# Patient Record
Sex: MALE | Race: WHITE | NOT HISPANIC OR LATINO | Employment: UNEMPLOYED | URBAN - METROPOLITAN AREA
[De-identification: names, ages, dates, MRNs, and addresses within clinical notes are randomized per-mention and may not be internally consistent; named-entity substitution may affect disease eponyms.]

---

## 2018-02-20 ENCOUNTER — HOSPITAL ENCOUNTER (OUTPATIENT)
Facility: CLINIC | Age: 43
Setting detail: OBSERVATION
Discharge: HOME | End: 2018-02-21
Attending: EMERGENCY MEDICINE | Admitting: INTERNAL MEDICINE
Payer: COMMERCIAL

## 2018-02-20 DIAGNOSIS — F10.11 HISTORY OF ALCOHOL ABUSE: ICD-10-CM

## 2018-02-20 DIAGNOSIS — F10.982 ALCOHOL-INDUCED INSOMNIA (H): Primary | ICD-10-CM

## 2018-02-20 DIAGNOSIS — I48.91 ATRIAL FIBRILLATION WITH RVR (H): ICD-10-CM

## 2018-02-20 LAB
ALBUMIN SERPL-MCNC: 4 G/DL (ref 3.4–5)
ALP SERPL-CCNC: 54 U/L (ref 40–150)
ALT SERPL W P-5'-P-CCNC: 35 U/L (ref 0–70)
ANION GAP SERPL CALCULATED.3IONS-SCNC: 8 MMOL/L (ref 3–14)
AST SERPL W P-5'-P-CCNC: 24 U/L (ref 0–45)
BASOPHILS # BLD AUTO: 0 10E9/L (ref 0–0.2)
BASOPHILS NFR BLD AUTO: 0.2 %
BILIRUB SERPL-MCNC: 1.1 MG/DL (ref 0.2–1.3)
BUN SERPL-MCNC: 7 MG/DL (ref 7–30)
CALCIUM SERPL-MCNC: 9.1 MG/DL (ref 8.5–10.1)
CHLORIDE SERPL-SCNC: 106 MMOL/L (ref 94–109)
CO2 SERPL-SCNC: 23 MMOL/L (ref 20–32)
CREAT SERPL-MCNC: 0.77 MG/DL (ref 0.66–1.25)
D DIMER PPP FEU-MCNC: <0.3 UG/ML FEU (ref 0–0.5)
DIFFERENTIAL METHOD BLD: NORMAL
EOSINOPHIL # BLD AUTO: 0 10E9/L (ref 0–0.7)
EOSINOPHIL NFR BLD AUTO: 0.2 %
ERYTHROCYTE [DISTWIDTH] IN BLOOD BY AUTOMATED COUNT: 12.3 % (ref 10–15)
ETHANOL SERPL-MCNC: <0.01 G/DL
GFR SERPL CREATININE-BSD FRML MDRD: >90 ML/MIN/1.7M2
GLUCOSE SERPL-MCNC: 108 MG/DL (ref 70–99)
HCT VFR BLD AUTO: 47.5 % (ref 40–53)
HGB BLD-MCNC: 16.9 G/DL (ref 13.3–17.7)
IMM GRANULOCYTES # BLD: 0 10E9/L (ref 0–0.4)
IMM GRANULOCYTES NFR BLD: 0.4 %
LYMPHOCYTES # BLD AUTO: 2.1 10E9/L (ref 0.8–5.3)
LYMPHOCYTES NFR BLD AUTO: 24.8 %
MAGNESIUM SERPL-MCNC: 2.1 MG/DL (ref 1.6–2.3)
MCH RBC QN AUTO: 31.1 PG (ref 26.5–33)
MCHC RBC AUTO-ENTMCNC: 35.6 G/DL (ref 31.5–36.5)
MCV RBC AUTO: 88 FL (ref 78–100)
MONOCYTES # BLD AUTO: 0.8 10E9/L (ref 0–1.3)
MONOCYTES NFR BLD AUTO: 9.3 %
NEUTROPHILS # BLD AUTO: 5.6 10E9/L (ref 1.6–8.3)
NEUTROPHILS NFR BLD AUTO: 65.1 %
NRBC # BLD AUTO: 0 10*3/UL
NRBC BLD AUTO-RTO: 0 /100
NT-PROBNP SERPL-MCNC: 96 PG/ML (ref 0–450)
PLATELET # BLD AUTO: 225 10E9/L (ref 150–450)
POTASSIUM SERPL-SCNC: 3.5 MMOL/L (ref 3.4–5.3)
PROT SERPL-MCNC: 7.3 G/DL (ref 6.8–8.8)
RBC # BLD AUTO: 5.43 10E12/L (ref 4.4–5.9)
SODIUM SERPL-SCNC: 137 MMOL/L (ref 133–144)
TROPONIN I SERPL-MCNC: <0.015 UG/L (ref 0–0.04)
TSH SERPL DL<=0.005 MIU/L-ACNC: 3.56 MU/L (ref 0.4–4)
WBC # BLD AUTO: 8.5 10E9/L (ref 4–11)

## 2018-02-20 PROCEDURE — 80053 COMPREHEN METABOLIC PANEL: CPT | Performed by: EMERGENCY MEDICINE

## 2018-02-20 PROCEDURE — 83880 ASSAY OF NATRIURETIC PEPTIDE: CPT | Performed by: EMERGENCY MEDICINE

## 2018-02-20 PROCEDURE — 99285 EMERGENCY DEPT VISIT HI MDM: CPT | Mod: 25

## 2018-02-20 PROCEDURE — 96365 THER/PROPH/DIAG IV INF INIT: CPT

## 2018-02-20 PROCEDURE — 93005 ELECTROCARDIOGRAM TRACING: CPT

## 2018-02-20 PROCEDURE — 25000125 ZZHC RX 250: Performed by: EMERGENCY MEDICINE

## 2018-02-20 PROCEDURE — 96375 TX/PRO/DX INJ NEW DRUG ADDON: CPT

## 2018-02-20 PROCEDURE — 84484 ASSAY OF TROPONIN QUANT: CPT | Performed by: EMERGENCY MEDICINE

## 2018-02-20 PROCEDURE — 85025 COMPLETE CBC W/AUTO DIFF WBC: CPT | Performed by: EMERGENCY MEDICINE

## 2018-02-20 PROCEDURE — 99220 ZZC INITIAL OBSERVATION CARE,LEVL III: CPT | Performed by: INTERNAL MEDICINE

## 2018-02-20 PROCEDURE — 83735 ASSAY OF MAGNESIUM: CPT | Performed by: EMERGENCY MEDICINE

## 2018-02-20 PROCEDURE — 84443 ASSAY THYROID STIM HORMONE: CPT | Performed by: EMERGENCY MEDICINE

## 2018-02-20 PROCEDURE — 85379 FIBRIN DEGRADATION QUANT: CPT | Performed by: EMERGENCY MEDICINE

## 2018-02-20 PROCEDURE — 96374 THER/PROPH/DIAG INJ IV PUSH: CPT

## 2018-02-20 PROCEDURE — G0378 HOSPITAL OBSERVATION PER HR: HCPCS

## 2018-02-20 PROCEDURE — 25000128 H RX IP 250 OP 636: Performed by: EMERGENCY MEDICINE

## 2018-02-20 PROCEDURE — 80320 DRUG SCREEN QUANTALCOHOLS: CPT | Performed by: EMERGENCY MEDICINE

## 2018-02-20 PROCEDURE — 96372 THER/PROPH/DIAG INJ SC/IM: CPT

## 2018-02-20 RX ORDER — LORAZEPAM 2 MG/ML
1 INJECTION INTRAMUSCULAR ONCE
Status: COMPLETED | OUTPATIENT
Start: 2018-02-20 | End: 2018-02-20

## 2018-02-20 RX ORDER — DILTIAZEM HYDROCHLORIDE 5 MG/ML
20 INJECTION INTRAVENOUS ONCE
Status: COMPLETED | OUTPATIENT
Start: 2018-02-20 | End: 2018-02-20

## 2018-02-20 RX ORDER — IBUPROFEN 600 MG/1
600 TABLET, FILM COATED ORAL EVERY 6 HOURS PRN
Status: ON HOLD | COMMUNITY
End: 2018-02-21

## 2018-02-20 RX ORDER — DILTIAZEM HYDROCHLORIDE 5 MG/ML
25 INJECTION INTRAVENOUS
Status: DISCONTINUED | OUTPATIENT
Start: 2018-02-20 | End: 2018-02-21

## 2018-02-20 RX ADMIN — ENOXAPARIN SODIUM 90 MG: 100 INJECTION SUBCUTANEOUS at 22:12

## 2018-02-20 RX ADMIN — LORAZEPAM 1 MG: 2 INJECTION INTRAMUSCULAR; INTRAVENOUS at 20:44

## 2018-02-20 RX ADMIN — FOLIC ACID: 5 INJECTION, SOLUTION INTRAMUSCULAR; INTRAVENOUS; SUBCUTANEOUS at 20:43

## 2018-02-20 RX ADMIN — DILTIAZEM HYDROCHLORIDE 20 MG: 5 INJECTION INTRAVENOUS at 21:31

## 2018-02-20 ASSESSMENT — ENCOUNTER SYMPTOMS
MYALGIAS: 1
NERVOUS/ANXIOUS: 1

## 2018-02-20 NOTE — IP AVS SNAPSHOT
MRN:[id number]                      After Visit Summary   2/20/2018    Georges Moreno    MRN: [id number]           Thank you!     Thank you for choosing [facility] for your care. Our goal is always to provide you with excellent care. Hearing back from our patients is one way we can continue to improve our services. Please take a few minutes to complete the written survey that you may receive in the mail after you visit. If you would like to speak to someone directly about your visit please contact Patient Relations at [phone]. Thank you!          Patient Information     Date Of Birth          1975        Designated Caregiver       Most Recent Value    Caregiver    Will someone help with your care after discharge? yes    Name of designated caregiver Alberta Moreno    Phone number of caregiver [phone]    Caregiver address same as patient      About your hospital stay     You were admitted on:  February 20, 2018 You last received care in the:  [facility] [facility] [address] [facility]    You were discharged on:  February 21, 2018        Reason for your hospital stay       You were admitted for concerns of rapid heart rate and anxiety. You were found to have an irregular heart rhythm called Atrial fib. Your heart rate has improved with new medication. You will be started on Xarelto for blood thinner. You will need to follow up with Dr. Mccormack in 1 week as well as cardiology. Take meds as directed.                  Who to Call     For medical emergencies, please call [phone].  For non-urgent questions about your medical care, please call your primary care provider or clinic, [phone]          Attending Provider     Provider Specialty    Katie Hunt MD Emergency Medicine    [facility]       Primary Care Provider Office Phone # Fax #    Sarah Mccormack [phone]      After Care Instructions     Activity       Your activity upon discharge: activity as  tolerated            Diet       Follow this diet upon discharge: Orders Placed This Encounter      Combination Diet Regular Diet Adult.   Alcohol cessation and avoid excessive caffeine                  Follow-up Appointments     Follow-up and recommended labs and tests        Follow up with primary care provider, SARAH KAPOOR, within 7 days for hospital follow- up.   Follow up with Cardiology as instructed.                  Your next 10 appointments already scheduled     Feb 23, 2018 12:45 PM CST   New Visit with Theo Palmer MD   [facility]    [address] [place], [place] [id number]  [facility] [id number] [phone]              Further instructions from your care team       You have a follow up appointment with Dr. Sarah Kapoor on Saturday, 3/3/2018 at 10am at the [facility] in [place] ([address], [place], [place], [address]nd Floor). If you are unable to keep this appointment, please call them at [phone] option 2 to change.     Pending Results     Date and Time Order Name Status Description    2/21/2018 1218 Cardiac event monitor In process             Statement of Approval     Ordered          02/21/18 1411  I have reviewed and agree with all the recommendations and orders detailed in this document.  EFFECTIVE NOW     Approved and electronically signed by:  Dalila Basilio PA-C             Admission Information     Date & Time Provider Department Dept. Phone    2/20/2018 John Sampson MD [place] [address] [facility] [phone]      Your Vitals Were     Blood Pressure Pulse Temperature Respirations Height Weight    129/60 94 96.3  F (35.7  C) (Oral) 16 1.829 m (6') 88.7 kg (195 lb 8 oz)    Pulse Oximetry BMI (Body Mass Index)                96% 26.51 kg/m2          MyChart Information     [employer] lets you send messages to your doctor, view your test results, renew your prescriptions, schedule appointments and more. To sign up,  "go to [email or web address] . Click on \"Log in\" on the left side of the screen, which will take you to the Welcome page. Then click on \"Sign up Now\" on the right side of the page.     You will be asked to enter the access code listed below, as well as some personal information. Please follow the directions to create your username and password.     Your access code is: [email or web address]  Expires: 2018 12:37 PM     Your access code will  in 90 days. If you need help or a new code, please call your [facility] clinic or [phone].        Care EveryWhere ID     This is your Care EveryWhere ID. This could be used by other organizations to access your [facility] medical records  [id number]        Equal Access to Services     BRITTANY TURPIN : Esha López, fanta bishop, mei bhatti, michelet palmer. So [facility] [phone].    ATENCIÓN: Si habla español, tiene a chinchilla disposición servicios gratuitos de asistencia lingüística. Wyatt al [phone].    We comply with applicable federal civil rights laws and [place] laws. We do not discriminate on the basis of race, color, national origin, age, disability, sex, sexual orientation, or gender identity.               Review of your medicines      START taking        Dose / Directions    gabapentin 300 MG capsule   Commonly known as:  NEURONTIN   Used for:  History of alcohol abuse        Dose:  300 mg   Take 1 capsule (300 mg) by mouth 2 times daily   Quantity:  120 capsule   Refills:  1       metoprolol tartrate 50 MG tablet   Commonly known as:  LOPRESSOR        Dose:  50 mg   Take 1 tablet (50 mg) by mouth 2 times daily   Quantity:  60 tablet   Refills:  0       rivaroxaban ANTICOAGULANT 20 MG Tabs tablet   Commonly known as:  XARELTO        Dose:  20 mg   Take 1 tablet (20 mg) by mouth daily (with dinner)   Quantity:  30 tablet   Refills:  0         STOP taking     ASPIRIN EC PO           ibuprofen 600 MG tablet "   Commonly known as:  ADVIL/MOTRIN                Where to get your medicines      These medications were sent to [facility] [place], [place] - [address] [address]  [address] [address], [facility] [address]     Phone:  [phone]     gabapentin 300 MG capsule    metoprolol tartrate 50 MG tablet    rivaroxaban ANTICOAGULANT 20 MG Tabs tablet                Protect others around you: Learn how to safely use, store and throw away your medicines at www.disposemymeds.org.             Medication List: This is a list of all your medications and when to take them. Check marks below indicate your daily home schedule. Keep this list as a reference.      Medications           Morning Afternoon Evening Bedtime As Needed    gabapentin 300 MG capsule   Commonly known as:  NEURONTIN   Take 1 capsule (300 mg) by mouth 2 times daily   Next Dose Due:  Begin today 2/21/18- take one dose tonight and then begin taking twice daily tomorrow 2/22/18                                metoprolol tartrate 50 MG tablet   Commonly known as:  LOPRESSOR   Take 1 tablet (50 mg) by mouth 2 times daily   Last time this was given:  50 mg on 2/21/2018  8:36 AM   Next Dose Due:  Take again tonight, 2/21/18                                rivaroxaban ANTICOAGULANT 20 MG Tabs tablet   Commonly known as:  XARELTO   Take 1 tablet (20 mg) by mouth daily (with dinner)   Next Dose Due:  Begin tonight, 2/21/18                                          More Information        Understanding Atrial Fibrillation    An arrhythmia is any problem with the speed or pattern of the heartbeat. Atrial fibrillation (AFib) is a common type of arrhythmia. It causes fast, chaotic electrical signals in the atria. This leads to poor functioning of the heart. It also affects how much blood your heart can pump out to the body.  Afib may occur once in a while and go away on its own. Or it may continue for longer periods and need treatment.  AFib can lead to  serious problems, such as stroke. Your healthcare provider will need to monitor and manage it.  What happens during atrial fibrillation?   The heart has an electrical system that sends signals to control the heartbeat. As signals move through the heart, they tell the heart s upper chambers (atria) and lower chambers (ventricles) when to squeeze (contract) and relax. This lets blood move through the heart and out to the body and lungs.  With AFib, the atria receive abnormal signals. This causes them to contract in a fast and irregular way, and out of sync with the ventricles. When this happens, the atria also have a harder time moving blood into the ventricles. Blood may then pool in the atria, which increases the risk for blood clots and stroke. The ventricles also may contract too quickly and irregularly. As a result, they may not pump blood to the body and lungs as well as they should. This can weaken the heart muscle over time and cause heart failure.  What causes atrial fibrillation?  AFib is more common in older adults. It has many possible causes including:    Coronary artery disease    Heart valve disease    Heart attack    Heart surgery    High blood pressure    Thyroid disease    Diabetes    Lung disease    Sleep apnea    Heavy alcohol use  In some cases of AFib, doctors do not know the cause.  What are the symptoms of atrial fibrillation?  AFib may or may not cause symptoms. If symptoms do occur, they may include:    A fast, pounding, irregular heartbeat    Shortness of breath    Tiredness    Dizziness or fainting    Chest pain  How is atrial fibrillation treated?  Treatments for AFib can include any of the options below.    Medicines. You may be prescribed:    Heart rate medicines to help slow down the heartbeat    Heart rhythm medicines to help the heart beat more regularly    Anti-clotting medicines to help reduce the risk for blood clots and stroke    Electrical cardioversion. Your healthcare provider  uses special pads or paddles to send one or more brief electrical shocks to the heart. This can help reset the heartbeat to normal.    Ablation. Long, thin tubes called catheters are threaded through a blood vessel to the heart. There, the catheters send out hot or cold energy to the areas causing the abnormal signals. This energy destroys the problem tissue or cells. This improves the chances that your heart will stay in normal rhythm without using medicines. If your heart rate and rhythm can t be controlled, you may need ablation and a pacemaker. These will help control the heart rate and regularity of the heartbeat.    Surgery. During surgery, your healthcare provider may use different methods to create scar tissue in the areas of the heart causing the abnormal signals. The scar tissue disrupts the abnormal signals and may stop AFib from occurring.  What are the complications of atrial fibrillation?  These can include:    Blood clots    Stroke    Heart failure. This problem occurs when the heart muscle weakens so much that it can no longer pump blood well.  When should I call my healthcare provider?  Call your healthcare provider right away if you have any of these:    Symptoms that don t get better with treatment, or get worse    New symptoms   Date Last Reviewed: 5/1/2016 2000-2017 [employer]. [address] [place] [address]. All rights reserved. This information is not intended as a substitute for professional medical care. Always follow your healthcare professional's instructions.                Discharge Instructions for Atrial Fibrillation  You have been diagnosed with an abnormal heart rhythm called atrial fibrillation. With this condition, your heart s 2 upper chambers quiver rather than squeeze the blood out in a normal pattern. This leads to an irregular and sometimes rapid heartbeat. Some people will develop associated symptoms such as a flip-flopping heartbeat, chest pain,  lightheadedness, or shortness of breath. Other people may have no symptoms at all. Atrial fibrillation is serious because it affects the heart s ability to fill with blood as it should. Blood clots may form. This increases the risk for stroke. Untreated atrial fibrillation can also lead to heart failure. Atrial fibrillation can be controlled. With treatment, most people with atrial fibrillation lead normal lives.  Treatment options  Recommended treatment for atrial fibrillation depends on your age, symptoms, how long you have had atrial fibrillation, and other factors. You will have a complete evaluation to find out if you have any abnormalities that caused your heart to go into atrial fibrillation. This might be blocked heart arteries or a thyroid problem. Your doctor will assess your particular case and discuss choices with you.  Treatment choices may include:    Treating an underlying disorder that puts you at risk for atrial fibrillation. For example, correcting an abnormal thyroid or electrolyte problem, or treating a blocked heart artery.    Restoring a normal heart rhythm with an electrical shock (cardioversion) or with an antiarrhythmic medicine (chemical cardioversion)    Using medicine to control your heart rate in atrial fibrillation.    Preventing the risk for blood clot and stroke using blood-thinning medicines. Your doctor will tell you what he or she recommends. Choices may include aspirin, clopidogrel, warfarin, dabigatran, rivaroxaban, apixaban, and edoxaban.    Doing catheter ablation or a surgical maze procedure. These use different methods to destroy certain areas of heart tissue. This interrupts the electrical signals causing atrial fibrillation. One of these procedures may be a choice when medicines do not work, or as an alternative to long-term medicine.    Other treatment choices may be recommended for you by your doctor.  Managing risk factors for stroke and preventing heart failure are  important parts of any treatment plan for atrial fibrillation.  Home care    Take your medicines exactly as directed. Don t skip doses.    Work with your doctor to find the right medicines and doses for you.    Learn to take your own pulse. Keep a record of your results. Ask your doctor which pulse rates mean that you need medical attention. Slowing your pulse is often the goal of treatment. Ask your doctor if it s OK for you to use an automatic machine to check your pulse at home. Sometimes these machines don t count the pulse correctly when you have atrial fibrillation.    Limit your intake of coffee, tea, cola, and other beverages with caffeine. Talk with your doctor about whether you should eliminate caffeine.    Avoid over-the-counter medicines that have caffeine in them.    Let your doctor know what medicines you take, including prescription and over-the-counter medicines, as well as any supplements. They interfere with some medicines given for atrial fibrillation.    Ask your doctor about whether you can drink alcohol. Some people need to avoid alcohol to better treat atrial fibrillation. If you are taking blood-thinner medicines, alcohol may interfere with them by increasing their effect.    Never take stimulants such as amphetamines or cocaine. These drugs can speed up your heart rate and trigger atrial fibrillation.  Follow-up care  Follow up with your doctor, or as advised.     When should I call my healthcare provider  Call your healthcare provider right away if you have any of the following:    Weakness    Dizziness    Fainting    Fatigue    Shortness of breath    Chest pain with increased activity    A change in the usual regularity of your heartbeat, or an unusually fast heartbeat   Date Last Reviewed: 4/23/2016 2000-2017 [employer]. [address] [place] [address], [place] [address]. All rights reserved. This information is not intended as a substitute for professional medical care. Always  follow your healthcare professional's instructions.                Patient Education    Rivaroxaban Oral tablet    Rivaroxaban Oral tablet, Rivaroxaban Oral tablet  Rivaroxaban Oral tablet, Rivaroxaban Oral tablet  What is this medicine?  RIVAROXABAN (ri va ROD a ban) is an anticoagulant (blood thinner). It is used to treat blood clots in the lungs or in the veins. It is also used after knee or hip surgeries to prevent blood clots. It is also used to lower the chance of stroke in people with a medical condition called atrial fibrillation.  This medicine may be used for other purposes; ask your health care provider or pharmacist if you have questions.  What should I tell my health care provider before I take this medicine?  They need to know if you have any of these conditions:    bleeding disorders    bleeding in the brain    blood in your stools (black or tarry stools) or if you have blood in your vomit    history of stomach bleeding    kidney disease    liver disease    low blood counts, like low white cell, platelet, or red cell counts    recent or planned spinal or epidural procedure    take medicines that treat or prevent blood clots    an unusual or allergic reaction to rivaroxaban, other medicines, foods, dyes, or preservatives    pregnant or trying to get pregnant    breast-feeding  How should I use this medicine?  Take this medicine by mouth with a glass of water. Follow the directions on the prescription label. Take your medicine at regular intervals. Do not take it more often than directed. Do not stop taking except on your doctor's advice. Stopping this medicine may increase your risk of a blood clot. Be sure to refill your prescription before you run out of medicine.  If you are taking this medicine after hip or knee replacement surgery, take it with or without food. If you are taking this medicine for atrial fibrillation, take it with your evening meal. If you are taking this medicine to treat blood  clots, take it with food at the same time each day. If you are unable to swallow your tablet, you may crush the tablet and mix it in applesauce. Then, immediately eat the applesauce. You should eat more food right after you eat the applesauce containing the crushed tablet.  Talk to your pediatrician regarding the use of this medicine in children. Special care may be needed.  Overdosage: If you think you have taken too much of this medicine contact a poison control center or emergency room at once.  NOTE: This medicine is only for you. Do not share this medicine with others.  What if I miss a dose?  If you take your medicine once a day and miss a dose, take the missed dose as soon as you remember. If you take your medicine twice a day and miss a dose, take the missed dose immediately. In this instance, 2 tablets may be taken at the same time. The next day you should take 1 tablet twice a day as directed.  What may interact with this medicine?    aspirin and aspirin-like medicines    certain antibiotics like erythromycin, azithromycin, and clarithromycin    certain medicines for fungal infections like ketoconazole and itraconazole    certain medicines for irregular heart beat like amiodarone, quinidine, dronedarone    certain medicines for seizures like carbamazepine, phenytoin    certain medicines that treat or prevent blood clots like warfarin, enoxaparin, and dalteparin    conivaptan    diltiazem    felodipine    indinavir    lopinavir; ritonavir    NSAIDS, medicines for pain and inflammation, like ibuprofen or naproxen    ranolazine    rifampin    ritonavir    [place]'s wort    verapamil  This list may not describe all possible interactions. Give your health care provider a list of all the medicines, herbs, non-prescription drugs, or dietary supplements you use. Also tell them if you smoke, drink alcohol, or use illegal drugs. Some items may interact with your medicine.  What should I watch for while using this  medicine?  Visit your doctor or health care professional for regular checks on your progress. Your condition will be monitored carefully while you are receiving this medicine.  Notify your doctor or health care professional and seek emergency treatment if you develop breathing problems; changes in vision; chest pain; severe, sudden headache; pain, swelling, warmth in the leg; trouble speaking; sudden numbness or weakness of the face, arm, or leg. These can be signs that your condition has gotten worse.  If you are going to have surgery, tell your doctor or health care professional that you are taking this medicine.  Tell your health care professional that you use this medicine before you have a spinal or epidural procedure. Sometimes people who take this medicine have bleeding problems around the spine when they have a spinal or epidural procedure. This bleeding is very rare. If you have a spinal or epidural procedure while on this medicine, call your health care professional immediately if you have back pain, numbness or tingling (especially in your legs and feet), muscle weakness, paralysis, or loss of bladder or bowel control.  Avoid sports and activities that might cause injury while you are using this medicine. Severe falls or injuries can cause unseen bleeding. Be careful when using sharp tools or knives. Consider using an electric razor. Take special care brushing or flossing your teeth. Report any injuries, bruising, or red spots on the skin to your doctor or health care professional.  What side effects may I notice from receiving this medicine?  Side effects that you should report to your doctor or health care professional as soon as possible:    allergic reactions like skin rash, itching or hives, swelling of the face, lips, or tongue    back pain    redness, blistering, peeling or loosening of the skin, including inside the mouth    signs and symptoms of bleeding such as bloody or black, tarry stools; red  or dark-brown urine; spitting up blood or brown material that looks like coffee grounds; red spots on the skin; unusual bruising or bleeding from the eye, gums, or nose  Side effects that usually do not require medical attention (Report these to your doctor or health care professional if they continue or are bothersome.):    dizziness    muscle pain  This list may not describe all possible side effects. Call your doctor for medical advice about side effects. You may report side effects to FDA at 9-999-YXY-1048.  Where should I keep my medicine?  Keep out of the reach of children.  Store at room temperature between 15 and 30 degrees C (59 and 86 degrees F). Throw away any unused medicine after the expiration date.  NOTE: This sheet is a summary. It may not cover all possible information. If you have questions about this medicine, talk to your doctor, pharmacist, or health care provider.  NOTE:This sheet is a summary. It may not cover all possible information. If you have questions about this medicine, talk to your doctor, pharmacist, or health care provider. Copyright  2016 [employer]                Metoprolol tablets  Brand Name: Lopressor  What is this medicine?  METOPROLOL (me TOE proe lole) is a beta-blocker. Beta-blockers reduce the workload on the heart and help it to beat more regularly. This medicine is used to treat high blood pressure and to prevent chest pain. It is also used to after a heart attack and to prevent an additional heart attack from occurring.  How should I use this medicine?  Take this medicine by mouth with a drink of water. Follow the directions on the prescription label. Take this medicine immediately after meals. Take your doses at regular intervals. Do not take more medicine than directed. Do not stop taking this medicine suddenly. This could lead to serious heart-related effects.  Talk to your pediatrician regarding the use of this medicine in children. Special care may be  needed.  What side effects may I notice from receiving this medicine?  Side effects that you should report to your doctor or health care professional as soon as possible:    allergic reactions like skin rash, itching or hives    cold or numb hands or feet    depression    difficulty breathing    faint    fever with sore throat    irregular heartbeat, chest pain    rapid weight gain    swollen legs or ankles  Side effects that usually do not require medical attention (report to your doctor or health care professional if they continue or are bothersome):    anxiety or nervousness    change in sex drive or performance    dry skin    headache    nightmares or trouble sleeping    short term memory loss    stomach upset or diarrhea    unusually tired  What may interact with this medicine?  This medicine may interact with the following medications:    certain medicines for blood pressure, heart disease, irregular heart beat    certain medicines for depression like monoamine oxidase (MAO) inhibitors, fluoxetine, or paroxetine    clonidine    dobutamine    epinephrine    isoproterenol    reserpine  What if I miss a dose?  If you miss a dose, take it as soon as you can. If it is almost time for your next dose, take only that dose. Do not take double or extra doses.  Where should I keep my medicine?  Keep out of the reach of children.  Store at room temperature between 15 and 30 degrees C (59 and 86 degrees F). Throw away any unused medicine after the expiration date.  What should I tell my health care provider before I take this medicine?  They need to know if you have any of these conditions:    diabetes    heart or vessel disease like slow heart rate, worsening heart failure, heart block, sick sinus syndrome or Raynaud's disease    kidney disease    liver disease    lung or breathing disease, like asthma or emphysema    pheochromocytoma    thyroid disease    an unusual or allergic reaction to metoprolol, other  beta-blockers, medicines, foods, dyes, or preservatives    pregnant or trying to get pregnant    breast-feeding  What should I watch for while using this medicine?  Visit your doctor or health care professional for regular check ups. Contact your doctor right away if your symptoms worsen. Check your blood pressure and pulse rate regularly. Ask your health care professional what your blood pressure and pulse rate should be, and when you should contact them.  You may get drowsy or dizzy. Do not drive, use machinery, or do anything that needs mental alertness until you know how this medicine affects you. Do not sit or stand up quickly, especially if you are an older patient. This reduces the risk of dizzy or fainting spells. Contact your doctor if these symptoms continue. Alcohol may interfere with the effect of this medicine. Avoid alcoholic drinks.  NOTE:This sheet is a summary. It may not cover all possible information. If you have questions about this medicine, talk to your doctor, pharmacist, or health care provider. Copyright  2017 [employer]

## 2018-02-20 NOTE — Clinical Note
Admitting Physician: DAVEY MENJIVAR [[id number]]   [id number]   Bed Type: Cardiac Telemetry [44]   Special needs: Fall Risk [8]   Special needs: Cardiac Drip [12]   Bed request comments: BED REQUEST IN AT 2369

## 2018-02-20 NOTE — IP AVS SNAPSHOT
[place] [address] [facility]    [address]    [facility] [address]    Phone:  [phone]    Fax:  [phone]                                       After Visit Summary   2/20/2018    Georges Moreno    MRN: [id number]           After Visit Summary Signature Page     I have received my discharge instructions, and my questions have been answered. I have discussed any challenges I see with this plan with the nurse or doctor.    ..........................................................................................................................................  Patient/Patient Representative Signature      ..........................................................................................................................................  Patient Representative Print Name and Relationship to Patient    ..................................................               ................................................  Date                                            Time    ..........................................................................................................................................  Reviewed by Signature/Title    ...................................................              ..............................................  Date                                                            Time

## 2018-02-21 ENCOUNTER — APPOINTMENT (OUTPATIENT)
Dept: CARDIOLOGY | Facility: CLINIC | Age: 43
End: 2018-02-21
Attending: INTERNAL MEDICINE
Payer: COMMERCIAL

## 2018-02-21 VITALS
HEIGHT: 72 IN | HEART RATE: 94 BPM | OXYGEN SATURATION: 96 % | SYSTOLIC BLOOD PRESSURE: 129 MMHG | TEMPERATURE: 96.3 F | DIASTOLIC BLOOD PRESSURE: 60 MMHG | WEIGHT: 195.5 LBS | BODY MASS INDEX: 26.48 KG/M2 | RESPIRATION RATE: 16 BRPM

## 2018-02-21 PROBLEM — I48.91 ATRIAL FIBRILLATION WITH RVR (H): Status: RESOLVED | Noted: 2018-02-21 | Resolved: 2018-02-21

## 2018-02-21 PROBLEM — I48.91 ATRIAL FIBRILLATION WITH RVR (H): Status: ACTIVE | Noted: 2018-02-21

## 2018-02-21 LAB
ALBUMIN SERPL-MCNC: 3.8 G/DL (ref 3.4–5)
ALP SERPL-CCNC: 50 U/L (ref 40–150)
ALT SERPL W P-5'-P-CCNC: 35 U/L (ref 0–70)
ANION GAP SERPL CALCULATED.3IONS-SCNC: 7 MMOL/L (ref 3–14)
AST SERPL W P-5'-P-CCNC: 22 U/L (ref 0–45)
BILIRUB SERPL-MCNC: 1.5 MG/DL (ref 0.2–1.3)
BUN SERPL-MCNC: 8 MG/DL (ref 7–30)
CALCIUM SERPL-MCNC: 9 MG/DL (ref 8.5–10.1)
CHLORIDE SERPL-SCNC: 107 MMOL/L (ref 94–109)
CO2 SERPL-SCNC: 27 MMOL/L (ref 20–32)
CREAT SERPL-MCNC: 0.98 MG/DL (ref 0.66–1.25)
ERYTHROCYTE [DISTWIDTH] IN BLOOD BY AUTOMATED COUNT: 12.6 % (ref 10–15)
GFR SERPL CREATININE-BSD FRML MDRD: 83 ML/MIN/1.7M2
GLUCOSE SERPL-MCNC: 104 MG/DL (ref 70–99)
HCT VFR BLD AUTO: 52.9 % (ref 40–53)
HGB BLD-MCNC: 18.1 G/DL (ref 13.3–17.7)
INTERPRETATION ECG - MUSE: NORMAL
MAGNESIUM SERPL-MCNC: 2.4 MG/DL (ref 1.6–2.3)
MCH RBC QN AUTO: 31.1 PG (ref 26.5–33)
MCHC RBC AUTO-ENTMCNC: 34.2 G/DL (ref 31.5–36.5)
MCV RBC AUTO: 91 FL (ref 78–100)
PHOSPHATE SERPL-MCNC: 2.9 MG/DL (ref 2.5–4.5)
PLATELET # BLD AUTO: 210 10E9/L (ref 150–450)
POTASSIUM SERPL-SCNC: 4.2 MMOL/L (ref 3.4–5.3)
PROT SERPL-MCNC: 7.2 G/DL (ref 6.8–8.8)
RBC # BLD AUTO: 5.82 10E12/L (ref 4.4–5.9)
SODIUM SERPL-SCNC: 141 MMOL/L (ref 133–144)
WBC # BLD AUTO: 8 10E9/L (ref 4–11)

## 2018-02-21 PROCEDURE — 25000128 H RX IP 250 OP 636: Performed by: INTERNAL MEDICINE

## 2018-02-21 PROCEDURE — 83735 ASSAY OF MAGNESIUM: CPT | Performed by: INTERNAL MEDICINE

## 2018-02-21 PROCEDURE — G0378 HOSPITAL OBSERVATION PER HR: HCPCS

## 2018-02-21 PROCEDURE — 25000132 ZZH RX MED GY IP 250 OP 250 PS 637: Performed by: INTERNAL MEDICINE

## 2018-02-21 PROCEDURE — 84100 ASSAY OF PHOSPHORUS: CPT | Performed by: INTERNAL MEDICINE

## 2018-02-21 PROCEDURE — 93272 ECG/REVIEW INTERPRET ONLY: CPT | Performed by: INTERNAL MEDICINE

## 2018-02-21 PROCEDURE — 93306 TTE W/DOPPLER COMPLETE: CPT

## 2018-02-21 PROCEDURE — 36415 COLL VENOUS BLD VENIPUNCTURE: CPT | Performed by: INTERNAL MEDICINE

## 2018-02-21 PROCEDURE — 80053 COMPREHEN METABOLIC PANEL: CPT | Performed by: INTERNAL MEDICINE

## 2018-02-21 PROCEDURE — 85027 COMPLETE CBC AUTOMATED: CPT | Performed by: INTERNAL MEDICINE

## 2018-02-21 PROCEDURE — 93270 REMOTE 30 DAY ECG REV/REPORT: CPT | Performed by: PHYSICIAN ASSISTANT

## 2018-02-21 PROCEDURE — 99207 ZZC NON-BILLABLE SERV PER CHARTING: CPT | Performed by: PSYCHOLOGIST

## 2018-02-21 PROCEDURE — 99217 ZZC OBSERVATION CARE DISCHARGE: CPT | Performed by: INTERNAL MEDICINE

## 2018-02-21 PROCEDURE — 96372 THER/PROPH/DIAG INJ SC/IM: CPT

## 2018-02-21 PROCEDURE — 25000132 ZZH RX MED GY IP 250 OP 250 PS 637: Performed by: PHYSICIAN ASSISTANT

## 2018-02-21 PROCEDURE — 93306 TTE W/DOPPLER COMPLETE: CPT | Mod: 26 | Performed by: INTERNAL MEDICINE

## 2018-02-21 RX ORDER — MULTIPLE VITAMINS W/ MINERALS TAB 9MG-400MCG
1 TAB ORAL DAILY
Status: DISCONTINUED | OUTPATIENT
Start: 2018-02-21 | End: 2018-02-21 | Stop reason: HOSPADM

## 2018-02-21 RX ORDER — MAGNESIUM SULFATE HEPTAHYDRATE 40 MG/ML
4 INJECTION, SOLUTION INTRAVENOUS EVERY 4 HOURS PRN
Status: DISCONTINUED | OUTPATIENT
Start: 2018-02-21 | End: 2018-02-21 | Stop reason: HOSPADM

## 2018-02-21 RX ORDER — LORAZEPAM 1 MG/1
1-2 TABLET ORAL EVERY 30 MIN PRN
Status: DISCONTINUED | OUTPATIENT
Start: 2018-02-21 | End: 2018-02-21 | Stop reason: HOSPADM

## 2018-02-21 RX ORDER — QUETIAPINE FUMARATE 50 MG/1
50 TABLET, FILM COATED ORAL AT BEDTIME
Status: DISCONTINUED | OUTPATIENT
Start: 2018-02-21 | End: 2018-02-21 | Stop reason: HOSPADM

## 2018-02-21 RX ORDER — POTASSIUM CHLORIDE 7.45 MG/ML
10 INJECTION INTRAVENOUS
Status: DISCONTINUED | OUTPATIENT
Start: 2018-02-21 | End: 2018-02-21 | Stop reason: HOSPADM

## 2018-02-21 RX ORDER — FOLIC ACID 1 MG/1
1 TABLET ORAL DAILY
Status: DISCONTINUED | OUTPATIENT
Start: 2018-02-21 | End: 2018-02-21 | Stop reason: HOSPADM

## 2018-02-21 RX ORDER — METOPROLOL TARTRATE 50 MG
50 TABLET ORAL 2 TIMES DAILY
Qty: 60 TABLET | Refills: 0 | Status: SHIPPED | OUTPATIENT
Start: 2018-02-21 | End: 2018-03-30

## 2018-02-21 RX ORDER — POTASSIUM CHLORIDE 1.5 G/1.58G
20-40 POWDER, FOR SOLUTION ORAL
Status: DISCONTINUED | OUTPATIENT
Start: 2018-02-21 | End: 2018-02-21 | Stop reason: HOSPADM

## 2018-02-21 RX ORDER — METOPROLOL TARTRATE 50 MG
50 TABLET ORAL 2 TIMES DAILY
Status: DISCONTINUED | OUTPATIENT
Start: 2018-02-21 | End: 2018-02-21 | Stop reason: HOSPADM

## 2018-02-21 RX ORDER — GABAPENTIN 300 MG/1
300 CAPSULE ORAL 2 TIMES DAILY
Qty: 120 CAPSULE | Refills: 1 | Status: SHIPPED | OUTPATIENT
Start: 2018-02-21 | End: 2018-05-07

## 2018-02-21 RX ORDER — LIDOCAINE 40 MG/G
CREAM TOPICAL
Status: DISCONTINUED | OUTPATIENT
Start: 2018-02-21 | End: 2018-02-21

## 2018-02-21 RX ORDER — NALOXONE HYDROCHLORIDE 0.4 MG/ML
.1-.4 INJECTION, SOLUTION INTRAMUSCULAR; INTRAVENOUS; SUBCUTANEOUS
Status: DISCONTINUED | OUTPATIENT
Start: 2018-02-21 | End: 2018-02-21 | Stop reason: HOSPADM

## 2018-02-21 RX ORDER — LANOLIN ALCOHOL/MO/W.PET/CERES
100 CREAM (GRAM) TOPICAL DAILY
Status: DISCONTINUED | OUTPATIENT
Start: 2018-02-21 | End: 2018-02-21 | Stop reason: HOSPADM

## 2018-02-21 RX ORDER — POTASSIUM CL/LIDO/0.9 % NACL 10MEQ/0.1L
10 INTRAVENOUS SOLUTION, PIGGYBACK (ML) INTRAVENOUS
Status: DISCONTINUED | OUTPATIENT
Start: 2018-02-21 | End: 2018-02-21 | Stop reason: HOSPADM

## 2018-02-21 RX ORDER — POTASSIUM CHLORIDE 1500 MG/1
20-40 TABLET, EXTENDED RELEASE ORAL
Status: DISCONTINUED | OUTPATIENT
Start: 2018-02-21 | End: 2018-02-21 | Stop reason: HOSPADM

## 2018-02-21 RX ORDER — POTASSIUM CHLORIDE 29.8 MG/ML
20 INJECTION INTRAVENOUS
Status: DISCONTINUED | OUTPATIENT
Start: 2018-02-21 | End: 2018-02-21 | Stop reason: HOSPADM

## 2018-02-21 RX ORDER — LORAZEPAM 2 MG/ML
1-2 INJECTION INTRAMUSCULAR EVERY 30 MIN PRN
Status: DISCONTINUED | OUTPATIENT
Start: 2018-02-21 | End: 2018-02-21 | Stop reason: HOSPADM

## 2018-02-21 RX ORDER — NICOTINE 21 MG/24HR
1 PATCH, TRANSDERMAL 24 HOURS TRANSDERMAL DAILY
Status: DISCONTINUED | OUTPATIENT
Start: 2018-02-21 | End: 2018-02-21

## 2018-02-21 RX ADMIN — NICOTINE POLACRILEX 2 MG: 2 GUM, CHEWING ORAL at 11:51

## 2018-02-21 RX ADMIN — Medication 100 MG: at 08:36

## 2018-02-21 RX ADMIN — FOLIC ACID 1 MG: 1 TABLET ORAL at 08:36

## 2018-02-21 RX ADMIN — MULTIPLE VITAMINS W/ MINERALS TAB 1 TABLET: TAB at 08:36

## 2018-02-21 RX ADMIN — QUETIAPINE FUMARATE 50 MG: 50 TABLET, FILM COATED ORAL at 02:10

## 2018-02-21 RX ADMIN — NICOTINE 1 PATCH: 21 PATCH, EXTENDED RELEASE TRANSDERMAL at 02:11

## 2018-02-21 RX ADMIN — METOPROLOL TARTRATE 50 MG: 50 TABLET, FILM COATED ORAL at 08:36

## 2018-02-21 RX ADMIN — ENOXAPARIN SODIUM 90 MG: 100 INJECTION SUBCUTANEOUS at 10:34

## 2018-02-21 ASSESSMENT — ACTIVITIES OF DAILY LIVING (ADL)
AMBULATION: 0-->INDEPENDENT
TRANSFERRING: 0-->INDEPENDENT
EATING: 0 - INDEPENDENT
BATHING: 0-->INDEPENDENT
TOILETING: 0 - INDEPENDENT
RETIRED_EATING: 0-->INDEPENDENT
COMMUNICATION: 0 - UNDERSTANDS/COMMUNICATES WITHOUT DIFFICULTY
TOILETING: 0-->INDEPENDENT
DRESS: 0 - INDEPENDENT
DRESS: 0-->INDEPENDENT
SWALLOWING: 0 - SWALLOWS FOODS/LIQUIDS WITHOUT DIFFICULTY
BATHING: 0 - INDEPENDENT
SWALLOWING: 0-->SWALLOWS FOODS/LIQUIDS WITHOUT DIFFICULTY
COGNITION: 0 - NO COGNITION ISSUES REPORTED
RETIRED_COMMUNICATION: 0-->UNDERSTANDS/COMMUNICATES WITHOUT DIFFICULTY
TRANSFERRING: 0 - INDEPENDENT
AMBULATION: 0 - INDEPENDENT
FALL_HISTORY_WITHIN_LAST_SIX_MONTHS: NO

## 2018-02-21 NOTE — PHARMACY-ADMISSION MEDICATION HISTORY
Admission medication history interview status for this patient is complete. See [facility] admission navigator for allergy information, prior to admission medications and immunization status.     Medication history interview source(s):Patient  Medication history resources (including written lists, pill bottles, clinic record):None  Primary pharmacy:-    Changes made to PTA medication list:  Added: ibuprofen, aspirin  Deleted: -  Changed: -    Actions taken by pharmacist (provider contacted, etc):None     Additional medication history information:None    Medication reconciliation/reorder completed by provider prior to medication history? No    Do you take OTC medications (eg tylenol, ibuprofen, fish oil, eye/ear drops, etc)? yes(Y/N)    For patients on insulin therapy: no (Y/N)  Lantus/levemir/NPH/Mix 70/30 dose:   (Y/N) (see Med list for doses)   Sliding scale Novolog Y/N  If Yes, do you have a baseline novolog pre-meal dose:  units with meals  Patients eat three meals a day:   Y/N    How many episodes of hypoglycemia do you have per week: _______  How many missed doses do you have per week: ______  How many times do you check your blood glucose per day: _______   Any Barriers to therapy - Be specific :  cost of medications, comfortable with giving injections (if applicable), comfortable and confident with current diabetes regimen: Y/N ______________      Prior to Admission medications    Medication Sig Last Dose Taking? Auth Provider   ASPIRIN EC PO Take 325 mg by mouth daily as needed for fever  Yes Unknown, Entered By History   ibuprofen (ADVIL/MOTRIN) 600 MG tablet Take 600 mg by mouth every 6 hours as needed for moderate pain  Yes Unknown, Entered By History

## 2018-02-21 NOTE — PROGRESS NOTES
Pt to D/C to home.  Pt provided with d/c instructions, including new medications, when medications were last given, and when to take them again.  Pt also informed to f/u with Cardiology as scheduled on 2/23 and with PCP as scheduled on 3/3.  Encouraged cessation of alcohol consumption and limiting caffeine intake.  Pt verbalized understanding of all d/c and f/u instructions.  All questions were answered at this time.  Copy of paperwork sent with pt- including information on a-fib and new medications.  Pt provided with community resources for alcohol addition by PATRIZIA.  Cardiac monitor applied per cardiopulmonary department.  Medications (xarelto, lopressor, and gabapentin) x 3 sent with pt.  Friend to provide transport.  All personal belongings sent with pt.

## 2018-02-21 NOTE — PROGRESS NOTES
"PRIMARY DIAGNOSIS: \"GENERIC\" NURSING  OUTPATIENT/OBSERVATION GOALS TO BE MET BEFORE DISCHARGE:  1. ADLs back to baseline: Yes    2. Activity and level of assistance: Independent    3. Pain status: Denies     4. Return to near baseline physical activity: Yes     Discharge Planner Nurse   Safe discharge environment identified: Yes  Barriers to discharge: Yes       Entered by: Gosia Duran 02/21/2018 7:39 AM     Please review provider order for any additional goals.   Nurse to notify provider when observation goals have been met and patient is ready for discharge.    Pt up to unit @ 0100. Tachy, elevated BP, scheduled Metoprolol. On Ra, denies any pain. Up independently, voiding without difficulty, BM this shift, PIV saline locked, on tele monitoring A-fib with RVR (). CIWA scores 4,3. Nicotine patch in place. Consults: Chem dep Will continue to monitor.   "

## 2018-02-21 NOTE — ED NOTES
"Anxiety for the past week.  Worsening symptoms.  Uses ETOH daily with last drink about 1330 today.  Wants to quit drinking but causes so agitation and shakiness.  \"my body feels like it's going to explode\"  Unable to sleep.  Patient alert and oriented x3.  Airway, breathing and circulation intact.  "

## 2018-02-21 NOTE — PROGRESS NOTES
"PRIMARY DIAGNOSIS: \"GENERIC\" NURSING  OUTPATIENT/OBSERVATION GOALS TO BE MET BEFORE DISCHARGE:  1. ADLs back to baseline: Yes    Activity and level of assistance: Independent  2. Pain status: Denies     3. Return to near baseline physical activity: Yes     Discharge Planner Nurse   Safe discharge environment identified: Yes  Barriers to discharge: Yes       Entered by: Gosia Duran 02/21/2018 7:37 AM     Please review provider order for any additional goals.   Nurse to notify provider when observation goals have been met and patient is ready for discharge.  "

## 2018-02-21 NOTE — ED PROVIDER NOTES
"  History     Chief Complaint:  Panic attack     HPI:   The history is provided by the patient.      Georges Moreno is a 42 year old male with a history of anxiety and alcoholism who presents to the emergency department for evaluation of panic attacks. The patient reports he has been chronically using alcohol since he was a teenager. 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 He says that he has an MRI scheduled for tomorrow secondary to headaches that he has been having for the past 5 months and notes that he worries about the cause of these headaches daily. With his approaching MRI he has been thinking about it more frequently and thinks it is possible that thinking about this is giving him panic attacks, which would correlate with these worsening panic attacks in the last week which include racing heart rate and sweating.  Before this he was drinking about 8-10 beers a day and states that with these worsening panic attacks he has been trying to cut back on his alcohol usage. He presents today as his body \"feels like it is going to explode\". Here in the ED he notes that he has been in treatment 6 times and does not want to be admitted here today as he does not want to be late for his MRI appointment tomorrow. He does not believe to have ever gone through alcohol withdrawal symptoms but does not exactly know what those would be. He endorses that he has failed detox but is amenable to speaking to a therapist.     CARDIAC RISK FACTORS:  Sex:    Male   Tobacco:   Positive  Hypertension:   Negative  Hyperlipidemia:  Negative  Diabetes:   Negative  Family History:  Negative    Allergies:  No known drug allergies     Medications:    The patient is not currently taking any prescribed medications.     Past Medical History:    Anxiety   Alcoholism   Thoracic or lumbosacral neuritis or radiculitis, unspecified    Past Surgical History:    History reviewed. No pertinent surgical " history.     Family History:    Alcohol/drug - father and uncle     Social History:  Presents with no one    Tobacco use: 1.00 PPD   Alcohol use: Daily   History of alcoholism   PCP: MORGAN KAPOOR    Marital Status:  Single     Review of Systems   Musculoskeletal: Positive for myalgias.   Psychiatric/Behavioral: The patient is nervous/anxious.      Physical Exam     Patient Vitals for the past 24 hrs:   BP Temp Temp src Pulse Heart Rate Resp SpO2 Height Weight   02/20/18 2140 - - - - 93 16 97 % - -   02/20/18 2130 (!) 153/97 - - - 103 8 97 % - -   02/20/18 2125 - - - - 120 16 98 % - -   02/20/18 2115 (!) 137/111 - - - - 18 97 % - -   02/20/18 2100 (!) 122/95 - - - 112 10 97 % - -   02/20/18 2045 (!) 125/115 - - - 112 (!) 6 96 % - -   02/20/18 2030 (!) 121/102 - - - 120 (!) 5 97 % - -   02/20/18 2015 (!) 119/103 - - - 106 8 96 % - -   02/20/18 2000 110/76 - - - 107 9 97 % - -   02/20/18 1945 (!) 128/97 - - - 149 (!) 5 98 % - -   02/20/18 1930 (!) 136/94 - - - 98 (!) 6 97 % - -   02/20/18 1856 (!) 165/101 98.1  F (36.7  C) Oral 96 - 20 98 % 1.829 m (6') 90.7 kg (200 lb)        Physical Exam  General: Well-nourished, appears anxious  Eyes: PERRL, conjunctivae pink no scleral icterus or conjunctival injection  ENT:  Moist mucus membranes, posterior oropharynx clear without erythema or exudates  Respiratory:  Lungs clear to auscultation bilaterally, no crackles/rubs/wheezes.  Good air movement  CV: Irregularly irregular rhythm and tachycardic rate, no murmurs/rubs/gallops  GI:  Abdomen soft and non-distended.  Normoactive BS.  No tenderness, guarding or rebound  Skin: Warm, mildly diaphoretic over back. No rashes or petechiae  Musculoskeletal: No peripheral edema or calf tenderness  Neuro: Alert and oriented to person/place/time  Psychiatric: Anxious affect, mild tremulousness      Emergency Department Course   ECG (20:16:17):  Rate 133 bpm. MS interval *. QRS duration 86. QT/QTc 276/410. P-R-T axes * 90 -20. Atrial  fibrillation with rapid ventricular response. Rightward axis. Abnormal QRS-T angle, consider primary T wave abnormality. Abnormal ECG. Agree with computer interpretation.  Interpreted at 2018 by Katie Hunt MD.     Laboratory:  Alcohol ethyl: <0.01  CBC: WNL (WBC 8.5, HGB 16.9, )    CMP: Glucose 108 (high), ow WNL (Creatinine 0.77)   2010: Troponin I: <0.015    D-dimer: <0.3   TSH with free T4 reflex: 3.56  Magnesium: 2.1  Nt probnp inpatient (BNP): 96     Interventions:  2044: Ativan 1 mg IV   2043: 1L NS bolus with INFUVITE, thiamine 100 mg, folic acid 1 mg infusion IV  2131: Cardizem 20 mg IV  2212: Lovenox 90 mg subcutaneous    Caredizem pending on admission     Emergency Department Course:  Past medical records, nursing notes, and vitals reviewed.  1950: I performed an exam of the patient and obtained history, as documented above.     Above interventions provided.      Blood drawn. This was sent to the lab for further testing, results above.     I personally reviewed the laboratory results with the Patient and answered all related questions prior to admission.       Findings and plan explained to the Patient who consents to admission.     2140: Discussed the patient with Dr. Sampson, who will admit the patient to a medical bed for further monitoring, evaluation, and treatment.      Impression & Plan      Medical Decision Making:  Georges Moreno is a 42 year old male comes today with complaint of palpitations and anxiety in the setting of a long history of alcohol abuse and decreasing his drinking lately.   He was treated with ativan for possible withdrawal initially.  EKG then showed the patient to be in atrial fibrillation with RVR. There are no signs of cardiac ischemia and no signs or symptoms of pulmonary embolism, pneumonia or other acute process.  We rate controlled with Cardizem and started on Lovenox. Given that the duration since onset is unknown, I did not feel that it is safe to cardiovert in  the ED and so the patient will be admitted for ongoing anticoagulation, rate control and evaluation prior to decision for cardioversion versus chronic rate control and anticoagulation.  He is at high risk for withdrawal and will need to be closely monitors. I spoke with the hospitalist who graciously agreed to admit the patient to the telemetry floor.  The patient's heart rate is under better control and so I feel safe for the telemetry floor.      Diagnosis:    ICD-10-CM    1. Atrial fibrillation with RVR (H) I48.91 Magnesium     Magnesium     CANCELED: Magnesium   2. History of alcohol abuse Z87.898        Disposition:  Admitted to Dr. Sampson for further evaluation.     Scribe Disclosure:   I, Mateo Cornell, am serving as a scribe at 7:50 PM on 2/20/2018 to document services personally performed by Katie Hunt MD based on my observations and the provider's statements to me.       Mateo Cornell  2/20/2018   [facility] EMERGENCY DEPARTMENT       Katie Hunt MD  02/20/18 [id number]

## 2018-02-21 NOTE — DISCHARGE SUMMARY
[facility]  Outpatient/Observation Unit  Discharge Summary        Patient ID:  Georges Moreno  [id number]  42 year old  1975    Admit date: 2/20/2018    Discharge date and time: 2/21/2018     Admitting Provider: John Sampson MD    Discharge Provider: Dalila Basilio PA-C    Admission Diagnoses:  History of alcohol abuse [Z87.898]  New onset Atrial fibrillation with RVR (H) [I48.91]    Discharge Diagnoses: same     Admission Condition: good    Discharged Condition: good    Hospital Course:    Reason for your hospital stay       You were admitted for concerns of rapid heart rate and anxiety. You were   found to have an irregular heart rhythm called Atrial fib. Your heart rate   has improved with new medication. You will be started on Xarelto for blood   thinner. You will need to follow up with Dr. Mccormack in 1 week as well as   cardiology. Take meds as directed.       Mr. Moreno indicates that he has been trying to stop drinking since about New Year's.  He has been really generally unsuccessful and often finds that he needs to drink again to calm his nerves.  He also however notes that he is unable to quiet his mind when he tries to go to sleep.     Apparently he was hoping to stop drinking today after his last drink about  1:30 PM.  He eventually felt so poorly that he came to the emergency department for evaluation.     Mr. Moreno incidentally notes that he has had a right sided headache for months.  He has been evaluated with CT scans he reports and was hoping to get an MRI done tomorrow.  He reports that he has been evaluated by a neurologist who ordered the advanced imaging of his head and possibly his neck.  He has not had significant neurologic deficits.     Mr. Moreno was incidentally found to be in atrial fibrillation with rapid ventricular response in the emergency department.  He responded very well to an initial dose of diltiazem 20 mg IV once.  At the time that I saw him his heart  rate was well below 100 at rest.  He does not recall a history of atrial fibrillation.  He has no personal history of coronary artery disease.  He denies significant palpitations.     Pt was admitted under obs. He remained in rate controlled Afib during his hospitalization after initiation of metoprolol BID. ECHO was normal and had no WMA, or valvular insufficiencies.  He was started on Lovenox and transitioned to Xarelto. He was placed on an event monitor 30 days. He will see the cardiologist on  to discuss possible need for cardioversion.  In regards to the etoh abuse, he ha been in treatment 6 times and is not interested in going to inpatient rehab. I am not sure that he is really ready to quit. He states he drinks to help with stress and anxiety and wanted to a valium to help him with those concerns. I had Psychology see pt who further re-iterated the need to dec or stop etoh. After conferring with Dr. Mcgraw, he was started on gabapentin for anxiety and cravings. Pt was also counseled on dangers of OAC in the setting of etoh abuse.     Consults: psyche    Significant Diagnostic Studies:     Recent Labs  Lab 1844 18   WBC 8.0 8.5   HGB 18.1* 16.9   HCT 52.9 47.5   MCV 91 88    225       Recent Labs  Lab 1844 18    137   POTASSIUM 4.2 3.5   CHLORIDE 107 106   CO2 27 23   ANIONGAP 7 8   * 108*   BUN 8 7   CR 0.98 0.77   GFRESTIMATED 83 >90   GFRESTBLACK >90 >90   LEILA 9.0 9.1       Recent Labs  Lab 18  2010   TSH 3.56       Recent Labs  Lab 18  2010   TROPI <0.015         Results for orders placed or performed in visit on 05   X-RAY HIP UNI 2+ VW    Impression    REPORT OF OUTSIDE FILMS FROM [facility]    05  DEBORA PEARL : 71  RIGHT HIP: 2 VIEWS  HISTORY:  Hip pain.    FINDINGS:  Negative.    SARAH WESTON M.D./  D/T: 6-3-05           Ordering MD/Provider Initial  Interpretation:  Normal/Negative  Electronically filed by Alberta Pemberton     6/1/2005  3:53 PM  .         Treatments: Dilt, BB    Discharge Exam:    B/P: 129/60, T: 96.3, P: 94, R: 16  GENERAL:  Comfortable.  PSYCH: pleasant, oriented, No acute distress.  HEENT:  PERRLA. Normal conjunctiva, normal hearing, nasal mucosa and Oropharynx are normal.  NECK:  Supple, no neck vein distention, adenopathy or bruits, normal thyroid.  HEART:  irreg irreg  with no murmur, no pericardial rub, gallops or S3 or S4.  LUNGS:  Clear to auscultation, normal Respiratory effort. No wheezing, rales or ronchi.  ABDOMEN:  Soft, no hepatosplenomegaly, normal bowel sounds. Non-tender, non distended.   EXTREMITIES:  No pedal edema, +2 pulses bilateral and equal.  SKIN:  Dry to touch, No rash, wound or ulcerations.  NEUROLOGIC:  CN 2-12 intact, BL 5/5 symmetric upper and lower extremity strength, sensation is intact with no focal deficits.      Pending Studies:    Unresulted Labs Ordered in the Past 30 Days of this Admission     No orders found for last 61 day(s).           Disposition: home    Patient Instructions:        Review of your medicines      START taking       Dose / Directions    gabapentin 300 MG capsule   Commonly known as:  NEURONTIN   Used for:  History of alcohol abuse        Dose:  300 mg   Take 1 capsule (300 mg) by mouth 2 times daily   Quantity:  120 capsule   Refills:  1       metoprolol tartrate 50 MG tablet   Commonly known as:  LOPRESSOR        Dose:  50 mg   Take 1 tablet (50 mg) by mouth 2 times daily   Quantity:  60 tablet   Refills:  0       rivaroxaban ANTICOAGULANT 20 MG Tabs tablet   Commonly known as:  XARELTO        Dose:  20 mg   Take 1 tablet (20 mg) by mouth daily (with dinner)   Quantity:  30 tablet   Refills:  0         STOP taking          ASPIRIN EC PO           ibuprofen 600 MG tablet   Commonly known as:  ADVIL/MOTRIN                Where to get your medicines      These medications were sent to  [place], [place] - [address] [facility]  [address] [facility] [address]     Phone:  [phone]      gabapentin 300 MG capsule     metoprolol tartrate 50 MG tablet     rivaroxaban ANTICOAGULANT 20 MG Tabs tablet           Activity: activity as tolerated    Active Diet Order      Combination Diet Regular Diet Adult      Diet    Follow-up with Cardiology on Friday 2/23 and PCP in 1 week.    Signed:  Dalila Basilio

## 2018-02-21 NOTE — CONSULTS
Care Transition Initial Assessment -   Reason For Consult: substance use concerns  Met with: Patient    Active Problems:    * No active hospital problems. *       DATA  Lives With: parent(s)  Living Arrangements: house    Identified issues/concerns regarding health management: Pt met with Psychology. He would prefer to talk with his primary care MD for recommendation for MH and Cardiology support.   He is agreeable to have CC set up appt with primary care MD         ASSESSMENT  Cognitive Status:  awake, alert and oriented  Concerns to be addressed: Pt is open about his CD issues. He is not interested in CD at this time as he feels he has been through the program enough to know when he is ready to reach out for support. Pt is open to the idea of MH support for his anxiety.  SWS did offer to set something up for him. As mentioned above he will speak with his primary care Clinic.   PT would like to make sure he does not have any restrictions for working .     PLAN  Spoke with CC to set up new clinic appt for pt. Appt will be placed in DC instructions.

## 2018-02-21 NOTE — CONSULTS
This document was created with voice recognition software.  As result, there may be errors in grammar and syntax.  Please consider this when reading this document.      Psychological consult, covering for psychiatry, ordered by Dalila Basilio PA-C.  This was an initial consult, which took a total of 55 minutes, with over half the time spent coordinating care.    Summary of stay  Georges Moreno is a 42-year-old man with a history of alcohol abuse who came to attention today with complaints of intermittent extreme anxiety.  In the emergency department he was found to be in atrial fibrillation with rapid ventricular response and I was called to admit him. The patient's heart rate came down to  range after single 20 mg dose of diltiazem.  He is not shaky or jittery and his blood alcohol level at the time of admission is less than measurable threshold.    Patient is very liseth about in the past he has also used cocaine and marijuana.  He is also been through treatment multiple times but has never had a psychiatric diagnosis.  He drinks to fall asleep and he drinks to settle his nerves.  He does not have any exam findings or lab findings to suggest chronic liver disease or chronic alcohol toxicity.     Reason for consult  I was asked to see this patient to help with psychiatric/behavioral differential diagnosis, assess risk factors,  to facilitate a medication consultation with Dr. Mcgraw, and to provide input into discharge planning.     Records reviewed   H&P done by Dr. Sampson, including review of systems.    Progress notes/consults  Georges has been operative with nursing cares.     Previous treatment records  [facility] - no other records in the system.  A brief review of the Care Everywhere section did not locate any obvious psychiatric/behavioral treatment records.     Patient Report of Admission Events/Circumstances  Georges reports that he has been concerned about his health for about 5 months, as he has been  "experiencing persistent headaches.  He reports at the headaches started after a night of heavy drinking.  He currently is in the process of a neurological workup, and reports that he actually was scheduled to have his brain scanned today.  He had to wait 2 weeks for the scan, and this was hard for him and he reports that he has been more anxious during this period, and been drinking to cope and to help with sleep.    Mental and Chemical Health Treatment History    Garth has had, he estimates, 6 episodes of CD treatment.  Most, he reported, were a result of legal problems and were mandated treatments.  He had several psychiatric assessments as part of treatment, and reports that he was tried on several antidepressant medications, including Celexa, and did not find them to be helpful but also was monitored only during treatment and did not apparently follow through as part of his aftercare program.  He has never seen a psychiatrist or therapist outside of CD treatment.  We discussed the potential benefits of trying psychiatric medication, with a focus on helping him with his anxiety symptoms, with ongoing monitoring and also trying counseling.  He was receptive to this.     Georges acknowledged that he is drinking to excess, and spontaneously express concern about how his drinking has affected his mood.  He spontaneously said, \"I tend to overdo it.\"  I provided information about the current CDC guidelines for alcohol use, and recommended that he at least adhere to these guidelines (a maximum of 14 \"doses\" of alcohol per week,a maximum of 5 per day, and at least 2 days with no alcohol consumption) and he said, \"that is something to think about.\"  I also discussed the impact on his health of continued to drink like he is doing, and he also seems willing to think about this.  He spontaneously reported that he has found AA to be helpful in the past, has already considered resuming AA and establishing sobriety, and I strongly " "supported this.    Social Background  Garth is single, has no children, and currently lives with his mother.  He works in the construction industry, and currently is laid off.  The knowledge that he tends to drink more when laid off.  He reports having an active social life, primarily with other construction workers and he readily acknowledged that his group of buddies tends to drink excessively.    Behavioral Assessment  Garth was resting in his bed when I introduced myself, and readily agreed to be interviewed.  He was oriented in all spheres.  He spoke softly, and his speech was spontaneous, fluent, non-pressured and goal-directed.  His associations were intact.  He maintained normal attention and concentration throughout the interview.  There were no indications of hallucinations or any other indications of withdrawal from alcohol, and there were no obvious behavioral indications of any problems with his basic perceptual and cognitive processes.  His fund of knowledge appears to be average for his SES and stage of life.  He appears to be functioning in the Average range of cognitive abilities.    Scribed his current mood as \"tranquil,\" which he attributed to obtaining relief from anxiety symptoms due to medication prescribed yesterday.  He does not endorse current symptoms of depression, but does endorse classic symptoms of anxiety, including palpitations, ruminating, a sense of shaking, and inability to calm himself.  We discussed the potential benefits of medication that targets anxiety symptoms, he was willing to try what Dr. Mcgraw recommends, but also stated that he would prefer to PRN medication.  I informed him that this is not likely to be an option due to his current alcohol abuse, and he would need to establish sobriety and work with a community prescriber on this.    He reports that his sleep has been poor, which he attributes to anxiety about his health.  I also informed him that his drinking is " "certainly affecting the quality of his sleep, and he acknowledged this.    His insight and judgment are mixed; on the one hand, in spite of an extensive history of CD treatment, he has been abusing alcohol with daily and excessive intake.  On the other hand, he spontaneously said, \"I am a victim of my own bad choices (in regard to alcohol abuse)\" and he has already considered resuming AA and establishing sobriety.  He was receptive to the information that I provided and my support for sobriety and AA.    Risk Assessment  Garth has no history of suicidal behavior, and denied, with solid eye contact and convincing affect, current suicidal ideation.    Impressions  Strengths: Garth appears to be a temperamentally pleasant and engaging person who interacted with me openly and collaboratively.  Garth showed insight into the need to address his drinking problem and to get help with anxiety symptoms.  Liabilities: Garth is abusing alcohol, in spite of an extensive history of CD treatment    Consultation with other team members  I conferred remotely with Dr. Mcgraw, who recommended offering gabapentin, 300 mg, b.i.d., which can potentially help with both anxiety symptoms and potential withdrawal cravings. She supports the need for patient to get sober.     Diagnoses   Alcohol abuse, likely secondary to chronic dependency; anxiety disorder, unspecified; cardiac problems, per hospitalist team.    Recommendations (hold/sitter)  1. Offer medication, per Dr. Mcgraw's recommendation above.   2. I coached patient on accessing therapy by contacting the [facility] system.  3. When medically stable, discharge to community resources.   4. D/C recommendations should support sobriety,  Including attending AA.     Minh Webster Psy.D., L.P.  [phone]    "

## 2018-02-21 NOTE — ED NOTES
[facility]  ED Nurse Handoff Report    Georges Moreno is a 42 year old male w/hx of alcoholism presents today after attempting to wean self off.  EKG shows new afib w/rvr, dilt bolus and drip initiated.  Pt is cooperative, alert, and independent in ambulation.    ED Chief complaint: Panic Attack  . ED Diagnosis:   Final diagnoses:   Atrial fibrillation with RVR (H)   History of alcohol abuse     Allergies:   Allergies   Allergen Reactions     No Known Allergies        Code Status: Full Code  Activity level - Baseline/Home:  Independent. Activity Level - Current:   Independent. Lift room needed: No. Bariatric: No   Needed: No   Isolation: No. Infection: Not Applicable.     Vital Signs:   Vitals:    02/20/18 2115 02/20/18 2125 02/20/18 2130 02/20/18 2140   BP: (!) 137/111  (!) 153/97    Pulse:       Resp: 18 16 8 16   Temp:       TempSrc:       SpO2: 97% 98% 97% 97%   Weight:       Height:           Cardiac Rhythm:  ,      Pain level: 0-10 Pain Scale: 4  Patient confused: No. Patient Falls Risk: Yes.   Elimination Status: Has voided   Patient Report - Initial Complaint: anxiety. Focused Assessment: anxious appearing   Tests Performed: blood, EKG. Abnormal Results: EKG shows afib w/rvr.   Treatments provided: dilt & dilt drip, lovenox  Family Comments: at bedside  OBS brochure/video discussed/provided to patient:  Yes  ED Medications:   Medications   enoxaparin (LOVENOX) injection 90 mg (not administered)   diltiazem (CARDIZEM) injection 25 mg (not administered)   diltiazem (CARDIZEM) 125 mg in sodium chloride 0.9 % 125 mL infusion (not administered)   LORazepam (ATIVAN) injection 1 mg (1 mg Intravenous Given 2/20/18 2044)   sodium chloride 0.9 % 1,000 mL with INFUVITE 10 mL, thiamine 100 mg, folic acid 1 mg infusion ( Intravenous New Bag 2/20/18 2043)   diltiazem (CARDIZEM) injection 20 mg (20 mg Intravenous Given 2/20/18 2131)     Drips infusing: Yes, diltiazem  For the majority of the shift,  the patient's behavior Green. Interventions performed were n/a.     Severe Sepsis OR Septic Shock Diagnosis Present: No      ED Nurse Name/Phone Number: Jonathan Seaver,   9:53 PM  RECEIVING UNIT ED HANDOFF REVIEW    Above ED Nurse Handoff Report was reviewed: Yes  Reviewed by: Gosia Duran on February 21, 2018 at 12:27 AM

## 2018-02-21 NOTE — DISCHARGE INSTRUCTIONS
You have a follow up appointment with Dr. Sarah Mccormack on Saturday, 3/3/2018 at 10am at the [facility] in [place] ([address], [place], [place], [address]). If you are unable to keep this appointment, please call them at [phone] option 2 to change.

## 2018-02-21 NOTE — PHARMACY
NoAC coverage check.  Patient has [employer] Plus Medical Assistance.    Xarelto = $3/mo  Eliquis/Pradaxa = not covered    Jantoven = $1/mo    -DEBBIE Jaeger, Pharmacy Technician/Liaison, Discharge Pharmacy [phone]

## 2018-02-21 NOTE — PLAN OF CARE
"Problem: Patient Care Overview  Goal: Plan of Care/Patient Progress Review  Pt PRIMARY DIAGNOSIS: \"GENERIC\" NURSING  OUTPATIENT/OBSERVATION GOALS TO BE MET BEFORE DISCHARGE:  1. ADLs back to baseline: Yes    2. Activity and level of assistance: Ambulating independently.    3. Pain status: Pain free.    4. Return to near baseline physical activity: Yes     Discharge Planner Nurse   Safe discharge environment identified: Yes  Barriers to discharge: No       Entered by: Alanna Serrano 02/21/2018 2:56 PM     Please review provider order for any additional goals.   Nurse to notify provider when observation goals have been met and patient is ready for discharge.      "

## 2018-02-21 NOTE — PLAN OF CARE
"Problem: Patient Care Overview  Goal: Plan of Care/Patient Progress Review  Outcome: Adequate for Discharge Date Met: 02/21/18  PRIMARY DIAGNOSIS: \"GENERIC\" NURSING  OUTPATIENT/OBSERVATION GOALS TO BE MET BEFORE DISCHARGE:  1. ADLs back to baseline: Yes    2. Activity and level of assistance: Ambulating independently.    3. Pain status: Pain free.    4. Return to near baseline physical activity: Yes     Discharge Planner Nurse   Safe discharge environment identified: Yes  Barriers to discharge: Yes, awaiting  to provide resources and cardiac event monitor to be placed.        Entered by: Alanna Serrano 02/21/2018 12:51 PM     Please review provider order for any additional goals.   Nurse to notify provider when observation goals have been met and patient is ready for discharge.      "

## 2018-02-21 NOTE — H&P
[facility] History and Physical    Georges Moreno MRN# [id number]   Age: 42 year old YOB: 1975     Date of Admission:  2/20/2018    [facility] clinic: [facility]  Primary care provider: Sarah Mcocrmack          Assessment and Plan:   Assessment:   Georges Moreno is a 42-year-old man with a history of alcohol abuse who came to attention today with complaints of intermittent extreme anxiety.  In the emergency department he was found to be in atrial fibrillation with rapid ventricular response and I was called to admit him.    Notably the patient's heart rate came down to  range after single 20 mg dose of diltiazem.  He is not shaky or jittery and his blood alcohol level at the time of admission is less than measurable threshold.    Diagnoses:  1.  Atrial fibrillation with rapid ventricular response.  This well may be due to chronic alcohol abuse.  On physical examination the patient does not have any evidence of cardiomyopathy.  His rate actually seems as though it was quite easy to control.  2.  Alcohol abuse.  Patient is very liseth about this noting that in the past she has also used cocaine and marijuana.  He is also been through treatment multiple times but has never had a psychiatric diagnosis.  He drinks to fall asleep and he drinks to settle his nerves.  He notably does not have any exam findings or lab findings to suggest chronic liver disease or chronic alcohol toxicity.  3.  Probable untreated anxiety disorder.  4.  Chronic headache for which the patient has planned outpatient evaluation on Wednesday morning at 11 AM in [place].  He is aware that he may not be able to make it to this appointment.      Plan:   1.  Admit to observation on telemetry.  2.  Metoprolol 50 mg p.o. twice daily.  As needed diltiazem.  3.  Patient was started on Lovenox in the emergency department but I think it reasonable to see which of the oral anticoagulants is available to him through his  insurance.  4.  Seroquel 50 mg p.o. nightly.  5.  CIWA scale.   6.  I have asked social work to give him information about local psychiatric care.  Of the [facility] also has good psychiatric services.  I discussed this with the patient tonight.             Chief Complaint:   Severe anxiety and this alcoholic man.     History is obtained from the patient and EMR.     Mr. Moreno indicates that he has been trying to stop drinking since about New Year's.  He has been really generally unsuccessful and often finds that he needs to drink again to calm his nerves.  He also however notes that he is unable to quiet his mind when he tries to go to sleep.    Apparently he was hoping to stop drinking today after his last drink about  1:30 PM.  He eventually felt so poorly that he came to the emergency department for evaluation.    Mr. Moreno incidentally notes that he has had a right sided headache for months.  He has been evaluated with CT scans he reports and was hoping to get an MRI done tomorrow.  He reports that he has been evaluated by a neurologist who ordered the advanced imaging of his head and possibly his neck.  He has not had significant neurologic deficits.    Mr. Moreno was incidentally found to be in atrial fibrillation with rapid ventricular response in the emergency department.  He responded very well to an initial dose of diltiazem 20 mg IV once.  At the time that I saw him his heart rate was well below 100 at rest.  He does not recall a history of atrial fibrillation.  He has no personal history of coronary artery disease.  He denies significant palpitations.        Past Medical History:     Past Medical History:   Diagnosis Date     Anxiety      Backache, unspecified      Personal history of alcoholism (H)      Substance abuse              Past Surgical History:    History reviewed. No pertinent surgical history.          Social History:     Social History   Substance Use Topics     Smoking status:  Current Every Day Smoker     Packs/day: 1.00     Years: 15.00     Smokeless tobacco: Never Used     Alcohol use Yes      Comment: daily             Family History:     Family History   Problem Relation Age of Onset     Alcohol/Drug Father      Alcohol/Drug Paternal Uncle      Family History Negative Mother      Family history not contributory to this hospitalization.          Allergies:     Allergies   Allergen Reactions     No Known Allergies              Medications:   None.         Review of Systems:   A comprehensive review of systems was performed and found to be negative except as described in this note           Physical Exam:   Vitals were reviewed  Temp: 98.1  F (36.7  C) Temp src: Oral BP: (!) 141/109 Pulse: 96 Heart Rate: 92 Resp: 9 SpO2: 97 % O2 Device: None (Room air)    Constitutional: Awake, alert, cooperative, no apparent distress, and appears stated age.  Eyes: Lids and lashes normal, pupils equal, round and reactive to light, extra ocular muscles intact, sclera clear, conjunctiva normal.  ENT: Normocephalic, without obvious abnormality, atraumatic, oral pharynx with moist mucus membranes, tonsils without erythema or exudates, gums normal and good dentition.  Neck: Supple, symmetrical, trachea midline, no adenopathy, thyroid symmetric, not enlarged and no tenderness, skin normal.  Hematologic / Lymphatic: No cervical lymphadenopathy and no supraclavicular lymphadenopathy.  Back: Symmetric, no curvature, spinous processes are non-tender on palpation, paraspinous muscles are non-tender on palpation, no costal vertebral tenderness.  Lungs: No increased work of breathing, good air exchange, clear to auscultation bilaterally, no crackles or wheezing.  Cardiovascular: IRRIR, normal S1 and S2, no S3 or S4, and no murmur noted.  Abdomen: No scars, normal bowel sounds, soft, non-distended, non-tender, no masses palpated, no hepatosplenomegaly.  Musculoskeletal: No redness, warmth, or swelling of the joints.  Motor strength is 5 out of 5 all extremities bilaterally.  Tone is normal.  Neurologic: Awake, alert, oriented to name, place and time.  Cranial nerves II-XII are grossly intact.  Motor is 5 out of 5 bilaterally.  No appreciable tremor.  Neuropsychiatric: Normal affect, mood, orientation, memory and insight.  Skin: No rashes, erythema, pallor, petechia or purpura.          Data:     Results for orders placed or performed during the hospital encounter of 02/20/18 (from the past 24 hour(s))   CBC with platelets differential   Result Value Ref Range    WBC 8.5 4.0 - 11.0 10e9/L    RBC Count 5.43 4.4 - 5.9 10e12/L    Hemoglobin 16.9 13.3 - 17.7 g/dL    Hematocrit 47.5 40.0 - 53.0 %    MCV 88 78 - 100 fl    MCH 31.1 26.5 - 33.0 pg    MCHC 35.6 31.5 - 36.5 g/dL    RDW 12.3 10.0 - 15.0 %    Platelet Count 225 150 - 450 10e9/L    Diff Method Automated Method     % Neutrophils 65.1 %    % Lymphocytes 24.8 %    % Monocytes 9.3 %    % Eosinophils 0.2 %    % Basophils 0.2 %    % Immature Granulocytes 0.4 %    Nucleated RBCs 0 0 /100    Absolute Neutrophil 5.6 1.6 - 8.3 10e9/L    Absolute Lymphocytes 2.1 0.8 - 5.3 10e9/L    Absolute Monocytes 0.8 0.0 - 1.3 10e9/L    Absolute Eosinophils 0.0 0.0 - 0.7 10e9/L    Absolute Basophils 0.0 0.0 - 0.2 10e9/L    Abs Immature Granulocytes 0.0 0 - 0.4 10e9/L    Absolute Nucleated RBC 0.0    Comprehensive metabolic panel   Result Value Ref Range    Sodium 137 133 - 144 mmol/L    Potassium 3.5 3.4 - 5.3 mmol/L    Chloride 106 94 - 109 mmol/L    Carbon Dioxide 23 20 - 32 mmol/L    Anion Gap 8 3 - 14 mmol/L    Glucose 108 (H) 70 - 99 mg/dL    Urea Nitrogen 7 7 - 30 mg/dL    Creatinine 0.77 0.66 - 1.25 mg/dL    GFR Estimate >90 >60 mL/min/1.7m2    GFR Estimate If Black >90 >60 mL/min/1.7m2    Calcium 9.1 8.5 - 10.1 mg/dL    Bilirubin Total 1.1 0.2 - 1.3 mg/dL    Albumin 4.0 3.4 - 5.0 g/dL    Protein Total 7.3 6.8 - 8.8 g/dL    Alkaline Phosphatase 54 40 - 150 U/L    ALT 35 0 - 70 U/L    AST 24 0  - 45 U/L   Troponin I   Result Value Ref Range    Troponin I ES <0.015 0.000 - 0.045 ug/L   D dimer quantitative   Result Value Ref Range    D Dimer <0.3 0.0 - 0.50 ug/ml FEU   Alcohol ethyl   Result Value Ref Range    Ethanol g/dL <0.01 <0.01 g/dL   Nt probnp inpatient   Result Value Ref Range    N-Terminal Pro BNP Inpatient 96 0 - 450 pg/mL   TSH with free T4 reflex   Result Value Ref Range    TSH 3.56 0.40 - 4.00 mU/L   Magnesium   Result Value Ref Range    Magnesium 2.1 1.6 - 2.3 mg/dL   EKG 12-lead, tracing only   Result Value Ref Range    Interpretation ECG Click View Image link to view waveform and result         Attestation:  I have reviewed today's vital signs, notes, medications, labs and imaging.  Total time: 35 minutes     John Sampson MD

## 2018-02-28 ENCOUNTER — OFFICE VISIT (OUTPATIENT)
Dept: CARDIOLOGY | Facility: CLINIC | Age: 43
End: 2018-02-28
Payer: COMMERCIAL

## 2018-02-28 VITALS
BODY MASS INDEX: 28 KG/M2 | WEIGHT: 206.7 LBS | SYSTOLIC BLOOD PRESSURE: 124 MMHG | HEART RATE: 60 BPM | HEIGHT: 72 IN | DIASTOLIC BLOOD PRESSURE: 76 MMHG

## 2018-02-28 DIAGNOSIS — Z13.6 SCREENING FOR CARDIOVASCULAR CONDITION: Primary | ICD-10-CM

## 2018-02-28 PROCEDURE — 93000 ELECTROCARDIOGRAM COMPLETE: CPT | Performed by: INTERNAL MEDICINE

## 2018-02-28 PROCEDURE — 99244 OFF/OP CNSLTJ NEW/EST MOD 40: CPT | Performed by: INTERNAL MEDICINE

## 2018-02-28 NOTE — LETTER
2/28/2018    MORGAN KAPOOR MD  [facility] [address]   [place] [address]    RE: Georges Moreno       Dear Colleague,    I had the pleasure of seeing Georges Moreno in the [facility].    HPI and Plan:   See dictation    Orders Placed This Encounter   Procedures     EKG 12-lead complete w/read - Clinics (performed today)       Orders Placed This Encounter   Medications     amoxicillin-clavulanate (AUGMENTIN) 875-125 MG per tablet     Sig: Take 1 tablet by mouth 2 times daily Take for 10 days       There are no discontinued medications.      Encounter Diagnosis   Name Primary?     Screening for cardiovascular condition Yes       CURRENT MEDICATIONS:  Current Outpatient Prescriptions   Medication Sig Dispense Refill     amoxicillin-clavulanate (AUGMENTIN) 875-125 MG per tablet Take 1 tablet by mouth 2 times daily Take for 10 days       rivaroxaban ANTICOAGULANT (XARELTO) 20 MG TABS tablet Take 1 tablet (20 mg) by mouth daily (with dinner) 30 tablet 0     metoprolol tartrate (LOPRESSOR) 50 MG tablet Take 1 tablet (50 mg) by mouth 2 times daily 60 tablet 0     gabapentin (NEURONTIN) 300 MG capsule Take 1 capsule (300 mg) by mouth 2 times daily (Patient not taking: Reported on 2/28/2018) 120 capsule 1       ALLERGIES     Allergies   Allergen Reactions     No Known Allergies        PAST MEDICAL HISTORY:  Past Medical History:   Diagnosis Date     Anxiety      Depression, major, single episode     moderate     PUSHPA (generalized anxiety disorder)      HA (headache)      Personal history of alcoholism (H)      Substance abuse      Tobacco dependence        PAST SURGICAL HISTORY:  No past surgical history on file.    FAMILY HISTORY:  Family History   Problem Relation Age of Onset     Family History Negative Mother      Alcohol/Drug Father      DIABETES Father      Leukemia Father      Alcohol/Drug Paternal Uncle        SOCIAL HISTORY:  Social History     Social History     Marital  status: Single     Spouse name: N/A     Number of children: N/A     Years of education: N/A     Occupational History     construction      Social History Main Topics     Smoking status: Current Every Day Smoker     Years: 15.00     Smokeless tobacco: Never Used      Comment: a little under a pack a day     Alcohol use Yes      Comment: no alcohol since 2/20/18     Drug use: Yes     Special: Cocaine, Marijuana     Sexual activity: Not Asked     Other Topics Concern     None     Social History Narrative       Review of Systems:  Skin:  Negative       Eyes:  Negative      ENT:  Negative      Respiratory:  Positive for cough;wheezing     Cardiovascular:    Positive for;palpitations;fatigue chest tightness; fatigue occ  Gastroenterology: Negative      Genitourinary:  Negative      Musculoskeletal:  Positive for joint pain;nocturnal cramping    Neurologic:  Positive for headaches    Psychiatric:  Positive for sleep disturbances;anxiety    Heme/Lymph/Imm:  Negative      Endocrine:  Negative        Physical Exam:  Vitals: /76 (BP Location: Right arm, Patient Position: Chair, Cuff Size: Adult Large)  Pulse 60  Ht 1.829 m (6')  Wt 93.8 kg (206 lb 11.2 oz)  BMI 28.03 kg/m2    Constitutional:  cooperative, alert and oriented, well developed, well nourished, in no acute distress        Skin:  warm and dry to the touch, no apparent skin lesions or masses noted          Head:  normocephalic, no masses or lesions        Eyes:  pupils equal and round, conjunctivae and lids unremarkable, sclera white, no xanthalasma, EOMS intact, no nystagmus        Lymph:No Cervical lymphadenopathy present     ENT:  no pallor or cyanosis, dentition good        Neck:  carotid pulses are full and equal bilaterally, JVP normal, no carotid bruit        Respiratory:  normal breath sounds, clear to auscultation, normal A-P diameter, normal symmetry, normal respiratory excursion, no use of accessory muscles         Cardiac: regular rhythm, normal  S1/S2, no S3 or S4, apical impulse not displaced, no murmurs, gallops or rubs                                                         GI:  abdomen soft, non-tender, BS normoactive, no mass, no HSM, no bruits        Extremities and Muscular Skeletal:  no deformities, clubbing, cyanosis, erythema observed              Neurological:  no gross motor deficits        Psych:  Alert and Oriented x 3        CC  Sarah Mccormack MD  [facility]  [address] [place] [address]                Service Date: 02/28/2018      HISTORY OF PRESENT ILLNESS:  I saw Mr. Moreno for evaluation of atrial fibrillation.  He is a 42-year-old white male who presented to the emergency room with severe anxiety and was found to be in atrial fibrillation with rapid ventricular rate on 02/20.  He was admitted for overnight observation, and he was converted to sinus rhythm.  Since the discharge, he has been on Xarelto and metoprolol.  He has no apparent side effects from the above medications.  Symptomatically, he has not had any palpitation, shortness of breath or chest pain.  He has been on a cardiac event monitor which has not recorded any atrial fibrillation so far.      The patient has a long history of heavy alcohol use.  He did have alcohol drinking on 02/20, but his alcohol level was relatively low when he presented to the ER.  He stated that he has been sober since the hospital admission.      The patient continues to smoke cigarettes.      He has been having complaint of a headache.  He had a brain MRI yesterday, which I believe was incomplete due to severe anxiety.  The other problem is the diagnosis of depression.      PHYSICAL EXAMINATION:   VITAL SIGNS:  On examination blood pressure was 124/76, heart rate 60 beats per minute, body weight 206 pounds.   HEENT:  Eyes and ENT were unremarkable.   LUNGS:  Clear.   CARDIAC:  Rhythm was regular, and heart sounds were normal with no murmur.   ABDOMEN:  Showed moderate obesity.    EXTREMITIES:  There was no pedal edema.      EKG today showed normal sinus rhythm.  His echocardiography reported normal ejection fraction without valvular disease.      ASSESSMENT AND RECOMMENDATIONS:  Mr. Pearl is a 42-year-old white male who had 1 episode of atrial fibrillation with spontaneous conversion.  He was using alcohol heavily, and in fact, he stated that he was drunk a couple of days prior to the hospitalization.  He has been sober and so far has not had recurrent atrial fibrillation.  For the time being, I do not recommend other intervention.  I agree for him to stop Xarelto and metoprolol by the end of March if he has no recurrent atrial fibrillation.  He is advised to continue the use of the CardioNet event monitor until the end of 1 month monitoring.  If he has no recurrent atrial fibrillation, he does not need to have routine Cardiology followup visit.      The patient is strongly encouraged to quit smoking in the near future.      cc:   Sarah Mccormack MD    [facility] [address] [place]  [address]         GILBERT AKHTAR MD             D: 2018   T: 2018   MT: ROSITA      Name:     DEBORA PEARL   MRN:      [id number]        Account:      [id number]   :      1975           Service Date: 2018      Document: [id number]        Thank you for allowing me to participate in the care of your patient.      Sincerely,     Gilbert Akhtar MD     [facility]    cc:   Sarah Mccormack MD  [facility] [address] [place] [address]

## 2018-02-28 NOTE — MR AVS SNAPSHOT
"              After Visit Summary   2018    Georges Moreno    MRN: [id number]           Patient Information     Date Of Birth          1975        Visit Information        Provider Department      2018 2:15 PM Randall Akhtar MD [facility]        Today's Diagnoses     Screening for cardiovascular condition    -  1       Follow-ups after your visit        Who to contact     If you have questions or need follow up information about today's clinic visit or your schedule please contact [facility] directly at [phone].  Normal or non-critical lab and imaging results will be communicated to you by CONEXANCE MDhart, letter or phone within 4 business days after the clinic has received the results. If you do not hear from us within 7 days, please contact the clinic through Covagent or phone. If you have a critical or abnormal lab result, we will notify you by phone as soon as possible.  Submit refill requests through Motomotives or call your pharmacy and they will forward the refill request to us. Please allow 3 business days for your refill to be completed.          Additional Information About Your Visit        MyChart Information     Motomotives lets you send messages to your doctor, view your test results, renew your prescriptions, schedule appointments and more. To sign up, go to [email or web address] . Click on \"Log in\" on the left side of the screen, which will take you to the Welcome page. Then click on \"Sign up Now\" on the right side of the page.     You will be asked to enter the access code listed below, as well as some personal information. Please follow the directions to create your username and password.     Your access code is: [email or web address]  Expires: 2018 12:37 PM     Your access code will  in 90 days. If you need help or a new code, please call your [place] clinic or [phone].        Care EveryWhere ID     " This is your Care EveryWhere ID. This could be used by other organizations to access your [place] medical records  [id number]        Your Vitals Were     Pulse Height BMI (Body Mass Index)             60 1.829 m (6') 28.03 kg/m2          Blood Pressure from Last 3 Encounters:   02/28/18 124/76   02/21/18 129/60   11/08/12 155/103    Weight from Last 3 Encounters:   02/28/18 93.8 kg (206 lb 11.2 oz)   02/21/18 88.7 kg (195 lb 8 oz)   06/01/05 85.3 kg (188 lb)              We Performed the Following     EKG 12-lead complete w/read - Clinics (performed today)        Primary Care Provider Office Phone # Fax #    Sarah Mccormack [phone]       [facility] [address] [place] [address]        Equal Access to Services     [facility]: Hadii aad ku hadasho Soomaali, waaxda luqadaha, qaybta kaalmada adeegyada, waxay idiin hayaan rubina pagan . So [facility] [phone].    ATENCIÓN: Si habla español, tiene a chinchilla disposición servicios gratuitos de asistencia lingüística. Llame al [phone].    We comply with applicable federal civil rights laws and [place] laws. We do not discriminate on the basis of race, color, national origin, age, disability, sex, sexual orientation, or gender identity.            Thank you!     Thank you for choosing [facility]  for your care. Our goal is always to provide you with excellent care. Hearing back from our patients is one way we can continue to improve our services. Please take a few minutes to complete the written survey that you may receive in the mail after your visit with us. Thank you!             Your Updated Medication List - Protect others around you: Learn how to safely use, store and throw away your medicines at www.disposemymeds.org.          This list is accurate as of 2/28/18 11:59 PM.  Always use your most recent med list.                   Brand Name Dispense Instructions for use Diagnosis     amoxicillin-clavulanate 875-125 MG per tablet    AUGMENTIN     Take 1 tablet by mouth 2 times daily Take for 10 days        gabapentin 300 MG capsule    NEURONTIN    120 capsule    Take 1 capsule (300 mg) by mouth 2 times daily    History of alcohol abuse       metoprolol tartrate 50 MG tablet    LOPRESSOR    60 tablet    Take 1 tablet (50 mg) by mouth 2 times daily    Atrial fibrillation with RVR (H)       rivaroxaban ANTICOAGULANT 20 MG Tabs tablet    XARELTO    30 tablet    Take 1 tablet (20 mg) by mouth daily (with dinner)    Atrial fibrillation with RVR (H)

## 2018-02-28 NOTE — LETTER
2/28/2018      MORGAN KAPOOR MD  [facility] [address]   [place] [address]      RE: Georges Moreno       Dear Colleague,    I had the pleasure of seeing Georges Moreno in the [facility].    Service Date: 02/28/2018      HISTORY OF PRESENT ILLNESS:  I saw Mr. Moreno for evaluation of atrial fibrillation.  He is a 42-year-old white male who presented to the emergency room with severe anxiety and was found to be in atrial fibrillation with rapid ventricular rate on 02/20.  He was admitted for overnight observation, and he was converted to sinus rhythm.  Since the discharge, he has been on Xarelto and metoprolol.  He has no apparent side effects from the above medications.  Symptomatically, he has not had any palpitation, shortness of breath or chest pain.  He has been on a cardiac event monitor which has not recorded any atrial fibrillation so far.      The patient has a long history of heavy alcohol use.  He did have alcohol drinking on 02/20, but his alcohol level was relatively low when he presented to the ER.  He stated that he has been sober since the hospital admission.      The patient continues to smoke cigarettes.      He has been having complaint of a headache.  He had a brain MRI yesterday, which I believe was incomplete due to severe anxiety.  The other problem is the diagnosis of depression.      PHYSICAL EXAMINATION:   VITAL SIGNS:  On examination blood pressure was 124/76, heart rate 60 beats per minute, body weight 206 pounds.   HEENT:  Eyes and ENT were unremarkable.   LUNGS:  Clear.   CARDIAC:  Rhythm was regular, and heart sounds were normal with no murmur.   ABDOMEN:  Showed moderate obesity.   EXTREMITIES:  There was no pedal edema.      EKG today showed normal sinus rhythm.  His echocardiography reported normal ejection fraction without valvular disease.      ASSESSMENT AND RECOMMENDATIONS:  Mr. Moreno is a 42-year-old white male who had 1 episode of  atrial fibrillation with spontaneous conversion.  He was using alcohol heavily, and in fact, he stated that he was drunk a couple of days prior to the hospitalization.  He has been sober and so far has not had recurrent atrial fibrillation.  For the time being, I do not recommend other intervention.  I agree for him to stop Xarelto and metoprolol by the end of March if he has no recurrent atrial fibrillation.  He is advised to continue the use of the CardioNet event monitor until the end of 1 month monitoring.  If he has no recurrent atrial fibrillation, he does not need to have routine Cardiology followup visit.      The patient is strongly encouraged to quit smoking in the near future.      cc:   Sarah Mccormack MD    [facility] [address] [place] [address]         GILBERT AKHTAR MD             D: 2018   T: 2018   MT: ROSITA      Name:     DEBORA PEARL   MRN:      [id number]        Account:      [id number]   :      1975           Service Date: 2018      Document: [id number]           Outpatient Encounter Prescriptions as of 2018   Medication Sig Dispense Refill     amoxicillin-clavulanate (AUGMENTIN) 875-125 MG per tablet Take 1 tablet by mouth 2 times daily Take for 10 days       rivaroxaban ANTICOAGULANT (XARELTO) 20 MG TABS tablet Take 1 tablet (20 mg) by mouth daily (with dinner) 30 tablet 0     metoprolol tartrate (LOPRESSOR) 50 MG tablet Take 1 tablet (50 mg) by mouth 2 times daily 60 tablet 0     gabapentin (NEURONTIN) 300 MG capsule Take 1 capsule (300 mg) by mouth 2 times daily (Patient not taking: Reported on 2018) 120 capsule 1     No facility-administered encounter medications on file as of 2018.        Again, thank you for allowing me to participate in the care of your patient.      Sincerely,    Gilbert Akhtar MD     [facility]

## 2018-02-28 NOTE — PROGRESS NOTES
HPI and Plan:   See dictation    Orders Placed This Encounter   Procedures     EKG 12-lead complete w/read - Clinics (performed today)       Orders Placed This Encounter   Medications     amoxicillin-clavulanate (AUGMENTIN) 875-125 MG per tablet     Sig: Take 1 tablet by mouth 2 times daily Take for 10 days       There are no discontinued medications.      Encounter Diagnosis   Name Primary?     Screening for cardiovascular condition Yes       CURRENT MEDICATIONS:  Current Outpatient Prescriptions   Medication Sig Dispense Refill     amoxicillin-clavulanate (AUGMENTIN) 875-125 MG per tablet Take 1 tablet by mouth 2 times daily Take for 10 days       rivaroxaban ANTICOAGULANT (XARELTO) 20 MG TABS tablet Take 1 tablet (20 mg) by mouth daily (with dinner) 30 tablet 0     metoprolol tartrate (LOPRESSOR) 50 MG tablet Take 1 tablet (50 mg) by mouth 2 times daily 60 tablet 0     gabapentin (NEURONTIN) 300 MG capsule Take 1 capsule (300 mg) by mouth 2 times daily (Patient not taking: Reported on 2/28/2018) 120 capsule 1       ALLERGIES     Allergies   Allergen Reactions     No Known Allergies        PAST MEDICAL HISTORY:  Past Medical History:   Diagnosis Date     Anxiety      Depression, major, single episode     moderate     PUSHPA (generalized anxiety disorder)      HA (headache)      Personal history of alcoholism (H)      Substance abuse      Tobacco dependence        PAST SURGICAL HISTORY:  No past surgical history on file.    FAMILY HISTORY:  Family History   Problem Relation Age of Onset     Family History Negative Mother      Alcohol/Drug Father      DIABETES Father      Leukemia Father      Alcohol/Drug Paternal Uncle        SOCIAL HISTORY:  Social History     Social History     Marital status: Single     Spouse name: N/A     Number of children: N/A     Years of education: N/A     Occupational History     construction      Social History Main Topics     Smoking status: Current Every Day Smoker     Years: 15.00      Smokeless tobacco: Never Used      Comment: a little under a pack a day     Alcohol use Yes      Comment: no alcohol since 2/20/18     Drug use: Yes     Special: Cocaine, Marijuana     Sexual activity: Not Asked     Other Topics Concern     None     Social History Narrative       Review of Systems:  Skin:  Negative       Eyes:  Negative      ENT:  Negative      Respiratory:  Positive for cough;wheezing     Cardiovascular:    Positive for;palpitations;fatigue chest tightness; fatigue occ  Gastroenterology: Negative      Genitourinary:  Negative      Musculoskeletal:  Positive for joint pain;nocturnal cramping    Neurologic:  Positive for headaches    Psychiatric:  Positive for sleep disturbances;anxiety    Heme/Lymph/Imm:  Negative      Endocrine:  Negative        Physical Exam:  Vitals: /76 (BP Location: Right arm, Patient Position: Chair, Cuff Size: Adult Large)  Pulse 60  Ht 1.829 m (6')  Wt 93.8 kg (206 lb 11.2 oz)  BMI 28.03 kg/m2    Constitutional:  cooperative, alert and oriented, well developed, well nourished, in no acute distress        Skin:  warm and dry to the touch, no apparent skin lesions or masses noted          Head:  normocephalic, no masses or lesions        Eyes:  pupils equal and round, conjunctivae and lids unremarkable, sclera white, no xanthalasma, EOMS intact, no nystagmus        Lymph:No Cervical lymphadenopathy present     ENT:  no pallor or cyanosis, dentition good        Neck:  carotid pulses are full and equal bilaterally, JVP normal, no carotid bruit        Respiratory:  normal breath sounds, clear to auscultation, normal A-P diameter, normal symmetry, normal respiratory excursion, no use of accessory muscles         Cardiac: regular rhythm, normal S1/S2, no S3 or S4, apical impulse not displaced, no murmurs, gallops or rubs                                                         GI:  abdomen soft, non-tender, BS normoactive, no mass, no HSM, no bruits        Extremities and  Muscular Skeletal:  no deformities, clubbing, cyanosis, erythema observed              Neurological:  no gross motor deficits        Psych:  Alert and Oriented x 3        CC  Sarah Mccormack MD  [facility]  [address] [place] [address]

## 2018-03-01 NOTE — PROGRESS NOTES
Service Date: 02/28/2018      HISTORY OF PRESENT ILLNESS:  I saw Mr. Moreno for evaluation of atrial fibrillation.  He is a 42-year-old white male who presented to the emergency room with severe anxiety and was found to be in atrial fibrillation with rapid ventricular rate on 02/20.  He was admitted for overnight observation, and he was converted to sinus rhythm.  Since the discharge, he has been on Xarelto and metoprolol.  He has no apparent side effects from the above medications.  Symptomatically, he has not had any palpitation, shortness of breath or chest pain.  He has been on a cardiac event monitor which has not recorded any atrial fibrillation so far.      The patient has a long history of heavy alcohol use.  He did have alcohol drinking on 02/20, but his alcohol level was relatively low when he presented to the ER.  He stated that he has been sober since the hospital admission.      The patient continues to smoke cigarettes.      He has been having complaint of a headache.  He had a brain MRI yesterday, which I believe was incomplete due to severe anxiety.  The other problem is the diagnosis of depression.      PHYSICAL EXAMINATION:   VITAL SIGNS:  On examination blood pressure was 124/76, heart rate 60 beats per minute, body weight 206 pounds.   HEENT:  Eyes and ENT were unremarkable.   LUNGS:  Clear.   CARDIAC:  Rhythm was regular, and heart sounds were normal with no murmur.   ABDOMEN:  Showed moderate obesity.   EXTREMITIES:  There was no pedal edema.      EKG today showed normal sinus rhythm.  His echocardiography reported normal ejection fraction without valvular disease.      ASSESSMENT AND RECOMMENDATIONS:  Mr. Moreno is a 42-year-old white male who had 1 episode of atrial fibrillation with spontaneous conversion.  He was using alcohol heavily, and in fact, he stated that he was drunk a couple of days prior to the hospitalization.  He has been sober and so far has not had recurrent atrial  fibrillation.  For the time being, I do not recommend other intervention.  I agree for him to stop Xarelto and metoprolol by the end of March if he has no recurrent atrial fibrillation.  He is advised to continue the use of the CardioNet event monitor until the end of 1 month monitoring.  If he has no recurrent atrial fibrillation, he does not need to have routine Cardiology followup visit.      The patient is strongly encouraged to quit smoking in the near future.      cc:   Sarah Mccormack MD    [facility] [address] [place] [address]         GILBERT TOBIN MD             D: 2018   T: 2018   MT: ROSITA      Name:     DEBORA PEARL   MRN:      [id number]        Account:      [id number]   :      1975           Service Date: 2018      Document: [id number]

## 2018-03-14 ENCOUNTER — HOSPITAL ENCOUNTER (EMERGENCY)
Facility: CLINIC | Age: 43
Discharge: HOME | End: 2018-03-14
Attending: EMERGENCY MEDICINE | Admitting: EMERGENCY MEDICINE
Payer: COMMERCIAL

## 2018-03-14 ENCOUNTER — NURSE TRIAGE (OUTPATIENT)
Dept: NURSING | Facility: CLINIC | Age: 43
End: 2018-03-14

## 2018-03-14 VITALS
BODY MASS INDEX: 28.05 KG/M2 | HEART RATE: 97 BPM | SYSTOLIC BLOOD PRESSURE: 138 MMHG | RESPIRATION RATE: 18 BRPM | OXYGEN SATURATION: 95 % | WEIGHT: 206.79 LBS | DIASTOLIC BLOOD PRESSURE: 109 MMHG | TEMPERATURE: 98 F

## 2018-03-14 DIAGNOSIS — F41.9 ACUTE ANXIETY: ICD-10-CM

## 2018-03-14 LAB
ALBUMIN SERPL-MCNC: 4.3 G/DL (ref 3.4–5)
ALP SERPL-CCNC: 51 U/L (ref 40–150)
ALT SERPL W P-5'-P-CCNC: 29 U/L (ref 0–70)
ANION GAP SERPL CALCULATED.3IONS-SCNC: 9 MMOL/L (ref 3–14)
AST SERPL W P-5'-P-CCNC: 20 U/L (ref 0–45)
BASOPHILS # BLD AUTO: 0 10E9/L (ref 0–0.2)
BASOPHILS NFR BLD AUTO: 0.4 %
BILIRUB SERPL-MCNC: 0.7 MG/DL (ref 0.2–1.3)
BUN SERPL-MCNC: 11 MG/DL (ref 7–30)
CALCIUM SERPL-MCNC: 9 MG/DL (ref 8.5–10.1)
CHLORIDE SERPL-SCNC: 104 MMOL/L (ref 94–109)
CO2 SERPL-SCNC: 24 MMOL/L (ref 20–32)
CREAT SERPL-MCNC: 0.88 MG/DL (ref 0.66–1.25)
DIFFERENTIAL METHOD BLD: NORMAL
EOSINOPHIL # BLD AUTO: 0 10E9/L (ref 0–0.7)
EOSINOPHIL NFR BLD AUTO: 0.3 %
ERYTHROCYTE [DISTWIDTH] IN BLOOD BY AUTOMATED COUNT: 12.6 % (ref 10–15)
ETHANOL SERPL-MCNC: 0.11 G/DL
GFR SERPL CREATININE-BSD FRML MDRD: >90 ML/MIN/1.7M2
GLUCOSE SERPL-MCNC: 99 MG/DL (ref 70–99)
HCT VFR BLD AUTO: 48.8 % (ref 40–53)
HGB BLD-MCNC: 17.3 G/DL (ref 13.3–17.7)
IMM GRANULOCYTES # BLD: 0 10E9/L (ref 0–0.4)
IMM GRANULOCYTES NFR BLD: 0.3 %
INTERPRETATION ECG - MUSE: NORMAL
LYMPHOCYTES # BLD AUTO: 1.5 10E9/L (ref 0.8–5.3)
LYMPHOCYTES NFR BLD AUTO: 20.4 %
MCH RBC QN AUTO: 31.9 PG (ref 26.5–33)
MCHC RBC AUTO-ENTMCNC: 35.5 G/DL (ref 31.5–36.5)
MCV RBC AUTO: 90 FL (ref 78–100)
MONOCYTES # BLD AUTO: 0.4 10E9/L (ref 0–1.3)
MONOCYTES NFR BLD AUTO: 5.4 %
NEUTROPHILS # BLD AUTO: 5.3 10E9/L (ref 1.6–8.3)
NEUTROPHILS NFR BLD AUTO: 73.2 %
NRBC # BLD AUTO: 0 10*3/UL
NRBC BLD AUTO-RTO: 0 /100
PLATELET # BLD AUTO: 261 10E9/L (ref 150–450)
POTASSIUM SERPL-SCNC: 3.9 MMOL/L (ref 3.4–5.3)
PROT SERPL-MCNC: 7.6 G/DL (ref 6.8–8.8)
RBC # BLD AUTO: 5.42 10E12/L (ref 4.4–5.9)
SODIUM SERPL-SCNC: 137 MMOL/L (ref 133–144)
WBC # BLD AUTO: 7.3 10E9/L (ref 4–11)

## 2018-03-14 PROCEDURE — 80053 COMPREHEN METABOLIC PANEL: CPT | Performed by: EMERGENCY MEDICINE

## 2018-03-14 PROCEDURE — 93005 ELECTROCARDIOGRAM TRACING: CPT

## 2018-03-14 PROCEDURE — 80320 DRUG SCREEN QUANTALCOHOLS: CPT | Performed by: EMERGENCY MEDICINE

## 2018-03-14 PROCEDURE — 25000128 H RX IP 250 OP 636: Performed by: EMERGENCY MEDICINE

## 2018-03-14 PROCEDURE — 99285 EMERGENCY DEPT VISIT HI MDM: CPT | Mod: 25

## 2018-03-14 PROCEDURE — 96374 THER/PROPH/DIAG INJ IV PUSH: CPT

## 2018-03-14 PROCEDURE — 85025 COMPLETE CBC W/AUTO DIFF WBC: CPT | Performed by: EMERGENCY MEDICINE

## 2018-03-14 PROCEDURE — 90791 PSYCH DIAGNOSTIC EVALUATION: CPT

## 2018-03-14 RX ORDER — LORAZEPAM 1 MG/1
1 TABLET ORAL EVERY 8 HOURS PRN
Qty: 8 TABLET | Refills: 0 | Status: ON HOLD | OUTPATIENT
Start: 2018-03-14 | End: 2018-05-29

## 2018-03-14 RX ORDER — LORAZEPAM 2 MG/ML
1 INJECTION INTRAMUSCULAR ONCE
Status: COMPLETED | OUTPATIENT
Start: 2018-03-14 | End: 2018-03-14

## 2018-03-14 RX ORDER — DIAZEPAM 5 MG
5 TABLET ORAL ONCE
Status: DISCONTINUED | OUTPATIENT
Start: 2018-03-14 | End: 2018-03-14

## 2018-03-14 RX ADMIN — LORAZEPAM 1 MG: 2 INJECTION INTRAMUSCULAR at 08:50

## 2018-03-14 ASSESSMENT — ENCOUNTER SYMPTOMS
FATIGUE: 1
NERVOUS/ANXIOUS: 1
ARTHRALGIAS: 1
HEADACHES: 1

## 2018-03-14 NOTE — DISCHARGE INSTRUCTIONS
Understanding Anxiety Disorders  Almost everyone gets nervous now and then. It s normal to have knots in your stomach before a test, or for your heart to race on a first date. But an anxiety disorder is much more than a case of nerves. In fact, its symptoms may be overwhelming. But treatment can relieve many of these symptoms. Talking to your healthcare provider is the first step.    What are anxiety disorders?  An anxiety disorder causes intense feelings of panic and fear. These feelings may arise for no apparent reason. And they tend to recur again and again. They may prevent you from coping with life and cause you great distress. As a result, you may avoid anything that triggers your fear. In extreme cases, you may never leave the house. Anxiety disorders may cause other symptoms, such as:    Obsessive thoughts you can t control    Constant nightmares or painful thoughts of the past    Nausea, sweating, and muscle tension    Trouble sleeping or concentrating  What causes anxiety disorders?  Anxiety disorders tend to run in families. For some people, childhood abuse or neglect may play a role. For others, stressful life events or trauma may trigger anxiety disorders. Anxiety can trigger low self-esteem and poor coping skills.  Common anxiety disorders    Panic disorder. This causes an intense fear of being in danger.    Phobias. These are extreme fears of certain objects, places, or events.    Obsessive-compulsive disorder. This causes you to have unwanted thoughts and urges. You also may perform certain actions over and over.    Posttraumatic stress disorder. This occurs in people who have survived a terrible ordeal. It can cause nightmares and flashbacks about the event.    Generalized anxiety disorder. This causes constant worry that can greatly disrupt your life.   Getting better  You may believe that nothing can help you. Or, you might fear what others may think. But most anxiety symptoms can be eased.  Having an anxiety disorder is nothing to be ashamed of. Most people do best with treatment that combines medicine and therapy. These aren t cures. But they can help you live a healthier life.  Date Last Reviewed: 2/1/2017 2000-2017 [employer]. [address] [place] [address], [place] [address]. All rights reserved. This information is not intended as a substitute for professional medical care. Always follow your healthcare professional's instructions.

## 2018-03-14 NOTE — ED NOTES
"Pt arrives with weakness, dizziness and just \"not feeling right\" onset a few days ago. Pt reports he has had a right sided headache onset months ago and was supposed to get an MRI but had anxiety when in the machine so supposed to reschedule. He has not yet at this time. Pt has holter monitor on due to recent dx of a-fib.   "

## 2018-03-14 NOTE — ED PROVIDER NOTES
"  History     Chief Complaint:  Dizziness and generalized weakness    HPI   Georges Moreno is a 42 year old male with a history of alcohol abuse, anxiety, and atrial fibrillation, taking Xarelto, who presents with a headache, dizziness, and generalized weakness. The patient reports that he has been experiencing several months of a headache and shoulder pain since September of last year. He reports that this headache began after a night of drinking and blacking out, waking up the next day feeling as if he had been punched in the head. Three weeks ago the patient thought he was experiencing an acute episode of anxiety and upon presentation to the emergency department was found to be in atrial fibrillation. He was started on Xarelto for his a fib and was placed on a Holter monitor. The patient was sent for a head MRI for this persistent headache, however he became very anxious and was told to reschedule this MRI. Today the patient reports feeling very anxious again due to his continued to experience this headache that has concerned him. He describes having some blurry vision with movement of his head today. The patient states that he feels as if he may be experiencing \" a nervous breakdown. He has had decreased appetite and poor sleep recently. He denies any thoughts of harming himself or others. He denies experiencing any new acute symptoms. He has not experienced any chest pain or shortness of breath. 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 He does endorse having a problem with alcohol with his last drink occurring last evening. He has been drinking less recently due to his health concerns with 4-5 drinks a day in the past two weeks. The patient has not been seen by anyone recently for mental health concerns.    CT Head w/o contrast 9/11/2017:  FINDINGS:  There is no evidence of intracranial hemorrhage, mass effect, midline shift, acute infarct or " hydrocephalus.  There are no abnormal intraaxial or extraaxial fluid collections.  Brain parenchyma, ventricles, sulci and cisternal areas appear normal and there are no areas of abnormal brain density.  Visualized paranasal sinuses are well-aerated and clear.  Calvaria and skull base appear intact and bony structures are unremarkable.  Allergies:  No Known Drug Allergies     Medications:    Xarelto  Metoprolol  Gabapentin    Past Medical History:    Anxiety  Depression  PUSHPA  Headache  Alcoholism  Substance abuse  Thoracic or lumbosacral neuritis or radiculitis unspecified     Past Surgical History:    History reviewed. No pertinent past surgical history.     Family History:    Alcohol/drug  Diabetes  Leukemia    Social History:  Smoking Status: Yes, 1.50 PPD  Smokeless Tobacco: No  Alcohol Use: Yes   Marital Status:  Single [1]     Review of Systems   Constitutional: Positive for fatigue.   Eyes: Positive for visual disturbance.   Musculoskeletal: Positive for arthralgias.   Neurological: Positive for headaches.   Psychiatric/Behavioral: The patient is nervous/anxious.    All other systems reviewed and are negative.    Physical Exam   Vitals:  Patient Vitals for the past 24 hrs:   BP Temp Temp src Pulse Heart Rate Resp SpO2 Weight   03/14/18 1030 (!) 138/109 - - - 90 - 95 % -   03/14/18 1015 (!) 147/101 - - - 92 - 92 % -   03/14/18 1000 (!) 135/100 - - - 79 - 97 % -   03/14/18 0930 (!) 149/99 - - - 77 - 98 % -   03/14/18 0915 (!) 138/105 - - - 92 - 97 % -   03/14/18 0900 138/89 - - - 87 - 94 % -   03/14/18 0845 (!) 136/99 - - - 80 - 98 % -   03/14/18 0830 (!) 145/93 - - - 86 - 98 % -   03/14/18 0815 (!) 145/115 - - - - - 98 % -   03/14/18 0808 (!) 158/113 98  F (36.7  C) Oral 97 - 18 97 % 93.8 kg (206 lb 12.7 oz)        Physical Exam    Gen: alert  HEENT: PERRL, oropharynx clear  Neck: normal ROM  CV: RRR, no murmurs, 2+ distal pulses in all 4 extremities  Chest: no tenderness over the chest wall  Pulm: breath  sounds equal, lungs clear  Abd: Soft, nontender  Back: no evidence of injury  MSK: no lower extremity edema, no calf tenderness  Skin: no rash  Neuro: alert, appropriate conversation and interaction  Psych: very anxious appearing, pressured speech, denies SI, poor sleep, ruminating thoughts      Emergency Department Course     ECG:  ECG taken at 0814, ECG read at 0814  Normal sinus rhythm. Normal ECG  Rate 87 bpm. FL interval 156. QRS duration 82. QT/QTc 366/440. P-R-T axes 56, 65, 34.     Laboratory:  Laboratory findings were communicated with the patient who voiced understanding of the findings.  CBC: AWNL (WBC 7.3, HGB 17.3, )  CMP: AWNL (Creatinine 0.88)     Interventions:  0850 Ativan 1 mg IV    Emergency Department Course:  Nursing notes and vitals reviewed.  I performed an exam of the patient as documented above.   IV was inserted and blood was drawn for laboratory testing, results above.    0858 I spoke with DEC regarding the patient    I discussed the treatment plan with the patient. They expressed understanding of this plan and consented to discharge. They will be discharged home with instructions for care and follow up. In addition, the patient will return to the emergency department if their symptoms persist, worsen, if new symptoms arise or if there is any concern.  All questions were answered.     I personally reviewed the laboratory results with the patient and answered all related questions prior to discharge.    Impression & Plan      Medical Decision Making:  Georges Moreno is a 42 year old male who presents to the emergency department today for anxiety. Recent diagnosis of A fib. ECG obtained and showed sinus rhythm today. Laboratory studies unremarkable. Patient with quite a lot of anxiety over recent diagnosis as well as other health care concerns. He has had chronic headaches for many months that are unchanged. He is following with Neurology and needs to have outpatient MRI. No indication  for acute MRI of the brain or C spine today. The patient is not suicidal. He does describe very pervasive anxiety. This is likely worsened by his alcohol use, though he has cut back. At this time, there is no evidence of overt withdrawal. Patient was evaluated by [facility] of [facility] services. We agree that patient is not functioning well secondary to his anxiety. Patient was offered in patient psychiatric admission for stabilization, however he declines this. I did not feel the patient was a danger to himself or other. I felt he was competent to make this decision. DEC will contact the patient to provide additional resources. Discussed with him [facility] [place] and this resource as well. Given the proponent of panic, patient is prescribed a small number of Ativan> He is aware that this prescription will not be refilled in the emergency department and close primary care and outpatient mental diann follow up is required.  Discussed that abstinence from alcohol is required while using this medication.     Diagnosis:    ICD-10-CM    1. Acute anxiety F41.9 Alcohol ethyl        Disposition:   Discharged    CMS Diagnoses: None     Discharge Medications:  New Prescriptions    LORAZEPAM (ATIVAN) 1 MG TABLET    Take 1 tablet (1 mg) by mouth every 8 hours as needed for anxiety       Scribe Disclosure:  Yinka TROTTER, am serving as a scribe at 8:12 AM on 3/14/2018 to document services personally performed by Preeti Quinonez MD, based on my observations and the provider's statements to me.   [facility] EMERGENCY DEPARTMENT       Preeti Quinonez MD  03/14/18 2369

## 2018-03-14 NOTE — ED AVS SNAPSHOT
[facility] Emergency Department    [address]    [facility] [address]    Phone:  [phone]    Fax:  [phone]                                       Georges Moreno   MRN: [id number]    Department:  [facility] Emergency Department   Date of Visit:  3/14/2018           After Visit Summary Signature Page     I have received my discharge instructions, and my questions have been answered. I have discussed any challenges I see with this plan with the nurse or doctor.    ..........................................................................................................................................  Patient/Patient Representative Signature      ..........................................................................................................................................  Patient Representative Print Name and Relationship to Patient    ..................................................               ................................................  Date                                            Time    ..........................................................................................................................................  Reviewed by Signature/Title    ...................................................              ..............................................  Date                                                            Time

## 2018-03-14 NOTE — ED AVS SNAPSHOT
[facility] Emergency Department    [address] [place] [address]    Phone:  [phone]    Fax:  [phone]                                       Georges Moreno   MRN: [id number]    Department:  [facility] Emergency Department   Date of Visit:  3/14/2018           Patient Information     Date Of Birth          1975        Your diagnoses for this visit were:     Acute anxiety        You were seen by Preeti Quinonez MD.      Follow-up Information     Follow up with Sarah Mccormack MD In 3 days.    Specialty:  Internal Medicine    Contact information:    [facility]  [address] [place] [address]  [phone]          Follow up with [facility] resources.        Schedule an appointment as soon as possible for a visit with Jeanne Powell.    Specialty:  Internal Medicine    Why:  discuss alcohol use    Contact information:    [facility]  [address] [facility] [address]  [phone]          Follow up with [facility] Emergency Department.    Specialty:  EMERGENCY MEDICINE    Why:  If symptoms worsen    Contact information:    [address] [place] [address]  [phone]        Discharge Instructions         Understanding Anxiety Disorders  Almost everyone gets nervous now and then. It s normal to have knots in your stomach before a test, or for your heart to race on a first date. But an anxiety disorder is much more than a case of nerves. In fact, its symptoms may be overwhelming. But treatment can relieve many of these symptoms. Talking to your healthcare provider is the first step.    What are anxiety disorders?  An anxiety disorder causes intense feelings of panic and fear. These feelings may arise for no apparent reason. And they tend to recur again and again. They may prevent you from coping with life and cause you great distress. As a result, you may avoid anything that triggers your  fear. In extreme cases, you may never leave the house. Anxiety disorders may cause other symptoms, such as:    Obsessive thoughts you can t control    Constant nightmares or painful thoughts of the past    Nausea, sweating, and muscle tension    Trouble sleeping or concentrating  What causes anxiety disorders?  Anxiety disorders tend to run in families. For some people, childhood abuse or neglect may play a role. For others, stressful life events or trauma may trigger anxiety disorders. Anxiety can trigger low self-esteem and poor coping skills.  Common anxiety disorders    Panic disorder. This causes an intense fear of being in danger.    Phobias. These are extreme fears of certain objects, places, or events.    Obsessive-compulsive disorder. This causes you to have unwanted thoughts and urges. You also may perform certain actions over and over.    Posttraumatic stress disorder. This occurs in people who have survived a terrible ordeal. It can cause nightmares and flashbacks about the event.    Generalized anxiety disorder. This causes constant worry that can greatly disrupt your life.   Getting better  You may believe that nothing can help you. Or, you might fear what others may think. But most anxiety symptoms can be eased. Having an anxiety disorder is nothing to be ashamed of. Most people do best with treatment that combines medicine and therapy. These aren t cures. But they can help you live a healthier life.  Date Last Reviewed: 2/1/2017 2000-2017 [employer]. [address] [place] [address], [place] [address]. All rights reserved. This information is not intended as a substitute for professional medical care. Always follow your healthcare professional's instructions.          24 Hour Appointment Hotline       To make an appointment at any [facility] clinic, call [phone] ([phone]). If you don't have a family doctor or clinic, we will help you find one. [facility] are conveniently  located to serve the needs of you and your family.             Review of your medicines      START taking        Dose / Directions Last dose taken    LORazepam 1 MG tablet   Commonly known as:  ATIVAN   Dose:  1 mg   Quantity:  8 tablet        Take 1 tablet (1 mg) by mouth every 8 hours as needed for anxiety   Refills:  0          Our records show that you are taking the medicines listed below. If these are incorrect, please call your family doctor or clinic.        Dose / Directions Last dose taken    gabapentin 300 MG capsule   Commonly known as:  NEURONTIN   Dose:  300 mg   Quantity:  120 capsule        Take 1 capsule (300 mg) by mouth 2 times daily   Refills:  1        metoprolol tartrate 50 MG tablet   Commonly known as:  LOPRESSOR   Dose:  50 mg   Quantity:  60 tablet        Take 1 tablet (50 mg) by mouth 2 times daily   Refills:  0        rivaroxaban ANTICOAGULANT 20 MG Tabs tablet   Commonly known as:  XARELTO   Dose:  20 mg   Quantity:  30 tablet        Take 1 tablet (20 mg) by mouth daily (with dinner)   Refills:  0                Prescriptions were sent or printed at these locations (1 Prescription)                   Other Prescriptions                Printed at Department/Unit printer (1 of 1)         LORazepam (ATIVAN) 1 MG tablet                Procedures and tests performed during your visit     Alcohol ethyl    CBC + differential    Comprehensive metabolic panel    EKG 12 lead      Orders Needing Specimen Collection     None      Pending Results     Date and Time Order Name Status Description    3/14/2018 0837 Alcohol ethyl In process     3/14/2018 0828 EKG 12 lead Preliminary             Pending Culture Results     No orders found from 3/12/2018 to 3/15/2018.            Pending Results Instructions     If you had any lab results that were not finalized at the time of your Discharge, you can call the ED Lab Result RN at [phone]. You will be contacted by this team for any positive Lab results or  changes in treatment. The nurses are available 7 days a week from 10A to 6:30P.  You can leave a message 24 hours per day and they will return your call.        Test Results From Your Hospital Stay        3/14/2018  8:55 AM      Component Results     Component Value Ref Range & Units Status    WBC 7.3 4.0 - 11.0 10e9/L Final    RBC Count 5.42 4.4 - 5.9 10e12/L Final    Hemoglobin 17.3 13.3 - 17.7 g/dL Final    Hematocrit 48.8 40.0 - 53.0 % Final    MCV 90 78 - 100 fl Final    MCH 31.9 26.5 - 33.0 pg Final    MCHC 35.5 31.5 - 36.5 g/dL Final    RDW 12.6 10.0 - 15.0 % Final    Platelet Count 261 150 - 450 10e9/L Final    Diff Method Automated Method  Final    % Neutrophils 73.2 % Final    % Lymphocytes 20.4 % Final    % Monocytes 5.4 % Final    % Eosinophils 0.3 % Final    % Basophils 0.4 % Final    % Immature Granulocytes 0.3 % Final    Nucleated RBCs 0 0 /100 Final    Absolute Neutrophil 5.3 1.6 - 8.3 10e9/L Final    Absolute Lymphocytes 1.5 0.8 - 5.3 10e9/L Final    Absolute Monocytes 0.4 0.0 - 1.3 10e9/L Final    Absolute Eosinophils 0.0 0.0 - 0.7 10e9/L Final    Absolute Basophils 0.0 0.0 - 0.2 10e9/L Final    Abs Immature Granulocytes 0.0 0 - 0.4 10e9/L Final    Absolute Nucleated RBC 0.0  Final         3/14/2018  9:15 AM      Component Results     Component Value Ref Range & Units Status    Sodium 137 133 - 144 mmol/L Final    Potassium 3.9 3.4 - 5.3 mmol/L Final    Chloride 104 94 - 109 mmol/L Final    Carbon Dioxide 24 20 - 32 mmol/L Final    Anion Gap 9 3 - 14 mmol/L Final    Glucose 99 70 - 99 mg/dL Final    Urea Nitrogen 11 7 - 30 mg/dL Final    Creatinine 0.88 0.66 - 1.25 mg/dL Final    GFR Estimate >90 >60 mL/min/1.7m2 Final    Non  GFR Calc    GFR Estimate If Black >90 >60 mL/min/1.7m2 Final    African American GFR Calc    Calcium 9.0 8.5 - 10.1 mg/dL Final    Bilirubin Total 0.7 0.2 - 1.3 mg/dL Final    Albumin 4.3 3.4 - 5.0 g/dL Final    Protein Total 7.6 6.8 - 8.8 g/dL Final     Alkaline Phosphatase 51 40 - 150 U/L Final    ALT 29 0 - 70 U/L Final    AST 20 0 - 45 U/L Final         3/14/2018 10:27 AM                Clinical Quality Measure: Blood Pressure Screening     Your blood pressure was checked while you were in the emergency department today. The last reading we obtained was  BP: (!) 149/99 . Please read the guidelines below about what these numbers mean and what you should do about them.  If your systolic blood pressure (the top number) is less than 120 and your diastolic blood pressure (the bottom number) is less than 80, then your blood pressure is normal. There is nothing more that you need to do about it.  If your systolic blood pressure (the top number) is 120-139 or your diastolic blood pressure (the bottom number) is 80-89, your blood pressure may be higher than it should be. You should have your blood pressure rechecked within a year by a primary care provider.  If your systolic blood pressure (the top number) is 140 or greater or your diastolic blood pressure (the bottom number) is 90 or greater, you may have high blood pressure. High blood pressure is treatable, but if left untreated over time it can put you at risk for heart attack, stroke, or kidney failure. You should have your blood pressure rechecked by a primary care provider within the next 4 weeks.  If your provider in the emergency department today gave you specific instructions to follow-up with your doctor or provider even sooner than that, you should follow that instruction and not wait for up to 4 weeks for your follow-up visit.        Thank you for choosing [facility]       Thank you for choosing [facility] for your care. Our goal is always to provide you with excellent care. Hearing back from our patients is one way we can continue to improve our services. Please take a few minutes to complete the written survey that you may receive in the mail after you visit with us. Thank you!        MyChart Information      "[employer] lets you send messages to your doctor, view your test results, renew your prescriptions, schedule appointments and more. To sign up, go to [email or web address] . Click on \"Log in\" on the left side of the screen, which will take you to the Welcome page. Then click on \"Sign up Now\" on the right side of the page.     You will be asked to enter the access code listed below, as well as some personal information. Please follow the directions to create your username and password.     Your access code is: [email or web address]  Expires: 2018  1:37 PM     Your access code will  in 90 days. If you need help or a new code, please call your [facility] clinic or [phone].        Care EveryWhere ID     This is your Care EveryWhere ID. This could be used by other organizations to access your [facility] medical records  [id number]        Equal Access to Services     BRITTANY TURPIN AH: Hadii christine López, wawale bishop, qabill kaalmaleticia bhatti, michelet pagan . So [facility] [phone].    ATENCIÓN: Si habla español, tiene a chinchilla disposición servicios gratuitos de asistencia lingüística. Llame al [phone].    We comply with applicable federal civil rights laws and [place] laws. We do not discriminate on the basis of race, color, national origin, age, disability, sex, sexual orientation, or gender identity.            After Visit Summary       This is your record. Keep this with you and show to your community pharmacist(s) and doctor(s) at your next visit.                  "

## 2018-03-14 NOTE — TELEPHONE ENCOUNTER
Reason for Disposition    Patient sounds very sick or weak to the triager    Additional Information    Negative: Unable to walk, or can only walk with assistance (e.g., requires support)    Negative: Stiff neck (can't touch chin to chest)    Negative: Severe pain in one eye    Negative: [1] Other family members (or roommates) with headaches AND [2] possibility of carbon monoxide exposure    Protocols used: HEADACHE-ADULT-AH

## 2018-03-14 NOTE — TELEPHONE ENCOUNTER
"Georges was into ED three weeks ago and told he has A-Fib and seen cardiologist and was told Georges is back to normal.  Georges tried to have a MRI but anxiety took over.    Today Georges has a headache and cannot eat or sleep.   Georges feels light headed and is in a \"haze\".  Georges has a muscle spasm on right shoulder and headache is on right side.  Georges has a shoulder injury 4 or five years ago.  Currently Georges is having high anxiety and not feeling well.  FNA advised to go to ED and phone ambulance if he has no one   To drive him.  Georges agreed.    "

## 2018-03-15 ENCOUNTER — TELEPHONE (OUTPATIENT)
Dept: CARDIOLOGY | Facility: CLINIC | Age: 43
End: 2018-03-15

## 2018-03-15 NOTE — TELEPHONE ENCOUNTER
Call from [facility] pt had onset of A Fib at 1747 rate . Pt had gone into ER that morning d/t Dizziness and generalized weakness. Pt continues to drink 4-5 drinks per day. Pt currently on Xarelto and Metoprolol since hospital stay. Spoke to pt who was not aware of the A Fib, but thought maybe he noticed that his heart was racing a bit. Pt states that he has forgot to take his meds on occasion and did say he has forgotten to take his Xarelto.  Reminded pt that he should not miss his medications and the importance of each medication. Pt states understanding. Mode

## 2018-03-30 ENCOUNTER — TELEPHONE (OUTPATIENT)
Dept: ADMINISTRATIVE | Facility: CLINIC | Age: 43
End: 2018-03-30

## 2018-03-30 DIAGNOSIS — I48.91 ATRIAL FIBRILLATION WITH RVR (H): ICD-10-CM

## 2018-03-30 RX ORDER — METOPROLOL TARTRATE 50 MG
50 TABLET ORAL 2 TIMES DAILY
Qty: 60 TABLET | Refills: 0 | Status: SHIPPED | OUTPATIENT
Start: 2018-03-30 | End: 2018-05-09

## 2018-03-30 NOTE — TELEPHONE ENCOUNTER
Contacted patient with results of cardiac event monitor. He has been out of his metoprolol and Xarelto scripts for 1 week. He has not been symptomatic this week. Given his persisting paroxysmal a fib, will send a new script for Xarelto 20 mg daily w/ dinner and metoprolol tartrate 50 mg BID to [facility] in [place]. He saw his PCP Dr. Sarah Mccormack w/ [facility] yesterday and was started on escitalopram and given a new script of lorazepam for anxiety. I have called his primary care office and left a detailed message with Dr. Mccormack's clinic nursing staff, plan will be for her to call me back this afternoon sometime after 1 PM to discuss when follow up with her will be appropriate given these findings (patient currently has follow up appointment with her scheduled for 4/16/2018).    Forwarded results to Dr. Mccormack's (internal med, [facility]) office as well as Dr. Akhtar's office (cardiology, [facility]) for review.    ADDENDUM: Spoke with Dr. Mccormack this afternoon who confirmed that he can see her on 4/16/2018, does not need to shorten follow up time with these findings as long as he starts on the beta blocker and blood thinner again. Called the patient back to inform him of this and he plans to  the medications sent to his pharmacy.    Winnie Torre PA-C

## 2018-04-11 ENCOUNTER — THERAPY VISIT (OUTPATIENT)
Dept: PHYSICAL THERAPY | Facility: CLINIC | Age: 43
End: 2018-04-11
Payer: COMMERCIAL

## 2018-04-11 DIAGNOSIS — M54.2 CERVICALGIA: Primary | ICD-10-CM

## 2018-04-11 DIAGNOSIS — M25.511 RIGHT SHOULDER PAIN: ICD-10-CM

## 2018-04-11 PROCEDURE — 97162 PT EVAL MOD COMPLEX 30 MIN: CPT | Mod: GP | Performed by: PHYSICAL THERAPIST

## 2018-04-11 PROCEDURE — 97110 THERAPEUTIC EXERCISES: CPT | Mod: GP | Performed by: PHYSICAL THERAPIST

## 2018-04-11 PROCEDURE — G8985 CARRY GOAL STATUS: HCPCS | Mod: GP | Performed by: PHYSICAL THERAPIST

## 2018-04-11 PROCEDURE — G8984 CARRY CURRENT STATUS: HCPCS | Mod: GP | Performed by: PHYSICAL THERAPIST

## 2018-04-11 PROCEDURE — 97112 NEUROMUSCULAR REEDUCATION: CPT | Mod: GP | Performed by: PHYSICAL THERAPIST

## 2018-04-11 NOTE — PROGRESS NOTES
[place] [facility] Initial Evaluation  Subjective:  HPI                    Objective:  System    Physical Exam    General     [facility]: Physical Therapy Initial Evaluation   Apr 11, 2018  Precautions/Restrictions/MD instructions:   Per Orders: Right shoulder, neck pain, decreased internal rotation right shoulder    Therapist Impression: Georges is a 42 year-old male who presents to physical therapy with a primary complaint of posterior shoulder pain in the region of the scapula. Clinical findings include shoulder and upper extremity weakness, scapular dyskinesis, impaired cervical ROM, and poor posture. Georges would benefit from physical therapy to address posture, restore normal ROM, and to train the muscles of the upper girdle for improved dynamic stability.     Subjective:   Chief Complaint:    Pain: In the back of the right shoulder, neck.    Numbness/Tingling: down the right upper extremity to the forearm, but none presently   Weakness: in the right hand/upper extremity, specifically cites hand strength   Stiffness: neck and upper back  New/Recurrent/Chronic: Recurrent  DOI/onset: about 6 weeks ago   Referral Date: 4/6/2018 - Dr. 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  Previous Treatment (Effect): None  Imaging: None recently  AM/PM: constant  Quality of Pain: steady, dull  Pain: 5/10 at present, 1/10 at best, 10/10 at worst  Better: heating pad, propping up his arm,   Worse: activity - general, lifting,   Progression of Symptoms since onset: current trend - unchanging  Other current functional challenges: lifting, reaching  Current Functional Status: reaching - feels a pulling  with reaching overhead, painful with reaching behind the back ; lifting - tries not to lift much of anything right now, unable to lift his 6-7 month-old nephew (~20 Lbs)  Current HEP/exercise regimen: None  Hand/Leg Dominance: right-handed  Transportation: Doesn't drive - no specific set arrangements for transportation to therapy  Live with Others: Lives with mother, he typically does some of the more physical housework  Red Flags:   - Patient denies the following: Pain with Cough / Sneeze / Laughing ; Night Pain ; Vision Change   - Patient reports the following: Weakness ; Numbness/Tingling ; Chest Pain ;     Patient's Goal(s): Get back to work. Figure out what keeps causing this to come back        Objective:    Posture: Poor - Forward head posture, very rounded shoulders    Shoulder Screen: reproduction of symptoms with arm elevation      Neurological:    Motor Deficit:  Myotomes R L   C5 (shoulder abduction) 5 5   C6 (elbow flexion) 4 5   C7 (elbow extension) 5 5   C8 (thumb extension) 5 5   T1 (finger add/abd) 5 5     Reflexes: 1+ for biceps and triceps bilaterally. Unable to elicit brachioradialis bilaterally     Strength (lb) R L   Attempt #1 65 109   Attempt #2 63 101   Attempt #3 65 95   Average 64.3 101.7         AROM: (Major, Moderate, Minimal or Nil loss)  Movement Loss Lawrence Mod Min Nil Pain   Flexion   x  cc   Extension  x   cc   Left Rotation   x  cc   Right Rotation   x  cc   Left Side Bending   x  cc   Right Side bending   x  cc   Retraction  x   cc   Protrusion            Shoulder: (* indicates patient's pain)   MMT R MMT L   Flex/  Elevation 5 with winging of the scapula    Abd 5    IR 5    ER 4    SA 4- with winging 5         Cervical PIVMs: (R- right, L - left; U - upglide, D - downglide)  Level Hypo Pain   C1/2     C2/3     C3/4     C4/5     C5/6     C6/7     C7/T1     PIVM were WNL bilaterally    SPECIAL TESTS   R L   Distraction (-)    Alar Ligament Test (-) (-)   Transverse Ligament Test  (-)    Adson's Maneuver (-)    1st Rib Test (-)      Other:   - Patient has significant difficulty with the motor movement of scapular depression.      Assessment/Plan:    Patient is a 42 year old male with cervical, thoracic and right side shoulder complaints.    Patient has the following significant findings with corresponding treatment plan.                Diagnosis 1:  Right shoulder, neck pain  Pain -  hot/cold therapy, manual therapy, splint/taping/bracing/orthotics, self management, education and home program  Decreased ROM/flexibility - manual therapy, therapeutic exercise, therapeutic activity and home program  Decreased strength - therapeutic exercise, therapeutic activities and home program  Impaired muscle performance - neuro re-education and home program  Decreased function - therapeutic activities and home program  Impaired posture - neuro re-education, therapeutic activities and home program    Therapy Evaluation Codes:   1) History comprised of:   Personal factors that impact the plan of care:      Overall behavior pattern, Past/current experiences and Profession.    Comorbidity factors that impact the plan of care are:      Chemical Dependency and Depression.     Medications impacting care: None.  2) Examination of Body Systems comprised of:   Body structures and functions that impact the plan of care:      Cervical spine, Shoulder and Thoracic Spine.   Activity limitations that impact the plan of care are:      Lifting, Sleeping and Reaching.  3) Clinical presentation characteristics are:   Evolving/Changing.  4) Decision-Making    Moderate complexity using standardized patient assessment instrument and/or measureable assessment of functional outcome.  Cumulative Therapy Evaluation is: Moderate complexity.    Previous and current functional limitations:  (See Goal Flow Sheet for this information)    Short term and Long term goals: (See Goal Flow Sheet for this information)     Communication  ability:  Patient appears to be able to clearly communicate and understand verbal and written communication and follow directions correctly.  Treatment Explanation - The following has been discussed with the patient:   RX ordered/plan of care  Anticipated outcomes  Possible risks and side effects  This patient would benefit from PT intervention to resume normal activities.   Rehab potential is good.    Frequency:  1 X week, once daily  Duration:  for 6 weeks tapering to 2 X a month over 8 weeks  Discharge Plan:  Achieve all LTG.  Independent in home treatment program.  Reach maximal therapeutic benefit.    Please refer to the daily flowsheet for treatment today, total treatment time and time spent performing 1:1 timed codes.

## 2018-04-11 NOTE — LETTER
[facility]  [address]  [address]  [place] [facility] [address]  [phone]    2018    Re: Georges Moreno   :   1975  MRN:  [id number]   REFERRING PHYSICIAN:   Augusto NUNEZ PT    Date of Initial Evaluation:  2017  Visits:  Rxs Used: 1  Reason for Referral:     Cervicalgia  Right shoulder pain  [place] [facility]: Physical Therapy Initial Evaluation   2018  Precautions/Restrictions/MD instructions:   Per Orders: Right shoulder, neck pain, decreased internal rotation right shoulder  Therapist Impression: Georges is a 42 year-old male who presents to physical therapy with a primary complaint of posterior shoulder pain in the region of the scapula. Clinical findings include shoulder and upper extremity weakness, scapular dyskinesis, impaired cervical ROM, and poor posture. Georges would benefit from physical therapy to address posture, restore normal ROM, and to train the muscles of the upper girdle for improved dynamic stability.   Subjective:   Chief Complaint:    Pain: In the back of the right shoulder, neck.    Numbness/Tingling: down the right upper extremity to the forearm, but none presently   Weakness: in the right hand/upper extremity, specifically cites hand strength   Stiffness: neck and upper back  New/Recurrent/Chronic: Recurrent  DOI/onset: about 6 weeks ago   Referral Date: 2018 - Dr. 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  Previous Treatment (Effect): None  Imaging: None recently  AM/PM: constant  Quality of Pain: steady, dull  Pain: 5/10 at present, 1/10 at best, 10/10 at worst  Better:  heating pad, propping up his arm,   Worse: activity - general, lifting,   Progression of Symptoms since onset: current trend - unchanging  Other current functional challenges: lifting, reaching  Current Functional Status: reaching - feels a pulling with reaching overhead, painful with reaching behind the back ; lifting - tries not to lift much of anything right now, unable to lift his 6-7 month-old nephew (~20 Lbs)  Current HEP/exercise regimen: None  Hand/Leg Dominance: right-handed  Transportation: Doesn't drive - no specific set arrangements for transportation to therapy  Live with Others: Lives with mother, he typically does some of the more physical housework  Red Flags:   - Patient denies the following: Pain with Cough / Sneeze / Laughing ; Night Pain ; Vision Change   - Patient reports the following: Weakness ; Numbness/Tingling ; Chest Pain ;     Patient's Goal(s): Get back to work. Figure out what keeps causing this to come back  Objective:  Posture: Poor - Forward head posture, very rounded shoulders  Shoulder Screen: reproduction of symptoms with arm elevation  Neurological:    Motor Deficit:  Myotomes R L   C5 (shoulder abduction) 5 5   C6 (elbow flexion) 4 5   C7 (elbow extension) 5 5   C8 (thumb extension) 5 5   T1 (finger add/abd) 5 5   Re: Georges Moreno   :   1975    Reflexes: 1+ for biceps and triceps bilaterally. Unable to elicit brachioradialis bilaterally     Strength (lb) R L   Attempt #1 65 109   Attempt #2 63 101   Attempt #3 65 95   Average 64.3 101.7   AROM: (Major, Moderate, Minimal or Nil loss)  Movement Loss Lawrence Mod Min Nil Pain   Flexion   x  cc   Extension  x   cc   Left Rotation   x  cc   Right Rotation   x  cc   Left Side Bending   x  cc   Right Side bending   x  cc   Retraction  x   cc   Protrusion        Shoulder: (* indicates patient's pain)   MMT R MMT L   Flex/  Elevation 5 with winging of the scapula    Abd 5    IR 5    ER 4    SA 4- with winging 5   Cervical PIVMs:  (R- right, L - left; U - upglide, D - downglide)  Level Hypo Pain   C1/2     C2/3     C3/4     C4/5     C5/6     C6/7     C7/T1     PIVM were WNL bilaterally    SPECIAL TESTS   R L   Distraction (-)    Alar Ligament Test (-) (-)   Transverse Ligament Test (-)    Adson's Maneuver (-)    1st Rib Test (-)      Other:   - Patient has significant difficulty with the motor movement of scapular depression.          Re: Georges Moreno   :   1975    Assessment/Plan:    Patient is a 42 year old male with cervical, thoracic and right side shoulder complaints.    Patient has the following significant findings with corresponding treatment plan.                Diagnosis 1:  Right shoulder, neck pain  Pain -  hot/cold therapy, manual therapy, splint/taping/bracing/orthotics, self management, education and home program  Decreased ROM/flexibility - manual therapy, therapeutic exercise, therapeutic activity and home program  Decreased strength - therapeutic exercise, therapeutic activities and home program  Impaired muscle performance - neuro re-education and home program  Decreased function - therapeutic activities and home program  Impaired posture - neuro re-education, therapeutic activities and home program  Therapy Evaluation Codes:   1) History comprised of:   Personal factors that impact the plan of care:      Overall behavior pattern, Past/current experiences and Profession.    Comorbidity factors that impact the plan of care are:      Chemical Dependency and Depression.     Medications impacting care: None.  2) Examination of Body Systems comprised of:   Body structures and functions that impact the plan of care:      Cervical spine, Shoulder and Thoracic Spine.   Activity limitations that impact the plan of care are:      Lifting, Sleeping and Reaching.  3) Clinical presentation characteristics are:   Evolving/Changing.  4) Decision-Making    Moderate complexity using standardized patient assessment instrument  and/or measureable assessment of functional outcome.  Cumulative Therapy Evaluation is: Moderate complexity.  Previous and current functional limitations:  (See Goal Flow Sheet for this information)    Short term and Long term goals: (See Goal Flow Sheet for this information)   Communication ability:  Patient appears to be able to clearly communicate and understand verbal and written communication and follow directions correctly.  Treatment Explanation - The following has been discussed with the patient:   RX ordered/plan of care  Anticipated outcomes  Possible risks and side effects  This patient would benefit from PT intervention to resume normal activities.   Rehab potential is good.    Frequency:  1 X week, once daily  Duration:  for 6 weeks tapering to 2 X a month over 8 weeks  Discharge Plan:  Achieve all LTG.  Independent in home treatment program.  Reach maximal therapeutic benefit.    Thank you for your referral.    INQUIRIES  Therapist: PAOLO Regalado [facility] PT  [address] [address] [address]  Phone: [phone] / Fax: [phone]

## 2018-04-25 ENCOUNTER — THERAPY VISIT (OUTPATIENT)
Dept: PHYSICAL THERAPY | Facility: CLINIC | Age: 43
End: 2018-04-25
Payer: COMMERCIAL

## 2018-04-25 DIAGNOSIS — M54.2 CERVICALGIA: ICD-10-CM

## 2018-04-25 DIAGNOSIS — M25.511 RIGHT SHOULDER PAIN: ICD-10-CM

## 2018-04-25 PROCEDURE — 97112 NEUROMUSCULAR REEDUCATION: CPT | Mod: GP | Performed by: PHYSICAL THERAPIST

## 2018-04-25 PROCEDURE — 97140 MANUAL THERAPY 1/> REGIONS: CPT | Mod: GP | Performed by: PHYSICAL THERAPIST

## 2018-04-25 PROCEDURE — 97110 THERAPEUTIC EXERCISES: CPT | Mod: GP | Performed by: PHYSICAL THERAPIST

## 2018-04-25 NOTE — LETTER
[facility]  [address]   [address]  [place] [facility] [place] [facility] [address]  [phone]    May 30, 2018    Re: Georges Moreno   :   1975  MRN:  [id number]   [facility]ING PHYSICIAN:   [facility]    Date of Initial Evaluation:  2018  Visits:  Rxs Used: 2  Reason for Referral:     Cervicalgia  Right shoulder pain    DISCHARGE REPORT    Updated as of May 30, 2018  Progress reporting period is from 2018 to 2018.       SUBJECTIVE  Subjective changes noted by patient:  Unknown. Patient has failed to return to clinic.    Current pain level is unknown. Patient has failed to return to clinic.  .     Initial Pain level: 10/10.   Changes in function:  Unknown. Patient has failed to return to clinic.  Adverse reaction to treatment or activity: Unknown. Patient has failed to return to clinic.  OBJECTIVE  Changes noted in objective findings:  Patient has failed to return to therapy so current objective findings are unknown.  ASSESSMENT/PLAN  Updated problem list and treatment plan: Diagnosis 1:  Right Shoulder, Neck Pain  STG/LTGs have been met or progress has been made towards goals:  Unknown. Patient has failed to return to clinic.  Assessment of Progress: The patient has not returned to therapy. Current status is unknown.  Self Management Plans:  Patient has been instructed in a home treatment program.  Karma continues to require the following intervention to meet STG and LTG's:  Unknown. Patient has failed to return to clinic.    Recommendations:  Unable to make an accurate recommendation at this time. Patient has failed to return to clinic.    The chart reflects 3 visits by the patient to the emergency room since his last visit to PT. Discharge recommended at this time until the patients other health issues are resolved.     Thank you for your referral.    INQUIRIES  Therapist: [facility]  [address]   [address] [place] [address]  [address]  Phone: [phone]  Fax: [phone]

## 2018-05-07 PROCEDURE — 96374 THER/PROPH/DIAG INJ IV PUSH: CPT

## 2018-05-07 PROCEDURE — 93005 ELECTROCARDIOGRAM TRACING: CPT

## 2018-05-07 PROCEDURE — 99285 EMERGENCY DEPT VISIT HI MDM: CPT | Mod: 25

## 2018-05-07 PROCEDURE — 93005 ELECTROCARDIOGRAM TRACING: CPT | Mod: 76

## 2018-05-08 ENCOUNTER — APPOINTMENT (OUTPATIENT)
Dept: GENERAL RADIOLOGY | Facility: CLINIC | Age: 43
End: 2018-05-08
Attending: EMERGENCY MEDICINE
Payer: COMMERCIAL

## 2018-05-08 ENCOUNTER — HOSPITAL ENCOUNTER (EMERGENCY)
Facility: CLINIC | Age: 43
Discharge: HOME | End: 2018-05-08
Attending: EMERGENCY MEDICINE | Admitting: EMERGENCY MEDICINE
Payer: COMMERCIAL

## 2018-05-08 VITALS
DIASTOLIC BLOOD PRESSURE: 83 MMHG | RESPIRATION RATE: 20 BRPM | OXYGEN SATURATION: 97 % | HEART RATE: 73 BPM | SYSTOLIC BLOOD PRESSURE: 115 MMHG | TEMPERATURE: 98.4 F

## 2018-05-08 DIAGNOSIS — K85.20 ALCOHOL-INDUCED ACUTE PANCREATITIS WITHOUT INFECTION OR NECROSIS: ICD-10-CM

## 2018-05-08 DIAGNOSIS — F10.10 ALCOHOL ABUSE: ICD-10-CM

## 2018-05-08 DIAGNOSIS — R07.9 ACUTE CHEST PAIN: ICD-10-CM

## 2018-05-08 LAB
ANION GAP SERPL CALCULATED.3IONS-SCNC: 5 MMOL/L (ref 3–14)
BASOPHILS # BLD AUTO: 0.1 10E9/L (ref 0–0.2)
BASOPHILS NFR BLD AUTO: 0.5 %
BUN SERPL-MCNC: 21 MG/DL (ref 7–30)
CALCIUM SERPL-MCNC: 8.7 MG/DL (ref 8.5–10.1)
CHLORIDE SERPL-SCNC: 106 MMOL/L (ref 94–109)
CO2 SERPL-SCNC: 27 MMOL/L (ref 20–32)
CREAT SERPL-MCNC: 0.99 MG/DL (ref 0.66–1.25)
DIFFERENTIAL METHOD BLD: ABNORMAL
EOSINOPHIL # BLD AUTO: 0.2 10E9/L (ref 0–0.7)
EOSINOPHIL NFR BLD AUTO: 1.2 %
ERYTHROCYTE [DISTWIDTH] IN BLOOD BY AUTOMATED COUNT: 11.9 % (ref 10–15)
GFR SERPL CREATININE-BSD FRML MDRD: 82 ML/MIN/1.7M2
GLUCOSE SERPL-MCNC: 99 MG/DL (ref 70–99)
HCT VFR BLD AUTO: 43.5 % (ref 40–53)
HGB BLD-MCNC: 15.4 G/DL (ref 13.3–17.7)
IMM GRANULOCYTES # BLD: 0.1 10E9/L (ref 0–0.4)
IMM GRANULOCYTES NFR BLD: 0.5 %
INTERPRETATION ECG - MUSE: NORMAL
INTERPRETATION ECG - MUSE: NORMAL
LIPASE SERPL-CCNC: 415 U/L (ref 73–393)
LYMPHOCYTES # BLD AUTO: 3.1 10E9/L (ref 0.8–5.3)
LYMPHOCYTES NFR BLD AUTO: 24.7 %
MCH RBC QN AUTO: 31.6 PG (ref 26.5–33)
MCHC RBC AUTO-ENTMCNC: 35.4 G/DL (ref 31.5–36.5)
MCV RBC AUTO: 89 FL (ref 78–100)
MONOCYTES # BLD AUTO: 1.2 10E9/L (ref 0–1.3)
MONOCYTES NFR BLD AUTO: 9.6 %
NEUTROPHILS # BLD AUTO: 8.1 10E9/L (ref 1.6–8.3)
NEUTROPHILS NFR BLD AUTO: 63.5 %
NRBC # BLD AUTO: 0 10*3/UL
NRBC BLD AUTO-RTO: 0 /100
PLATELET # BLD AUTO: 205 10E9/L (ref 150–450)
POTASSIUM SERPL-SCNC: 3.7 MMOL/L (ref 3.4–5.3)
RBC # BLD AUTO: 4.87 10E12/L (ref 4.4–5.9)
SODIUM SERPL-SCNC: 138 MMOL/L (ref 133–144)
TROPONIN I BLD-MCNC: 0.01 UG/L (ref 0–0.1)
TROPONIN I SERPL-MCNC: <0.015 UG/L (ref 0–0.04)
WBC # BLD AUTO: 12.7 10E9/L (ref 4–11)

## 2018-05-08 PROCEDURE — 25000128 H RX IP 250 OP 636: Performed by: EMERGENCY MEDICINE

## 2018-05-08 PROCEDURE — 85025 COMPLETE CBC W/AUTO DIFF WBC: CPT | Performed by: EMERGENCY MEDICINE

## 2018-05-08 PROCEDURE — 84484 ASSAY OF TROPONIN QUANT: CPT | Mod: 91

## 2018-05-08 PROCEDURE — 80048 BASIC METABOLIC PNL TOTAL CA: CPT | Performed by: EMERGENCY MEDICINE

## 2018-05-08 PROCEDURE — 25000132 ZZH RX MED GY IP 250 OP 250 PS 637: Performed by: EMERGENCY MEDICINE

## 2018-05-08 PROCEDURE — 84484 ASSAY OF TROPONIN QUANT: CPT | Performed by: EMERGENCY MEDICINE

## 2018-05-08 PROCEDURE — 71046 X-RAY EXAM CHEST 2 VIEWS: CPT

## 2018-05-08 PROCEDURE — 83690 ASSAY OF LIPASE: CPT | Performed by: EMERGENCY MEDICINE

## 2018-05-08 PROCEDURE — 96374 THER/PROPH/DIAG INJ IV PUSH: CPT

## 2018-05-08 RX ORDER — ASPIRIN 81 MG/1
324 TABLET, CHEWABLE ORAL ONCE
Status: COMPLETED | OUTPATIENT
Start: 2018-05-08 | End: 2018-05-08

## 2018-05-08 RX ORDER — KETOROLAC TROMETHAMINE 30 MG/ML
30 INJECTION, SOLUTION INTRAMUSCULAR; INTRAVENOUS ONCE
Status: COMPLETED | OUTPATIENT
Start: 2018-05-08 | End: 2018-05-08

## 2018-05-08 RX ADMIN — KETOROLAC TROMETHAMINE 30 MG: 30 INJECTION, SOLUTION INTRAMUSCULAR at 01:19

## 2018-05-08 RX ADMIN — ASPIRIN 81 MG 324 MG: 81 TABLET ORAL at 01:17

## 2018-05-08 ASSESSMENT — ENCOUNTER SYMPTOMS
NERVOUS/ANXIOUS: 1
CHILLS: 0
DIAPHORESIS: 0
FEVER: 0
VOMITING: 0
NAUSEA: 0
LIGHT-HEADEDNESS: 0
HEADACHES: 1

## 2018-05-08 NOTE — ED AVS SNAPSHOT
[facility] Emergency Department    [address]    [facility] [address]    Phone:  [phone]    Fax:  [phone]                                       Georges Moreno   MRN: [id number]    Department:  [facility] Emergency Department   Date of Visit:  5/7/2018           After Visit Summary Signature Page     I have received my discharge instructions, and my questions have been answered. I have discussed any challenges I see with this plan with the nurse or doctor.    ..........................................................................................................................................  Patient/Patient Representative Signature      ..........................................................................................................................................  Patient Representative Print Name and Relationship to Patient    ..................................................               ................................................  Date                                            Time    ..........................................................................................................................................  Reviewed by Signature/Title    ...................................................              ..............................................  Date                                                            Time

## 2018-05-08 NOTE — ED PROVIDER NOTES
History     Chief Complaint:  Chest pain     HPI   Georges Moreno is a 42-year-old male with a history of atrial fibrillation, currently anticoagulated on Xarelto, who presents the emergency department for evaluation of left-sided chest pain that started 2-3 hours prior to arrival.  The patient reports that the pain started after he got out of the shower and was not related to exertion.  He describes the pain as pressure and radiating into his left shoulder.  He has no pain radiation into his neck or jaw.  The patient denies any shortness of breath or pleuritic pain.  He notes that throughout the day he was not feeling well.  He adds that he has had a headache on the top of his head for the last couple of days with associated blurry vision.  The patient denies any recent fall or head injury.  He notes that he has a history of panic attacks and thinks that anxiety may be contributing to his symptoms tonight.  He denies any recent travel or illness.  No nausea, vomiting, diaphoresis, or other symptoms.  Patient reports that he had a recent brain MRI, see results below.      Cardiac Risk Factors      Sex:     Male    Tobacco:    POSITIVE    Hypertension:    Negative   Diabetes:    Negative   Hyperlipidemia:   Negative   Family History:   Negative      MR Brain w/ and w/o Contrast 4/19/2018   1. No evidence for acute infarction, abnormal intracranial enhancement, acute sinusitis or acute mastoiditis.  2. Mild probable chronic small vessel ischemic foci involving deep white matter of the parietal lobes bilaterally. No evidence for corpus callosal or posterior fossa involvement.      Allergies:  No known drug allergies.      Medications:    Lexapro   Ativan   Metoprolol   Xarelto     Past Medical History:    Alcoholism   Anxiety   Atrial fibrillation   Cervicalgia   Depression   Headache   Substance abuse     Past Surgical History:    History reviewed. No pertinent past surgical history.     Family History:     Alcohol/drug   Diabetes   Leukemia     Social History:  Marital Status: Single   Presents to the ED alone   Tobacco Use: Current daily smoker   Alcohol Use: Yes   PCP: Sarah Mccormack     Review of Systems   Constitutional: Negative for chills, diaphoresis and fever.   Cardiovascular: Positive for chest pain.   Gastrointestinal: Negative for nausea and vomiting.   Musculoskeletal:        Positive for chronic shoulder pain.   Neurological: Positive for headaches. Negative for light-headedness.   Psychiatric/Behavioral: The patient is nervous/anxious.    All other systems reviewed and are negative.    Physical Exam   First Vitals:  BP: 121/83  Pulse: 73  Temp: 98.4  F (36.9  C)  Resp: 20  SpO2: 98 %    Physical Exam  General: The patient is alert, in no respiratory distress. Tearful.     HENT: Mucous membranes moist.    Cardiovascular: Regular rate and rhythm. Good pulses in all four extremities. Normal capillary refill and skin turgor.     Respiratory: Lungs are clear. No nasal flaring. No retractions. No wheezing, no crackles.    Gastrointestinal: Abdomen soft. No abdominal tenderness. No guarding, no rebound. No palpable hernias.     Musculoskeletal: No gross deformity.     Skin: No rashes or petechiae.     Neurologic: The patient is alert and oriented x3. GCS 15. No testable cranial nerve deficit. Follows commands with clear and appropriate speech. Gives appropriate answers. Good strength in all extremities. No gross neurologic deficit. Gross sensation intact. Pupils are round and reactive. No meningismus.     Lymphatic: No cervical adenopathy. No lower extremity swelling.    Psychiatric: The patient is non-tearful.     Emergency Department Course   ECG:  @ 2348  Indication: Chest pain   Vent. Rate 63 bpm. MS interval 182 ms. QRS duration 86 ms. QT/QTc 406/415 ms. P-R-T axis 82 71 51.   Normal sinus rhythm. Normal ECG.   Read @ 0020 by Dr. Ruiz.     ECG #2  @ 0055  Vent. Rate 63 bpm. MS interval 174 ms. QRS duration  90 ms. QT/QTc 420/429 ms. P-R-T axis 56 66 39.   Normal sinus rhythm. Abnormal ECG.  No significant change when compared to previous ECG.  Read @ 0055 by Dr. Ruiz.     Imaging:  XR Chest 2 Views  No acute abnormality.     Radiographic findings were communicated with the patient who voiced understanding of the findings.    Laboratory:  Blood:  CBC:   WBC 12.7, HGB 15.4, , otherwise WNL  BMP:  WNL (Creatinine 0.99)   Lipase: 415      Troponin I: <0.015 (WNL)    (0302) Troponin POCT: 0.01     Interventions:  (0117) Aspirin, 324 mg, PO   (0119) Toradol, 30 mg, IV injection     Emergency Department Course:  Nursing notes and vitals reviewed.    (0025) I entered the room with my scribe, obtained the history, and performed an exam of the patient as documented above.    EKG was done, interpretation as above.     A peripheral IV was established. Blood was drawn from the patient. This was sent for laboratory testing, findings above.      The patient was sent for a chest x-ray while in the emergency department, findings above.      (0256) Patient updated and reevaluated. Will do repeat troponin and if this is negative will discharge the patient. I personally reviewed the laboratory results with the patient and answered all related questions prior to discharge.       Findings and plan explained to the patient. Patient discharged home with instructions regarding supportive care, medications, and reasons to return. The importance of close follow-up was reviewed.     Impression & Plan    Medical Decision Making:  Georges Moreno is a 42-year-old male.  The patient reports that he has been using alcohol to treat pain in his right shoulder.  He is on Xarelto for A. fib but he is not currently in A. fib and there is no current signs of an ST elevation MI.  He reports that he developed left-sided chest pain after he was drinking.  The pain is more reproducible with palpation and I felt it was more likely chest wall related but  I did suspect that pancreatitis is playing some part of this.  He does have an elevated lipase.  His chest x-ray is clear and serial enzymes are normal.  EKGs are reassuring.  The patient was told to stop alcohol, follow-up with primary care doctor and he was discharged home in good condition.  I do not feel that his symptoms are due to cardiac cause.  He is otherwise stable and safe for discharge.      Diagnosis:    ICD-10-CM   1. Acute chest pain R07.9   2. Alcohol-induced acute pancreatitis without infection or necrosis K85.20   3. Alcohol abuse F10.10     Disposition:  discharged to home      [facility] EMERGENCY DEPARTMENT      Scribe Disclosure:  I, Guillermo Winkler, am serving as a scribe on 5/8/2018 at 12:25 AM to personally document services performed by Dr. Ruiz based on my observations and the provider's statements to me.                Jeff Ruiz MD  05/08/18 0593

## 2018-05-08 NOTE — ED TRIAGE NOTES
Left chest pressure, left abd pain with left shoulder pain on and off for 2-3 hrs.    Pt A&O x 3, CMS x 3, ABCD's adequate in triage

## 2018-05-08 NOTE — DISCHARGE INSTRUCTIONS
Discharge Instructions  Chest Pain    You have been seen today for chest pain or discomfort.  At this time, your doctor has found no signs that your chest pain is due to a serious or life-threatening condition, (or you have declined more testing and/or admission to the hospital). However, sometimes there is a serious problem that does not show up right away. Your evaluation today may not be complete and you may need further testing and evaluation.     You need to follow-up with your regular doctor within 3 days.    Return to the Emergency Department if:    Your chest pain changes, gets worse, starts to happen more often, or comes with less activity.    You are short of breath.    You get very weak or tired.    You pass out or faint.    You have any new symptoms, like fever, cough, numb legs, or you cough up blood.    You have anything else that worries you.    Until you follow-up with your regular doctor please do the following:    Take one aspirin daily unless you have an allergy or are told not to by your doctor.    If a stress test appointment has been made, go to the appointment.    If you have questions, contact your regular doctor.    If your doctor today has told you to follow-up with your regular doctor, it is very important that you make an appointment with your clinic and go to the appointment.  If you do not follow-up with your primary doctor, it may result in missing an important development which could result in permanent injury or disability and/or lasting pain.  If there is any problem keeping your appointment, call your doctor or return to the Emergency Department.    If you were given a prescription for medicine here today, be sure to read all of the information (including the package insert) that comes with your prescription.  This will include important information about the medicine, its side effects, and any warnings that you need to know about.  The pharmacist who fills the prescription can  provide more information and answer questions you may have about the medicine.  If you have questions or concerns that the pharmacist cannot address, please call or return to the Emergency Department.     Opioid Medication Information    Pain medications are among the most commonly prescribed medicines, so we are including this information for all our patients. If you did not receive pain medication or get a prescription for pain medicine, you can ignore it.     You may have been given a prescription for an opioid (narcotic) pain medicine and/or have received a pain medicine while here in the Emergency Department. These medicines can make you drowsy or impaired. You must not drive, operate dangerous equipment, or engage in any other dangerous activities while taking these medications. If you drive while taking these medications, you could be arrested for DUI, or driving under the influence. Do not drink any alcohol while you are taking these medications.     Opioid pain medications can cause addiction. If you have a history of chemical dependency of any type, you are at a higher risk of becoming addicted to pain medications.  Only take these prescribed medications to treat your pain when all other options have been tried. Take it for as short a time and as few doses as possible. Store your pain pills in a secure place, as they are frequently stolen and provide a dangerous opportunity for children or visitors in your house to start abusing these powerful medications. We will not replace any lost or stolen medicine.  As soon as your pain is better, you should flush all your remaining medication.     Many prescription pain medications contain Tylenol  (acetaminophen), including Vicodin , Tylenol #3 , Norco , Lortab , and Percocet .  You should not take any extra pills of Tylenol  if you are using these prescription medications or you can get very sick.  Do not ever take more than 3000 mg of acetaminophen in any 24 hour  period.    All opioids tend to cause constipation. Drink plenty of water and eat foods that have a lot of fiber, such as fruits, vegetables, prune juice, apple juice and high fiber cereal.  Take a laxative if you don t move your bowels at least every other day. Miralax , Milk of Magnesia, Colace , or Senna  can be used to keep you regular.      Remember that you can always come back to the Emergency Department if you are not able to see your regular doctor in the amount of time listed above, if you get any new symptoms, or if there is anything that worries you.          Discharge Instructions for Acute Pancreatitis  You have been diagnosed with acute pancreatitis. Your pancreas is inflamed or swollen. The pancreas is an organ that makes digestive juices and hormones. Gallstones are a common cause of pancreatitis. These hard stones form in the gallbladder. The gallbladder shares a tube with the pancreas into the small intestine. If gallstones block this tube, fluid can t leave the pancreas. The fluid backs up and causes redness and swelling (inflammation). There are other causes of pancreatitis. Make sure you understand the cause of your pancreatitis. Then you can try to stop it from happening again.  Immediate home care    Find someone to drive you to appointments. Acute pancreatitis is a serious condition, and you should never drive if you are experiencing symptoms.    Stop drinking if your illness was caused by alcohol.  ? Ask your healthcare provider about alcohol abuse programs and support groups such as Alcoholics Anonymous.  ? Ask your provider about prescription medicines that can help you stop drinking.  ? Tell your provider about the alcohol withdrawal symptoms you have when you stop drinking. This is very important. You may need close medical supervision and special medicines when you stop drinking. This will depend on your alcohol withdrawal history.     Take your medicines exactly as directed. Don t skip  doses.    Eat a low-fat diet. Ask your provider for menus and other diet information.    Learn to take your own pulse. Keep a record of your results. Ask your provider which readings mean that you need medical attention.  Ongoing care    Tell your provider about any medicines you are taking. Some medicines can cause this condition.    Before starting any new medicine, ask your provider if it will harm your pancreas. This includes any new over-the-counter medicines, vitamins, or herbal supplements.      Tell your provider if you lose weight without dieting.    Be aware of symptoms that may mean your pancreatitis has come back. These symptoms include belly pain, nausea and vomiting, and fever.    Keep all follow-up appointments with your provider. Problems can often show up later.  Follow-up  Follow up with your healthcare provider, or as advised.  When to call your provider  Call your healthcare provider right away if you have any of the following:    Fever of 100.4 F (38.0 C) or higher, or as advised by your provider    Severe pain from your upper belly to your back    Nausea and vomiting    Feely dizzy or lightheaded    Yellowing of your skin or eyes (jaundice)    Bruises on your belly or back    Belly swelling and tenderness    Rapid pulse    Shallow, fast breathing   Date Last Reviewed: 8/1/2016 2000-2017 [employer]. [address] [place] [address], [place] [address]. All rights reserved. This information is not intended as a substitute for professional medical care. Always follow your healthcare professional's instructions.

## 2018-05-08 NOTE — ED AVS SNAPSHOT
[facility] Emergency Department    [address] [place] [address]    Phone:  [phone]    Fax:  [phone]                                       Georges Moreno   MRN: [id number]    Department:  [facility] Emergency Department   Date of Visit:  5/7/2018           Patient Information     Date Of Birth          1975        Your diagnoses for this visit were:     Acute chest pain     Alcohol-induced acute pancreatitis without infection or necrosis     Alcohol abuse        You were seen by Jeff Ruiz MD.      Follow-up Information     Follow up with Sarah Mccormack MD. Schedule an appointment as soon as possible for a visit in 3 days.    Specialty:  Internal Medicine    Contact information:    [facility]  [address] [place] [address]  [phone]          Discharge Instructions       Discharge Instructions  Chest Pain    You have been seen today for chest pain or discomfort.  At this time, your doctor has found no signs that your chest pain is due to a serious or life-threatening condition, (or you have declined more testing and/or admission to the hospital). However, sometimes there is a serious problem that does not show up right away. Your evaluation today may not be complete and you may need further testing and evaluation.     You need to follow-up with your regular doctor within 3 days.    Return to the Emergency Department if:    Your chest pain changes, gets worse, starts to happen more often, or comes with less activity.    You are short of breath.    You get very weak or tired.    You pass out or faint.    You have any new symptoms, like fever, cough, numb legs, or you cough up blood.    You have anything else that worries you.    Until you follow-up with your regular doctor please do the following:    Take one aspirin daily unless you have an allergy or are told not to by your doctor.    If a stress test appointment has been  made, go to the appointment.    If you have questions, contact your regular doctor.    If your doctor today has told you to follow-up with your regular doctor, it is very important that you make an appointment with your clinic and go to the appointment.  If you do not follow-up with your primary doctor, it may result in missing an important development which could result in permanent injury or disability and/or lasting pain.  If there is any problem keeping your appointment, call your doctor or return to the Emergency Department.    If you were given a prescription for medicine here today, be sure to read all of the information (including the package insert) that comes with your prescription.  This will include important information about the medicine, its side effects, and any warnings that you need to know about.  The pharmacist who fills the prescription can provide more information and answer questions you may have about the medicine.  If you have questions or concerns that the pharmacist cannot address, please call or return to the Emergency Department.     Opioid Medication Information    Pain medications are among the most commonly prescribed medicines, so we are including this information for all our patients. If you did not receive pain medication or get a prescription for pain medicine, you can ignore it.     You may have been given a prescription for an opioid (narcotic) pain medicine and/or have received a pain medicine while here in the Emergency Department. These medicines can make you drowsy or impaired. You must not drive, operate dangerous equipment, or engage in any other dangerous activities while taking these medications. If you drive while taking these medications, you could be arrested for DUI, or driving under the influence. Do not drink any alcohol while you are taking these medications.     Opioid pain medications can cause addiction. If you have a history of chemical dependency of any type,  you are at a higher risk of becoming addicted to pain medications.  Only take these prescribed medications to treat your pain when all other options have been tried. Take it for as short a time and as few doses as possible. Store your pain pills in a secure place, as they are frequently stolen and provide a dangerous opportunity for children or visitors in your house to start abusing these powerful medications. We will not replace any lost or stolen medicine.  As soon as your pain is better, you should flush all your remaining medication.     Many prescription pain medications contain Tylenol  (acetaminophen), including Vicodin , Tylenol #3 , Norco , Lortab , and Percocet .  You should not take any extra pills of Tylenol  if you are using these prescription medications or you can get very sick.  Do not ever take more than 3000 mg of acetaminophen in any 24 hour period.    All opioids tend to cause constipation. Drink plenty of water and eat foods that have a lot of fiber, such as fruits, vegetables, prune juice, apple juice and high fiber cereal.  Take a laxative if you don t move your bowels at least every other day. Miralax , Milk of Magnesia, Colace , or Senna  can be used to keep you regular.      Remember that you can always come back to the Emergency Department if you are not able to see your regular doctor in the amount of time listed above, if you get any new symptoms, or if there is anything that worries you.          Discharge Instructions for Acute Pancreatitis  You have been diagnosed with acute pancreatitis. Your pancreas is inflamed or swollen. The pancreas is an organ that makes digestive juices and hormones. Gallstones are a common cause of pancreatitis. These hard stones form in the gallbladder. The gallbladder shares a tube with the pancreas into the small intestine. If gallstones block this tube, fluid can t leave the pancreas. The fluid backs up and causes redness and swelling  (inflammation). There are other causes of pancreatitis. Make sure you understand the cause of your pancreatitis. Then you can try to stop it from happening again.  Immediate home care    Find someone to drive you to appointments. Acute pancreatitis is a serious condition, and you should never drive if you are experiencing symptoms.    Stop drinking if your illness was caused by alcohol.  ? Ask your healthcare provider about alcohol abuse programs and support groups such as Alcoholics Anonymous.  ? Ask your provider about prescription medicines that can help you stop drinking.  ? Tell your provider about the alcohol withdrawal symptoms you have when you stop drinking. This is very important. You may need close medical supervision and special medicines when you stop drinking. This will depend on your alcohol withdrawal history.     Take your medicines exactly as directed. Don t skip doses.    Eat a low-fat diet. Ask your provider for menus and other diet information.    Learn to take your own pulse. Keep a record of your results. Ask your provider which readings mean that you need medical attention.  Ongoing care    Tell your provider about any medicines you are taking. Some medicines can cause this condition.    Before starting any new medicine, ask your provider if it will harm your pancreas. This includes any new over-the-counter medicines, vitamins, or herbal supplements.      Tell your provider if you lose weight without dieting.    Be aware of symptoms that may mean your pancreatitis has come back. These symptoms include belly pain, nausea and vomiting, and fever.    Keep all follow-up appointments with your provider. Problems can often show up later.  Follow-up  Follow up with your healthcare provider, or as advised.  When to call your provider  Call your healthcare provider right away if you have any of the following:    Fever of 100.4 F (38.0 C) or higher, or as advised by your provider    Severe pain from  your upper belly to your back    Nausea and vomiting    Feely dizzy or lightheaded    Yellowing of your skin or eyes (jaundice)    Bruises on your belly or back    Belly swelling and tenderness    Rapid pulse    Shallow, fast breathing   Date Last Reviewed: 8/1/2016 2000-2017 [employer]. [address] [place] [address]. All rights reserved. This information is not intended as a substitute for professional medical care. Always follow your healthcare professional's instructions.          Your next 10 appointments already scheduled     May 09, 2018  1:20 PM CDT   [facility] with Doe SCOTT Krakow PT ([facility]  )    [address]  [address]  [facility] [address]   [phone]            May 16, 2018 12:40 PM CDT   [facility] with Doe NUNEZ PT ([facility]  )    [address] [place] [address]  [address]  [facility] [address]   [phone]              24 Hour Appointment Hotline       To make an appointment at any [facility] clinic, call [phone] ([phone]). If you don't have a family doctor or clinic, we will help you find one. [facility] clinics are conveniently located to serve the needs of you and your family.             Review of your medicines      Our records show that you are taking the medicines listed below. If these are incorrect, please call your family doctor or clinic.        Dose / Directions Last dose taken    LEXAPRO PO        Refills:  0        LORazepam 1 MG tablet   Commonly known as:  ATIVAN   Dose:  1 mg   Quantity:  8 tablet        Take 1 tablet (1 mg) by mouth every 8 hours as needed for anxiety   Refills:  0        metoprolol tartrate 50 MG tablet   Commonly known as:  LOPRESSOR   Dose:  50 mg   Quantity:  60 tablet        Take 1 tablet (50 mg) by mouth 2 times daily   Refills:  0        rivaroxaban ANTICOAGULANT 20 MG Tabs tablet   Commonly known as:  XARELTO   Dose:  20 mg   Quantity:  30 tablet        Take 1 tablet (20  mg) by mouth daily (with dinner)   Refills:  0                Procedures and tests performed during your visit     Basic metabolic panel    CBC with platelets differential    EKG 12 lead    EKG 12-lead, tracing only    Lipase    Troponin I    Troponin POCT    XR Chest 2 Views      Orders Needing Specimen Collection     None      Pending Results     Date and Time Order Name Status Description    5/8/2018 0039 XR Chest 2 Views Preliminary     5/7/2018 2350 EKG 12 lead Preliminary             Pending Culture Results     No orders found from 5/6/2018 to 5/9/2018.            Pending Results Instructions     If you had any lab results that were not finalized at the time of your Discharge, you can call the ED Lab Result RN at [phone]. You will be contacted by this team for any positive Lab results or changes in treatment. The nurses are available 7 days a week from 10A to 6:30P.  You can leave a message 24 hours per day and they will return your call.        Test Results From Your Hospital Stay        5/8/2018  1:31 AM      Component Results     Component Value Ref Range & Units Status    WBC 12.7 (H) 4.0 - 11.0 10e9/L Final    RBC Count 4.87 4.4 - 5.9 10e12/L Final    Hemoglobin 15.4 13.3 - 17.7 g/dL Final    Hematocrit 43.5 40.0 - 53.0 % Final    MCV 89 78 - 100 fl Final    MCH 31.6 26.5 - 33.0 pg Final    MCHC 35.4 31.5 - 36.5 g/dL Final    RDW 11.9 10.0 - 15.0 % Final    Platelet Count 205 150 - 450 10e9/L Final    Diff Method Automated Method  Final    % Neutrophils 63.5 % Final    % Lymphocytes 24.7 % Final    % Monocytes 9.6 % Final    % Eosinophils 1.2 % Final    % Basophils 0.5 % Final    % Immature Granulocytes 0.5 % Final    Nucleated RBCs 0 0 /100 Final    Absolute Neutrophil 8.1 1.6 - 8.3 10e9/L Final    Absolute Lymphocytes 3.1 0.8 - 5.3 10e9/L Final    Absolute Monocytes 1.2 0.0 - 1.3 10e9/L Final    Absolute Eosinophils 0.2 0.0 - 0.7 10e9/L Final    Absolute Basophils 0.1 0.0 - 0.2 10e9/L Final    Abs  Immature Granulocytes 0.1 0 - 0.4 10e9/L Final    Absolute Nucleated RBC 0.0  Final         5/8/2018  1:49 AM      Component Results     Component Value Ref Range & Units Status    Sodium 138 133 - 144 mmol/L Final    Potassium 3.7 3.4 - 5.3 mmol/L Final    Chloride 106 94 - 109 mmol/L Final    Carbon Dioxide 27 20 - 32 mmol/L Final    Anion Gap 5 3 - 14 mmol/L Final    Glucose 99 70 - 99 mg/dL Final    Urea Nitrogen 21 7 - 30 mg/dL Final    Creatinine 0.99 0.66 - 1.25 mg/dL Final    GFR Estimate 82 >60 mL/min/1.7m2 Final    Non  GFR Calc    GFR Estimate If Black >90 >60 mL/min/1.7m2 Final    African American GFR Calc    Calcium 8.7 8.5 - 10.1 mg/dL Final         5/8/2018  1:49 AM      Component Results     Component Value Ref Range & Units Status    Troponin I ES <0.015 0.000 - 0.045 ug/L Final    The 99th percentile for upper reference range is 0.045 ug/L.  Troponin values   in the range of 0.045 - 0.120 ug/L may be associated with risks of adverse   clinical events.           5/8/2018  1:49 AM      Component Results     Component Value Ref Range & Units Status    Lipase 415 (H) 73 - 393 U/L Final         5/8/2018  1:54 AM      Narrative     XR CHEST 2 VW  5/8/2018 1:48 AM     HISTORY: Chest pain.    COMPARISON: None.    FINDINGS: The heart size is normal. The lungs are clear. No  pneumothorax or pleural effusion.        Impression     IMPRESSION: No acute abnormality.         5/8/2018  3:16 AM      Component Results     Component Value Ref Range & Units Status    Troponin I 0.01 0.00 - 0.10 ug/L Final                Clinical Quality Measure: Blood Pressure Screening     Your blood pressure was checked while you were in the emergency department today. The last reading we obtained was  BP: 117/72 . Please read the guidelines below about what these numbers mean and what you should do about them.  If your systolic blood pressure (the top number) is less than 120 and your diastolic blood pressure (the  "bottom number) is less than 80, then your blood pressure is normal. There is nothing more that you need to do about it.  If your systolic blood pressure (the top number) is 120-139 or your diastolic blood pressure (the bottom number) is 80-89, your blood pressure may be higher than it should be. You should have your blood pressure rechecked within a year by a primary care provider.  If your systolic blood pressure (the top number) is 140 or greater or your diastolic blood pressure (the bottom number) is 90 or greater, you may have high blood pressure. High blood pressure is treatable, but if left untreated over time it can put you at risk for heart attack, stroke, or kidney failure. You should have your blood pressure rechecked by a primary care provider within the next 4 weeks.  If your provider in the emergency department today gave you specific instructions to follow-up with your doctor or provider even sooner than that, you should follow that instruction and not wait for up to 4 weeks for your follow-up visit.        Thank you for choosing [facility]       Thank you for choosing [facility] for your care. Our goal is always to provide you with excellent care. Hearing back from our patients is one way we can continue to improve our services. Please take a few minutes to complete the written survey that you may receive in the mail after you visit with us. Thank you!        [employer] Information     [facility] lets you send messages to your doctor, view your test results, renew your prescriptions, schedule appointments and more. To sign up, go to [email or web address] . Click on \"Log in\" on the left side of the screen, which will take you to the Welcome page. Then click on \"Sign up Now\" on the right side of the page.     You will be asked to enter the access code listed below, as well as some personal information. Please follow the directions to create your username and password.     Your access code is: " [email or web address]  Expires: 2018  1:37 PM     Your access code will  in 90 days. If you need help or a new code, please call your [facility] clinic or [phone].        Care EveryWhere ID     This is your Care EveryWhere ID. This could be used by other organizations to access your [facility] medical records  [id number]        Equal Access to Services     [facility] HospitalRUDOLPH : Hadii christine López, waaxda ludestinyadaha, qaybta kaalmada davy, michelet pagan . So [facility] [phone].    ATENCIÓN: Si habla español, tiene a chinchilla disposición servicios gratuitos de asistencia lingüística. Llame al [phone].    We comply with applicable federal civil rights laws and [place] laws. We do not discriminate on the basis of race, color, national origin, age, disability, sex, sexual orientation, or gender identity.            After Visit Summary       This is your record. Keep this with you and show to your community pharmacist(s) and doctor(s) at your next visit.

## 2018-05-09 ENCOUNTER — TELEPHONE (OUTPATIENT)
Dept: CARDIOLOGY | Facility: CLINIC | Age: 43
End: 2018-05-09

## 2018-05-09 DIAGNOSIS — I48.91 ATRIAL FIBRILLATION WITH RVR (H): ICD-10-CM

## 2018-05-09 RX ORDER — METOPROLOL TARTRATE 50 MG
50 TABLET ORAL 2 TIMES DAILY
Qty: 60 TABLET | Refills: 5 | Status: SHIPPED | OUTPATIENT
Start: 2018-05-09 | End: 2018-05-23

## 2018-05-09 NOTE — TELEPHONE ENCOUNTER
Pt called and was wondering what his next step is.  Pt wore and event monitor and was told to continue on the Metoprolol and Xarelto, d/t showing episodes a fib.  This writer spoke to pt while wearing the monitor and made pt aware of the episode of A Fib and at that time pt stated that he had been missing doses of both Metoprolol and Xarelto and was instructed to continue taking and not miss any doses.   Monitor was then completed and PCP contacted pt with the final results and at that time pt had run out of both Metoprolol and Xarelto and had been off for a week.  Pt was given a new prescription for one month and told to start taking again.  Pt has been trying to decrease his drinking, but states that he does not do well all the time and at one time decided he was going to go back to his old ways of drinking and not taking his medicaitons.  Pt is unaware of his A Fib and it is pt anxiety that keeps him going back to the ER, with last visit on 5/7.  Pt now calls as he is running low on meds and wondering what to do.  Will have pt come in and see Anais on 5/23 at 0850 in [place], Dr Akhtar also happens to be in [place] that day also.  Pt needs a long term plan to ease his anxiety. Mode

## 2018-05-16 ENCOUNTER — HOSPITAL ENCOUNTER (EMERGENCY)
Facility: CLINIC | Age: 43
Discharge: HOME | End: 2018-05-16
Attending: EMERGENCY MEDICINE | Admitting: EMERGENCY MEDICINE
Payer: COMMERCIAL

## 2018-05-16 VITALS
SYSTOLIC BLOOD PRESSURE: 155 MMHG | WEIGHT: 190.48 LBS | BODY MASS INDEX: 25.83 KG/M2 | OXYGEN SATURATION: 99 % | DIASTOLIC BLOOD PRESSURE: 97 MMHG | TEMPERATURE: 97.1 F | RESPIRATION RATE: 18 BRPM | HEART RATE: 90 BPM

## 2018-05-16 DIAGNOSIS — F41.1 ANXIETY, GENERALIZED: ICD-10-CM

## 2018-05-16 DIAGNOSIS — F10.90 ALCOHOL USE DISORDER: ICD-10-CM

## 2018-05-16 DIAGNOSIS — E86.0 DEHYDRATION: ICD-10-CM

## 2018-05-16 LAB
ALBUMIN SERPL-MCNC: 4.5 G/DL (ref 3.4–5)
ALBUMIN UR-MCNC: 30 MG/DL
ALP SERPL-CCNC: 60 U/L (ref 40–150)
ALT SERPL W P-5'-P-CCNC: 31 U/L (ref 0–70)
ANION GAP SERPL CALCULATED.3IONS-SCNC: 13 MMOL/L (ref 3–14)
APPEARANCE UR: CLEAR
AST SERPL W P-5'-P-CCNC: 30 U/L (ref 0–45)
BACTERIA #/AREA URNS HPF: ABNORMAL /HPF
BASOPHILS # BLD AUTO: 0.1 10E9/L (ref 0–0.2)
BASOPHILS NFR BLD AUTO: 0.5 %
BILIRUB SERPL-MCNC: 1.6 MG/DL (ref 0.2–1.3)
BILIRUB UR QL STRIP: NEGATIVE
BUN SERPL-MCNC: 17 MG/DL (ref 7–30)
CALCIUM SERPL-MCNC: 9.3 MG/DL (ref 8.5–10.1)
CHLORIDE SERPL-SCNC: 102 MMOL/L (ref 94–109)
CO2 SERPL-SCNC: 20 MMOL/L (ref 20–32)
COLOR UR AUTO: YELLOW
CREAT SERPL-MCNC: 0.91 MG/DL (ref 0.66–1.25)
DIFFERENTIAL METHOD BLD: ABNORMAL
EOSINOPHIL # BLD AUTO: 0 10E9/L (ref 0–0.7)
EOSINOPHIL NFR BLD AUTO: 0 %
ERYTHROCYTE [DISTWIDTH] IN BLOOD BY AUTOMATED COUNT: 11.9 % (ref 10–15)
GFR SERPL CREATININE-BSD FRML MDRD: >90 ML/MIN/1.7M2
GLUCOSE SERPL-MCNC: 118 MG/DL (ref 70–99)
GLUCOSE UR STRIP-MCNC: NEGATIVE MG/DL
HCT VFR BLD AUTO: 52.5 % (ref 40–53)
HGB BLD-MCNC: 18.6 G/DL (ref 13.3–17.7)
HGB UR QL STRIP: NEGATIVE
IMM GRANULOCYTES # BLD: 0.1 10E9/L (ref 0–0.4)
IMM GRANULOCYTES NFR BLD: 0.4 %
INTERPRETATION ECG - MUSE: NORMAL
KETONES UR STRIP-MCNC: 80 MG/DL
LEUKOCYTE ESTERASE UR QL STRIP: NEGATIVE
LIPASE SERPL-CCNC: 126 U/L (ref 73–393)
LYMPHOCYTES # BLD AUTO: 1.6 10E9/L (ref 0.8–5.3)
LYMPHOCYTES NFR BLD AUTO: 11.9 %
MAGNESIUM SERPL-MCNC: 1.9 MG/DL (ref 1.6–2.3)
MCH RBC QN AUTO: 31.2 PG (ref 26.5–33)
MCHC RBC AUTO-ENTMCNC: 35.4 G/DL (ref 31.5–36.5)
MCV RBC AUTO: 88 FL (ref 78–100)
MONOCYTES # BLD AUTO: 0.9 10E9/L (ref 0–1.3)
MONOCYTES NFR BLD AUTO: 6.5 %
MUCOUS THREADS #/AREA URNS LPF: PRESENT /LPF
NEUTROPHILS # BLD AUTO: 10.7 10E9/L (ref 1.6–8.3)
NEUTROPHILS NFR BLD AUTO: 80.7 %
NITRATE UR QL: NEGATIVE
NRBC # BLD AUTO: 0 10*3/UL
NRBC BLD AUTO-RTO: 0 /100
PH UR STRIP: 6 PH (ref 5–7)
PHOSPHATE SERPL-MCNC: 2.1 MG/DL (ref 2.5–4.5)
PLATELET # BLD AUTO: 320 10E9/L (ref 150–450)
POTASSIUM SERPL-SCNC: 3.8 MMOL/L (ref 3.4–5.3)
PROT SERPL-MCNC: 7.9 G/DL (ref 6.8–8.8)
RBC # BLD AUTO: 5.96 10E12/L (ref 4.4–5.9)
RBC #/AREA URNS AUTO: <1 /HPF (ref 0–2)
SODIUM SERPL-SCNC: 135 MMOL/L (ref 133–144)
SOURCE: ABNORMAL
SP GR UR STRIP: 1.02 (ref 1–1.03)
SQUAMOUS #/AREA URNS AUTO: <1 /HPF (ref 0–1)
UROBILINOGEN UR STRIP-MCNC: 2 MG/DL (ref 0–2)
WBC # BLD AUTO: 13.2 10E9/L (ref 4–11)
WBC #/AREA URNS AUTO: 1 /HPF (ref 0–5)

## 2018-05-16 PROCEDURE — 90791 PSYCH DIAGNOSTIC EVALUATION: CPT

## 2018-05-16 PROCEDURE — 93005 ELECTROCARDIOGRAM TRACING: CPT

## 2018-05-16 PROCEDURE — 81001 URINALYSIS AUTO W/SCOPE: CPT | Performed by: EMERGENCY MEDICINE

## 2018-05-16 PROCEDURE — 80053 COMPREHEN METABOLIC PANEL: CPT | Performed by: EMERGENCY MEDICINE

## 2018-05-16 PROCEDURE — 96375 TX/PRO/DX INJ NEW DRUG ADDON: CPT

## 2018-05-16 PROCEDURE — 99285 EMERGENCY DEPT VISIT HI MDM: CPT | Mod: 25

## 2018-05-16 PROCEDURE — 25000132 ZZH RX MED GY IP 250 OP 250 PS 637: Performed by: EMERGENCY MEDICINE

## 2018-05-16 PROCEDURE — 25000128 H RX IP 250 OP 636: Performed by: EMERGENCY MEDICINE

## 2018-05-16 PROCEDURE — 96361 HYDRATE IV INFUSION ADD-ON: CPT

## 2018-05-16 PROCEDURE — 96374 THER/PROPH/DIAG INJ IV PUSH: CPT

## 2018-05-16 PROCEDURE — 83735 ASSAY OF MAGNESIUM: CPT | Performed by: EMERGENCY MEDICINE

## 2018-05-16 PROCEDURE — 83690 ASSAY OF LIPASE: CPT | Performed by: EMERGENCY MEDICINE

## 2018-05-16 PROCEDURE — 84100 ASSAY OF PHOSPHORUS: CPT | Performed by: EMERGENCY MEDICINE

## 2018-05-16 PROCEDURE — 85025 COMPLETE CBC W/AUTO DIFF WBC: CPT | Performed by: EMERGENCY MEDICINE

## 2018-05-16 RX ORDER — DIAZEPAM 5 MG
5 TABLET ORAL ONCE
Status: COMPLETED | OUTPATIENT
Start: 2018-05-16 | End: 2018-05-16

## 2018-05-16 RX ORDER — CHLORDIAZEPOXIDE HYDROCHLORIDE 25 MG/1
25 CAPSULE, GELATIN COATED ORAL 3 TIMES DAILY PRN
Qty: 9 CAPSULE | Refills: 0 | Status: ON HOLD | OUTPATIENT
Start: 2018-05-16 | End: 2018-05-31

## 2018-05-16 RX ORDER — METOCLOPRAMIDE HYDROCHLORIDE 5 MG/ML
10 INJECTION INTRAMUSCULAR; INTRAVENOUS ONCE
Status: COMPLETED | OUTPATIENT
Start: 2018-05-16 | End: 2018-05-16

## 2018-05-16 RX ORDER — HYDROXYZINE HYDROCHLORIDE 50 MG/1
TABLET, FILM COATED ORAL
Qty: 30 TABLET | Refills: 0 | Status: SHIPPED | OUTPATIENT
Start: 2018-05-16 | End: 2018-05-29

## 2018-05-16 RX ORDER — HYDROXYZINE HYDROCHLORIDE 25 MG/1
50 TABLET, FILM COATED ORAL ONCE
Status: COMPLETED | OUTPATIENT
Start: 2018-05-16 | End: 2018-05-16

## 2018-05-16 RX ORDER — DIAZEPAM 10 MG/2ML
5 INJECTION, SOLUTION INTRAMUSCULAR; INTRAVENOUS ONCE
Status: COMPLETED | OUTPATIENT
Start: 2018-05-16 | End: 2018-05-16

## 2018-05-16 RX ORDER — METOPROLOL TARTRATE 50 MG
50 TABLET ORAL ONCE
Status: COMPLETED | OUTPATIENT
Start: 2018-05-16 | End: 2018-05-16

## 2018-05-16 RX ORDER — ACETAMINOPHEN 500 MG
1000 TABLET ORAL ONCE
Status: COMPLETED | OUTPATIENT
Start: 2018-05-16 | End: 2018-05-16

## 2018-05-16 RX ADMIN — SODIUM CHLORIDE, POTASSIUM CHLORIDE, SODIUM LACTATE AND CALCIUM CHLORIDE 2000 ML: 600; 310; 30; 20 INJECTION, SOLUTION INTRAVENOUS at 07:26

## 2018-05-16 RX ADMIN — HYDROXYZINE HYDROCHLORIDE 50 MG: 25 TABLET ORAL at 07:26

## 2018-05-16 RX ADMIN — ACETAMINOPHEN 1000 MG: 500 TABLET, FILM COATED ORAL at 11:15

## 2018-05-16 RX ADMIN — DIAZEPAM 5 MG: 5 INJECTION, SOLUTION INTRAMUSCULAR; INTRAVENOUS at 13:14

## 2018-05-16 RX ADMIN — METOPROLOL TARTRATE 50 MG: 50 TABLET, FILM COATED ORAL at 15:06

## 2018-05-16 RX ADMIN — METOCLOPRAMIDE 10 MG: 5 INJECTION, SOLUTION INTRAMUSCULAR; INTRAVENOUS at 11:15

## 2018-05-16 RX ADMIN — DIAZEPAM 5 MG: 5 TABLET ORAL at 09:12

## 2018-05-16 ASSESSMENT — LIFESTYLE VARIABLES
ORIENTATION AND CLOUDING OF SENSORIUM: ORIENTED AND CAN DO SERIAL ADDITIONS
PAROXYSMAL SWEATS: 2
ANXIETY: MILDLY ANXIOUS
ANXIETY: 3
ORIENTATION AND CLOUDING OF SENSORIUM: ORIENTED AND CAN DO SERIAL ADDITIONS
TOTAL SCORE: 8
PAROXYSMAL SWEATS: BARELY PERCEPTIBLE SWEATING, PALMS MOIST
VISUAL DISTURBANCES: MODERATE SENSITIVITY
AUDITORY DISTURBANCES: NOT PRESENT
TOTAL SCORE: 10
AUDITORY DISTURBANCES: NOT PRESENT
NAUSEA AND VOMITING: NO NAUSEA AND NO VOMITING
TREMOR: 2
VISUAL DISTURBANCES: VERY MILD SENSITIVITY
NAUSEA AND VOMITING: NO NAUSEA AND NO VOMITING
AGITATION: NORMAL ACTIVITY
ORIENTATION AND CLOUDING OF SENSORIUM: ORIENTED AND CAN DO SERIAL ADDITIONS
ANXIETY: 3
PAROXYSMAL SWEATS: 2
HEADACHE, FULLNESS IN HEAD: MILD
HEADACHE, FULLNESS IN HEAD: MILD
AGITATION: NORMAL ACTIVITY
NAUSEA AND VOMITING: NO NAUSEA AND NO VOMITING
HEADACHE, FULLNESS IN HEAD: MILD
AGITATION: NORMAL ACTIVITY
AUDITORY DISTURBANCES: NOT PRESENT
TREMOR: NO TREMOR
VISUAL DISTURBANCES: NOT PRESENT
TOTAL SCORE: 7
TREMOR: NOT VISIBLE, BUT CAN BE FELT FINGERTIP TO FINGERTIP

## 2018-05-16 ASSESSMENT — ENCOUNTER SYMPTOMS
HEADACHES: 1
HEMATURIA: 0
DYSURIA: 0
VOMITING: 0
DIARRHEA: 0
FREQUENCY: 0
FEVER: 0
DIAPHORESIS: 1
SLEEP DISTURBANCE: 1
DIFFICULTY URINATING: 0
NERVOUS/ANXIOUS: 1

## 2018-05-16 NOTE — ED AVS SNAPSHOT
[facility] Emergency Department    [address]    [facility] [address]    Phone:  [phone]    Fax:  [phone]                                       Georges Moreno   MRN: [id number]    Department:  [facility] Emergency Department   Date of Visit:  5/16/2018           After Visit Summary Signature Page     I have received my discharge instructions, and my questions have been answered. I have discussed any challenges I see with this plan with the nurse or doctor.    ..........................................................................................................................................  Patient/Patient Representative Signature      ..........................................................................................................................................  Patient Representative Print Name and Relationship to Patient    ..................................................               ................................................  Date                                            Time    ..........................................................................................................................................  Reviewed by Signature/Title    ...................................................              ..............................................  Date                                                            Time

## 2018-05-16 NOTE — ED NOTES
"Patient continues with headache despite Tylenol.  States he feels \"funny\".  Also complains of feeling anxious.  No tremors noted, patient is not diaphoretic or nauseated.  Dr. Coles notified, order for IV valium obtained.  "

## 2018-05-16 NOTE — DISCHARGE INSTRUCTIONS
"THE EMERGENCY DEPARTMENT IS UNABLE TO PROVIDE ANY REFILLS OR LONG TERM PRESCRIPTIONS FOR BENZODIAZEPINES SUCH AS ATIVAN. THESE MEDICATIONS SHOULD BE USED IN THE SETTING OF A COMPREHENSIVE TREATMENT PROGRAM FOR ANXIETY WHICH INCLUDES COGNITIVE BEHAVIORAL THERAPY, ALCOHOL ABUSE TREATMENT, AND PSYCHIATRY CARE. FOLLOW UP CLOSELY WITH YOUR PSYCHIATRIST, PRIMARY CARE PROVIDER AND SCHEDULE AN APPOINTMENT WITH [facility] OR OTHER OUTPATIENT MENTAL HEALTH ORGANIZATION FOR THERAPY      Mental Health Crisis Numbers  [facility]  If you have a mental health or substance abuse crisis on a weekend or after hours, please use any of the resources below.  General numbers  911 emergency services  211 First Call for Help  [facility]  [address] [place] [id number]  [phone]  Crisis Connection Hotline  729.237.1949 or 1-284.854.9425  National Suicide Prevention Lifeline  1-575-317-TALK (8058)  NSD5BTJO  Text crisis line for teens. Text \"LIFE\" to 886545  [facility] mobile crisis services  These services will come to you. Call the county where the child is physically located.    [place] (adult only): [phone]    [place] (child and adult): [phone]    [place] (child and adult): [phone]    [place] (child): [phone]    [place] (adult): [phone]    [place] (child): [phone]    [place] (Adult): [phone]    [place] (child and adult): [phone]    [place] (child and adult): [phone] or [phone]  Other crisis resources (not mobile)  [facility]  [phone]  [place] Crisis Hotline  797.857.6001 or 1-321.139.6492  [facility]formerly known as [facility])  Offers walk-in and telephone crisis intervention services for adults.  [address], " [place] [address]  [phone] or 725-801-5827 (suicide hotline)  Short-term residential crisis resources  [facility] (ages 10 to 17)  [address] [place] [address] [phone]  Suggested inpatient hospitalization   [facility]  [address] [place] [address]  [phone]  For informational purposes only. Not to replace the advice of your health care provider.  Copyright   2014 [facility]. All rights reserved. [employer] [id number] - REV 09/15.    Anxiety Disorders  Almost everyone gets nervous now and then. It s normal to have knots in your stomach before a test, or for your heart to race on a first date. But an anxiety disorder is much more than a case of nerves. In fact, its symptoms may be overwhelming. But treatment can relieve many of these symptoms. Talking to your healthcare provider is the first step.    What are anxiety disorders?  An anxiety disorder causes intense feelings of panic and fear. These feelings may arise for no apparent reason. And they tend to recur again and again. They may prevent you from coping with life and cause you great distress. As a result, you may avoid anything that triggers your fear. In extreme cases, you may never leave the house. Anxiety disorders may cause other symptoms, such as:    Obsessive thoughts you can t control    Constant nightmares or painful thoughts of the past    Nausea, sweating, and muscle tension    Trouble sleeping or concentrating  What causes anxiety disorders?  Anxiety disorders tend to run in families. For some people, childhood abuse or neglect may play a role. For others, stressful life events or trauma may trigger anxiety disorders. Anxiety can trigger low self-esteem and poor coping skills.  Common anxiety disorders    Panic disorder. This causes an intense fear of being in danger.    Phobias. These are extreme fears of certain objects, places, or  "events.    Obsessive-compulsive disorder. This causes you to have unwanted thoughts and urges. You also may perform certain actions over and over.    Posttraumatic stress disorder. This occurs in people who have survived a terrible ordeal. It can cause nightmares and flashbacks about the event.    Generalized anxiety disorder. This causes constant worry that can greatly disrupt your life.   Getting better  You may believe that nothing can help you. Or, you might fear what others may think. But most anxiety symptoms can be eased. Having an anxiety disorder is nothing to be ashamed of. Most people do best with treatment that combines medicine and therapy. These aren t cures. But they can help you live a healthier life.  Date Last Reviewed: 2/1/2017 2000-2017 [employer]. [address] [place] [address], [place] [address]. All rights reserved. This information is not intended as a substitute for professional medical care. Always follow your healthcare professional's instructions.       Alcohol Abuse  Alcoholic drinks are harmful when you have too many of them. There is no set number of drinks that defines too much. Drinking that disrupts your life or your health is called alcohol abuse. Alcohol abuse can hurt your relationships with others. You may lose friends, a spouse, or even your job. You may be abusing alcohol if any of the following are true for you:    Duties at home or with  suffer because of drinking.    Duties at work or in school suffer because of drinking.    You have missed work or school because of drinking.    You use alcohol while driving or operating machinery.    You have legal problems such as arrests due to drinking.    You keep drinking even though it causes serious problems in your life.  Health effects  Alcohol abuse causes health problems. Sometimes this can happen after only drinking a  little.\" There is no set number of drinks or amount of alcohol that defines too much. The " more you drink at one time, and the more often you drink determine both the short-term and long-term health effects. It affects all parts of your body and your health, including your:    Brain. Alcohol is a central nervous system depressant. It can damage parts of the brain that affect your balance, memory, thinking, and emotions. It can cause memory loss, blackouts, depression, agitation, sleep cycle changes, and seizures. These changes may or may not be reversible.    Heart and vascular system. Alcohol affects multiple areas. It can damage heart muscle causing cardiomyopathy, which is a weakening and stretching of the heart muscle. This can lead to trouble breathing, an irregular heartbeat, atrial fibrillation, leg swelling, and heart failure. Alcohol use makes the blood vessels stiffen causing high blood pressure. All of these problems increase your risk of having heart attacks or strokes.    Liver. Alcohol causes fat to build up in the liver, affecting its normal function. This increases the risk for hepatitis, leading to abdominal pain, appetite loss, jaundice, bleeding problems, liver fibrosis, and cirrhosis. This, in turn, can affect your ability to fight off infections, and can cause diabetes. The liver changes prevent it from removing toxins in your blood that can cause encephalopathy, which may show with confusion, altered level of consciousness, personality changes, memory loss, seizures, coma, and death.    Pancreas. Alcohol can cause inflammation of the pancreas, or pancreatitis. This can cause abdominal pain, fever, and diabetes.    Immune system. Alcohol weakens your immune system in a number of ways. It suppresses your immune system making it harder to fight infections and colds. It also increases the chance of getting pneumonia and tuberculosis.    Cancer. Alcohol is a risk factor for developing cancer of the mouth, esophagus, pharynx, larynx, liver, and breast.    Sexual function. Alcohol can lead  to sexual problems.  Home care  The following guidelines will help you deal with alcohol abuse:    Admit you have a problem with alcohol.    Ask for help from your healthcare provider and trusted family members or close friends.    Get help from people trained in dealing with alcohol abuse. This may be individual counseling or group therapy, or it may be a supervised alcohol treatment program.    Join a self-help group for alcohol abuse such as Alcoholics Anonymous (AA).    Stay away from people who abuse alcohol or tempt you to drink.  Follow-up care  Follow up with your healthcare provider, or as advised. Contact these groups to get help:    Alcoholics Anonymous (AA): Go to www.aa.org or check the phone book for meetings near you.    National Alcohol and Substance Abuse Information Center ([employer]): 734.925.1277, www.addictioncareChibwe.[place]    National [place] on Alcoholism and Drug Dependence (NCADD): 346-MSB-FTEN (621-339-0086), www.ncadd.org  Call 911  Call 911 if any of these occur:    Trouble breathing or slow irregular breathing    Chest pain    Sudden weakness on one side of your body or sudden trouble speaking    Heavy bleeding or vomiting blood    Very drowsy or trouble awakening    Fainting or loss of consciousness    Rapid heart rate    Seizure  When to seek medical care  Call your healthcare provider right away if any of these occur:     Confusion    Hallucinations (seeing, hearing, or feeling things that aren t there)    Pain in your upper abdomen that gets worse    Repeated vomiting or black or tarry stools    Severe shakiness  Date Last Reviewed: 6/1/2016 2000-2017 [employer]. [address] [place] [address], [place] [address]. All rights reserved. This information is not intended as a substitute for professional medical care. Always follow your healthcare professional's instructions.

## 2018-05-16 NOTE — ED AVS SNAPSHOT
[facility] Emergency Department    [address] [place] [address]    Phone:  [phone]    Fax:  [phone]                                       Georges Moreno   MRN: [id number]    Department:  [facility] Emergency Department   Date of Visit:  5/16/2018           Patient Information     Date Of Birth          1975        Your diagnoses for this visit were:     Alcohol use disorder (H)     Anxiety, generalized     Dehydration        You were seen by Ez Coles MD.      Follow-up Information     Schedule an appointment as soon as possible for a visit with [facility].    Why:  For comprehensive treatment of your anxiety and therapy and alcohol abuse treatment    Contact information:    Address: [address] [place], [place] [address], [place] [address]  Phone: [phone]  Fax: [phone]  Hours:  Monday through Friday  8:00 am   5:30 pm        Follow up with Sarah Mccomrack MD. Schedule an appointment as soon as possible for a visit in 3 days.    Specialty:  Internal Medicine    Why:  For close follow up.    Contact information:    [facility]  [address]   [place] [address]  [phone]          Discharge Instructions       THE EMERGENCY DEPARTMENT IS UNABLE TO PROVIDE ANY REFILLS OR LONG TERM PRESCRIPTIONS FOR BENZODIAZEPINES SUCH AS ATIVAN. THESE MEDICATIONS SHOULD BE USED IN THE SETTING OF A COMPREHENSIVE TREATMENT PROGRAM FOR ANXIETY WHICH INCLUDES COGNITIVE BEHAVIORAL THERAPY, ALCOHOL ABUSE TREATMENT, AND PSYCHIATRY CARE. FOLLOW UP CLOSELY WITH YOUR PSYCHIATRIST, PRIMARY CARE PROVIDER AND SCHEDULE AN APPOINTMENT WITH [facility] OR OTHER OUTPATIENT MENTAL HEALTH ORGANIZATION FOR THERAPY      Mental Health Crisis Numbers  [facility]  If you have a mental health or substance abuse crisis on a weekend or after hours, please use any of the resources below.  General numbers  911 emergency services  211 First Call for Help  [facility]  "[facility]  [address] [place] [address]  [phone]  Crisis Connection Hotline  931.184.9758 or 1-512.960.1848  National Suicide Prevention Lifeline  3-872-303-TALK (2983)  GDQ7QBWV  Text crisis line for teens. Text \"LIFE\" to 177393  [facility] mobile crisis services  These services will come to you. Call the county where the child is physically located.    [place] (adult only): [phone]    [place] (child and adult): [phone]    [place] (child and adult): [phone]    [place] (child): [phone]    [place] (adult): [phone]    [place] (child): [phone]    [place] (Adult): [phone]    [place] (child and adult): [phone]    [place] (child and adult): [phone] or 1-289.804.1993  Other crisis resources (not mobile)  [facility]  0-800-697-5120  [place] Crisis Hotline  526.418.8392 or 1-937.191.6329  [facility] Services (formerly known as the Crisis Intervention Center)  Offers walk-in and telephone crisis intervention services for adults.  [facility]  [address] [place] [address] [phone] or 981-519-9600 (suicide hotline)  Short-term residential crisis resources  [facility] (ages 10 to 17)  [address] [place], [place] [id number] [phone]  Suggested inpatient hospitalization   [address] [address] [phone]  For informational purposes only. Not to replace the advice of your health care provider.  Copyright   2014 [facility]. All rights reserved. [facility] 254737 - REV 09/15.    Anxiety Disorders  Almost everyone gets nervous now and then. It s normal to have knots in your stomach before a test, or for your heart to race on a first date. But an anxiety disorder is much more than a " case of nerves. In fact, its symptoms may be overwhelming. But treatment can relieve many of these symptoms. Talking to your healthcare provider is the first step.    What are anxiety disorders?  An anxiety disorder causes intense feelings of panic and fear. These feelings may arise for no apparent reason. And they tend to recur again and again. They may prevent you from coping with life and cause you great distress. As a result, you may avoid anything that triggers your fear. In extreme cases, you may never leave the house. Anxiety disorders may cause other symptoms, such as:    Obsessive thoughts you can t control    Constant nightmares or painful thoughts of the past    Nausea, sweating, and muscle tension    Trouble sleeping or concentrating  What causes anxiety disorders?  Anxiety disorders tend to run in families. For some people, childhood abuse or neglect may play a role. For others, stressful life events or trauma may trigger anxiety disorders. Anxiety can trigger low self-esteem and poor coping skills.  Common anxiety disorders    Panic disorder. This causes an intense fear of being in danger.    Phobias. These are extreme fears of certain objects, places, or events.    Obsessive-compulsive disorder. This causes you to have unwanted thoughts and urges. You also may perform certain actions over and over.    Posttraumatic stress disorder. This occurs in people who have survived a terrible ordeal. It can cause nightmares and flashbacks about the event.    Generalized anxiety disorder. This causes constant worry that can greatly disrupt your life.   Getting better  You may believe that nothing can help you. Or, you might fear what others may think. But most anxiety symptoms can be eased. Having an anxiety disorder is nothing to be ashamed of. Most people do best with treatment that combines medicine and therapy. These aren t cures. But they can help you live a healthier life.  Date Last Reviewed: 2/1/2017     "2852-0083 [employer]. [address] [facility], [place] [address]. All rights reserved. This information is not intended as a substitute for professional medical care. Always follow your healthcare professional's instructions.       Alcohol Abuse  Alcoholic drinks are harmful when you have too many of them. There is no set number of drinks that defines too much. Drinking that disrupts your life or your health is called alcohol abuse. Alcohol abuse can hurt your relationships with others. You may lose friends, a spouse, or even your job. You may be abusing alcohol if any of the following are true for you:    Duties at home or with  suffer because of drinking.    Duties at work or in school suffer because of drinking.    You have missed work or school because of drinking.    You use alcohol while driving or operating machinery.    You have legal problems such as arrests due to drinking.    You keep drinking even though it causes serious problems in your life.  Health effects  Alcohol abuse causes health problems. Sometimes this can happen after only drinking a  little.\" There is no set number of drinks or amount of alcohol that defines too much. The more you drink at one time, and the more often you drink determine both the short-term and long-term health effects. It affects all parts of your body and your health, including your:    Brain. Alcohol is a central nervous system depressant. It can damage parts of the brain that affect your balance, memory, thinking, and emotions. It can cause memory loss, blackouts, depression, agitation, sleep cycle changes, and seizures. These changes may or may not be reversible.    Heart and vascular system. Alcohol affects multiple areas. It can damage heart muscle causing cardiomyopathy, which is a weakening and stretching of the heart muscle. This can lead to trouble breathing, an irregular heartbeat, atrial fibrillation, leg swelling, and heart failure. " Alcohol use makes the blood vessels stiffen causing high blood pressure. All of these problems increase your risk of having heart attacks or strokes.    Liver. Alcohol causes fat to build up in the liver, affecting its normal function. This increases the risk for hepatitis, leading to abdominal pain, appetite loss, jaundice, bleeding problems, liver fibrosis, and cirrhosis. This, in turn, can affect your ability to fight off infections, and can cause diabetes. The liver changes prevent it from removing toxins in your blood that can cause encephalopathy, which may show with confusion, altered level of consciousness, personality changes, memory loss, seizures, coma, and death.    Pancreas. Alcohol can cause inflammation of the pancreas, or pancreatitis. This can cause abdominal pain, fever, and diabetes.    Immune system. Alcohol weakens your immune system in a number of ways. It suppresses your immune system making it harder to fight infections and colds. It also increases the chance of getting pneumonia and tuberculosis.    Cancer. Alcohol is a risk factor for developing cancer of the mouth, esophagus, pharynx, larynx, liver, and breast.    Sexual function. Alcohol can lead to sexual problems.  Home care  The following guidelines will help you deal with alcohol abuse:    Admit you have a problem with alcohol.    Ask for help from your healthcare provider and trusted family members or close friends.    Get help from people trained in dealing with alcohol abuse. This may be individual counseling or group therapy, or it may be a supervised alcohol treatment program.    Join a self-help group for alcohol abuse such as Alcoholics Anonymous (AA).    Stay away from people who abuse alcohol or tempt you to drink.  Follow-up care  Follow up with your healthcare provider, or as advised. Contact these groups to get help:    Alcoholics Anonymous (AA): Go to www.aa.org or check the phone book for meetings near you.    National  Alcohol and Substance Abuse Information Center ([facility]): 263.840.5214, www.addictioncareIGG.U.S. Fiduciary    National [place] on Alcoholism and Drug Dependence (NCADD): 038-THE-SMUT (124-225-0601), www.ncadd.org  Call 911  Call 911 if any of these occur:    Trouble breathing or slow irregular breathing    Chest pain    Sudden weakness on one side of your body or sudden trouble speaking    Heavy bleeding or vomiting blood    Very drowsy or trouble awakening    Fainting or loss of consciousness    Rapid heart rate    Seizure  When to seek medical care  Call your healthcare provider right away if any of these occur:     Confusion    Hallucinations (seeing, hearing, or feeling things that aren t there)    Pain in your upper abdomen that gets worse    Repeated vomiting or black or tarry stools    Severe shakiness  Date Last Reviewed: 6/1/2016 2000-2017 [employer]. [address] [place] [address]. All rights reserved. This information is not intended as a substitute for professional medical care. Always follow your healthcare professional's instructions.            Your next 10 appointments already scheduled     May 23, 2018  8:50 AM CDT   Return Visit with JOE Nieves CNP   [facility] ([facility])    [address] [address] [address] [phone]              24 Hour Appointment Hotline       To make an appointment at any [facility], call [phone] ([phone]). If you don't have a family doctor or clinic, we will help you find one. [facility] are conveniently located to serve the needs of you and your family.             Review of your medicines      START taking        Dose / Directions Last dose taken    chlordiazePOXIDE 25 MG capsule   Commonly known as:  LIBRIUM   Dose:  25 mg   Quantity:  9 capsule        Take 1 capsule (25 mg) by mouth 3 times daily as needed for anxiety or  withdrawal   Refills:  0        hydrOXYzine 50 MG tablet   Commonly known as:  ATARAX   Quantity:  30 tablet        Take 1-2 tablets every 8 hours as needed for anxiety   Refills:  0          Our records show that you are taking the medicines listed below. If these are incorrect, please call your family doctor or clinic.        Dose / Directions Last dose taken    LEXAPRO PO        Refills:  0        LORazepam 1 MG tablet   Commonly known as:  ATIVAN   Dose:  1 mg   Quantity:  8 tablet        Take 1 tablet (1 mg) by mouth every 8 hours as needed for anxiety   Refills:  0        metoprolol tartrate 50 MG tablet   Commonly known as:  LOPRESSOR   Dose:  50 mg   Quantity:  60 tablet        Take 1 tablet (50 mg) by mouth 2 times daily   Refills:  5        rivaroxaban ANTICOAGULANT 20 MG Tabs tablet   Commonly known as:  XARELTO   Dose:  20 mg   Quantity:  30 tablet        Take 1 tablet (20 mg) by mouth daily (with dinner)   Refills:  5                Prescriptions were sent or printed at these locations (2 Prescriptions)                   Other Prescriptions                Printed at Department/Unit printer (2 of 2)         chlordiazePOXIDE (LIBRIUM) 25 MG capsule               hydrOXYzine (ATARAX) 50 MG tablet                Procedures and tests performed during your visit     CBC with platelets differential    Comprehensive metabolic panel    EKG 12-lead, tracing only    Lipase    Magnesium    Peripheral IV catheter    Phosphorus    UA reflex to Microscopic and Culture      Orders Needing Specimen Collection     None      Pending Results     No orders found from 5/14/2018 to 5/17/2018.            Pending Culture Results     No orders found from 5/14/2018 to 5/17/2018.            Pending Results Instructions     If you had any lab results that were not finalized at the time of your Discharge, you can call the ED Lab Result RN at [phone]. You will be contacted by this team for any positive Lab results or changes in  treatment. The nurses are available 7 days a week from 10A to 6:30P.  You can leave a message 24 hours per day and they will return your call.        Test Results From Your Hospital Stay        5/16/2018  7:26 AM      Component Results     Component Value Ref Range & Units Status    WBC 13.2 (H) 4.0 - 11.0 10e9/L Final    RBC Count 5.96 (H) 4.4 - 5.9 10e12/L Final    Hemoglobin 18.6 (H) 13.3 - 17.7 g/dL Final    Hematocrit 52.5 40.0 - 53.0 % Final    MCV 88 78 - 100 fl Final    MCH 31.2 26.5 - 33.0 pg Final    MCHC 35.4 31.5 - 36.5 g/dL Final    RDW 11.9 10.0 - 15.0 % Final    Platelet Count 320 150 - 450 10e9/L Final    Diff Method Automated Method  Final    % Neutrophils 80.7 % Final    % Lymphocytes 11.9 % Final    % Monocytes 6.5 % Final    % Eosinophils 0.0 % Final    % Basophils 0.5 % Final    % Immature Granulocytes 0.4 % Final    Nucleated RBCs 0 0 /100 Final    Absolute Neutrophil 10.7 (H) 1.6 - 8.3 10e9/L Final    Absolute Lymphocytes 1.6 0.8 - 5.3 10e9/L Final    Absolute Monocytes 0.9 0.0 - 1.3 10e9/L Final    Absolute Eosinophils 0.0 0.0 - 0.7 10e9/L Final    Absolute Basophils 0.1 0.0 - 0.2 10e9/L Final    Abs Immature Granulocytes 0.1 0 - 0.4 10e9/L Final    Absolute Nucleated RBC 0.0  Final         5/16/2018  7:50 AM      Component Results     Component Value Ref Range & Units Status    Sodium 135 133 - 144 mmol/L Final    Potassium 3.8 3.4 - 5.3 mmol/L Final    Chloride 102 94 - 109 mmol/L Final    Carbon Dioxide 20 20 - 32 mmol/L Final    Anion Gap 13 3 - 14 mmol/L Final    Glucose 118 (H) 70 - 99 mg/dL Final    Urea Nitrogen 17 7 - 30 mg/dL Final    Creatinine 0.91 0.66 - 1.25 mg/dL Final    GFR Estimate >90 >60 mL/min/1.7m2 Final    Non  GFR Calc    GFR Estimate If Black >90 >60 mL/min/1.7m2 Final    African American GFR Calc    Calcium 9.3 8.5 - 10.1 mg/dL Final    Bilirubin Total 1.6 (H) 0.2 - 1.3 mg/dL Final    Albumin 4.5 3.4 - 5.0 g/dL Final    Protein Total 7.9 6.8 - 8.8  g/dL Final    Alkaline Phosphatase 60 40 - 150 U/L Final    ALT 31 0 - 70 U/L Final    AST 30 0 - 45 U/L Final         5/16/2018  7:50 AM      Component Results     Component Value Ref Range & Units Status    Lipase 126 73 - 393 U/L Final         5/16/2018  7:50 AM      Component Results     Component Value Ref Range & Units Status    Magnesium 1.9 1.6 - 2.3 mg/dL Final         5/16/2018  7:50 AM      Component Results     Component Value Ref Range & Units Status    Phosphorus 2.1 (L) 2.5 - 4.5 mg/dL Final         5/16/2018  9:38 AM      Component Results     Component Value Ref Range & Units Status    Color Urine Yellow  Final    Appearance Urine Clear  Final    Glucose Urine Negative NEG^Negative mg/dL Final    Bilirubin Urine Negative NEG^Negative Final    Ketones Urine 80 (A) NEG^Negative mg/dL Final    Specific Gravity Urine 1.023 1.003 - 1.035 Final    Blood Urine Negative NEG^Negative Final    pH Urine 6.0 5.0 - 7.0 pH Final    Protein Albumin Urine 30 (A) NEG^Negative mg/dL Final    Urobilinogen mg/dL 2.0 0.0 - 2.0 mg/dL Final    Nitrite Urine Negative NEG^Negative Final    Leukocyte Esterase Urine Negative NEG^Negative Final    Source Midstream Urine  Final    RBC Urine <1 0 - 2 /HPF Final    WBC Urine 1 0 - 5 /HPF Final    Bacteria Urine Few (A) NEG^Negative /HPF Final    Squamous Epithelial /HPF Urine <1 0 - 1 /HPF Final    Mucous Urine Present (A) NEG^Negative /LPF Final                Clinical Quality Measure: Blood Pressure Screening     Your blood pressure was checked while you were in the emergency department today. The last reading we obtained was  BP: (!) 155/97 . Please read the guidelines below about what these numbers mean and what you should do about them.  If your systolic blood pressure (the top number) is less than 120 and your diastolic blood pressure (the bottom number) is less than 80, then your blood pressure is normal. There is nothing more that you need to do about it.  If your  "systolic blood pressure (the top number) is 120-139 or your diastolic blood pressure (the bottom number) is 80-89, your blood pressure may be higher than it should be. You should have your blood pressure rechecked within a year by a primary care provider.  If your systolic blood pressure (the top number) is 140 or greater or your diastolic blood pressure (the bottom number) is 90 or greater, you may have high blood pressure. High blood pressure is treatable, but if left untreated over time it can put you at risk for heart attack, stroke, or kidney failure. You should have your blood pressure rechecked by a primary care provider within the next 4 weeks.  If your provider in the emergency department today gave you specific instructions to follow-up with your doctor or provider even sooner than that, you should follow that instruction and not wait for up to 4 weeks for your follow-up visit.        Thank you for choosing [facility]       Thank you for choosing [facility] for your care. Our goal is always to provide you with excellent care. Hearing back from our patients is one way we can continue to improve our services. Please take a few minutes to complete the written survey that you may receive in the mail after you visit with us. Thank you!        [facility] Information     [employer] lets you send messages to your doctor, view your test results, renew your prescriptions, schedule appointments and more. To sign up, go to [email or web address] . Click on \"Log in\" on the left side of the screen, which will take you to the Welcome page. Then click on \"Sign up Now\" on the right side of the page.     You will be asked to enter the access code listed below, as well as some personal information. Please follow the directions to create your username and password.     Your access code is: [email or web address]  Expires: 2018  1:37 PM     Your access code will  in 90 days. If you need help or a new code, please call your [facility] " [facility] or [phone].        Care EveryWhere ID     This is your Care EveryWhere ID. This could be used by other organizations to access your [facility] medical records  [id number]        Equal Access to Services     BRITTANY TURPIN : Esha López, wakendrada ludestinyadaha, qaybta kaalmada rubinaagleticia, michelet palmer. [facility] [phone].    ATENCIÓN: Si habla español, tiene a chinchilla disposición servicios gratuitos de asistencia lingüística. Llame al [phone].    We comply with applicable federal civil rights laws and [place] laws. We do not discriminate on the basis of race, color, national origin, age, disability, sex, sexual orientation, or gender identity.            After Visit Summary       This is your record. Keep this with you and show to your community pharmacist(s) and doctor(s) at your next visit.

## 2018-05-16 NOTE — ED PROVIDER NOTES
"  History     Chief Complaint:  \"Not feeling right\"    HPI   Georges Moreno is a 42 year old male with a history of anxiety and pancreatitis who presents to the emergency department today for evaluation of feeling \"off.\" The patient was seen here in the ED on 5/8/18 for chest and abdominal pain and was diagnosed with alcohol induced pancreatitis. The patient reports that he developed a lack of balance, a faint feeling, diaphoresis, dry mouth, and a headache yesterday, 5/15/18, after waking up from a nap that have persisted. In addition, the patient notes that he has been having difficulty sleeping, getting approximately 4-5 hours a night, and has been having increased anxiety. He states that he had been drinking a bit heavier on 5/13 and 5/14, but notes that yesterday, 5/15 was a lighter day (five drinks in total). He reports that he was recently started on Ativan but his PCP has not refilled this for him. He denies fever, vomiting, diarrhea, urinary symptoms, syncope, recent illness, or history of alcohol withdrawal.     Allergies:  No Known Allergies     Medications:    Lexapro  Lopressor  Xarelto  Ativan    Past Medical History:    Anxiety  Atrial fibrillation  Depression  Generalized anxiety  Headache  Personal history of alcoholism  Pancreatitis  Substance abuse  Tobacco dependence    Past Surgical History:    History reviewed. No pertinent surgical history.    Family History:    Father: alcohol/drug, diabetes, leukemia, alcohol/drug    Social History:  Smoking Status: Current Every Day Smoker   Years: 15  Smokeless Tobacco: Never Used  Marijuana use: positive  Alcohol Use: Positive  Marital Status:  Single      Review of Systems   Constitutional: Positive for diaphoresis. Negative for fever.        Feeling \"off\"   HENT:        Dry mouth   Gastrointestinal: Negative for diarrhea and vomiting.   Genitourinary: Negative for difficulty urinating, dysuria, frequency and hematuria.   Neurological: Positive for " "headaches. Negative for syncope.        Lack of balance  A \"faint\" feeling     Psychiatric/Behavioral: Positive for sleep disturbance. The patient is nervous/anxious.    All other systems reviewed and are negative.      Physical Exam     Patient Vitals for the past 24 hrs:   BP Temp Temp src Pulse Heart Rate Resp SpO2 Weight   05/16/18 1400 155/86 - - - 73 - 95 % -   05/16/18 1345 (!) 153/103 - - - 85 - 98 % -   05/16/18 1330 (!) 145/93 - - - 65 - 97 % -   05/16/18 1315 152/86 - - - 82 - 95 % -   05/16/18 1245 149/85 - - - 107 - 98 % -   05/16/18 1230 146/85 - - - 80 - 98 % -   05/16/18 1215 (!) 152/96 - - - 77 - 97 % -   05/16/18 1200 155/82 - - - - - - -   05/16/18 1145 (!) 145/118 - - - 89 - 97 % -   05/16/18 1130 (!) 140/93 - - - 80 - 98 % -   05/16/18 1017 - - - - 88 - 98 % -   05/16/18 1015 (!) 136/112 - - - - - 97 % -   05/16/18 1008 - - - - - - 96 % -   05/16/18 1000 142/87 - - - - - - -   05/16/18 0947 - - - - - - (!) 89 % -   05/16/18 0945 (!) 134/93 - - - 80 - 90 % -   05/16/18 0930 - - - - - - 94 % -   05/16/18 0915 149/86 - - - 78 - 98 % -   05/16/18 0845 150/88 - - - 80 - 97 % -   05/16/18 0830 147/86 - - - 93 - 97 % -   05/16/18 0800 141/88 - - - 79 - 93 % -   05/16/18 0745 152/85 - - - - - - -   05/16/18 0730 (!) 138/99 - - - 92 - 97 % -   05/16/18 0715 (!) 126/93 - - - 81 - 99 % -   05/16/18 0655 (!) 137/110 97.1  F (36.2  C) Oral 90 - 18 100 % 86.4 kg (190 lb 7.6 oz)     Physical Exam  General: Well appearing, nontoxic. Resting comfortably  Head:  Scalp, face, and head appear normal  Eyes:  Pupils are equal, round, and reactive to light. No nystagmus.    Conjunctivae non-injected and sclerae white  ENT:    The external nose is normal    Pinnae are normal    The oropharynx is normal, mucous membranes dry    Uvula is in the midline  Neck:  Normal range of motion    There is no rigidity noted    Trachea is in the midline  CV:  Regular rate and rhythm     Normal S1/S2, no S3/S4    No murmur or " rub  Resp:  Lungs are clear and equal bilaterally    There is no tachypnea    No increased work of breathing    No rales, wheezing, or rhonchi  GI:  Abdomen is soft, no rigidity or guarding    No distension, or mass    No tenderness or rebound tenderness   MS:  Normal muscular tone    Symmetric motor strength    No lower extremity edema  Skin:  No rash or acute skin lesions noted  Neuro: Awake and alert, oriented x3. Strength 5/5 and intact throughout. SILT.    Speech is normal and fluent    Moves all extremities spontaneously  Psych:  Anxious affect.  Appropriate interactions.    Emergency Department Course     ECG:  ECG taken at 0946, ECG read at 0950  Normal sinus rhythm  Normal ECG  Rate 73 bpm. SC interval 152 ms. QRS duration 82 ms. QT/QTc 406/447 ms. P-R-T axes 61 73 48.    Laboratory:  Laboratory findings were communicated with the patient who voiced understanding of the findings.    UA Reflex to Microscopic and Culture: Bacteria few (A), Mucous present (A), Protein Albumin 30 (A), Ketones 80 (A), o/w WNL  CBC: WBC 13.2 (H), HGB 18.6 (H),   CMP: Glucose 118 (H), Bilirubin 1.6 (H) o/w WNL (Creatinine 0.91)  Lipase: 126  Magnesium 1.9  Phosphorus: 2.1 (L)    Interventions:  0726 Lactated ringers bolus 2000 ml IV  0726 Ztarax 50 mg oral   0912 Valium 5  mg Oral  1115 Tylenol 1000 mg Oral  1115 Reglan 10 mg IV  1314 Valium 5 mg IV    Emergency Department Course:    0657 Nursing notes and vitals reviewed.    0704 IV was inserted and blood was drawn for laboratory testing, results above.    0709 I performed an exam of the patient as documented above.     0842 The patient provided a urine sample here in the emergency department. This was sent for laboratory testing, findings above.    1330 DEC assessment of the patient.    1500 I personally reviewed the imaging results with the patient and answered all related questions prior to discharge.    Impression & Plan      Medical Decision Making:  Georges Moreno is  "a 42 year old male with a long history of chronic alcohol abuse who presents to the emergency department today for evaluation of generalized feeling unwell as noted above. There is no evidence of trauma. He does not appear to be acutely intoxicated. No evidence of acute alcohol withdrawal at this time. No significant tachycardia or tremors. Patient denies seizures or significant delirium in the past. Patient reports he has been to detox, as well as inpatient and outpatient programs many times. He is unwilling to consider detox again today. I feel that he may benefit from further outpatient resources. The DEV evaluator initially saw the patient and recommended detox referral, however the patient declines this, stating that he has been there before and it is \"not a good environment\" and would only \"make him feel worse.\" The DEC  did state that she could schedule an outpatient intake assessment for Monday. I offered this as an option to the patient, who then stated that he actually had a substance abuse intake appointment to start treatment on Tuesday. I recommended that he keep this appointment. Patient was given IV fluid hydration. Laboratory studies were checked and were non-concerning. He was monitored in the ED. There is no evidence of developing significant alcohol withdrawal symptoms, although he does continue to be significantly anxious. PO and IV valium were provided. I discussed with the patient that we are unable to continue to provide Ativan prescriptions to him from the ED and that he needs to participate in detox or substance abuse treatment programs and obtain further outpatient care such as counseling. As he is at risk for alcohol withdrawal I did povide a short supply of librium to help alleviate this. There is no sign of acute alcohol withdrawal or DTs at the time of discharge. Patient was agreeable to plan of care and was discharged in stable condition.    Diagnosis:    ICD-10-CM    1. " Alcohol use disorder (H) F10.99    2. Anxiety, generalized F41.1    3. Dehydration E86.0      Disposition:   The patient is discharged to home.    Scribe Disclosure:  I, Hayley Huntley, am serving as a scribe at 6:58 AM on 5/16/2018 to document services personally performed by Ez Coles MD based on my observations and the provider's statements to me.    [facility] EMERGENCY DEPARTMENT       Ez Coles MD  05/16/18 [id number]

## 2018-05-23 ENCOUNTER — OFFICE VISIT (OUTPATIENT)
Dept: CARDIOLOGY | Facility: CLINIC | Age: 43
End: 2018-05-23
Payer: COMMERCIAL

## 2018-05-23 VITALS
HEIGHT: 72 IN | DIASTOLIC BLOOD PRESSURE: 66 MMHG | HEART RATE: 64 BPM | WEIGHT: 204.2 LBS | BODY MASS INDEX: 27.66 KG/M2 | SYSTOLIC BLOOD PRESSURE: 112 MMHG

## 2018-05-23 DIAGNOSIS — I48.0 PAROXYSMAL ATRIAL FIBRILLATION (H): Primary | ICD-10-CM

## 2018-05-23 PROCEDURE — 99214 OFFICE O/P EST MOD 30 MIN: CPT | Performed by: NURSE PRACTITIONER

## 2018-05-23 PROCEDURE — 93000 ELECTROCARDIOGRAM COMPLETE: CPT | Performed by: NURSE PRACTITIONER

## 2018-05-23 RX ORDER — METOPROLOL TARTRATE 50 MG
75 TABLET ORAL 2 TIMES DAILY
Qty: 275 TABLET | Refills: 3 | Status: SHIPPED | OUTPATIENT
Start: 2018-05-23 | End: 2019-01-07

## 2018-05-23 NOTE — LETTER
5/23/2018    MORGAN KAPOOR MD  [facility] [address] [place] [address]    RE: Georges Moreno       Dear Colleague,    I had the pleasure of seeing Georges Moreno in the [facility].    HPI:  Georges Moreno is a 42 year old male who is a patient of Dr. Akhtar.  His past medical history includes significant alcohol intake, anxiety, atrial fibrillation, tobacco abuse.  Frequent visits to the  in the ED   2/22/18, 3/14/18, 5/8/18, 5/16/18.  His only documented atrial fibrillation includes 2/22/2018 ED visit and a 30 day cardiac event monitor (2/20/2018) showed paroxsymal atrial fibrillation with rates 110-160s.  During the last 3 ED visits he is complaining of palpitations prior to arrival however the EKG in the ED are sinus rhythm.  Echo (2/21/18) showed EF 50-55% when the patient was in atrial fibrillation.    Today Georges Moreno presents stating he has not had any alcohol since 5/16 and is taking Librium and ativan to help with his anxiety as he has found that SSRIs are not helpful.  He continues to smoke and does not regularly exercise he is not interested in doing inpatient or outpatient alcohol treatment.   Denies chest pain or pressure, headaches, dizziness, syncope, angina, dyspnea at rest or with exertion, dry cough, palpitations, orthopnea, PND, abdominal pain, abdominal edema, pedal edema, or claudication.  Denies easy bruising or bleeding, hematuria, hematochezia, and epistaxis. Denies signs/symptoms of stroke such as visual disturbance, difficulty speaking, facial drooping, confusion, problems with gait, or any new numbness or weakness.      Presenting EKG:   Sinus bradycardia 59 bpm    ASSESSMENT AND PLAN    (I48.0) Paroxysmal atrial fibrillation (H)      Patient's history sounds like he is having an increase in symptomatic paroxysmal atrial fibrillation therefore I increased his metoprolol to 75 mg twice daily.  Which may help with his anxiety as well.  He continues  to take Xarelto 20 mg daily with no problems.  Will discuss with Dr. Akhtar options for antiarrhythmics including possible pulmonary vein isolation ablation.  Will also discuss anticoagulation in the setting of alcohol use.  Currently patient is sober however has had multiple episodes of relapse.  CHADS VASC 0.  Did refer to sleep study as patient snores and does have atrial fibrillation.  Encouraged tobacco cessation, alcohol abstinence, and follow-up treatment for anxiety.    Will follow up with Dr. Akhtar and get back to patient via phone within 2 weeks.  And also has follow-up for 4 weeks, however if we get a plan from Dr. Akhtar that is different than today we can cancel that appointment    Patient expresses understanding and agreement with the plan.     I appreciate the chance to help with Georges Moreno Please let me know if you have any questions or concerns.    JOE Hua, CNP    This note was completed in part using Dragon voice recognition software. Although reviewed after completion, some word and grammatical errors may occur.    Orders Placed This Encounter   Procedures     SLEEP EVALUATION & MANAGEMENT REFERRAL - [facility]  [phone] (Age 18 and up)     Follow-Up with Cardiac Advanced Practice Provider     EKG 12-lead complete w/read - Clinics (performed today)     Orders Placed This Encounter   Medications     metoprolol tartrate (LOPRESSOR) 50 MG tablet     Sig: Take 1.5 tablets (75 mg) by mouth 2 times daily     Dispense:  275 tablet     Refill:  3     Pt will call when he needs refill     Medications Discontinued During This Encounter   Medication Reason     metoprolol tartrate (LOPRESSOR) 50 MG tablet Reorder         Encounter Diagnosis   Name Primary?     Paroxysmal atrial fibrillation (H) Yes       CURRENT MEDICATIONS:  Current Outpatient Prescriptions   Medication Sig Dispense Refill     chlordiazePOXIDE (LIBRIUM) 25 MG capsule Take 1 capsule (25 mg) by mouth 3  times daily as needed for anxiety or withdrawal 9 capsule 0     Escitalopram Oxalate (LEXAPRO PO)        hydrOXYzine (ATARAX) 50 MG tablet Take 1-2 tablets every 8 hours as needed for anxiety 30 tablet 0     LORazepam (ATIVAN) 1 MG tablet Take 1 tablet (1 mg) by mouth every 8 hours as needed for anxiety 8 tablet 0     metoprolol tartrate (LOPRESSOR) 50 MG tablet Take 1.5 tablets (75 mg) by mouth 2 times daily 275 tablet 3     rivaroxaban ANTICOAGULANT (XARELTO) 20 MG TABS tablet Take 1 tablet (20 mg) by mouth daily (with dinner) 30 tablet 5     [DISCONTINUED] metoprolol tartrate (LOPRESSOR) 50 MG tablet Take 1 tablet (50 mg) by mouth 2 times daily 60 tablet 5       ALLERGIES     Allergies   Allergen Reactions     No Known Allergies        PAST MEDICAL HISTORY:  Past Medical History:   Diagnosis Date     Anxiety      Depression, major, single episode     moderate     PUSHPA (generalized anxiety disorder)      HA (headache)      Personal history of alcoholism (H)      Substance abuse      Tobacco dependence        PAST SURGICAL HISTORY:  No past surgical history on file.    FAMILY HISTORY:  Family History   Problem Relation Age of Onset     Family History Negative Mother      Alcohol/Drug Father      DIABETES Father      Leukemia Father      Alcohol/Drug Paternal Uncle        SOCIAL HISTORY:  Social History     Social History     Marital status: Single     Spouse name: N/A     Number of children: N/A     Years of education: N/A     Occupational History     construction      Social History Main Topics     Smoking status: Current Every Day Smoker     Years: 15.00     Smokeless tobacco: Never Used      Comment: a little over a pack a day     Alcohol use Yes     Drug use: Yes     Special: Cocaine, Marijuana     Sexual activity: Not Asked     Other Topics Concern     None     Social History Narrative       Review of Systems:  Skin:  Negative     Eyes:  Positive for visual blurring  ENT:  Positive for sinus  trouble  Respiratory:  Positive for cough;wheezing  Cardiovascular:    Positive for;palpitations;lightheadedness  Gastroenterology: Positive for    Genitourinary:  Negative    Musculoskeletal:  Positive for joint pain  Neurologic:  Positive for headaches  Psychiatric:  Positive for anxiety;sleep disturbances  Heme/Lymph/Imm:  Negative    Endocrine:  Negative      Physical Exam:  Vitals: /66 (BP Location: Right arm, Patient Position: Chair, Cuff Size: Adult Large)  Pulse 64  Ht 1.829 m (6')  Wt 92.6 kg (204 lb 3.2 oz)  BMI 27.69 kg/m2    Constitutional:  cooperative, alert and oriented, well developed, well nourished, in no acute distress        Skin:  warm and dry to the touch, no apparent skin lesions or masses noted        Head:  normocephalic, no masses or lesions        Eyes:  pupils equal and round, conjunctivae and lids unremarkable, sclera white, no xanthalasma, EOMS intact, no nystagmus        ENT:  no pallor or cyanosis        Neck:  carotid pulses are full and equal bilaterally, JVP normal, no carotid bruit        Chest:  normal breath sounds, clear to auscultation, normal A-P diameter, normal symmetry, normal respiratory excursion, no use of accessory muscles        Cardiac: regular rhythm, normal S1/S2, no S3 or S4, apical impulse not displaced, no murmurs, gallops or rubs                  Abdomen:  abdomen soft, non-tender, BS normoactive, no mass, no HSM, no bruits        Vascular:                                        Extremities and Back:  no deformities, clubbing, cyanosis, erythema observed        Neurological:  no gross motor deficits          Recent Lab Results:  LIPID RESULTS:  No results found for: CHOL, HDL, LDL, TRIG, CHOLHDLRATIO    LIVER ENZYME RESULTS:  Lab Results   Component Value Date    AST 30 05/16/2018    ALT 31 05/16/2018       CBC RESULTS:  Lab Results   Component Value Date    WBC 13.2 (H) 05/16/2018    RBC 5.96 (H) 05/16/2018    HGB 18.6 (H) 05/16/2018    HCT 52.5  05/16/2018    MCV 88 05/16/2018    MCH 31.2 05/16/2018    MCHC 35.4 05/16/2018    RDW 11.9 05/16/2018     05/16/2018       BMP RESULTS:  Lab Results   Component Value Date     05/16/2018    POTASSIUM 3.8 05/16/2018    CHLORIDE 102 05/16/2018    CO2 20 05/16/2018    ANIONGAP 13 05/16/2018     (H) 05/16/2018    BUN 17 05/16/2018    CR 0.91 05/16/2018    GFRESTIMATED >90 05/16/2018    GFRESTBLACK >90 05/16/2018    LEILA 9.3 05/16/2018        A1C RESULTS:  No results found for: A1C    INR RESULTS:  No results found for: INR        CC  No referring provider defined for this encounter.                  Thank you for allowing me to participate in the care of your patient.      Sincerely,     JOE [facility]    cc:   No referring provider defined for this encounter.

## 2018-05-23 NOTE — LETTER
5/23/2018    MORGAN KAPOOR MD  [facility] [address] [place] [address]    RE: Georges Moreno       Dear Colleague,    I had the pleasure of seeing Georges Moreno in the [facility].    HPI:  Georges Moreno is a 42 year old male who is a patient of Dr. Akhtar.  His past medical history includes significant alcohol intake, anxiety, atrial fibrillation, tobacco abuse.  Frequent visits to the  in the ED   2/22/18, 3/14/18, 5/8/18, 5/16/18.  His only documented atrial fibrillation includes 2/22/2018 ED visit and a 30 day cardiac event monitor (2/20/2018) showed paroxsymal atrial fibrillation with rates 110-160s.  During the last 3 ED visits he is complaining of palpitations prior to arrival however the EKG in the ED are sinus rhythm.  Echo (2/21/18) showed EF 50-55% when the patient was in atrial fibrillation.    Today Georges Moreno presents stating he has not had any alcohol since 5/16 and is taking Librium and ativan to help with his anxiety as he has found that SSRIs are not helpful.  He continues to smoke and does not regularly exercise he is not interested in doing inpatient or outpatient alcohol treatment.   Denies chest pain or pressure, headaches, dizziness, syncope, angina, dyspnea at rest or with exertion, dry cough, palpitations, orthopnea, PND, abdominal pain, abdominal edema, pedal edema, or claudication.  Denies easy bruising or bleeding, hematuria, hematochezia, and epistaxis. Denies signs/symptoms of stroke such as visual disturbance, difficulty speaking, facial drooping, confusion, problems with gait, or any new numbness or weakness.      Presenting EKG:   Sinus bradycardia 59 bpm    ASSESSMENT AND PLAN    (I48.0) Paroxysmal atrial fibrillation (H)      Patient's history sounds like he is having an increase in symptomatic paroxysmal atrial fibrillation therefore I increased his metoprolol to 75 mg twice daily.  Which may help with his anxiety as well.  He continues  to take Xarelto 20 mg daily with no problems.  Will discuss with Dr. Akhtar options for antiarrhythmics including possible pulmonary vein isolation ablation.  Will also discuss anticoagulation in the setting of alcohol use.  Currently patient is sober however has had multiple episodes of relapse.  CHADS VASC 0.  Did refer to sleep study as patient snores and does have atrial fibrillation.  Encouraged tobacco cessation, alcohol abstinence, and follow-up treatment for anxiety.    Will follow up with Dr. Akhtar and get back to patient via phone within 2 weeks.  And also has follow-up for 4 weeks, however if we get a plan from Dr. Akhtar that is different than today we can cancel that appointment    Patient expresses understanding and agreement with the plan.     I appreciate the chance to help with Georges Moreno Please let me know if you have any questions or concerns.    JOE Hua, CNP    This note was completed in part using Dragon voice recognition software. Although reviewed after completion, some word and grammatical errors may occur.    Orders Placed This Encounter   Procedures     SLEEP EVALUATION & MANAGEMENT REFERRAL - [facility]  [phone] (Age 18 and up)     Follow-Up with Cardiac Advanced Practice Provider     EKG 12-lead complete w/read - Clinics (performed today)     Orders Placed This Encounter   Medications     metoprolol tartrate (LOPRESSOR) 50 MG tablet     Sig: Take 1.5 tablets (75 mg) by mouth 2 times daily     Dispense:  275 tablet     Refill:  3     Pt will call when he needs refill     Medications Discontinued During This Encounter   Medication Reason     metoprolol tartrate (LOPRESSOR) 50 MG tablet Reorder         Encounter Diagnosis   Name Primary?     Paroxysmal atrial fibrillation (H) Yes       CURRENT MEDICATIONS:  Current Outpatient Prescriptions   Medication Sig Dispense Refill     chlordiazePOXIDE (LIBRIUM) 25 MG capsule Take 1 capsule (25 mg) by mouth 3  times daily as needed for anxiety or withdrawal 9 capsule 0     Escitalopram Oxalate (LEXAPRO PO)        hydrOXYzine (ATARAX) 50 MG tablet Take 1-2 tablets every 8 hours as needed for anxiety 30 tablet 0     LORazepam (ATIVAN) 1 MG tablet Take 1 tablet (1 mg) by mouth every 8 hours as needed for anxiety 8 tablet 0     metoprolol tartrate (LOPRESSOR) 50 MG tablet Take 1.5 tablets (75 mg) by mouth 2 times daily 275 tablet 3     rivaroxaban ANTICOAGULANT (XARELTO) 20 MG TABS tablet Take 1 tablet (20 mg) by mouth daily (with dinner) 30 tablet 5     [DISCONTINUED] metoprolol tartrate (LOPRESSOR) 50 MG tablet Take 1 tablet (50 mg) by mouth 2 times daily 60 tablet 5       ALLERGIES     Allergies   Allergen Reactions     No Known Allergies        PAST MEDICAL HISTORY:  Past Medical History:   Diagnosis Date     Anxiety      Depression, major, single episode     moderate     PUSHPA (generalized anxiety disorder)      HA (headache)      Personal history of alcoholism (H)      Substance abuse      Tobacco dependence        PAST SURGICAL HISTORY:  No past surgical history on file.    FAMILY HISTORY:  Family History   Problem Relation Age of Onset     Family History Negative Mother      Alcohol/Drug Father      DIABETES Father      Leukemia Father      Alcohol/Drug Paternal Uncle        SOCIAL HISTORY:  Social History     Social History     Marital status: Single     Spouse name: N/A     Number of children: N/A     Years of education: N/A     Occupational History     construction      Social History Main Topics     Smoking status: Current Every Day Smoker     Years: 15.00     Smokeless tobacco: Never Used      Comment: a little over a pack a day     Alcohol use Yes     Drug use: Yes     Special: Cocaine, Marijuana     Sexual activity: Not Asked     Other Topics Concern     None     Social History Narrative       Review of Systems:  Skin:  Negative     Eyes:  Positive for visual blurring  ENT:  Positive for sinus  trouble  Respiratory:  Positive for cough;wheezing  Cardiovascular:    Positive for;palpitations;lightheadedness  Gastroenterology: Positive for    Genitourinary:  Negative    Musculoskeletal:  Positive for joint pain  Neurologic:  Positive for headaches  Psychiatric:  Positive for anxiety;sleep disturbances  Heme/Lymph/Imm:  Negative    Endocrine:  Negative      Physical Exam:  Vitals: /66 (BP Location: Right arm, Patient Position: Chair, Cuff Size: Adult Large)  Pulse 64  Ht 1.829 m (6')  Wt 92.6 kg (204 lb 3.2 oz)  BMI 27.69 kg/m2    Constitutional:  cooperative, alert and oriented, well developed, well nourished, in no acute distress        Skin:  warm and dry to the touch, no apparent skin lesions or masses noted        Head:  normocephalic, no masses or lesions        Eyes:  pupils equal and round, conjunctivae and lids unremarkable, sclera white, no xanthalasma, EOMS intact, no nystagmus        ENT:  no pallor or cyanosis        Neck:  carotid pulses are full and equal bilaterally, JVP normal, no carotid bruit        Chest:  normal breath sounds, clear to auscultation, normal A-P diameter, normal symmetry, normal respiratory excursion, no use of accessory muscles        Cardiac: regular rhythm, normal S1/S2, no S3 or S4, apical impulse not displaced, no murmurs, gallops or rubs                  Abdomen:  abdomen soft, non-tender, BS normoactive, no mass, no HSM, no bruits        Vascular:                                        Extremities and Back:  no deformities, clubbing, cyanosis, erythema observed        Neurological:  no gross motor deficits          Recent Lab Results:  LIPID RESULTS:  No results found for: CHOL, HDL, LDL, TRIG, CHOLHDLRATIO    LIVER ENZYME RESULTS:  Lab Results   Component Value Date    AST 30 05/16/2018    ALT 31 05/16/2018       CBC RESULTS:  Lab Results   Component Value Date    WBC 13.2 (H) 05/16/2018    RBC 5.96 (H) 05/16/2018    HGB 18.6 (H) 05/16/2018    HCT 52.5  05/16/2018    MCV 88 05/16/2018    MCH 31.2 05/16/2018    MCHC 35.4 05/16/2018    RDW 11.9 05/16/2018     05/16/2018       BMP RESULTS:  Lab Results   Component Value Date     05/16/2018    POTASSIUM 3.8 05/16/2018    CHLORIDE 102 05/16/2018    CO2 20 05/16/2018    ANIONGAP 13 05/16/2018     (H) 05/16/2018    BUN 17 05/16/2018    CR 0.91 05/16/2018    GFRESTIMATED >90 05/16/2018    GFRESTBLACK >90 05/16/2018    LEILA 9.3 05/16/2018        A1C RESULTS:  No results found for: A1C    INR RESULTS:  No results found for: INR      Thank you for allowing me to participate in the care of your patient.    Sincerely,     JOE Lee CNP     [facility]

## 2018-05-23 NOTE — PATIENT INSTRUCTIONS
Start taking metoprolol 75 mg twice daily  Please follow up with Sleep medicine  I will call you in a couple weeks about a plan once I talk with Dr. Akhtar.    Follow up in 1 month.  If we get a plan from Dr. Akhtar that is different than today, we can cancel the appointment.     If you need a medication refill please contact your pharmacy. Please allow 3 business days for your refill to be completed.     As always, thank you for trusting us with your health care needs!    If you have any questions regarding your visit please contact your care team:   Cardiology  Telephone Number    Afib RNs  Adilene Fraga, and [place] [phone]     Call for Electrophysiology procedure scheduling concerns  Rena Leno [phone]   Device Clinic (Pacemakers, ICDs, Loop)   RN's :    Lucy Pollard, RYANN Davey, Arin Heath During business hours:   [phone]

## 2018-05-23 NOTE — MR AVS SNAPSHOT
After Visit Summary   5/23/2018    Georges Moreno    MRN: [id number]           Patient Information     Date Of Birth          1975        Visit Information        Provider Department      5/23/2018 8:50 AM Anais Treviño APRN [facility]        Today's Diagnoses     Screening for cardiovascular condition    -  1    Atrial fibrillation with RVR (H)          Care Instructions    Start taking metoprolol 75 mg twice daily  Please follow up with Sleep medicine  I will call you in a couple weeks about a plan once I talk with Dr. Akhtar.    Follow up in 1 month.  If we get a plan from Dr. Akhtar that is different than today, we can cancel the appointment.     If you need a medication refill please contact your pharmacy. Please allow 3 business days for your refill to be completed.     As always, thank you for trusting us with your health care needs!    If you have any questions regarding your visit please contact your care team:   Cardiology  Telephone Number    Afib RNs  Adilene Fraga, and [place] [phone]     Call for Electrophysiology procedure scheduling concerns  Rena Burr [phone]   Device Clinic (Pacemakers, ICDs, Loop)   RN's :    Lucy Pollard, RYANN Davey, Arin Heath During business hours:   [phone]                 Follow-ups after your visit        Additional Services     SLEEP EVALUATION & MANAGEMENT REFERRAL - [facility]  [phone] (Age 18 and up)       Please be aware that coverage of these services is subject to the terms and limitations of your health insurance plan.  Call member services at your health plan with any benefit or coverage questions.      Please bring the following to your appointment:    >>   List of current medications   >>   This referral request   >>   Any documents/labs given to you for this referral                Follow-Up with Cardiac Advanced Practice Provider                  Your next 10 appointments already scheduled     Jun 27, 2018  8:10 AM CDT   Return Visit with JOE Nieves CNP   [facility]    [address]  [facility] [address] [phone]              Future tests that were ordered for you today     Open Future Orders        Priority Expected Expires Ordered    Follow-Up with Cardiac Advanced Practice Provider Routine 6/22/2018 5/23/2019 5/23/2018    SLEEP EVALUATION & MANAGEMENT REFERRAL - ADULT -[facility] [place]  [phone] (Age 18 and up) Routine 5/23/2018 5/22/2020 5/23/2018            Who to contact     If you have questions or need follow up information about today's clinic visit or your schedule please contact [facility] directly at [phone].  Normal or non-critical lab and imaging results will be communicated to you by MyChart, letter or phone within 4 business days after the clinic has received the results. If you do not hear from us within 7 days, please contact the clinic through MyChart or phone. If you have a critical or abnormal lab result, we will notify you by phone as soon as possible.  Submit refill requests through [email or web address] or call your pharmacy and they will forward the refill request to us. Please allow 3 business days for your refill to be completed.          Additional Information About Your Visit        Care EveryWhere ID     This is your Care EveryWhere ID. This could be used by other organizations to access your [place] medical records  [id number]        Your Vitals Were     Pulse Height BMI (Body Mass Index)             64 1.829 m (6') 27.69 kg/m2          Blood Pressure from Last 3 Encounters:   05/23/18 112/66   05/16/18 (!) 155/97   05/08/18 115/83    Weight from Last 3 Encounters:   05/23/18 92.6 kg (204 lb 3.2 oz)   05/16/18 86.4 kg (190 lb 7.6 oz)   03/14/18 93.8 kg  (206 lb 12.7 oz)              We Performed the Following     EKG 12-lead complete w/read - Clinics (performed today)          Today's Medication Changes          These changes are accurate as of 5/23/18  9:41 AM.  If you have any questions, ask your nurse or doctor.               These medicines have changed or have updated prescriptions.        Dose/Directions    metoprolol tartrate 50 MG tablet   Commonly known as:  LOPRESSOR   This may have changed:  how much to take   Used for:  Atrial fibrillation with RVR (H)   Changed by:  Anais Treviño APRN CNP        Dose:  75 mg   Take 1.5 tablets (75 mg) by mouth 2 times daily   Quantity:  275 tablet   Refills:  3            Where to get your medicines      These medications were sent to [id number] [place] - [address] AT [facility]  [address] [address]     Phone:  [phone]     metoprolol tartrate 50 MG tablet                Primary Care Provider Office Phone # Fax #    Sarah Mccormack [phone]       [facility] [address] [place] [address]        Equal Access to Services     [facility] AH: Hadii aad ku hadasho Soomaali, waaxda luqadaha, qaybta kaalmada adeegyada, waxay idiin hayaan adesamantha apgan . So [facility] [phone].    ATENCIÓN: Si habla español, tiene a chinchilla disposición servicios gratuitos de asistencia lingüística. Llame al [phone].    We comply with applicable federal civil rights laws and [place] laws. We do not discriminate on the basis of race, color, national origin, age, disability, sex, sexual orientation, or gender identity.            Thank you!     Thank you for choosing [facility]  for your care. Our goal is always to provide you with excellent care. Hearing back from our patients is one way we can continue to improve our services. Please take a few minutes to complete the written survey that you may  receive in the mail after your visit with us. Thank you!             Your Updated Medication List - Protect others around you: Learn how to safely use, store and throw away your medicines at www.disposemymeds.org.          This list is accurate as of 5/23/18  9:41 AM.  Always use your most recent med list.                   Brand Name Dispense Instructions for use Diagnosis    chlordiazePOXIDE 25 MG capsule    LIBRIUM    9 capsule    Take 1 capsule (25 mg) by mouth 3 times daily as needed for anxiety or withdrawal        hydrOXYzine 50 MG tablet    ATARAX    30 tablet    Take 1-2 tablets every 8 hours as needed for anxiety        LEXAPRO PO           LORazepam 1 MG tablet    ATIVAN    8 tablet    Take 1 tablet (1 mg) by mouth every 8 hours as needed for anxiety        metoprolol tartrate 50 MG tablet    LOPRESSOR    275 tablet    Take 1.5 tablets (75 mg) by mouth 2 times daily    Atrial fibrillation with RVR (H)       rivaroxaban ANTICOAGULANT 20 MG Tabs tablet    XARELTO    30 tablet    Take 1 tablet (20 mg) by mouth daily (with dinner)    Atrial fibrillation with RVR (H)

## 2018-05-23 NOTE — PROGRESS NOTES
HPI:  Georges Moreno is a 42 year old male who is a patient of Dr. Akhtar.  His past medical history includes significant alcohol intake, anxiety, atrial fibrillation, tobacco abuse.  Frequent visits to the  in the ED   2/22/18, 3/14/18, 5/8/18, 5/16/18.  His only documented atrial fibrillation includes 2/22/2018 ED visit and a 30 day cardiac event monitor (2/20/2018) showed paroxsymal atrial fibrillation with rates 110-160s.  During the last 3 ED visits he is complaining of palpitations prior to arrival however the EKG in the ED are sinus rhythm.  Echo (2/21/18) showed EF 50-55% when the patient was in atrial fibrillation.    Today Georges Moreno presents stating he has not had any alcohol since 5/16 and is taking Librium and ativan to help with his anxiety as he has found that SSRIs are not helpful.  He continues to smoke and does not regularly exercise he is not interested in doing inpatient or outpatient alcohol treatment.   Denies chest pain or pressure, headaches, dizziness, syncope, angina, dyspnea at rest or with exertion, dry cough, palpitations, orthopnea, PND, abdominal pain, abdominal edema, pedal edema, or claudication.  Denies easy bruising or bleeding, hematuria, hematochezia, and epistaxis. Denies signs/symptoms of stroke such as visual disturbance, difficulty speaking, facial drooping, confusion, problems with gait, or any new numbness or weakness.      Presenting EKG:   Sinus bradycardia 59 bpm    ASSESSMENT AND PLAN    (I48.0) Paroxysmal atrial fibrillation (H)      Patient's history sounds like he is having an increase in symptomatic paroxysmal atrial fibrillation therefore I increased his metoprolol to 75 mg twice daily.  Which may help with his anxiety as well.  He continues to take Xarelto 20 mg daily with no problems.  Will discuss with Dr. Akhtar options for antiarrhythmics including possible pulmonary vein isolation ablation.  Will also discuss anticoagulation in the setting of alcohol use.   Currently patient is sober however has had multiple episodes of relapse.  CHADS VASC 0.  Did refer to sleep study as patient snores and does have atrial fibrillation.  Encouraged tobacco cessation, alcohol abstinence, and follow-up treatment for anxiety.    Will follow up with Dr. Akhtar and get back to patient via phone within 2 weeks.  And also has follow-up for 4 weeks, however if we get a plan from Dr. Akhtar that is different than today we can cancel that appointment    Patient expresses understanding and agreement with the plan.     I appreciate the chance to help with Geroges Moreno Please let me know if you have any questions or concerns.    Anais Treviño, APRN, CNP    This note was completed in part using Dragon voice recognition software. Although reviewed after completion, some word and grammatical errors may occur.    Orders Placed This Encounter   Procedures     SLEEP EVALUATION & MANAGEMENT REFERRAL - [facility]  [phone] (Age 18 and up)     Follow-Up with Cardiac Advanced Practice Provider     EKG 12-lead complete w/read - Clinics (performed today)     Orders Placed This Encounter   Medications     metoprolol tartrate (LOPRESSOR) 50 MG tablet     Sig: Take 1.5 tablets (75 mg) by mouth 2 times daily     Dispense:  275 tablet     Refill:  3     Pt will call when he needs refill     Medications Discontinued During This Encounter   Medication Reason     metoprolol tartrate (LOPRESSOR) 50 MG tablet Reorder         Encounter Diagnosis   Name Primary?     Paroxysmal atrial fibrillation (H) Yes       CURRENT MEDICATIONS:  Current Outpatient Prescriptions   Medication Sig Dispense Refill     chlordiazePOXIDE (LIBRIUM) 25 MG capsule Take 1 capsule (25 mg) by mouth 3 times daily as needed for anxiety or withdrawal 9 capsule 0     Escitalopram Oxalate (LEXAPRO PO)        hydrOXYzine (ATARAX) 50 MG tablet Take 1-2 tablets every 8 hours as needed for anxiety 30 tablet 0     LORazepam  (ATIVAN) 1 MG tablet Take 1 tablet (1 mg) by mouth every 8 hours as needed for anxiety 8 tablet 0     metoprolol tartrate (LOPRESSOR) 50 MG tablet Take 1.5 tablets (75 mg) by mouth 2 times daily 275 tablet 3     rivaroxaban ANTICOAGULANT (XARELTO) 20 MG TABS tablet Take 1 tablet (20 mg) by mouth daily (with dinner) 30 tablet 5     [DISCONTINUED] metoprolol tartrate (LOPRESSOR) 50 MG tablet Take 1 tablet (50 mg) by mouth 2 times daily 60 tablet 5       ALLERGIES     Allergies   Allergen Reactions     No Known Allergies        PAST MEDICAL HISTORY:  Past Medical History:   Diagnosis Date     Anxiety      Depression, major, single episode     moderate     PUSHPA (generalized anxiety disorder)      HA (headache)      Personal history of alcoholism (H)      Substance abuse      Tobacco dependence        PAST SURGICAL HISTORY:  No past surgical history on file.    FAMILY HISTORY:  Family History   Problem Relation Age of Onset     Family History Negative Mother      Alcohol/Drug Father      DIABETES Father      Leukemia Father      Alcohol/Drug Paternal Uncle        SOCIAL HISTORY:  Social History     Social History     Marital status: Single     Spouse name: N/A     Number of children: N/A     Years of education: N/A     Occupational History     construction      Social History Main Topics     Smoking status: Current Every Day Smoker     Years: 15.00     Smokeless tobacco: Never Used      Comment: a little over a pack a day     Alcohol use Yes     Drug use: Yes     Special: Cocaine, Marijuana     Sexual activity: Not Asked     Other Topics Concern     None     Social History Narrative       Review of Systems:  Skin:  Negative     Eyes:  Positive for visual blurring  ENT:  Positive for sinus trouble  Respiratory:  Positive for cough;wheezing  Cardiovascular:    Positive for;palpitations;lightheadedness  Gastroenterology: Positive for    Genitourinary:  Negative    Musculoskeletal:  Positive for joint pain  Neurologic:   Positive for headaches  Psychiatric:  Positive for anxiety;sleep disturbances  Heme/Lymph/Imm:  Negative    Endocrine:  Negative      Physical Exam:  Vitals: /66 (BP Location: Right arm, Patient Position: Chair, Cuff Size: Adult Large)  Pulse 64  Ht 1.829 m (6')  Wt 92.6 kg (204 lb 3.2 oz)  BMI 27.69 kg/m2    Constitutional:  cooperative, alert and oriented, well developed, well nourished, in no acute distress        Skin:  warm and dry to the touch, no apparent skin lesions or masses noted        Head:  normocephalic, no masses or lesions        Eyes:  pupils equal and round, conjunctivae and lids unremarkable, sclera white, no xanthalasma, EOMS intact, no nystagmus        ENT:  no pallor or cyanosis        Neck:  carotid pulses are full and equal bilaterally, JVP normal, no carotid bruit        Chest:  normal breath sounds, clear to auscultation, normal A-P diameter, normal symmetry, normal respiratory excursion, no use of accessory muscles        Cardiac: regular rhythm, normal S1/S2, no S3 or S4, apical impulse not displaced, no murmurs, gallops or rubs                  Abdomen:  abdomen soft, non-tender, BS normoactive, no mass, no HSM, no bruits        Vascular:                                        Extremities and Back:  no deformities, clubbing, cyanosis, erythema observed        Neurological:  no gross motor deficits          Recent Lab Results:  LIPID RESULTS:  No results found for: CHOL, HDL, LDL, TRIG, CHOLHDLRATIO    LIVER ENZYME RESULTS:  Lab Results   Component Value Date    AST 30 05/16/2018    ALT 31 05/16/2018       CBC RESULTS:  Lab Results   Component Value Date    WBC 13.2 (H) 05/16/2018    RBC 5.96 (H) 05/16/2018    HGB 18.6 (H) 05/16/2018    HCT 52.5 05/16/2018    MCV 88 05/16/2018    MCH 31.2 05/16/2018    MCHC 35.4 05/16/2018    RDW 11.9 05/16/2018     05/16/2018       BMP RESULTS:  Lab Results   Component Value Date     05/16/2018    POTASSIUM 3.8 05/16/2018     CHLORIDE 102 05/16/2018    CO2 20 05/16/2018    ANIONGAP 13 05/16/2018     (H) 05/16/2018    BUN 17 05/16/2018    CR 0.91 05/16/2018    GFRESTIMATED >90 05/16/2018    GFRESTBLACK >90 05/16/2018    LEILA 9.3 05/16/2018        A1C RESULTS:  No results found for: A1C    INR RESULTS:  No results found for: INR        CC  No referring provider defined for this encounter.

## 2018-05-29 ENCOUNTER — TELEPHONE (OUTPATIENT)
Dept: BEHAVIORAL HEALTH | Facility: CLINIC | Age: 43
End: 2018-05-29

## 2018-05-29 ENCOUNTER — HOSPITAL ENCOUNTER (INPATIENT)
Facility: CLINIC | Age: 43
LOS: 2 days | Discharge: HOME | End: 2018-05-31
Attending: PSYCHIATRY & NEUROLOGY | Admitting: PSYCHIATRY & NEUROLOGY
Payer: COMMERCIAL

## 2018-05-29 DIAGNOSIS — F10.20 ALCOHOL DEPENDENCE, CONTINUOUS (H): ICD-10-CM

## 2018-05-29 DIAGNOSIS — F10.220 ALCOHOL DEPENDENCE WITH UNCOMPLICATED INTOXICATION (H): ICD-10-CM

## 2018-05-29 LAB
AMPHETAMINES UR QL SCN: NEGATIVE
BARBITURATES UR QL: NEGATIVE
BENZODIAZ UR QL: POSITIVE
CANNABINOIDS UR QL SCN: POSITIVE
COCAINE UR QL: NEGATIVE
ETHANOL UR QL SCN: POSITIVE
OPIATES UR QL SCN: NEGATIVE

## 2018-05-29 PROCEDURE — 12800012 ZZH R&B CD MH INTERMEDIATE ADULT

## 2018-05-29 PROCEDURE — 90791 PSYCH DIAGNOSTIC EVALUATION: CPT

## 2018-05-29 PROCEDURE — 80320 DRUG SCREEN QUANTALCOHOLS: CPT | Performed by: PSYCHIATRY & NEUROLOGY

## 2018-05-29 PROCEDURE — 99285 EMERGENCY DEPT VISIT HI MDM: CPT | Mod: 25

## 2018-05-29 PROCEDURE — 25000132 ZZH RX MED GY IP 250 OP 250 PS 637: Performed by: PSYCHIATRY & NEUROLOGY

## 2018-05-29 PROCEDURE — HZ2ZZZZ DETOXIFICATION SERVICES FOR SUBSTANCE ABUSE TREATMENT: ICD-10-PCS | Performed by: PSYCHIATRY & NEUROLOGY

## 2018-05-29 PROCEDURE — 80307 DRUG TEST PRSMV CHEM ANLYZR: CPT | Performed by: PSYCHIATRY & NEUROLOGY

## 2018-05-29 PROCEDURE — 99284 EMERGENCY DEPT VISIT MOD MDM: CPT | Mod: Z6 | Performed by: PSYCHIATRY & NEUROLOGY

## 2018-05-29 RX ORDER — NICOTINE 21 MG/24HR
1 PATCH, TRANSDERMAL 24 HOURS TRANSDERMAL DAILY
Status: DISCONTINUED | OUTPATIENT
Start: 2018-05-29 | End: 2018-05-31 | Stop reason: HOSPADM

## 2018-05-29 RX ORDER — DIAZEPAM 5 MG
5-20 TABLET ORAL EVERY 30 MIN PRN
Status: DISCONTINUED | OUTPATIENT
Start: 2018-05-29 | End: 2018-05-31

## 2018-05-29 RX ORDER — LORAZEPAM 1 MG/1
1-2 TABLET ORAL DAILY PRN
Status: ON HOLD | COMMUNITY
End: 2018-05-31

## 2018-05-29 RX ORDER — METOPROLOL TARTRATE 25 MG/1
75 TABLET, FILM COATED ORAL 2 TIMES DAILY
Status: DISCONTINUED | OUTPATIENT
Start: 2018-05-29 | End: 2018-05-31 | Stop reason: HOSPADM

## 2018-05-29 RX ORDER — GABAPENTIN 100 MG/1
100-300 CAPSULE ORAL 3 TIMES DAILY PRN
Status: ON HOLD | COMMUNITY
End: 2018-05-31

## 2018-05-29 RX ORDER — ACETAMINOPHEN 325 MG/1
325-650 TABLET ORAL EVERY 4 HOURS PRN
Status: DISCONTINUED | OUTPATIENT
Start: 2018-05-29 | End: 2018-05-31 | Stop reason: HOSPADM

## 2018-05-29 RX ORDER — LANOLIN ALCOHOL/MO/W.PET/CERES
100 CREAM (GRAM) TOPICAL DAILY
Status: COMPLETED | OUTPATIENT
Start: 2018-05-29 | End: 2018-05-31

## 2018-05-29 RX ORDER — ATENOLOL 50 MG/1
50 TABLET ORAL DAILY PRN
Status: DISCONTINUED | OUTPATIENT
Start: 2018-05-29 | End: 2018-05-31

## 2018-05-29 RX ORDER — TRAZODONE HYDROCHLORIDE 50 MG/1
50 TABLET, FILM COATED ORAL
Status: DISCONTINUED | OUTPATIENT
Start: 2018-05-29 | End: 2018-05-31 | Stop reason: HOSPADM

## 2018-05-29 RX ORDER — MULTIPLE VITAMINS W/ MINERALS TAB 9MG-400MCG
1 TAB ORAL DAILY
Status: DISCONTINUED | OUTPATIENT
Start: 2018-05-29 | End: 2018-05-31 | Stop reason: HOSPADM

## 2018-05-29 RX ORDER — BUSPIRONE HYDROCHLORIDE 5 MG/1
5 TABLET ORAL 3 TIMES DAILY
Status: ON HOLD | COMMUNITY
End: 2018-05-31

## 2018-05-29 RX ORDER — FOLIC ACID 1 MG/1
1 TABLET ORAL DAILY
Status: DISCONTINUED | OUTPATIENT
Start: 2018-05-29 | End: 2018-05-31 | Stop reason: HOSPADM

## 2018-05-29 RX ORDER — HYDROXYZINE HYDROCHLORIDE 25 MG/1
25 TABLET, FILM COATED ORAL EVERY 4 HOURS PRN
Status: DISCONTINUED | OUTPATIENT
Start: 2018-05-29 | End: 2018-05-31 | Stop reason: HOSPADM

## 2018-05-29 RX ORDER — ALUMINA, MAGNESIA, AND SIMETHICONE 2400; 2400; 240 MG/30ML; MG/30ML; MG/30ML
30 SUSPENSION ORAL EVERY 4 HOURS PRN
Status: DISCONTINUED | OUTPATIENT
Start: 2018-05-29 | End: 2018-05-31 | Stop reason: HOSPADM

## 2018-05-29 RX ADMIN — RIVAROXABAN 20 MG: 10 TABLET, FILM COATED ORAL at 18:27

## 2018-05-29 RX ADMIN — METOPROLOL TARTRATE 75 MG: 25 TABLET, FILM COATED ORAL at 20:17

## 2018-05-29 RX ADMIN — Medication 100 MG: at 15:46

## 2018-05-29 RX ADMIN — DIAZEPAM 10 MG: 5 TABLET ORAL at 15:55

## 2018-05-29 RX ADMIN — NICOTINE 1 PATCH: 21 PATCH, EXTENDED RELEASE TRANSDERMAL at 15:46

## 2018-05-29 RX ADMIN — FOLIC ACID 1 MG: 1 TABLET ORAL at 15:46

## 2018-05-29 RX ADMIN — DIAZEPAM 10 MG: 5 TABLET ORAL at 20:17

## 2018-05-29 RX ADMIN — MULTIPLE VITAMINS W/ MINERALS TAB 1 TABLET: TAB at 15:46

## 2018-05-29 RX ADMIN — TRAZODONE HYDROCHLORIDE 50 MG: 50 TABLET ORAL at 21:26

## 2018-05-29 ASSESSMENT — ACTIVITIES OF DAILY LIVING (ADL)
GROOMING: INDEPENDENT
LAUNDRY: WITH SUPERVISION
ORAL_HYGIENE: INDEPENDENT
GROOMING: INDEPENDENT
DRESS: INDEPENDENT

## 2018-05-29 ASSESSMENT — ENCOUNTER SYMPTOMS
RESPIRATORY NEGATIVE: 1
TREMORS: 0
HALLUCINATIONS: 0
SLEEP DISTURBANCE: 1
ENDOCRINE NEGATIVE: 1
MUSCULOSKELETAL NEGATIVE: 1
EYES NEGATIVE: 1
HEMATOLOGIC/LYMPHATIC NEGATIVE: 1
CARDIOVASCULAR NEGATIVE: 1
GASTROINTESTINAL NEGATIVE: 1
HYPERACTIVE: 0
APPETITE CHANGE: 1
ACTIVITY CHANGE: 1
NERVOUS/ANXIOUS: 1
NEUROLOGICAL NEGATIVE: 1
SEIZURES: 0

## 2018-05-29 NOTE — PROGRESS NOTES
"Georges is a 42 year old male admitted to station 3A due to etoh withdrawal and wanting to be sober. He is currently drinking \"about 4 days a week, anywhere from 10 to 16 drinks at a time\" Patients last drink was this morning \"from about 0700 to 1000 I had 5 beers.\" Patient is calm and cooperative during admission interview. He does talk about he has a recent history of Afib. He is being treated for that and it has contributed to a decrease in his drinking. He says he previously would drink 10-16 drinks 6 days a week and this was back in January. Patient also has anxiety and is prescribed ativan. However is a poor historian of how much he is using this medication. Patient will be started on MSSA protocol with valium for etoh and phenobarbital for ativan use. He reports smoking a pack and a half a day. Patient states he has \"dabbled\" in other substances years ago, but right now only uses etoh and marijuana. Patient did score an 8 on MSSA in admission interview. Dr. Anaya was paged and put in orders for patient. [facility]. No other concerns. Will continue to monitor and assess.  "

## 2018-05-29 NOTE — IP AVS SNAPSHOT
MRN:[id number]                      After Visit Summary   5/29/2018    Georges Moreno    MRN: [id number]           Thank you!     Thank you for choosing [facility] for your care. Our goal is always to provide you with excellent care.        Patient Information     Date Of Birth          1975        Designated Caregiver       Most Recent Value    Caregiver    Will someone help with your care after discharge? yes    Name of designated caregiver Mom    Phone number of caregiver same as pt    Caregiver address same as pt      About your hospital stay     You were admitted on:  May 29, 2018 You last received care in the: [facility]    You were discharged on:  May 31, 2018       Who to Call     For medical emergencies, please call 911.  For non-urgent questions about your medical care, please call your primary care provider or clinic, [phone]          Attending Provider     Provider Specialty    Henry Parrish MD Psychiatry    Jaclyn, Chai Brown MD Psychiatry       Primary Care Provider Office Phone # Fax #    Sarah Kapoor [phone]      Follow-up Appointments     Follow Up ([facility])       Follow up with primary care provider, SARAH KAPOOR, within 7 days for hospital follow- up.  The following labs/tests are recommended: CMP.      Appointments on [place] and/or [facility] (with [facility] or [facility] provider or service). Call [phone] if you haven't heard regarding these appointments within 7 days of discharge.                  Your next 10 appointments already scheduled     Jun 27, 2018  8:10 AM CDT   Return Visit with JOE Nieves CNP   [facility])    [address] [address] [address] [phone]              Further instructions from your care team       Behavioral Discharge Planning and Instructions  THANK YOU FOR CHOOSING THE [facility]  "[facility]  3A  [phone]    Summary: You were admitted to Station 3A on 5/29/18 for detoxification from alcohol.  A medical exam was performed that included lab work. You met with a  and opted to return home and follow up with Florentino Barone for outpatient treatment services and individual therapy.  You inquired about attending groups for [employer] alumni, now known as \"[employer]\" - you will be contacted at [phone] with more information. Please take care and make your recovery a daily priority, Georges!     Recommendation:  As stated above.    Main Diagnoses:  Per Dr. Anaya;  1.  Alcohol use disorder, severe dependence.     2.  Generalized anxiety disorder.   3.  Cannabis use disorder.     4.  Rule out benzodiazepine use disorder.   5.  Nicotine use disorder.    Major Treatments, Procedures and Findings:  You were treated for alcohol detoxification using valium administered based on the [facility] protocol. You were treated for benzodiazepine detoxification using phenobarbital. You completed a chemical dependency assessment. You had labs drawn and those results were reviewed with you. Please take a copy of your lab work with you to your next primary care physician appointment.    Symptoms to Report:  If you experience more anxiety, confusion, sleeplessness, deep sadness or thoughts of suicide, notify your treatment team or notify your primary care physician. IF ANY OF THE SYMPTOMS YOU ARE EXPERIENCING ARE A MEDICAL EMERGENCY CALL 911 IMMEDIATELY.     Lifestyle Adjustment: Adjust your lifestyle to get enough sleep, relaxation, exercise and good nutrition. Continue to develop healthy coping skills to decrease stress and promote a sober living environment. Do not use mood altering substances including alcohol, illegal drugs or addictive medications other than what is currently prescribed.     Treatment Program Provider:  Once [facility] has approved your admission, you will be contacted at " home to schedule an intake appointment.  [place]  [address] [place] [address]  Ph:  [phone]    Disposition:  Home, [place].    Follow-up Appointment:   Please schedule a follow up appointment with a primary care provider within 7 days of discharge.    Resources:   AA/NA and Sponsors are excellent resources for support and you can find one at any support group meeting.   SMART Recovery - self management for addiction recovery:  www.smartrecovery.org  http://www.aastpaul.org/?topic=8  http://a[place].org/meetings/  http://www.nam[place].org/index.php/meeting-list-pdf  Pathways ~ A Health Crisis Resource & Support Center:  385.307.9470.  http://www.Online Prasadreduction.org  [facility] 343-053-0966  Support Group:  AA/NA and Sponsor/support  Crisis Intervention: 264.850.7536 or 036-332-4906 (TTY: 956.993.5974).  Call anytime for help.  National [place] on Mental Illness (www.[place].melissa.org): 171.629.5387 or 232-349-8599.  Alcoholics Anonymous (www.alcoholics-anonymous.org): Check your phone book for your local chapter.  Suicide Awareness Voices of Education (SAVE) (www.save.org): 212-432-CTAV (8583)  National Suicide Prevention Line (www.mentalhealthmn.org): 517-713-MLJS (5242)  Mental Health Consumer/Survivor Network of [place] (www.mhcsn.net): 672.675.1481 or 788-902-8524  Mental Health Association of [place] (www.mentalhealth.org): 684.678.2862 or 815-000-5549   Substance Abuse and Mental Health Services (www.samhsa.gov)    [place] Recovery Connection ([facility])  [facility] connects people seeking recovery to resources that help foster and sustain long-term recovery.  Whether you are seeking resources for treatment, transportation, housing, job training, education, health care or other pathways to recovery, [facility] is a great place to start.  864.137.7127.  www.[facility].org    General Medication Instructions:   See your medication sheet(s) for instructions.   Take all medicines as directed.   Make no changes unless your doctor suggests them.   Go to all your doctor visits.  Be sure to have all your required lab tests. This way, your medicines can be refilled on time.  Do not use any forms of alcohol.    Please Note:  If you have any questions at anytime after you are discharged please call the [facility], [place] detox unit 3AW unit at [phone].  [facility], Behavioral Intake [phone]  Please take this discharge folder with you to all your follow up appointments, it contains your lab results, diagnosis, medication list and discharge recommendations.      THANK YOU FOR CHOOSING THE [facility]         Pending Results     No orders found from 5/27/2018 to 5/30/2018.            Statement of Approval     Ordered          05/31/18 0215  I have reviewed and agree with all the recommendations and orders detailed in this document.  EFFECTIVE NOW     Approved and electronically signed by:  Chai Anaya MD             Admission Information     Date & Time Provider Department Dept. Phone    5/29/2018 Chai Anaya MD [facility] [phone]      Your Vitals Were     Blood Pressure Pulse Temperature Respirations Height Weight    138/84 57 97.8  F (36.6  C) (Oral) 16 1.829 m (6') 90.7 kg (200 lb)    Pulse Oximetry BMI (Body Mass Index)                97% 27.12 kg/m2          Care EveryWhere ID     This is your Care EveryWhere ID. This could be used by other organizations to access your [place] medical records  [id number]        Equal Access to Services     BRITTANY TURPIN AH: Hadii christine López, wakendrada dario, qaybta kaalmaleticia bhatti, michelet palmer. So [facility] [phone].    ATENCIÓN: Si habla español, tiene a chinchilla disposición servicios gratuitos de asistencia lingüística. Llame al [phone].    We comply with applicable federal civil rights laws and  [place] laws. We do not discriminate on the basis of race, color, national origin, age, disability, sex, sexual orientation, or gender identity.               Review of your medicines      CONTINUE these medicines which may have CHANGED, or have new prescriptions. If we are uncertain of the size of tablets/capsules you have at home, strength may be listed as something that might have changed.        Dose / Directions    gabapentin 300 MG capsule   Commonly known as:  NEURONTIN   This may have changed:    - medication strength  - how much to take  - when to take this  - reasons to take this        Dose:  300 mg   Take 1 capsule (300 mg) by mouth 3 times daily   Refills:  0         CONTINUE these medicines which have NOT CHANGED        Dose / Directions    metoprolol tartrate 50 MG tablet   Commonly known as:  LOPRESSOR        Dose:  75 mg   Take 1.5 tablets (75 mg) by mouth 2 times daily   Quantity:  275 tablet   Refills:  3       rivaroxaban ANTICOAGULANT 20 MG Tabs tablet   Commonly known as:  XARELTO   Used for:  Atrial fibrillation with RVR (H)        Dose:  20 mg   Take 1 tablet (20 mg) by mouth daily (with dinner)   Quantity:  30 tablet   Refills:  5         STOP taking     busPIRone 5 MG tablet   Commonly known as:  BUSPAR           chlordiazePOXIDE 25 MG capsule   Commonly known as:  LIBRIUM           LORazepam 1 MG tablet   Commonly known as:  ATIVAN                    Protect others around you: Learn how to safely use, store and throw away your medicines at www.disposemymeds.org.             Medication List: This is a list of all your medications and when to take them. Check marks below indicate your daily home schedule. Keep this list as a reference.      Medications           Morning Afternoon Evening Bedtime As Needed    gabapentin 300 MG capsule   Commonly known as:  NEURONTIN   Take 1 capsule (300 mg) by mouth 3 times daily   Last time this was given:  300 mg on 5/31/2018  8:29 AM                                 metoprolol tartrate 50 MG tablet   Commonly known as:  LOPRESSOR   Take 1.5 tablets (75 mg) by mouth 2 times daily   Last time this was given:  75 mg on 5/31/2018  8:31 AM                                rivaroxaban ANTICOAGULANT 20 MG Tabs tablet   Commonly known as:  XARELTO   Take 1 tablet (20 mg) by mouth daily (with dinner)   Last time this was given:  20 mg on 5/30/2018  5:24 PM

## 2018-05-29 NOTE — ED PROVIDER NOTES
History     Chief Complaint   Patient presents with     Addiction Problem     blew a 0.090     The history is provided by the patient and medical records.     Georges Moreno is a 42 year old male who is here dropped off by his mother. Patient had called ahead and had a bed held for detox. Patient has a long history of alcohol dependence. He was here 10 years ago for detox and [facility] treatment. He felt it was beneficial. Patient struggled with sobriety. Past couple months, he has been having medical complications due to his drinking. He was seen in the ED on multiple occasions. He drank heavily and developed atrial fibrillation. He also had pancreatitis. He is told to stop drinking. He struggles with this. Sleep has been disrupted. He gets too anxious and needs alcohol to feel calmer and to get sleep. He doctor had prescribed lorazepam which he takes but does not stop him from drinking. He went on a binge over this 3 day weekend, and felt he needs to get his drinking under control. He does not recall how much he drank but past 24 hours he had consumed more than 12 beers. He last drank heavily last night but had to drink some beers this morning to stave off withdrawal. He last drank at 10 AM. He breathalized 0.09 on arrival. He also smoked THC today. He denies other drugs. He denies acute medical concerns. He denies feeling suicidal nor have hallucinations. Patient has no history of withdrawal seizures. He last took Ativan 2 days ago.    Please see DEC Crisis Assessment on 5/29/18 in [facility] for further details.    PERSONAL MEDICAL HISTORY  Past Medical History:   Diagnosis Date     Anxiety      Atrial fibrillation (H)      Depression, major, single episode     moderate     PUSHPA (generalized anxiety disorder)      HA (headache)      Pancreatitis      Personal history of alcoholism (H)      Substance abuse      Tobacco dependence      PAST SURGICAL HISTORY  Past Surgical History:   Procedure Laterality Date      ORTHOPEDIC SURGERY       SOFT TISSUE SURGERY       FAMILY HISTORY  Family History   Problem Relation Age of Onset     Family History Negative Mother      Alcohol/Drug Father      DIABETES Father      Leukemia Father      Alcohol/Drug Paternal Uncle      SOCIAL HISTORY  Social History   Substance Use Topics     Smoking status: Current Every Day Smoker     Years: 15.00     Smokeless tobacco: Never Used      Comment: a little over a pack a day     Alcohol use Yes      Comment: daily     MEDICATIONS  No current facility-administered medications for this encounter.      Current Outpatient Prescriptions   Medication     chlordiazePOXIDE (LIBRIUM) 25 MG capsule     Escitalopram Oxalate (LEXAPRO PO)     hydrOXYzine (ATARAX) 50 MG tablet     LORazepam (ATIVAN) 1 MG tablet     metoprolol tartrate (LOPRESSOR) 50 MG tablet     rivaroxaban ANTICOAGULANT (XARELTO) 20 MG TABS tablet     ALLERGIES  Allergies   Allergen Reactions     No Known Allergies          I have reviewed the Medications, Allergies, Past Medical and Surgical History, and Social History in the Epic system.    Review of Systems   Constitutional: Positive for activity change and appetite change.   HENT: Negative.    Eyes: Negative.    Respiratory: Negative.    Cardiovascular: Negative.    Gastrointestinal: Negative.    Endocrine: Negative.    Genitourinary: Negative.    Musculoskeletal: Negative.    Skin: Negative.    Neurological: Negative.  Negative for tremors and seizures.   Hematological: Negative.    Psychiatric/Behavioral: Positive for sleep disturbance. Negative for hallucinations and suicidal ideas. The patient is nervous/anxious. The patient is not hyperactive.    All other systems reviewed and are negative.      Physical Exam   BP: (!) 157/96  Pulse: 62  Temp: 97.6  F (36.4  C)  Resp: 16  Weight: 90.7 kg (200 lb)  SpO2: 99 %      Physical Exam   Constitutional: He appears well-developed and well-nourished.   HENT:   Head: Normocephalic.   Eyes: Pupils  are equal, round, and reactive to light.   Neck: Normal range of motion.   Cardiovascular: Normal rate.    Pulmonary/Chest: Effort normal.   Abdominal: Soft.   Musculoskeletal: Normal range of motion.   Neurological: He is alert.   Skin: Skin is warm.   Psychiatric: His speech is normal and behavior is normal. Judgment and thought content normal. His mood appears anxious. He is not agitated, not aggressive, not hyperactive, not actively hallucinating and not combative. Thought content is not paranoid and not delusional. Cognition and memory are normal. He expresses no homicidal and no suicidal ideation.   Nursing note and vitals reviewed.      ED Course     ED Course     Procedures      Labs Ordered and Resulted from Time of ED Arrival Up to the Time of Departure from the ED   DRUG ABUSE SCREEN 6 CHEM DEP URINE ([facility])            Assessments & Plan (with Medical Decision Making)   Patient with ongoing alcohol dependence who feels his drinking is out of control and it starting to affect his health. He wants to stop and get into treatment. He is medically and psychiatrically cleared for detox. He has a bed held.    I have reviewed the nursing notes.    I have reviewed the findings, diagnosis, plan and need for follow up with the patient.    New Prescriptions    No medications on file       Final diagnoses:   Alcohol dependence, continuous (H)       5/29/2018   [facility], [place], EMERGENCY DEPARTMENT     Henry Parrish MD  05/29/18 3577

## 2018-05-29 NOTE — IP AVS SNAPSHOT
[facility]: [phone]                                              INTERAGENCY TRANSFER FORM - LAB / IMAGING / EKG / EMG RESULTS   2018                    Hospital Admission Date: 2018  DEBORA PEARL   : 1975  Sex: Male        Attending Provider: Chai Anaya MD     Allergies:  No Known Allergies    Infection:  None   Service:  BEC    Ht:  1.829 m (6')   Wt:  90.7 kg (200 lb)   Admission Wt:  90.7 kg (200 lb)    BMI:  27.12 kg/m 2   BSA:  2.15 m 2            Patient PCP Information     Provider PCP Type    MORGAN KAPOOR MD General         Lab Results - 3 Days      Folate [[id number]]  Resulted: 18 1300, Result status: Final result    Ordering provider: Chai Anaya MD  18 Resulting lab: [facility]    Specimen Information    Type Source Collected On   Blood  18 0749          Components       Value Reference Range Flag Lab   Folate 18.5 >5.4 ng/mL  51            Vitamin B12 [[id number]]  Resulted: 18 1228, Result status: Final result    Ordering provider: Chai Anaya MD  18 Resulting lab: [facility]    Specimen Information    Type Source Collected On   Blood  18 0749          Components       Value Reference Range Flag Lab   Vitamin B12 455 193 - 986 pg/mL  51            GGT [[id number]]  Resulted: 18 0829, Result status: Final result    Ordering provider: Chai Anaya MD  18 Resulting lab: [facility]    Specimen Information    Type Source Collected On   Blood  18 0749          Components       Value Reference Range Flag Lab   GGT 66 0 - 75 U/L  13            Comprehensive metabolic panel [[id number]] (Abnormal)  Resulted: 18 0829, Result status: Final result    Ordering provider: Chai Anaya MD  18 Resulting lab: [facility]  BANK    Specimen Information    Type Source Collected On   Blood  05/30/18 0749          Components       Value Reference Range Flag Lab   Sodium 143 133 - 144 mmol/L  13   Potassium 4.4 3.4 - 5.3 mmol/L  13   Chloride 106 94 - 109 mmol/L  13   Carbon Dioxide 28 20 - 32 mmol/L  13   Anion Gap 9 3 - 14 mmol/L  13   Glucose 114 70 - 99 mg/dL H 13   Urea Nitrogen 12 7 - 30 mg/dL  13   Creatinine 0.98 0.66 - 1.25 mg/dL  13   GFR Estimate 83 >60 mL/min/1.7m2  13   Comment:  Non  GFR Calc   GFR Estimate If Black >90 >60 mL/min/1.7m2  13   Comment:  African American GFR Calc   Calcium 9.0 8.5 - 10.1 mg/dL  13   Bilirubin Total 1.7 0.2 - 1.3 mg/dL H 13   Albumin 4.1 3.4 - 5.0 g/dL  13   Protein Total 7.3 6.8 - 8.8 g/dL  13   Alkaline Phosphatase 51 40 - 150 U/L  13   ALT 48 0 - 70 U/L  13   AST 46 0 - 45 U/L H 13            Lipid panel [[id number]] (Abnormal)  Resulted: 05/30/18 0829, Result status: Final result    Ordering provider: Chai Anaya MD  05/30/18 0030 Resulting lab: [facility]    Specimen Information    Type Source Collected On   Blood  05/30/18 0749          Components       Value Reference Range Flag Lab   Cholesterol 99 <200 mg/dL  13   Triglycerides 294 <150 mg/dL H 13   Comment:         Borderline high:  150-199 mg/dl  High:             200-499 mg/dl  Very high:       >499 mg/dl     HDL Cholesterol 28 >39 mg/dL L 13   LDL Cholesterol Calculated 12 <100 mg/dL  13   Comment:  Desirable:       <100 mg/dl   Non HDL Cholesterol 71 <130 mg/dL  13            CBC with platelets differential [[id number]]  Resulted: 05/30/18 0804, Result status: Final result    Ordering provider: Chai Anaya MD  05/30/18 0030 Resulting lab: [facility]    Specimen Information    Type Source Collected On   Blood  05/30/18 0749          Components       Value Reference Range Flag Lab   WBC 6.6 4.0 - 11.0 10e9/L  13   RBC Count 5.45 4.4 - 5.9  10e12/L  13   Hemoglobin 16.9 13.3 - 17.7 g/dL  13   Hematocrit 49.3 40.0 - 53.0 %  13   MCV 91 78 - 100 fl  13   MCH 31.0 26.5 - 33.0 pg  13   MCHC 34.3 31.5 - 36.5 g/dL  13   RDW 12.1 10.0 - 15.0 %  13   Platelet Count 221 150 - 450 10e9/L  13   Diff Method Automated Method   13   % Neutrophils 56.1 %  13   % Lymphocytes 32.6 %  13   % Monocytes 9.0 %  13   % Eosinophils 1.8 %  13   % Basophils 0.3 %  13   % Immature Granulocytes 0.2 %  13   Nucleated RBCs 0 0 /100  13   Absolute Neutrophil 3.7 1.6 - 8.3 10e9/L  13   Absolute Lymphocytes 2.2 0.8 - 5.3 10e9/L  13   Absolute Monocytes 0.6 0.0 - 1.3 10e9/L  13   Absolute Eosinophils 0.1 0.0 - 0.7 10e9/L  13   Absolute Basophils 0.0 0.0 - 0.2 10e9/L  13   Abs Immature Granulocytes 0.0 0 - 0.4 10e9/L  13   Absolute Nucleated RBC 0.0   13            Drug abuse screen 6 urine (tox) [[id number]] (Abnormal)  Resulted: 05/29/18 1312, Result status: Final result    Ordering provider: Hnery Parrish MD  05/29/18 1118 Resulting lab: [facility]    Specimen Information    Type Source Collected On   Urine Random Urine 05/29/18 1243          Components       Value Reference Range Flag Lab   Amphetamine Qual Urine Negative NEG^Negative  13   Comment:  Cutoff for a negative amphetamine is 500 ng/mL or less.   Barbiturates Qual Urine Negative NEG^Negative  13   Comment:  Cutoff for a negative barbiturate is 200 ng/mL or less.   Benzodiazepine Qual Urine Positive NEG^Negative A 13   Comment:         Cutoff for a positive benzodiazepine is greater than 200 ng/mL. This is an   unconfirmed screening result to be used for medical purposes only.     Cannabinoids Qual Urine Positive NEG^Negative A 13   Comment:         Cutoff for a positive cannabinoid is greater than 50 ng/mL. This is an   unconfirmed screening result to be used for medical purposes only.     Cocaine Qual Urine Negative NEG^Negative  13   Comment:  Cutoff for a negative cocaine is 300  ng/mL or less.   Ethanol Qual Urine Positive NEG^Negative A 13   Comment:         Cutoff for a positive urine ethanol is greater than 0.05 g/dL.  This is an   unconfirmed screening result to be used for medical purposes only.     Opiates Qualitative Urine Negative NEG^Negative  13   Comment:  Cutoff for a negative opiate is 300 ng/mL or less.            Testing Performed By     Lab - Abbreviation Name Director Address Valid Date Range    13 - Unknown [facility] Unknown [address] [facility] [id number] 01/15/15 0916 - Present    51 - Unknown [facility] Unknown [address] [facility] [address] 12/31/14 1010 - Present            Unresulted Labs     None      Encounter-Level Documents:     There are no encounter-level documents.      Order-Level Documents:     There are no order-level documents.

## 2018-05-29 NOTE — PROGRESS NOTES
05/29/18 1433   Patient Belongings   Did you bring any home meds/supplements to the hospital?  Yes   Disposition of meds  Sent to security/pharmacy per site process   Patient Belongings clothing   Disposition of Belongings Kept with patient;Sent to security per site process;Locker;Other (see comment)   Belongings Search Yes     Duffle, hat,shoes, hygiene kit, in storage    Wallet in locked drawer    2 nail clipper, shaver w/extra blades, elec shaver, trrimmer, floss, , cigs, lighter, MP3 player in sharps    Meds, $46.00 cash, [place]isa, [place] ID, [place] in security    No cell phone    A               Admission:  I am responsible for any personal items that are not sent to the safe or pharmacy.  [facility] is not responsible for loss, theft or damage of any property in my possession.    Signature:  _________________________________ Date: _______  Time: _____                                              Staff Signature:  ____________________________ Date: ________  Time: _____      2nd Staff person, if patient is unable/unwilling to sign:    Signature: ________________________________ Date: ________  Time: _____     Discharge:  [facility] has returned all of my personal belongings:    Signature: _________________________________ Date: ________  Time: _____                                          Staff Signature:  ____________________________ Date: ________  Time: _____

## 2018-05-29 NOTE — IP AVS SNAPSHOT
[facility]    [address] [place] [id number]    Phone:  [phone]                                       After Visit Summary   5/29/2018    Georges Moreno    MRN: [id number]           After Visit Summary Signature Page     I have received my discharge instructions, and my questions have been answered. I have discussed any challenges I see with this plan with the nurse or doctor.    ..........................................................................................................................................  Patient/Patient Representative Signature      ..........................................................................................................................................  Patient Representative Print Name and Relationship to Patient    ..................................................               ................................................  Date                                            Time    ..........................................................................................................................................  Reviewed by Signature/Title    ...................................................              ..............................................  Date                                                            Time

## 2018-05-29 NOTE — CONSULTS
Internal Medicine Initial Visit    Georges Moreno MRN# [id number]   Age: 42 year old YOB: 1975   Date of Admission: 5/29/2018     Reason for consult: Paroxysmal A Fib, HA, Mild Hyperbilirubinemia       Requesting physician Dr. Chai nAaya      SUBJECTIVE   HPI:   Georges Moreno is a 42 year old male with history of paroxysmal atrial fibrillation, alcohol-induced pancreatitis, headaches, polysubstance abuse, depression, and anxiety admitted to station 3A for acute alcohol withdrawal. Medicine was consulted to co-manage patient's paroxysmal atrial fibrillation.    Patient presented to the ED 5/29 voluntarily seeking detoxification from alcohol. Reports drinking anywhere from 10-16 beers in a day, 4-5 days per week. No history of withdrawal seizures or hallucinations. Is prescribed ativan for anxiety. Also smokes marijuana daily.    Currently, patient is overall feeling well. Notes majority of his withdrawal symptoms have resolved since arriving. At home he was struggling with fevers, chills, and poor appetite in the setting of alcohol cessation. Reports often having large amounts of emesis the morning after drinking. Endorses recent history of paroxysmal atrial fibrillation, appears to have had first episode 2/20/2018. Started on metoprolol and rivaroxaban at that time. Has had a few breakthrough episodes since although is usually not able to tell when he is in a fib. Also has been struggling with frequent headaches, underwent brain MRI 4/19/18 which was unremarkable. Currently endorses a faint right-sided headache which has been slowly improving. Notes his declining health has made him take his drinking more seriously and is very interested in maintaining sobriety. Currently denies fevers, chills, dizziness, chest pain, SOB, abdominal pain, nausea, vomiting, dysuria, constipation, or peripheral edema.   Past Medical History:     Past Medical History:   Diagnosis Date     Atrial fibrillation (H)       Depression, major, single episode     moderate     PUSHPA (generalized anxiety disorder)      HA (headache)      Pancreatitis      Personal history of alcoholism (H)      Substance abuse      Tobacco dependence       Reviewed & updated in [employer].     Past Surgical History:      Past Surgical History:   Procedure Laterality Date     SOFT TISSUE SURGERY      Excision of pilonidal cyst     SOFT TISSUE SURGERY      removal of rectal cyst      Reviewed & updated in Epic.     Medications prior to admission:     Prior to Admission Medications   Prescriptions Last Dose Informant Patient Reported? Taking?   LORazepam (ATIVAN) 1 MG tablet 2 days ago  Yes Yes   Sig: Take 1-2 mg by mouth daily as needed for anxiety   busPIRone (BUSPAR) 5 MG tablet not started yet  Yes Yes   Sig: Take 5 mg by mouth 3 times daily   chlordiazePOXIDE (LIBRIUM) 25 MG capsule Past Week at Unknown time Self No Yes   Sig: Take 1 capsule (25 mg) by mouth 3 times daily as needed for anxiety or withdrawal   gabapentin (NEURONTIN) 100 MG capsule not started yet  Yes Yes   Sig: Take 100-300 mg by mouth 3 times daily as needed (anxiety or sleep)   metoprolol tartrate (LOPRESSOR) 50 MG tablet 5/28/2018 at PM Self No Yes   Sig: Take 1.5 tablets (75 mg) by mouth 2 times daily   rivaroxaban ANTICOAGULANT (XARELTO) 20 MG TABS tablet 5/28/2018 at PM Self No Yes   Sig: Take 1 tablet (20 mg) by mouth daily (with dinner)      Facility-Administered Medications: None         Allergies:     Allergies   Allergen Reactions     No Known Allergies          Social History:   Tobacco Use: Smokes 1+ packs per day  Alcohol Use: Reports drinking up to 10-14 drinks per day, 6 days per week  Illicit Drug Use: Smokes marijuana daily, denies IVDU     Family History:     Family History   Problem Relation Age of Onset     Family History Negative Mother      Alcohol/Drug Father      DIABETES Father      Leukemia Father      Alcohol/Drug Paternal Uncle       Reviewed & updated in Epic.      "Review of Systems:   Ten point review of systems negative except as stated above in History of Present Illness.    OBJECTIVE   Physical Exam:   Blood pressure 141/84, pulse 52, temperature 97.8  F (36.6  C), resp. rate 16, height 1.829 m (6'), weight 90.7 kg (200 lb), SpO2 97 %.  General: Well-appearing  male sitting upright in chair, NAD.  HEENT: NC/AT. Anicteric sclera. Mucous membranes moist.  Neck: Supple  Cardiovascular: RRR. S1/S2. No murmurs appreciated.  Lungs: Normal respiratory effort on RA. Lungs CTAB without wheezes, rales, or rhonchi.  Abdomen: Soft, non-tender, and non-distended with normoactive bowel sounds.  Extremities: Warm & well perfused. No peripheral edema.  Skin: No rashes, jaundice, or lesions on exposed areas of skin.  Neurologic: A&O X 3. Answers questions appropriately. Moves all extremities symmetrically.     Laboratory Data:   CMP    Recent Labs  Lab 05/30/18  0749      POTASSIUM 4.4   CHLORIDE 106   CO2 28   ANIONGAP 9   *   BUN 12   CR 0.98   GFRESTIMATED 83   GFRESTBLACK >90   LEILA 9.0   PROTTOTAL 7.3   ALBUMIN 4.1   BILITOTAL 1.7*   ALKPHOS 51   AST 46*   ALT 48       CBC    Recent Labs  Lab 05/30/18  0749   WBC 6.6   RBC 5.45   HGB 16.9   HCT 49.3   MCV 91   MCH 31.0   MCHC 34.3   RDW 12.1         Assessment and Plan/Recommendations:   Georges Moreno is a 42 year old male with history of paroxysmal atrial fibrillation, alcohol-induced pancreatitis, headaches, polysubstance abuse, depression, and anxiety admitted to station 3A for acute alcohol withdrawal.    Depression, Anxiety, Acute Polysubstance Abuse and Withdrawal. Reports drinking 10-16 beers per day. U tox + for benzodiazepines, cannabinoids, and ethanol in ED. On MSSA protocol.  - Continue MVI, thiamine, folate supplements  - Management per Psychiatry    Paroxysmal Atrial Fibrillation. Presented to ED 2/20/18 feeling \"unwell\", found to be in new a fib with RVR. Placed on a 30-day event monitor " which revealed additional episode of a fib in setting of ongoing alcohol use. Follows with cardiology as OP, last seen 5/23/18 at which time his metoprolol was increased. Also maintained on rivaroxaban for anticoagulation (CHADSVASC of 0). EKG in ED with sinus bradycardia with sinus arrhythmia, HR 50. Currently RRR, no chest pain or palpitations.  - Continue metoprolol 75 mg BID  - Continue rivaroxaban 20 mg QHS  - Follow up with cardiology as planned in ~3 weeks    Mild Hyperbilirubinemia. Total bilirubin mildly elevated at 1.7 this AM, consistent with previous level of 1.6 on 5/16. AST, ALT, and alk phos unremarkable. No abdominal pain, N/V on exam. Given bilirubin is overall unchanged from 2 weeks ago, no need to trend further while IP. Recommend following up with PCP within 1 week of discharge for repeat CMP.  - Follow up with PCP in 1 week for repeat CMP (follow up order placed)    Headaches. Recurrent headaches over the past several months. MRI 4/19/18 with no evidence for acute intracranial pathology. Reports mild HA currently, improving. No neurologic deficits.  - PRN tylenol    Medicine will sign off at this time. Please page the Internal Medicine job code pager for any intercurrent medical issues which arise. Thank you for the opportunity to be a part of this patient's care.    Trice Lauren PA-C  Hospitalist Service  [phone]

## 2018-05-29 NOTE — PHARMACY-ADMISSION MEDICATION HISTORY
Admission medication history interview status for the 5/29/2018 admission is complete. See Epic admission navigator for allergy information, pharmacy and prior to admission medications.     Medication history interview sources: [facility] (Anna; [phone])    Changes made to PTA medication list (reason)  Added: buspirone, gabapentin  Deleted: escitalopram 20 mg PO daily (patient reported he stopped taking)  Hydroxyzine  mg PO Q8 hours PRN (patient reported he has at home but stopped taking)  Changed: lorazepam (directions and ose per [facility])    Additional medication history information (including reliability of information, actions taken by pharmacist): The patient was a moderately reliable historian. He reported he has not started the buspirone or gabapentin yet as he felt the lorazepam worked better for him. The patient would like to discuss with a physician prior to starting the buspirone or gabapentin. He reported he used the chlordiazepoxide for withdrawal and was not clear on how many capsules he had left. The patient reported he stopped the Lexapro and hydroxyzine. This writer spoke with the outpatient [facility] pharmacist for the patient's prescription filling history.    Lorazepam 1 mg last filled 05/21/18 for quantity 15 for 7 day supply  Gabapentin 100 mg last filled 05/21/18 for quantity 120 for 13 day supply  Chlordiazepoxide 25 mg last filled 05/17/18 for quantity 9 for 3 day supply      Prior to Admission medications    Medication Sig Last Dose Taking? Auth Provider   busPIRone (BUSPAR) 5 MG tablet Take 5 mg by mouth 3 times daily not started yet Yes Unknown, Entered By History   chlordiazePOXIDE (LIBRIUM) 25 MG capsule Take 1 capsule (25 mg) by mouth 3 times daily as needed for anxiety or withdrawal Past Week at Unknown time Yes Ez Coles MD   gabapentin (NEURONTIN) 100 MG capsule Take 100-300 mg by mouth 3 times daily as needed (anxiety or sleep) not started yet Yes  Unknown, Entered By History   LORazepam (ATIVAN) 1 MG tablet Take 1-2 mg by mouth daily as needed for anxiety 2 days ago Yes Unknown, Entered By History   metoprolol tartrate (LOPRESSOR) 50 MG tablet Take 1.5 tablets (75 mg) by mouth 2 times daily 5/28/2018 at PM Yes Anais Treviño APRN CNP   rivaroxaban ANTICOAGULANT (XARELTO) 20 MG TABS tablet Take 1 tablet (20 mg) by mouth daily (with dinner) 5/28/2018 at PM Yes Randall Akhtar MD         Medication history completed by:  Mari Stockton, PharmD, BCPP  Behavioral ER Pharmacist  [phone]

## 2018-05-29 NOTE — ED NOTES
.  ED to Behavioral Floor Handoff    SITUATION  Georges Moreno is a 42 year old male who speaks English and lives in a home with family members The patient arrived in the ED by private car from home with a complaint of Addiction Problem (blew a 0.090)  .The patient's current symptoms started/worsened over the last several years and during this time the symptoms have increased.   In the ED, pt was diagnosed with   Final diagnoses:   Alcohol dependence, continuous (H)        Initial vitals were: BP: (!) 157/96  Pulse: 62  Temp: 97.6  F (36.4  C)  Resp: 16  Weight: 90.7 kg (200 lb)  SpO2: 99 %   --------  Is the patient diabetic? No   If yes, last blood glucose? --     If yes, was this treated in the ED? --  --------  Is the patient inebriated (ETOH) Yes or Impaired on other substances? No  MSSA done? Yes  Last MSSA score:2    Were withdrawal symptoms treated? No  Does the patient have a seizure history? No.   Is the patient patient experiencing suicidal ideation? denies current or recent suicidal ideation     Homicidal ideation? denies current or recent homicidal ideation or behaviors.    Self-injurious behavior/urges? denies current or recent self injurious behavior or ideation.  ------  Was pt aggressive in the ED No  Was a code called No  Is the pt now cooperative? Yes  -------  Meds given in ED: Medications - No data to display   Family present during ED course? No  Family currently present? No    BACKGROUND  Does the patient have a cognitive impairment or developmental disability? No  Allergies:   Allergies   Allergen Reactions     No Known Allergies    .   Social demographics are   Social History     Social History     Marital status: Single     Spouse name: N/A     Number of children: N/A     Years of education: N/A     Occupational History     construction      Social History Main Topics     Smoking status: Current Every Day Smoker     Years: 15.00     Smokeless tobacco: Never Used      Comment: a little over a  pack a day     Alcohol use Yes      Comment: daily     Drug use: Yes     Special: Marijuana     Sexual activity: Not Asked     Other Topics Concern     None     Social History Narrative        ASSESSMENT  Labs results   Labs Ordered and Resulted from Time of ED Arrival Up to the Time of Departure from the ED   DRUG ABUSE SCREEN 6 CHEM DEP URINE ([facility])      Imaging Studies: No results found for this or any previous visit (from the past 24 hour(s)).   Most recent vital signs BP (!) 157/96  Pulse 62  Temp 97.6  F (36.4  C) (Oral)  Resp 16  Wt 90.7 kg (200 lb)  SpO2 99%  BMI 27.12 kg/m2   Abnormal labs/tests/findings requiring intervention:---   Pain control: pt had none  Nausea control: fair, c/o queasy stomach, given ginger ale    RECOMMENDATION  Are any infection precautions needed (MRSA, VRE, etc.)? No If yes, what infection? --  ---  Does the patient have mobility issues? independently.   ---  Is patient on 72 hour hold or commitment? No If on 72 hour hold, have hold and rights been given to patient? N/A  Are admitting orders written if after 10 p.m. ?N/A  Tasks needing to be completed:---     Pamela Joiner   [facility]-- [phone]   [phone]

## 2018-05-29 NOTE — TELEPHONE ENCOUNTER
S: Pt is a 43 yo male looking to detox from [facility]    B: Pt was a daily user of ETOH since his early 20's. Pt experienced some periods of sobriety but recently began drinking again. Pt has been in [facility] and felt it was helpful. Pt wants to go back to [facility] once detox complete. Pt has been drinking up to 12 drinks over the past week. No history of DT's or seizures.  MRI completed one month ago, everything was ok. EKG done in ED, everything ok.     A: Hx of Afib     R: Jaclyn/3a

## 2018-05-30 PROBLEM — M54.2 CERVICALGIA: Status: RESOLVED | Noted: 2018-04-11 | Resolved: 2018-05-30

## 2018-05-30 PROBLEM — M25.511 RIGHT SHOULDER PAIN: Status: RESOLVED | Noted: 2018-04-11 | Resolved: 2018-05-30

## 2018-05-30 LAB
ALBUMIN SERPL-MCNC: 4.1 G/DL (ref 3.4–5)
ALP SERPL-CCNC: 51 U/L (ref 40–150)
ALT SERPL W P-5'-P-CCNC: 48 U/L (ref 0–70)
ANION GAP SERPL CALCULATED.3IONS-SCNC: 9 MMOL/L (ref 3–14)
AST SERPL W P-5'-P-CCNC: 46 U/L (ref 0–45)
BASOPHILS # BLD AUTO: 0 10E9/L (ref 0–0.2)
BASOPHILS NFR BLD AUTO: 0.3 %
BILIRUB SERPL-MCNC: 1.7 MG/DL (ref 0.2–1.3)
BUN SERPL-MCNC: 12 MG/DL (ref 7–30)
CALCIUM SERPL-MCNC: 9 MG/DL (ref 8.5–10.1)
CHLORIDE SERPL-SCNC: 106 MMOL/L (ref 94–109)
CHOLEST SERPL-MCNC: 99 MG/DL
CO2 SERPL-SCNC: 28 MMOL/L (ref 20–32)
CREAT SERPL-MCNC: 0.98 MG/DL (ref 0.66–1.25)
DIFFERENTIAL METHOD BLD: NORMAL
EOSINOPHIL # BLD AUTO: 0.1 10E9/L (ref 0–0.7)
EOSINOPHIL NFR BLD AUTO: 1.8 %
ERYTHROCYTE [DISTWIDTH] IN BLOOD BY AUTOMATED COUNT: 12.1 % (ref 10–15)
FOLATE SERPL-MCNC: 18.5 NG/ML
GFR SERPL CREATININE-BSD FRML MDRD: 83 ML/MIN/1.7M2
GGT SERPL-CCNC: 66 U/L (ref 0–75)
GLUCOSE SERPL-MCNC: 114 MG/DL (ref 70–99)
HCT VFR BLD AUTO: 49.3 % (ref 40–53)
HDLC SERPL-MCNC: 28 MG/DL
HGB BLD-MCNC: 16.9 G/DL (ref 13.3–17.7)
IMM GRANULOCYTES # BLD: 0 10E9/L (ref 0–0.4)
IMM GRANULOCYTES NFR BLD: 0.2 %
INTERPRETATION ECG - MUSE: NORMAL
LDLC SERPL CALC-MCNC: 12 MG/DL
LYMPHOCYTES # BLD AUTO: 2.2 10E9/L (ref 0.8–5.3)
LYMPHOCYTES NFR BLD AUTO: 32.6 %
MCH RBC QN AUTO: 31 PG (ref 26.5–33)
MCHC RBC AUTO-ENTMCNC: 34.3 G/DL (ref 31.5–36.5)
MCV RBC AUTO: 91 FL (ref 78–100)
MONOCYTES # BLD AUTO: 0.6 10E9/L (ref 0–1.3)
MONOCYTES NFR BLD AUTO: 9 %
NEUTROPHILS # BLD AUTO: 3.7 10E9/L (ref 1.6–8.3)
NEUTROPHILS NFR BLD AUTO: 56.1 %
NONHDLC SERPL-MCNC: 71 MG/DL
NRBC # BLD AUTO: 0 10*3/UL
NRBC BLD AUTO-RTO: 0 /100
PLATELET # BLD AUTO: 221 10E9/L (ref 150–450)
POTASSIUM SERPL-SCNC: 4.4 MMOL/L (ref 3.4–5.3)
PROT SERPL-MCNC: 7.3 G/DL (ref 6.8–8.8)
RBC # BLD AUTO: 5.45 10E12/L (ref 4.4–5.9)
SODIUM SERPL-SCNC: 143 MMOL/L (ref 133–144)
TRIGL SERPL-MCNC: 294 MG/DL
VIT B12 SERPL-MCNC: 455 PG/ML (ref 193–986)
WBC # BLD AUTO: 6.6 10E9/L (ref 4–11)

## 2018-05-30 PROCEDURE — H0001 ALCOHOL AND/OR DRUG ASSESS: HCPCS

## 2018-05-30 PROCEDURE — 36415 COLL VENOUS BLD VENIPUNCTURE: CPT | Performed by: PSYCHIATRY & NEUROLOGY

## 2018-05-30 PROCEDURE — 99222 1ST HOSP IP/OBS MODERATE 55: CPT | Performed by: PHYSICIAN ASSISTANT

## 2018-05-30 PROCEDURE — 99222 1ST HOSP IP/OBS MODERATE 55: CPT | Mod: AI | Performed by: PSYCHIATRY & NEUROLOGY

## 2018-05-30 PROCEDURE — 25000132 ZZH RX MED GY IP 250 OP 250 PS 637: Performed by: PSYCHIATRY & NEUROLOGY

## 2018-05-30 PROCEDURE — 82977 ASSAY OF GGT: CPT | Performed by: PSYCHIATRY & NEUROLOGY

## 2018-05-30 PROCEDURE — 82607 VITAMIN B-12: CPT | Performed by: PSYCHIATRY & NEUROLOGY

## 2018-05-30 PROCEDURE — 82746 ASSAY OF FOLIC ACID SERUM: CPT | Performed by: PSYCHIATRY & NEUROLOGY

## 2018-05-30 PROCEDURE — 12800012 ZZH R&B CD MH INTERMEDIATE ADULT

## 2018-05-30 PROCEDURE — 80053 COMPREHEN METABOLIC PANEL: CPT | Performed by: PSYCHIATRY & NEUROLOGY

## 2018-05-30 PROCEDURE — 85025 COMPLETE CBC W/AUTO DIFF WBC: CPT | Performed by: PSYCHIATRY & NEUROLOGY

## 2018-05-30 PROCEDURE — 80061 LIPID PANEL: CPT | Performed by: PSYCHIATRY & NEUROLOGY

## 2018-05-30 PROCEDURE — 99207 ZZC CONSULT E&M CHANGED TO INITIAL LEVEL: CPT | Performed by: PHYSICIAN ASSISTANT

## 2018-05-30 RX ORDER — PHENOBARBITAL 16.2 MG/1
16.2 TABLET ORAL 2 TIMES DAILY
Status: DISCONTINUED | OUTPATIENT
Start: 2018-05-31 | End: 2018-05-30

## 2018-05-30 RX ORDER — PHENOBARBITAL 16.2 MG/1
16.2 TABLET ORAL 3 TIMES DAILY
Status: COMPLETED | OUTPATIENT
Start: 2018-05-30 | End: 2018-05-30

## 2018-05-30 RX ORDER — PHENOBARBITAL 32.4 MG/1
32.4 TABLET ORAL 2 TIMES DAILY
Status: DISCONTINUED | OUTPATIENT
Start: 2018-05-30 | End: 2018-05-30

## 2018-05-30 RX ORDER — PHENOBARBITAL 16.2 MG/1
16.2 TABLET ORAL 2 TIMES DAILY
Status: DISCONTINUED | OUTPATIENT
Start: 2018-05-31 | End: 2018-05-31

## 2018-05-30 RX ORDER — GABAPENTIN 300 MG/1
300 CAPSULE ORAL 3 TIMES DAILY
Status: DISCONTINUED | OUTPATIENT
Start: 2018-05-30 | End: 2018-05-31 | Stop reason: HOSPADM

## 2018-05-30 RX ORDER — PROPRANOLOL HYDROCHLORIDE 10 MG/1
10-20 TABLET ORAL 3 TIMES DAILY PRN
Status: DISCONTINUED | OUTPATIENT
Start: 2018-05-30 | End: 2018-05-31 | Stop reason: HOSPADM

## 2018-05-30 RX ADMIN — GABAPENTIN 300 MG: 300 CAPSULE ORAL at 14:57

## 2018-05-30 RX ADMIN — NICOTINE POLACRILEX 4 MG: 2 GUM, CHEWING ORAL at 08:26

## 2018-05-30 RX ADMIN — METOPROLOL TARTRATE 75 MG: 25 TABLET, FILM COATED ORAL at 08:26

## 2018-05-30 RX ADMIN — PHENOBARBITAL 16.2 MG: 16.2 TABLET ORAL at 10:19

## 2018-05-30 RX ADMIN — TRAZODONE HYDROCHLORIDE 50 MG: 50 TABLET ORAL at 22:56

## 2018-05-30 RX ADMIN — NICOTINE POLACRILEX 4 MG: 2 GUM, CHEWING ORAL at 14:57

## 2018-05-30 RX ADMIN — METOPROLOL TARTRATE 75 MG: 25 TABLET, FILM COATED ORAL at 20:36

## 2018-05-30 RX ADMIN — MULTIPLE VITAMINS W/ MINERALS TAB 1 TABLET: TAB at 08:26

## 2018-05-30 RX ADMIN — Medication 100 MG: at 08:26

## 2018-05-30 RX ADMIN — PHENOBARBITAL 16.2 MG: 16.2 TABLET ORAL at 20:36

## 2018-05-30 RX ADMIN — NICOTINE POLACRILEX 4 MG: 2 GUM, CHEWING ORAL at 10:50

## 2018-05-30 RX ADMIN — FOLIC ACID 1 MG: 1 TABLET ORAL at 08:26

## 2018-05-30 RX ADMIN — PHENOBARBITAL 16.2 MG: 16.2 TABLET ORAL at 14:59

## 2018-05-30 RX ADMIN — GABAPENTIN 300 MG: 300 CAPSULE ORAL at 20:36

## 2018-05-30 RX ADMIN — HYDROXYZINE HYDROCHLORIDE 25 MG: 25 TABLET ORAL at 00:03

## 2018-05-30 RX ADMIN — NICOTINE POLACRILEX 4 MG: 2 GUM, CHEWING ORAL at 18:23

## 2018-05-30 RX ADMIN — GABAPENTIN 300 MG: 300 CAPSULE ORAL at 08:26

## 2018-05-30 RX ADMIN — RIVAROXABAN 20 MG: 10 TABLET, FILM COATED ORAL at 17:24

## 2018-05-30 RX ADMIN — TRAZODONE HYDROCHLORIDE 50 MG: 50 TABLET ORAL at 00:03

## 2018-05-30 ASSESSMENT — ACTIVITIES OF DAILY LIVING (ADL)
GROOMING: INDEPENDENT
DRESS: STREET CLOTHES
ORAL_HYGIENE: INDEPENDENT
LAUNDRY: WITH SUPERVISION
LAUNDRY: WITH SUPERVISION
GROOMING: INDEPENDENT
ORAL_HYGIENE: INDEPENDENT
DRESS: INDEPENDENT

## 2018-05-30 NOTE — TELEPHONE ENCOUNTER
LP SCREEN TELEPHONE NOTE   Georges Moreno paperwork was reviewed by DOM Saldana and the patient was deemed ELIGIBLE for the LP program.     Medical: Deferred, because this patient is currently on 3A IP detoxification unit.     Insurance: [employer] [place]/PMAP no authorization is needed for IOP CD treatment services.     This will be a: 3A DIRECT TRANSFER, The LP RN will complete the [facility] & [facility].  The primary counselors will complete the LP UPDATE.     IV: This patient is not an IV drug user.     Business office: The patient has [employer] [place]/PMAP and would NOT need to consult with anyone in the business office about the out of pocket costs of the LP program.     List: This patient CAN be placed on the PRIORITY LP Waiting List at this time.       Group: Men's Group or Mixed Group     Additional Info as needed: NA     The best current contact telephone number for the patient is: 3A @ [phone], or @ [phone] if discharged home       Thanks,  DOM Saldana   5/30/2018

## 2018-05-30 NOTE — PLAN OF CARE
Behavioral Team Discussion: (5/30/2018)    Continued Stay Criteria/Rationale: Patient admitted for Chemical Use Issues.  Plan: The following services will be provided to the patient; psychiatric assessment, medication management, therapeutic milieu, individual and group support, and skills groups.   Participants: 3A Provider: Dr. Chai Anaya MD; 3A RN's: Kaya Beckwith, RN, Radames Lopez, RN, Oleksandr Edouard, RN and Bruna Hare, RN; 3A CM's: Nory Wheat, Lacey Sanchez  and Joey Rosenberg.  Summary/Recommendation: Providers will assess today for treatment recommendations, discharge planning, and aftercare plans. CM will meet with pt for discharge planning.   Medical/Physical: Deferred (see medical notes).  Precautions:   Behavioral Orders   Procedures     Code 1 - Restrict to Unit     Routine Programming     As clinically indicated     Status 15     Every 15 minutes.     Withdrawal precautions     Rationale for change in precautions or plan: N/A  Progress: No Change.

## 2018-05-30 NOTE — PROGRESS NOTES
CASE MANAGEMENT NOTE    Rule 25 assessment completed and faxed to [facility] requesting intensive outpatient treatment and individual psychotherapy weekly. Patient lives with his mother in a safe, sober, supportive environment. He initially requesting placement in [facility] but then received an offer to work 3 days per week so is now requesting placement at [facility] for outpatient treatment. Patient attended outpatient treatment in the past and maintained sobriety during the program and for 3 months following. Majority of his treatment history was court-ordered or involved ultimatums. He states this time he is here voluntarily, asking for help.    Jolene Ga) DANA Wheat, [facility]  3A Psychotherapist  [phone]

## 2018-05-30 NOTE — PROGRESS NOTES
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 environment.    REASONS SEVERITY WAS ASSIGNED - (What support does the person have for making changes? What structure/stability does the person have in his or her daily life that will increase the likelihood that changes can be sustained? What problems exist in the person s environment that will jeopardize getting/staying clean and sober?)     Lives with mother in a safe, sober, supprotive environment and helps around the house.  Has a psychiatrist and is requesting individual psychotherapy also.  Has opportunity to work 3 days week.  History of legal involvement, not currently.  Lacks strong sober support system involvement.         Client Choice/Exceptions   Would you like services specific to language, age, gender, culture, Confucianist preference, race, ethnicity, sexual orientation or disability?  No    What particular treatment choices and options would you like to have? Outpatient treatment, individual psychotherapy.    Do you have a preference for a particular treatment program? [facility].    Criteria for Diagnosis     Criteria for Diagnosis  DSM-5 Criteria for Substance Use Disorder  Instructions:  Determine whether the client currently meets the criteria for Substance Use Disorder using the diagnostic criteria in the DSM-V pp.481-583. Current means during the most recent 12 months outside a facility that controls access to substances    Category of Substance Severity (ICD-10 Code / DSM 5 Code)     Alcohol Use Disorder Severe  (10.20) (303.90)   Cannabis Use Disorder Severe   (F12.20) (304.30)   Hallucinogen Use Disorder NA   Inhalant Use Disorder NA   Opioid Use Disorder NA   Sedative, Hypnotic, or Anxiolytic Use Disorder Rule out benzodiazepine use disorder   Stimulant Related Disorder NA   Tobacco Use Disorder Severe   (F17.200) (305.1)    Other (or unknown) Substance Use Disorder NA       Collateral Contact Summary   Number of contacts made: See below.    Contact with referring person:  NA.    If court related records were reviewed, summarize here: NA      Rule 25 Assessment Summary and Plan   's Recommendation    1) Complete an MI/CD intensive outpatient treatment program followed by strong aftercare plan.  2) Abstain from all mood-altering chemicals unless prescribed by a licensed provider and take all medications as prescribed.   3) Regular attendance and active participation in support group meetings with a same gender sponsor and/or  through [place] "[employer], [place]" ([phone]).   4)  Keep appointments with your care team providers.  5)  Remain law abiding.  6)  Patient may benefit from individual psychotherapy/chemical dependency counseling weekly.    Collateral Contacts     Name:       Relationship:       Phone Number:     Releases:         Henry Parrish MD Physician Signed Emergency Medicine ED Provider Notes   Date of Service: 5/29/2018 10:49 AM Creation Time: 5/29/2018  1:04 PM            History           Chief Complaint   Patient presents with     Addiction Problem       blew a 0.090      The history is provided by the patient and medical records.      Georges Moreno  is a 42 year old male who is here dropped off by his mother. Patient had called ahead and had a bed held for detox. Patient has a long history of alcohol dependence. He was here 10 years ago for detox and [facility] treatment. He felt it was beneficial. Patient struggled with sobriety. Past couple months, he has been having medical complications due to his drinking. He was seen in the ED on multiple occasions. He drank heavily and developed atrial fibrillation. He also had pancreatitis. He is told to stop drinking. He struggles with this. Sleep has been disrupted. He gets too anxious and needs alcohol to feel calmer and to get sleep. He doctor had prescribed lorazepam which he takes but does not stop him from drinking. He went on a binge over this 3 day weekend, and felt he needs to get his drinking under control. He does not recall how much he drank but past 24 hours he had consumed more than 12 beers. He last drank heavily last night but had to drink some beers this morning to stave off withdrawal. He last drank at 10 AM. He breathalized 0.09 on arrival. He also smoked THC today. He denies other drugs. He denies acute medical concerns. He denies feeling suicidal nor have hallucinations. Patient has no history of withdrawal seizures. He last took Ativan 2 days ago.            Collateral Contacts     Name:       Relationship:       Phone Number:     Releases:         Trice Briones PA-C Physician Assistant Addendum Hospitalist Consults   Date of Service: 5/30/2018  2:07 PM Creation Time: 5/29/2018  4:02 PM     Consult Orders:     Internal Medicine Adult IP Consult for [facility] Detox on 3A: Patient to be seen: Routine within 24 hrs; Call back #: [phone]; Detox; Consultant may enter orders: Yes [[id number]] ordered by Chai Anaya MD at 05/29/18 1500                  Internal Medicine Initial Visit     Georges Moreno MRN# [id number]   Age: 42 year old YOB: 1975   Date of Admission:                    5/29/2018      Reason for consult: Paroxysmal A Fib, HA, Mild Hyperbilirubinemia         Requesting physician Dr. Chai Anaya      SUBJECTIVE   HPI:   Georges Moreno is a 42 year old male with history of paroxysmal atrial fibrillation, alcohol-induced pancreatitis, headaches, polysubstance abuse, depression, and anxiety admitted to station 3A for acute alcohol withdrawal. Medicine was consulted to co-manage patient's paroxysmal atrial fibrillation.     Patient presented to the ED 5/29 voluntarily seeking detoxification from alcohol. Reports drinking anywhere from 10-16 beers in a day, 4-5 days per week. No history of withdrawal seizures or hallucinations. Is prescribed ativan for anxiety. Also smokes marijuana daily.     Currently, patient is overall feeling well. Notes majority of his withdrawal symptoms have resolved since arriving. At home he was struggling with fevers, chills, and poor appetite in the setting of alcohol cessation. Reports often having large amounts of emesis the morning after drinking. Endorses recent history of paroxysmal atrial fibrillation, appears to have had first episode 2/20/2018. Started on metoprolol and rivaroxaban at that time. Has had a few breakthrough episodes since although is usually not able to tell when he is in a fib. Also has been struggling with frequent headaches, underwent brain MRI 4/19/18 which was unremarkable. Currently endorses a faint right-sided headache which has been slowly improving. Notes his declining health has made him take his drinking more seriously and is very interested in maintaining sobriety. Currently denies fevers, chills, dizziness, chest pain, SOB, abdominal pain, nausea, vomiting, dysuria, constipation, or peripheral edema.   Past Medical History:       Past Medical History         Past Medical History:   Diagnosis Date     Atrial fibrillation (H)       Depression, major, single episode       moderate     PUSHPA (generalized anxiety  disorder)       HA (headache)       Pancreatitis       Personal history of alcoholism (H)       Substance abuse       Tobacco dependence           Reviewed & updated in [facility].      Past Surgical History:        Past Surgical History          Past Surgical History:   Procedure Laterality Date     SOFT TISSUE SURGERY         Excision of pilonidal cyst     SOFT TISSUE SURGERY         removal of rectal cyst         Reviewed & updated in Epic.      Medications prior to admission:      Prior to Admission Medications   Prescriptions Last Dose Informant Patient Reported? Taking?   LORazepam (ATIVAN) 1 MG tablet 2 days ago   Yes Yes   Sig: Take 1-2 mg by mouth daily as needed for anxiety   busPIRone (BUSPAR) 5 MG tablet not started yet   Yes Yes   Sig: Take 5 mg by mouth 3 times daily   chlordiazePOXIDE (LIBRIUM) 25 MG capsule Past Week at Unknown time Self No Yes   Sig: Take 1 capsule (25 mg) by mouth 3 times daily as needed for anxiety or withdrawal   gabapentin (NEURONTIN) 100 MG capsule not started yet   Yes Yes   Sig: Take 100-300 mg by mouth 3 times daily as needed (anxiety or sleep)   metoprolol tartrate (LOPRESSOR) 50 MG tablet 5/28/2018 at PM Self No Yes   Sig: Take 1.5 tablets (75 mg) by mouth 2 times daily   rivaroxaban ANTICOAGULANT (XARELTO) 20 MG TABS tablet 5/28/2018 at PM Self No Yes   Sig: Take 1 tablet (20 mg) by mouth daily (with dinner)       Facility-Administered Medications: None          Allergies:           Allergies   Allergen Reactions     No Known Allergies            Social History:   Tobacco Use: Smokes 1+ packs per day  Alcohol Use: Reports drinking up to 10-14 drinks per day, 6 days per week  Illicit Drug Use: Smokes marijuana daily, denies IVDU      Family History:       Family History          Family History   Problem Relation Age of Onset     Family History Negative Mother       Alcohol/Drug Father       DIABETES Father       Leukemia Father       Alcohol/Drug Paternal Uncle          "  Reviewed & updated in Epic.      Review of Systems:   Ten point review of systems negative except as stated above in History of Present Illness.     OBJECTIVE   Physical Exam:   Blood pressure 141/84, pulse 52, temperature 97.8  F (36.6  C), resp. rate 16, height 1.829 m (6'), weight 90.7 kg (200 lb), SpO2 97 %.  General: Well-appearing  male sitting upright in chair, NAD.  HEENT: NC/AT. Anicteric sclera. Mucous membranes moist.  Neck: Supple  Cardiovascular: RRR. S1/S2. No murmurs appreciated.  Lungs: Normal respiratory effort on RA. Lungs CTAB without wheezes, rales, or rhonchi.  Abdomen: Soft, non-tender, and non-distended with normoactive bowel sounds.  Extremities: Warm & well perfused. No peripheral edema.  Skin: No rashes, jaundice, or lesions on exposed areas of skin.  Neurologic: A&O X 3. Answers questions appropriately. Moves all extremities symmetrically.      Laboratory Data:   CMP     Recent Labs  Lab 05/30/18  0749      POTASSIUM 4.4   CHLORIDE 106   CO2 28   ANIONGAP 9   *   BUN 12   CR 0.98   GFRESTIMATED 83   GFRESTBLACK >90   LEILA 9.0   PROTTOTAL 7.3   ALBUMIN 4.1   BILITOTAL 1.7*   ALKPHOS 51   AST 46*   ALT 48         CBC     Recent Labs  Lab 05/30/18  0749   WBC 6.6   RBC 5.45   HGB 16.9   HCT 49.3   MCV 91   MCH 31.0   MCHC 34.3   RDW 12.1          Assessment and Plan/Recommendations:   Georges Moreno is a 42 year old male with history of paroxysmal atrial fibrillation, alcohol-induced pancreatitis, headaches, polysubstance abuse, depression, and anxiety admitted to station 3A for acute alcohol withdrawal.     Depression, Anxiety, Acute Polysubstance Abuse and Withdrawal. Reports drinking 10-16 beers per day. U tox + for benzodiazepines, cannabinoids, and ethanol in ED. On MSSA protocol.  - Continue MVI, thiamine, folate supplements  - Management per Psychiatry     Paroxysmal Atrial Fibrillation. Presented to ED 2/20/18 feeling \"unwell\", found to be in new a fib " with RVR. Placed on a 30-day event monitor which revealed additional episode of a fib in setting of ongoing alcohol use. Follows with cardiology as OP, last seen 5/23/18 at which time his metoprolol was increased. Also maintained on rivaroxaban for anticoagulation (CHADSVASC of 0). EKG in ED with sinus bradycardia with sinus arrhythmia, HR 50. Currently RRR, no chest pain or palpitations.  - Continue metoprolol 75 mg BID  - Continue rivaroxaban 20 mg QHS  - Follow up with cardiology as planned in ~3 weeks     Mild Hyperbilirubinemia. Total bilirubin mildly elevated at 1.7 this AM, consistent with previous level of 1.6 on 5/16. AST, ALT, and alk phos unremarkable. No abdominal pain, N/V on exam. Given bilirubin is overall unchanged from 2 weeks ago, no need to trend further while IP. Recommend following up with PCP within 1 week of discharge for repeat CMP.  - Follow up with PCP in 1 week for repeat CMP (follow up order placed)     Headaches. Recurrent headaches over the past several months. MRI 4/19/18 with no evidence for acute intracranial pathology. Reports mild HA currently, improving. No neurologic deficits.  - PRN tylenol     Medicine will sign off at this time. Please page the Internal Medicine job code pager for any intercurrent medical issues which arise. Thank you for the opportunity to be a part of this patient's care.     Trice Lauren PA-C  Hospitalist Service  [phone]                 Collateral Contacts     Name:       Relationship:       Phone Number:       Releases:         Chai Anaya MD Physician Unsigned Transcription Psychiatry H&P   Date of Service: 5/30/2018  8:53 AM Creation Time: 5/30/2018  9:08 AM           Admitted:     05/29/2018       CHIEF COMPLAINT:  Alcohol use and anxiety.       HISTORY OF PRESENT ILLNESS:  The patient is a 42-year-old white male with a history of alcohol use disorder and generalized anxiety disorder who was admitted after drinking he said a  10-16 drinks 4 times a week.  This is down from 6 times a week due to a recent diagnosis of AFib.  He says his anxiety is a little better here, but was taking Ativan for anxiety.  Has tried other things including Lexapro, hydroxyzine and gabapentin to he says very little effect.  Has been on Antabuse in the past, said that this did not go well.  Says he has been essentially drinking since he was age 18.  Says he did not sleep well last night, had some bad dreams but feels it was because he left the nicotine patch on.  Appetite has been okay this morning.  Denies suicidal or homicidal ideation, denies auditory or visual hallucinations.       PAST PSYCHIATRIC HISTORY:  The patient does have a history of generalized anxiety disorder.  Follows with Dr. Baron for psychiatry.  He never attempted suicide, never been psychiatrically hospitalized.       CURRENT PSYCHIATRIC MEDICATIONS:  The patient was prescribed BuSpar and gabapentin, but has not been taking them.  Was also on Ativan 1-2 mg daily, as well as Vistaril  mg q. 8 hours  p.r.n.       In the past, the patient has been on Lexapro and Antabuse.       ALLERGIES:  NO KNOWN DRUG ALLERGIES.       PAST MEDICAL HISTORY:  Significant for atrial fibrillation.       REVIEW OF SYSTEMS:  A 10-point systems reviewed and all were negative except those mentioned in HPI.       SUBSTANCE USE HISTORY:  The patient's substance of choice is alcohol.  Also uses marijuana and has been using benzodiazepines.  Denies any history of withdrawal seizures or DTs.  He is a smoker, has been through multiple chemical dependency treatment programs in the past.       FAMILY PSYCHIATRIC HISTORY:  The patient's father and paternal uncle both have chemical dependency issues.       SOCIAL HISTORY:  The patient is currently unemployed, staying with his mother.  Does have a history of a DUI.  No current legal charges.       PHYSICAL EXAMINATION:  Please see the physical exam from the medical team.        CURRENT VITAL SIGNS:  Blood pressure 137/82, pulse 58, respirations 16, temperature 97.6.       MENTAL STATUS EXAMINATION:  RAMONA:  The patient is a 42-year-old white male, cooperative, pleasant, fair eye contact.  Speech:  Regular rate, rhythm, volume and tone.  Mood is anxious.  Affect is mood congruent.  Thought process is goal directed.  Thought content:  Denies suicidal or homicidal ideation, denies auditory or visual hallucinations.  No loosening of associations noted.  Sensorium is clear.  Cognition:  Oriented x 3.  Recent and remote memory fair.  Attention and concentration:  No deficits noted.  Language:  No deficits noted.  Fund of knowledge appropriate.  Muscle strength and tone, no deficits noted.  Gait, station, no deficits noted.  Insight is fair, judgment is fair to good.       DIAGNOSES:   Axis I:   1.  Alcohol use disorder, severe dependence.     2.  Generalized anxiety disorder.   3.  Cannabis use disorder.     4.  Rule out benzodiazepine use disorder.   5.  Nicotine use disorder.   Axis II:  Deferred.   Axis III:  See past medical history.       PLAN:   1.  The patient was voluntarily admitted to station 3A, encouraged to engage in groups and with staff on unit.   2.  The patient will be detoxified from alcohol using the MSSA protocol and Valium.   3.  The patient will be detoxified from the Ativan using the phenobarbital taper started at 32.4 mg t.i.d.   4.  We will restart the patient's gabapentin at 300 mg t.i.d.   5.  We will have Inderal 10-20 mg available t.i.d. p.r.n. anxiety or palpitations.   6.  CTC to meet with the patient to discuss options for chemical dependency treatment.           MD mariaelena [facility] Contacts      A problematic pattern of alcohol/drug use leading to clinically significant impairment or distress, as manifested by at least two of the following, occurring within a 12-month period:    Alcohol/drug is often taken in larger amounts or  over a longer period than was intended.  There is a persistent desire or unsuccessful efforts to cut down or control alcohol/drug use  A great deal of time is spent in activities necessary to obtain alcohol, use alcohol, or recover from its effects.  Craving, or a strong desire or urge to use alcohol/drug  Recurrent alcohol/drug use resulting in a failure to fulfill major role obligations at work, school or home.  Continued alcohol use despite having persistent or recurrent social or interpersonal problems caused or exacerbated by the effects of alcohol/drug.  Important social, occupational, or recreational activities are given up or reduced because of alcohol/drug use.  Recurrent alcohol/drug use in situations in which it is physically hazardous.  Alcohol/drug use is continued despite knowledge of having a persistent or recurrent physical or psychological problem that is likely to have been caused or exacerbated by alcohol.  Tolerance, as defined by either of the following: A need for markedly increased amounts of alcohol/drug to achieve intoxication or desired effect.  Withdrawal, as manifested by either of the following: The characteristic withdrawal syndrome for alcohol/drug (refer to Criteria A and B of the criteria set for alcohol/drug withdrawal). and Alcohol/drug (or a closely related substance, such as a benzodiazepine) is taken to relieve or avoid withdrawal symptoms.      Specify if: In early remission:  After full criteria for alcohol/drug use disorder were previously met, none of the criteria for alcohol/drug use disorder have been met for at least 3 months but for less than 12 months (with the exception that Criterion A4,  Craving or a strong desire or urge to use alcohol/drug  may be met).     In sustained remission:   After full criteria for alcohol use disorder were previously met, non of the criteria for alcohol/drug use disorder have been met at any time during a period of 12 months or longer  (with the exception that Criterion A4,  Craving or strong desire or urge to use alcohol/drug  may be met).   Specify if:   This additional specifier is used if the individual is in an environment where access to alcohol is restricted.    Mild: Presence of 2-3 symptoms    Moderate: Presence of 4-5 symptoms    Severe: Presence of 6 or more symptoms

## 2018-05-30 NOTE — H&P
Admitted:     05/29/2018      CHIEF COMPLAINT:  Alcohol use and anxiety.      HISTORY OF PRESENT ILLNESS:  The patient is a 42-year-old white male with a history of alcohol use disorder and generalized anxiety disorder who was admitted after drinking he said a 10-16 drinks 4 times a week.  This is down from 6 times a week due to a recent diagnosis of AFib.  He says his anxiety is a little better here, but was taking Ativan for anxiety.  Has tried other things including Lexapro, hydroxyzine and gabapentin to he says very little effect.  Has been on Antabuse in the past, said that this did not go well.  Says he has been essentially drinking since he was age 18.  Says he did not sleep well last night, had some bad dreams but feels it was because he left the nicotine patch on.  Appetite has been okay this morning.  Denies suicidal or homicidal ideation, denies auditory or visual hallucinations.      PAST PSYCHIATRIC HISTORY:  The patient does have a history of generalized anxiety disorder.  Follows with Dr. Baron for psychiatry.  He never attempted suicide, never been psychiatrically hospitalized.      CURRENT PSYCHIATRIC MEDICATIONS:  The patient was prescribed BuSpar and gabapentin, but has not been taking them.  Was also on Ativan 1-2 mg daily, as well as Vistaril  mg q. 8 hours  p.r.n.      In the past, the patient has been on Lexapro and Antabuse.      ALLERGIES:  NO KNOWN DRUG ALLERGIES.      PAST MEDICAL HISTORY:  Significant for atrial fibrillation.      REVIEW OF SYSTEMS:  A 10-point systems reviewed and all were negative except those mentioned in HPI.      SUBSTANCE USE HISTORY:  The patient's substance of choice is alcohol.  Also uses marijuana and has been using benzodiazepines.  Denies any history of withdrawal seizures or DTs.  He is a smoker, has been through multiple chemical dependency treatment programs in the past.      FAMILY PSYCHIATRIC HISTORY:  The patient's father and paternal uncle both  have chemical dependency issues.      SOCIAL HISTORY:  The patient is currently unemployed, staying with his mother.  Does have a history of a DUI.  No current legal charges.      PHYSICAL EXAMINATION:  Please see the physical exam from the medical team.      CURRENT VITAL SIGNS:  Blood pressure 137/82, pulse 58, respirations 16, temperature 97.6.      MENTAL STATUS EXAMINATION:  RAMONA:  The patient is a 42-year-old white male, cooperative, pleasant, fair eye contact.  Speech:  Regular rate, rhythm, volume and tone.  Mood is anxious.  Affect is mood congruent.  Thought process is goal directed.  Thought content:  Denies suicidal or homicidal ideation, denies auditory or visual hallucinations.  No loosening of associations noted.  Sensorium is clear.  Cognition:  Oriented x 3.  Recent and remote memory fair.  Attention and concentration:  No deficits noted.  Language:  No deficits noted.  Fund of knowledge appropriate.  Muscle strength and tone, no deficits noted.  Gait, station, no deficits noted.  Insight is fair, judgment is fair to good.      DIAGNOSES:   Axis I:   1.  Alcohol use disorder, severe dependence.     2.  Generalized anxiety disorder.   3.  Cannabis use disorder.     4.  Rule out benzodiazepine use disorder.   5.  Nicotine use disorder.   Axis II:  Deferred.   Axis III:  See past medical history.      PLAN:   1.  The patient was voluntarily admitted to station 3A, encouraged to engage in groups and with staff on unit.   2.  The patient will be detoxified from alcohol using the MSSA protocol and Valium.   3.  The patient will be detoxified from the Ativan using the phenobarbital taper started at 16.2 mg t.i.d. For one day then BID for one day then Qday for one day.   4.  We will restart the patient's gabapentin at 300 mg t.i.d.   5.  We will have Inderal 10-20 mg available t.i.d. p.r.n. anxiety or palpitations.   6.  CTC to meet with the patient to discuss options for chemical dependency treatment.          APOLINAR ALEX MD             D: 2018   T: 2018   MT: JESSICA      Name:     DEBORA PEARL   MRN:      [id number]        Account:      [id number]   :      1975        Admitted:     2018                   Document: [id number]

## 2018-05-30 NOTE — PROGRESS NOTES
CASE MANAGEMENT NOTE    Writer met with patient to initiate discharge planning, he was working on his paperwork. Patient is asking for placement in [facility], hoping that he would not have to stay in detox over the weekend. Writer  plans to complete CD assessment today, pending patient's completion of paperwork.     Jolene Ga) ADNA Wheat, [facility]  3A Psychotherapist  [phone]

## 2018-05-31 VITALS
SYSTOLIC BLOOD PRESSURE: 138 MMHG | DIASTOLIC BLOOD PRESSURE: 84 MMHG | HEART RATE: 57 BPM | TEMPERATURE: 97.8 F | HEIGHT: 72 IN | WEIGHT: 200 LBS | BODY MASS INDEX: 27.09 KG/M2 | RESPIRATION RATE: 16 BRPM | OXYGEN SATURATION: 97 %

## 2018-05-31 PROCEDURE — 99207 ZZC NO CHARGE VISIT/PATIENT NOT SEEN: CPT | Performed by: PSYCHIATRY & NEUROLOGY

## 2018-05-31 PROCEDURE — 25000132 ZZH RX MED GY IP 250 OP 250 PS 637: Performed by: PSYCHIATRY & NEUROLOGY

## 2018-05-31 RX ORDER — GABAPENTIN 300 MG/1
300 CAPSULE ORAL 3 TIMES DAILY
Status: ON HOLD | COMMUNITY
Start: 2018-05-31 | End: 2019-08-16

## 2018-05-31 RX ADMIN — Medication 100 MG: at 08:31

## 2018-05-31 RX ADMIN — FOLIC ACID 1 MG: 1 TABLET ORAL at 08:31

## 2018-05-31 RX ADMIN — METOPROLOL TARTRATE 75 MG: 25 TABLET, FILM COATED ORAL at 08:31

## 2018-05-31 RX ADMIN — NICOTINE POLACRILEX 4 MG: 2 GUM, CHEWING ORAL at 08:34

## 2018-05-31 RX ADMIN — GABAPENTIN 300 MG: 300 CAPSULE ORAL at 08:29

## 2018-05-31 RX ADMIN — PHENOBARBITAL 16.2 MG: 16.2 TABLET ORAL at 08:31

## 2018-05-31 RX ADMIN — MULTIPLE VITAMINS W/ MINERALS TAB 1 TABLET: TAB at 08:31

## 2018-05-31 ASSESSMENT — ACTIVITIES OF DAILY LIVING (ADL)
LAUNDRY: WITH SUPERVISION
GROOMING: INDEPENDENT
DRESS: STREET CLOTHES
ORAL_HYGIENE: INDEPENDENT

## 2018-05-31 NOTE — PROGRESS NOTES
Pt given copy of their discharge instructions and medication administration instructions. All discharge plans and labs were discussed with patient. Pt reports no questions at this time regarding discharge plans, labs or medications. Pt denies any suicidal ideation, plans or intent at this time. Patient discharge assisted via Pastor GURROLA directly to the lobby. Patient plan is to follow up with outpatient treatment at [facility]. Patient is discharged at this time.

## 2018-05-31 NOTE — PROGRESS NOTES
CASE MANAGEMENT NOTE    Call received by [facility] stating patient's case is under review and they will contact him at home to schedule intake appointment.     Jolene Ga) DANA Wheat, [facility]  3A Psychotherapist  [phone]

## 2018-05-31 NOTE — PLAN OF CARE
Problem: Substance Withdrawal  Goal: Substance Withdrawal  Signs and symptoms of listed problems will be absent or manageable.   Outcome: Completed Date Met: 05/30/18  Pt denies SI/SIB/HI.  MSSA scores of 1 and 1.  No symptoms of withdrawal.  OOD as of 2020 this evening.  No other issues.  Will continue to monitor.

## 2018-05-31 NOTE — DISCHARGE INSTRUCTIONS
"Behavioral Discharge Planning and Instructions  THANK YOU FOR CHOOSING THE [facility]  3A  [phone]    Summary: You were admitted to Station 3A on 5/29/18 for detoxification from alcohol.  A medical exam was performed that included lab work. You met with a  and opted to return home and follow up with [facility] for outpatient treatment services and individual therapy.  You inquired about attending groups for [employer] alumni, now known as \"[employer]\" - you will be contacted at [phone] with more information. Please take care and make your recovery a daily priority, Georges!     Recommendation:  As stated above.    Main Diagnoses:  Per Dr. Anaya;  1.  Alcohol use disorder, severe dependence.     2.  Generalized anxiety disorder.   3.  Cannabis use disorder.     4.  Rule out benzodiazepine use disorder.   5.  Nicotine use disorder.    Major Treatments, Procedures and Findings:  You were treated for alcohol detoxification using valium administered based on the [facility] protocol. You were treated for benzodiazepine detoxification using phenobarbital. You completed a chemical dependency assessment. You had labs drawn and those results were reviewed with you. Please take a copy of your lab work with you to your next primary care physician appointment.    Symptoms to Report:  If you experience more anxiety, confusion, sleeplessness, deep sadness or thoughts of suicide, notify your treatment team or notify your primary care physician. IF ANY OF THE SYMPTOMS YOU ARE EXPERIENCING ARE A MEDICAL EMERGENCY CALL 911 IMMEDIATELY.     Lifestyle Adjustment: Adjust your lifestyle to get enough sleep, relaxation, exercise and good nutrition. Continue to develop healthy coping skills to decrease stress and promote a sober living environment. Do not use mood altering substances including alcohol, illegal drugs or addictive medications other than what is currently prescribed.     Treatment " Program Provider:  Once [facility] has approved your admission, you will be contacted at home to schedule an intake appointment.  Florentino Barone  [address] [place] [address]  Ph:  [phone]    Disposition:  Home, [facility].    Follow-up Appointment:   Please schedule a follow up appointment with a primary care provider within 7 days of discharge.    Resources:   AA/NA and Sponsors are excellent resources for support and you can find one at any support group meeting.   SMART Recovery - self management for addiction recovery:  www.smartrecovery.org  http://www.aastpaul.org/?topic=8  http://a[place].org/meetings/  http://www.[place].org/index.php/meeting-list-pdf  Pathways ~ A Health Crisis Resource & Support Center:  956.264.1101.  http://www.Zeebo.org  [facility] 702-830-0527  Support Group:  AA/NA and Sponsor/support  Crisis Intervention: 672.381.1532 or 498-399-1846 (TTY: 541.152.3358).  Call anytime for help.  National [place] on Mental Illness (www.[place].melissa.org): 386.477.4887 or 056-194-0740.  Alcoholics Anonymous (www.alcoholics-anonymous.org): Check your phone book for your local chapter.  Suicide Awareness Voices of Education (SAVE) (www.save.org): 824-849-RZIX (1683)  National Suicide Prevention Line (www.mentalhealthmn.org): 120-433-YKNV (7514)  Mental Health Consumer/Survivor Network of [place] (www.mhcsn.net): 356.318.7205 or 026-327-2441  Mental Health Association of [place] (www.mentalhealth.org): 976.944.4272 or 433-006-5146   Substance Abuse and Mental Health Services (www.samhsa.gov)    [place] Recovery Connection ([facility])  [facility] connects people seeking recovery to resources that help foster and sustain long-term recovery.  Whether you are seeking resources for treatment, transportation, housing, job training, education, health care or other pathways to recovery, [facility] is a great place to start.  767.251.8406.  www.[facility].org    General Medication  Instructions:   See your medication sheet(s) for instructions.   Take all medicines as directed.  Make no changes unless your doctor suggests them.   Go to all your doctor visits.  Be sure to have all your required lab tests. This way, your medicines can be refilled on time.  Do not use any forms of alcohol.    Please Note:  If you have any questions at anytime after you are discharged please call the [facility] detox unit 3AW unit at [phone].  [facility], Behavioral Intake [phone]  Please take this discharge folder with you to all your follow up appointments, it contains your lab results, diagnosis, medication list and discharge recommendations.      THANK YOU FOR CHOOSING THE [facility]

## 2018-05-31 NOTE — DISCHARGE SUMMARY
Psychiatric Discharge Summary    Georges Moreno MRN# [id number]   Age: 42 year old YOB: 1975     Date of Admission:  5/29/2018  Date of Discharge:  5/31/2018  1:50 PM  Admitting Physician:  Chai Anaya MD  Discharge Physician:  Chai Anaya MD          Event Leading to Hospitalization:   HISTORY OF PRESENT ILLNESS:  The patient is a 42-year-old white male with a history of alcohol use disorder and generalized anxiety disorder who was admitted after drinking he said a 10-16 drinks 4 times a week.  This is down from 6 times a week due to a recent diagnosis of AFib.  He says his anxiety is a little better here, but was taking Ativan for anxiety.  Has tried other things including Lexapro, hydroxyzine and gabapentin to he says very little effect.  Has been on Antabuse in the past, said that this did not go well.  Says he has been essentially drinking since he was age 18.  Says he did not sleep well last night, had some bad dreams but feels it was because he left the nicotine patch on.  Appetite has been okay this morning.  Denies suicidal or homicidal ideation, denies auditory or visual hallucinations.        See Admission note by Chai Anaya MD for additional details.          DIagnoses:     AAxis I:   1.  Alcohol use disorder, severe dependence.     2.  Generalized anxiety disorder.   3.  Cannabis use disorder.     4.  Rule out benzodiazepine use disorder.   5.  Nicotine use disorder.   Axis II:  Deferred.   Axis III:  See past medical history.          Labs:          Lab Results   Component Value Date     05/30/2018    Lab Results   Component Value Date    CHLORIDE 106 05/30/2018    Lab Results   Component Value Date    BUN 12 05/30/2018      Lab Results   Component Value Date    POTASSIUM 4.4 05/30/2018    Lab Results   Component Value Date    CO2 28 05/30/2018    Lab Results   Component Value Date    CR 0.98 05/30/2018        Lab Results   Component Value Date    WBC  "6.6 05/30/2018    HGB 16.9 05/30/2018    HCT 49.3 05/30/2018    MCV 91 05/30/2018     05/30/2018     Lab Results   Component Value Date    AST 46 (H) 05/30/2018    ALT 48 05/30/2018    GGT 66 05/30/2018    ALKPHOS 51 05/30/2018    BILITOTAL 1.7 (H) 05/30/2018     Lab Results   Component Value Date    TSH 3.56 02/20/2018            Consults:   Consultation during this admission received from internal medicine:    Georges Moreno is a 42 year old male with history of paroxysmal atrial fibrillation, alcohol-induced pancreatitis, headaches, polysubstance abuse, depression, and anxiety admitted to station 3A for acute alcohol withdrawal.     Depression, Anxiety, Acute Polysubstance Abuse and Withdrawal. Reports drinking 10-16 beers per day. U tox + for benzodiazepines, cannabinoids, and ethanol in ED. On MSSA protocol.  - Continue MVI, thiamine, folate supplements  - Management per Psychiatry     Paroxysmal Atrial Fibrillation. Presented to ED 2/20/18 feeling \"unwell\", found to be in new a fib with RVR. Placed on a 30-day event monitor which revealed additional episode of a fib in setting of ongoing alcohol use. Follows with cardiology as OP, last seen 5/23/18 at which time his metoprolol was increased. Also maintained on rivaroxaban for anticoagulation (CHADSVASC of 0). EKG in ED with sinus bradycardia with sinus arrhythmia, HR 50. Currently RRR, no chest pain or palpitations.  - Continue metoprolol 75 mg BID  - Continue rivaroxaban 20 mg QHS  - Follow up with cardiology as planned in ~3 weeks     Mild Hyperbilirubinemia. Total bilirubin mildly elevated at 1.7 this AM, consistent with previous level of 1.6 on 5/16. AST, ALT, and alk phos unremarkable. No abdominal pain, N/V on exam. Given bilirubin is overall unchanged from 2 weeks ago, no need to trend further while IP. Recommend following up with PCP within 1 week of discharge for repeat CMP.  - Follow up with PCP in 1 week for repeat CMP (follow up order " placed)     Headaches. Recurrent headaches over the past several months. MRI 4/19/18 with no evidence for acute intracranial pathology. Reports mild HA currently, improving. No neurologic deficits.  - PRN tylenol         Hospital Course:   Georges Moreno was admitted to Station 3A with attending Chai Anaya MD as a voluntary patient. The patient was placed under status 15 (15 minute checks) to ensure patient safety.   MSSA protocol was initiated due to the patient's history of alcohol abuse and concern for withdrawal symptoms.  CBC, BMP and utox obtained.    All outpatient medications were continued. The patient was detoxified from Ativan with a brief phenobarbital taper. He requested to leave before the taper was completed but had not shown any withdrawal symptoms per staff.     Georges Moreno did participate in groups and was visible in the milieu.     The patient's symptoms of withdrawal improved.     # Discharge Pain Plan:   - Patient currently has NO PAIN and is not being prescribed pain medications on discharge.      Georges Moreno was released to home. At the time of discharge Georges Moreno was determined to not be a danger to himself or others. At the current time of discharge, the patient does not meet criteria for involuntary hospitalization. On the day of discharge, the patient reports that they do not have suicidal or homicidal ideation and would never hurt themselves or others. Steps taken to minimize risk include: assessing patient s behavior and thought process daily during hospital stay, discharging patient with adequate plan for follow up for mental and physical health and discussing safety plan of returning to the hospital should the patient ever have thoughts of harming themselves or others. Therefore, based on all available evidence including the factors cited above, the patient does not appear to be at imminent risk for self-harm, and is appropriate for outpatient level of care.             Discharge Medications:     Current Discharge Medication List      CONTINUE these medications which have CHANGED    Details   gabapentin (NEURONTIN) 300 MG capsule Take 1 capsule (300 mg) by mouth 3 times daily         CONTINUE these medications which have NOT CHANGED    Details   metoprolol tartrate (LOPRESSOR) 50 MG tablet Take 1.5 tablets (75 mg) by mouth 2 times daily  Qty: 275 tablet, Refills: 3    Comments: Pt will call when he needs refill      rivaroxaban ANTICOAGULANT (XARELTO) 20 MG TABS tablet Take 1 tablet (20 mg) by mouth daily (with dinner)  Qty: 30 tablet, Refills: 5    Associated Diagnoses: Atrial fibrillation with RVR (H)         STOP taking these medications       busPIRone (BUSPAR) 5 MG tablet Comments:   Reason for Stopping:         chlordiazePOXIDE (LIBRIUM) 25 MG capsule Comments:   Reason for Stopping:         LORazepam (ATIVAN) 1 MG tablet Comments:   Reason for Stopping:                    Psychiatric Examination:   The patient was not seen on the day of discharge.            Discharge Plan:   Continue gabapentin.     Major Treatments, Procedures and Findings:  You were treated for alcohol detoxification using valium administered based on the [facility] protocol. You were treated for benzodiazepine detoxification using phenobarbital. You completed a chemical dependency assessment. You had labs drawn and those results were reviewed with you. Please take a copy of your lab work with you to your next primary care physician appointment.     Symptoms to Report:  If you experience more anxiety, confusion, sleeplessness, deep sadness or thoughts of suicide, notify your treatment team or notify your primary care physician. IF ANY OF THE SYMPTOMS YOU ARE EXPERIENCING ARE A MEDICAL EMERGENCY CALL 911 IMMEDIATELY.      Lifestyle Adjustment: Adjust your lifestyle to get enough sleep, relaxation, exercise and good nutrition. Continue to develop healthy coping skills to decrease stress and promote a sober  living environment. Do not use mood altering substances including alcohol, illegal drugs or addictive medications other than what is currently prescribed.      Treatment Program Provider:  Once [facility] has approved your admission, you will be contacted at home to schedule an intake appointment.  Florentino Barone  [address] [place] [address]  Ph:  [phone]     Disposition:  Home, [facility].     Follow-up Appointment:   Please schedule a follow up appointment with a primary care provider within 7 days of discharge.     Resources:   AA/NA and Sponsors are excellent resources for support and you can find one at any support group meeting.   SMART Recovery - self management for addiction recovery:  www.smartrecovery.org  http://www.aastpaul.org/?topic=8  http://a[place].org/meetings/  http://www.nam[place].org/index.php/meeting-list-pdf  Pathways ~ A Health Crisis Resource & Support Center:  316.857.8231.  http://www.Phosphate Therapeuticsreduction.org  [facility] 812-909-9949  Support Group:  AA/NA and Sponsor/support  Crisis Intervention: 184.356.1466 or 017-778-8668 (TTY: 166.297.8894).  Call anytime for help.  National [place] on Mental Illness (www.[place].melissa.org): 275.956.9336 or 918-845-1341.  Alcoholics Anonymous (www.alcoholics-anonymous.org): Check your phone book for your local chapter.  Suicide Awareness Voices of Education (SAVE) (www.save.org): 156-148-ZGMK (8883)  National Suicide Prevention Line (www.mentalhealthmn.org): 613-376-WUOM (9653)  Mental Health Consumer/Survivor Network of [place] (www.mhcsn.net): 988.821.7166 or 925-319-6729  Mental Health Association of [place] (www.mentalhealth.org): 107.485.8129 or 213-877-5366   Substance Abuse and Mental Health Services (www.samhsa.gov)    Attestation:  I spent less than 30 minutes on discharge day activities. Chai Anaya MD

## 2018-12-20 ENCOUNTER — HOSPITAL ENCOUNTER (EMERGENCY)
Facility: CLINIC | Age: 43
Discharge: HOME | End: 2018-12-20
Attending: EMERGENCY MEDICINE | Admitting: EMERGENCY MEDICINE
Payer: COMMERCIAL

## 2018-12-20 VITALS
HEART RATE: 74 BPM | TEMPERATURE: 98 F | RESPIRATION RATE: 18 BRPM | SYSTOLIC BLOOD PRESSURE: 137 MMHG | DIASTOLIC BLOOD PRESSURE: 112 MMHG | OXYGEN SATURATION: 98 %

## 2018-12-20 DIAGNOSIS — F10.220 ALCOHOL DEPENDENCE WITH UNCOMPLICATED INTOXICATION (H): ICD-10-CM

## 2018-12-20 LAB
ANION GAP SERPL CALCULATED.3IONS-SCNC: 13 MMOL/L (ref 3–14)
BASOPHILS # BLD AUTO: 0.1 10E9/L (ref 0–0.2)
BASOPHILS NFR BLD AUTO: 0.4 %
BUN SERPL-MCNC: 15 MG/DL (ref 7–30)
CALCIUM SERPL-MCNC: 8.7 MG/DL (ref 8.5–10.1)
CHLORIDE SERPL-SCNC: 105 MMOL/L (ref 94–109)
CO2 SERPL-SCNC: 21 MMOL/L (ref 20–32)
CREAT SERPL-MCNC: 0.98 MG/DL (ref 0.66–1.25)
DIFFERENTIAL METHOD BLD: ABNORMAL
EOSINOPHIL # BLD AUTO: 0 10E9/L (ref 0–0.7)
EOSINOPHIL NFR BLD AUTO: 0.2 %
ERYTHROCYTE [DISTWIDTH] IN BLOOD BY AUTOMATED COUNT: 11.9 % (ref 10–15)
ETHANOL SERPL-MCNC: 0.08 G/DL
GFR SERPL CREATININE-BSD FRML MDRD: >90 ML/MIN/{1.73_M2}
GLUCOSE SERPL-MCNC: 116 MG/DL (ref 70–99)
HCT VFR BLD AUTO: 52.2 % (ref 40–53)
HGB BLD-MCNC: 18.2 G/DL (ref 13.3–17.7)
IMM GRANULOCYTES # BLD: 0 10E9/L (ref 0–0.4)
IMM GRANULOCYTES NFR BLD: 0.3 %
INTERPRETATION ECG - MUSE: NORMAL
LIPASE SERPL-CCNC: 145 U/L (ref 73–393)
LYMPHOCYTES # BLD AUTO: 2.5 10E9/L (ref 0.8–5.3)
LYMPHOCYTES NFR BLD AUTO: 21 %
MCH RBC QN AUTO: 31.8 PG (ref 26.5–33)
MCHC RBC AUTO-ENTMCNC: 34.9 G/DL (ref 31.5–36.5)
MCV RBC AUTO: 91 FL (ref 78–100)
MONOCYTES # BLD AUTO: 0.6 10E9/L (ref 0–1.3)
MONOCYTES NFR BLD AUTO: 5.3 %
NEUTROPHILS # BLD AUTO: 8.6 10E9/L (ref 1.6–8.3)
NEUTROPHILS NFR BLD AUTO: 72.8 %
NRBC # BLD AUTO: 0 10*3/UL
NRBC BLD AUTO-RTO: 0 /100
PLATELET # BLD AUTO: 273 10E9/L (ref 150–450)
POTASSIUM SERPL-SCNC: 3.8 MMOL/L (ref 3.4–5.3)
RBC # BLD AUTO: 5.73 10E12/L (ref 4.4–5.9)
SODIUM SERPL-SCNC: 139 MMOL/L (ref 133–144)
WBC # BLD AUTO: 11.9 10E9/L (ref 4–11)

## 2018-12-20 PROCEDURE — 85025 COMPLETE CBC W/AUTO DIFF WBC: CPT | Performed by: EMERGENCY MEDICINE

## 2018-12-20 PROCEDURE — 96361 HYDRATE IV INFUSION ADD-ON: CPT

## 2018-12-20 PROCEDURE — 93005 ELECTROCARDIOGRAM TRACING: CPT

## 2018-12-20 PROCEDURE — 90791 PSYCH DIAGNOSTIC EVALUATION: CPT

## 2018-12-20 PROCEDURE — 80320 DRUG SCREEN QUANTALCOHOLS: CPT | Performed by: EMERGENCY MEDICINE

## 2018-12-20 PROCEDURE — 80048 BASIC METABOLIC PNL TOTAL CA: CPT | Performed by: EMERGENCY MEDICINE

## 2018-12-20 PROCEDURE — 96374 THER/PROPH/DIAG INJ IV PUSH: CPT

## 2018-12-20 PROCEDURE — 83690 ASSAY OF LIPASE: CPT | Performed by: EMERGENCY MEDICINE

## 2018-12-20 PROCEDURE — 25000128 H RX IP 250 OP 636: Performed by: EMERGENCY MEDICINE

## 2018-12-20 PROCEDURE — 99285 EMERGENCY DEPT VISIT HI MDM: CPT | Mod: 25

## 2018-12-20 RX ORDER — CHLORDIAZEPOXIDE HYDROCHLORIDE 25 MG/1
25 CAPSULE, GELATIN COATED ORAL 4 TIMES DAILY PRN
Qty: 12 CAPSULE | Refills: 0 | Status: SHIPPED | OUTPATIENT
Start: 2018-12-20 | End: 2019-04-24

## 2018-12-20 RX ORDER — LORAZEPAM 2 MG/ML
1 INJECTION INTRAMUSCULAR ONCE
Status: COMPLETED | OUTPATIENT
Start: 2018-12-20 | End: 2018-12-20

## 2018-12-20 RX ORDER — SODIUM CHLORIDE 9 MG/ML
1000 INJECTION, SOLUTION INTRAVENOUS CONTINUOUS
Status: DISCONTINUED | OUTPATIENT
Start: 2018-12-20 | End: 2018-12-20 | Stop reason: HOSPADM

## 2018-12-20 RX ADMIN — LORAZEPAM 1 MG: 2 INJECTION, SOLUTION INTRAMUSCULAR; INTRAVENOUS at 10:20

## 2018-12-20 RX ADMIN — SODIUM CHLORIDE 1000 ML: 9 INJECTION, SOLUTION INTRAVENOUS at 08:59

## 2018-12-20 ASSESSMENT — ENCOUNTER SYMPTOMS
HALLUCINATIONS: 0
SLEEP DISTURBANCE: 1
NUMBNESS: 0
TREMORS: 1
NERVOUS/ANXIOUS: 1

## 2018-12-20 NOTE — ED PROVIDER NOTES
History     Chief Complaint:  Alcohol Problem      HPI   Georges Moreno is a 42 year old male who presents with EMS for evaluation following some difficulty sleeping and shakes in the setting of at attempt to stop drinking. The patient reports that he has a long standing problem with alcohol consumption but would like to start undergoing treatment at [facility]. He states that every time he tried to sleep, he felt like he was losing consciousness rather than falling asleep. He states that he keeps feeling shaky though he does report that he has ever had seizures before. He denies hearing any voices or shortness of breath. He states that he is currently anxious. He denies any intentional thoughts of harming himself or others. The patient states that he thinks he had a nervous / anxiety attack yesterday. He reports that he has been seeing a psychiatrist for this but states that he would like treatment for anxiety only but is currently been treated for depression as well. The patient states that he has been taking Ativan for 6 months but then 4 days ago he stopped taking these. He states that he was taking about 4 pills of Ativan a day before the abrupt stop. The patient has had one drink this morning.       Allergies:  No Known Drug Allergies      Medications:    Lopressor  Xarelto  Neurontin     Past Medical History:    Atrial fibrillation (H)   Depression, major, single episode   PUSHPA (generalized anxiety disorder)   HA (headache)   Pancreatitis   Personal history of alcoholism (H)   Substance abuse (H)   Tobacco dependence     Past Surgical History:    Excision of pilonidal cyst  Removal of rectal cyst    Family History:    Alcohol  Diabetes  Leukemia     Social History:  The patient  reports that he has been smoking.  He has smoked for the past 15.00 years. he has never used smokeless tobacco. He reports that he drinks alcohol. He reports that he uses drugs. Drug: Marijuana.   Marital Status:  Single [1]      Review of Systems   Neurological: Positive for tremors. Negative for numbness.   Psychiatric/Behavioral: Positive for sleep disturbance. Negative for hallucinations, self-injury and suicidal ideas. The patient is nervous/anxious.    All other systems reviewed and are negative.        Physical Exam     Vital signs  Patient Vitals for the past 24 hrs:   BP Temp Temp src Pulse Heart Rate Resp SpO2   12/20/18 1000 (!) 143/113 -- -- 89 -- -- 99 %   12/20/18 0945 (!) 120/102 -- -- 78 -- -- 100 %   12/20/18 0930 (!) 127/93 -- -- 68 -- -- 99 %   12/20/18 0915 134/80 -- -- 75 -- -- 100 %   12/20/18 0900 (!) 164/98 -- -- 76 -- -- 100 %   12/20/18 0846 (!) 160/123 98  F (36.7  C) Temporal -- 77 18 99 %          Physical Exam    Constitutional: Patient is well appearing. No distress.  Head: Atraumatic.  Mouth/Throat: Oropharynx is clear and moist. No oropharyngeal exudate.  Eyes: Conjunctivae and EOM are normal. No scleral icterus.  Neck: Normal range of motion. Neck supple.   Cardiovascular: Normal rate, regular rhythm, normal heart sounds and intact distal pulses.   Pulmonary/Chest: Breath sounds normal. No respiratory distress.  Abdominal: Soft. Bowel sounds are normal. No distension. No tenderness. No rebound or guarding.   Musculoskeletal: Normal range of motion. No edema or tenderness.   Neurological: Alert and orientated to person, place, and time. No observable focal neuro deficit  Skin: Warm and dry. No rash noted. Not diaphoretic.    Emergency Department Course   ECG (08:49:44):  Rate 56 bpm. MO interval 160. QRS duration 92. QT/QTc 416/401. P-R-T axes 50 73 50. Sinus bradycardia with marked sinus arrhythmia. Otherwise normal EKG. Interpreted at 0850 by Aaron Hull MD.     Laboratory:  CBC: WBC 11.9, HGB 18.2, Absolute Neutrophil 8.6, otherwise within normal limits   BMP: Glucose 116, otherwise within normal limits   Alcohol ethyl 0.08  Lipase 145    Interventions:  0859: NS 1L IV Bolus  1020: Ativan 1mg      Emergency Department Course:  Past medical records, nursing notes, and vitals reviewed.  0855: I performed an exam of the patient and obtained history, as documented above.       EKG was obtained, findings as reported above.    1015: Rechecked the patient, findings and plan explained to the patient. Patient discharged home, status improved, with instructions regarding supportive care, medications, and reasons to return as well as the importance of close follow-up was reviewed.    Impression & Plan    Medical Decision Making:  Georges Moreno is a 42 year old male who presents for evaluation of alcohol abuse.  He is not intoxicated here in ED by blood work.  Blood work otherwise looks ok; no signs of alcoholic ketoacidosis, significant liver impairment or acute alcoholic hepatitis. He has no history of DT's or alcohol withdrawal seizures. There are no signs of co-ingestion including acetaminophen, drugs, medications, volatile alcohols. He has no signs of trauma related to alcohol use and no further workup is needed including head CT. Sober ride obtained.  Alcohol counseling provided by myself and patient does  want treatment resources.  DEC/SW did evaluate patient.        Diagnosis:    ICD-10-CM    1. Alcohol dependence with uncomplicated intoxication (H)Active F10.220        Disposition:  discharged to home    Discharge Medications:     Medication List      Started    chlordiazePOXIDE 25 MG capsule  Commonly known as:  LIBRIUM  25 mg, Oral, 4 TIMES DAILY PRN          Pushpa TROTTER am serving as a scribe at 8:55 AM on 12/20/2018 to document services personally performed by Aaron Hull MD based on my observations and the provider's statements to me.    [facility] EMERGENCY DEPARTMENT       Aaron Hull MD  12/20/18 3957

## 2018-12-20 NOTE — ED AVS SNAPSHOT
[facility] Emergency Department  [address]  [facility] [address]  Phone:  [phone]  Fax:  [phone]                                    Georges Moreno   MRN: [id number]    Department:  [facility] Emergency Department   Date of Visit:  12/20/2018           After Visit Summary Signature Page    I have received my discharge instructions, and my questions have been answered. I have discussed any challenges I see with this plan with the nurse or doctor.    ..........................................................................................................................................  Patient/Patient Representative Signature      ..........................................................................................................................................  Patient Representative Print Name and Relationship to Patient    ..................................................               ................................................  Date                                   Time    ..........................................................................................................................................  Reviewed by Signature/Title    ...................................................              ..............................................  Date                                               Time          22EPIC Rev 08/18

## 2018-12-20 NOTE — ED TRIAGE NOTES
Pt arrives with ETOH intoxication. Pt states last drink 1 hour ago. Was going to attempt to stop last night but was shaking this morning so had a drink. Endorses anxiety, nausea. Wants treatment. Hx of A-fib, on xarelto. ABCs intact.

## 2019-01-07 ENCOUNTER — OFFICE VISIT (OUTPATIENT)
Dept: CARDIOLOGY | Facility: CLINIC | Age: 44
End: 2019-01-07
Payer: COMMERCIAL

## 2019-01-07 VITALS
HEART RATE: 78 BPM | DIASTOLIC BLOOD PRESSURE: 82 MMHG | HEIGHT: 72 IN | WEIGHT: 216 LBS | SYSTOLIC BLOOD PRESSURE: 141 MMHG | BODY MASS INDEX: 29.26 KG/M2

## 2019-01-07 DIAGNOSIS — I48.0 PAROXYSMAL ATRIAL FIBRILLATION (H): Primary | ICD-10-CM

## 2019-01-07 PROCEDURE — 99214 OFFICE O/P EST MOD 30 MIN: CPT | Mod: 25 | Performed by: INTERNAL MEDICINE

## 2019-01-07 PROCEDURE — 93000 ELECTROCARDIOGRAM COMPLETE: CPT | Performed by: INTERNAL MEDICINE

## 2019-01-07 RX ORDER — FLECAINIDE ACETATE 100 MG/1
100 TABLET ORAL 2 TIMES DAILY
Qty: 180 TABLET | Refills: 3 | Status: SHIPPED | OUTPATIENT
Start: 2019-01-07 | End: 2019-09-24

## 2019-01-07 ASSESSMENT — MIFFLIN-ST. JEOR: SCORE: 1902.77

## 2019-01-07 NOTE — LETTER
1/7/2019    MORGAN KAPOOR MD  [facility] [address]   [place] [address]    RE: Georges Moreno       Dear Colleague,    I had the pleasure of seeing Georges Moreno in the [facility].    HPI and Plan:   See dictation    Orders Placed This Encounter   Procedures     Follow-Up with Cardiac Advanced Practice Provider Post-Procedure     Follow-Up with Electrophysiologist     EKG 12-lead complete w/read - Clinics (performed today)     EKG 12-lead complete w/read (Future)- to be scheduled     Zio Patch Holter Adult Pediatric Greater than 48 hrs       Orders Placed This Encounter   Medications     flecainide (TAMBOCOR) 100 MG tablet     Sig: Take 1 tablet (100 mg) by mouth 2 times daily     Dispense:  180 tablet     Refill:  3       Medications Discontinued During This Encounter   Medication Reason     metoprolol tartrate (LOPRESSOR) 50 MG tablet      rivaroxaban ANTICOAGULANT (XARELTO) 20 MG TABS tablet          Encounter Diagnosis   Name Primary?     Paroxysmal atrial fibrillation (H) Yes       CURRENT MEDICATIONS:  Current Outpatient Medications   Medication Sig Dispense Refill     flecainide (TAMBOCOR) 100 MG tablet Take 1 tablet (100 mg) by mouth 2 times daily 180 tablet 3     gabapentin (NEURONTIN) 300 MG capsule Take 1 capsule (300 mg) by mouth 3 times daily         ALLERGIES     Allergies   Allergen Reactions     No Known Allergies        PAST MEDICAL HISTORY:  Past Medical History:   Diagnosis Date     Depression, major, single episode     moderate     PUSHPA (generalized anxiety disorder)      HA (headache)      Pancreatitis      Paroxysmal atrial fibrillation (H)      Personal history of alcoholism (H)      Substance abuse (H)      Tobacco dependence        PAST SURGICAL HISTORY:  Past Surgical History:   Procedure Laterality Date     SOFT TISSUE SURGERY      Excision of pilonidal cyst     SOFT TISSUE SURGERY      removal of rectal cyst       FAMILY HISTORY:  Family History    Problem Relation Age of Onset     Family History Negative Mother      Alcohol/Drug Father      Diabetes Father      Leukemia Father      Alcohol/Drug Paternal Uncle        SOCIAL HISTORY:  Social History     Socioeconomic History     Marital status: Single     Spouse name: None     Number of children: None     Years of education: None     Highest education level: None   Social Needs     Financial resource strain: None     Food insecurity - worry: None     Food insecurity - inability: None     Transportation needs - medical: None     Transportation needs - non-medical: None   Occupational History     Occupation: construction   Tobacco Use     Smoking status: Current Every Day Smoker     Years: 15.00     Smokeless tobacco: Never Used     Tobacco comment: a little over a pack a day   Substance and Sexual Activity     Alcohol use: Yes     Comment: daily     Drug use: Yes     Types: Marijuana     Sexual activity: None   Other Topics Concern      Service Not Asked     Blood Transfusions Not Asked     Caffeine Concern Not Asked     Occupational Exposure Not Asked     Hobby Hazards Not Asked     Sleep Concern Not Asked     Stress Concern Not Asked     Weight Concern Not Asked     Special Diet Not Asked     Back Care Not Asked     Exercise Not Asked     Bike Helmet Not Asked     Seat Belt Not Asked     Self-Exams Not Asked   Social History Narrative     None       Review of Systems:  Skin:  Negative       Eyes:  Negative      ENT:  Negative      Respiratory:  Negative       Cardiovascular:    Positive for;palpitations chest discomfort   Gastroenterology: not assessed      Genitourinary:  Negative      Musculoskeletal:  Positive for joint pain    Neurologic:  Positive for headaches    Psychiatric:  Positive for anxiety;sleep disturbances    Heme/Lymph/Imm:  Negative      Endocrine:  Negative        Physical Exam:  Vitals: /82   Pulse 78   Ht 1.829 m (6')   Wt 98 kg (216 lb)   BMI 29.29 kg/m        Constitutional:  cooperative, alert and oriented, well developed, well nourished, in no acute distress        Skin:  warm and dry to the touch, no apparent skin lesions or masses noted          Head:  normocephalic, no masses or lesions        Eyes:  pupils equal and round, conjunctivae and lids unremarkable, sclera white, no xanthalasma, EOMS intact, no nystagmus        Lymph:No Cervical lymphadenopathy present     ENT:  no pallor or cyanosis        Neck:  carotid pulses are full and equal bilaterally, JVP normal, no carotid bruit        Respiratory:  normal breath sounds, clear to auscultation, normal A-P diameter, normal symmetry, normal respiratory excursion, no use of accessory muscles         Cardiac: regular rhythm, normal S1/S2, no S3 or S4, apical impulse not displaced, no murmurs, gallops or rubs                                                         GI:  abdomen soft, non-tender, BS normoactive, no mass, no HSM, no bruits        Extremities and Muscular Skeletal:  no deformities, clubbing, cyanosis, erythema observed              Neurological:  no gross motor deficits        Psych:  Alert and Oriented x 3        CC  No referring provider defined for this encounter.                Thank you for allowing me to participate in the care of your patient.      Sincerely,     Randall Akhtar MD     [facility]    cc:   No referring provider defined for this encounter.

## 2019-01-07 NOTE — LETTER
2019      [facility] [address]  [place] [address]      RE: Georges Boothman       Dear Colleague,    I had the pleasure of seeing Georges Moreno in the [facility].    Service Date: 2019      MD [facility]    [address] [place] [address]      RE: Georges Moreno    MRN: [id number]   : 1975      Dear Dr. Mccormack:        I saw Mr. Moreno for followup of atrial fibrillation.  He is a 43-year-old white male who had documented atrial fibrillation in early .  When I saw him on 2018, he was in sinus rhythm.  The frist episode of atrial fibrillation happened a few days after he got drunk from alcohol.  I stopped his medications during that clinic visit.  I recommended observation and advised to him to avoid binge drinking.  Unfortunately, the patient had recurrent atrial fibrillation that was documented by an event monitor in 2018.  Since then, he has been put on Xarelto and metoprolol.  The patient has been to the ER multiple times in the last several months, but no arrhythmia was documented by the time he got to the emergency room.  He thought he had anxiety, but that was the same symptom when he first presented with atrial fibrillation.  The patient currently drinks alcohol 2-3 times per week.  Each time he drinks about 2-3 beers.  He smokes marijuana occasionally.  There was a remote history of cocaine use years ago.  He still smokes cigarettes.      PHYSICAL EXAMINATION:  Blood pressure 141/82, heart rate 78 beats per minute, body weight 216 pounds.  The cardiac rhythm was regular, and an EKG showed normal sinus rhythm.      ASSESSMENT AND RECOMMENDATIONS:  Mr. Moreno has recurrent atrial fibrillation.  It is now clear that his atrial fibrillation recurrence is not clearly related to alcohol abuse.  He has had recurrent symptoms of atrial fibrillation in the absence of alcohol use.  He has no  indication for long-term anticoagulation.  Metoprolol does not prevent recurrence of atrial fibrillation.  Both metoprolol and Xarelto have been stopped.  I prescribed flecainide 100 mg p.o. b.i.d. for prevention of recurrent atrial fibrillation.  He will wear a Ziopatch monitor for 2 weeks next week.  I plan to see him in February for further assessment.  If the medication does not work well, we may discuss catheter ablation of atrial fib.      The patient is planning for an inpatient 30 day treatment for alcohol withdrawal.  Based on his current drinking pattern, which he insisted after being asked repeatedly, I do not think he needs to go through alcohol withdrawal treatment for 30 days as an inpatient.  The patient is encouraged to limit the alcohol, drinking up to 2 drinks per day.  He is advised to stop smoking.      Sincerely,      MD RANDALL Hyatt MD             D: 2019   T: 2019   MT: sharan      Name:     DEBORA PEARL   MRN:      [id number]        Account:      [id number]   :      1975           Service Date: 2019      Document: [id number]           Outpatient Encounter Medications as of 2019   Medication Sig Dispense Refill     flecainide (TAMBOCOR) 100 MG tablet Take 1 tablet (100 mg) by mouth 2 times daily 180 tablet 3     gabapentin (NEURONTIN) 300 MG capsule Take 1 capsule (300 mg) by mouth 3 times daily       [DISCONTINUED] metoprolol tartrate (LOPRESSOR) 50 MG tablet Take 1.5 tablets (75 mg) by mouth 2 times daily 275 tablet 3     [DISCONTINUED] rivaroxaban ANTICOAGULANT (XARELTO) 20 MG TABS tablet Take 1 tablet (20 mg) by mouth daily (with dinner) 30 tablet 5     No facility-administered encounter medications on file as of 2019.        Again, thank you for allowing me to participate in the care of your patient.      Sincerely,    Randall Akhtar MD     [facility]

## 2019-01-07 NOTE — PROGRESS NOTES
Service Date: 2019      Sarah Mccormack MD   [facility]    [address] [place] [address]      RE: Georges Moreno    MRN: [id number]   : 1975      Dear Dr. Mccormack:        I saw Mr. Moreno for followup of atrial fibrillation.  He is a 43-year-old white male who had documented atrial fibrillation in early .  When I saw him on 2018, he was in sinus rhythm.  The frist episode of atrial fibrillation happened a few days after he got drunk from alcohol.  I stopped his medications during that clinic visit.  I recommended observation and advised to him to avoid binge drinking.  Unfortunately, the patient had recurrent atrial fibrillation that was documented by an event monitor in 2018.  Since then, he has been put on Xarelto and metoprolol.  The patient has been to the ER multiple times in the last several months, but no arrhythmia was documented by the time he got to the emergency room.  He thought he had anxiety, but that was the same symptom when he first presented with atrial fibrillation.  The patient currently drinks alcohol 2-3 times per week.  Each time he drinks about 2-3 beers.  He smokes marijuana occasionally.  There was a remote history of cocaine use years ago.  He still smokes cigarettes.      PHYSICAL EXAMINATION:  Blood pressure 141/82, heart rate 78 beats per minute, body weight 216 pounds.  The cardiac rhythm was regular, and an EKG showed normal sinus rhythm.      ASSESSMENT AND RECOMMENDATIONS:  Mr. Moreno has recurrent atrial fibrillation.  It is now clear that his atrial fibrillation recurrence is not clearly related to alcohol abuse.  He has had recurrent symptoms of atrial fibrillation in the absence of alcohol use.  He has no indication for long-term anticoagulation.  Metoprolol does not prevent recurrence of atrial fibrillation.  Both metoprolol and Xarelto have been stopped.  I prescribed flecainide 100 mg p.o. b.i.d. for prevention of recurrent atrial  fibrillation.  He will wear a Ziopatch monitor for 2 weeks next week.  I plan to see him in February for further assessment.  If the medication does not work well, we may discuss catheter ablation of atrial fib.      The patient is planning for an inpatient 30 day treatment for alcohol withdrawal.  Based on his current drinking pattern, which he insisted after being asked repeatedly, I do not think he needs to go through alcohol withdrawal treatment for 30 days as an inpatient.  The patient is encouraged to limit the alcohol, drinking up to 2 drinks per day.  He is advised to stop smoking.      Sincerely,      MD GILBERT Hyatt MD             D: 2019   T: 2019   MT: sharan      Name:     DEBORA PEARL   MRN:      [id number]        Account:      [id number]   :      1975           Service Date: 2019      Document: [id number]

## 2019-01-07 NOTE — PROGRESS NOTES
HPI and Plan:   See dictation    Orders Placed This Encounter   Procedures     Follow-Up with Cardiac Advanced Practice Provider Post-Procedure     Follow-Up with Electrophysiologist     EKG 12-lead complete w/read - Clinics (performed today)     EKG 12-lead complete w/read (Future)- to be scheduled     Zio Patch Holter Adult Pediatric Greater than 48 hrs       Orders Placed This Encounter   Medications     flecainide (TAMBOCOR) 100 MG tablet     Sig: Take 1 tablet (100 mg) by mouth 2 times daily     Dispense:  180 tablet     Refill:  3       Medications Discontinued During This Encounter   Medication Reason     metoprolol tartrate (LOPRESSOR) 50 MG tablet      rivaroxaban ANTICOAGULANT (XARELTO) 20 MG TABS tablet          Encounter Diagnosis   Name Primary?     Paroxysmal atrial fibrillation (H) Yes       CURRENT MEDICATIONS:  Current Outpatient Medications   Medication Sig Dispense Refill     flecainide (TAMBOCOR) 100 MG tablet Take 1 tablet (100 mg) by mouth 2 times daily 180 tablet 3     gabapentin (NEURONTIN) 300 MG capsule Take 1 capsule (300 mg) by mouth 3 times daily         ALLERGIES     Allergies   Allergen Reactions     No Known Allergies        PAST MEDICAL HISTORY:  Past Medical History:   Diagnosis Date     Depression, major, single episode     moderate     PUSHPA (generalized anxiety disorder)      HA (headache)      Pancreatitis      Paroxysmal atrial fibrillation (H)      Personal history of alcoholism (H)      Substance abuse (H)      Tobacco dependence        PAST SURGICAL HISTORY:  Past Surgical History:   Procedure Laterality Date     SOFT TISSUE SURGERY      Excision of pilonidal cyst     SOFT TISSUE SURGERY      removal of rectal cyst       FAMILY HISTORY:  Family History   Problem Relation Age of Onset     Family History Negative Mother      Alcohol/Drug Father      Diabetes Father      Leukemia Father      Alcohol/Drug Paternal Uncle        SOCIAL HISTORY:  Social History     Socioeconomic  History     Marital status: Single     Spouse name: None     Number of children: None     Years of education: None     Highest education level: None   Social Needs     Financial resource strain: None     Food insecurity - worry: None     Food insecurity - inability: None     Transportation needs - medical: None     Transportation needs - non-medical: None   Occupational History     Occupation: construction   Tobacco Use     Smoking status: Current Every Day Smoker     Years: 15.00     Smokeless tobacco: Never Used     Tobacco comment: a little over a pack a day   Substance and Sexual Activity     Alcohol use: Yes     Comment: daily     Drug use: Yes     Types: Marijuana     Sexual activity: None   Other Topics Concern      Service Not Asked     Blood Transfusions Not Asked     Caffeine Concern Not Asked     Occupational Exposure Not Asked     Hobby Hazards Not Asked     Sleep Concern Not Asked     Stress Concern Not Asked     Weight Concern Not Asked     Special Diet Not Asked     Back Care Not Asked     Exercise Not Asked     Bike Helmet Not Asked     Seat Belt Not Asked     Self-Exams Not Asked   Social History Narrative     None       Review of Systems:  Skin:  Negative       Eyes:  Negative      ENT:  Negative      Respiratory:  Negative       Cardiovascular:    Positive for;palpitations chest discomfort   Gastroenterology: not assessed      Genitourinary:  Negative      Musculoskeletal:  Positive for joint pain    Neurologic:  Positive for headaches    Psychiatric:  Positive for anxiety;sleep disturbances    Heme/Lymph/Imm:  Negative      Endocrine:  Negative        Physical Exam:  Vitals: /82   Pulse 78   Ht 1.829 m (6')   Wt 98 kg (216 lb)   BMI 29.29 kg/m      Constitutional:  cooperative, alert and oriented, well developed, well nourished, in no acute distress        Skin:  warm and dry to the touch, no apparent skin lesions or masses noted          Head:  normocephalic, no masses or  lesions        Eyes:  pupils equal and round, conjunctivae and lids unremarkable, sclera white, no xanthalasma, EOMS intact, no nystagmus        Lymph:No Cervical lymphadenopathy present     ENT:  no pallor or cyanosis        Neck:  carotid pulses are full and equal bilaterally, JVP normal, no carotid bruit        Respiratory:  normal breath sounds, clear to auscultation, normal A-P diameter, normal symmetry, normal respiratory excursion, no use of accessory muscles         Cardiac: regular rhythm, normal S1/S2, no S3 or S4, apical impulse not displaced, no murmurs, gallops or rubs                                                         GI:  abdomen soft, non-tender, BS normoactive, no mass, no HSM, no bruits        Extremities and Muscular Skeletal:  no deformities, clubbing, cyanosis, erythema observed              Neurological:  no gross motor deficits        Psych:  Alert and Oriented x 3        CC  No referring provider defined for this encounter.

## 2019-01-14 ENCOUNTER — HOSPITAL ENCOUNTER (OUTPATIENT)
Dept: CARDIOLOGY | Facility: CLINIC | Age: 44
Discharge: HOME | End: 2019-01-14
Attending: INTERNAL MEDICINE | Admitting: INTERNAL MEDICINE
Payer: COMMERCIAL

## 2019-01-14 DIAGNOSIS — I48.0 PAROXYSMAL ATRIAL FIBRILLATION (H): ICD-10-CM

## 2019-01-14 PROCEDURE — 0298T ZIO PATCH HOLTER ADULT PEDIATRIC GREATER THAN 48 HRS: CPT | Performed by: INTERNAL MEDICINE

## 2019-01-14 PROCEDURE — 0296T ZIO PATCH HOLTER ADULT PEDIATRIC GREATER THAN 48 HRS: CPT

## 2019-02-18 ENCOUNTER — TELEPHONE (OUTPATIENT)
Dept: CARDIOLOGY | Facility: CLINIC | Age: 44
End: 2019-02-18

## 2019-02-18 NOTE — TELEPHONE ENCOUNTER
Called and LM requesting callback from pt. I have the results of the Ziopatch. More importantly he cancelled his Feb F/U with Dr Akhtar.  He was started on Flecainide at last office visit and has not had ECG yet. Informed pt I had a spot open n [facility] if he was interested. MARE Schultz

## 2019-04-07 ENCOUNTER — HOSPITAL ENCOUNTER (EMERGENCY)
Facility: CLINIC | Age: 44
Discharge: HOME | End: 2019-04-07
Attending: EMERGENCY MEDICINE | Admitting: EMERGENCY MEDICINE
Payer: COMMERCIAL

## 2019-04-07 VITALS
TEMPERATURE: 98.2 F | HEART RATE: 82 BPM | OXYGEN SATURATION: 95 % | RESPIRATION RATE: 15 BRPM | SYSTOLIC BLOOD PRESSURE: 137 MMHG | DIASTOLIC BLOOD PRESSURE: 93 MMHG

## 2019-04-07 DIAGNOSIS — R00.2 PALPITATIONS: ICD-10-CM

## 2019-04-07 LAB
ANION GAP SERPL CALCULATED.3IONS-SCNC: 9 MMOL/L (ref 3–14)
BASOPHILS # BLD AUTO: 0 10E9/L (ref 0–0.2)
BASOPHILS NFR BLD AUTO: 0.4 %
BUN SERPL-MCNC: 7 MG/DL (ref 7–30)
CALCIUM SERPL-MCNC: 9 MG/DL (ref 8.5–10.1)
CHLORIDE SERPL-SCNC: 103 MMOL/L (ref 94–109)
CO2 SERPL-SCNC: 24 MMOL/L (ref 20–32)
CREAT SERPL-MCNC: 0.91 MG/DL (ref 0.66–1.25)
DIFFERENTIAL METHOD BLD: NORMAL
EOSINOPHIL # BLD AUTO: 0 10E9/L (ref 0–0.7)
EOSINOPHIL NFR BLD AUTO: 0.2 %
ERYTHROCYTE [DISTWIDTH] IN BLOOD BY AUTOMATED COUNT: 11.8 % (ref 10–15)
GFR SERPL CREATININE-BSD FRML MDRD: >90 ML/MIN/{1.73_M2}
GLUCOSE SERPL-MCNC: 98 MG/DL (ref 70–99)
HCT VFR BLD AUTO: 49 % (ref 40–53)
HGB BLD-MCNC: 17.2 G/DL (ref 13.3–17.7)
IMM GRANULOCYTES # BLD: 0 10E9/L (ref 0–0.4)
IMM GRANULOCYTES NFR BLD: 0.2 %
LYMPHOCYTES # BLD AUTO: 2.6 10E9/L (ref 0.8–5.3)
LYMPHOCYTES NFR BLD AUTO: 31.8 %
MAGNESIUM SERPL-MCNC: 2.1 MG/DL (ref 1.6–2.3)
MCH RBC QN AUTO: 31.4 PG (ref 26.5–33)
MCHC RBC AUTO-ENTMCNC: 35.1 G/DL (ref 31.5–36.5)
MCV RBC AUTO: 90 FL (ref 78–100)
MONOCYTES # BLD AUTO: 0.5 10E9/L (ref 0–1.3)
MONOCYTES NFR BLD AUTO: 6.1 %
NEUTROPHILS # BLD AUTO: 5.1 10E9/L (ref 1.6–8.3)
NEUTROPHILS NFR BLD AUTO: 61.3 %
NRBC # BLD AUTO: 0 10*3/UL
NRBC BLD AUTO-RTO: 0 /100
PLATELET # BLD AUTO: 219 10E9/L (ref 150–450)
POTASSIUM SERPL-SCNC: 4 MMOL/L (ref 3.4–5.3)
RBC # BLD AUTO: 5.47 10E12/L (ref 4.4–5.9)
SODIUM SERPL-SCNC: 136 MMOL/L (ref 133–144)
WBC # BLD AUTO: 8.2 10E9/L (ref 4–11)

## 2019-04-07 PROCEDURE — 25000132 ZZH RX MED GY IP 250 OP 250 PS 637: Performed by: EMERGENCY MEDICINE

## 2019-04-07 PROCEDURE — 90791 PSYCH DIAGNOSTIC EVALUATION: CPT

## 2019-04-07 PROCEDURE — 99284 EMERGENCY DEPT VISIT MOD MDM: CPT | Mod: 25

## 2019-04-07 PROCEDURE — 83735 ASSAY OF MAGNESIUM: CPT | Performed by: EMERGENCY MEDICINE

## 2019-04-07 PROCEDURE — 93005 ELECTROCARDIOGRAM TRACING: CPT

## 2019-04-07 PROCEDURE — 96360 HYDRATION IV INFUSION INIT: CPT

## 2019-04-07 PROCEDURE — 25000128 H RX IP 250 OP 636: Performed by: EMERGENCY MEDICINE

## 2019-04-07 PROCEDURE — 85025 COMPLETE CBC W/AUTO DIFF WBC: CPT | Performed by: EMERGENCY MEDICINE

## 2019-04-07 PROCEDURE — 80048 BASIC METABOLIC PNL TOTAL CA: CPT | Performed by: EMERGENCY MEDICINE

## 2019-04-07 RX ORDER — LORAZEPAM 1 MG/1
1 TABLET ORAL EVERY 6 HOURS PRN
Qty: 8 TABLET | Refills: 0 | Status: ON HOLD | OUTPATIENT
Start: 2019-04-07 | End: 2019-08-16

## 2019-04-07 RX ORDER — LIDOCAINE 40 MG/G
CREAM TOPICAL
Status: DISCONTINUED | OUTPATIENT
Start: 2019-04-07 | End: 2019-04-08 | Stop reason: HOSPADM

## 2019-04-07 RX ORDER — LORAZEPAM 1 MG/1
1 TABLET ORAL ONCE
Status: COMPLETED | OUTPATIENT
Start: 2019-04-07 | End: 2019-04-07

## 2019-04-07 RX ADMIN — LORAZEPAM 1 MG: 1 TABLET ORAL at 20:45

## 2019-04-07 RX ADMIN — SODIUM CHLORIDE 500 ML: 9 INJECTION, SOLUTION INTRAVENOUS at 20:45

## 2019-04-07 ASSESSMENT — ENCOUNTER SYMPTOMS
FEVER: 0
NERVOUS/ANXIOUS: 1
BLOOD IN STOOL: 0
DIARRHEA: 1

## 2019-04-07 NOTE — ED AVS SNAPSHOT
[facility]  [address]  [facility] [address]  Phone:  [phone]  Fax:  [phone]                                    Georges Moreno   MRN: [id number]    Department:  [facility]   Date of Visit:  4/7/2019           After Visit Summary Signature Page    I have received my discharge instructions, and my questions have been answered. I have discussed any challenges I see with this plan with the nurse or doctor.    ..........................................................................................................................................  Patient/Patient Representative Signature      ..........................................................................................................................................  Patient Representative Print Name and Relationship to Patient    ..................................................               ................................................  Date                                   Time    ..........................................................................................................................................  Reviewed by Signature/Title    ...................................................              ..............................................  Date                                               Time          22EPIC Rev 08/18

## 2019-04-08 LAB — INTERPRETATION ECG - MUSE: NORMAL

## 2019-04-08 NOTE — ED TRIAGE NOTES
Pt comes in with irregular heart beat, anxiety, increased stress and diarrhea with difficulty sleeping for last week or so. Pt has hx of anxiety and afib.

## 2019-04-08 NOTE — ED PROVIDER NOTES
History     Chief Complaint:  Anxiety    HPI   Georges Moreno is a 43 year old male with a history of ETOH abuse, atrial fibrillation, depression with anxiety,  who presents with concern for anxiety. The patient reports that he has presented to the emergency department before with concern for anxiety and panic attacks, and he is being followed up with psychiatry and they have been trying several medications. He states that the most effective of these medications was Ativan which he was on briefly. He states that a year and a half ago he came in for anxiety and was diagnosed with atrial fibrillation at that time. Intermittently since then he has panic attacks associated with fears of being in atrial fibrillation again. For the last 3 days the patient has been having an extended episode of this and is not sure how to proceed forward. He states he drinks daily and goes to AA and had six beers today. He also notes diarrhea, but no melena or fevers.     Allergies:  NKDA    Medications:    Tambocor  Gabapentin    Past Medical History:    Substance abuse  Afib  ETOH abuse  Anxiety  Depression    Past Surgical History:    Pilonidal cyst excision     Family History:    Alcohol  Drug    Social History:  Current smoker: 15 years  Positive for alcohol use. Daily.  Marijuana    Review of Systems   Constitutional: Negative for fever.   Gastrointestinal: Positive for diarrhea. Negative for blood in stool.   Psychiatric/Behavioral: The patient is nervous/anxious.    All other systems reviewed and are negative.    Physical Exam     Patient Vitals for the past 24 hrs:   BP Temp Temp src Pulse Heart Rate Resp SpO2   04/07/19 2145 132/85 -- -- 82 87 (!) 6 95 %   04/07/19 2130 (!) 158/103 -- -- 81 90 13 96 %   04/07/19 2115 137/88 -- -- 81 77 (!) 5 95 %   04/07/19 2100 130/83 -- -- 81 80 (!) 5 94 %   04/07/19 1947 (!) 148/116 98.2  F (36.8  C) Oral 87 87 15 96 %         Physical Exam  General: Adult male sitting upright  Eyes: PERRL,  Conjunctive within normal limits  ENT: Moist mucous membranes, oropharynx clear.   CV: Normal S1S2, no murmur, rub or gallop. Regular rate and rhythm  Resp: Clear to auscultation bilaterally, no wheezes, rales or rhonchi. Normal respiratory effort.  GI: Abdomen is soft, nontender and nondistended. No palpable masses. No rebound or guarding.  MSK: No edema. Nontender. Normal active range of motion.  Skin: Warm and dry. No rashes or lesions or ecchymoses on visible skin.  Neuro: Alert and oriented. Responds appropriately to all questions and commands. No focal findings appreciated. Normal muscle tone. Occasional spasm/jerking of legs.  Psych: Normal mood and affect. Pleasant.      Emergency Department Course   ECG:  Indication: anxiety  Time: 1946  Vent. Rate 86 bpm. UT interval 184. QRS duration 100. QT/QTc 388/464. P-R-T axis 68 78 Normal sinus rhythm with sinus arrhythmia. Read time: 1949      Laboratory:  CBC: WBC: 8.2, HGB: 17.2, PLT: 219  BMP: All WNL (Creatinine: 0.91)    Magnesium: 2.1    Interventions:    2045 Ativan 1 mg Oral   NS 1L IV    Emergency Department Course:  Nursing notes and vitals reviewed. ( 1949) I performed an exam of the patient as documented above.     IV inserted. Medicine administered as documented above. Blood drawn. This was sent to the lab for further testing, results above.    Consulted DEC who evaluated the patient.     (2223) I rechecked the patient and discussed the results of his workup thus far. He denies new concerns.     Findings and plan explained to the Patient and mother. Patient discharged home with instructions regarding supportive care, medications, and reasons to return. The importance of close follow-up was reviewed. The patient was prescribed Ativan.     I personally reviewed the laboratory results with the Patient and mother and answered all related questions prior to discharge.       Impression & Plan      Medical Decision Making:  Garth Moreno is a 43 year old male  with a history of anxiety and atrial fibrillation that is paroxysmal who presents to the ED with concerns for palpitations ultimately stating he is most worried about longstanding, difficult to manage anxiety requesting ativan. Here in the ED he is in normal sinus rhythm. No signs of ischemia or injury. He denies any chest pain currently and was minimally symptomatic . His vital signs were remarkable for initial high blood pressure which improved over time. His workup here is unremarkable. DEC evaluated the patient and set him up for therapy tomorrow. He is not suicidal or homicidal. He seems very anxious.  Instructed to present to ED if symptoms worsen. Should follow up with his PCP within three days as discussed. At this period there is no indication that there is a life threatening cause for his sensations which have not been new over the past year but have been more severe since being of benzodiazepines and now having a therapist.       Diagnosis:    ICD-10-CM    1. Palpitations R00.2        Disposition:  discharged to home    Discharge Medications:     Medication List      Started    LORazepam 1 MG tablet  Commonly known as:  ATIVAN  1 mg, Oral, EVERY 6 HOURS PRN        ASK your doctor about these medications    chlordiazePOXIDE 25 MG capsule  Commonly known as:  LIBRIUM  25 mg, Oral, 4 TIMES DAILY PRN  Ask about: Should I take this medication?          Scribe Disclosure:  I, Giovani De Dios, am serving as a scribe on 4/7/2019 at 7:49 PM to personally document services performed by Juli Lau MD based on my observations and the provider's statements to me.     Giovani De Dios  4/7/2019   [facility] EMERGENCY DEPARTMENT       Juli Lau MD  04/11/19 0138

## 2019-04-24 ENCOUNTER — OFFICE VISIT (OUTPATIENT)
Dept: CARDIOLOGY | Facility: CLINIC | Age: 44
End: 2019-04-24
Attending: INTERNAL MEDICINE
Payer: COMMERCIAL

## 2019-04-24 VITALS
BODY MASS INDEX: 30.07 KG/M2 | HEIGHT: 72 IN | HEART RATE: 70 BPM | SYSTOLIC BLOOD PRESSURE: 112 MMHG | DIASTOLIC BLOOD PRESSURE: 70 MMHG | WEIGHT: 222 LBS

## 2019-04-24 DIAGNOSIS — I48.0 PAROXYSMAL ATRIAL FIBRILLATION (H): ICD-10-CM

## 2019-04-24 PROCEDURE — 99213 OFFICE O/P EST LOW 20 MIN: CPT | Performed by: INTERNAL MEDICINE

## 2019-04-24 PROCEDURE — 93000 ELECTROCARDIOGRAM COMPLETE: CPT | Performed by: INTERNAL MEDICINE

## 2019-04-24 RX ORDER — QUETIAPINE FUMARATE 50 MG/1
50 TABLET, FILM COATED ORAL
Status: ON HOLD | COMMUNITY
End: 2019-08-19

## 2019-04-24 ASSESSMENT — MIFFLIN-ST. JEOR: SCORE: 1929.99

## 2019-04-24 NOTE — PROGRESS NOTES
Service Date: 04/24/2019      HISTORY OF PRESENT ILLNESS:  I saw Mr. Moreno for followup of atrial fibrillation.  He is a 43-year-old white male with a history of cigarette smoking and binge drinking of alcohol.  He presented with recurrent atrial fibrillation with rapid ventricular rate.  He was initially thought to have alcohol-induced atrial fibrillation but subsequent cardiac monitoring showed that he continued to have recurrent atrial fibrillation without alcohol drinking.  The patient was started on flecainide 100 mg p.o. b.i.d. on 01/07/2019.  He subsequently had a ZIO Patch monitor during which he had 1 episode of atrial flutter that lasted for 2 minutes.  During the atrial flutter, his ventricular rate was about 113 beats per minute.  He had no atrial fibrillation.  He seemed to tolerate flecainide well without apparent side effects.      The patient has severe anxiety and has had ER visit multiple times.  He tended to drink alcohol heavily in order to solve his anxiety problem.  He is scheduled to see his psychiatrist for his anxiety.      He still has difficulty quitting smoking.      PHYSICAL EXAMINATION:  Blood pressure was 112/70, heart rate 70 beats per minute, body weight 222 pounds.  Cardiac rhythm was regular and the heart sounds were normal.        The EKG showed normal sinus rhythm.      ASSESSMENT AND RECOMMENDATIONS:  Mr. Moreno is a 43-year-old white male with recurrent atrial fibrillation.  He had 1 episode of atrial flutter lasting up to 2 minutes without particular rapid ventricular rate during a 2-week ZIO Patch monitoring.  He seemed to tolerate flecainide well.  For the time being, I have recommended continuation of flecainide.  The patient is strongly recommended to avoid binge drinking and quit cigarette smoking.  I plan to see him for followup in a year.  Because his alcohol use and psychiatric problems, I would defer the consideration of catheter ablation.      cc:      Sarah Mccormack MD     [facility] [address] [place] [address]         GILBERT TOBIN MD             D: 2019   T: 2019   MT: LUIS ALBERTO      Name:     DEBORA PEARL   MRN:      [id number]        Account:      [id number]   :      1975           Service Date: 2019      Document: [id number]

## 2019-04-24 NOTE — PROGRESS NOTES
HPI and Plan:   See dictation    Orders Placed This Encounter   Procedures     Follow-Up with Electrophysiologist     EKG 12-lead complete w/read (Future)- to be scheduled       Orders Placed This Encounter   Medications     QUEtiapine (SEROQUEL) 50 MG tablet     Sig: Take 50 mg by mouth       Medications Discontinued During This Encounter   Medication Reason     chlordiazePOXIDE (LIBRIUM) 25 MG capsule          Encounter Diagnosis   Name Primary?     Paroxysmal atrial fibrillation (H)        CURRENT MEDICATIONS:  Current Outpatient Medications   Medication Sig Dispense Refill     flecainide (TAMBOCOR) 100 MG tablet Take 1 tablet (100 mg) by mouth 2 times daily 180 tablet 3     QUEtiapine (SEROQUEL) 50 MG tablet Take 50 mg by mouth       gabapentin (NEURONTIN) 300 MG capsule Take 1 capsule (300 mg) by mouth 3 times daily       LORazepam (ATIVAN) 1 MG tablet Take 1 tablet (1 mg) by mouth every 6 hours as needed for anxiety (Patient not taking: Reported on 4/24/2019) 8 tablet 0       ALLERGIES     Allergies   Allergen Reactions     No Known Allergies        PAST MEDICAL HISTORY:  Past Medical History:   Diagnosis Date     Depression, major, single episode     moderate     PUSHPA (generalized anxiety disorder)      HA (headache)      Pancreatitis      Paroxysmal atrial fibrillation (H)      Personal history of alcoholism (H)      Substance abuse (H)      Tobacco dependence        PAST SURGICAL HISTORY:  Past Surgical History:   Procedure Laterality Date     SOFT TISSUE SURGERY      Excision of pilonidal cyst     SOFT TISSUE SURGERY      removal of rectal cyst       FAMILY HISTORY:  Family History   Problem Relation Age of Onset     Family History Negative Mother      Alcohol/Drug Father      Diabetes Father      Leukemia Father      Alcohol/Drug Paternal Uncle        SOCIAL HISTORY:  Social History     Socioeconomic History     Marital status: Single     Spouse name: None     Number of children: None     Years of  education: None     Highest education level: None   Occupational History     Occupation: construction   Social Needs     Financial resource strain: None     Food insecurity:     Worry: None     Inability: None     Transportation needs:     Medical: None     Non-medical: None   Tobacco Use     Smoking status: Current Every Day Smoker     Years: 15.00     Smokeless tobacco: Never Used     Tobacco comment: a little over a pack a day   Substance and Sexual Activity     Alcohol use: Yes     Comment: daily     Drug use: Yes     Types: Marijuana     Sexual activity: None   Lifestyle     Physical activity:     Days per week: None     Minutes per session: None     Stress: None   Relationships     Social connections:     Talks on phone: None     Gets together: None     Attends Taoist service: None     Active member of club or organization: None     Attends meetings of clubs or organizations: None     Relationship status: None     Intimate partner violence:     Fear of current or ex partner: None     Emotionally abused: None     Physically abused: None     Forced sexual activity: None   Other Topics Concern      Service Not Asked     Blood Transfusions Not Asked     Caffeine Concern Not Asked     Occupational Exposure Not Asked     Hobby Hazards Not Asked     Sleep Concern Not Asked     Stress Concern Not Asked     Weight Concern Not Asked     Special Diet Not Asked     Back Care Not Asked     Exercise Not Asked     Bike Helmet Not Asked     Seat Belt Not Asked     Self-Exams Not Asked   Social History Narrative     None       Review of Systems:  Skin:  Negative for       Eyes:  Negative for      ENT:  Negative for      Respiratory:  Positive for       Cardiovascular:    Positive for;edema chest discomfort  afib occ  Gastroenterology: Positive for heartburn    Genitourinary:  not assessed      Musculoskeletal:  Positive for back pain    Neurologic:  Positive for numbness or tingling of feet    Psychiatric:  Positive  for anxiety;sleep disturbances    Heme/Lymph/Imm:  Positive for allergies    Endocrine:  Negative        Physical Exam:  Vitals: /70   Pulse 70   Ht 1.829 m (6')   Wt 100.7 kg (222 lb)   BMI 30.11 kg/m      Constitutional:  cooperative, alert and oriented, well developed, well nourished, in no acute distress        Skin:  warm and dry to the touch, no apparent skin lesions or masses noted          Head:  normocephalic, no masses or lesions        Eyes:  pupils equal and round, conjunctivae and lids unremarkable, sclera white, no xanthalasma, EOMS intact, no nystagmus        Lymph:No Cervical lymphadenopathy present     ENT:  no pallor or cyanosis        Neck:  carotid pulses are full and equal bilaterally, JVP normal, no carotid bruit        Respiratory:  normal breath sounds, clear to auscultation, normal A-P diameter, normal symmetry, normal respiratory excursion, no use of accessory muscles         Cardiac: regular rhythm, normal S1/S2, no S3 or S4, apical impulse not displaced, no murmurs, gallops or rubs                                                         GI:  abdomen soft, non-tender, BS normoactive, no mass, no HSM, no bruits        Extremities and Muscular Skeletal:  no deformities, clubbing, cyanosis, erythema observed              Neurological:  no gross motor deficits        Psych:  Alert and Oriented x 3        CC  Randall Akhtar MD  [address]  [address]  [place] [address]

## 2019-04-24 NOTE — LETTER
4/24/2019    MORGAN KAPOOR MD  [facility] [address]   [place] [address]    RE: Georges Moreno       Dear Colleague,    I had the pleasure of seeing Georges Moreno in the [facility].    HPI and Plan:   See dictation    Orders Placed This Encounter   Procedures     Follow-Up with Electrophysiologist     EKG 12-lead complete w/read (Future)- to be scheduled       Orders Placed This Encounter   Medications     QUEtiapine (SEROQUEL) 50 MG tablet     Sig: Take 50 mg by mouth       Medications Discontinued During This Encounter   Medication Reason     chlordiazePOXIDE (LIBRIUM) 25 MG capsule          Encounter Diagnosis   Name Primary?     Paroxysmal atrial fibrillation (H)        CURRENT MEDICATIONS:  Current Outpatient Medications   Medication Sig Dispense Refill     flecainide (TAMBOCOR) 100 MG tablet Take 1 tablet (100 mg) by mouth 2 times daily 180 tablet 3     QUEtiapine (SEROQUEL) 50 MG tablet Take 50 mg by mouth       gabapentin (NEURONTIN) 300 MG capsule Take 1 capsule (300 mg) by mouth 3 times daily       LORazepam (ATIVAN) 1 MG tablet Take 1 tablet (1 mg) by mouth every 6 hours as needed for anxiety (Patient not taking: Reported on 4/24/2019) 8 tablet 0       ALLERGIES     Allergies   Allergen Reactions     No Known Allergies        PAST MEDICAL HISTORY:  Past Medical History:   Diagnosis Date     Depression, major, single episode     moderate     PUSHPA (generalized anxiety disorder)      HA (headache)      Pancreatitis      Paroxysmal atrial fibrillation (H)      Personal history of alcoholism (H)      Substance abuse (H)      Tobacco dependence        PAST SURGICAL HISTORY:  Past Surgical History:   Procedure Laterality Date     SOFT TISSUE SURGERY      Excision of pilonidal cyst     SOFT TISSUE SURGERY      removal of rectal cyst       FAMILY HISTORY:  Family History   Problem Relation Age of Onset     Family History Negative Mother      Alcohol/Drug Father       Diabetes Father      Leukemia Father      Alcohol/Drug Paternal Uncle        SOCIAL HISTORY:  Social History     Socioeconomic History     Marital status: Single     Spouse name: None     Number of children: None     Years of education: None     Highest education level: None   Occupational History     Occupation: construction   Social Needs     Financial resource strain: None     Food insecurity:     Worry: None     Inability: None     Transportation needs:     Medical: None     Non-medical: None   Tobacco Use     Smoking status: Current Every Day Smoker     Years: 15.00     Smokeless tobacco: Never Used     Tobacco comment: a little over a pack a day   Substance and Sexual Activity     Alcohol use: Yes     Comment: daily     Drug use: Yes     Types: Marijuana     Sexual activity: None   Lifestyle     Physical activity:     Days per week: None     Minutes per session: None     Stress: None   Relationships     Social connections:     Talks on phone: None     Gets together: None     Attends Worship service: None     Active member of club or organization: None     Attends meetings of clubs or organizations: None     Relationship status: None     Intimate partner violence:     Fear of current or ex partner: None     Emotionally abused: None     Physically abused: None     Forced sexual activity: None   Other Topics Concern      Service Not Asked     Blood Transfusions Not Asked     Caffeine Concern Not Asked     Occupational Exposure Not Asked     Hobby Hazards Not Asked     Sleep Concern Not Asked     Stress Concern Not Asked     Weight Concern Not Asked     Special Diet Not Asked     Back Care Not Asked     Exercise Not Asked     Bike Helmet Not Asked     Seat Belt Not Asked     Self-Exams Not Asked   Social History Narrative     None       Review of Systems:  Skin:  Negative for       Eyes:  Negative for      ENT:  Negative for      Respiratory:  Positive for       Cardiovascular:    Positive for;edema  chest discomfort  afib occ  Gastroenterology: Positive for heartburn    Genitourinary:  not assessed      Musculoskeletal:  Positive for back pain    Neurologic:  Positive for numbness or tingling of feet    Psychiatric:  Positive for anxiety;sleep disturbances    Heme/Lymph/Imm:  Positive for allergies    Endocrine:  Negative        Physical Exam:  Vitals: /70   Pulse 70   Ht 1.829 m (6')   Wt 100.7 kg (222 lb)   BMI 30.11 kg/m       Constitutional:  cooperative, alert and oriented, well developed, well nourished, in no acute distress        Skin:  warm and dry to the touch, no apparent skin lesions or masses noted          Head:  normocephalic, no masses or lesions        Eyes:  pupils equal and round, conjunctivae and lids unremarkable, sclera white, no xanthalasma, EOMS intact, no nystagmus        Lymph:No Cervical lymphadenopathy present     ENT:  no pallor or cyanosis        Neck:  carotid pulses are full and equal bilaterally, JVP normal, no carotid bruit        Respiratory:  normal breath sounds, clear to auscultation, normal A-P diameter, normal symmetry, normal respiratory excursion, no use of accessory muscles         Cardiac: regular rhythm, normal S1/S2, no S3 or S4, apical impulse not displaced, no murmurs, gallops or rubs                                                         GI:  abdomen soft, non-tender, BS normoactive, no mass, no HSM, no bruits        Extremities and Muscular Skeletal:  no deformities, clubbing, cyanosis, erythema observed              Neurological:  no gross motor deficits        Psych:  Alert and Oriented x 3        CC  Randall Akhtar MD  [address]  [address]  [place] [address]                Thank you for allowing me to participate in the care of your patient.      Sincerely,     Randall Akhtar MD     [facility]    cc:   Randall Akhtar MD  [address]  [address]  [place] [address]

## 2019-04-24 NOTE — LETTER
4/24/2019      MORGAN KAPOOR MD  [facility] [address] [place] [address]      RE: Georges Moreno       Dear Colleague,    I had the pleasure of seeing Georges Moreno in the [facility].    Service Date: 04/24/2019      HISTORY OF PRESENT ILLNESS:  I saw Mr. Moreno for followup of atrial fibrillation.  He is a 43-year-old white male with a history of cigarette smoking and binge drinking of alcohol.  He presented with recurrent atrial fibrillation with rapid ventricular rate.  He was initially thought to have alcohol-induced atrial fibrillation but subsequent cardiac monitoring showed that he continued to have recurrent atrial fibrillation without alcohol drinking.  The patient was started on flecainide 100 mg p.o. b.i.d. on 01/07/2019.  He subsequently had a ZIO Patch monitor during which he had 1 episode of atrial flutter that lasted for 2 minutes.  During the atrial flutter, his ventricular rate was about 113 beats per minute.  He had no atrial fibrillation.  He seemed to tolerate flecainide well without apparent side effects.      The patient has severe anxiety and has had ER visit multiple times.  He tended to drink alcohol heavily in order to solve his anxiety problem.  He is scheduled to see his psychiatrist for his anxiety.      He still has difficulty quitting smoking.      PHYSICAL EXAMINATION:  Blood pressure was 112/70, heart rate 70 beats per minute, body weight 222 pounds.  Cardiac rhythm was regular and the heart sounds were normal.        The EKG showed normal sinus rhythm.      ASSESSMENT AND RECOMMENDATIONS:  Mr. Moreno is a 43-year-old white male with recurrent atrial fibrillation.  He had 1 episode of atrial flutter lasting up to 2 minutes without particular rapid ventricular rate during a 2-week ZIO Patch monitoring.  He seemed to tolerate flecainide well.  For the time being, I have recommended continuation of flecainide.  The patient is strongly  recommended to avoid binge drinking and quit cigarette smoking.  I plan to see him for followup in a year.  Because his alcohol use and psychiatric problems, I would defer the consideration of catheter ablation.      cc:      Sarah Mccormack MD    [facility] [address] [place] [address]         RANDALL AKHTAR MD             D: 2019   T: 2019   MT:       Name:     DEBORA PEARL   MRN:      [id number]        Account:      [id number]   :      1975           Service Date: 2019      Document: [id number]      Outpatient Encounter Medications as of 2019   Medication Sig Dispense Refill     flecainide (TAMBOCOR) 100 MG tablet Take 1 tablet (100 mg) by mouth 2 times daily 180 tablet 3     QUEtiapine (SEROQUEL) 50 MG tablet Take 50 mg by mouth       gabapentin (NEURONTIN) 300 MG capsule Take 1 capsule (300 mg) by mouth 3 times daily       LORazepam (ATIVAN) 1 MG tablet Take 1 tablet (1 mg) by mouth every 6 hours as needed for anxiety (Patient not taking: Reported on 2019) 8 tablet 0     [DISCONTINUED] chlordiazePOXIDE (LIBRIUM) 25 MG capsule Take 1 capsule (25 mg) by mouth 4 times daily as needed for withdrawal 12 capsule 0     No facility-administered encounter medications on file as of 2019.      Again, thank you for allowing me to participate in the care of your patient.      Sincerely,    Randall Akhtar MD     [facility]

## 2019-05-08 ENCOUNTER — HOSPITAL ENCOUNTER (EMERGENCY)
Facility: CLINIC | Age: 44
Discharge: HOME | End: 2019-05-08
Attending: EMERGENCY MEDICINE | Admitting: EMERGENCY MEDICINE
Payer: COMMERCIAL

## 2019-05-08 VITALS
TEMPERATURE: 98.9 F | SYSTOLIC BLOOD PRESSURE: 140 MMHG | BODY MASS INDEX: 29.84 KG/M2 | DIASTOLIC BLOOD PRESSURE: 93 MMHG | RESPIRATION RATE: 16 BRPM | HEART RATE: 77 BPM | WEIGHT: 220 LBS | OXYGEN SATURATION: 96 %

## 2019-05-08 DIAGNOSIS — F43.0 ACUTE REACTION TO STRESS: ICD-10-CM

## 2019-05-08 LAB
ALBUMIN SERPL-MCNC: 3.8 G/DL (ref 3.4–5)
ALP SERPL-CCNC: 53 U/L (ref 40–150)
ALT SERPL W P-5'-P-CCNC: 28 U/L (ref 0–70)
ANION GAP SERPL CALCULATED.3IONS-SCNC: 10 MMOL/L (ref 3–14)
AST SERPL W P-5'-P-CCNC: 23 U/L (ref 0–45)
BASOPHILS # BLD AUTO: 0 10E9/L (ref 0–0.2)
BASOPHILS NFR BLD AUTO: 0.4 %
BILIRUB SERPL-MCNC: 0.5 MG/DL (ref 0.2–1.3)
BUN SERPL-MCNC: 12 MG/DL (ref 7–30)
CALCIUM SERPL-MCNC: 9 MG/DL (ref 8.5–10.1)
CHLORIDE SERPL-SCNC: 103 MMOL/L (ref 94–109)
CO2 SERPL-SCNC: 25 MMOL/L (ref 20–32)
CREAT SERPL-MCNC: 0.83 MG/DL (ref 0.66–1.25)
DIFFERENTIAL METHOD BLD: NORMAL
EOSINOPHIL # BLD AUTO: 0 10E9/L (ref 0–0.7)
EOSINOPHIL NFR BLD AUTO: 0.5 %
ERYTHROCYTE [DISTWIDTH] IN BLOOD BY AUTOMATED COUNT: 12 % (ref 10–15)
GFR SERPL CREATININE-BSD FRML MDRD: >90 ML/MIN/{1.73_M2}
GLUCOSE SERPL-MCNC: 106 MG/DL (ref 70–99)
HCT VFR BLD AUTO: 46.8 % (ref 40–53)
HGB BLD-MCNC: 16.6 G/DL (ref 13.3–17.7)
IMM GRANULOCYTES # BLD: 0 10E9/L (ref 0–0.4)
IMM GRANULOCYTES NFR BLD: 0.4 %
INTERPRETATION ECG - MUSE: NORMAL
LIPASE SERPL-CCNC: 190 U/L (ref 73–393)
LYMPHOCYTES # BLD AUTO: 2.1 10E9/L (ref 0.8–5.3)
LYMPHOCYTES NFR BLD AUTO: 24.6 %
MCH RBC QN AUTO: 31.9 PG (ref 26.5–33)
MCHC RBC AUTO-ENTMCNC: 35.5 G/DL (ref 31.5–36.5)
MCV RBC AUTO: 90 FL (ref 78–100)
MONOCYTES # BLD AUTO: 0.8 10E9/L (ref 0–1.3)
MONOCYTES NFR BLD AUTO: 9.2 %
NEUTROPHILS # BLD AUTO: 5.5 10E9/L (ref 1.6–8.3)
NEUTROPHILS NFR BLD AUTO: 64.9 %
NRBC # BLD AUTO: 0 10*3/UL
NRBC BLD AUTO-RTO: 0 /100
PLATELET # BLD AUTO: 218 10E9/L (ref 150–450)
POTASSIUM SERPL-SCNC: 3.5 MMOL/L (ref 3.4–5.3)
PROT SERPL-MCNC: 7.1 G/DL (ref 6.8–8.8)
RBC # BLD AUTO: 5.2 10E12/L (ref 4.4–5.9)
SODIUM SERPL-SCNC: 138 MMOL/L (ref 133–144)
TROPONIN I SERPL-MCNC: <0.015 UG/L (ref 0–0.04)
WBC # BLD AUTO: 8.5 10E9/L (ref 4–11)

## 2019-05-08 PROCEDURE — 25000132 ZZH RX MED GY IP 250 OP 250 PS 637: Performed by: EMERGENCY MEDICINE

## 2019-05-08 PROCEDURE — 90791 PSYCH DIAGNOSTIC EVALUATION: CPT

## 2019-05-08 PROCEDURE — 99285 EMERGENCY DEPT VISIT HI MDM: CPT | Mod: 25

## 2019-05-08 PROCEDURE — 84484 ASSAY OF TROPONIN QUANT: CPT | Performed by: EMERGENCY MEDICINE

## 2019-05-08 PROCEDURE — 83690 ASSAY OF LIPASE: CPT | Performed by: EMERGENCY MEDICINE

## 2019-05-08 PROCEDURE — 85025 COMPLETE CBC W/AUTO DIFF WBC: CPT | Performed by: EMERGENCY MEDICINE

## 2019-05-08 PROCEDURE — 80053 COMPREHEN METABOLIC PANEL: CPT | Performed by: EMERGENCY MEDICINE

## 2019-05-08 PROCEDURE — 93005 ELECTROCARDIOGRAM TRACING: CPT

## 2019-05-08 RX ORDER — LORAZEPAM 1 MG/1
2 TABLET ORAL ONCE
Status: COMPLETED | OUTPATIENT
Start: 2019-05-08 | End: 2019-05-08

## 2019-05-08 RX ADMIN — LORAZEPAM 2 MG: 1 TABLET ORAL at 02:48

## 2019-05-08 ASSESSMENT — ENCOUNTER SYMPTOMS
PALPITATIONS: 1
TREMORS: 1
CHEST TIGHTNESS: 1
APPETITE CHANGE: 1
NERVOUS/ANXIOUS: 1

## 2019-05-08 NOTE — ED AVS SNAPSHOT
[facility]  [address]  [facility] [address]  Phone:  [phone]  Fax:  [phone]                                    Georges Moreno   MRN: [id number]    Department:  [facility] Emergency [facility]   Date of Visit:  5/8/2019           After Visit Summary Signature Page    I have received my discharge instructions, and my questions have been answered. I have discussed any challenges I see with this plan with the nurse or doctor.    ..........................................................................................................................................  Patient/Patient Representative Signature      ..........................................................................................................................................  Patient Representative Print Name and Relationship to Patient    ..................................................               ................................................  Date                                   Time    ..........................................................................................................................................  Reviewed by Signature/Title    ...................................................              ..............................................  Date                                               Time          22EPIC Rev 08/18

## 2019-05-08 NOTE — DISCHARGE INSTRUCTIONS
Please follow-up with the therapy and resources provided    Consider calling the crisis number with acute attack.    Return to the ER with any other new or troubling symptoms

## 2019-05-08 NOTE — ED TRIAGE NOTES
Pt in with C/O Anxiety and palpitations. Pt reports hx of afib, and reports his anxiety sx and afib sx feel similar. Pt reports he has recently been seeing a counselor and has had 3 sessions so far. Pt reports recent stressors d/t argument with friend. Pt A&Ox4 ABCD's intact.

## 2019-05-09 ENCOUNTER — NURSE TRIAGE (OUTPATIENT)
Dept: NURSING | Facility: CLINIC | Age: 44
End: 2019-05-09

## 2019-05-10 NOTE — TELEPHONE ENCOUNTER
"Georges reports having a lot of difficulty with feeling \"on edge\" and that he currently feels unstable.    He reports that he does not currently feel that he'd hurt himself.    He reports he has struggled with this since he was an adolescent.    He reports that because of this he has struggled with alcohol and drugs, with addiction problems and with legal problems.    He has been to the ER but treated & released.  He is seeking inpatient treatment.    The resources he's tried have appointment wait times of several weeks or months.    He is aware of the  Mental Health clinics but reports that they are currently closed.    Info given for:   [facility]  [facility]   [phone]    Notified of:  [facility] Mental Health Crisis Response  [facility]: [phone]    He will try calling the [facility].    He will consider going to ER and requesting to be admitted to psychiatric unit.     7:45 pm - called patient. He had spoken to [facility] who advised hospitals, [place] being one of them.  Notified Georges of the [employer] [place]  He did not want the phone number at this time.    Laura Post RN  [facility]          Reason for Disposition    Patient sounds very upset or troubled to the triager    Additional Information    Negative: Severe difficulty breathing (e.g., struggling for each breath, speaks in single words)    Negative: Bluish (or gray) lips or face now    Negative: Difficult to awaken or acting confused (e.g., disoriented, slurred speech)    Negative: Hysterical or combative behavior    Negative: Sounds like a life-threatening emergency to the triager    Negative: [1] Difficulty breathing AND [2] persists > 10 minutes AND [3] not relieved by reassurance provided by triager    Negative: [1] Lightheadedness or dizziness AND [2] persists > 10 minutes AND [3] not relieved by reassurance provided by triager    Negative: [1] Known or " suspected alcohol or drug abuse AND [2] feeling very shaky (i.e., visible tremors of hands)    Negative: Patient sounds very sick or weak to the triager    Protocols used: ANXIETY AND PANIC ATTACK-A-AH

## 2019-08-16 ENCOUNTER — HOSPITAL ENCOUNTER (INPATIENT)
Facility: CLINIC | Age: 44
LOS: 4 days | Discharge: PSYCHIATRIC OR SUBSTANCE USE FACILITY | End: 2019-08-20
Attending: PSYCHIATRY & NEUROLOGY | Admitting: PSYCHIATRY & NEUROLOGY
Payer: COMMERCIAL

## 2019-08-16 DIAGNOSIS — F10.930 ALCOHOL WITHDRAWAL SYNDROME WITHOUT COMPLICATION (H): Primary | ICD-10-CM

## 2019-08-16 DIAGNOSIS — F12.20 CANNABIS USE DISORDER, MODERATE, DEPENDENCE (H): ICD-10-CM

## 2019-08-16 DIAGNOSIS — F41.9 ANXIETY: ICD-10-CM

## 2019-08-16 DIAGNOSIS — F10.20 ALCOHOL USE DISORDER, SEVERE, DEPENDENCE (H): ICD-10-CM

## 2019-08-16 DIAGNOSIS — F41.1 GAD (GENERALIZED ANXIETY DISORDER): ICD-10-CM

## 2019-08-16 DIAGNOSIS — F17.213 CIGARETTE NICOTINE DEPENDENCE WITH WITHDRAWAL: ICD-10-CM

## 2019-08-16 DIAGNOSIS — I48.20 CHRONIC ATRIAL FIBRILLATION (H): ICD-10-CM

## 2019-08-16 DIAGNOSIS — F43.10 PTSD (POST-TRAUMATIC STRESS DISORDER): ICD-10-CM

## 2019-08-16 DIAGNOSIS — F39 MOOD DISORDER (H): ICD-10-CM

## 2019-08-16 PROBLEM — F10.10 ALCOHOL ABUSE: Status: ACTIVE | Noted: 2019-08-16

## 2019-08-16 LAB
ALBUMIN SERPL-MCNC: 3.8 G/DL (ref 3.4–5)
ALCOHOL BREATH TEST: 0.12 (ref 0–0.01)
ALP SERPL-CCNC: 63 U/L (ref 40–150)
ALT SERPL W P-5'-P-CCNC: 44 U/L (ref 0–70)
AMPHETAMINES UR QL SCN: NEGATIVE
ANION GAP SERPL CALCULATED.3IONS-SCNC: 10 MMOL/L (ref 3–14)
AST SERPL W P-5'-P-CCNC: 36 U/L (ref 0–45)
BARBITURATES UR QL: NEGATIVE
BASOPHILS # BLD AUTO: 0 10E9/L (ref 0–0.2)
BASOPHILS NFR BLD AUTO: 0.2 %
BENZODIAZ UR QL: NEGATIVE
BILIRUB SERPL-MCNC: 0.8 MG/DL (ref 0.2–1.3)
BUN SERPL-MCNC: 18 MG/DL (ref 7–30)
CALCIUM SERPL-MCNC: 8.2 MG/DL (ref 8.5–10.1)
CANNABINOIDS UR QL SCN: POSITIVE
CHLORIDE SERPL-SCNC: 101 MMOL/L (ref 94–109)
CO2 SERPL-SCNC: 26 MMOL/L (ref 20–32)
COCAINE UR QL: NEGATIVE
CREAT SERPL-MCNC: 0.96 MG/DL (ref 0.66–1.25)
DIFFERENTIAL METHOD BLD: NORMAL
EOSINOPHIL # BLD AUTO: 0 10E9/L (ref 0–0.7)
EOSINOPHIL NFR BLD AUTO: 0.3 %
ERYTHROCYTE [DISTWIDTH] IN BLOOD BY AUTOMATED COUNT: 12.2 % (ref 10–15)
ETHANOL UR QL SCN: POSITIVE
GFR SERPL CREATININE-BSD FRML MDRD: >90 ML/MIN/{1.73_M2}
GGT SERPL-CCNC: 105 U/L (ref 0–75)
GLUCOSE SERPL-MCNC: 110 MG/DL (ref 70–99)
HCT VFR BLD AUTO: 48.2 % (ref 40–53)
HGB BLD-MCNC: 17.2 G/DL (ref 13.3–17.7)
IMM GRANULOCYTES # BLD: 0 10E9/L (ref 0–0.4)
IMM GRANULOCYTES NFR BLD: 0.3 %
LIPASE SERPL-CCNC: 145 U/L (ref 73–393)
LYMPHOCYTES # BLD AUTO: 2.4 10E9/L (ref 0.8–5.3)
LYMPHOCYTES NFR BLD AUTO: 23.6 %
MCH RBC QN AUTO: 31.9 PG (ref 26.5–33)
MCHC RBC AUTO-ENTMCNC: 35.7 G/DL (ref 31.5–36.5)
MCV RBC AUTO: 89 FL (ref 78–100)
MONOCYTES # BLD AUTO: 0.8 10E9/L (ref 0–1.3)
MONOCYTES NFR BLD AUTO: 7.5 %
NEUTROPHILS # BLD AUTO: 6.9 10E9/L (ref 1.6–8.3)
NEUTROPHILS NFR BLD AUTO: 68.1 %
NRBC # BLD AUTO: 0 10*3/UL
NRBC BLD AUTO-RTO: 0 /100
OPIATES UR QL SCN: NEGATIVE
PLATELET # BLD AUTO: 235 10E9/L (ref 150–450)
POTASSIUM SERPL-SCNC: 3.8 MMOL/L (ref 3.4–5.3)
PROT SERPL-MCNC: 7.4 G/DL (ref 6.8–8.8)
RBC # BLD AUTO: 5.4 10E12/L (ref 4.4–5.9)
SODIUM SERPL-SCNC: 137 MMOL/L (ref 133–144)
TSH SERPL DL<=0.005 MIU/L-ACNC: 1.95 MU/L (ref 0.4–4)
WBC # BLD AUTO: 10.1 10E9/L (ref 4–11)

## 2019-08-16 PROCEDURE — 83690 ASSAY OF LIPASE: CPT | Performed by: PSYCHIATRY & NEUROLOGY

## 2019-08-16 PROCEDURE — 25000132 ZZH RX MED GY IP 250 OP 250 PS 637: Performed by: PSYCHIATRY & NEUROLOGY

## 2019-08-16 PROCEDURE — 82075 ASSAY OF BREATH ETHANOL: CPT | Performed by: PSYCHIATRY & NEUROLOGY

## 2019-08-16 PROCEDURE — 99284 EMERGENCY DEPT VISIT MOD MDM: CPT | Mod: Z6 | Performed by: PSYCHIATRY & NEUROLOGY

## 2019-08-16 PROCEDURE — 85025 COMPLETE CBC W/AUTO DIFF WBC: CPT | Performed by: PSYCHIATRY & NEUROLOGY

## 2019-08-16 PROCEDURE — 80307 DRUG TEST PRSMV CHEM ANLYZR: CPT | Performed by: FAMILY MEDICINE

## 2019-08-16 PROCEDURE — 80053 COMPREHEN METABOLIC PANEL: CPT | Performed by: PSYCHIATRY & NEUROLOGY

## 2019-08-16 PROCEDURE — 82306 VITAMIN D 25 HYDROXY: CPT | Performed by: PSYCHIATRY & NEUROLOGY

## 2019-08-16 PROCEDURE — 12800008 ZZH R&B CD ADULT

## 2019-08-16 PROCEDURE — 82977 ASSAY OF GGT: CPT | Performed by: PSYCHIATRY & NEUROLOGY

## 2019-08-16 PROCEDURE — 84443 ASSAY THYROID STIM HORMONE: CPT | Performed by: PSYCHIATRY & NEUROLOGY

## 2019-08-16 PROCEDURE — 80320 DRUG SCREEN QUANTALCOHOLS: CPT | Performed by: FAMILY MEDICINE

## 2019-08-16 PROCEDURE — 25000131 ZZH RX MED GY IP 250 OP 636 PS 637: Performed by: PSYCHIATRY & NEUROLOGY

## 2019-08-16 PROCEDURE — 90791 PSYCH DIAGNOSTIC EVALUATION: CPT

## 2019-08-16 PROCEDURE — 99285 EMERGENCY DEPT VISIT HI MDM: CPT | Mod: 25 | Performed by: PSYCHIATRY & NEUROLOGY

## 2019-08-16 PROCEDURE — HZ2ZZZZ DETOXIFICATION SERVICES FOR SUBSTANCE ABUSE TREATMENT: ICD-10-PCS | Performed by: PSYCHIATRY & NEUROLOGY

## 2019-08-16 PROCEDURE — 25000132 ZZH RX MED GY IP 250 OP 250 PS 637: Performed by: NURSE PRACTITIONER

## 2019-08-16 RX ORDER — LANOLIN ALCOHOL/MO/W.PET/CERES
100 CREAM (GRAM) TOPICAL DAILY
Status: DISCONTINUED | OUTPATIENT
Start: 2019-08-17 | End: 2019-08-20 | Stop reason: HOSPADM

## 2019-08-16 RX ORDER — HYDROXYZINE HYDROCHLORIDE 25 MG/1
25 TABLET, FILM COATED ORAL EVERY 4 HOURS PRN
Status: DISCONTINUED | OUTPATIENT
Start: 2019-08-16 | End: 2019-08-17

## 2019-08-16 RX ORDER — MULTIPLE VITAMINS W/ MINERALS TAB 9MG-400MCG
1 TAB ORAL DAILY
Status: DISCONTINUED | OUTPATIENT
Start: 2019-08-17 | End: 2019-08-20 | Stop reason: HOSPADM

## 2019-08-16 RX ORDER — FOLIC ACID 1 MG/1
1 TABLET ORAL DAILY
Status: DISCONTINUED | OUTPATIENT
Start: 2019-08-17 | End: 2019-08-20 | Stop reason: HOSPADM

## 2019-08-16 RX ORDER — FLECAINIDE ACETATE 100 MG/1
100 TABLET ORAL 2 TIMES DAILY
Status: DISCONTINUED | OUTPATIENT
Start: 2019-08-16 | End: 2019-08-20 | Stop reason: HOSPADM

## 2019-08-16 RX ORDER — ONDANSETRON 4 MG/1
4 TABLET, ORALLY DISINTEGRATING ORAL ONCE
Status: COMPLETED | OUTPATIENT
Start: 2019-08-16 | End: 2019-08-16

## 2019-08-16 RX ORDER — TRAZODONE HYDROCHLORIDE 50 MG/1
50 TABLET, FILM COATED ORAL
Status: DISCONTINUED | OUTPATIENT
Start: 2019-08-16 | End: 2019-08-20 | Stop reason: HOSPADM

## 2019-08-16 RX ORDER — ATENOLOL 50 MG/1
50 TABLET ORAL DAILY PRN
Status: DISCONTINUED | OUTPATIENT
Start: 2019-08-16 | End: 2019-08-20 | Stop reason: HOSPADM

## 2019-08-16 RX ORDER — ALUMINA, MAGNESIA, AND SIMETHICONE 2400; 2400; 240 MG/30ML; MG/30ML; MG/30ML
30 SUSPENSION ORAL EVERY 4 HOURS PRN
Status: DISCONTINUED | OUTPATIENT
Start: 2019-08-16 | End: 2019-08-20 | Stop reason: HOSPADM

## 2019-08-16 RX ORDER — QUETIAPINE FUMARATE 50 MG/1
50 TABLET, FILM COATED ORAL
Status: DISCONTINUED | OUTPATIENT
Start: 2019-08-16 | End: 2019-08-17

## 2019-08-16 RX ORDER — ACETAMINOPHEN 325 MG/1
650 TABLET ORAL EVERY 4 HOURS PRN
Status: DISCONTINUED | OUTPATIENT
Start: 2019-08-16 | End: 2019-08-20 | Stop reason: HOSPADM

## 2019-08-16 RX ORDER — ONDANSETRON 4 MG/1
4 TABLET, ORALLY DISINTEGRATING ORAL EVERY 6 HOURS PRN
Status: DISCONTINUED | OUTPATIENT
Start: 2019-08-16 | End: 2019-08-20 | Stop reason: HOSPADM

## 2019-08-16 RX ORDER — DIAZEPAM 5 MG
5-20 TABLET ORAL EVERY 30 MIN PRN
Status: DISCONTINUED | OUTPATIENT
Start: 2019-08-16 | End: 2019-08-18 | Stop reason: CLARIF

## 2019-08-16 RX ADMIN — QUETIAPINE FUMARATE 50 MG: 50 TABLET ORAL at 22:53

## 2019-08-16 RX ADMIN — NICOTINE POLACRILEX 4 MG: 4 GUM, CHEWING ORAL at 19:18

## 2019-08-16 RX ADMIN — ONDANSETRON 4 MG: 4 TABLET, ORALLY DISINTEGRATING ORAL at 19:17

## 2019-08-16 RX ADMIN — DIAZEPAM 10 MG: 5 TABLET ORAL at 22:53

## 2019-08-16 ASSESSMENT — ACTIVITIES OF DAILY LIVING (ADL)
DRESS: INDEPENDENT
HYGIENE/GROOMING: INDEPENDENT
ORAL_HYGIENE: INDEPENDENT
LAUNDRY: WITH SUPERVISION

## 2019-08-16 ASSESSMENT — ENCOUNTER SYMPTOMS
ABDOMINAL PAIN: 0
SHORTNESS OF BREATH: 0
NERVOUS/ANXIOUS: 1
FEVER: 0
HALLUCINATIONS: 0
DYSPHORIC MOOD: 1

## 2019-08-16 ASSESSMENT — MIFFLIN-ST. JEOR: SCORE: 1936.79

## 2019-08-16 NOTE — ED NOTES
Bed: HW01  Expected date: 8/16/19  Expected time: 5:15 PM  Means of arrival: Ambulance  Comments:  [facility] 44yo male, depression, calm and cooperative

## 2019-08-16 NOTE — ED NOTES
Safety search completed by security. Patient Belongings: shoes, wallet, cigarettes, lighter, .     Patient has cell phone in room.

## 2019-08-17 PROBLEM — F10.930 ALCOHOL WITHDRAWAL SYNDROME WITHOUT COMPLICATION (H): Status: ACTIVE | Noted: 2019-08-17

## 2019-08-17 PROBLEM — F12.20 CANNABIS USE DISORDER, MODERATE, DEPENDENCE (H): Status: ACTIVE | Noted: 2019-08-17

## 2019-08-17 PROCEDURE — 99222 1ST HOSP IP/OBS MODERATE 55: CPT | Performed by: PHYSICIAN ASSISTANT

## 2019-08-17 PROCEDURE — 25000132 ZZH RX MED GY IP 250 OP 250 PS 637: Performed by: NURSE PRACTITIONER

## 2019-08-17 PROCEDURE — 99207 ZZC CONSULT E&M CHANGED TO INITIAL LEVEL: CPT | Performed by: PHYSICIAN ASSISTANT

## 2019-08-17 PROCEDURE — 99223 1ST HOSP IP/OBS HIGH 75: CPT | Mod: AI | Performed by: PSYCHIATRY & NEUROLOGY

## 2019-08-17 PROCEDURE — 25000132 ZZH RX MED GY IP 250 OP 250 PS 637: Performed by: PSYCHIATRY & NEUROLOGY

## 2019-08-17 PROCEDURE — 12800008 ZZH R&B CD ADULT

## 2019-08-17 RX ORDER — HYDROXYZINE HYDROCHLORIDE 50 MG/1
50 TABLET, FILM COATED ORAL EVERY 4 HOURS PRN
Status: DISCONTINUED | OUTPATIENT
Start: 2019-08-17 | End: 2019-08-20 | Stop reason: HOSPADM

## 2019-08-17 RX ORDER — QUETIAPINE FUMARATE 50 MG/1
50 TABLET, FILM COATED ORAL 4 TIMES DAILY PRN
Status: DISCONTINUED | OUTPATIENT
Start: 2019-08-17 | End: 2019-08-20 | Stop reason: HOSPADM

## 2019-08-17 RX ORDER — IBUPROFEN 600 MG/1
600 TABLET, FILM COATED ORAL EVERY 6 HOURS PRN
Status: DISCONTINUED | OUTPATIENT
Start: 2019-08-17 | End: 2019-08-20 | Stop reason: HOSPADM

## 2019-08-17 RX ORDER — GABAPENTIN 300 MG/1
300 CAPSULE ORAL 3 TIMES DAILY
Status: DISCONTINUED | OUTPATIENT
Start: 2019-08-17 | End: 2019-08-20 | Stop reason: HOSPADM

## 2019-08-17 RX ADMIN — FLECAINIDE ACETATE 100 MG: 100 TABLET ORAL at 00:16

## 2019-08-17 RX ADMIN — QUETIAPINE FUMARATE 50 MG: 50 TABLET ORAL at 21:36

## 2019-08-17 RX ADMIN — FLECAINIDE ACETATE 100 MG: 100 TABLET ORAL at 20:08

## 2019-08-17 RX ADMIN — DIAZEPAM 10 MG: 5 TABLET ORAL at 16:37

## 2019-08-17 RX ADMIN — DIAZEPAM 10 MG: 5 TABLET ORAL at 00:17

## 2019-08-17 RX ADMIN — DIAZEPAM 10 MG: 5 TABLET ORAL at 12:35

## 2019-08-17 RX ADMIN — NICOTINE POLACRILEX 8 MG: 4 GUM, CHEWING ORAL at 16:37

## 2019-08-17 RX ADMIN — HYDROXYZINE HYDROCHLORIDE 50 MG: 50 TABLET, FILM COATED ORAL at 16:37

## 2019-08-17 RX ADMIN — DIAZEPAM 10 MG: 5 TABLET ORAL at 08:32

## 2019-08-17 RX ADMIN — GABAPENTIN 300 MG: 300 CAPSULE ORAL at 14:04

## 2019-08-17 RX ADMIN — NICOTINE POLACRILEX 8 MG: 4 GUM, CHEWING ORAL at 09:33

## 2019-08-17 RX ADMIN — FLECAINIDE ACETATE 100 MG: 100 TABLET ORAL at 08:32

## 2019-08-17 RX ADMIN — ACETAMINOPHEN 650 MG: 325 TABLET, FILM COATED ORAL at 10:47

## 2019-08-17 RX ADMIN — GABAPENTIN 300 MG: 300 CAPSULE ORAL at 20:08

## 2019-08-17 RX ADMIN — DIAZEPAM 10 MG: 5 TABLET ORAL at 04:20

## 2019-08-17 RX ADMIN — NICOTINE POLACRILEX 8 MG: 4 GUM, CHEWING ORAL at 20:08

## 2019-08-17 ASSESSMENT — ACTIVITIES OF DAILY LIVING (ADL)
ORAL_HYGIENE: INDEPENDENT
HYGIENE/GROOMING: INDEPENDENT
DRESS: INDEPENDENT
LAUNDRY: WITH SUPERVISION

## 2019-08-17 NOTE — ED NOTES
ED to Behavioral Floor Handoff    SITUATION  Georges Moreno is a 43 year old male who speaks English and lives in a home with family members The patient arrived in the ED by ambulance from home with a complaint of Depression (Per EMS pt coming from home, Hx depression-feeling hopeless. Cooperative en route.)  .The patient's current symptoms started/worsened a while ago and during this time the symptoms have remained the same.   In the ED, pt was diagnosed with   Final diagnoses:   Mood disorder (H)   Uncomplicated alcohol dependence (H)        Initial vitals were: BP: 116/88  Pulse: 80  Temp: 98.2  F (36.8  C)  Resp: 18  Weight: 99.8 kg (220 lb)(stated)  SpO2: 98 %   --------  Is the patient diabetic? No   If yes, last blood glucose? --     If yes, was this treated in the ED? --  --------  Is the patient inebriated (ETOH) No or Impaired on other substances? No  MSSA done? Yes  Last MSSA score: 3    Were withdrawal symptoms treated? N/A  Does the patient have a seizure history? No. If yes, date of most recent seizure--  --------  Is the patient patient experiencing suicidal ideation? denies current or recent suicidal ideation     Homicidal ideation? denies current or recent homicidal ideation or behaviors.    Self-injurious behavior/urges? denies current or recent self injurious behavior or ideation.  ------  Was pt aggressive in the ED No  Was a code called No  Is the pt now cooperative? Yes  -------  Meds given in ED:   Medications   ondansetron (ZOFRAN-ODT) ODT tab 4 mg (4 mg Oral Given 8/16/19 1917)   nicotine polacrilex (NICORETTE) gum 4 mg (4 mg Buccal Given 8/16/19 1918)      Family present during ED course? No  Family currently present? No    BACKGROUND  Does the patient have a cognitive impairment or developmental disability? No  Allergies:   Allergies   Allergen Reactions     No Known Allergies    .   Social demographics are   Social History     Socioeconomic History     Marital status: Single     Spouse  name: None     Number of children: None     Years of education: None     Highest education level: None   Occupational History     Occupation: construction   Social Needs     Financial resource strain: None     Food insecurity:     Worry: None     Inability: None     Transportation needs:     Medical: None     Non-medical: None   Tobacco Use     Smoking status: Current Every Day Smoker     Years: 15.00     Smokeless tobacco: Never Used     Tobacco comment: a little over a pack a day   Substance and Sexual Activity     Alcohol use: Yes     Comment: daily     Drug use: Yes     Types: Marijuana     Comment: today     Sexual activity: None   Lifestyle     Physical activity:     Days per week: None     Minutes per session: None     Stress: None   Relationships     Social connections:     Talks on phone: None     Gets together: None     Attends [employer] service: None     Active member of club or organization: None     Attends meetings of clubs or organizations: None     Relationship status: None     Intimate partner violence:     Fear of current or ex partner: None     Emotionally abused: None     Physically abused: None     Forced sexual activity: None   Other Topics Concern      Service Not Asked     Blood Transfusions Not Asked     Caffeine Concern Not Asked     Occupational Exposure Not Asked     Hobby Hazards Not Asked     Sleep Concern Not Asked     Stress Concern Not Asked     Weight Concern Not Asked     Special Diet Not Asked     Back Care Not Asked     Exercise Not Asked     Bike Helmet Not Asked     Seat Belt Not Asked     Self-Exams Not Asked   Social History Narrative     None        ASSESSMENT  Labs results   Labs Ordered and Resulted from Time of ED Arrival Up to the Time of Departure from the ED   DRUG ABUSE SCREEN 6 CHEM DEP URINE ([facility]) - Abnormal; Notable for the following components:       Result Value    Cannabinoids Qual Urine Positive (*)     Ethanol Qual Urine Positive (*)     All other  components within normal limits   COMPREHENSIVE METABOLIC PANEL - Abnormal; Notable for the following components:    Glucose 110 (*)     Calcium 8.2 (*)     All other components within normal limits   ALCOHOL BREATH TEST POCT - Abnormal; Notable for the following components:    Alcohol Breath Test 0.123 (*)     All other components within normal limits   CBC WITH PLATELETS DIFFERENTIAL   LIPASE      Imaging Studies: No results found for this or any previous visit (from the past 24 hour(s)).   Most recent vital signs /89   Pulse 78   Temp 98.3  F (36.8  C) (Oral)   Resp 16   Wt 99.8 kg (220 lb)   SpO2 98%   BMI 29.84 kg/m     Abnormal labs/tests/findings requiring intervention:---   Pain control: good  Nausea control: good    RECOMMENDATION  Are any infection precautions needed (MRSA, VRE, etc.)? No If yes, what infection? --  ---  Does the patient have mobility issues? independently. If yes, what device does the pt use? ---  ---  Is patient on 72 hour hold or commitment? No If on 72 hour hold, have hold and rights been given to patient? N/A  Are admitting orders written if after 10 p.m. ?N/A  Tasks needing to be completed:---     Karlene Bland   [phone] [facility]   [phone] [facility]   [phone] [facility]

## 2019-08-17 NOTE — CONSULTS
Consult Date:  08/17/2019      HISTORY OF PRESENT ILLNESS:  The patient is a 43-year-old male with history of alcohol use disorder admitted to station 3A for alcohol abuse and detoxification.  Drinking on average 10-15 beers daily with breathalyzer on admission 0.123.  An Internal Medicine consultation was ordered by Dr. Menon to assess medical problems including history of paroxysmal atrial fibrillation and chronic back pain.  At this time, Georges admits to mild nausea but denies vomiting.  Denies fever, chills, chest pain, shortness of breath.  Denies abdominal pain.  Denies bowel or bladder concerns.      PAST MEDICAL HISTORY:   1.  Alcohol use disorder and other psychiatric history per Dr. Menon.   2.  History of paroxysmal atrial fibrillation followed outpatient by Cardiology at the .  CHADS-VASc score 0.  Placed on flecainide for prophylaxis 01/2019.   3.  Chronic low back pain.   4.  Status post pilonidal cystectomy.   5.  Denies history of other chronic medical problems and surgeries including coronary artery disease, hypertension and diabetes.      ADMISSION MEDICATIONS:  Reviewed and listed in the medication reconciliation list.      ALLERGIES:  NO KNOWN DRUG ALLERGIES.      SOCIAL HISTORY:  Single.  No children.  Lives in [place].  Currently unemployed.  Smokes on average 30 cigarettes daily.      FAMILY HISTORY:  Reviewed and noncontributory.      REVIEW OF SYSTEMS:  Ten-point review of systems negative except as stated above in history of present illness.      PHYSICAL EXAMINATION:   GENERAL:  Nontoxic appearing man in no acute distress.   VITAL SIGNS:  Stable.  Temperature afebrile, pulse in the 90s, blood pressure 147/99.   HEENT:  Negative.   NECK:  Supple.  No cervical lymphadenopathy or thyromegaly.   LUNGS:  Clear.   CARDIOVASCULAR:  Regular rate and rhythm.   ABDOMEN:  Soft, nontender.   EXTREMITIES:  No edema.   SKIN:  No rash on exposed areas.   NEUROLOGIC:  He is awake, alert and oriented x 3.   Cranial nerves grossly intact.  Motor strength is symmetric.  He is not tremulous.  Gait deferred.      LABORATORY DATA:  Breathalyzer 0.123.  Urine drug screen positive for cannabinoids and alcohol.  Glucose 110.  .  Otherwise, CBC and rest of comprehensive metabolic panel essentially normal.      IMPRESSION:    1.  Alcohol abuse and withdrawal per Dr. Menon.   2.  Chronic paroxysmal atrial fibrillation, currently in regular rhythm on exam.  Chronically managed on flecainide for prophylaxis prior to admission.   3.  Chronic back pain, stable.   4.  Elevated blood pressure, stable, most likely secondary to alcohol withdrawal. The patient denies history of primary hypertension.      PLAN:  No medical intervention indicated at this time.  Blood pressures will be monitored closely.  Continue with prior to admission flecainide 100 mg p.o. b.i.d.  No further medical intervention indicated at this time.  The patient is medically stable and Medicine is signing off.  Please feel free to call with questions.      Thank you for this consultation.         BILLY DAVILA PA-C             D: 2019   T: 2019   MT: GH      Name:     DEBORA PEARL   MRN:      [id number]        Account:       [id number]   :      1975           Consult Date:  2019      Document: [id number]

## 2019-08-17 NOTE — H&P
Admitted:     08/16/2019      CHIEF COMPLAINT:  A 43-year-old man admitted for alcohol withdrawal.      HISTORY OF PRESENT ILLNESS:  The patient is a 43-year-old man who is here for alcohol withdrawal and been drinking 10+ beers daily.  He reports that he mostly uses alcohol to self-medicate his anxiety.  Without marijuana or alcohol on board, he reports that his anxiety is so bad that he is unable to leave the house.  He has been tried on numerous SSRI medications in the past.  They either caused sexual side effects, rash or worsening anxiety.  He was given them adequate trials.  Nothing seems to have helped.  He has been on gabapentin in the past, did not recall it being helpful but could not remember the dose.  He was recently prescribed Seroquel for sleep, which he found moderately effective for that.  The patient is very anxious even during our interview.  The more we talked, the more he is thinking about how bad his anxiety is going to get and he starts to spiral during our interview.  I did discuss with him that stopping marijuana and stopping alcohol is likely going to lead to worsening anxiety over several weeks to possibly several months and indicated that we can manage this by nonaddictive medications here.  In the past, he has been given Ativan for about 4 months, which really worked, but then this was stopped.  We discussed the problems associated with chronic benzodiazepine use, but did indicate that if he is getting close followup with a provider who is good at managing this, it might be reasonable for him to get short-term benzodiazepine treatment in the form of several months with a taper at the end, provided that he is abstaining from all drugs and attending regular therapy.  In the past, he has been seeing in our outpatient Addiction [facility] under the care of Dr. Altamirano who had prescribed him benzodiazepines to good effect.  He understands that he cannot be on them here or in [facility].  He is  just fearful that we are not going to be to find a medicine to manage his anxiety and that he is going to return to drinking soon as he leaves.      PAST PSYCHIATRIC HISTORY:  The patient reports that he has been in therapy before for his anxiety, been on multiple SSRI medications in the past.  He has been in treatment, has been in detox before.      PAST MEDICAL HISTORY:   1.  Paroxysmal atrial fibrillation.   2.  Chronic low back pain   3.  History of pilonidal cyst removal.      SUBSTANCE HISTORY:  The patient reports that he smokes a joint every day.  Previously was a much heavier cannabis user.  He smokes cigarettes heavily every day.  He is drinking 10-15 beers daily.  Denies other illicit drug use.      PHYSICAL REVIEW OF SYSTEMS:  The patient reports that he is anxious now.  Denies other problems on 10-point review of systems except as noted in HPI.      FAMILY HISTORY:  Noncontributory.      SOCIAL HISTORY:  The patient reports that he is unable to hold down a job due to his anxiety, no children.  He is currently unemployed.      ALLERGIES:  NO KNOWN DRUG ALLERGIES.      PRIOR TO ADMISSION MEDICATIONS:     1.  Tambocor 100 mg 2 times daily.   2.  Seroquel 50 mg at night as needed for sleep.      LABORATORY DATA:  Comprehensive metabolic panel notable for calcium of 8.2, otherwise normal.  GGT was elevated at 105, other hepatic panel labs within normal limits.  TSH is 1.95, glucose is 110.  CBC with differential within normal limits.  Alcohol breath was 0.123 in the emergency room.  Urine toxicology is positive for cannabinoids and alcohol, negative for other drugs of abuse.      VITAL SIGNS:  Temperature 98.6, pulse 88, respiratory rate 16, blood pressure 148/95, oxygen saturation 96% on room air.      PHYSICAL EXAMINATION:  I have reviewed the physical exam as documented by Internal Medicine consultant, Rodri Lua, dated 08/17/2019.  I have no additional findings at this time.      MENTAL STATUS  EXAMINATION:  The patient is awake, alert, oriented to person, place and date.  He is casually dressed.  He is cooperative throughout the interview with good eye contact.  Speech is normal in rate and volume.  Language is intact for word usage and sentence structure.  He has no psychomotor agitation or retardation.  Muscle strength, tone, gait and station are within normal limits.  Mood is anxious.  Affect is congruent to mood.  Thought process is linear, goal oriented.  Associations are intact.  Thought content is notable for significant anxiety, some perseveration on this anxiety.  No signs of psychosis.  No suicidal or homicidal thoughts.  Recent and remote memory are intact.  Fund of knowledge is adequate.  Attention and concentration are intact.  Insight is fair, judgment is fair.      DIAGNOSES:   1.  Alcohol withdrawal.   2.  Alcohol use disorder, severe.   3.  Cannabis use disorder, moderate.   4.  Cigarette nicotine dependence, currently in withdrawal.   5.  Generalized anxiety disorder.   6.  Paroxysmal atrial fibrillation.   7.  Chronic low back pain.      PLAN:   1.  Continue with MSSA protocol for alcohol withdrawal.  Most recent MSSA score is 10.  We will continue to monitor.  He will be considered out of detox when he is 24 hours after his last need for Valium.   2.  Nicotine replacement therapy available as needed for his withdrawal symptoms.   3.  Continue with treatment of his medical issues per Internal Medicine recommendations.   4.  For his anxiety, at this point in time we will start gabapentin 300 mg 3 times daily.  In addition, we will make Seroquel available as needed up to 4 times daily for anxiety or sleep to be used only if hydroxyzine is not effective.  The patient may need to be referred to more specialist physicians for managing anxiety outside the hospital, may need to be closely monitored and consider placing on short-term benzo treatment while he is involved in intensive outpatient  CD treatment.   5.  Case management working with patient on placement into chemical dependency treatment.  The patient has a strong desire to go to some place that allows smoking.  He is considering [facility] currently.  There is a noted place in [place] that he has been to in the past that he would like considered.   has put in a note saying she has left a message with them to see if they take his insurance.   6.  Legal:  The patient is currently voluntary.   7.  Disposition:  The patient likely would do best on [id number] transfer for chemical dependency use.  In his current state, I think it is highly unlikely he would maintain sobriety outside the hospital.  I have advised this to him; however, he is somewhat ambivalent and in his current state it does not appear that he could be held should he request to leave.  He would need evaluation for safety.         DIRK MYERS MD             D: 2019   T: 2019   MT: PK      Name:     DEBORA PEARL   MRN:      [id number]        Account:      [id number]   :      1975        Admitted:     2019                   Document: [id number]       cc: Sarah Mccormack MD

## 2019-08-17 NOTE — PLAN OF CARE
The patient scored a 10 and an 8 on his MSSAs and was given Valium 10 mg x 2.  The patient completed his assessment.  He is focused on going to a treatment program where he can smoke.  He is thinking that he would like to go to [facility].  He denies that he has thoughts of self harm.  He has spent some of the shift in his room due to feeling very tired.

## 2019-08-17 NOTE — PLAN OF CARE
KRYSTAL      Georges Moreno is a 43 year old year old male with a chief complaint of alcohol abuse      S = Situation:   Patient is a voluntary admission seeking alcohol detox      B  = Background:   Patient has been drinking 10-15 beers a day for over a year. Patient breathalyzer in the ER 0.123. Urine drug screen positive for ethanol and cannabinoids. Patient denies any history of alcohol withdrawal seizures or delirium tremens. Patient has been to detox in the past. Has never been hospitalized for mental health reasons. Has been to both residential and outpatient CD programs for his alcohol abuse. Patient reports a history of atrial fibrillation, controlled with medication, anxiety, depression, and insomnia. Denies any active medical concerns at the moment.  A  =  Assessment:   Patient affect flat, blunted, and tense. Mood is anxious. Patient denies SI, SIB, HI, or hallucinations. Patient's MSSA is a 9 during admission assessment.     R =   Request or Recommendation:   Patient is on MSSA monitoring with Valium for alcohol withdrawal. Is on withdrawal precautions. Patient's general medical and psychosocial well-being to be monitored. Interventions to e provided as ordered and/or as needed.

## 2019-08-17 NOTE — ED PROVIDER NOTES
History     Chief Complaint   Patient presents with     Depression     Per EMS pt coming from home, Hx depression-feeling hopeless. Cooperative en route.     The history is provided by the patient and medical records.     Georges Moreno is a 43 year old male who comes in due to his depression, anxiety and drinking.  He is drinking 5 to 15 beers a day. His last drink was sometime this evening.  His breathalyzer is 0.123.  He is having some nausea, struggling keeping food down and some anorexia. He has a history of pancreatitis.  He talks about being sad, anxious and not wanting to live like this anymore. He is not suicidal or homicidal. He wants help and feels he needs to be in the hospital otherwise he will just continue to drink. He believes the only medication that works is ativan but he cannot get it anymore.  He uses marijuana daily.  He does not think that this has anything to do with his mood.    Please see the 's assessment in EPIC from today (8/16/19) for further details.    I have reviewed the Medications, Allergies, Past Medical and Surgical History, and Social History in the Epic system.    Review of Systems   Constitutional: Negative for fever.   Respiratory: Negative for shortness of breath.    Cardiovascular: Negative for chest pain.   Gastrointestinal: Negative for abdominal pain.   Psychiatric/Behavioral: Positive for dysphoric mood. Negative for hallucinations, self-injury and suicidal ideas. The patient is nervous/anxious.    All other systems reviewed and are negative.      Physical Exam   BP: 116/88  Pulse: 80  Temp: 98.2  F (36.8  C)  Resp: 18  Weight: 99.8 kg (220 lb)(stated)      Physical Exam   Constitutional: He appears well-developed and well-nourished.   Cardiovascular: Normal rate, regular rhythm and normal heart sounds.   Pulmonary/Chest: Effort normal and breath sounds normal. No respiratory distress.   Psychiatric: His speech is normal and behavior is normal. Thought content  normal. His mood appears anxious. He is not actively hallucinating. Thought content is not paranoid and not delusional. Cognition and memory are normal. He expresses inappropriate judgment. He exhibits a depressed mood. He expresses no homicidal and no suicidal ideation. He expresses no suicidal plans and no homicidal plans.   Georges is a 44 y/o male who looks his age. He is disheveled with good eye contact.    Nursing note and vitals reviewed.      ED Course        Procedures               Labs Ordered and Resulted from Time of ED Arrival Up to the Time of Departure from the ED   DRUG ABUSE SCREEN 6 CHEM DEP URINE ([facility]) - Abnormal; Notable for the following components:       Result Value    Cannabinoids Qual Urine Positive (*)     Ethanol Qual Urine Positive (*)     All other components within normal limits   ALCOHOL BREATH TEST POCT - Abnormal; Notable for the following components:    Alcohol Breath Test 0.123 (*)     All other components within normal limits   CBC WITH PLATELETS DIFFERENTIAL   COMPREHENSIVE METABOLIC PANEL   LIPASE            Assessments & Plan (with Medical Decision Making)   Georges will be admitted to detox due to his depression, anxiety and alcohol dependence. He will go to station 3a under Dr. Menon.    I have reviewed the nursing notes.    I have reviewed the findings, diagnosis, plan and need for follow up with the patient.    New Prescriptions    No medications on file       Final diagnoses:   Mood disorder (H)   Uncomplicated alcohol dependence (H)       8/16/2019   [facility], [place], EMERGENCY DEPARTMENT     Minh Griffiths MD  08/16/19 [id number]

## 2019-08-17 NOTE — PROGRESS NOTES
"Case Management Note  8/17/2019    Met with pt to discuss discharge planning. Pt irritable, defensive throughout conversation. Pt reports he wants to attend [facility], \"but only if I can smoke.\" Writer explained to pt smoking policy, assured him there are still smoke breaks. Pt responded \"because if I can't smoke, in the first 5 minutes, I'll leave.\" Pt reported he is also interested in [facility] in [place]. Writer explained they typically don't take state funding, offered to double check. Pt responded \"I'm not looking for state funding, I have insurance.\" Writer explained that due to pt having a medicaid-funded insurance (Blue Plus), he can't go to [facility], [facility], or [facility] as these programs will not take state funding including medicaid insurances. Assured pt he has many options for treatment with Blue Plus insurance. Pt wants to go somewhere that allows smoking and will be in the [facility] as he doesn't drive. Explained to pt the intake paperwork process. Pt has been through this before. Pt wanted assurance that by saying he wants residential treatment, he wouldn't be forced to go anywhere. Pt reports \"that's what happened last time.\" Pt reports he had been getting set up to go to residential during his last [facility] stay, then changed his mind to go to outpatient, and the [facility] staff were upset about it. Pt reports he didn't follow through with outpatient, \"but still\", wants to have autonomy in his discharge planning. Writer encouraged pt to work with [facility] staff during his stay as a team rather than opposition. Assured pt he is still his own decision-maker.     Upon review of their website, it's unclear if [facility] takes medicaid insurances. Writer called, left  requesting more info.     Addendum: Pt completed his paperwork, however there was not enough time to complete his assessment. Pt informed of this, understands he'll be assigned a CM on Monday who can follow up.     Steffanie" JAMES Barahona, [facility]

## 2019-08-17 NOTE — PROGRESS NOTES
08/16/19 5207   Patient Belongings   Did you bring any home meds/supplements to the hospital?  No   Patient Belongings other (see comments)   Patient Belongings Remaining with Patient other (see comments)   Patient Belongings Put in Hospital Secure Location (Security or Locker, etc.) other (see comments)   Belongings Search Yes   Clothing Search Yes   Second Staff Erick and Minh   Comment see note    Storage Bin   Just the shoes lace, lighter, cigarette  Medical bin   Cell phone, wallet,   Security Envelope  $70, [employer] [place] [employer], [employer] bank visa card and [place] 's license  A             Admission:  I am responsible for any personal items that are not sent to the safe or pharmacy.  [facility] is not responsible for loss, theft or damage of any property in my possession.  Signature:  _________________________________ Date: _______  Time: _____                                              Staff Signature:  ____________________________ Date: ________  Time: _____      2nd Staff person, if patient is unable/unwilling to sign:    Signature: ________________________________ Date: ________  Time: _____   Discharge:  [facility] has returned all of my personal belongings:  Signature: _________________________________ Date: ________  Time: _____                                          Staff Signature:  ____________________________ Date: ________  Time: _____

## 2019-08-18 PROCEDURE — 12800008 ZZH R&B CD ADULT

## 2019-08-18 PROCEDURE — H2032 ACTIVITY THERAPY, PER 15 MIN: HCPCS

## 2019-08-18 PROCEDURE — 25000132 ZZH RX MED GY IP 250 OP 250 PS 637: Performed by: PSYCHIATRY & NEUROLOGY

## 2019-08-18 PROCEDURE — 25000132 ZZH RX MED GY IP 250 OP 250 PS 637: Performed by: NURSE PRACTITIONER

## 2019-08-18 RX ORDER — NICOTINE 21 MG/24HR
1 PATCH, TRANSDERMAL 24 HOURS TRANSDERMAL DAILY
Status: DISCONTINUED | OUTPATIENT
Start: 2019-08-18 | End: 2019-08-20 | Stop reason: HOSPADM

## 2019-08-18 RX ORDER — QUETIAPINE FUMARATE 100 MG/1
100 TABLET, FILM COATED ORAL
Status: DISCONTINUED | OUTPATIENT
Start: 2019-08-18 | End: 2019-08-20 | Stop reason: HOSPADM

## 2019-08-18 RX ADMIN — QUETIAPINE FUMARATE 100 MG: 100 TABLET ORAL at 22:16

## 2019-08-18 RX ADMIN — GABAPENTIN 300 MG: 300 CAPSULE ORAL at 20:07

## 2019-08-18 RX ADMIN — GABAPENTIN 300 MG: 300 CAPSULE ORAL at 13:36

## 2019-08-18 RX ADMIN — NICOTINE POLACRILEX 8 MG: 4 GUM, CHEWING ORAL at 08:50

## 2019-08-18 RX ADMIN — HYDROXYZINE HYDROCHLORIDE 50 MG: 50 TABLET, FILM COATED ORAL at 12:09

## 2019-08-18 RX ADMIN — QUETIAPINE FUMARATE 50 MG: 50 TABLET ORAL at 16:17

## 2019-08-18 RX ADMIN — MULTIPLE VITAMINS W/ MINERALS TAB 1 TABLET: TAB at 08:32

## 2019-08-18 RX ADMIN — NICOTINE POLACRILEX 8 MG: 4 GUM, CHEWING ORAL at 12:09

## 2019-08-18 RX ADMIN — IBUPROFEN 600 MG: 600 TABLET ORAL at 11:24

## 2019-08-18 RX ADMIN — NICOTINE POLACRILEX 8 MG: 4 GUM, CHEWING ORAL at 18:06

## 2019-08-18 RX ADMIN — FLECAINIDE ACETATE 100 MG: 100 TABLET ORAL at 08:32

## 2019-08-18 RX ADMIN — FLECAINIDE ACETATE 100 MG: 100 TABLET ORAL at 20:07

## 2019-08-18 RX ADMIN — FOLIC ACID 1 MG: 1 TABLET ORAL at 08:32

## 2019-08-18 RX ADMIN — GABAPENTIN 300 MG: 300 CAPSULE ORAL at 08:32

## 2019-08-18 RX ADMIN — THIAMINE HCL TAB 100 MG 100 MG: 100 TAB at 08:32

## 2019-08-18 ASSESSMENT — ACTIVITIES OF DAILY LIVING (ADL)
LAUNDRY: WITH SUPERVISION
DRESS: INDEPENDENT
ORAL_HYGIENE: INDEPENDENT
HYGIENE/GROOMING: INDEPENDENT

## 2019-08-18 NOTE — PLAN OF CARE
The patient has not required Valium so far this shift.  He continues to be tired and says that he feels somewhat dizzy from the Gabapentin.  The writer encouraged the patient to talk to the doctor about this.  He is unsure about treatment, but is leaning towards [facility] or [facility].  No thoughts of self harm.

## 2019-08-18 NOTE — PROGRESS NOTES
08/18/19 1800   Therapeutic Recreation   Type of Intervention structured groups   Activity leisure education   Response Participates, initiates socially appropriate   Hours 1     Pt participated in Therapeutic Recreation group with focus on stress reduction, leisure education, and acquisition of knowledge and skills. Pt was fully engaged and cooperative in group activity creating a collaborative leisure inventory. Pt participated through the entire duration of the group. Pt discussed many healthy interests enjoyed during free time during group discussion. Showed progress in session goals. Pt mood was calm and was appropriate with interactions.

## 2019-08-19 LAB — DEPRECATED CALCIDIOL+CALCIFEROL SERPL-MC: 12 UG/L (ref 20–75)

## 2019-08-19 PROCEDURE — 25000132 ZZH RX MED GY IP 250 OP 250 PS 637: Performed by: PSYCHIATRY & NEUROLOGY

## 2019-08-19 PROCEDURE — 12800008 ZZH R&B CD ADULT

## 2019-08-19 PROCEDURE — 25000132 ZZH RX MED GY IP 250 OP 250 PS 637: Performed by: NURSE PRACTITIONER

## 2019-08-19 RX ORDER — PRAZOSIN HYDROCHLORIDE 1 MG/1
1 CAPSULE ORAL AT BEDTIME
Status: DISCONTINUED | OUTPATIENT
Start: 2019-08-19 | End: 2019-08-20 | Stop reason: HOSPADM

## 2019-08-19 RX ORDER — PRAZOSIN HYDROCHLORIDE 1 MG/1
1 CAPSULE ORAL AT BEDTIME
Qty: 30 CAPSULE | Refills: 3 | Status: SHIPPED | OUTPATIENT
Start: 2019-08-19 | End: 2020-01-16

## 2019-08-19 RX ORDER — QUETIAPINE FUMARATE 50 MG/1
50 TABLET, FILM COATED ORAL 4 TIMES DAILY PRN
Qty: 120 TABLET | Refills: 1 | Status: SHIPPED | OUTPATIENT
Start: 2019-08-19 | End: 2020-01-16

## 2019-08-19 RX ORDER — DIVALPROEX SODIUM 125 MG/1
500 CAPSULE, COATED PELLETS ORAL AT BEDTIME
Qty: 30 CAPSULE | Refills: 3 | Status: SHIPPED | OUTPATIENT
Start: 2019-08-19 | End: 2020-01-16

## 2019-08-19 RX ORDER — GABAPENTIN 300 MG/1
300 CAPSULE ORAL 3 TIMES DAILY
Qty: 90 CAPSULE | Refills: 3 | Status: SHIPPED | OUTPATIENT
Start: 2019-08-19 | End: 2020-01-16

## 2019-08-19 RX ORDER — DIVALPROEX SODIUM 125 MG/1
500 CAPSULE, COATED PELLETS ORAL
Status: DISCONTINUED | OUTPATIENT
Start: 2019-08-19 | End: 2019-08-20 | Stop reason: HOSPADM

## 2019-08-19 RX ORDER — MULTIPLE VITAMINS W/ MINERALS TAB 9MG-400MCG
1 TAB ORAL DAILY
Qty: 90 EACH | Refills: 3 | Status: SHIPPED | OUTPATIENT
Start: 2019-08-20 | End: 2020-01-16

## 2019-08-19 RX ORDER — HYDROXYZINE HYDROCHLORIDE 50 MG/1
50 TABLET, FILM COATED ORAL EVERY 4 HOURS PRN
Qty: 120 TABLET | Refills: 3 | Status: SHIPPED | OUTPATIENT
Start: 2019-08-19 | End: 2020-01-16

## 2019-08-19 RX ORDER — NICOTINE 21 MG/24HR
1 PATCH, TRANSDERMAL 24 HOURS TRANSDERMAL DAILY
Qty: 28 PATCH | Refills: 3 | Status: SHIPPED | OUTPATIENT
Start: 2019-08-20 | End: 2020-01-16

## 2019-08-19 RX ORDER — FOLIC ACID 1 MG/1
1 TABLET ORAL DAILY
Qty: 90 TABLET | Refills: 3 | Status: SHIPPED | OUTPATIENT
Start: 2019-08-20 | End: 2020-01-16

## 2019-08-19 RX ADMIN — NICOTINE POLACRILEX 4 MG: 4 GUM, CHEWING ORAL at 07:00

## 2019-08-19 RX ADMIN — GABAPENTIN 300 MG: 300 CAPSULE ORAL at 22:25

## 2019-08-19 RX ADMIN — FOLIC ACID 1 MG: 1 TABLET ORAL at 08:25

## 2019-08-19 RX ADMIN — NICOTINE 1 PATCH: 21 PATCH, EXTENDED RELEASE TRANSDERMAL at 08:25

## 2019-08-19 RX ADMIN — NICOTINE POLACRILEX 4 MG: 4 GUM, CHEWING ORAL at 12:07

## 2019-08-19 RX ADMIN — NICOTINE POLACRILEX 4 MG: 4 GUM, CHEWING ORAL at 18:50

## 2019-08-19 RX ADMIN — FLECAINIDE ACETATE 100 MG: 100 TABLET ORAL at 08:25

## 2019-08-19 RX ADMIN — GABAPENTIN 300 MG: 300 CAPSULE ORAL at 08:25

## 2019-08-19 RX ADMIN — MULTIPLE VITAMINS W/ MINERALS TAB 1 TABLET: TAB at 08:25

## 2019-08-19 RX ADMIN — QUETIAPINE FUMARATE 50 MG: 50 TABLET ORAL at 12:07

## 2019-08-19 RX ADMIN — PRAZOSIN HYDROCHLORIDE 1 MG: 1 CAPSULE ORAL at 22:25

## 2019-08-19 RX ADMIN — GABAPENTIN 300 MG: 300 CAPSULE ORAL at 14:07

## 2019-08-19 RX ADMIN — QUETIAPINE FUMARATE 100 MG: 100 TABLET ORAL at 22:25

## 2019-08-19 RX ADMIN — FLECAINIDE ACETATE 100 MG: 100 TABLET ORAL at 22:25

## 2019-08-19 RX ADMIN — THIAMINE HCL TAB 100 MG 100 MG: 100 TAB at 08:25

## 2019-08-19 ASSESSMENT — ACTIVITIES OF DAILY LIVING (ADL)
HYGIENE/GROOMING: INDEPENDENT
DRESS: INDEPENDENT
ORAL_HYGIENE: INDEPENDENT
LAUNDRY: WITH SUPERVISION

## 2019-08-19 ASSESSMENT — ANXIETY QUESTIONNAIRES
1. FEELING NERVOUS, ANXIOUS, OR ON EDGE: NEARLY EVERY DAY
IF YOU CHECKED OFF ANY PROBLEMS ON THIS QUESTIONNAIRE, HOW DIFFICULT HAVE THESE PROBLEMS MADE IT FOR YOU TO DO YOUR WORK, TAKE CARE OF THINGS AT HOME, OR GET ALONG WITH OTHER PEOPLE: EXTREMELY DIFFICULT
4. TROUBLE RELAXING: MORE THAN HALF THE DAYS
3. WORRYING TOO MUCH ABOUT DIFFERENT THINGS: NEARLY EVERY DAY
6. BECOMING EASILY ANNOYED OR IRRITABLE: NEARLY EVERY DAY
GAD7 TOTAL SCORE: 16
5. BEING SO RESTLESS THAT IT IS HARD TO SIT STILL: MORE THAN HALF THE DAYS
2. NOT BEING ABLE TO STOP OR CONTROL WORRYING: NEARLY EVERY DAY
7. FEELING AFRAID AS IF SOMETHING AWFUL MIGHT HAPPEN: NOT AT ALL

## 2019-08-19 ASSESSMENT — PATIENT HEALTH QUESTIONNAIRE - PHQ9: SUM OF ALL RESPONSES TO PHQ QUESTIONS 1-9: 10

## 2019-08-19 NOTE — PLAN OF CARE
The patient is out of detox.  He is social with his peers and has participated in unit activities.  No complaints noted.  He is awaiting placement.
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
## 2019-08-19 NOTE — PROGRESS NOTES
Patient has not scored greater than an 8 on the MSSA withdrawal protocol for alcohol withdrawal and has not received any valium for over 24 hours. Patient is out of alcohol detox.

## 2019-08-19 NOTE — PROGRESS NOTES
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 basis. \"Anxiety is the biggest trigger.\"      3. Have you been treated for alcohol/other drug abuse/dependence? Yes.  3B. Number of times(lifetime) (over what period) 6.  3C. Number of times completed treatment (lifetime) 6.  3D. During the past three years have you participated in outpatient and/or residential?  No    4. Support group participation: Have you/do you attend support group meetings to reduce/stop your alcohol/drug use? How recently? What was your experience? Are you willing to restart? If the person has not participated, is he or she willing?     \"Was going to AA. But due to anxiety I cannot attend.\"     5. What would assist you in staying sober/straight?     Unsure.     Dimension V Ratings   Relapse/Continued Use/Continued problem potential - The placing authority must use the criteria in Dimension V to determine a client s relapse, continued use, and continued problem potential.   RISK DESCRIPTIONS - Severity ratin No awareness of the negative impact of mental health problems or substance abuse. No coping skills to arrest mental health or addiction illnesses, or prevent relapse.    REASONS SEVERITY WAS ASSIGNED - (What information did the person provide that indicates his or her understanding of relapse issues? What about the person s experience indicates how prone he or she is to relapse? What coping " "skills does the person have that decrease relapse potential?)      Pt endorses tolerance, withdrawal, cravings, progressive use, loss of control. Patient has continued to use despite significant consequences. Patient lacks insight into his relapse cycle, triggers and cues. Pt has comorbid mental health concerns that are poorly understood that are likely increasing risk of recidivism. Pt lacks coping skills to arrest/prevent use, manage triggers, manage emotions including anxiety and anger. Pt is at high risk of relapse at this time.        DIMENSION VI - Recovery Environment   1. Are you employed/attending school? Tell me about that.     Not employed or attending school.     2A. Describe a typical day; evening for you. Work, school, social, leisure, volunteer, spiritual practices. Include time spent obtaining, using, recovering from drugs or alcohol. (DSM)     \"Since I don't work, most of my days are getting drunk.\"     Please describe what leisure activities have been associated with your substance abuse:     Fishing with his friends, they usually get drunk/high. Alone however it's not as much of an issue.     2B. How often do you spend more time than you planned using or use more than you planned? (DSM)     \"A lot.\"     3. How important is using to your social connections? Do many of your family or friends use?     Not important for family connections.   Friends: \"All of them use alcohol and marijuana.\"     4A. Are you currently in a significant relationship?     No    4C. Sexual Orientation:     Heterosexual    5A. Who do you live with?      Pt lives with his mother.     5B. Tell me about their alcohol/drug use and mental health issues.     Pt's mom is sober.     5C. Are you concerned for your safety there? No    5D. Are you concerned about the safety of anyone else who lives with you? No    6A. Do you have children who live with you?     No    6B. Do you have children who do not live with you?     No    7A. Who " "supports you in making changes in your alcohol or drug use? What are they willing to do to support you? Who is upset or angry about you making changes in your alcohol or drug use? How big a problem is this for you?      \"My friends don't seem to care. My family has shown concern for years.\"     7B. This table is provided to record information about the person s relationships and available support It is not necessary to ask each item; only to get a comprehensive picture of their support system.  How often can you count on the following people when you need someone?   Partner / Spouse The patient does not have a current partner or spouse.   Parent(s)/Aunt(s)/Uncle(s)/Grandparents Usually supportive   Sibling(s)/Cousin(s) Usually supportive   Child(oma) The patient doesn't have any children.   Other relative(s) The patient doesn't have any other relatives.   Friend(s)/neighbor(s) Never supportive   Child(oma) s father(s)/mother(s) The patient doesn't have any children.   Support group member(s) Always supportive   Community of [place] members The patient denied having any current involvement with community bridgette members.   /counselor/therapist/healer The patient denied having any current involvement with a , counselor, therapist or healer.   Other (specify) No     8A. What is your current living situation?     \"I live with my mother. Currently unemployable.\"     8B. What is your long term plan for where you will be living?     \"Mother's house.\"     8C. Tell me about your living environment/neighborhood? Ask enough follow up questions to determine safety, criminal activity, availability of alcohol and drugs, supportive or antagonistic to the person making changes.      No concerns. Drugs are available everywhere. \"Yes I feel safe there.\"     9. Criminal justice history: Gather current/recent history and any significant history related to substance use--Arrests? Convictions? Circumstances? Alcohol " "or drug involvement? Sentences? Still on probation or parole? Expectations of the court? Current court order? Any sex offenses - lifetime? What level? (DSM)    DWIs, disorderly conduct, 5th degree assault, burglary, theft, possession.     10. What obstacles exist to participating in treatment? (Time off work, childcare, funding, transportation, pending assisted time, living situation)     \"Possibly transportation. I don't drive.\"     Dimension VI Ratings   Recovery environment - The placing authority must use the criteria in Dimension VI to determine a client s recovery environment.   RISK DESCRIPTIONS - Severity rating: 3 Client is not engaged in structured, meaningful activity and the client's peers, family, significant other, and living environment are unsupportive, or there is significant criminal justice system involvement.    REASONS SEVERITY WAS ASSIGNED - (What support does the person have for making changes? What structure/stability does the person have in his or her daily life that will increase the likelihood that changes can be sustained? What problems exist in the person s environment that will jeopardize getting/staying clean and sober?)     Pt is unemployed, not attending school. Pt has no daily structure or routine. Pt lacks sufficient sober social support. All of pt's friends use alcohol/drugs, they do not support pt's recovery. Pt lives with his mom who is sober, however pt continually uses there and pt is sure that if he returns to this environment after detox he will relapse immediately. Pt endorses significant anxiety and sadness about his current station in life. Pt was working in construction, exploring a career change due to ongoing chronic pain issues. Pt's environment is currently unsupportive toward recovery at this time.        Client Choice/Exceptions   Would you like services specific to language, age, gender, culture, Islam preference, race, ethnicity, sexual orientation or disability? "  No    What particular treatment choices and options would you like to have? Residential or IOP w/ Lodging or similar    Do you have a preference for a particular treatment program? [facility]    Criteria for Diagnosis     Criteria for Diagnosis  DSM-5 Criteria for Substance Use Disorder  Instructions: Determine whether the client currently meets the criteria for Substance Use Disorder using the diagnostic criteria in the DSM-V pp.[phone]. Current means during the most recent 12 months outside a facility that controls access to substances    Category of Substance Severity (ICD-10 Code / DSM 5 Code)     Alcohol Use Disorder Severe  (10.20) (303.90)   Cannabis Use Disorder Moderate  (F12.20) (304.30)   Hallucinogen Use Disorder The patient does not meet the criteria for a Hallucinogen use disorder.   Inhalant Use Disorder The patient does not meet the criteria for an Inhalant use disorder.   Opioid Use Disorder The patient does not meet the criteria for an Opioid use disorder.   Sedative, Hypnotic, or Anxiolytic Use Disorder The patient does not meet the criteria for a Sedative/Hypnotic use disorder.   Stimulant Related Disorder The patient does not currently meet the criteria for a Stimulant use disorder, but has a history of cocaine use disorder.   Tobacco Use Disorder Moderate   (F17.200) (305.1)   Other (or unknown) Substance Use Disorder The patient does not meet the criteria for a Other (or unknown) Substance use disorder.       Collateral Contact Summary   Number of contacts made: 2    Contact with referring person:  No    If court related records were reviewed, summarize here: No court records had been reviewed at the time of this documentation.    Information from collateral contacts supported/largely agreed with information from the client and associated risk ratings.      Rule 25 Assessment Summary and Plan   's Recommendation    1)  Complete a residential based, IOP w/Lodging or similar CD  "treatment program such as [facility].   2)  Abstain from all mood-altering chemicals unless prescribed by a licensed provider.   3)  Attend weekly sober support group meetings.     4)  Actively work with a male sponsor or .  5)  Follow all the recommendations of your treatment/medical providers.  6)  Patient may benefit from obtaining a full mental health evaluation.  8)  Patient could benefit from individual therapy due to mental health symptoms, childhood abuse, poor coping mechanisms to manage emotional dysregulation, anxiety.     Collateral Contacts     Name:    Dr. Cat MD Relationship:    3A Attending Provider   Phone Number:    [phone] Releases:    Yes     \"The patient is a 43-year-old man who is here for alcohol withdrawal and been drinking 10+ beers daily.  He reports that he mostly uses alcohol to self-medicate his anxiety.  Without marijuana or alcohol on board, he reports that his anxiety is so bad that he is unable to leave the house.  He has been tried on numerous SSRI medications in the past.  They either caused sexual side effects, rash or worsening anxiety.  He was given them adequate trials.  Nothing seems to have helped.  He has been on gabapentin in the past, did not recall it being helpful but could not remember the dose.  He was recently prescribed Seroquel for sleep, which he found moderately effective for that.  The patient is very anxious even during our interview.  The more we talked, the more he is thinking about how bad his anxiety is going to get and he starts to spiral during our interview.  I did discuss with him that stopping marijuana and stopping alcohol is likely going to lead to worsening anxiety over several weeks to possibly several months and indicated that we can manage this by nonaddictive medications here.  In the past, he has been given Ativan for about 4 months, which really worked, but then this was stopped.  We discussed the problems associated " "with chronic benzodiazepine use, but did indicate that if he is getting close followup with a provider who is good at managing this, it might be reasonable for him to get short-term benzodiazepine treatment in the form of several months with a taper at the end, provided that he is abstaining from all drugs and attending regular therapy.  In the past, he has been seeing in our outpatient [facility] under the care of Dr. Altamirano who had prescribed him benzodiazepines to good effect.  He understands that he cannot be on them here or in [facility].  He is just fearful that we are not going to be to find a medicine to manage his anxiety and that he is going to return to drinking soon as he leaves.\"    Collateral Contacts     Name:    Dr. Tunde MD   Relationship:    ED Provider   Phone Number:    [phone]   Releases:    Yes     \"Georges Moreno is a 43 year old male who comes in due to his depression, anxiety and drinking.  He is drinking 5 to 15 beers a day. His last drink was sometime this evening.  His breathalyzer is 0.123.  He is having some nausea, struggling keeping food down and some anorexia. He has a history of pancreatitis.  He talks about being sad, anxious and not wanting to live like this anymore. He is not suicidal or homicidal. He wants help and feels he needs to be in the hospital otherwise he will just continue to drink. He believes the only medication that works is ativan but he cannot get it anymore.  He uses marijuana daily.  He does not think that this has anything to do with his mood.\"    ollateral Contacts      A problematic pattern of alcohol/drug use leading to clinically significant impairment or distress, as manifested by at least two of the following, occurring within a 12-month period:    1.) Alcohol/drug is often taken in larger amounts or over a longer period than was intended.  2.) There is a persistent desire or unsuccessful efforts to cut down or control alcohol/drug use  3.) A " great deal of time is spent in activities necessary to obtain alcohol, use alcohol, or recover from its effects.  4.) Craving, or a strong desire or urge to use alcohol/drug  5.) Recurrent alcohol/drug use resulting in a failure to fulfill major role obligations at work, school or home.  6.) Continued alcohol use despite having persistent or recurrent social or interpersonal problems caused or exacerbated by the effects of alcohol/drug.  7.) Important social, occupational, or recreational activities are given up or reduced because of alcohol/drug use.  8.) Recurrent alcohol/drug use in situations in which it is physically hazardous.  9.) Alcohol/drug use is continued despite knowledge of having a persistent or recurrent physical or psychological problem that is likely to have been caused or exacerbated by alcohol.  10.) Tolerance, as defined by either of the following: A need for markedly increased amounts of alcohol/drug to achieve intoxication or desired effect. and A markedly diminished effect with continued use of the same amount of alcohol/drug.  11.) Withdrawal, as manifested by either of the following: The characteristic withdrawal syndrome for alcohol/drug (refer to Criteria A and B of the criteria set for alcohol/drug withdrawal).      Specify if: In early remission:  After full criteria for alcohol/drug use disorder were previously met, none of the criteria for alcohol/drug use disorder have been met for at least 3 months but for less than 12 months (with the exception that Criterion A4,  Craving or a strong desire or urge to use alcohol/drug  may be met).     In sustained remission:   After full criteria for alcohol use disorder were previously met, non of the criteria for alcohol/drug use disorder have been met at any time during a period of 12 months or longer (with the exception that Criterion A4,  Craving or strong desire or urge to use alcohol/drug  may be met).   Specify if:   This additional  specifier is used if the individual is in an environment where access to alcohol is restricted.    Mild: Presence of 2-3 symptoms  Moderate: Presence of 4-5 symptoms  Severe: Presence of 6 or more symptoms
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
## 2019-08-20 ENCOUNTER — BEH TREATMENT PLAN (OUTPATIENT)
Dept: BEHAVIORAL HEALTH | Facility: CLINIC | Age: 44
End: 2019-08-20

## 2019-08-20 ENCOUNTER — HOSPITAL ENCOUNTER (OUTPATIENT)
Dept: BEHAVIORAL HEALTH | Facility: CLINIC | Age: 44
End: 2019-08-20
Attending: FAMILY MEDICINE
Payer: COMMERCIAL

## 2019-08-20 VITALS
WEIGHT: 220 LBS | OXYGEN SATURATION: 98 % | HEIGHT: 73 IN | TEMPERATURE: 97.2 F | SYSTOLIC BLOOD PRESSURE: 125 MMHG | BODY MASS INDEX: 29.16 KG/M2 | HEART RATE: 75 BPM | RESPIRATION RATE: 16 BRPM | DIASTOLIC BLOOD PRESSURE: 85 MMHG

## 2019-08-20 PROCEDURE — H2035 A/D TX PROGRAM, PER HOUR: HCPCS

## 2019-08-20 PROCEDURE — 10020000 ZZH LODGING PLUS FACILITY CHARGE ADULT

## 2019-08-20 PROCEDURE — 25000132 ZZH RX MED GY IP 250 OP 250 PS 637: Performed by: NURSE PRACTITIONER

## 2019-08-20 PROCEDURE — 25000132 ZZH RX MED GY IP 250 OP 250 PS 637: Performed by: PSYCHIATRY & NEUROLOGY

## 2019-08-20 RX ORDER — LORATADINE 10 MG/1
10 TABLET ORAL DAILY PRN
COMMUNITY
End: 2020-02-12

## 2019-08-20 RX ORDER — MAGNESIUM HYDROXIDE/ALUMINUM HYDROXICE/SIMETHICONE 120; 1200; 1200 MG/30ML; MG/30ML; MG/30ML
30 SUSPENSION ORAL EVERY 6 HOURS PRN
COMMUNITY
End: 2020-01-16

## 2019-08-20 RX ORDER — IBUPROFEN 200 MG
200-400 TABLET ORAL EVERY 4 HOURS PRN
COMMUNITY
End: 2020-01-16

## 2019-08-20 RX ORDER — ACETAMINOPHEN 325 MG/1
325-650 TABLET ORAL EVERY 4 HOURS PRN
COMMUNITY
End: 2020-02-26

## 2019-08-20 RX ORDER — AMOXICILLIN 250 MG
2 CAPSULE ORAL DAILY PRN
COMMUNITY
End: 2020-01-16

## 2019-08-20 RX ORDER — LANOLIN ALCOHOL/MO/W.PET/CERES
3 CREAM (GRAM) TOPICAL
COMMUNITY
End: 2020-01-16

## 2019-08-20 RX ADMIN — GABAPENTIN 300 MG: 300 CAPSULE ORAL at 13:11

## 2019-08-20 RX ADMIN — FLECAINIDE ACETATE 100 MG: 100 TABLET ORAL at 08:39

## 2019-08-20 RX ADMIN — GABAPENTIN 300 MG: 300 CAPSULE ORAL at 08:39

## 2019-08-20 RX ADMIN — NICOTINE 1 PATCH: 21 PATCH, EXTENDED RELEASE TRANSDERMAL at 08:40

## 2019-08-20 ASSESSMENT — ANXIETY QUESTIONNAIRES
6. BECOMING EASILY ANNOYED OR IRRITABLE: NEARLY EVERY DAY
4. TROUBLE RELAXING: SEVERAL DAYS
7. FEELING AFRAID AS IF SOMETHING AWFUL MIGHT HAPPEN: SEVERAL DAYS
GAD7 TOTAL SCORE: 13
5. BEING SO RESTLESS THAT IT IS HARD TO SIT STILL: SEVERAL DAYS
1. FEELING NERVOUS, ANXIOUS, OR ON EDGE: NEARLY EVERY DAY
2. NOT BEING ABLE TO STOP OR CONTROL WORRYING: MORE THAN HALF THE DAYS
3. WORRYING TOO MUCH ABOUT DIFFERENT THINGS: MORE THAN HALF THE DAYS
GAD7 TOTAL SCORE: 16

## 2019-08-20 ASSESSMENT — ACTIVITIES OF DAILY LIVING (ADL)
LAUNDRY: WITH SUPERVISION
ORAL_HYGIENE: INDEPENDENT
DRESS: INDEPENDENT
HYGIENE/GROOMING: INDEPENDENT

## 2019-08-20 ASSESSMENT — PATIENT HEALTH QUESTIONNAIRE - PHQ9: SUM OF ALL RESPONSES TO PHQ QUESTIONS 1-9: 11

## 2019-08-20 NOTE — DISCHARGE INSTRUCTIONS
Behavioral Discharge Planning and Instructions  THANK YOU FOR CHOOSING THE [address]  [phone]    Summary: You were admitted to [facility] on 8/16/2019 for detoxification from Alcohol.  A medical exam was performed that included lab work. You have met with a  and opted to attend treatment at [facility]. Please take care and make your recovery a priority!    Main Diagnosis:  Per  Dr. Cat MD:psychiatrist  DIAGNOSES:   1.  Alcohol withdrawal.   2.  Alcohol use disorder, severe.   3.  Cannabis use disorder, moderate.   4.  Cigarette nicotine dependence, currently in withdrawal.   5.  Generalized anxiety disorder.   6.  Paroxysmal atrial fibrillation.   7.  Chronic low back pain.     Major Treatments, Procedures and Findings:  You were detoxed from alcohol. You have met with a  to develop a treatment plan for discharge.  You have had labs drawn and a copy of those labs will be sent home with you. Your alcohol withdrawal is complete and you are being discharged today.  Please bring your lab results with to your follow up doctor appointment.  Make your recovery a priority!                      Symptoms to Report:  If you experience more anxiety, confusion, sleeplessness, deep sadness or thoughts of suicide, notify your treatment team or notify your primary care physician. IF ANY OF THE SYMPTOMS YOU ARE EXPERIENCING ARE A MEDICAL EMERGENCY CALL 911 IMMEDIATELY.     Lifestyle Adjustment:   Health Action Plan:  1.Create a daily schedule  2. Eat Healthy  3. Plan Enjoyable Sober Activities  4. Use Problem Solving Skills and Deal with Issues as they Arise.   5. Be Physically Active  6. Take your medications as prescribed  7. Get enough restful sleep  8. Practice Relaxation  9. Spend time with Supportive People  10. No use of alcohol, illegal drugs or addictive medications other than what is currently prescribed.   11.AA, NA Sponsor are excellent resources for  support      Keeping hands clean is one of the most important steps we can take to avoid getting sick and spreading germs to others.  Please wash your hands frequently.       Disposition: [facility]  Admit date/time: 8/20/2019 @ 1:45 PM.     Primary Provider:  You report you will make your own follow up appointment for medication, psychiatric and medical follow up.       DISCHARGE RESOURCES:  -SMART Recovery - self management for addiction recovery:  www.smartrecovery.org    -Pathways ~ A Health Crisis Resource & Support Center: 650.490.9017.  -[facility] [phone]   -[facility] Behavioral Intake 000-502-0380 or 732-856-1684.  -Crisis Intervention: 804.173.3899 or 037-042-1884 (TTY: 538.526.4942).  Call anytime.  -Suicide Awareness Voices of Education (SAVE) (www.save.org): 980-789-WBGO (9695)  -National Suicide Prevention Line (www.mentalhealthmn.org): 954-907-[facility]MC (2520)  -National [place] on Mental Illness (www.mn.melissa.org): 126.516.4777 or 266-365-7719.  -Yuhl3qzre: text the word LIFE to 58172 for immediate support and crisis intervention  -Mental Health Consumer/Survivor Network of [place] (www.mhcsn.net): 559.183.4675 or 980-543-2064  -Mental Health Association of [place] (www.mentalhealth.org): 587.532.4967 or 296-354-1407     -Substance Abuse and Mental Health Services (www.samhsa.gov)  -Harm Reduction Coalition (www. Harmreduction.org)  -www.prescribetoprevent.org or http://prescribetoprevent.org/video  -Poison control 1-792.603.5979     Sober Support Group Information:  AA/NA & Sponsor/Support  -Alcoholics Anonymous (www.alcoholics-anonymous.org): for local information 24 hours/day  -AA Intergroup service office in [place] (http://www.aastpaul.org/) 284.491.6501  -AA Intergroup service office in [facility]: 153-645-9944. (http://www.a[place].org/)  -Narcotics Anonymous (www.nam[place].org) (131) 917-6387   **Sober Fun Activities:  www.sober-activities.Proven.EZ-Ticket/[facility]//[facility] Connection ([facility])  [facility] connects people seeking recovery to resources that help foster and sustain long-term recovery.  Whether you are seeking resources for treatment, transportation, housing, job training, education, health care or other pathways to recovery, [facility] is a great place to start.  [phone].  Www.minnesotarecovery.org    General Medication Instructions:   See your medication sheet(s) for instructions.   Take all medicines as directed.  Make no changes unless your doctor suggests them.   Go to all your doctor visits.  Be sure to have all your required lab tests. This way, your medicines can be refilled on time.  Do not use any drugs not prescribed by your provider.  AA/NA and Sponsors are excellent resources for support  Avoid alcohol.    Please Note:  If you have any questions at anytime after you are discharged please call the [facility] [place] detox unit 3AW unit at [phone].    [facility], Behavioral Intake [phone]    Please take this discharge folder with you to all your follow up appointments, it contains your lab results, diagnosis, medication list and discharge recommendations.      THANK YOU FOR CHOOSING THE [facility]

## 2019-08-20 NOTE — PLAN OF CARE
Pt alcohol withdrawal is complete. Pt mood irritable in am. Pt in room. 1:1 with patient. He talked of his struggling with his social anxiety. Reports mood as slightly anxious. Denies SI.   Pt is to discharge to [facility]. Pt provided handouts on coping skills for anxiety. Reviewed discharge instructions with patient and he verbalized understanding. Pt has not take depakote as ordered last night. Discussed with patient and he may take it when up in the LP program. See discharge instructions. Pt states his mood is also a little emotional. Teared up when talking about a niece he is close with. Pt works in [employer].

## 2019-08-20 NOTE — PROGRESS NOTES
Progress Note    This patient had a comprehensive/Rule 25 assessment on 8/19/2019 completed by Steffanie Barahona LPC, [facility].  This patient was seen for a face to face update of the comprehensive/Rule 25 assessment on 8/20/2019 by DOM Saldana:  Yes    Alcohol/Drug use since the last CD evaluation (include date of last use):     No additional substances use since the last CD evaluation     Please note any other clinical changes since the last CD evaluation (such as medication changes, additional legal charges, detoxification admissions, overdoses, etc.)     No significant changes since the last CD evaluation       ASAM Dimensions Original scores Current Scores   I.) Intoxication and Withdrawal: 0 0   II.) Biomedical:  1 1   III.) Emotional and Behavioral:  2 2   IV.) Readiness to Change:  1 1   V.) Relapse Potential: 4 4   VI.) Recovery Environmental: 3 3     Please list clinical justifications for the above ASAM score changes since the original comprehensive assessment:     None of the ASAM scores on the six dimensions had changed since the original comprehensive assessment was completed on 8/19/2019.       Current JIMMY: Current UA:     No JIMMY as he was a direct transfer from [facility] IP detoxification unit.      No UA as he was a direct transfer from [facility] IP detoxification unit.        PHQ-9, PUSHPA-7   PHQ-9 on 8/20/2019 PUSHPA-7 on 8/20/2019   The patient's PHQ-9 score was 11 out of 27, indicating moderate depression.   The patient's PUSHPA-7 score was 13 out of 21, indicating moderate anxiety.       Caswell-Suicide Severity Rating Scale Reassessment   Have you ever wished you were dead or that you could go to sleep and not wake up?  Past Month:  No     Have you actually had any thoughts of killing yourself?  Past Month:  No     Have you been thinking about how you might do this?     Past Month:  No   Lifetime:  No   Have you had these thoughts and had some intention of acting on them?     Past Month:  No   Lifetime:   No   Have you started to work out the details of how to kill yourself?   Past Month:  No   Lifetime:  No   Do you intend to carry out this plan?   No     When you have the thoughts how long do they last?  The patient denied ever having any suicidal thoughts in life.     Are there things - anyone or anything (i.e. family, Congregation, pain of death) that stopped you from wanting to die or acting on thoughts of suicide?  Does not apply       2008  [facility]  Used with permission by Lauren Badillo, PhD.       Guide to C-SSRS Risk Ratings   NO IDEATION:  with no active thoughts IDEATION: with a wish to die. IDEATION: with active thoughts. Risk Ratings   If Yes No No 0 - Very Low Risk   If NA Yes No 1 - Low Risk   If NA Yes Yes 2 - Low/moderate risk   IDEATION: associated thoughts of methods without intent or plan INTENT: Intent to follow through on suicide PLAN: Plan to follow through on suicide Risk Ratings cont...   If Yes No No 3 - Moderate Risk   If Yes Yes No 4 - High Risk   If Yes Yes Yes 5 - High Risk   The patient's ADDITIONAL RISK FACTORS and lack of PROTECTIVE FACTORS may increase their overall suicide risk ratings.     Additional Risk Factors:    Someone close to the patient (family member/friend) completed a suicide     Significant history of having untreated or poorly treated mental health symptoms     Significant history of physical illness or chronic medical problems     Significant history of untreated or poorly treated chronic pain issues     Tendency to be socially isolated and/or cut off from the support of others     A recent death of someone close to the patient and/or unresolved grief and loss issues     Significant history of trauma and/or abuse issues     History of impulsive or aggressive behaviors   Protective Factors:    Having people in his/her life that would prevent the patient from considering a suicide attempt (i.e. young children, spouse, parents,  "etc.)     Having easy access to supportive family members     Having restricted access to highly lethal means of suicide     Risk Status   0. - Very Low Risk:  Evaluation Counselors:  Document in Epic / SBAR to counselor \"Very Low Risk\".      Treatment Counselors:  Reassess upon admission as applicable, assess weekly in progress notes under Dimension 3 and summarize in Discharge / Treatment summary under Dimension 3.     Additional information to support suicide risk rating: There was no additional information to provide at this time.     "

## 2019-08-20 NOTE — PROGRESS NOTES
Patient given medication education on prazosin and depakote. Patient not comfortable taking depakote, does not believe he has bipolar disorder. Declined depakote dose this evening.
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
## 2019-08-20 NOTE — PROGRESS NOTES
Initial Services Plan        Service Initiation Date: 8/20/2019    Immediate health and/or safety concerns: No    Identify health and safety concern(s) below and include plan to address:    None Identified    Client issues to be addressed in the first treatment sessions:     Some minimal concern regarding adjusting to a roommate and/or different environment  The patient would like to work on developing sober coping skills and long-term sober maintenance skills.  The patient is open to considering entering some type of extended care program or sober housing for after completing the [facility] treatment program.    Treatment suggestions for client during the time between intake (admit date) and completion of the individual treatment plan:     Look for a sober support network, i.e. 12 step, [facility], [facility], etc  Tour the treatment center or outpatient clinic  Introduce yourself to your treatment group. Spend time getting to know your peers  Review your patient or client handbook  Begin working on your treatment goal list    Completed by: DOM Saldana  Date completed: 8/20/2019 at 1:37 PM

## 2019-08-20 NOTE — PROGRESS NOTES
[facility] Health Assessment      Vital signs:     There were no vitals taken for this visit.      Transfer from [facility]/transfer    Counselor: Sukhi  Drug of Choice: Alcohol, marjuiana  Last use: Friday, 8/16/19  Home clinic/MD: [facility], [place], [place] - Janet Oh  Psychiatrist/therapist: none.  Pt would like to see therapy while at [facility]    Medical history/current conditions:  Atrial Fibrillation     H&P Screen:  H&P within the last 90 days: Yes.  Date: 8/20/2019 Location: 3A/detox      Mental Health diagnosis: ADHD, depression, anxiety  Medication compliant?: yes  Recent sucidal thoughts? no     When? na  Current thought of self-harm? no    Plan? na    Pain assessment:   Pt. Experiencing pain at this time?  Yes.  Rating on 0-10 scale: (1-10 scale): 3.  Location: back  Chronic  Result of: wear and tear.       Nursing Assessment Summary:  No acute issues    On-going nursing intervention required?   No    Acute care visit recommended: no
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
## 2019-08-21 ENCOUNTER — HOSPITAL ENCOUNTER (OUTPATIENT)
Dept: BEHAVIORAL HEALTH | Facility: CLINIC | Age: 44
End: 2019-08-21
Attending: FAMILY MEDICINE
Payer: COMMERCIAL

## 2019-08-21 DIAGNOSIS — E56.9 VITAMIN DEFICIENCY: Primary | ICD-10-CM

## 2019-08-21 PROCEDURE — H2035 A/D TX PROGRAM, PER HOUR: HCPCS

## 2019-08-21 PROCEDURE — H2035 A/D TX PROGRAM, PER HOUR: HCPCS | Mod: HQ

## 2019-08-21 PROCEDURE — 10020000 ZZH LODGING PLUS FACILITY CHARGE ADULT

## 2019-08-21 RX ORDER — CHOLECALCIFEROL (VITAMIN D3) 50 MCG
1 TABLET ORAL 2 TIMES DAILY
Qty: 60 TABLET | Refills: 3 | Status: SHIPPED | OUTPATIENT
Start: 2019-08-21 | End: 2020-01-16

## 2019-08-21 ASSESSMENT — ANXIETY QUESTIONNAIRES: GAD7 TOTAL SCORE: 13

## 2019-08-21 NOTE — PROGRESS NOTES
First Group Note:    D) Patient attended his first group today (8/21/2019). Patient shared a brief history of circumstances around his admittance and was able to identify some ways peers can help him achieve some sober goals. Pt was welcomed by the group.  I) Counselor facilitated group.  Group rules and expectations were reviewed. Counselors and peers were introduced.  A) Patient appears to have adequate motivation for sobriety and seems appropriate for this level of care.  P) Patient to participate in all programming and be available for further assessment and to contribute to treatment planning.    Sunil Self, [facility]
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
## 2019-08-21 NOTE — PROGRESS NOTES
Name: Georges Moreno  Date: 8/21/2019  Medical Record: [id number]    Envelope Number: [id number]    List of Contents (List each item separately in new row):     Nicotine 21/24 PT24 (2 boxes)     Admission:  I am responsible for any personal items that are not sent to the safe or pharmacy.  [facility] is not responsible for loss, theft or damage of any property in my possession.      Patient Signature:  ___________________________________________       Date/Time:__________________________    Staff Signature: __________________________________       Date/Time:__________________________    2nd Staff person, if patient is unable/unwilling to sign:      __________________________________________________________       Date/Time: __________________________      Discharge:  [facility] has returned all of my personal belongings:    Patient Signature: ________________________________________     Date/Time: ____________________________________    Staff Signature: ______________________________________     Date/Time:_____________________________________

## 2019-08-21 NOTE — PROGRESS NOTES
Name: Georges Moreno  Date: 8/20/2019  Medical Record: [id number]    Envelope Number: [id number]    List of Contents (List each item separately in new row):   1 cell phone ( doloressung w/ black case)  1     Admission:  I am responsible for any personal items that are not sent to the safe or pharmacy.  [facility] is not responsible for loss, theft or damage of any property in my possession.      Patient Signature:  ___________________________________________       Date/Time:__________________________    Staff Signature: __________________________________       Date/Time:__________________________    2nd Staff person, if patient is unable/unwilling to sign:      __________________________________________________________       Date/Time: __________________________      Discharge:  [facility] has returned all of my personal belongings:    Patient Signature: ________________________________________     Date/Time: ____________________________________    Staff Signature: ______________________________________     Date/Time:_____________________________________

## 2019-08-22 ENCOUNTER — HOSPITAL ENCOUNTER (OUTPATIENT)
Dept: BEHAVIORAL HEALTH | Facility: CLINIC | Age: 44
End: 2019-08-22
Attending: FAMILY MEDICINE
Payer: COMMERCIAL

## 2019-08-22 PROCEDURE — H2035 A/D TX PROGRAM, PER HOUR: HCPCS

## 2019-08-22 PROCEDURE — 10020000 ZZH LODGING PLUS FACILITY CHARGE ADULT

## 2019-08-22 PROCEDURE — H2035 A/D TX PROGRAM, PER HOUR: HCPCS | Mod: HQ

## 2019-08-22 NOTE — PROGRESS NOTES
Name: Georges Moreno  Date: 8/22/2019  Medical Record: [id number]    Envelope Number: [id number]    List of Contents (List each item separately in new row):   Rolaids  Hydrocortisone oint.    Admission:  I am responsible for any personal items that are not sent to the safe or pharmacy.  [facility] is not responsible for loss, theft or damage of any property in my possession.      Patient Signature:  ___________________________________________       Date/Time:__________________________    Staff Signature: __________________________________       Date/Time:__________________________    2nd Staff person, if patient is unable/unwilling to sign:      __________________________________________________________       Date/Time: __________________________      Discharge:  [facility] has returned all of my personal belongings:    Patient Signature: ________________________________________     Date/Time: ____________________________________    Staff Signature: ______________________________________     Date/Time:_____________________________________

## 2019-08-23 ENCOUNTER — HOSPITAL ENCOUNTER (OUTPATIENT)
Dept: BEHAVIORAL HEALTH | Facility: CLINIC | Age: 44
End: 2019-08-23
Attending: FAMILY MEDICINE
Payer: COMMERCIAL

## 2019-08-23 PROCEDURE — H2035 A/D TX PROGRAM, PER HOUR: HCPCS | Mod: HQ

## 2019-08-23 PROCEDURE — H2035 A/D TX PROGRAM, PER HOUR: HCPCS

## 2019-08-23 PROCEDURE — 80307 DRUG TEST PRSMV CHEM ANLYZR: CPT | Performed by: FAMILY MEDICINE

## 2019-08-23 PROCEDURE — 10020000 ZZH LODGING PLUS FACILITY CHARGE ADULT

## 2019-08-23 NOTE — PROGRESS NOTES
Patient met with program aftercare coordinator to review and initiate transitional care opportunities.  He is currently living with his mother, is unemployed and lacks financial resources. Patient presents as a high risk for relapse with limited independent sober living skills.  Patient acknowledges a need to transition into a program offering continuing care but is resistant to a residential component. Patient was informed of his counselor's recommendation that he enter an extended residential program after completing LP. Patient history indicates he has been resistant to following aftercare recommendations. He has had very limited participation in community support groups and has refused prior efforts to address his mental health issues. Patient was provided information about the residential programs at [facility] and [facility]. He was also given an overview of the [facility] outpatient program in [place].  Patient stated that he will review the information and follow up within the next week.

## 2019-08-24 ENCOUNTER — HOSPITAL ENCOUNTER (OUTPATIENT)
Dept: BEHAVIORAL HEALTH | Facility: CLINIC | Age: 44
End: 2019-08-24
Attending: FAMILY MEDICINE
Payer: COMMERCIAL

## 2019-08-24 LAB
BENZODIAZ UR QL: POSITIVE
CANNABINOIDS UR QL SCN: POSITIVE

## 2019-08-24 PROCEDURE — H2035 A/D TX PROGRAM, PER HOUR: HCPCS | Mod: HQ

## 2019-08-24 PROCEDURE — 10020000 ZZH LODGING PLUS FACILITY CHARGE ADULT

## 2019-08-25 ENCOUNTER — HOSPITAL ENCOUNTER (OUTPATIENT)
Dept: BEHAVIORAL HEALTH | Facility: CLINIC | Age: 44
End: 2019-08-25
Attending: FAMILY MEDICINE
Payer: COMMERCIAL

## 2019-08-25 PROCEDURE — 10020000 ZZH LODGING PLUS FACILITY CHARGE ADULT

## 2019-08-25 PROCEDURE — H2035 A/D TX PROGRAM, PER HOUR: HCPCS | Mod: HQ

## 2019-08-25 NOTE — PROGRESS NOTES
"Patient:  Georges Moreno            Adult CD Progress Note and Treatment Plan Review     Attendance  Please refer to OP BEH CD Adult Attendance Record Documentation Flowsheet    Support group attended this week: Yes    Reporting sobriety:  Yes    Treatment Plan     Treatment Plan Review competed on: 8/25/19     Client preferred learning style:   Visual   Verbal  Hands On  Demonstration    Staff Members contributing: DOM James. DOM Ortega, DOM Barker                        Received Supervision: No    Client contributed to goals and plans.    Client received copy of plan/revised plan: Yes    Client agrees with plan/revised plan: Yes    Changes to Treatment Plan: No    New Goals added since last review: None since this is clients first progress note.    Goals worked on since last review: Motivation, Change, Relationships, Relapse Prevention, Voices of Addiction, Smoking Cessation, Rituals    Strategies effective: Yes    Strategies need these changes: None needed    1) Care Coordination Activities:  Patient will work with counselors and  to address any such issues.  2) Medical, Mental Health and other appointments the client attended:  3) Medication issues: None  4) Physical and mental health problems: None  5) Any changes in Vulnerable Adult Status?  No  6) Review and evaluation of the individual abuse prevention plan: Current IAPP for this program is adequate for client      ASAM Risk Rating:    Dimension 1-0 Patient reports no withdrawal symptomology art this time.        Dimension 2-1 Patient reports no biomedical issues or concerns at this time.        Dimension 3-2 Patient is working to gain better insight regarding how his addiction affects in mental and emotional well-being. Patient is working on his \"Understanding and Coping with Guilt and Shame\" assignment and will present in group when complete. Patient will also be working on his \"Letter Assignment\" and will present " "in group when complete.  Patient will also attend scheduled appointments with staff therapist. Patient reports no suicidal ideation at this time.        Dimension 4-1 Patient is working on improving his internal motivation for change. Patient attends group sessions and lectures on time and offers meaningful feedback to his peers regarding various topics and assignments. Patient is working on his \"Drug Use History\" assignment and will present in group when complete.      Dimension 5-4 Patient is working to gain awareness regarding his triggers, cues and earlry warning signs for relapse. Patient is also gaining knowledge regarding people, places, things and how to avoid putting himself in risky situations. Patient attended a \"Relapse Prevention Workshop\".        Dimension 6-3 Patient reports attending 5 12 Step meetings this week according to his treatment plan.  Patient needs to obtain a sponsor while in treatment at [facility]. Patient will be working with counselors and  to develop an aftercare treatment program conducive to long term sobriety and recovery.      Guide to Risk Ratings for Suicidality:   IDEATION: Active thoughts of suicide? INTENT: Intent to follow on suicide? PLAN: Plan to follow through on suicide? Level of Risk:   IF Yes Yes Yes Patient = High Emergent   IF Yes Yes No Patient = High Urgent/Non-Emergent   IF Yes No No Patient = Moderate Non-Urgent   IF No No   No Patient = Low Risk   The patient's ADDITIONAL RISK FACTORS and lack of PROTECTIVE FACTORS may increase their overall suicide risk ratings.     Patient's/client's current risk rating:  Low Rsik    Family Involvement:   HENRY signed    Data:   Offered  Feedback, good insight, client did actively participate.      Intervention:   Aftercare planning  Behavior Modification  Cognitive Behavior Therapy  Counselor Feedback  Education'  Relapse Prevention  Twelve Step Facilitation  Mental Health Education    Assessment:   Stages of " Change Model    Appears/Sounds:  Cooperative  Motivated  Engaged    Plan:  Continue group therapy  Continue with treatment plan objectives    DOM Barker

## 2019-08-26 ENCOUNTER — HOSPITAL ENCOUNTER (OUTPATIENT)
Dept: BEHAVIORAL HEALTH | Facility: CLINIC | Age: 44
End: 2019-08-26
Attending: FAMILY MEDICINE
Payer: COMMERCIAL

## 2019-08-26 PROCEDURE — 10020000 ZZH LODGING PLUS FACILITY CHARGE ADULT

## 2019-08-26 PROCEDURE — H2035 A/D TX PROGRAM, PER HOUR: HCPCS | Mod: HQ

## 2019-08-26 PROCEDURE — H2035 A/D TX PROGRAM, PER HOUR: HCPCS

## 2019-08-27 ENCOUNTER — HOSPITAL ENCOUNTER (OUTPATIENT)
Dept: BEHAVIORAL HEALTH | Facility: CLINIC | Age: 44
End: 2019-08-27
Attending: FAMILY MEDICINE
Payer: COMMERCIAL

## 2019-08-27 PROCEDURE — 10020000 ZZH LODGING PLUS FACILITY CHARGE ADULT

## 2019-08-27 PROCEDURE — H2035 A/D TX PROGRAM, PER HOUR: HCPCS

## 2019-08-27 PROCEDURE — H2035 A/D TX PROGRAM, PER HOUR: HCPCS | Mod: HQ

## 2019-08-28 ENCOUNTER — HOSPITAL ENCOUNTER (OUTPATIENT)
Dept: BEHAVIORAL HEALTH | Facility: CLINIC | Age: 44
End: 2019-08-28
Attending: FAMILY MEDICINE
Payer: COMMERCIAL

## 2019-08-28 NOTE — PROGRESS NOTES
D) Pt.stated he will be leaving treatment today and returning to live with his mother. It should be noted that the pt is a 43 yr old man that has had many past treatments.

## 2019-08-28 NOTE — PROGRESS NOTES
[address] [place] [address]          Georges Moreno, 1975, was admitted for evaluation/treatment of chemical dependency at [facility].  This person took part in these program(s):    ______ The Inpatient Program   ______ The Outpatient Program   __x____ [facility]   ______ [facility]       Date admitted: 8-20-19  Date discharged: 8-28-19    Type of discharge:   ______ Satisfactory - completed evaluation / treatment   ______ Discharged without completing   ______ Behavioral discharge   ______ Transferred to another chemical dependency program   ______ Transferred to another type of service   ___X___ Left against medical advice (AMA) / [facility]       Comments:       Counselor: DOM Guerra                       Date: 8/28/2019             Time: 7:42 AM
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
## 2019-09-11 ENCOUNTER — HOSPITAL ENCOUNTER (EMERGENCY)
Facility: CLINIC | Age: 44
Discharge: HOME | End: 2019-09-11
Attending: EMERGENCY MEDICINE | Admitting: EMERGENCY MEDICINE
Payer: COMMERCIAL

## 2019-09-11 VITALS
TEMPERATURE: 99.2 F | HEART RATE: 73 BPM | SYSTOLIC BLOOD PRESSURE: 119 MMHG | HEIGHT: 72 IN | OXYGEN SATURATION: 98 % | WEIGHT: 213.19 LBS | BODY MASS INDEX: 28.88 KG/M2 | RESPIRATION RATE: 18 BRPM | DIASTOLIC BLOOD PRESSURE: 88 MMHG

## 2019-09-11 DIAGNOSIS — K62.5 RECTAL BLEEDING: ICD-10-CM

## 2019-09-11 LAB
ABO + RH BLD: NORMAL
ABO + RH BLD: NORMAL
ALBUMIN SERPL-MCNC: 4.2 G/DL (ref 3.4–5)
ALP SERPL-CCNC: 47 U/L (ref 40–150)
ALT SERPL W P-5'-P-CCNC: 35 U/L (ref 0–70)
ANION GAP SERPL CALCULATED.3IONS-SCNC: 1 MMOL/L (ref 3–14)
AST SERPL W P-5'-P-CCNC: 21 U/L (ref 0–45)
BASOPHILS # BLD AUTO: 0 10E9/L (ref 0–0.2)
BASOPHILS NFR BLD AUTO: 0.4 %
BILIRUB SERPL-MCNC: 0.3 MG/DL (ref 0.2–1.3)
BLD GP AB SCN SERPL QL: NORMAL
BLOOD BANK CMNT PATIENT-IMP: NORMAL
BUN SERPL-MCNC: 21 MG/DL (ref 7–30)
CALCIUM SERPL-MCNC: 8.9 MG/DL (ref 8.5–10.1)
CHLORIDE SERPL-SCNC: 108 MMOL/L (ref 94–109)
CO2 SERPL-SCNC: 31 MMOL/L (ref 20–32)
CREAT SERPL-MCNC: 0.84 MG/DL (ref 0.66–1.25)
DIFFERENTIAL METHOD BLD: NORMAL
EOSINOPHIL # BLD AUTO: 0.1 10E9/L (ref 0–0.7)
EOSINOPHIL NFR BLD AUTO: 1.2 %
ERYTHROCYTE [DISTWIDTH] IN BLOOD BY AUTOMATED COUNT: 11.9 % (ref 10–15)
ETHANOL SERPL-MCNC: <0.01 G/DL
GFR SERPL CREATININE-BSD FRML MDRD: >90 ML/MIN/{1.73_M2}
GLUCOSE SERPL-MCNC: 98 MG/DL (ref 70–99)
HCT VFR BLD AUTO: 46 % (ref 40–53)
HEMOCCULT STL QL: NEGATIVE
HGB BLD-MCNC: 16 G/DL (ref 13.3–17.7)
IMM GRANULOCYTES # BLD: 0 10E9/L (ref 0–0.4)
IMM GRANULOCYTES NFR BLD: 0.4 %
INR PPP: 0.98 (ref 0.86–1.14)
LIPASE SERPL-CCNC: 284 U/L (ref 73–393)
LYMPHOCYTES # BLD AUTO: 2.5 10E9/L (ref 0.8–5.3)
LYMPHOCYTES NFR BLD AUTO: 27.6 %
MCH RBC QN AUTO: 31.8 PG (ref 26.5–33)
MCHC RBC AUTO-ENTMCNC: 34.8 G/DL (ref 31.5–36.5)
MCV RBC AUTO: 92 FL (ref 78–100)
MONOCYTES # BLD AUTO: 0.7 10E9/L (ref 0–1.3)
MONOCYTES NFR BLD AUTO: 8 %
NEUTROPHILS # BLD AUTO: 5.6 10E9/L (ref 1.6–8.3)
NEUTROPHILS NFR BLD AUTO: 62.4 %
NRBC # BLD AUTO: 0 10*3/UL
NRBC BLD AUTO-RTO: 0 /100
PLATELET # BLD AUTO: 249 10E9/L (ref 150–450)
POTASSIUM SERPL-SCNC: 3.9 MMOL/L (ref 3.4–5.3)
PROT SERPL-MCNC: 7.2 G/DL (ref 6.8–8.8)
RBC # BLD AUTO: 5.03 10E12/L (ref 4.4–5.9)
SODIUM SERPL-SCNC: 140 MMOL/L (ref 133–144)
SPECIMEN EXP DATE BLD: NORMAL
WBC # BLD AUTO: 9.1 10E9/L (ref 4–11)

## 2019-09-11 PROCEDURE — 99284 EMERGENCY DEPT VISIT MOD MDM: CPT | Mod: 25

## 2019-09-11 PROCEDURE — 83690 ASSAY OF LIPASE: CPT | Performed by: EMERGENCY MEDICINE

## 2019-09-11 PROCEDURE — 85025 COMPLETE CBC W/AUTO DIFF WBC: CPT | Performed by: EMERGENCY MEDICINE

## 2019-09-11 PROCEDURE — 86901 BLOOD TYPING SEROLOGIC RH(D): CPT | Performed by: EMERGENCY MEDICINE

## 2019-09-11 PROCEDURE — 96374 THER/PROPH/DIAG INJ IV PUSH: CPT

## 2019-09-11 PROCEDURE — 86900 BLOOD TYPING SEROLOGIC ABO: CPT | Performed by: EMERGENCY MEDICINE

## 2019-09-11 PROCEDURE — 85610 PROTHROMBIN TIME: CPT | Performed by: EMERGENCY MEDICINE

## 2019-09-11 PROCEDURE — 36415 COLL VENOUS BLD VENIPUNCTURE: CPT | Performed by: EMERGENCY MEDICINE

## 2019-09-11 PROCEDURE — C9113 INJ PANTOPRAZOLE SODIUM, VIA: HCPCS | Performed by: EMERGENCY MEDICINE

## 2019-09-11 PROCEDURE — 80053 COMPREHEN METABOLIC PANEL: CPT | Performed by: EMERGENCY MEDICINE

## 2019-09-11 PROCEDURE — 80320 DRUG SCREEN QUANTALCOHOLS: CPT | Performed by: EMERGENCY MEDICINE

## 2019-09-11 PROCEDURE — 25000128 H RX IP 250 OP 636: Performed by: EMERGENCY MEDICINE

## 2019-09-11 PROCEDURE — 82272 OCCULT BLD FECES 1-3 TESTS: CPT | Performed by: EMERGENCY MEDICINE

## 2019-09-11 PROCEDURE — 86850 RBC ANTIBODY SCREEN: CPT | Performed by: EMERGENCY MEDICINE

## 2019-09-11 RX ADMIN — PANTOPRAZOLE SODIUM 80 MG: 40 INJECTION, POWDER, FOR SOLUTION INTRAVENOUS at 17:57

## 2019-09-11 ASSESSMENT — ENCOUNTER SYMPTOMS
BLOOD IN STOOL: 0
ANAL BLEEDING: 1
ABDOMINAL PAIN: 1
NAUSEA: 0
CHILLS: 0
VOMITING: 0
FEVER: 0

## 2019-09-11 ASSESSMENT — MIFFLIN-ST. JEOR: SCORE: 1890

## 2019-09-11 NOTE — ED PROVIDER NOTES
"  History     Chief Complaint:    Rectal Bleeding      HPI   Georges Moreno is a 43 year old male with history of alcoholism who presents to the emergency department today with rectal bleeding. The patient states that he has been having generalized abdominal pain and rectal bleeding for the last 3 days that worsens when he smokes. He states that when he wipes it is bright red in nature, otherwise yesterday he had 2 episode of black diarrhea and today had one black solid stool. He presented to urgent care today but they did not want to preform a rectal exam because they did not want to cause more bleeding he reports. He has taken Pepto bismol during this time. He states he is 25 days sober and has not consumed marijuana or cocaine for 2 years. He denies fever, chills, nausea or vomiting. He denies being on blood thinners, history of varies or cirrhosis, but states he has done enough daily drinking and ibuprofen in his life to \"probably cause harm.\" He sates he had a colonoscopy performed in in April of 2019, as noted below.    Colonoscopy (4/30/2019):  Impression: - One 5 mm polyp in the ascending                        colon, removed with a cold snare.                        Resected and retrieved.                       - The entire examined colon is                        normal. Biopsied.                       - One 1 mm polyp in the rectum,                        removed with a cold biopsy forceps.                        Resected and retrieved.    Allergies:  Duloxetine  Sertraline    Medications:    Alum & Mag Hydroxide-Simethicone (Mylanta/Maalox)   Divalproex Sodium Delayed-Release (Depakote Sprinkle)   Flecainide (Tambocor)   Folic Acid (Folvite)   Gabapentin (Neurontin)  Guaifenesin (Robitussin)   Hydroxyzine (Atarax)   Ibuprofen (Advil/Motrin)  Loratadine (Claritin)  Multivitamin W/Minerals (Thera-Vit-M)  Nicotine (Nicoderm Cq)  Nicotine Polacrilex (Nicorette)   Phenol-Menthol (Cepastat)   Prazosin " (Minipress)  Quetiapine (Seroquel)   Senna-Docusate (Senokot-S/Pericolace)    Past Medical History:    Depression, major   PUSHPA (generalized anxiety disorder)   HA (headache)   Pancreatitis   Paroxysmal atrial fibrillation    Personal history of alcoholism   Perirectal abscess  Substance abuse   Tobacco dependence   Thoracic or lumbosacral neuritis or radiculitis    Past Surgical History:    Excision of pilonidal cyst  Removal of rectal cyst    Family History:    Father - alcohol/drug abuse, diabetes, leukemia    Social History:  The patient was accompanied to the ED alone.  Smoking Status: Current  Smokeless Tobacco: Never  Alcohol Use: Yes  Drug: marijuana    Marital Status:  Single     Review of Systems   Constitutional: Negative for chills and fever.   Gastrointestinal: Positive for abdominal pain and anal bleeding. Negative for blood in stool, nausea and vomiting.   All other systems reviewed and are negative.      Physical Exam     Patient Vitals for the past 24 hrs:   BP Temp Temp src Pulse Heart Rate Resp SpO2 Height Weight   09/11/19 1800 119/88 -- -- 73 -- -- 98 % -- --   09/11/19 1740 (!) 114/96 -- -- 72 -- -- 97 % -- --   09/11/19 1735 111/78 -- -- 71 -- -- 97 % -- --   09/11/19 1638 (!) 132/95 99.2  F (37.3  C) Temporal -- 81 18 99 % 1.829 m (6') 96.7 kg (213 lb 3 oz)       Physical Exam  Nursing note and vitals reviewed.  Constitutional: Well nourished. Resting comfortably.   Eyes: Conjunctiva normal.  Pupils are equal, round, and reactive to light.   ENT: Nose normal. Mucous membranes pink and moist.    Neck: Normal range of motion.  CVS: Normal rate, regular rhythm.  Normal heart sounds.  No murmur.  Pulmonary: Lungs clear to auscultation bilaterally. No wheezes/rales/rhonchi.  GI: Abdomen soft. Nontender, nondistended. No rigidity or guarding.  ARIANNE with black appearing stool; nonthrombosed, nonbleeding external hemorrhoid. Chaperone FE NELSON: No calf tenderness or swelling.  Neuro: Alert.  Follows simple commands.  Skin: Skin is warm and dry. No rash noted.   Psychiatric: Normal affect.       Emergency Department Course     Laboratory:  INR: 0.98  CBC: WBC: 9.1, HGB: 16.0, PLT: 249  CMP: Glucose: 98, Anion Gap: 1 (L), o/w WNL (Creatinine: 0.84)  Lipase: 284  ETOH: <0.01  Stool occult blood: neg    Interventions:  1757 Protonix 80 mg IV    Emergency Department Course:  Nursing notes and vitals reviewed. (1720) I performed an exam of the patient as documented above.     IV inserted. Blood drawn. This was sent to the lab for further testing, results above.     Medicine administered as documented above.    A rectal exam was performed.    1821 I rechecked the patient and discussed the results of his and her workup thus far.     Findings and plan explained to the Patient. Patient discharged home with instructions regarding supportive care, medications, and reasons to return. The importance of close follow-up was reviewed.     I personally reviewed the laboratory results with the Patient and answered all related questions prior to discharge.     Impression & Plan      Medical Decision Making:  Patient is a 43-year-old male presenting with concerns for rectal bleeding.  He is nontoxic, well-hydrated on arrival.  He has a non-peritoneal abdomen.  Patient expresses a history of significant alcohol abuse though currently sober.  He denies any history of varices or cirrhosis.  He has guaiac negative stools today and has a stable hemoglobin and tthere is no active bleeding on exam.  I doubt acute life-threatening GI bleed.  Patient was given empiric Protonix given my concern for possible upper GI etiology on visualizing black stool on arrival though I discussed with patient however Pepto-Bismol can cause dark stool and this may be contributing to his presentation.  The remainder of his labs are unremarkable.  The patient is not septic appearing. His repeat abdominal exam is benign and I doubt intraabdominal  catastrophe. I recommended close outpatient follow-up for patient to return to ED should he develop fever, worsening abdominal pain, vomiting      Diagnosis:    ICD-10-CM    1. Rectal bleeding K62.5 ABO/Rh type and screen     INR       Disposition:  discharged to home    Scribe Disclosure:  I, Gustavo Hopkins, am serving as a scribe at 5:40 PM on 9/11/2019 to document services personally performed by Barbara Perry DO based on my observations and the provider's statements to me.      9/11/2019   [facility] EMERGENCY DEPARTMENT       Barbara Perry DO  09/11/19 2101

## 2019-09-11 NOTE — ED TRIAGE NOTES
"C/O abdominal pain since Sunday night and Monday.  Black stool x2 since yesterday, diarrhea. Pt reports solid stool today, black, Denies blood thinner use. Hx Afib, pt states taking \"fleckinide\" Pt reports last drink 25 days ago.  Pt also reports pain worsens with having a cigarette. Denies N/V. ABC in tact. A/Ox4  "

## 2019-09-11 NOTE — ED AVS SNAPSHOT
[facility] Emergency Department  [address]  [facility] [address]  Phone:  [phone]  Fax:  [phone]                                    Georges Moreno   MRN: [id number]    Department:  [facility] Emergency Department   Date of Visit:  9/11/2019           After Visit Summary Signature Page    I have received my discharge instructions, and my questions have been answered. I have discussed any challenges I see with this plan with the nurse or doctor.    ..........................................................................................................................................  Patient/Patient Representative Signature      ..........................................................................................................................................  Patient Representative Print Name and Relationship to Patient    ..................................................               ................................................  Date                                   Time    ..........................................................................................................................................  Reviewed by Signature/Title    ...................................................              ..............................................  Date                                               Time          22EPIC Rev 08/18

## 2019-09-24 DIAGNOSIS — I48.0 PAROXYSMAL ATRIAL FIBRILLATION (H): ICD-10-CM

## 2019-09-24 RX ORDER — FLECAINIDE ACETATE 100 MG/1
100 TABLET ORAL 2 TIMES DAILY
Qty: 180 TABLET | Refills: 2 | Status: SHIPPED | OUTPATIENT
Start: 2019-09-24 | End: 2020-01-16

## 2020-01-14 ENCOUNTER — TRANSFERRED RECORDS (OUTPATIENT)
Dept: HEALTH INFORMATION MANAGEMENT | Facility: CLINIC | Age: 45
End: 2020-01-14

## 2020-01-15 ENCOUNTER — TELEPHONE (OUTPATIENT)
Dept: CARDIOLOGY | Facility: CLINIC | Age: 45
End: 2020-01-15

## 2020-01-15 NOTE — TELEPHONE ENCOUNTER
"Patient called reporting he was in UC yesterday thinking he was in AF and was not.  Reports BP reading extremely high and was concerned.  Reviewed UC report and patient has history of anxiety and substance abuse.  Explained to patient that his BP was most likely elevated d/t the fact that he was not feeling well.  Explained to patient that this is a very common response that our bodies exhibit when under stress and anxiety.  Patient disclosed he has taken himself off of all his medications for his heart and anxiety.  Continues to drink however indicates not as much.  Admits to using marijuana and still smokes cigarettes.  Was verbalizing dissatisfaction on how no one is able to get his anxiety in check.  He stated \"nothing helps that is why I turn to alcohol and marijuana\".  This writer tried to lead patient down positive path and recommended that he see his PCP to try and get back on track.  Asked patient if he check BP on regular basis and he indicated he does not.  Explained to patient to identify if one has HTN there has to be a monitoring system in place. Recommended that he start checking his BP readings at home and then reach out to PCP if numbers are not satisfactory.  Provided patient with guideline of 130/80 as being cut off to look for.  Did offer appt tomorrow with Anais Treviño, EMA in [place] to see if we could get him back on track with management of his PAF.  He agreed to coming to [place] location.  Patient continued to rant about no Traitement for his anxiety and toward end of conversation was becoming angry when I told him we would not be able to address this and recommended that patient get in to see PCP as soon as possible to work on this.  Current medication list is incorrect as patient indicated he is taking nothing at this time.  Stopped taking flecainide about one month ago.  Will have EMA discuss tomorrow at OV.  FE Gutierrez  "

## 2020-01-16 ENCOUNTER — OFFICE VISIT (OUTPATIENT)
Dept: CARDIOLOGY | Facility: CLINIC | Age: 45
End: 2020-01-16
Payer: COMMERCIAL

## 2020-01-16 VITALS
WEIGHT: 226 LBS | SYSTOLIC BLOOD PRESSURE: 120 MMHG | HEART RATE: 92 BPM | BODY MASS INDEX: 30.61 KG/M2 | DIASTOLIC BLOOD PRESSURE: 80 MMHG | HEIGHT: 72 IN

## 2020-01-16 DIAGNOSIS — I48.0 PAROXYSMAL ATRIAL FIBRILLATION (H): Primary | ICD-10-CM

## 2020-01-16 PROCEDURE — 99214 OFFICE O/P EST MOD 30 MIN: CPT | Performed by: NURSE PRACTITIONER

## 2020-01-16 RX ORDER — FLECAINIDE ACETATE 100 MG/1
100 TABLET ORAL 2 TIMES DAILY
Qty: 180 TABLET | Refills: 2 | COMMUNITY
Start: 2020-01-16 | End: 2020-02-26

## 2020-01-16 ASSESSMENT — MIFFLIN-ST. JEOR: SCORE: 1943.13

## 2020-01-16 NOTE — LETTER
1/16/2020    MORGAN KAPOOR MD  [facility] [address] [place] [address]    RE: Georges Moreno       Dear Colleague,    I had the pleasure of seeing Georges Moreno in the [facility].    HPI:  Georges Moreno is a 44 year old male who presents for follow up after being in the emergency room.  He is a patient of Dr. Akhtar and his past medical history includes     1. atrial fibrillation.    2. significant alcohol intake  3. Anxiety  4. tobacco abuse.      Diagnostics:   Echo (2/21/18) showed EF 50-55% when the patient was in atrial fibrillation.    He was initially thought to have alcohol-induced atrial fibrillation but subsequent cardiac monitoring showed that he continued to have recurrent atrial fibrillation without consuming alcohol.  The patient was started on flecainide 100 mg twice daily on 01/07/2019.  He subsequently had a ZIO Patch monitor during which he had 1 episode of atrial flutter that lasted 2 minutes    In May 2019, he saw Dr. Akhtar, was on flecainide.  Due to alcohol use an ablation was deferred.      On 1/15/20 pt was in urgent care thinking he was in atrial fibrillation with elevated blood pressure.  ECG revealed Normal rhythm Apparently patient is no longer taking his medication.      Today he presents stating he was in the detox in the past year and had done well, was started on antipsychotic medications however approximately 2 months ago he stopped his medication including his flecainide.  When he is anxious he has palpitations and cannot tell if they are related to atrial fibrillation and he binge drinks.  Patient denies chest pain or pressure, dizziness, syncope, angina, dyspnea at rest or with exertion, orthopnea, PND, or edema.  He has not been on OAC and denies signs/symptoms of stroke       ASSESSMENT AND PLAN    paroxymal atrial fibrillation    Thought to be related to alcohol however a zio patch revealed paroxymal atrial fibrillation when not consuming  alcohol.       For rhythm control he was taking flecainide 100 mg twice daily with no AV not blocker.  He stopped taking this approximately 2 months ago and is unsure if he has a recurrence of atrial fibrillation.  His ECG at Urgent care revealed sinus rhythm with ventricular rate of 87 bpm, QRS=90 ms.      For anticoagulation for CHADS VASC 0 he is not taking OAC.     Life style intervention alcohol and tobacco cessation were encouraged.  Also encouraged to see his mental health provider.      Tobacco Abuse    Not interested in cessation at this time    Binge Alcohol Abuse    Not interested in cessation at thi time.      Plan:    Restart flecainide 100 mg twice daily    ECG on Monday 1/20/2020.  He will need an exercise ECG to evaluate for pro arrhythmias    Follow up with Dr. Akhtar in 2/2020      Patient expresses understanding and agreement with the plan.     I appreciate the chance to help with Georges Moreno Please let me know if you have any questions or concerns.    Anais Treviño, APRN, CNP    This note was completed in part using Dragon voice recognition software. Although reviewed after completion, some word and grammatical errors may occur.    Orders Placed This Encounter   Procedures     Follow-Up with Cardiac Advanced Practice Provider     EKG 12-lead complete w/read - Clinics (performed today)     EKG 12-lead complete w/read - Clinics (to be scheduled)     Orders Placed This Encounter   Medications     aspirin (ASA) 325 MG EC tablet     Sig: Take 325 mg by mouth every 6 hours as needed for moderate pain     flecainide (TAMBOCOR) 100 MG tablet     Sig: Take 1 tablet (100 mg) by mouth 2 times daily     Dispense:  180 tablet     Refill:  2     Medications Discontinued During This Encounter   Medication Reason     prazosin (MINIPRESS) 1 MG capsule Stopped by Patient     hydrOXYzine (ATARAX) 50 MG tablet Stopped by Patient     flecainide (TAMBOCOR) 100 MG tablet Not filled/taken by Patient     gabapentin  (NEURONTIN) 300 MG capsule Medication Reconciliation Clean Up     QUEtiapine (SEROQUEL) 50 MG tablet Stopped by Patient     folic acid (FOLVITE) 1 MG tablet Stopped by Patient     multivitamin w/minerals (THERA-VIT-M) tablet Stopped by Patient     vitamin D3 (CHOLECALCIFEROL) 2000 units (50 mcg) tablet Stopped by Patient     divalproex sodium delayed-release (DEPAKOTE SPRINKLE) 125 MG DR capsule Stopped by Patient     nicotine (NICODERM CQ) 21 MG/24HR 24 hr patch Stopped by Patient     nicotine polacrilex (NICORETTE) 4 MG gum Stopped by Patient     ibuprofen (ADVIL/MOTRIN) 200 MG tablet Stopped by Patient     phenol-menthol (CEPASTAT) 14.5 MG lozenge Stopped by Patient     senna-docusate (SENOKOT-S/PERICOLACE) 8.6-50 MG tablet Stopped by Patient     guaiFENesin (ROBITUSSIN) 20 mg/mL SOLN solution Stopped by Patient     alum & mag hydroxide-simethicone (MYLANTA/MAALOX) 200-200-20 MG/5ML SUSP suspension Stopped by Patient     melatonin 3 MG tablet Stopped by Patient         Encounter Diagnosis   Name Primary?     Paroxysmal atrial fibrillation (H) Yes       CURRENT MEDICATIONS:  Current Outpatient Medications   Medication Sig Dispense Refill     aspirin (ASA) 325 MG EC tablet Take 325 mg by mouth every 6 hours as needed for moderate pain       flecainide (TAMBOCOR) 100 MG tablet Take 1 tablet (100 mg) by mouth 2 times daily 180 tablet 2     acetaminophen (TYLENOL) 325 MG tablet Take 325-650 mg by mouth every 4 hours as needed for mild pain       loratadine (CLARITIN) 10 MG tablet Take 10 mg by mouth daily as needed for allergies         ALLERGIES     Allergies   Allergen Reactions     Duloxetine      Rash, hives     Sertraline      Excessive fatigue       PAST MEDICAL HISTORY:  Past Medical History:   Diagnosis Date     Depression, major, single episode     moderate     PUSHPA (generalized anxiety disorder)      HA (headache)      Pancreatitis      Paroxysmal atrial fibrillation (H)      Personal history of alcoholism  (H)      Substance abuse (H)      Tobacco dependence        PAST SURGICAL HISTORY:  Past Surgical History:   Procedure Laterality Date     SOFT TISSUE SURGERY      Excision of pilonidal cyst     SOFT TISSUE SURGERY      removal of rectal cyst       FAMILY HISTORY:  Family History   Problem Relation Age of Onset     Family History Negative Mother      Alcohol/Drug Father      Diabetes Father      Leukemia Father      Alcohol/Drug Paternal Uncle        SOCIAL HISTORY:  Social History     Socioeconomic History     Marital status: Single     Spouse name: None     Number of children: None     Years of education: None     Highest education level: None   Occupational History     Occupation: construction   Social Needs     Financial resource strain: None     Food insecurity:     Worry: None     Inability: None     Transportation needs:     Medical: None     Non-medical: None   Tobacco Use     Smoking status: Current Every Day Smoker     Years: 15.00     Smokeless tobacco: Never Used     Tobacco comment: a little over a pack a day   Substance and Sexual Activity     Alcohol use: Yes     Drug use: Yes     Types: Marijuana     Comment: today     Sexual activity: None   Lifestyle     Physical activity:     Days per week: None     Minutes per session: None     Stress: None   Relationships     Social connections:     Talks on phone: None     Gets together: None     Attends [employer] service: None     Active member of club or organization: None     Attends meetings of clubs or organizations: None     Relationship status: None     Intimate partner violence:     Fear of current or ex partner: None     Emotionally abused: None     Physically abused: None     Forced sexual activity: None   Other Topics Concern      Service Not Asked     Blood Transfusions Not Asked     Caffeine Concern Not Asked     Occupational Exposure Not Asked     Hobby Hazards Not Asked     Sleep Concern Not Asked     Stress Concern Not Asked     Weight  Concern Not Asked     Special Diet Not Asked     Back Care Not Asked     Exercise Not Asked     Bike Helmet Not Asked     Seat Belt Not Asked     Self-Exams Not Asked     Parent/sibling w/ CABG, MI or angioplasty before 65F 55M? Not Asked   Social History Narrative     None       Review of Systems:  Skin:  Negative for     Eyes:  Negative    ENT:  Negative    Respiratory:  Positive for wheezing;cough  Cardiovascular:  Negative Positive for;edema;chest pain  Gastroenterology: Negative    Genitourinary:  Negative    Musculoskeletal:  Positive for back pain  Neurologic:  Positive for numbness or tingling of feet  Psychiatric:  Positive for anxiety;sleep disturbances  Heme/Lymph/Imm:  Positive for allergies  Endocrine:  Negative      Physical Exam:  Vitals: /80   Pulse 92   Ht 1.829 m (6')   Wt 102.5 kg (226 lb)   BMI 30.65 kg/m       Constitutional:  cooperative, alert and oriented, well developed, well nourished, in no acute distress obese      Skin:  warm and dry to the touch, no apparent skin lesions or masses noted        Head:  normocephalic, no masses or lesions        Eyes:  pupils equal and round, conjunctivae and lids unremarkable, sclera white, no xanthalasma, EOMS intact, no nystagmus        ENT:  no pallor or cyanosis        Neck:  carotid pulses are full and equal bilaterally, JVP normal, no carotid bruit        Chest:  normal breath sounds, clear to auscultation, normal A-P diameter, normal symmetry, normal respiratory excursion, no use of accessory muscles        Cardiac: regular rhythm, normal S1/S2, no S3 or S4, apical impulse not displaced, no murmurs, gallops or rubs                  Abdomen:  abdomen soft obese      Vascular:                                        Extremities and Back:  no deformities, clubbing, cyanosis, erythema observed        Neurological:  no gross motor deficits          Recent Lab Results:  LIPID RESULTS:  Lab Results   Component Value Date    CHOL 99 05/30/2018     HDL 28 (L) 05/30/2018    LDL 12 05/30/2018    TRIG 294 (H) 05/30/2018       LIVER ENZYME RESULTS:  Lab Results   Component Value Date    AST 21 09/11/2019    ALT 35 09/11/2019       CBC RESULTS:  Lab Results   Component Value Date    WBC 9.1 09/11/2019    RBC 5.03 09/11/2019    HGB 16.0 09/11/2019    HCT 46.0 09/11/2019    MCV 92 09/11/2019    MCH 31.8 09/11/2019    MCHC 34.8 09/11/2019    RDW 11.9 09/11/2019     09/11/2019       BMP RESULTS:  Lab Results   Component Value Date     09/11/2019    POTASSIUM 3.9 09/11/2019    CHLORIDE 108 09/11/2019    CO2 31 09/11/2019    ANIONGAP 1 (L) 09/11/2019    GLC 98 09/11/2019    BUN 21 09/11/2019    CR 0.84 09/11/2019    GFRESTIMATED >90 09/11/2019    GFRESTBLACK >90 09/11/2019    LEILA 8.9 09/11/2019        A1C RESULTS:  No results found for: A1C    INR RESULTS:  Lab Results   Component Value Date    INR 0.98 09/11/2019           CC  No referring provider defined for this encounter.                  Thank you for allowing me to participate in the care of your patient.      Sincerely,     [facility]    cc:   No referring provider defined for this encounter.

## 2020-01-16 NOTE — PROGRESS NOTES
HPI:  Georges Moreno is a 44 year old male who presents for follow up after being in the emergency room.  He is a patient of Dr. Akhtar and his past medical history includes     1. atrial fibrillation.    2. significant alcohol intake  3. Anxiety  4. tobacco abuse.      Diagnostics:   Echo (2/21/18) showed EF 50-55% when the patient was in atrial fibrillation.    He was initially thought to have alcohol-induced atrial fibrillation but subsequent cardiac monitoring showed that he continued to have recurrent atrial fibrillation without consuming alcohol.  The patient was started on flecainide 100 mg twice daily on 01/07/2019.  He subsequently had a ZIO Patch monitor during which he had 1 episode of atrial flutter that lasted 2 minutes    In May 2019, he saw Dr. Akhtar, was on flecainide.  Due to alcohol use an ablation was deferred.      On 1/15/20 pt was in urgent care thinking he was in atrial fibrillation with elevated blood pressure.  ECG revealed Normal rhythm Apparently patient is no longer taking his medication.      Today he presents stating he was in the detox in the past year and had done well, was started on antipsychotic medications however approximately 2 months ago he stopped his medication including his flecainide.  When he is anxious he has palpitations and cannot tell if they are related to atrial fibrillation and he binge drinks.  Patient denies chest pain or pressure, dizziness, syncope, angina, dyspnea at rest or with exertion, orthopnea, PND, or edema.  He has not been on OAC and denies signs/symptoms of stroke       ASSESSMENT AND PLAN    paroxymal atrial fibrillation    Thought to be related to alcohol however a zio patch revealed paroxymal atrial fibrillation when not consuming alcohol.       For rhythm control he was taking flecainide 100 mg twice daily with no AV not blocker.  He stopped taking this approximately 2 months ago and is unsure if he has a recurrence of atrial fibrillation.  His ECG at  Urgent care revealed sinus rhythm with ventricular rate of 87 bpm, QRS=90 ms.      For anticoagulation for CHADS VASC 0 he is not taking OAC.     Life style intervention alcohol and tobacco cessation were encouraged.  Also encouraged to see his mental health provider.      Tobacco Abuse    Not interested in cessation at this time    Binge Alcohol Abuse    Not interested in cessation at thi time.      Plan:    Restart flecainide 100 mg twice daily    ECG on Monday 1/20/2020.  He will need an exercise ECG to evaluate for pro arrhythmias    Follow up with Dr. Akhtar in 2/2020      Patient expresses understanding and agreement with the plan.     I appreciate the chance to help with Georges Moreno Please let me know if you have any questions or concerns.    Anais Treviño, APRN, CNP    This note was completed in part using Dragon voice recognition software. Although reviewed after completion, some word and grammatical errors may occur.    Orders Placed This Encounter   Procedures     Follow-Up with Cardiac Advanced Practice Provider     EKG 12-lead complete w/read - Clinics (performed today)     EKG 12-lead complete w/read - Clinics (to be scheduled)     Orders Placed This Encounter   Medications     aspirin (ASA) 325 MG EC tablet     Sig: Take 325 mg by mouth every 6 hours as needed for moderate pain     flecainide (TAMBOCOR) 100 MG tablet     Sig: Take 1 tablet (100 mg) by mouth 2 times daily     Dispense:  180 tablet     Refill:  2     Medications Discontinued During This Encounter   Medication Reason     prazosin (MINIPRESS) 1 MG capsule Stopped by Patient     hydrOXYzine (ATARAX) 50 MG tablet Stopped by Patient     flecainide (TAMBOCOR) 100 MG tablet Not filled/taken by Patient     gabapentin (NEURONTIN) 300 MG capsule Medication Reconciliation Clean Up     QUEtiapine (SEROQUEL) 50 MG tablet Stopped by Patient     folic acid (FOLVITE) 1 MG tablet Stopped by Patient     multivitamin w/minerals (THERA-VIT-M) tablet Stopped  by Patient     vitamin D3 (CHOLECALCIFEROL) 2000 units (50 mcg) tablet Stopped by Patient     divalproex sodium delayed-release (DEPAKOTE SPRINKLE) 125 MG DR capsule Stopped by Patient     nicotine (NICODERM CQ) 21 MG/24HR 24 hr patch Stopped by Patient     nicotine polacrilex (NICORETTE) 4 MG gum Stopped by Patient     ibuprofen (ADVIL/MOTRIN) 200 MG tablet Stopped by Patient     phenol-menthol (CEPASTAT) 14.5 MG lozenge Stopped by Patient     senna-docusate (SENOKOT-S/PERICOLACE) 8.6-50 MG tablet Stopped by Patient     guaiFENesin (ROBITUSSIN) 20 mg/mL SOLN solution Stopped by Patient     alum & mag hydroxide-simethicone (MYLANTA/MAALOX) 200-200-20 MG/5ML SUSP suspension Stopped by Patient     melatonin 3 MG tablet Stopped by Patient         Encounter Diagnosis   Name Primary?     Paroxysmal atrial fibrillation (H) Yes       CURRENT MEDICATIONS:  Current Outpatient Medications   Medication Sig Dispense Refill     aspirin (ASA) 325 MG EC tablet Take 325 mg by mouth every 6 hours as needed for moderate pain       flecainide (TAMBOCOR) 100 MG tablet Take 1 tablet (100 mg) by mouth 2 times daily 180 tablet 2     acetaminophen (TYLENOL) 325 MG tablet Take 325-650 mg by mouth every 4 hours as needed for mild pain       loratadine (CLARITIN) 10 MG tablet Take 10 mg by mouth daily as needed for allergies         ALLERGIES     Allergies   Allergen Reactions     Duloxetine      Rash, hives     Sertraline      Excessive fatigue       PAST MEDICAL HISTORY:  Past Medical History:   Diagnosis Date     Depression, major, single episode     moderate     PUSHPA (generalized anxiety disorder)      HA (headache)      Pancreatitis      Paroxysmal atrial fibrillation (H)      Personal history of alcoholism (H)      Substance abuse (H)      Tobacco dependence        PAST SURGICAL HISTORY:  Past Surgical History:   Procedure Laterality Date     SOFT TISSUE SURGERY      Excision of pilonidal cyst     SOFT TISSUE SURGERY      removal of  rectal cyst       FAMILY HISTORY:  Family History   Problem Relation Age of Onset     Family History Negative Mother      Alcohol/Drug Father      Diabetes Father      Leukemia Father      Alcohol/Drug Paternal Uncle        SOCIAL HISTORY:  Social History     Socioeconomic History     Marital status: Single     Spouse name: None     Number of children: None     Years of education: None     Highest education level: None   Occupational History     Occupation: construction   Social Needs     Financial resource strain: None     Food insecurity:     Worry: None     Inability: None     Transportation needs:     Medical: None     Non-medical: None   Tobacco Use     Smoking status: Current Every Day Smoker     Years: 15.00     Smokeless tobacco: Never Used     Tobacco comment: a little over a pack a day   Substance and Sexual Activity     Alcohol use: Yes     Drug use: Yes     Types: Marijuana     Comment: today     Sexual activity: None   Lifestyle     Physical activity:     Days per week: None     Minutes per session: None     Stress: None   Relationships     Social connections:     Talks on phone: None     Gets together: None     Attends [employer] service: None     Active member of club or organization: None     Attends meetings of clubs or organizations: None     Relationship status: None     Intimate partner violence:     Fear of current or ex partner: None     Emotionally abused: None     Physically abused: None     Forced sexual activity: None   Other Topics Concern      Service Not Asked     Blood Transfusions Not Asked     Caffeine Concern Not Asked     Occupational Exposure Not Asked     Hobby Hazards Not Asked     Sleep Concern Not Asked     Stress Concern Not Asked     Weight Concern Not Asked     Special Diet Not Asked     Back Care Not Asked     Exercise Not Asked     Bike Helmet Not Asked     Seat Belt Not Asked     Self-Exams Not Asked     Parent/sibling w/ CABG, MI or angioplasty before 65F 55M? Not  Asked   Social History Narrative     None       Review of Systems:  Skin:  Negative for     Eyes:  Negative    ENT:  Negative    Respiratory:  Positive for wheezing;cough  Cardiovascular:  Negative Positive for;edema;chest pain  Gastroenterology: Negative    Genitourinary:  Negative    Musculoskeletal:  Positive for back pain  Neurologic:  Positive for numbness or tingling of feet  Psychiatric:  Positive for anxiety;sleep disturbances  Heme/Lymph/Imm:  Positive for allergies  Endocrine:  Negative      Physical Exam:  Vitals: /80   Pulse 92   Ht 1.829 m (6')   Wt 102.5 kg (226 lb)   BMI 30.65 kg/m      Constitutional:  cooperative, alert and oriented, well developed, well nourished, in no acute distress obese      Skin:  warm and dry to the touch, no apparent skin lesions or masses noted        Head:  normocephalic, no masses or lesions        Eyes:  pupils equal and round, conjunctivae and lids unremarkable, sclera white, no xanthalasma, EOMS intact, no nystagmus        ENT:  no pallor or cyanosis        Neck:  carotid pulses are full and equal bilaterally, JVP normal, no carotid bruit        Chest:  normal breath sounds, clear to auscultation, normal A-P diameter, normal symmetry, normal respiratory excursion, no use of accessory muscles        Cardiac: regular rhythm, normal S1/S2, no S3 or S4, apical impulse not displaced, no murmurs, gallops or rubs                  Abdomen:  abdomen soft obese      Vascular:                                        Extremities and Back:  no deformities, clubbing, cyanosis, erythema observed        Neurological:  no gross motor deficits          Recent Lab Results:  LIPID RESULTS:  Lab Results   Component Value Date    CHOL 99 05/30/2018    HDL 28 (L) 05/30/2018    LDL 12 05/30/2018    TRIG 294 (H) 05/30/2018       LIVER ENZYME RESULTS:  Lab Results   Component Value Date    AST 21 09/11/2019    ALT 35 09/11/2019       CBC RESULTS:  Lab Results   Component Value Date     WBC 9.1 09/11/2019    RBC 5.03 09/11/2019    HGB 16.0 09/11/2019    HCT 46.0 09/11/2019    MCV 92 09/11/2019    MCH 31.8 09/11/2019    MCHC 34.8 09/11/2019    RDW 11.9 09/11/2019     09/11/2019       BMP RESULTS:  Lab Results   Component Value Date     09/11/2019    POTASSIUM 3.9 09/11/2019    CHLORIDE 108 09/11/2019    CO2 31 09/11/2019    ANIONGAP 1 (L) 09/11/2019    GLC 98 09/11/2019    BUN 21 09/11/2019    CR 0.84 09/11/2019    GFRESTIMATED >90 09/11/2019    GFRESTBLACK >90 09/11/2019    LEILA 8.9 09/11/2019        A1C RESULTS:  No results found for: A1C    INR RESULTS:  Lab Results   Component Value Date    INR 0.98 09/11/2019           CC  No referring provider defined for this encounter.

## 2020-01-16 NOTE — LETTER
1/16/2020    MORGAN KAPOOR MD  [facility] [address] [place] [address]    RE: Georges Moreno       Dear Colleague,    I had the pleasure of seeing Georges Moreno in the [facility].    HPI:  Georges Moreno is a 44 year old male who presents for follow up after being in the emergency room.  He is a patient of Dr. Akhtar and his past medical history includes     1. atrial fibrillation.    2. significant alcohol intake  3. Anxiety  4. tobacco abuse.      Diagnostics:   Echo (2/21/18) showed EF 50-55% when the patient was in atrial fibrillation.    He was initially thought to have alcohol-induced atrial fibrillation but subsequent cardiac monitoring showed that he continued to have recurrent atrial fibrillation without consuming alcohol.  The patient was started on flecainide 100 mg twice daily on 01/07/2019.  He subsequently had a ZIO Patch monitor during which he had 1 episode of atrial flutter that lasted 2 minutes    In May 2019, he saw Dr. Akhtar, was on flecainide.  Due to alcohol use an ablation was deferred.      On 1/15/20 pt was in urgent care thinking he was in atrial fibrillation with elevated blood pressure.  ECG revealed Normal rhythm Apparently patient is no longer taking his medication.      Today he presents stating he was in the detox in the past year and had done well, was started on antipsychotic medications however approximately 2 months ago he stopped his medication including his flecainide.  When he is anxious he has palpitations and cannot tell if they are related to atrial fibrillation and he binge drinks.  Patient denies chest pain or pressure, dizziness, syncope, angina, dyspnea at rest or with exertion, orthopnea, PND, or edema.  He has not been on OAC and denies signs/symptoms of stroke       ASSESSMENT AND PLAN    paroxymal atrial fibrillation    Thought to be related to alcohol however a zio patch revealed paroxymal atrial fibrillation when not consuming  alcohol.       For rhythm control he was taking flecainide 100 mg twice daily with no AV not blocker.  He stopped taking this approximately 2 months ago and is unsure if he has a recurrence of atrial fibrillation.  His ECG at Urgent care revealed sinus rhythm with ventricular rate of 87 bpm, QRS=90 ms.      For anticoagulation for CHADS VASC 0 he is not taking OAC.     Life style intervention alcohol and tobacco cessation were encouraged.  Also encouraged to see his mental health provider.      Tobacco Abuse    Not interested in cessation at this time    Binge Alcohol Abuse    Not interested in cessation at thi time.      Plan:    Restart flecainide 100 mg twice daily    ECG on Monday 1/20/2020.  He will need an exercise ECG to evaluate for pro arrhythmias    Follow up with Dr. Akhtar in 2/2020      Patient expresses understanding and agreement with the plan.     I appreciate the chance to help with Georges Moreno Please let me know if you have any questions or concerns.    Anais Treviño, APRN, CNP    This note was completed in part using Dragon voice recognition software. Although reviewed after completion, some word and grammatical errors may occur.    Orders Placed This Encounter   Procedures     Follow-Up with Cardiac Advanced Practice Provider     EKG 12-lead complete w/read - Clinics (performed today)     EKG 12-lead complete w/read - Clinics (to be scheduled)     Orders Placed This Encounter   Medications     aspirin (ASA) 325 MG EC tablet     Sig: Take 325 mg by mouth every 6 hours as needed for moderate pain     flecainide (TAMBOCOR) 100 MG tablet     Sig: Take 1 tablet (100 mg) by mouth 2 times daily     Dispense:  180 tablet     Refill:  2     Medications Discontinued During This Encounter   Medication Reason     prazosin (MINIPRESS) 1 MG capsule Stopped by Patient     hydrOXYzine (ATARAX) 50 MG tablet Stopped by Patient     flecainide (TAMBOCOR) 100 MG tablet Not filled/taken by Patient     gabapentin  (NEURONTIN) 300 MG capsule Medication Reconciliation Clean Up     QUEtiapine (SEROQUEL) 50 MG tablet Stopped by Patient     folic acid (FOLVITE) 1 MG tablet Stopped by Patient     multivitamin w/minerals (THERA-VIT-M) tablet Stopped by Patient     vitamin D3 (CHOLECALCIFEROL) 2000 units (50 mcg) tablet Stopped by Patient     divalproex sodium delayed-release (DEPAKOTE SPRINKLE) 125 MG DR capsule Stopped by Patient     nicotine (NICODERM CQ) 21 MG/24HR 24 hr patch Stopped by Patient     nicotine polacrilex (NICORETTE) 4 MG gum Stopped by Patient     ibuprofen (ADVIL/MOTRIN) 200 MG tablet Stopped by Patient     phenol-menthol (CEPASTAT) 14.5 MG lozenge Stopped by Patient     senna-docusate (SENOKOT-S/PERICOLACE) 8.6-50 MG tablet Stopped by Patient     guaiFENesin (ROBITUSSIN) 20 mg/mL SOLN solution Stopped by Patient     alum & mag hydroxide-simethicone (MYLANTA/MAALOX) 200-200-20 MG/5ML SUSP suspension Stopped by Patient     melatonin 3 MG tablet Stopped by Patient         Encounter Diagnosis   Name Primary?     Paroxysmal atrial fibrillation (H) Yes       CURRENT MEDICATIONS:  Current Outpatient Medications   Medication Sig Dispense Refill     aspirin (ASA) 325 MG EC tablet Take 325 mg by mouth every 6 hours as needed for moderate pain       flecainide (TAMBOCOR) 100 MG tablet Take 1 tablet (100 mg) by mouth 2 times daily 180 tablet 2     acetaminophen (TYLENOL) 325 MG tablet Take 325-650 mg by mouth every 4 hours as needed for mild pain       loratadine (CLARITIN) 10 MG tablet Take 10 mg by mouth daily as needed for allergies         ALLERGIES     Allergies   Allergen Reactions     Duloxetine      Rash, hives     Sertraline      Excessive fatigue       PAST MEDICAL HISTORY:  Past Medical History:   Diagnosis Date     Depression, major, single episode     moderate     PUSHPA (generalized anxiety disorder)      HA (headache)      Pancreatitis      Paroxysmal atrial fibrillation (H)      Personal history of alcoholism  (H)      Substance abuse (H)      Tobacco dependence        PAST SURGICAL HISTORY:  Past Surgical History:   Procedure Laterality Date     SOFT TISSUE SURGERY      Excision of pilonidal cyst     SOFT TISSUE SURGERY      removal of rectal cyst       FAMILY HISTORY:  Family History   Problem Relation Age of Onset     Family History Negative Mother      Alcohol/Drug Father      Diabetes Father      Leukemia Father      Alcohol/Drug Paternal Uncle        SOCIAL HISTORY:  Social History     Socioeconomic History     Marital status: Single     Spouse name: None     Number of children: None     Years of education: None     Highest education level: None   Occupational History     Occupation: construction   Social Needs     Financial resource strain: None     Food insecurity:     Worry: None     Inability: None     Transportation needs:     Medical: None     Non-medical: None   Tobacco Use     Smoking status: Current Every Day Smoker     Years: 15.00     Smokeless tobacco: Never Used     Tobacco comment: a little over a pack a day   Substance and Sexual Activity     Alcohol use: Yes     Drug use: Yes     Types: Marijuana     Comment: today     Sexual activity: None   Lifestyle     Physical activity:     Days per week: None     Minutes per session: None     Stress: None   Relationships     Social connections:     Talks on phone: None     Gets together: None     Attends Pentecostal service: None     Active member of club or organization: None     Attends meetings of clubs or organizations: None     Relationship status: None     Intimate partner violence:     Fear of current or ex partner: None     Emotionally abused: None     Physically abused: None     Forced sexual activity: None   Other Topics Concern      Service Not Asked     Blood Transfusions Not Asked     Caffeine Concern Not Asked     Occupational Exposure Not Asked     Hobby Hazards Not Asked     Sleep Concern Not Asked     Stress Concern Not Asked     Weight  Concern Not Asked     Special Diet Not Asked     Back Care Not Asked     Exercise Not Asked     Bike Helmet Not Asked     Seat Belt Not Asked     Self-Exams Not Asked     Parent/sibling w/ CABG, MI or angioplasty before 65F 55M? Not Asked   Social History Narrative     None       Review of Systems:  Skin:  Negative for     Eyes:  Negative    ENT:  Negative    Respiratory:  Positive for wheezing;cough  Cardiovascular:  Negative Positive for;edema;chest pain  Gastroenterology: Negative    Genitourinary:  Negative    Musculoskeletal:  Positive for back pain  Neurologic:  Positive for numbness or tingling of feet  Psychiatric:  Positive for anxiety;sleep disturbances  Heme/Lymph/Imm:  Positive for allergies  Endocrine:  Negative      Physical Exam:  Vitals: /80   Pulse 92   Ht 1.829 m (6')   Wt 102.5 kg (226 lb)   BMI 30.65 kg/m       Constitutional:  cooperative, alert and oriented, well developed, well nourished, in no acute distress obese      Skin:  warm and dry to the touch, no apparent skin lesions or masses noted        Head:  normocephalic, no masses or lesions        Eyes:  pupils equal and round, conjunctivae and lids unremarkable, sclera white, no xanthalasma, EOMS intact, no nystagmus        ENT:  no pallor or cyanosis        Neck:  carotid pulses are full and equal bilaterally, JVP normal, no carotid bruit        Chest:  normal breath sounds, clear to auscultation, normal A-P diameter, normal symmetry, normal respiratory excursion, no use of accessory muscles        Cardiac: regular rhythm, normal S1/S2, no S3 or S4, apical impulse not displaced, no murmurs, gallops or rubs                  Abdomen:  abdomen soft obese      Vascular:                                        Extremities and Back:  no deformities, clubbing, cyanosis, erythema observed        Neurological:  no gross motor deficits          Recent Lab Results:  LIPID RESULTS:  Lab Results   Component Value Date    CHOL 99 05/30/2018     HDL 28 (L) 05/30/2018    LDL 12 05/30/2018    TRIG 294 (H) 05/30/2018       LIVER ENZYME RESULTS:  Lab Results   Component Value Date    AST 21 09/11/2019    ALT 35 09/11/2019       CBC RESULTS:  Lab Results   Component Value Date    WBC 9.1 09/11/2019    RBC 5.03 09/11/2019    HGB 16.0 09/11/2019    HCT 46.0 09/11/2019    MCV 92 09/11/2019    MCH 31.8 09/11/2019    MCHC 34.8 09/11/2019    RDW 11.9 09/11/2019     09/11/2019       BMP RESULTS:  Lab Results   Component Value Date     09/11/2019    POTASSIUM 3.9 09/11/2019    CHLORIDE 108 09/11/2019    CO2 31 09/11/2019    ANIONGAP 1 (L) 09/11/2019    GLC 98 09/11/2019    BUN 21 09/11/2019    CR 0.84 09/11/2019    GFRESTIMATED >90 09/11/2019    GFRESTBLACK >90 09/11/2019    LEILA 8.9 09/11/2019        A1C RESULTS:  No results found for: A1C    INR RESULTS:  Lab Results   Component Value Date    INR 0.98 09/11/2019           CC  No referring provider defined for this encounter.              Thank you for allowing me to participate in the care of your patient.  Sincerely,     JOE Lee [facility]

## 2020-02-12 ENCOUNTER — OFFICE VISIT (OUTPATIENT)
Dept: CARDIOLOGY | Facility: CLINIC | Age: 45
End: 2020-02-12
Attending: NURSE PRACTITIONER
Payer: COMMERCIAL

## 2020-02-12 VITALS
WEIGHT: 221 LBS | DIASTOLIC BLOOD PRESSURE: 82 MMHG | BODY MASS INDEX: 29.93 KG/M2 | SYSTOLIC BLOOD PRESSURE: 138 MMHG | HEART RATE: 79 BPM | HEIGHT: 72 IN | OXYGEN SATURATION: 97 %

## 2020-02-12 DIAGNOSIS — I48.0 PAROXYSMAL ATRIAL FIBRILLATION (H): ICD-10-CM

## 2020-02-12 PROCEDURE — 99214 OFFICE O/P EST MOD 30 MIN: CPT | Performed by: INTERNAL MEDICINE

## 2020-02-12 RX ORDER — IBUPROFEN 200 MG
200 TABLET ORAL EVERY 4 HOURS PRN
Status: ON HOLD | COMMUNITY
End: 2020-02-28

## 2020-02-12 ASSESSMENT — MIFFLIN-ST. JEOR: SCORE: 1920.45
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
## 2020-02-12 NOTE — LETTER
2/12/2020    MORGAN KAPOOR MD  [facility] [address]   [place] [address]    RE: Georges Moreno       Dear Colleague,    I had the pleasure of seeing Georges Moreno in the [facility].    HPI and Plan:   See dictation    No orders of the defined types were placed in this encounter.      Orders Placed This Encounter   Medications     ibuprofen (ADVIL/MOTRIN) 200 MG tablet     Sig: Take 200 mg by mouth every 4 hours as needed for mild pain       Medications Discontinued During This Encounter   Medication Reason     aspirin (ASA) 325 MG EC tablet Stopped by Patient     loratadine (CLARITIN) 10 MG tablet Stopped by Patient         Encounter Diagnosis   Name Primary?     Paroxysmal atrial fibrillation (H)        CURRENT MEDICATIONS:  Current Outpatient Medications   Medication Sig Dispense Refill     acetaminophen (TYLENOL) 325 MG tablet Take 325-650 mg by mouth every 4 hours as needed for mild pain       ibuprofen (ADVIL/MOTRIN) 200 MG tablet Take 200 mg by mouth every 4 hours as needed for mild pain       flecainide (TAMBOCOR) 100 MG tablet Take 1 tablet (100 mg) by mouth 2 times daily 180 tablet 2       ALLERGIES     Allergies   Allergen Reactions     Duloxetine      Rash, hives     Sertraline      Excessive fatigue       PAST MEDICAL HISTORY:  Past Medical History:   Diagnosis Date     ADHD      Depression, major, single episode     moderate     PUSHPA (generalized anxiety disorder)      HA (headache)      Pancreatitis      Paroxysmal atrial fibrillation (H)      Personal history of alcoholism (H)      PTSD (post-traumatic stress disorder)      Substance abuse (H)      Tobacco dependence        PAST SURGICAL HISTORY:  Past Surgical History:   Procedure Laterality Date     SOFT TISSUE SURGERY      Excision of pilonidal cyst     SOFT TISSUE SURGERY      removal of rectal cyst       FAMILY HISTORY:  Family History   Problem Relation Age of Onset     Family History Negative Mother       Alcohol/Drug Father      Diabetes Father      Leukemia Father      Alcohol/Drug Paternal Uncle        SOCIAL HISTORY:  Social History     Socioeconomic History     Marital status: Single     Spouse name: None     Number of children: None     Years of education: None     Highest education level: None   Occupational History     Occupation: construction   Social Needs     Financial resource strain: None     Food insecurity:     Worry: None     Inability: None     Transportation needs:     Medical: None     Non-medical: None   Tobacco Use     Smoking status: Current Every Day Smoker     Years: 15.00     Smokeless tobacco: Never Used     Tobacco comment: a little over a pack a day   Substance and Sexual Activity     Alcohol use: Yes     Drug use: Yes     Types: Marijuana     Comment: today     Sexual activity: None   Lifestyle     Physical activity:     Days per week: None     Minutes per session: None     Stress: None   Relationships     Social connections:     Talks on phone: None     Gets together: None     Attends Taoism service: None     Active member of club or organization: None     Attends meetings of clubs or organizations: None     Relationship status: None     Intimate partner violence:     Fear of current or ex partner: None     Emotionally abused: None     Physically abused: None     Forced sexual activity: None   Other Topics Concern      Service Not Asked     Blood Transfusions Not Asked     Caffeine Concern Not Asked     Occupational Exposure Not Asked     Hobby Hazards Not Asked     Sleep Concern Not Asked     Stress Concern Not Asked     Weight Concern Not Asked     Special Diet Not Asked     Back Care Not Asked     Exercise Not Asked     Bike Helmet Not Asked     Seat Belt Not Asked     Self-Exams Not Asked     Parent/sibling w/ CABG, MI or angioplasty before 65F 55M? Not Asked   Social History Narrative     None       Review of Systems:  Skin:  Negative       Eyes:  Negative      ENT:   Negative      Respiratory:  Positive for cough;wheezing     Cardiovascular:  Negative      Gastroenterology: Negative      Genitourinary:  Negative      Musculoskeletal:  Positive for back pain    Neurologic:  Positive for      Psychiatric:  Positive for anxiety;excessive stress    Heme/Lymph/Imm:  Negative      Endocrine:  Negative        Physical Exam:  Vitals: /82 (BP Location: Right arm, Patient Position: Sitting, Cuff Size: Adult Large)   Pulse 79   Ht 1.829 m (6')   Wt 100.2 kg (221 lb)   SpO2 97%   BMI 29.97 kg/m       Constitutional:  cooperative, alert and oriented, well developed, well nourished, in no acute distress obese      Skin:  warm and dry to the touch, no apparent skin lesions or masses noted          Head:  normocephalic, no masses or lesions        Eyes:  pupils equal and round, conjunctivae and lids unremarkable, sclera white, no xanthalasma, EOMS intact, no nystagmus        Lymph:No Cervical lymphadenopathy present     ENT:  no pallor or cyanosis        Neck:  carotid pulses are full and equal bilaterally, JVP normal, no carotid bruit        Respiratory:  normal breath sounds, clear to auscultation, normal A-P diameter, normal symmetry, normal respiratory excursion, no use of accessory muscles         Cardiac: regular rhythm, normal S1/S2, no S3 or S4, apical impulse not displaced, no murmurs, gallops or rubs                                                         GI:  abdomen soft obese      Extremities and Muscular Skeletal:  no deformities, clubbing, cyanosis, erythema observed              Neurological:  no gross motor deficits        Psych:  Alert and Oriented x 3 Anxious      CC  JOE Nieves CNP  [address]  [place] [address]                Thank you for allowing me to participate in the care of your patient.      Sincerely,     Randall Akhtar MD     [facility]    cc:   JOE Nieves CNP  [address]  [place] [address]

## 2020-02-12 NOTE — PROGRESS NOTES
HPI and Plan:   See dictation    No orders of the defined types were placed in this encounter.      Orders Placed This Encounter   Medications     ibuprofen (ADVIL/MOTRIN) 200 MG tablet     Sig: Take 200 mg by mouth every 4 hours as needed for mild pain       Medications Discontinued During This Encounter   Medication Reason     aspirin (ASA) 325 MG EC tablet Stopped by Patient     loratadine (CLARITIN) 10 MG tablet Stopped by Patient         Encounter Diagnosis   Name Primary?     Paroxysmal atrial fibrillation (H)        CURRENT MEDICATIONS:  Current Outpatient Medications   Medication Sig Dispense Refill     acetaminophen (TYLENOL) 325 MG tablet Take 325-650 mg by mouth every 4 hours as needed for mild pain       ibuprofen (ADVIL/MOTRIN) 200 MG tablet Take 200 mg by mouth every 4 hours as needed for mild pain       flecainide (TAMBOCOR) 100 MG tablet Take 1 tablet (100 mg) by mouth 2 times daily 180 tablet 2       ALLERGIES     Allergies   Allergen Reactions     Duloxetine      Rash, hives     Sertraline      Excessive fatigue       PAST MEDICAL HISTORY:  Past Medical History:   Diagnosis Date     ADHD      Depression, major, single episode     moderate     PUSHPA (generalized anxiety disorder)      HA (headache)      Pancreatitis      Paroxysmal atrial fibrillation (H)      Personal history of alcoholism (H)      PTSD (post-traumatic stress disorder)      Substance abuse (H)      Tobacco dependence        PAST SURGICAL HISTORY:  Past Surgical History:   Procedure Laterality Date     SOFT TISSUE SURGERY      Excision of pilonidal cyst     SOFT TISSUE SURGERY      removal of rectal cyst       FAMILY HISTORY:  Family History   Problem Relation Age of Onset     Family History Negative Mother      Alcohol/Drug Father      Diabetes Father      Leukemia Father      Alcohol/Drug Paternal Uncle        SOCIAL HISTORY:  Social History     Socioeconomic History     Marital status: Single     Spouse name: None     Number of  children: None     Years of education: None     Highest education level: None   Occupational History     Occupation: construction   Social Needs     Financial resource strain: None     Food insecurity:     Worry: None     Inability: None     Transportation needs:     Medical: None     Non-medical: None   Tobacco Use     Smoking status: Current Every Day Smoker     Years: 15.00     Smokeless tobacco: Never Used     Tobacco comment: a little over a pack a day   Substance and Sexual Activity     Alcohol use: Yes     Drug use: Yes     Types: Marijuana     Comment: today     Sexual activity: None   Lifestyle     Physical activity:     Days per week: None     Minutes per session: None     Stress: None   Relationships     Social connections:     Talks on phone: None     Gets together: None     Attends Evangelical service: None     Active member of club or organization: None     Attends meetings of clubs or organizations: None     Relationship status: None     Intimate partner violence:     Fear of current or ex partner: None     Emotionally abused: None     Physically abused: None     Forced sexual activity: None   Other Topics Concern      Service Not Asked     Blood Transfusions Not Asked     Caffeine Concern Not Asked     Occupational Exposure Not Asked     Hobby Hazards Not Asked     Sleep Concern Not Asked     Stress Concern Not Asked     Weight Concern Not Asked     Special Diet Not Asked     Back Care Not Asked     Exercise Not Asked     Bike Helmet Not Asked     Seat Belt Not Asked     Self-Exams Not Asked     Parent/sibling w/ CABG, MI or angioplasty before 65F 55M? Not Asked   Social History Narrative     None       Review of Systems:  Skin:  Negative       Eyes:  Negative      ENT:  Negative      Respiratory:  Positive for cough;wheezing     Cardiovascular:  Negative      Gastroenterology: Negative      Genitourinary:  Negative      Musculoskeletal:  Positive for back pain    Neurologic:  Positive for       Psychiatric:  Positive for anxiety;excessive stress    Heme/Lymph/Imm:  Negative      Endocrine:  Negative        Physical Exam:  Vitals: /82 (BP Location: Right arm, Patient Position: Sitting, Cuff Size: Adult Large)   Pulse 79   Ht 1.829 m (6')   Wt 100.2 kg (221 lb)   SpO2 97%   BMI 29.97 kg/m      Constitutional:  cooperative, alert and oriented, well developed, well nourished, in no acute distress obese      Skin:  warm and dry to the touch, no apparent skin lesions or masses noted          Head:  normocephalic, no masses or lesions        Eyes:  pupils equal and round, conjunctivae and lids unremarkable, sclera white, no xanthalasma, EOMS intact, no nystagmus        Lymph:No Cervical lymphadenopathy present     ENT:  no pallor or cyanosis        Neck:  carotid pulses are full and equal bilaterally, JVP normal, no carotid bruit        Respiratory:  normal breath sounds, clear to auscultation, normal A-P diameter, normal symmetry, normal respiratory excursion, no use of accessory muscles         Cardiac: regular rhythm, normal S1/S2, no S3 or S4, apical impulse not displaced, no murmurs, gallops or rubs                                                         GI:  abdomen soft obese      Extremities and Muscular Skeletal:  no deformities, clubbing, cyanosis, erythema observed              Neurological:  no gross motor deficits        Psych:  Alert and Oriented x 3 Anxious      CC  Anais Treviño, APRN CNP  [address]  [place] [address]

## 2020-02-26 ENCOUNTER — TELEPHONE (OUTPATIENT)
Dept: BEHAVIORAL HEALTH | Facility: CLINIC | Age: 45
End: 2020-02-26

## 2020-02-26 ENCOUNTER — HOSPITAL ENCOUNTER (INPATIENT)
Facility: CLINIC | Age: 45
LOS: 2 days | Discharge: HOME | End: 2020-02-28
Attending: PSYCHIATRY & NEUROLOGY | Admitting: PSYCHIATRY & NEUROLOGY
Payer: COMMERCIAL

## 2020-02-26 DIAGNOSIS — F10.939 ALCOHOL WITHDRAWAL WITH COMPLICATION WITH INPATIENT TREATMENT, WITH UNSPECIFIED COMPLICATION (H): Primary | ICD-10-CM

## 2020-02-26 DIAGNOSIS — F33.1 MAJOR DEPRESSIVE DISORDER, RECURRENT EPISODE, MODERATE (H): ICD-10-CM

## 2020-02-26 DIAGNOSIS — I48.91 ATRIAL FIBRILLATION, UNSPECIFIED TYPE (H): ICD-10-CM

## 2020-02-26 DIAGNOSIS — F10.220 ALCOHOL DEPENDENCE WITH UNCOMPLICATED INTOXICATION (H): ICD-10-CM

## 2020-02-26 DIAGNOSIS — F10.280 ALCOHOL DEPENDENCE WITH ALCOHOL-INDUCED ANXIETY DISORDER (H): ICD-10-CM

## 2020-02-26 LAB
ALBUMIN SERPL-MCNC: 4.1 G/DL (ref 3.4–5)
ALCOHOL BREATH TEST: 0.05 (ref 0–0.01)
ALP SERPL-CCNC: 51 U/L (ref 40–150)
ALT SERPL W P-5'-P-CCNC: 66 U/L (ref 0–70)
AMPHETAMINES UR QL SCN: NEGATIVE
ANION GAP SERPL CALCULATED.3IONS-SCNC: 10 MMOL/L (ref 3–14)
AST SERPL W P-5'-P-CCNC: 54 U/L (ref 0–45)
BARBITURATES UR QL: NEGATIVE
BASOPHILS # BLD AUTO: 0 10E9/L (ref 0–0.2)
BASOPHILS NFR BLD AUTO: 0.2 %
BENZODIAZ UR QL: NEGATIVE
BILIRUB SERPL-MCNC: 0.8 MG/DL (ref 0.2–1.3)
BUN SERPL-MCNC: 16 MG/DL (ref 7–30)
CALCIUM SERPL-MCNC: 8.7 MG/DL (ref 8.5–10.1)
CANNABINOIDS UR QL SCN: POSITIVE
CHLORIDE SERPL-SCNC: 102 MMOL/L (ref 94–109)
CO2 SERPL-SCNC: 25 MMOL/L (ref 20–32)
COCAINE UR QL: NEGATIVE
CREAT SERPL-MCNC: 0.96 MG/DL (ref 0.66–1.25)
DIFFERENTIAL METHOD BLD: NORMAL
EOSINOPHIL # BLD AUTO: 0 10E9/L (ref 0–0.7)
EOSINOPHIL NFR BLD AUTO: 0.2 %
ERYTHROCYTE [DISTWIDTH] IN BLOOD BY AUTOMATED COUNT: 12.5 % (ref 10–15)
ETHANOL UR QL SCN: POSITIVE
GFR SERPL CREATININE-BSD FRML MDRD: >90 ML/MIN/{1.73_M2}
GLUCOSE SERPL-MCNC: 104 MG/DL (ref 70–99)
HCT VFR BLD AUTO: 47.9 % (ref 40–53)
HGB BLD-MCNC: 17.2 G/DL (ref 13.3–17.7)
IMM GRANULOCYTES # BLD: 0 10E9/L (ref 0–0.4)
IMM GRANULOCYTES NFR BLD: 0.2 %
LYMPHOCYTES # BLD AUTO: 2.2 10E9/L (ref 0.8–5.3)
LYMPHOCYTES NFR BLD AUTO: 23.9 %
MCH RBC QN AUTO: 32.6 PG (ref 26.5–33)
MCHC RBC AUTO-ENTMCNC: 35.9 G/DL (ref 31.5–36.5)
MCV RBC AUTO: 91 FL (ref 78–100)
MONOCYTES # BLD AUTO: 0.8 10E9/L (ref 0–1.3)
MONOCYTES NFR BLD AUTO: 8.4 %
NEUTROPHILS # BLD AUTO: 6 10E9/L (ref 1.6–8.3)
NEUTROPHILS NFR BLD AUTO: 67.1 %
NRBC # BLD AUTO: 0 10*3/UL
NRBC BLD AUTO-RTO: 0 /100
OPIATES UR QL SCN: NEGATIVE
PLATELET # BLD AUTO: 208 10E9/L (ref 150–450)
POTASSIUM SERPL-SCNC: 3.8 MMOL/L (ref 3.4–5.3)
PROT SERPL-MCNC: 7.4 G/DL (ref 6.8–8.8)
RBC # BLD AUTO: 5.27 10E12/L (ref 4.4–5.9)
SODIUM SERPL-SCNC: 137 MMOL/L (ref 133–144)
WBC # BLD AUTO: 9 10E9/L (ref 4–11)

## 2020-02-26 PROCEDURE — 80320 DRUG SCREEN QUANTALCOHOLS: CPT | Performed by: FAMILY MEDICINE

## 2020-02-26 PROCEDURE — HZ2ZZZZ DETOXIFICATION SERVICES FOR SUBSTANCE ABUSE TREATMENT: ICD-10-PCS | Performed by: PSYCHIATRY & NEUROLOGY

## 2020-02-26 PROCEDURE — 99285 EMERGENCY DEPT VISIT HI MDM: CPT | Mod: 25 | Performed by: PSYCHIATRY & NEUROLOGY

## 2020-02-26 PROCEDURE — 93005 ELECTROCARDIOGRAM TRACING: CPT | Performed by: PSYCHIATRY & NEUROLOGY

## 2020-02-26 PROCEDURE — 12800008 ZZH R&B CD ADULT

## 2020-02-26 PROCEDURE — 80307 DRUG TEST PRSMV CHEM ANLYZR: CPT | Performed by: FAMILY MEDICINE

## 2020-02-26 PROCEDURE — 25000132 ZZH RX MED GY IP 250 OP 250 PS 637: Performed by: PSYCHIATRY & NEUROLOGY

## 2020-02-26 PROCEDURE — 85025 COMPLETE CBC W/AUTO DIFF WBC: CPT | Performed by: PSYCHIATRY & NEUROLOGY

## 2020-02-26 PROCEDURE — 25000128 H RX IP 250 OP 636: Performed by: PSYCHIATRY & NEUROLOGY

## 2020-02-26 PROCEDURE — 80053 COMPREHEN METABOLIC PANEL: CPT | Performed by: PSYCHIATRY & NEUROLOGY

## 2020-02-26 PROCEDURE — 82075 ASSAY OF BREATH ETHANOL: CPT | Performed by: PSYCHIATRY & NEUROLOGY

## 2020-02-26 PROCEDURE — 36415 COLL VENOUS BLD VENIPUNCTURE: CPT | Performed by: PSYCHIATRY & NEUROLOGY

## 2020-02-26 PROCEDURE — 90791 PSYCH DIAGNOSTIC EVALUATION: CPT

## 2020-02-26 PROCEDURE — 99285 EMERGENCY DEPT VISIT HI MDM: CPT | Mod: Z6 | Performed by: PSYCHIATRY & NEUROLOGY

## 2020-02-26 RX ORDER — LANOLIN ALCOHOL/MO/W.PET/CERES
100 CREAM (GRAM) TOPICAL DAILY
Status: DISCONTINUED | OUTPATIENT
Start: 2020-02-27 | End: 2020-02-28 | Stop reason: HOSPADM

## 2020-02-26 RX ORDER — MULTIPLE VITAMINS W/ MINERALS TAB 9MG-400MCG
1 TAB ORAL DAILY
Status: DISCONTINUED | OUTPATIENT
Start: 2020-02-27 | End: 2020-02-28 | Stop reason: HOSPADM

## 2020-02-26 RX ORDER — HYDROXYZINE HYDROCHLORIDE 25 MG/1
25 TABLET, FILM COATED ORAL EVERY 4 HOURS PRN
Status: DISCONTINUED | OUTPATIENT
Start: 2020-02-26 | End: 2020-02-28 | Stop reason: HOSPADM

## 2020-02-26 RX ORDER — FOLIC ACID 1 MG/1
1 TABLET ORAL DAILY
Status: DISCONTINUED | OUTPATIENT
Start: 2020-02-27 | End: 2020-02-28 | Stop reason: HOSPADM

## 2020-02-26 RX ORDER — HYDROXYZINE HYDROCHLORIDE 25 MG/1
50 TABLET, FILM COATED ORAL ONCE
Status: COMPLETED | OUTPATIENT
Start: 2020-02-26 | End: 2020-02-26

## 2020-02-26 RX ORDER — IBUPROFEN 200 MG
200 TABLET ORAL EVERY 4 HOURS PRN
Status: DISCONTINUED | OUTPATIENT
Start: 2020-02-26 | End: 2020-02-28 | Stop reason: HOSPADM

## 2020-02-26 RX ORDER — QUETIAPINE FUMARATE 25 MG/1
25-50 TABLET, FILM COATED ORAL 2 TIMES DAILY PRN
Status: DISCONTINUED | OUTPATIENT
Start: 2020-02-26 | End: 2020-02-28 | Stop reason: HOSPADM

## 2020-02-26 RX ORDER — DIAZEPAM 5 MG
5-20 TABLET ORAL EVERY 30 MIN PRN
Status: DISCONTINUED | OUTPATIENT
Start: 2020-02-26 | End: 2020-02-28 | Stop reason: HOSPADM

## 2020-02-26 RX ORDER — ALUMINA, MAGNESIA, AND SIMETHICONE 2400; 2400; 240 MG/30ML; MG/30ML; MG/30ML
30 SUSPENSION ORAL EVERY 4 HOURS PRN
Status: DISCONTINUED | OUTPATIENT
Start: 2020-02-26 | End: 2020-02-28 | Stop reason: HOSPADM

## 2020-02-26 RX ORDER — ONDANSETRON 4 MG/1
4 TABLET, ORALLY DISINTEGRATING ORAL EVERY 6 HOURS PRN
Status: DISCONTINUED | OUTPATIENT
Start: 2020-02-26 | End: 2020-02-28 | Stop reason: HOSPADM

## 2020-02-26 RX ORDER — QUETIAPINE FUMARATE 50 MG/1
TABLET, FILM COATED ORAL
Status: ON HOLD | COMMUNITY
End: 2020-02-28

## 2020-02-26 RX ORDER — POLYETHYLENE GLYCOL 3350 17 G
2 POWDER IN PACKET (EA) ORAL
Status: DISCONTINUED | OUTPATIENT
Start: 2020-02-26 | End: 2020-02-26 | Stop reason: ALTCHOICE

## 2020-02-26 RX ORDER — QUETIAPINE FUMARATE 50 MG/1
50-150 TABLET, FILM COATED ORAL
Status: DISCONTINUED | OUTPATIENT
Start: 2020-02-26 | End: 2020-02-28 | Stop reason: HOSPADM

## 2020-02-26 RX ORDER — LOPERAMIDE HCL 2 MG
2 CAPSULE ORAL 4 TIMES DAILY PRN
Status: DISCONTINUED | OUTPATIENT
Start: 2020-02-26 | End: 2020-02-28 | Stop reason: HOSPADM

## 2020-02-26 RX ORDER — ONDANSETRON 4 MG/1
4 TABLET, ORALLY DISINTEGRATING ORAL ONCE
Status: COMPLETED | OUTPATIENT
Start: 2020-02-26 | End: 2020-02-26

## 2020-02-26 RX ORDER — ATENOLOL 50 MG/1
50 TABLET ORAL DAILY PRN
Status: DISCONTINUED | OUTPATIENT
Start: 2020-02-26 | End: 2020-02-28 | Stop reason: HOSPADM

## 2020-02-26 RX ADMIN — NICOTINE POLACRILEX 2 MG: 2 GUM, CHEWING BUCCAL at 18:15

## 2020-02-26 RX ADMIN — HYDROXYZINE HYDROCHLORIDE 50 MG: 25 TABLET, FILM COATED ORAL at 18:14

## 2020-02-26 RX ADMIN — ONDANSETRON 4 MG: 4 TABLET, ORALLY DISINTEGRATING ORAL at 17:07

## 2020-02-26 ASSESSMENT — ACTIVITIES OF DAILY LIVING (ADL)
HYGIENE/GROOMING: INDEPENDENT
ORAL_HYGIENE: INDEPENDENT
DRESS: STREET CLOTHES;INDEPENDENT
LAUNDRY: WITH SUPERVISION

## 2020-02-26 ASSESSMENT — ENCOUNTER SYMPTOMS
NEUROLOGICAL NEGATIVE: 1
DECREASED CONCENTRATION: 1
EYES NEGATIVE: 1
ACTIVITY CHANGE: 1
APPETITE CHANGE: 1
HALLUCINATIONS: 0
GASTROINTESTINAL NEGATIVE: 1
NERVOUS/ANXIOUS: 1
RESPIRATORY NEGATIVE: 1
FATIGUE: 1
SLEEP DISTURBANCE: 1
MUSCULOSKELETAL NEGATIVE: 1
PALPITATIONS: 1

## 2020-02-26 ASSESSMENT — MIFFLIN-ST. JEOR: SCORE: 1915.91

## 2020-02-26 NOTE — PHARMACY-ADMISSION MEDICATION HISTORY
Admission Medication History status for the 2020 admission is complete.  See EPIC admission navigator for Prior to Admission medications.  Patient's Name: Georges Moreno  Patient's : 1975   44 year old, male    Medication History Sources: Patient and dispense report  Primary Pharmacy: [id number] - [place], [place] -  [address] [facility]    Medication history source reliability: Good  Medication adherence:  Poor    Changes made to PTA medication list (reason)  Added: None  Deleted:    Tylenol - takes ibuprofen   Flecainide - discontinue by his MD  Changed: Changed from 50 mg bid to 25-50 mg bid prn anxiety and  mg prn insomnia    High Risk Medications (Warfarin, Immunosuppressants, Insulin, Antibiotics, or Other)    None    Additional medication history information (including actions taken by pharmacist):    Pt expresses concern with some abdominal pain that he feels when he drinks alcohol and takes Seroquel together.    Pt reports he smokes 2 packs a day and is a loud sleeper and is requesting accomodation such as a solo room and nicotine replacement.     Time spent in this activity: 20 minutes    Medication history completed by:  Nereida Johnson, PD2 Pharmacy Intern    Prior to Admission Medications   Prescriptions Last Dose Informant Patient Reported? Taking?   QUEtiapine (SEROQUEL) 50 MG tablet Past Week at Unknown time Self Yes Yes   Sig: Take 1/2 - 1 tablet by mouth twice daily as needed for anxiety, and  mg at bedtime as needed for insomnia.   ibuprofen (ADVIL/MOTRIN) 200 MG tablet More than a month at Unknown time Self Yes No   Sig: Take 200 mg by mouth every 4 hours as needed for mild pain      Facility-Administered Medications: None

## 2020-02-26 NOTE — ED PROVIDER NOTES
"  History     Chief Complaint   Patient presents with     Mental Health Problem     Pt states \"I just don't feel good\".  Pt states \"It feels like a panic attack\" has not slept or ate in 2 days.  Pt feels he has needed psychiatric help x2 years     HPI  Georges Moreno is a 44 year old male who is here dropped off by mother due to feeling depressed and hopeless. Patient has history of alcohol dependence and depression. He does not drive. He lives at home with mother. He feels unable to manage his life. He is drinking alcohol daily, averaging 8-9 beers daily. He also smokes THC. He had 2 beers today. Patient admits to feeling hopeless and does not like the life he has presently. He tried therapy but was kicked out after he got angry at the therapist. He has been in multiple CD treatments. He was last in [facility] last summer. He has only been able to get limited sobriety. Patient breathalized 0.047 on arrival. He has history of atrial fibrillation. This likely is associated with his drinking.    Please see DEC Crisis Assessment on 02/26/20 in Epic for further details.    PERSONAL MEDICAL HISTORY  Past Medical History:   Diagnosis Date     ADHD      Depression, major, single episode     moderate     PUSHPA (generalized anxiety disorder)      HA (headache)      Pancreatitis      Paroxysmal atrial fibrillation (H)      Personal history of alcoholism (H)      PTSD (post-traumatic stress disorder)      Substance abuse (H)      Tobacco dependence      PAST SURGICAL HISTORY  Past Surgical History:   Procedure Laterality Date     SOFT TISSUE SURGERY      Excision of pilonidal cyst     SOFT TISSUE SURGERY      removal of rectal cyst     FAMILY HISTORY  Family History   Problem Relation Age of Onset     Family History Negative Mother      Alcohol/Drug Father      Diabetes Father      Leukemia Father      Alcohol/Drug Paternal Uncle      SOCIAL HISTORY  Social History     Tobacco Use     Smoking status: Current Every Day Smoker    "  Years: 15.00     Smokeless tobacco: Never Used     Tobacco comment: a little over a pack a day   Substance Use Topics     Alcohol use: Yes     MEDICATIONS  No current facility-administered medications for this encounter.      Current Outpatient Medications   Medication     QUEtiapine (SEROQUEL) 50 MG tablet     acetaminophen (TYLENOL) 325 MG tablet     flecainide (TAMBOCOR) 100 MG tablet     ibuprofen (ADVIL/MOTRIN) 200 MG tablet     ALLERGIES  Allergies   Allergen Reactions     Duloxetine      Rash, hives     Sertraline      Excessive fatigue         I have reviewed the Medications, Allergies, Past Medical and Surgical History, and Social History in the Epic system.    Review of Systems   Constitutional: Positive for activity change, appetite change and fatigue.   HENT: Negative.    Eyes: Negative.    Respiratory: Negative.    Cardiovascular: Positive for palpitations.   Gastrointestinal: Negative.    Genitourinary: Negative.    Musculoskeletal: Negative.    Neurological: Negative.    Psychiatric/Behavioral: Positive for decreased concentration, sleep disturbance and suicidal ideas. Negative for hallucinations. The patient is nervous/anxious.    All other systems reviewed and are negative.      Physical Exam   BP: (!) 140/101  Heart Rate: 99  Temp: 98.2  F (36.8  C)  Resp: 20  SpO2: 97 %      Physical Exam  Vitals signs and nursing note reviewed.   HENT:      Head: Normocephalic.      Nose: Nose normal.      Mouth/Throat:      Mouth: Mucous membranes are moist.   Eyes:      Pupils: Pupils are equal, round, and reactive to light.   Neck:      Musculoskeletal: Normal range of motion.   Cardiovascular:      Rate and Rhythm: Normal rate.   Pulmonary:      Effort: Pulmonary effort is normal.   Abdominal:      General: Abdomen is flat.   Musculoskeletal: Normal range of motion.   Skin:     General: Skin is warm.   Neurological:      General: No focal deficit present.      Mental Status: He is alert.   Psychiatric:          Attention and Perception: Attention and perception normal. He does not perceive auditory or visual hallucinations.         Mood and Affect: Affect normal. Mood is anxious and depressed.         Speech: Speech normal.         Behavior: Behavior is withdrawn. Behavior is not agitated, aggressive or hyperactive. Behavior is cooperative.         Thought Content: Thought content normal. Thought content is not paranoid or delusional. Thought content does not include homicidal or suicidal ideation.         Cognition and Memory: Cognition normal.         Judgment: Judgment normal.         ED Course        Procedures   EKG reveals normal sinus rhythm. No acute changes. Patient was reassured.             Labs Ordered and Resulted from Time of ED Arrival Up to the Time of Departure from the ED   DRUG ABUSE SCREEN 6 CHEM DEP URINE ([facility]) - Abnormal; Notable for the following components:       Result Value    Cannabinoids Qual Urine Positive (*)     Ethanol Qual Urine Positive (*)     All other components within normal limits   ALCOHOL BREATH TEST POCT - Abnormal; Notable for the following components:    Alcohol Breath Test 0.047 (*)     All other components within normal limits   CBC WITH PLATELETS DIFFERENTIAL   COMPREHENSIVE METABOLIC PANEL            Assessments & Plan (with Medical Decision Making)   Patient with alcohol dependence and recurrent MDD who is open to going into detox here. He is feeling depressed and his admission will be an MI/CD designation. Patient is concerned about his history of atrial fibrillation. He requests an EKG. I find this reasonable as part of the medical work-up and need for clearance.    I have reviewed the nursing notes.    I have reviewed the findings, diagnosis, plan and need for follow up with the patient.    New Prescriptions    No medications on file       Final diagnoses:   Alcohol dependence with uncomplicated intoxication (H)   Major depressive disorder, recurrent episode, moderate  (H)       2/26/2020   [facility], [place], EMERGENCY DEPARTMENT     Henry Parrish MD  02/26/20 1551       Henry Parrish MD  02/26/20 [id number]

## 2020-02-27 LAB
CHOLEST SERPL-MCNC: 123 MG/DL
HDLC SERPL-MCNC: 33 MG/DL
INTERPRETATION ECG - MUSE: NORMAL
LDLC SERPL CALC-MCNC: ABNORMAL MG/DL
LDLC SERPL DIRECT ASSAY-MCNC: 42 MG/DL
NONHDLC SERPL-MCNC: 90 MG/DL
TRIGL SERPL-MCNC: 595 MG/DL
TSH SERPL DL<=0.005 MIU/L-ACNC: 3.88 MU/L (ref 0.4–4)
VIT B12 SERPL-MCNC: 357 PG/ML (ref 193–986)

## 2020-02-27 PROCEDURE — 84443 ASSAY THYROID STIM HORMONE: CPT | Performed by: PSYCHIATRY & NEUROLOGY

## 2020-02-27 PROCEDURE — H2032 ACTIVITY THERAPY, PER 15 MIN: HCPCS

## 2020-02-27 PROCEDURE — 83721 ASSAY OF BLOOD LIPOPROTEIN: CPT | Performed by: PSYCHIATRY & NEUROLOGY

## 2020-02-27 PROCEDURE — 99222 1ST HOSP IP/OBS MODERATE 55: CPT | Mod: AI | Performed by: PSYCHIATRY & NEUROLOGY

## 2020-02-27 PROCEDURE — 99207 ZZC CDG-MDM COMPONENT: MEETS LOW - DOWN CODED: CPT | Performed by: PSYCHIATRY & NEUROLOGY

## 2020-02-27 PROCEDURE — 82607 VITAMIN B-12: CPT | Performed by: PSYCHIATRY & NEUROLOGY

## 2020-02-27 PROCEDURE — 36415 COLL VENOUS BLD VENIPUNCTURE: CPT | Performed by: PSYCHIATRY & NEUROLOGY

## 2020-02-27 PROCEDURE — 25000132 ZZH RX MED GY IP 250 OP 250 PS 637: Performed by: PSYCHIATRY & NEUROLOGY

## 2020-02-27 PROCEDURE — 12800008 ZZH R&B CD ADULT

## 2020-02-27 PROCEDURE — 80061 LIPID PANEL: CPT | Performed by: PSYCHIATRY & NEUROLOGY

## 2020-02-27 RX ORDER — NICOTINE 21 MG/24HR
1 PATCH, TRANSDERMAL 24 HOURS TRANSDERMAL DAILY
Status: DISCONTINUED | OUTPATIENT
Start: 2020-02-27 | End: 2020-02-28 | Stop reason: HOSPADM

## 2020-02-27 RX ORDER — PAROXETINE 20 MG/1
20 TABLET, FILM COATED ORAL DAILY
Status: DISCONTINUED | OUTPATIENT
Start: 2020-02-27 | End: 2020-02-28 | Stop reason: HOSPADM

## 2020-02-27 RX ADMIN — IBUPROFEN 200 MG: 200 TABLET, FILM COATED ORAL at 16:44

## 2020-02-27 RX ADMIN — DIAZEPAM 5 MG: 5 TABLET ORAL at 13:27

## 2020-02-27 RX ADMIN — MULTIPLE VITAMINS W/ MINERALS TAB 1 TABLET: TAB at 08:24

## 2020-02-27 RX ADMIN — DIAZEPAM 10 MG: 5 TABLET ORAL at 20:28

## 2020-02-27 RX ADMIN — QUETIAPINE FUMARATE 100 MG: 50 TABLET ORAL at 00:10

## 2020-02-27 RX ADMIN — FOLIC ACID 1 MG: 1 TABLET ORAL at 08:24

## 2020-02-27 RX ADMIN — NICOTINE POLACRILEX 4 MG: 2 GUM, CHEWING BUCCAL at 11:58

## 2020-02-27 RX ADMIN — QUETIAPINE FUMARATE 50 MG: 25 TABLET ORAL at 08:23

## 2020-02-27 RX ADMIN — NICOTINE POLACRILEX 4 MG: 2 GUM, CHEWING BUCCAL at 21:13

## 2020-02-27 RX ADMIN — DIAZEPAM 5 MG: 5 TABLET ORAL at 08:24

## 2020-02-27 RX ADMIN — QUETIAPINE FUMARATE 150 MG: 50 TABLET ORAL at 21:13

## 2020-02-27 RX ADMIN — DIAZEPAM 10 MG: 5 TABLET ORAL at 16:44

## 2020-02-27 RX ADMIN — PAROXETINE HYDROCHLORIDE HEMIHYDRATE 20 MG: 20 TABLET, FILM COATED ORAL at 14:22

## 2020-02-27 RX ADMIN — NICOTINE 1 PATCH: 21 PATCH, EXTENDED RELEASE TRANSDERMAL at 10:29

## 2020-02-27 RX ADMIN — THIAMINE HCL TAB 100 MG 100 MG: 100 TAB at 08:24

## 2020-02-27 RX ADMIN — DIAZEPAM 10 MG: 5 TABLET ORAL at 00:10

## 2020-02-27 ASSESSMENT — ACTIVITIES OF DAILY LIVING (ADL)
ORAL_HYGIENE: INDEPENDENT
LAUNDRY: WITH SUPERVISION
DRESS: STREET CLOTHES
HYGIENE/GROOMING: INDEPENDENT

## 2020-02-27 NOTE — PLAN OF CARE
S:  Georges is a 43 yo M who comes to [facility] seeking detox from alcohol.  He states that he has been drinking 8-10 beers daily since October 2019.  He reports that his last drink was today around 11:00 am.  He reports that he is also a marijuana smoker about an 1/8 of an ounce/week.  He states that he smokes cigarettes ~ 2 ppd.  When he arrived on the floor he was very hypertensive at 163/114, HR=87  He moved back in to his Mother's house.  He states that he has been having anxiety and panic.  He is worried about his mental health.  He can't concentrate or focus.  He denies any current thoughts of self harm, suicide or thoughts of harming others.  He states that he feels safe here. He states that when he was in his early twenties, he was drunk and he cut his arm.  He didn't think of it as a suicide attempt but he needed stitches.   B:  Per Epic: history of SA, headache, PUSHPA, depression, pancreatitis, paroxysmal atrial fibrillation, ADHD and PTSD   A:  Pt in moderate alcohol withdrawal AEB MSSA score of 10  R:  Attempted to obtain admission orders, (paged on call provider twice, text message sent once, cell phone called once and left message on that number's voice mail.  Re-oriented to unit, schedule and POC.  Counseled on smoking cessation.  Re-introduced to [facility] program. Contacted Behavioral Intake department.  They had not had any reports of having difficulty getting a response from the on call provider. Spoke with night RNErnst who will be assuming his care, regarding inability to obtain admission orders from on-call provider.  She is aware of the situation.  At 23:15 writer placed another page to the on call provider.  Updated FE Dukes of writer's action and that it would be up to her to assume responsibility for the situation at 23:30 when writer is off duty.

## 2020-02-27 NOTE — PROGRESS NOTES
02/26/20 2151   Patient Belongings   Did you bring any home meds/supplements to the hospital?  No   Patient Belongings other (see comments)   Patient Belongings Put in Hospital Secure Location (Security or Locker, etc.) other (see comments)   Belongings Search Yes   Clothing Search Yes   Second Staff Loyd   Comment See Notes     Large bin:  Backpack  Belt  Tennis shoes  Sunglasses  Toiletries   Small bin:  Cell phone  Wallet  Security # [id number]  Visa [phone]  [place] ID card  Health insurance card  A               Admission:  I am responsible for any personal items that are not sent to the safe or pharmacy.  [facility] is not responsible for loss, theft or damage of any property in my possession.    Signature:  _________________________________ Date: _______  Time: _____                                              Staff Signature:  ____________________________ Date: ________  Time: _____      2nd Staff person, if patient is unable/unwilling to sign:    Signature: ________________________________ Date: ________  Time: _____     Discharge:  [facility] has returned all of my personal belongings:    Signature: _________________________________ Date: ________  Time: _____                                          Staff Signature:  ____________________________ Date: ________  Time: _____

## 2020-02-27 NOTE — PROGRESS NOTES
Behavioral Team Discussion: (2/27/2020)    Continued Stay Criteria/Rationale: Patient admitted for Chemical Use Issues.  Plan: The following services will be provided to the patient; psychiatric assessment, medication management, therapeutic milieu, individual and group support, and skills groups.   Participants: [facility] Provider: Dr. Jacquelyn Justin MD; [facility] RN's: Genoveva RN; [facility] CM's: Philly Triplett.  Summary/Recommendation: Providers will assess today for treatment recommendations, discharge planning, and aftercare plans. CM will meet with pt for discharge planning.   Medical/Physical: Per ED note:    ADHD       Depression, major, single episode       moderate     PUSHPA (generalized anxiety disorder)       HA (headache)       Pancreatitis       Paroxysmal atrial fibrillation (H)       Personal history of alcoholism (H)       PTSD (post-traumatic stress disorder)       Substance abuse (H)       Tobacco dependence      Precautions:   Behavioral Orders   Procedures     Code 1 - Restrict to Unit     Routine Programming     As clinically indicated     Status 15     Every 15 minutes.     Withdrawal precautions     Rationale for change in precautions or plan: N/A  Progress: Initial.

## 2020-02-27 NOTE — PLAN OF CARE
"Nursing Note    Diagnosis: Major depressive disorder, recurrent episode, moderate (H) [F33.1]  Alcohol dependence with uncomplicated intoxication (H) [F10.220]  Admit Date: 2/26/2020  Precautions: Orders Placed This Encounter      Withdrawal precautions     Recent Vitals: /53   Pulse 90   Temp 98  F (36.7  C)   Resp 16   Ht 1.829 m (6')   Wt 99.8 kg (220 lb)   SpO2 94%   BMI 29.84 kg/m  ,  ,  ,       Patient was irritable in the morning since he did not receive breakfast, patient did not have a dietary order and one of placed. He was then frustrated when his food came up since it wasn't what he wanted and he no longer had an appetite. With patient's irritability and detox symptoms of sweatyness he was provided 5mg of Valium at 0815. Patient ate about half of lunch. Stated is sleep is okay. Did not shower, hygiene is lacking. Is not social with others nor does he partake in groups. Patient only ate about half of breakfast. He rated anxiety 5/10, denied SI/HI and AVH. He stated he feels depressed and hopeless since he's had multiple CD treatments for years and nothing has really worked. \"I feel like it's the same thing over and over and it's just not working for me. I don't know what to do next and don't think there is really anything else to do.\". Patient was started on Paxil this shift and was said he's hoping this will help.    Last Valium was at 1330 with a MSSA score of 8. Will continue to monitor detox symptoms and intervene as nessesary.    Genoveva Rose, RN MSN    "

## 2020-02-27 NOTE — PROGRESS NOTES
Writer met with pt to discuss aftercare plans. Pt reports he is not sure yet if he will do treatment at this time. Reports he was most recently in treatment in August 2019 in [facility]. Pt reports he has not had a recent CD assessment since that time. Pt reports he is feeling ill and will need to think about plan before making a decision. Pt is aware of paperwork he will need to complete for assessment and referral should he decide he is interested. Pt states he will work on this and turn into RN if he decides to do treatment. Pt has [employer] for insurance.  CM to follow-up with plan on 2/28.

## 2020-02-27 NOTE — H&P
Admitted:     02/26/2020      The patient is a 44-year-old  male.  He is living in his mother's basement.      CHIEF COMPLAINT:  Alcohol.      The patient came to the emergency room wanting detox from alcohol.  He was in [facility] in August, but did not complete and continued to drink.  He is having numerous stressors, job, girlfriend, money.  He began to drink alcohol.  He is vague about when he began to drink alcohol, but it has been a problem for him.  He has tolerance, withdrawal, progressive use with loss of control, used despite having negative consequences.  Family is upset with his use.  He has big problem is that he has anxiety.  He describes that he gets irritable, muscle tension, concentration gets impacted, he cannot focus.  He does not feel safe.  He feels restless and anger and rage.  He does not have any active suicidal or homicidal ideation, plan or intent.  He smokes 2 packs a day.  He uses marijuana 4 times a day.  He does not pearson.  He has tried several medications for his anxiety, including Lexapro, Effexor, Celexa, but has not tried BuSpar or Remeron.      PAST PSYCHIATRIC HISTORY:  He was never psychiatrically hospitalized.  He made 1 suicide attempt at age 23, sliced his arm.  He was in 6 or 7 chemical dependency treatments.      FAMILY HISTORY:  Alcoholism in his father.      SOCIAL HISTORY:  Born in [place].  He presently lives in his mother's basement.  He is unemployed.      PAST MEDICAL HISTORY:  A 10-point review systems was reviewed and is negative.         VITAL SIGNS:  Temperature of 97.3, pulse of 93, heart rate of 99, respiratory rate of 16, blood pressure 136/87.      MENTAL STATUS EXAMINATION:  The patient is a  male who appears his stated age.  He has adequate grooming, adequate hygiene, maintains good eye contact, cooperative, no psychomotor abnormalities.  No gait problems.  Mood is euthymic.  Affect is congruent.  Speech is spontaneous, normal rate.  No  psychomotor abnormalities.  Linear thought process.  No loose associations.  Does not have any active suicidal or homicidal ideation.  Does not have auditory or visual hallucinations.  Insight and judgment are fair.  Alert, oriented x3.  Attention and concentration good.  Recent and remote memory, language, fund of knowledge, muscle strength and gait are normal.      DIAGNOSES:    alcohol use disorder, severe; nicotine use disorder, severe.     Generalized anxiety disorder;  RECOMMENDATIONS:  The patient will be detoxed off alcohol using MSSA protocol on Valium.  The patient is having a lot of symptoms of withdrawal.  He is having eating disturbance, tremor, sleeping disturbances, agitation, elevated pulse, his pulse is 90.  He scored 8 on the MSSA withdrawal protocol.  Since his admission, he has required up to 50 mg.  He has generalized anxiety disorder.  He appears to want benzos.  He talks about coordination with being on Suboxone; however, patient has alcoholism.  He has had this discussion with numerous providers.  The patient does not have any active suicidal plan or intent.  The patient will be given options to try Paxil.      I have reviewed this patient's labs.  I have talked with the nurse about medications, detox course, withdrawal scores and I have spoken with Case Management about treatment options.         PAULINO LACEY MD             D: 2020   T: 2020   MT: ANUP      Name:     DEBORA PEARL   MRN:      [id number]        Account:      [id number]   :      1975        Admitted:     2020                   Document: [id number]       cc: Sarah Mccormack MD

## 2020-02-28 VITALS
SYSTOLIC BLOOD PRESSURE: 130 MMHG | DIASTOLIC BLOOD PRESSURE: 91 MMHG | HEART RATE: 94 BPM | RESPIRATION RATE: 16 BRPM | BODY MASS INDEX: 29.8 KG/M2 | TEMPERATURE: 97.9 F | WEIGHT: 220 LBS | OXYGEN SATURATION: 98 % | HEIGHT: 72 IN

## 2020-02-28 PROCEDURE — 25000132 ZZH RX MED GY IP 250 OP 250 PS 637: Performed by: PSYCHIATRY & NEUROLOGY

## 2020-02-28 PROCEDURE — 99239 HOSP IP/OBS DSCHRG MGMT >30: CPT | Performed by: PSYCHIATRY & NEUROLOGY

## 2020-02-28 RX ORDER — QUETIAPINE FUMARATE 50 MG/1
50-150 TABLET, FILM COATED ORAL
Qty: 90 TABLET | Refills: 0 | Status: SHIPPED | OUTPATIENT
Start: 2020-02-28 | End: 2020-04-29

## 2020-02-28 RX ORDER — QUETIAPINE FUMARATE 25 MG/1
25-50 TABLET, FILM COATED ORAL 2 TIMES DAILY PRN
Qty: 60 TABLET | Refills: 0 | Status: SHIPPED | OUTPATIENT
Start: 2020-02-28 | End: 2020-04-29

## 2020-02-28 RX ORDER — PAROXETINE 20 MG/1
20 TABLET, FILM COATED ORAL DAILY
Qty: 30 TABLET | Refills: 0 | Status: ON HOLD | OUTPATIENT
Start: 2020-02-29 | End: 2023-08-27

## 2020-02-28 RX ADMIN — QUETIAPINE FUMARATE 50 MG: 25 TABLET ORAL at 04:10

## 2020-02-28 RX ADMIN — PAROXETINE HYDROCHLORIDE HEMIHYDRATE 20 MG: 20 TABLET, FILM COATED ORAL at 08:00

## 2020-02-28 RX ADMIN — NICOTINE 1 PATCH: 21 PATCH, EXTENDED RELEASE TRANSDERMAL at 08:02

## 2020-02-28 RX ADMIN — NICOTINE POLACRILEX 2 MG: 2 GUM, CHEWING BUCCAL at 12:39

## 2020-02-28 RX ADMIN — FOLIC ACID 1 MG: 1 TABLET ORAL at 08:00

## 2020-02-28 RX ADMIN — MULTIPLE VITAMINS W/ MINERALS TAB 1 TABLET: TAB at 08:00

## 2020-02-28 RX ADMIN — THIAMINE HCL TAB 100 MG 100 MG: 100 TAB at 08:00

## 2020-02-28 ASSESSMENT — ACTIVITIES OF DAILY LIVING (ADL)
HYGIENE/GROOMING: INDEPENDENT
ORAL_HYGIENE: INDEPENDENT
HYGIENE/GROOMING: INDEPENDENT
DRESS: INDEPENDENT

## 2020-02-28 NOTE — PROGRESS NOTES
Writer followed up with pt to confirm aftercare plans. Pt reports he is not interested in treatment, reports he has been through several treatment programs and feels as though his mental health is what he needs to focus on at this point. Pt appeared irritable and edgy during meeting, reports he had recently seen his psychiatrist at [facility] in [place] and walked out of this appointment then began drinking prior to detox admission. Pt reports his plan is to call [facility] to reschedule both therapy and psychiatry appointments and will update treatment regarding dates and times of appointments. Pt is aware if needs change he can seek out case management for assistance. AVS updated.

## 2020-02-28 NOTE — DISCHARGE INSTRUCTIONS
Behavioral Discharge Planning and Instructions  THANK YOU FOR CHOOSING [facility]  3A  [phone]    Summary: You were admitted to [facility] on 2/26/20 for detoxification from alcohol.  A medical exam was performed that included lab work. You have met with a  and opted to continue with psychiatry and therapy at [facility].  Please take care and make your recovery a daily priority, Georges! It was a pleasure working with you and the entire treatment team here wishes you the very best in your recovery!     Recommendation:  Continue with outpatient mental health services.    Main Diagnoses:  Per Dr. Jacquelyn Justin MD:  Alcohol Use Disorder-Severe    Major Treatments, Procedures and Findings:  You were treated for alcohol detoxification using [facility] protocol.  You declined a chemical dependency assessment. You had labs drawn and those results were reviewed with you. Please take a copy of your lab work with you to your next primary care physician appointment.    Symptoms to Report:  If you experience more anxiety, confusion, sleeplessness, deep sadness or thoughts of suicide, notify your treatment team or notify your primary care physician. IF ANY OF THE SYMPTOMS YOU ARE EXPERIENCING ARE A MEDICAL EMERGENCY CALL 911 IMMEDIATELY.     Lifestyle Adjustment: Adjust your lifestyle to get enough sleep, relaxation, exercise and good nutrition. Continue to develop healthy coping skills to decrease stress and promote a sober living environment. Do not use mood altering substances including alcohol, illegal drugs or addictive medications other than what is currently prescribed.     Disposition: Home  [facility]    [address] [place] [address] [phone]    Please follow up with primary MD  within 30 days for post hospitalization checkup.      Therapy Follow-Up:  [facility]  [address]  [place] [address] [phone]    Psychiatry  Follow-Up:  [facility]  [address]  [place] [address]  [phone]    Resources:   AA/NA and Sponsors are excellent resources for support and you can find one at any support group meeting.   Alcoholics Anonymous (www.alcoholics-anonymous.org): for local information 24 hours/day  AA Intergroup service office in [place] (http://www.aastpaul.org/) [phone]  AA Intergroup service office in [facility]: [phone]. (http://www.a[place].org/)  Narcotics Anonymous (www.nam[place].org) (707) 171-8501  https://aafairviewriverside.org/meetings  SMART Recovery - self management for addiction recovery:  www.[place] Increcovery.org  Pathways ~ A Health Crisis Resource & Support Center:  959.954.2723.  https://prescribetoprevent.org/patient-education/videos/  http://www.harmreduction.org  [facility] [phone]  Support Group:  AA/NA and Sponsor/support.  National [place] on Mental Illness (www.[place].melissa.org): 869.641.3157 or 819-262-8567.  Alcoholics Anonymous (www.alcoholics-anonymous.org): Check your phone book for your local chapter.  Suicide Awareness Voices of Education (SAVE) (www.save.org): 277-309-OXEC (7747)  National Suicide Prevention Line (www.mentalhealthmn.org): 804-506-QYZX (4100)  Mental Health Consumer/Survivor Network of [place] (www.mhcsn.net): 548.928.4051 or 688-013-6941  Mental Health Association of [place] (www.mentalhealth.org): 314.183.3577 or 814-451-3395   Substance Abuse and Mental Health Services (www.samhsa.gov)  [place] Opioid Prevention Coalition: www.opioidcoalition.org    [place] Recovery Connection (MRC)  [facility] connects people seeking recovery to resources that help foster and sustain long-term recovery.  Whether you are seeking resources for treatment, transportation, housing, job training, education, health care or other pathways to recovery, [facility] is a great place to start.  529.865.6521.  www.[facility].org    General Medication  Instructions:   See your medication sheet(s) for instructions.   Take all medications as prescribed.  Make no changes unless your doctor suggests them.   Do not use any forms of alcohol.    Please Note:  If you have any questions at anytime after you are discharged please call [facility] detox unit 3AW at [phone].  [facility], Behavioral Intake [phone]  Medical Records call [phone]  Outpatient Behavioral Intake call [phone]  LP+ Wait List/Bed Availability call [phone]    Please remember to take all of your behavioral discharge planning and lab paperwork to any follow up appointments, it contains your lab results, diagnosis, medication list and discharge recommendations.      THANK YOU FOR CHOOSING [facility]

## 2020-02-28 NOTE — PROGRESS NOTES
"LUCITASOO Moreno is a 44 year old year old male with a chief complaint of Mental Health Problem (Pt states \"I just don't feel good\".  Pt states \"It feels like a panic attack\" has not slept or ate in 2 days.  Pt feels he has needed psychiatric help x2 years)    S = Situation:   Mood  B  = Background:   Patient admitted for depression.  Pt reports that his mood is mostly better.  He reports feeling \"a bit low\" but calmer.  Pt attended art therapy, therapeutic meeting and is eating socially.  A  =  Assessment:   Vital Signs: BP (!) 167/83 (BP Location: Left arm)   Pulse 101   Temp 97.5  F (36.4  C) (Tympanic)   Resp 16   Ht 1.829 m (6')   Wt 99.8 kg (220 lb)   SpO2 94%   BMI 29.84 kg/m    Pt has a full range affect, talking and laughing with peers and staff.  He appears engaged and interested in conversation.  He makes good eye contact, his thoughts follow.  He was interested in his discharge plan--wondering when he might be discharged.  Explained the relationship between being \"out of detox\" and discharge and also noted that he started a new medication today.  Pt verbalizes understanding and is comfortable staying until detox is complete.  R =   Request or Recommendation:   Will continue to monitor symptoms of depression, alcohol withdrawal, Dr. Justin to assess, case management to follow up regarding treatment options.     "

## 2020-02-28 NOTE — PROGRESS NOTES
[facility], [place]   Psychiatric Progress Note    Interim history   This is a 44 year old male with  alcohol use disorder, severe; nicotine use disorder, severe.     Generalized anxiety disorder.Pt seen in rounds. Patient's mood is irritable TOLERATING PAXIL   Was in trt at [facility]  Energy Level:MODERATE  Sleep:a little   Appetite:fair motivation interest improving   Suicidal/homicidal ideation/plan intent.psychosis  No prior suicde attempts  No access to gun  Pt is in alcohol withdrawal still being monitered every 4 hrs for it,   Pt mssa score are monitered  Tolerating meds and has no side effects.              Medications:     Current Facility-Administered Medications   Medication     alum & mag hydroxide-simethicone (MAALOX  ES) suspension 30 mL     atenolol (TENORMIN) tablet 50 mg     diazepam (VALIUM) tablet 5-20 mg     folic acid (FOLVITE) tablet 1 mg     hydrOXYzine (ATARAX) tablet 25 mg     ibuprofen (ADVIL/MOTRIN) tablet 200 mg     loperamide (IMODIUM) capsule 2 mg     magnesium hydroxide (MILK OF MAGNESIA) suspension 30 mL     multivitamin w/minerals (THERA-VIT-M) tablet 1 tablet     nicotine (NICODERM CQ) 21 MG/24HR 24 hr patch 1 patch     nicotine (NICORETTE) gum 2-4 mg     nicotine Patch in Place     ondansetron (ZOFRAN-ODT) ODT tab 4 mg     PARoxetine (PAXIL) tablet 20 mg     QUEtiapine (SEROquel) tablet 25-50 mg     QUEtiapine (SEROquel) tablet  mg     vitamin B1 (THIAMINE) tablet 100 mg             Allergies:     Allergies   Allergen Reactions     Duloxetine      Rash, hives     Sertraline      Excessive fatigue            Psychiatric Examination:   Blood pressure (!) 141/94, pulse 103, temperature 97  F (36.1  C), temperature source Tympanic, resp. rate 16, height 1.829 m (6'), weight 99.8 kg (220 lb), SpO2 98 %.  Weight is 220 lbs 0 oz  Body mass index is 29.84 kg/m .    Appearance:  awake, alert and adequately groomed  Attitude:  cooperative  Eye  Contact:  good  Mood:  anxious  Affect:  appropriate and in normal range and mood congruent  Speech:  clear, coherent rate /rhythm are good  Psychomotor Behavior:  no evidence of tardive dyskinesia, dystonia, or tics and intact station, gait and muscle tone  Throught Process:  logical  Associations:  no loose associations  Thought Content:  no evidence of suicidal ideation or homicidal ideation, no evidence of psychotic thought, no auditory hallucinations present and no visual hallucinations present  Insight:  limited  Judgement:  limited  Oriented to:  time, person, and place  Attention Span and Concentration:  intact  Recent and Remote Memory:  intact  Language fund of knowledge are adequate         Labs:     Lab Results   Component Value Date    NTBNPI 96 02/20/2018     Lab Results   Component Value Date    WBC 9.0 02/26/2020    HGB 17.2 02/26/2020    HCT 47.9 02/26/2020    MCV 91 02/26/2020     02/26/2020     Lab Results   Component Value Date    TSH 3.88 02/27/2020         DX      PLAN    DIAGNOSES:    alcohol use disorder, severe; nicotine use disorder, severe.     Generalized anxiety disorder;    ALOCHOL WITHDRAWAL:  :  The patient will be detoxed off alcohol using MSSA protocol on Valium.  The patient is having a lot of symptoms of withdrawal.  He is having e, tremor, sleeping disturbances, agitation, elevated pulse, his pulse is 103.  He scored 7 on the MSSA withdrawal protocol.        He has generalized anxiety disorder.       CONTINUE  Paxil. 20 MG PO every day  PT DECLINED GABPENTIN/NALTREXONE  SEROQUEL PT              Supportive psychotheraoy provided, rehana talked about recovery enviroment, relapse prevention, triggers to use.  Discussed with patient many issues of addiction,triggers, relapse, and establishing a solid recovery program.  Asked pt to be med complinat     Pt not open to naltrexone/antabuse/campral    Intervention done by me during this visit:    Laboratory/Imaging: reviewed with  patient   Consults: internal medicine consult reviewed  Patient will be treated in therapeutic milieu with appropriate individual and group therapies as described.  PDMP CHECKED     Supportive psychotheraoy provided, rehana talked about recovery enviroment, relapse prevention, triggers to use.  Discussed with patient many issues of addiction,triggers, relapse, and establishing a solid recovery program.  Asked pt to be med complinat   Medical diagnoses to be addressed this admission:    Plan:      Legal Status: voluntary    Safety Assessment:   Checks:  15 min  Precautions: withdrawal precautions  Pt has not required locked seclusion or restraints in the past 24 hours to maintain safety, please refer to RN documentation for further details.  Discussed with patient many issues of addiction,triggers, relapse, and establishing a solid recovery program.  Able to give informed consent:  YES   Discussed Risks/Benefits/Side Effects/Alternatives: YES    After discussion of the indications, risks, benefits, alternatives and consequences of no treatment, the patient elects to  complete detox and do trt. On calldoctor can discharge him after detox AND CM is complete, meds ordered for discharge

## 2020-02-28 NOTE — DISCHARGE SUMMARY
Admit Date:     02/26/2020             More than 35 minutes spent on discharge summary, doing the discharge instructions, discharge medication, discharge mental status examination.      DISCHARGE DIAGNOSES:   AXIS I:  Alcohol use disorder, severe; generalized anxiety disorder.      Please review detailed admission note by Dr. Justin on 02/27/2020.      During the hospitalization, the patient was detoxed off alcohol using MSSA protocol on Valium.  He was started on Paxil.  During the hospitalization, the patient was seen by Case Management.  His plan is to go to [facility].  He is anticipated to come out of detox at 8:00.      DISCHARGE MENTAL STATUS EXAMINATION:  The patient is alert, oriented x3.  Recent and remote memory, language, fund of knowledge were adequate.  No loose associations.  The patient does not have any active suicidal plan or intent.        During the hospitalization, the patient had lab work done, which shows a normal comprehensive metabolic panel.  Cholesterol is 33, triglycerides 194, AST 54.        During the hospitalization, the patient's energy, motivation, sleep and interest improved.  He did not have any suicidal or homicidal ideation, plan or intent.      DISCHARGE DISPOSITION:  The patient is going home.           Labs:   No results found for this or any previous visit (from the past 48 hour(s)).  Because this patient meets criteria for an Alcohol Use Disorder, I performed the following brief intervention on the date of this note:              1) Expressed concern that the patient is drinking at unhealthy levels known to increase their risk of alcohol related problems              2) Gave feedback linking alcohol use and health, including personalized feedback explaining how alcohol use can interact with their medical and/or psychiatric problems, and with prescribed medications.              3) Advised patient to abstain.      DISCHARGE MENTAL STATUS EXAMINATION:  The patient is alert,  oriented x3.  Good fund of knowledge.  Good use of language.  Recent and remote memory, language, fund of knowledge are all adequate.  Euthymic mood congruent affect  Speech normal rate/rhythm linear tp no loose asso,The patient does not have any active suicidal or homicidal ideation.  Does not have any auditory or visual hallucination.  Fair insight/judgment At this time, the patient was stable to be discharged.        Pt was not determined to not be a danger to himself or others. At the current time of discharge, the patient does not meet criteria for involuntary hospitalization. On the day of discharge, the patient reports that they do not have suicidal or homicidal ideation and would never hurt themselves or others. Steps taken to minimize risk include: assessing patient s behavior and thought process daily during hospital stay, discharging patient with adequate plan for follow up for mental and physical health and discussing safety plan of returning to the hospital should the patient ever have thoughts of harming themselves or others. Therefore, based on all available evidence including the factors cited above, the patient does not appear to be at imminent risk for self-harm, and is appropriate for outpatient level of care.     Educated about side effects/risk vs benefits /alternative including non treatment.Pt consented to be on medication.     .Total time spent on discharge summary more than 35 min  More than  20 min  planning, coordination of care, medication reconciliation and performance of physical exam on day of discharge.Care was coordinated with unit RN and unit therapist    .   Georges Moreno   Home Medication Instructions CARRI:[id number]    Printed on:03/04/20 1231   Medication Information                      PARoxetine (PAXIL) 20 MG tablet  Take 1 tablet (20 mg) by mouth daily             QUEtiapine (SEROQUEL) 25 MG tablet  Take 1-2 tablets (25-50 mg) by mouth 2 times daily as needed (anxiety)              QUEtiapine (SEROQUEL) 50 MG tablet  Take 1-3 tablets ( mg) by mouth nightly as needed (anxiety)             Disposition: Home  [facility]    [address] [place], [place] [id number] [phone]    Please follow up with primary MD  within 30 days for post hospitalization checkup.        Therapy Follow-Up:  [facility]  [address]  [place] [address] [phone]     Psychiatry Follow-Up:  [facility]  [address]  [place], [place] [id number] [phone]     PAULINO LACEY MD             D: 2020   T: 2020   MT: ANUP      Name:     PEARL, DEBORA   MRN:      [id number]        Account:        [id number]   :      1975           Admit Date:     2020                                  Discharge Date:       Document: [id number]

## 2020-02-28 NOTE — PROGRESS NOTES
02/27/20 1800   General Information   Art Directive other (see comments)   AT directive is to create an image of self as a landscape. Goals of directive: to create a personal self narrative, to identify personal strengths and goals, emotional expression. Pt was a quiet participant, focused on task for the full duration of group. Pt finished drawing and briefly shared with group.   Pts mood was calm.

## 2020-04-29 ENCOUNTER — APPOINTMENT (OUTPATIENT)
Dept: GENERAL RADIOLOGY | Facility: CLINIC | Age: 45
End: 2020-04-29
Attending: PHYSICIAN ASSISTANT
Payer: COMMERCIAL

## 2020-04-29 ENCOUNTER — HOSPITAL ENCOUNTER (OUTPATIENT)
Facility: CLINIC | Age: 45
Discharge: HOME | End: 2020-04-30
Attending: EMERGENCY MEDICINE | Admitting: HOSPITALIST
Payer: COMMERCIAL

## 2020-04-29 DIAGNOSIS — F10.10 ALCOHOL ABUSE: ICD-10-CM

## 2020-04-29 DIAGNOSIS — I48.91 ATRIAL FIBRILLATION WITH RVR (H): ICD-10-CM

## 2020-04-29 DIAGNOSIS — R00.2 PALPITATIONS: Primary | ICD-10-CM

## 2020-04-29 LAB
ALBUMIN SERPL-MCNC: 4.6 G/DL (ref 3.4–5)
ALP SERPL-CCNC: 62 U/L (ref 40–150)
ALT SERPL W P-5'-P-CCNC: 43 U/L (ref 0–70)
ANION GAP SERPL CALCULATED.3IONS-SCNC: 12 MMOL/L (ref 3–14)
AST SERPL W P-5'-P-CCNC: 32 U/L (ref 0–45)
BILIRUB DIRECT SERPL-MCNC: 0.2 MG/DL (ref 0–0.2)
BILIRUB SERPL-MCNC: 0.9 MG/DL (ref 0.2–1.3)
BUN SERPL-MCNC: 11 MG/DL (ref 7–30)
CALCIUM SERPL-MCNC: 9.4 MG/DL (ref 8.5–10.1)
CHLORIDE SERPL-SCNC: 105 MMOL/L (ref 94–109)
CO2 SERPL-SCNC: 21 MMOL/L (ref 20–32)
CREAT SERPL-MCNC: 0.68 MG/DL (ref 0.66–1.25)
ERYTHROCYTE [DISTWIDTH] IN BLOOD BY AUTOMATED COUNT: 11.9 % (ref 10–15)
ETHANOL SERPL-MCNC: 0.04 G/DL
FOLATE SERPL-MCNC: 27.8 NG/ML
GFR SERPL CREATININE-BSD FRML MDRD: >90 ML/MIN/{1.73_M2}
GLUCOSE SERPL-MCNC: 105 MG/DL (ref 70–99)
HCT VFR BLD AUTO: 51.7 % (ref 40–53)
HGB BLD-MCNC: 17.4 G/DL (ref 13.3–17.7)
INR PPP: 0.97 (ref 0.86–1.14)
MAGNESIUM SERPL-MCNC: 1.9 MG/DL (ref 1.6–2.3)
MCH RBC QN AUTO: 31.4 PG (ref 26.5–33)
MCHC RBC AUTO-ENTMCNC: 33.7 G/DL (ref 31.5–36.5)
MCV RBC AUTO: 93 FL (ref 78–100)
PHOSPHATE SERPL-MCNC: 2 MG/DL (ref 2.5–4.5)
PLATELET # BLD AUTO: 290 10E9/L (ref 150–450)
POTASSIUM SERPL-SCNC: 3.7 MMOL/L (ref 3.4–5.3)
PROT SERPL-MCNC: 8.4 G/DL (ref 6.8–8.8)
RBC # BLD AUTO: 5.55 10E12/L (ref 4.4–5.9)
SODIUM SERPL-SCNC: 138 MMOL/L (ref 133–144)
VIT B12 SERPL-MCNC: 390 PG/ML (ref 193–986)
WBC # BLD AUTO: 10.6 10E9/L (ref 4–11)

## 2020-04-29 PROCEDURE — 25000128 H RX IP 250 OP 636: Performed by: PHYSICIAN ASSISTANT

## 2020-04-29 PROCEDURE — 85610 PROTHROMBIN TIME: CPT | Performed by: PHYSICIAN ASSISTANT

## 2020-04-29 PROCEDURE — 25800030 ZZH RX IP 258 OP 636: Performed by: PHYSICIAN ASSISTANT

## 2020-04-29 PROCEDURE — 96374 THER/PROPH/DIAG INJ IV PUSH: CPT

## 2020-04-29 PROCEDURE — 80076 HEPATIC FUNCTION PANEL: CPT | Performed by: PHYSICIAN ASSISTANT

## 2020-04-29 PROCEDURE — 99223 1ST HOSP IP/OBS HIGH 75: CPT | Mod: AI | Performed by: PHYSICIAN ASSISTANT

## 2020-04-29 PROCEDURE — 82607 VITAMIN B-12: CPT | Performed by: PHYSICIAN ASSISTANT

## 2020-04-29 PROCEDURE — 84100 ASSAY OF PHOSPHORUS: CPT | Performed by: PHYSICIAN ASSISTANT

## 2020-04-29 PROCEDURE — 96375 TX/PRO/DX INJ NEW DRUG ADDON: CPT

## 2020-04-29 PROCEDURE — 82746 ASSAY OF FOLIC ACID SERUM: CPT | Performed by: PHYSICIAN ASSISTANT

## 2020-04-29 PROCEDURE — 25000125 ZZHC RX 250: Performed by: PHYSICIAN ASSISTANT

## 2020-04-29 PROCEDURE — 25000132 ZZH RX MED GY IP 250 OP 250 PS 637: Performed by: PHYSICIAN ASSISTANT

## 2020-04-29 PROCEDURE — 96361 HYDRATE IV INFUSION ADD-ON: CPT

## 2020-04-29 PROCEDURE — 99285 EMERGENCY DEPT VISIT HI MDM: CPT | Mod: 25

## 2020-04-29 PROCEDURE — 71046 X-RAY EXAM CHEST 2 VIEWS: CPT

## 2020-04-29 PROCEDURE — 85027 COMPLETE CBC AUTOMATED: CPT | Performed by: PHYSICIAN ASSISTANT

## 2020-04-29 PROCEDURE — 96376 TX/PRO/DX INJ SAME DRUG ADON: CPT

## 2020-04-29 PROCEDURE — 83735 ASSAY OF MAGNESIUM: CPT | Performed by: PHYSICIAN ASSISTANT

## 2020-04-29 PROCEDURE — 12000000 ZZH R&B MED SURG/OB

## 2020-04-29 PROCEDURE — 80320 DRUG SCREEN QUANTALCOHOLS: CPT | Performed by: PHYSICIAN ASSISTANT

## 2020-04-29 PROCEDURE — 93005 ELECTROCARDIOGRAM TRACING: CPT

## 2020-04-29 PROCEDURE — 80048 BASIC METABOLIC PNL TOTAL CA: CPT | Performed by: PHYSICIAN ASSISTANT

## 2020-04-29 RX ORDER — LANOLIN ALCOHOL/MO/W.PET/CERES
100 CREAM (GRAM) TOPICAL DAILY
Status: DISCONTINUED | OUTPATIENT
Start: 2020-04-30 | End: 2020-04-30 | Stop reason: HOSPADM

## 2020-04-29 RX ORDER — METOPROLOL TARTRATE 25 MG/1
25 TABLET, FILM COATED ORAL 2 TIMES DAILY
Status: DISCONTINUED | OUTPATIENT
Start: 2020-04-30 | End: 2020-04-30 | Stop reason: HOSPADM

## 2020-04-29 RX ORDER — PAROXETINE 20 MG/1
20 TABLET, FILM COATED ORAL DAILY
Status: DISCONTINUED | OUTPATIENT
Start: 2020-04-30 | End: 2020-04-30 | Stop reason: HOSPADM

## 2020-04-29 RX ORDER — DILTIAZEM HYDROCHLORIDE 5 MG/ML
25 INJECTION INTRAVENOUS ONCE
Status: COMPLETED | OUTPATIENT
Start: 2020-04-29 | End: 2020-04-29

## 2020-04-29 RX ORDER — LORAZEPAM 2 MG/ML
1 INJECTION INTRAMUSCULAR ONCE
Status: COMPLETED | OUTPATIENT
Start: 2020-04-29 | End: 2020-04-29

## 2020-04-29 RX ORDER — QUETIAPINE FUMARATE 50 MG/1
50 TABLET, FILM COATED ORAL
Status: DISCONTINUED | OUTPATIENT
Start: 2020-04-29 | End: 2020-04-30 | Stop reason: HOSPADM

## 2020-04-29 RX ORDER — FOLIC ACID 1 MG/1
1 TABLET ORAL DAILY
Status: DISCONTINUED | OUTPATIENT
Start: 2020-04-30 | End: 2020-04-30 | Stop reason: HOSPADM

## 2020-04-29 RX ORDER — NICOTINE 21 MG/24HR
1 PATCH, TRANSDERMAL 24 HOURS TRANSDERMAL DAILY
Status: DISCONTINUED | OUTPATIENT
Start: 2020-04-29 | End: 2020-04-30 | Stop reason: HOSPADM

## 2020-04-29 RX ORDER — DIAZEPAM 5 MG
10 TABLET ORAL EVERY 30 MIN PRN
Status: DISCONTINUED | OUTPATIENT
Start: 2020-04-29 | End: 2020-04-30 | Stop reason: HOSPADM

## 2020-04-29 RX ORDER — QUETIAPINE FUMARATE 50 MG/1
50-150 TABLET, FILM COATED ORAL DAILY PRN
COMMUNITY
End: 2023-04-21

## 2020-04-29 RX ORDER — METOPROLOL TARTRATE 25 MG/1
25 TABLET, FILM COATED ORAL ONCE
Status: COMPLETED | OUTPATIENT
Start: 2020-04-29 | End: 2020-04-29

## 2020-04-29 RX ORDER — LIDOCAINE 40 MG/G
CREAM TOPICAL
Status: DISCONTINUED | OUTPATIENT
Start: 2020-04-29 | End: 2020-04-30 | Stop reason: HOSPADM

## 2020-04-29 RX ORDER — IBUPROFEN 600 MG/1
600 TABLET, FILM COATED ORAL EVERY 6 HOURS PRN
Status: DISCONTINUED | OUTPATIENT
Start: 2020-04-29 | End: 2020-04-30 | Stop reason: HOSPADM

## 2020-04-29 RX ORDER — NICOTINE 21 MG/24HR
1 PATCH, TRANSDERMAL 24 HOURS TRANSDERMAL EVERY 24 HOURS
COMMUNITY
End: 2023-04-21

## 2020-04-29 RX ORDER — NALOXONE HYDROCHLORIDE 0.4 MG/ML
.1-.4 INJECTION, SOLUTION INTRAMUSCULAR; INTRAVENOUS; SUBCUTANEOUS
Status: DISCONTINUED | OUTPATIENT
Start: 2020-04-29 | End: 2020-04-30 | Stop reason: HOSPADM

## 2020-04-29 RX ORDER — DIAZEPAM 10 MG/2ML
5-10 INJECTION, SOLUTION INTRAMUSCULAR; INTRAVENOUS EVERY 30 MIN PRN
Status: DISCONTINUED | OUTPATIENT
Start: 2020-04-29 | End: 2020-04-30 | Stop reason: HOSPADM

## 2020-04-29 RX ORDER — MULTIPLE VITAMINS W/ MINERALS TAB 9MG-400MCG
1 TAB ORAL DAILY
Status: DISCONTINUED | OUTPATIENT
Start: 2020-04-30 | End: 2020-04-30 | Stop reason: HOSPADM

## 2020-04-29 RX ADMIN — QUETIAPINE 50 MG: 50 TABLET ORAL at 23:10

## 2020-04-29 RX ADMIN — LORAZEPAM 1 MG: 2 INJECTION, SOLUTION INTRAMUSCULAR; INTRAVENOUS at 13:20

## 2020-04-29 RX ADMIN — DILTIAZEM HYDROCHLORIDE 25 MG: 5 INJECTION, SOLUTION INTRAVENOUS at 14:36

## 2020-04-29 RX ADMIN — NICOTINE 1 PATCH: 14 PATCH, EXTENDED RELEASE TRANSDERMAL at 17:11

## 2020-04-29 RX ADMIN — SODIUM CHLORIDE 1000 ML: 9 INJECTION, SOLUTION INTRAVENOUS at 12:28

## 2020-04-29 RX ADMIN — DILTIAZEM HYDROCHLORIDE 25 MG: 5 INJECTION, SOLUTION INTRAVENOUS at 13:15

## 2020-04-29 RX ADMIN — METOPROLOL TARTRATE 25 MG: 25 TABLET, FILM COATED ORAL at 17:12

## 2020-04-29 RX ADMIN — DIAZEPAM 10 MG: 5 TABLET ORAL at 20:09

## 2020-04-29 RX ADMIN — FOLIC ACID: 5 INJECTION, SOLUTION INTRAMUSCULAR; INTRAVENOUS; SUBCUTANEOUS at 17:37

## 2020-04-29 ASSESSMENT — ENCOUNTER SYMPTOMS
CHILLS: 0
FEVER: 0
MYALGIAS: 0
SHORTNESS OF BREATH: 0
BLOOD IN STOOL: 0
PALPITATIONS: 1
DIAPHORESIS: 1

## 2020-04-29 ASSESSMENT — ACTIVITIES OF DAILY LIVING (ADL): ADLS_ACUITY_SCORE: 10

## 2020-04-29 NOTE — ED TRIAGE NOTES
Patient presents with the feeling his heart rate beating faster and reports he drank for 3 days and is a recovering alcoholic.  Patient reports history of A-Fib. ABC's intact.

## 2020-04-29 NOTE — PHARMACY-ADMISSION MEDICATION HISTORY
Admission medication history interview status for this patient is complete. See [facility] admission navigator for allergy information, prior to admission medications and immunization status.     Medication history interview done via telephone during Covid-19 pandemic, indicate source(s): Patient  Medication history resources (including written lists, pill bottles, clinic record): Care Everywhere & Rx fill hx.  [facility]    Changes made to PTA medication list:  Added:  Nicotine patches  Deleted:  Seroquel (duplicate)  Changed:  None    Actions taken by pharmacist (provider contacted, etc):None     Additional medication history information:None    Medication reconciliation/reorder completed by provider prior to medication history?  No    For patients on insulin therapy: No     Prior to Admission medications    Medication Sig Last Dose Taking? Auth Provider   nicotine (NICODERM CQ) 21 MG/24HR 24 hr patch Place 1 patch onto the skin every 24 hours Past Month at Unknown time Yes Unknown, Entered By History   PARoxetine (PAXIL) 20 MG tablet Take 1 tablet (20 mg) by mouth daily 4/29/2020 at am Yes Jacquelyn Justin MD   QUEtiapine (SEROQUEL) 50 MG tablet Take  mg by mouth daily as needed 4/24/2020 Yes Unknown, Entered By History

## 2020-04-29 NOTE — H&P
[facility] Hospitalist Admission Note  Name: Georges Moreno    MRN: [id number]  YOB: 1975    Age: 44 year old  Date of admission: 4/29/2020  Primary care provider: Sarah Mccormack      Date of Admission:  4/29/2020    Assessment & Plan   Georges Moreno is a 44 year old male with PMhx of alcohol dependence, tobacco dependence, atrial fibrillation, PUSHPA, who presents with palpitations found to be in atrial fibrillation with RVR.     The patient was admitted to inpatient status for further work-up and monitoring.    Active Hospital problems    #Atrial Fibrillation with RVR   Symptomatic with palpitations, unclear onset. Max ventricular rate in the . After treatment with 25 mg IV diltiazem x 2, now in and out of sinus rhythm with a rate of 87-93 on evaluation. Likely triggered with 5-6 days of heavy etoh use, 2 yrs sobriety prior to. No infectious, angina, CHF symptoms.  He has history of atrial fibrillation, thought to be paroxysmal.   Investigated previously with ambulatory monitoring showing recurrent atrial fibrillation despite cessation of alcohol.  Was on flecainide at one point which was discontinued for unclear reasons. No AV blocking medication PTA. He was not anticoagulated PTA, [id number] 0.  - Cardiac telemetry   - transition to oral metoprolol, 25 mg BID  - Update TTE   - hold anticoagulation for now given low risk of CVA, etoh use, now in SR  - consider ambulatory cardiac monitor at discharge    #Alcohol Use  #History of Alcohol Dependence  Patient has been drinking large quantities of alcohol for the past 5 to 6 days, EtOH detectable on admission 0.04.  Prior to this, maintained sobriety for two years, breach triggered by stress of quarantine.   He has history of alcohol withdrawal symptoms with hospitalization. No hx of withdrawal seizures.  Denies coingestions, no SI, hallucinations.  - Continue CIWA protocol  - IV, oral multivitamin/thiamine/folic acid replacement  - consider chem  dependency sima. , LISA consult     #Tobacco dependence  Ongoing tobacco usage pack 1.5/day.  -Discussed importance of sensation  -nicotine replacement during hospitalization    Chronic Medical Problems:     #PUSHPA  #ADHD  #PTSD  - resume PTA paxil, prn seroquel at bedtime    DVT Prophylaxis: PCD  Code Status: Full Code  Expected discharge: 1-2 days, recommended to prior living arrangement once heart rates stable with ambulation, safe disposition plan. .    Lali Braxton PA-C    Primary Care Physician   *MORGAN KAPOOR    Chief Complaint   palpitations    History is obtained from the patient    Discussed with Abdullahi ROMERO in the ED, full chart review including lab work, imaging, and vital signs were reviewed. Patient received 1 L normal saline, 25 mg Cardizem x2, 1 mg Ativan in the ED.     History of Present Illness   Georges Moreno is a 44 year old male with PMhx of alcohol dependence, tobacco dependence, atrial fibrillation, PUSHPA, PTSD, who presents with palpitations.  On interview the patient states that since Saturday he has been drinking excessive amounts of alcohol.  Yesterday he noticed palpitations in his chest.  Due to their persistence he presented to the emergency department for evaluation today.  He has had palpitations like this in the past with previous episodes of atrial fibrillation.  It is unclear what the patient was doing yesterday, how long they had been going on prior to him noticing.  He has had no chest pain. He denies dyspnea, although does state it is difficult to breathe with the surgical mask on.  No weight gain, lower extremity edema, orthopnea.  Prior to 5 to 6 days ago he had been sober from alcohol use for 2 years.  He states that the stress and boredom of being in quarantine had caused him to re start drinking alcohol.   He denies co ingestions.  Denies SI SA.  No hallucinations.  No falls, LOC, presyncope. No infectious symptoms or history of ill contacts.     Patient presented  afebrile with a pulse ranging 108-152, 92 after bolus of IV cardizem, blood pressure 170/117, respiratory rate 27.  Laboratory studies remarkable for BMP and CBC within normal limits, glucose 105.  Ethanol level 0.04.  Chest x-ray negative for consolidation or effusion.   EKG showing atrial fibrillation with ventricular rate of 136.    Past Medical History    I have reviewed this patient's medical history and updated it with pertinent information if needed.   Past Medical History:   Diagnosis Date     ADHD      Depression, major, single episode     moderate     PUSHPA (generalized anxiety disorder)      HA (headache)      Pancreatitis      Paroxysmal atrial fibrillation (H)      Personal history of alcoholism (H)      PTSD (post-traumatic stress disorder)      Substance abuse (H)      Tobacco dependence        Past Surgical History   I have reviewed this patient's surgical history and updated it with pertinent information if needed.  Past Surgical History:   Procedure Laterality Date     SOFT TISSUE SURGERY      Excision of pilonidal cyst     SOFT TISSUE SURGERY      removal of rectal cyst       Prior to Admission Medications   Prior to Admission Medications   Prescriptions Last Dose Informant Patient Reported? Taking?   PARoxetine (PAXIL) 20 MG tablet 4/29/2020 at Unknown time  No Yes   Sig: Take 1 tablet (20 mg) by mouth daily   QUEtiapine (SEROQUEL) 25 MG tablet 4/29/2020 at Unknown time  No Yes   Sig: Take 1-2 tablets (25-50 mg) by mouth 2 times daily as needed (anxiety)   QUEtiapine (SEROQUEL) 50 MG tablet   No No   Sig: Take 1-3 tablets ( mg) by mouth nightly as needed (anxiety)      Facility-Administered Medications: None     Allergies   Allergies   Allergen Reactions     Duloxetine      Rash, hives     Sertraline      Excessive fatigue       Social History   I have reviewed this patient's social history and updated it with pertinent information if needed. Georges Moreno  reports that he has been smoking.  He has smoked for the past 15.00 years. He has never used smokeless tobacco. He reports current alcohol use. He reports current drug use. Drug: Marijuana.    Family History   I have reviewed this patient's family history and updated it with pertinent information if needed.   Family History   Problem Relation Age of Onset     Family History Negative Mother      Alcohol/Drug Father      Diabetes Father      Leukemia Father      Alcohol/Drug Paternal Uncle        Review of Systems   The 10 point Review of Systems is negative other than noted in the HPI or here.     Physical Exam   Temp: 97.7  F (36.5  C)   BP: (!) 145/90 Pulse: 92 Heart Rate: 94 Resp: 27 SpO2: 96 %      Vital Signs with Ranges  Temp:  [97.7  F (36.5  C)] 97.7  F (36.5  C)  Pulse:  [] 92  Heart Rate:  [] 94  Resp:  [8-27] 27  BP: (145-170)/() 145/90  SpO2:  [96 %-98 %] 96 %  0 lbs 0 oz    Constitutional:  Awake, alert,  no apparent distress.  Eyes: Conjunctiva and pupils examined and normal.  HEENT: Dry mucous membranes, normal dentition.  Respiratory: Clear to auscultation bilaterally, no crackles or wheezing.  Cardiovascular: Regular rate and rhythm, normal S1 and S2, and no murmur noted.  GI: Soft, non-distended, non-tender, bowel sounds present. No rebound tenderness or guarding.  Lymph/Hematologic: No anterior cervical or supraclavicular adenopathy.  Skin: No rashes, no cyanosis, no edema.  Musculoskeletal: No deformities noted.  No erythema or tenderness. Moving all extremities.  Neurologic: No focal deficits noted. Speech is clear. Coordination and strength grossly normal.   Psychiatric: Appropriate affect.    Data   Data reviewed today:      EKG: atrial fibrillation with ventricular rate of 136.  Imaging:   Recent Results (from the past 24 hour(s))   Chest XR,  PA & LAT    Narrative    XR CHEST 2 VW  4/29/2020 2:08 PM       INDICATION: atrial fibrillation  COMPARISON: 5/8/2018       Impression    IMPRESSION: Negative chest. No  change from previous.    SAMUEL COX MD       Recent Labs   Lab 04/29/20  1218   WBC 10.6   HGB 17.4   MCV 93      INR 0.97      POTASSIUM 3.7   CHLORIDE 105   CO2 21   BUN 11   CR 0.68   ANIONGAP 12   LEILA 9.4   *   ALBUMIN 4.6   PROTTOTAL 8.4   BILITOTAL 0.9   ALKPHOS 62   ALT 43   AST 32       Lali Braxton PA-C on 4/29/2020 at 2:19 PM

## 2020-04-29 NOTE — ED NOTES
[facility]  ED Nurse Handoff Report    Georges Moerno is a 44 year old male   ED Chief complaint: Tachycardia  . ED Diagnosis:   Final diagnoses:   Atrial fibrillation with RVR (H)   Alcohol abuse     Allergies:   Allergies   Allergen Reactions     Duloxetine      Rash, hives     Sertraline      Excessive fatigue       Code Status: Full Code  Activity level - Baseline/Home:  Independent. Activity Level - Current:   Stand by Assist. Lift room needed: No. Bariatric: No   Needed: No   Isolation: No. Infection: Not Applicable.     Vital Signs:   Vitals:    04/29/20 1400 04/29/20 1415 04/29/20 1430 04/29/20 1445   BP:  (!) 166/134  (!) 149/93   Pulse:  100 111 94   Resp: 26   9   Temp:       SpO2: 98% 97% 96% 98%       Cardiac Rhythm:  ,      Pain level:    Patient confused: No. Patient Falls Risk: Yes.   Elimination Status: Has voided hasn't needed to yet, but can at baseline  Patient Report - Initial Complaint: tachycardia, ETOH. Focused Assessment: Georges Moreno is a 44 year old male, with history of alcoholism, paroxysmal atrial fibrillation, substance abuse amongst others as noted below, not currently anticoagulated, who presents alone for evaluation of palpitations. The patient reports he had increased alcohol intake the past 4 days and yesterday had onset of palpitations, accompanied by hot and cold flashes. He has since had persistence of his palpitations, prompting his presentation to the ED. The patient denies any fever, chills, chest pain, shortness of breath, black or bloody stools, leg swelling, leg pain, or rash. He notes his last episode of atrial fibrillation was around 6 months ago.     13:40 Cardiac Cardiac - Cardiac WDL: -WDL except (c/o tachycardia without CP, ETOH, hx afib RVR from alcohol use)      13[facility] - ADL Assessment (WDL): WDL  Speech (WDL): WDL   Psychiatric Symptoms / Stressors - Mood/Behavior: anxious; cooperative  Thoughts/Cognition (WDL):  WDL   Suicide/Homicide Risk - Suicidality (WDL): WDL   Chemical Abuse/Addictions - Alcohol: Binge; Blackouts; Daily (for past 4 days, hx ETOH and afib RVR) Benzodiazepines: None  Opiates: None  Marijuana: Occasional  Other Use: None   CIWA-Ar - Nausea and Vomiting: mild nausea with no vomiting  Tactile Disturbances: none  Tremor: not visible, but can be felt fingertip to fingertip  Auditory Disturbances: not present  Paroxysmal Sweats: 2  Visual Disturbances: not present  Anxiety: moderately anxious, or guarded, so anxiety is inferred  Headache, Fullness in Head: none present  Agitation: normal activity  Orientation and Clouding of Sensorium: oriented and can do serial additions  CIWA-Ar Total: 8         Tests Performed: labs, ekg, imaging. Abnormal Results:   Labs Ordered and Resulted from Time of ED Arrival Up to the Time of Departure from the ED   BASIC METABOLIC PANEL - Abnormal; Notable for the following components:       Result Value    Glucose 105 (*)     All other components within normal limits   ALCOHOL ETHYL - Abnormal; Notable for the following components:    Ethanol g/dL 0.04 (*)     All other components within normal limits   CBC WITH PLATELETS   HEPATIC PANEL   MAGNESIUM   INR   VITAMIN B12   FOLATE     Chest XR,  PA & LAT   Final Result   IMPRESSION: Negative chest. No change from previous.      SAMUEL COX MD        .   Treatments provided: see MAR  Family Comments: n/a  OBS brochure/video discussed/provided to patient:  No  ED Medications:   Medications   diltiazem (CARDIZEM) 125 mg in sodium chloride 0.9 % 125 mL infusion (0 mg/hr Intravenous Hold 4/29/20 1436)   0.9% sodium chloride BOLUS (0 mLs Intravenous Stopped 4/29/20 1436)   diltiazem (CARDIZEM) injection 25 mg (25 mg Intravenous Given 4/29/20 1315)   LORazepam (ATIVAN) injection 1 mg (1 mg Intravenous Given 4/29/20 1320)   diltiazem (CARDIZEM) injection 25 mg (25 mg Intravenous Given 4/29/20 1436)     Drips infusing:  No  For the  majority of the shift, the patient's behavior Green. Interventions performed were n/a.    Sepsis treatment initiated: No       ED Nurse Name/Phone Number: Catherine Estrada RN,   2:58 PM

## 2020-04-29 NOTE — PLAN OF CARE
Up with standby assist to bathroom. Makes needs known, A&Ox3, no c/o pain. Appetite good. Tele: SR HR in 70s to 80s.

## 2020-04-29 NOTE — ED PROVIDER NOTES
History     Chief Complaint:  Palpitations    The history is provided by the patient.      Georges Moreno is a 44 year old male, with history of alcoholism, paroxysmal atrial fibrillation, substance abuse amongst others as noted below, not currently anticoagulated, who presents alone for evaluation of palpitations. The patient reports he had increased alcohol intake the past 4 days and yesterday had onset of palpitations, accompanied by hot and cold flashes. He has since had persistence of his palpitations, prompting his presentation to the ED. The patient denies any fever, chills, chest pain, shortness of breath, black or bloody stools, leg swelling, leg pain, or rash. He notes his last episode of atrial fibrillation was around 6 months ago.    Allergies:  Duloxetine  Sertraline     Medications:    Paxil  Seroquel    Past Medical History:    ADHD  Depression  Generalized anxiety disorder  Headache  Pancreatitis  Paroxysmal atrial fibrillation  Alcoholism  PTSD  Substance abuse  Tobacco dependence  Alcohol withdrawal syndrome  Cannabis use disorder    Past Surgical History:    Soft tissue surgery - excision of pilonidal cyst  Removal of rectal cyst    Family History:    Father - alcohol/drug, diabetes, leukemia    Social History:  The patient was unaccompanied to the ED.  Smoking Status: Yes - current every day smoker  Smokeless Tobacco: Never  Alcohol Use: Yes  Drug Use: Yes   Marital Status:  Single [1]     Review of Systems   Constitutional: Positive for diaphoresis. Negative for chills and fever.   Respiratory: Negative for shortness of breath.    Cardiovascular: Positive for palpitations. Negative for leg swelling.   Gastrointestinal: Negative for blood in stool.   Musculoskeletal: Negative for myalgias.   Skin: Negative for rash.   All other systems reviewed and are negative.    Physical Exam     Patient Vitals for the past 24 hrs:   BP Temp Pulse Heart Rate Resp SpO2   04/29/20 1330 (!) 145/90 -- 92 94 27 96  %   04/29/20 1315 (!) 153/111 -- 141 147 10 98 %   04/29/20 1309 -- -- -- 152 17 --   04/29/20 1303 -- -- -- 141 10 --   04/29/20 1300 -- -- -- 114 8 --   04/29/20 1203 (!) 170/117 -- -- -- 18 --   04/29/20 1159 -- 97.7  F (36.5  C) 108 -- -- 97 %     Physical Exam  Vitals signs and nursing note reviewed.   Constitutional:       General: He is not in acute distress.     Appearance: He is not diaphoretic.   HENT:      Head: Atraumatic.   Eyes:      General: No scleral icterus.     Pupils: Pupils are equal, round, and reactive to light.   Cardiovascular:      Rate and Rhythm: Tachycardia present. Rhythm irregular.      Pulses: Normal pulses.      Heart sounds: Normal heart sounds.   Pulmonary:      Effort: Pulmonary effort is normal. No respiratory distress.      Breath sounds: Normal breath sounds.   Abdominal:      Palpations: Abdomen is soft.      Tenderness: There is no abdominal tenderness.   Musculoskeletal:         General: No tenderness.      Right lower leg: No edema.      Left lower leg: No edema.   Skin:     General: Skin is warm.      Findings: No rash.   Neurological:      Mental Status: He is alert.       Emergency Department Course   ECG:  Time: 1214  Vent. Rate 136 bpm. RI interval *. QRS duration 88. QT/QTc 324/487. P-R-T axis * 83 31.  Atrial fibrillation with rapid ventricular response   Abnormal ECG  Read time: 1218, read by Dr. Lara.    Imaging:  Radiology findings were communicated with the patient who voiced understanding of the findings.    XR Chest, PA & LAT:  Negative chest. No change from previous.  As per radiology.    Laboratory:  Laboratory findings were communicated with the patient who voiced understanding of the findings.    CBC: WBC: 10.6, HGB: 17.4, PLT: 290  BMP: Glucose 105 (H), o/w WNL (Creatinine: 0.68)  Hepatic panel: all negative    Magnesium: 1.8    Alcohol level: 0.04 (H)    Folate pending.  Vitamin B12 pending.    Interventions:  1228 NS 1L IV Bolus  1315 Diltiazem 25 mg  IV  1320 Ativan 1 mg IV  1436 Diltiazem 25 mg IV    Emergency Department Course:  Past medical records, nursing notes, and vitals reviewed.    1205 I performed an exam of the patient as documented above.     EKG obtained in the ED, see results above.     IV was inserted and blood was drawn for laboratory testing, results above.    The patient was sent for a XR Chest while in the emergency department, results above.     1355 I spoke with Dr. Marie, cardiology, regarding the patient.    1400 I rechecked the patient and discussed the results of his workup thus far.     1421 I spoke with Dr. Cano, hospitalist, who agreed to admit the patient.    Findings and plan explained to the Patient who consents to admission. Discussed the patient with Dr. Cano, who will admit the patient to a cardiac telemetry bed for further monitoring, evaluation, and treatment.    I personally reviewed the laboratory results with the Patient and answered all related questions prior to admission.     Impression & Plan     Medical Decision Making:  Georges Moreno is a 44 year old male presents for evaluation of palpitations. He does not appear intoxicated at this time. He does not appear most c/w acute alcohol withdrawal. There are changes of atrial fibrillation with RVR noted at this time. After repeat discussions with the patient he voices being uncertain about the timing of symptom onset and is unable to reliably give the window of onset. Due to this and potential for CVA if cardioverted, rate control was attempted. He voices feeling anxious but improved while in the ED. Case discussed with cardiology who voices patient can be admitted due to his a fib with RVR. Plan for continued Diltiazem administration at this time. Anticoagulants held pending admission presently. He is an alcoholic and there is potential for fall or injury with risk for life threatening bleeding.     Diagnosis:    ICD-10-CM   1. Atrial fibrillation with RVR (H)   I48.91   2. Alcohol abuse  F10.10       Disposition:  Admitted to Dr. Cano.    Scribe Disclosure:  I, Carlos Salgado, am serving as a scribe at 12:19 PM on 4/29/2020 to document services personally performed by Abdullahi Rodgers* based on my observations and the provider's statements to me.      Abdullahi Rodgers, MOISES  04/29/20 1450

## 2020-04-30 ENCOUNTER — APPOINTMENT (OUTPATIENT)
Dept: CARDIOLOGY | Facility: CLINIC | Age: 45
End: 2020-04-30
Attending: PHYSICIAN ASSISTANT
Payer: COMMERCIAL

## 2020-04-30 VITALS
WEIGHT: 220.8 LBS | OXYGEN SATURATION: 97 % | HEART RATE: 77 BPM | RESPIRATION RATE: 18 BRPM | SYSTOLIC BLOOD PRESSURE: 156 MMHG | TEMPERATURE: 97.1 F | DIASTOLIC BLOOD PRESSURE: 91 MMHG | BODY MASS INDEX: 29.95 KG/M2

## 2020-04-30 PROBLEM — R00.2 PALPITATION: Status: ACTIVE | Noted: 2020-04-30

## 2020-04-30 LAB
ANION GAP SERPL CALCULATED.3IONS-SCNC: 1 MMOL/L (ref 3–14)
BUN SERPL-MCNC: 14 MG/DL (ref 7–30)
CALCIUM SERPL-MCNC: 8.9 MG/DL (ref 8.5–10.1)
CHLORIDE SERPL-SCNC: 106 MMOL/L (ref 94–109)
CO2 SERPL-SCNC: 31 MMOL/L (ref 20–32)
CREAT SERPL-MCNC: 0.89 MG/DL (ref 0.66–1.25)
ERYTHROCYTE [DISTWIDTH] IN BLOOD BY AUTOMATED COUNT: 11.8 % (ref 10–15)
GFR SERPL CREATININE-BSD FRML MDRD: >90 ML/MIN/{1.73_M2}
GLUCOSE SERPL-MCNC: 110 MG/DL (ref 70–99)
HCT VFR BLD AUTO: 45.9 % (ref 40–53)
HGB BLD-MCNC: 15.4 G/DL (ref 13.3–17.7)
INTERPRETATION ECG - MUSE: NORMAL
INTERPRETATION ECG - MUSE: NORMAL
MAGNESIUM SERPL-MCNC: 2 MG/DL (ref 1.6–2.3)
MCH RBC QN AUTO: 31.8 PG (ref 26.5–33)
MCHC RBC AUTO-ENTMCNC: 33.6 G/DL (ref 31.5–36.5)
MCV RBC AUTO: 95 FL (ref 78–100)
PLATELET # BLD AUTO: 210 10E9/L (ref 150–450)
POTASSIUM SERPL-SCNC: 3.4 MMOL/L (ref 3.4–5.3)
RBC # BLD AUTO: 4.85 10E12/L (ref 4.4–5.9)
SODIUM SERPL-SCNC: 138 MMOL/L (ref 133–144)
WBC # BLD AUTO: 5.9 10E9/L (ref 4–11)

## 2020-04-30 PROCEDURE — G0378 HOSPITAL OBSERVATION PER HR: HCPCS

## 2020-04-30 PROCEDURE — 80048 BASIC METABOLIC PNL TOTAL CA: CPT | Performed by: PHYSICIAN ASSISTANT

## 2020-04-30 PROCEDURE — 99239 HOSP IP/OBS DSCHRG MGMT >30: CPT | Performed by: HOSPITALIST

## 2020-04-30 PROCEDURE — 25000132 ZZH RX MED GY IP 250 OP 250 PS 637: Performed by: PHYSICIAN ASSISTANT

## 2020-04-30 PROCEDURE — 99207 ZZC CDG-CODE CATEGORY CHANGED: CPT | Performed by: HOSPITALIST

## 2020-04-30 PROCEDURE — 83735 ASSAY OF MAGNESIUM: CPT | Performed by: PHYSICIAN ASSISTANT

## 2020-04-30 PROCEDURE — 93306 TTE W/DOPPLER COMPLETE: CPT | Mod: 26 | Performed by: INTERNAL MEDICINE

## 2020-04-30 PROCEDURE — 36415 COLL VENOUS BLD VENIPUNCTURE: CPT | Performed by: PHYSICIAN ASSISTANT

## 2020-04-30 PROCEDURE — 85027 COMPLETE CBC AUTOMATED: CPT | Performed by: PHYSICIAN ASSISTANT

## 2020-04-30 PROCEDURE — 93306 TTE W/DOPPLER COMPLETE: CPT

## 2020-04-30 RX ORDER — METOPROLOL TARTRATE 25 MG/1
25 TABLET, FILM COATED ORAL 2 TIMES DAILY
Qty: 60 TABLET | Refills: 1 | Status: ON HOLD | OUTPATIENT
Start: 2020-04-30 | End: 2024-02-02

## 2020-04-30 RX ADMIN — NICOTINE 1 PATCH: 14 PATCH, EXTENDED RELEASE TRANSDERMAL at 08:42

## 2020-04-30 RX ADMIN — THIAMINE HCL TAB 100 MG 100 MG: 100 TAB at 08:38

## 2020-04-30 RX ADMIN — FOLIC ACID 1 MG: 1 TABLET ORAL at 08:38

## 2020-04-30 RX ADMIN — PAROXETINE HYDROCHLORIDE 20 MG: 20 TABLET, FILM COATED ORAL at 08:37

## 2020-04-30 RX ADMIN — MULTIPLE VITAMINS W/ MINERALS TAB 1 TABLET: TAB at 08:38

## 2020-04-30 RX ADMIN — METOPROLOL TARTRATE 25 MG: 25 TABLET, FILM COATED ORAL at 08:38

## 2020-04-30 ASSESSMENT — ACTIVITIES OF DAILY LIVING (ADL)
ADLS_ACUITY_SCORE: 10

## 2020-04-30 NOTE — PLAN OF CARE
Pt. A&Ox4, up with SBA, Tele: SR. Lung sounds diminished, room air sat's at 97-98%. Pt. Scored a 9 on CIWA on evening shift and given PRN Valium based off score. Total 10mg PO Valium given at 2009, with improvement in behaviors. Pt. To have Echo this AM. Per MD orders: Pt. To walk on AM shift to examine telemetry. Pt. Denies any needs at this time. Will continue with POC

## 2020-04-30 NOTE — DISCHARGE SUMMARY
Education printed and verbal. Pt states understanding of education. IV out. Tele off. discharge meds given. Pt dressed self and walked off floor with belongings and medication. Pt stated ride was collecting him from the ED entrance.  Carrol Cates RN on 4/30/2020 at 12:21 PM

## 2020-04-30 NOTE — PLAN OF CARE
Orientation: A/O x4  VS: VSS,   Tele: SR/SB  LS: Clear  GI: WNL  : WNL  Skin: Tattoos, WNL  Activity: Independent  Pain: denies  Lines:PIV, SL  Plan:D/C today with meds  Carrol Cates RN on 4/30/2020 at 12:18 PM

## 2020-04-30 NOTE — DISCHARGE SUMMARY
Hospitalist Discharge Summary  [facility]    Georges Moreno MRN# [id number]   YOB: 1975 Age: 44 year old     Date of Admission:  4/29/2020  Date of Discharge:  4/30/2020  Admitting Physician:  Garth Cano DO  Discharge Physician:  Garth Cano DO  Discharging Service:  Hospitalist     Primary Provider: Sarah Mccormack          Discharge Diagnosis:   Atrial fibrillation with rapid ventricular rate  Alcohol dependence and intoxication             Discharge Disposition:   Discharged to home           Allergies:   Allergies   Allergen Reactions     Duloxetine      Rash, hives     Sertraline      Excessive fatigue              Discharge Medications:   Current Discharge Medication List      START taking these medications    Details   metoprolol tartrate (LOPRESSOR) 25 MG tablet Take 1 tablet (25 mg) by mouth 2 times daily  Qty: 60 tablet, Refills: 1    Associated Diagnoses: Palpitations         CONTINUE these medications which have NOT CHANGED    Details   nicotine (NICODERM CQ) 21 MG/24HR 24 hr patch Place 1 patch onto the skin every 24 hours      PARoxetine (PAXIL) 20 MG tablet Take 1 tablet (20 mg) by mouth daily  Qty: 30 tablet, Refills: 0    Associated Diagnoses: Alcohol withdrawal with complication with inpatient treatment, with unspecified complication (H)      QUEtiapine (SEROQUEL) 50 MG tablet Take  mg by mouth daily as needed                    Condition on Discharge:   Discharge condition: Stable   Discharge vitals: Blood pressure (!) 156/91, pulse 77, temperature 97.1  F (36.2  C), temperature source Oral, resp. rate 18, weight 100.2 kg (220 lb 12.8 oz), SpO2 97 %.   Code status on discharge: Full Code      BASIC PHYSICAL EXAMINATION:  GENERAL: No apparent distress.  CARDIOVASCULAR: Regular rate and rhythm without murmurs.  PULMONARY: Clear to auscultation bilaterally.   GASTROINTESTINAL: Abdomen soft, non-tender.  EXTREMITIES: No edema, pulses intact.  NEUROLOGIC: No  focal deficits.            History of Illness:   See detailed admission note for full details.               Procedures excluding imaging which is summarized below:   Please see details in the electronic medical record.           Consultations:   CHEMICAL DEPENDENCY IP CONSULT  SOCIAL WORK IP CONSULT          Significant Results:   Results for orders placed or performed during the hospital encounter of 20   Chest XR,  PA & LAT    Narrative    XR CHEST 2 VW  2020 2:08 PM       INDICATION: atrial fibrillation  COMPARISON: 2018       Impression    IMPRESSION: Negative chest. No change from previous.    SAMUEL COX MD   Echocardiogram Complete    Narrative    [id number]  [id number]  [id number]  [id number]^OLENA^LINDA^CONRAD           [facility]  Echocardiography Laboratory  [address] [place] [address]        Name: DEBORA PEARL  MRN: [id number]  : 1975  Study Date: 2020 07:51 AM  Age: 44 yrs  Gender: Male  Patient Location: [facility]  Reason For Study: Arrhythmia  Ordering Physician: LINDA HYATT  Referring Physician: Sarah Mccormack  Performed By: Radhames Estrada RDCS     BSA: 2.2 m2  Height: 72 in  Weight: 220 lb  HR: 69  BP: 132/80 mmHg  _____________________________________________________________________________  __        Procedure  Complete Portable Echo Adult.  _____________________________________________________________________________  __        Interpretation Summary     Normal LV size and function  Normal RV size and function  Atria are normal in size  PA pressure is borderline elevated  Valves are normal in function  Compared to last echo done 2 years ago for afib, there have been no changes.  _____________________________________________________________________________  __        Left Ventricle  The left ventricle is normal in size. There is normal left ventricular wall  thickness. The visual ejection fraction is estimated at 55-60%. Diastolic  Doppler  findings (E/E' ratio and/or other parameters) suggest left ventricular  filling pressures are normal. No regional wall motion abnormalities noted.     Right Ventricle  The right ventricle is normal in structure, function and size.     Atria  Normal left atrial size. Right atrial size is normal. There is no atrial shunt  seen.     Mitral Valve  The mitral valve is normal in structure and function. There is physiologic  mitral regurgitation.        Tricuspid Valve  Normal tricuspid valve. There is trace tricuspid regurgitation. The right  ventricular systolic pressure is approximated at 30mmHg plus the right atrial  pressure. IVC diameter <2.1 cm collapsing >50% with sniff suggests a normal RA  pressure of 3 mmHg.     Aortic Valve  The aortic valve is normal in structure and function.     Pulmonic Valve  The pulmonic valve is not well visualized. The pulmonic valve is not well  seen, but is grossly normal.     Vessels  Normal size aorta.     Pericardium  There is no pericardial effusion.        Rhythm  Sinus rhythm was noted.  _____________________________________________________________________________  __  MMode/2D Measurements & Calculations  IVSd: 0.92 cm     LVIDd: 4.8 cm  LVIDs: 3.0 cm  LVPWd: 1.1 cm  FS: 38.3 %  LV mass(C)d: 174.7 grams  LV mass(C)dI: 78.8 grams/m2  Ao root diam: 3.5 cm  LA dimension: 3.8 cm  asc Aorta Diam: 3.5 cm  LA/Ao: 1.1  LVOT diam: 2.0 cm  LVOT area: 3.1 cm2  LA Volume (BP): 46.0 ml  LA Volume Index (BP): 20.7 ml/m2  RWT: 0.47           Doppler Measurements & Calculations  MV E max cuauhtemoc: 81.4 cm/sec  MV A max cuauhtemoc: 76.5 cm/sec  MV E/A: 1.1  MV max P.0 mmHg  MV mean P.0 mmHg  MV V2 VTI: 36.8 cm  MVA(VTI): 2.8 cm2  MV dec time: 0.20 sec  LV V1 max P.2 mmHg  LV V1 max: 152.0 cm/sec  LV V1 VTI: 32.6 cm  CO(LVOT): 6.2 l/min  CI(LVOT): 2.8 l/min/m2  SV(LVOT): 102.4 ml  SI(LVOT): 46.2 ml/m2  PA acc time: 0.09 sec  TR max cuauhtemoc: 274.0 cm/sec  TR max P.0 mmHg  E/E' av.9  Lateral  E/e': 7.5  [facility] E/e': 8.2              _____________________________________________________________________________  __        Report approved by: Betzy Song 04/30/2020 08:33 AM            Transthoracic Echocardiogram Results:  No results found for this or any previous visit (from the past 4320 hour(s)).             Pending Results:   Unresulted Labs Ordered in the Past 30 Days of this Admission     No orders found for last 31 day(s).                      Discharge Instructions and Follow-Up:   Discharge instructions and follow-up:   Discharge Procedure Orders   Follow-up and recommended labs and tests    Order Comments: Follow up with primary care provider, MORGAN KAPOOR, within 7 days to evaluate medication change and for hospital follow- up.  The following labs/tests are recommended: Consider cardiology follow-up and cardiac monitoring at home.     Activity   Order Comments: Your activity upon discharge: activity as tolerated     Order Specific Question Answer Comments   Is discharge order? Yes      Full Code     Order Specific Question Answer Comments   Code status determined by: Discussion with patient/legal decision maker      Diet   Order Comments: Follow this diet upon discharge: Orders Placed This Encounter      Regular Diet Adult     Order Specific Question Answer Comments   Is discharge order? Yes              Hospital Course:   44-year-old male with a history of alcohol dependence, tobacco dependence, PUSHAP, and atrial fibrillation presented to the ED yesterday with palpitations.  He was found to be in rapid atrial fibrillation.  He was provided IV diltiazem and eventually converted back to a normal sinus rhythm.  He was started on metoprolol 25 mg twice daily and has maintained normal sinus rhythm overnight with resolution of his chest palpitations.  Echocardiogram is grossly normal with no changes from the study from several years ago.  He was previously on flecainide and at one point on  anticoagulation but has discontinued these medications.  I think it is reasonable to discharge him with low-dose metoprolol and consider follow-up with a cardiologist in the outpatient setting.  His [id number] is 0 and given his alcohol dependency I think the risk of anticoagulation outweighs the benefit.  The patient agrees with this determination.  He had no significant evidence of ongoing alcohol withdrawal during the hospitalization.  He does appear motivated to quit and is established in the  community and has previously underwent treatment.  He appears suitable for discharge home today.    The patient was seen, examined, and counseled on this day. The hospitalization and plan of care was reviewed with the patient extensively. All questions were addressed and the patient agreed to follow-up as noted above.      Total time spent in face to face contact with the patient and coordinating discharge was:  35 Minutes    Garth Cano DO, MPH  Critical access hospital Hospitalist  [address] [place] [address]  Pager: [phone]  04/30/2020

## 2020-05-26 ENCOUNTER — HOSPITAL ENCOUNTER (EMERGENCY)
Facility: CLINIC | Age: 45
Discharge: HOME | End: 2020-05-26
Attending: EMERGENCY MEDICINE | Admitting: EMERGENCY MEDICINE
Payer: COMMERCIAL

## 2020-05-26 VITALS
TEMPERATURE: 98.4 F | SYSTOLIC BLOOD PRESSURE: 144 MMHG | RESPIRATION RATE: 18 BRPM | OXYGEN SATURATION: 100 % | HEART RATE: 85 BPM | DIASTOLIC BLOOD PRESSURE: 96 MMHG

## 2020-05-26 DIAGNOSIS — R00.2 PALPITATIONS: ICD-10-CM

## 2020-05-26 DIAGNOSIS — F10.10 ALCOHOL ABUSE: ICD-10-CM

## 2020-05-26 LAB
ANION GAP SERPL CALCULATED.3IONS-SCNC: 8 MMOL/L (ref 3–14)
BASOPHILS # BLD AUTO: 0 10E9/L (ref 0–0.2)
BASOPHILS NFR BLD AUTO: 0.4 %
BUN SERPL-MCNC: 10 MG/DL (ref 7–30)
CALCIUM SERPL-MCNC: 9 MG/DL (ref 8.5–10.1)
CHLORIDE SERPL-SCNC: 102 MMOL/L (ref 94–109)
CO2 SERPL-SCNC: 26 MMOL/L (ref 20–32)
CREAT SERPL-MCNC: 0.73 MG/DL (ref 0.66–1.25)
DIFFERENTIAL METHOD BLD: NORMAL
EOSINOPHIL # BLD AUTO: 0 10E9/L (ref 0–0.7)
EOSINOPHIL NFR BLD AUTO: 0.2 %
ERYTHROCYTE [DISTWIDTH] IN BLOOD BY AUTOMATED COUNT: 11.9 % (ref 10–15)
GFR SERPL CREATININE-BSD FRML MDRD: >90 ML/MIN/{1.73_M2}
GLUCOSE SERPL-MCNC: 93 MG/DL (ref 70–99)
HCT VFR BLD AUTO: 48.1 % (ref 40–53)
HGB BLD-MCNC: 16.5 G/DL (ref 13.3–17.7)
IMM GRANULOCYTES # BLD: 0 10E9/L (ref 0–0.4)
IMM GRANULOCYTES NFR BLD: 0.3 %
LYMPHOCYTES # BLD AUTO: 2.5 10E9/L (ref 0.8–5.3)
LYMPHOCYTES NFR BLD AUTO: 25.7 %
MAGNESIUM SERPL-MCNC: 1.9 MG/DL (ref 1.6–2.3)
MCH RBC QN AUTO: 31.3 PG (ref 26.5–33)
MCHC RBC AUTO-ENTMCNC: 34.3 G/DL (ref 31.5–36.5)
MCV RBC AUTO: 91 FL (ref 78–100)
MONOCYTES # BLD AUTO: 0.7 10E9/L (ref 0–1.3)
MONOCYTES NFR BLD AUTO: 7.4 %
NEUTROPHILS # BLD AUTO: 6.4 10E9/L (ref 1.6–8.3)
NEUTROPHILS NFR BLD AUTO: 66 %
NRBC # BLD AUTO: 0 10*3/UL
NRBC BLD AUTO-RTO: 0 /100
PLATELET # BLD AUTO: 220 10E9/L (ref 150–450)
POTASSIUM SERPL-SCNC: 3.7 MMOL/L (ref 3.4–5.3)
RBC # BLD AUTO: 5.28 10E12/L (ref 4.4–5.9)
SODIUM SERPL-SCNC: 136 MMOL/L (ref 133–144)
WBC # BLD AUTO: 9.6 10E9/L (ref 4–11)

## 2020-05-26 PROCEDURE — 80048 BASIC METABOLIC PNL TOTAL CA: CPT | Performed by: EMERGENCY MEDICINE

## 2020-05-26 PROCEDURE — 85025 COMPLETE CBC W/AUTO DIFF WBC: CPT | Performed by: EMERGENCY MEDICINE

## 2020-05-26 PROCEDURE — 83735 ASSAY OF MAGNESIUM: CPT | Performed by: EMERGENCY MEDICINE

## 2020-05-26 PROCEDURE — 99284 EMERGENCY DEPT VISIT MOD MDM: CPT

## 2020-05-26 PROCEDURE — 93005 ELECTROCARDIOGRAM TRACING: CPT

## 2020-05-26 RX ORDER — LIDOCAINE 40 MG/G
CREAM TOPICAL
Status: DISCONTINUED | OUTPATIENT
Start: 2020-05-26 | End: 2020-05-26 | Stop reason: HOSPADM

## 2020-05-26 ASSESSMENT — ENCOUNTER SYMPTOMS
NERVOUS/ANXIOUS: 1
COUGH: 0
FEVER: 0
SHORTNESS OF BREATH: 0
PALPITATIONS: 1

## 2020-05-26 NOTE — ED PROVIDER NOTES
History   Chief Complaint:  Palpitations     HPI   Georges Moreno is a 44 year old male with history of atrial fibrillation, pancreatitis, substance abuse, and alcohol abuse who presents for evaluation of palpitations that began yesterday afternoon. The patient states about 3 weeks ago he presented to the hospital for similar symptoms, where he was found to be atrial fibrillation. He was admitted to the hospital for further cardiac monitoring after his atrial fibrillation resolved on its own and discharged home with prescription for Lopressor. The patient states he was supposed to follow-up with his cardiologist, Dr. Ayala at [facility]. This past weekend he states he drank heavily, drinking 1 case of beer and about 24 shots of tequila. Yesterday afternoon he began having palpitations, which has not subsided since initial onset. His last drink was around 0300 and he had difficulty sleeping secondary to his palpitations. Out of concern for his symptoms, he presented for evaluation.    Here, the patient states he was going through AA before the current pandemic and was sober for a period of time. He states he had palpitations while here in the ED with the EKG. He also endorses feeling anxious.  He states he has a history of alcohol withdrawal, but he denies any alcohol withdrawal seizures. He denies any fever, new cough, shortness of breath, chest pain, or suicidal ideation.     Of note, the patient states 3 months ago he went through detox at [facility].     Cardiac/PE/DVT Risk Factors:  History of hypertension - No  History of hyperlipidemia - No  History of diabetes - No  History of smoking - Yes  Personal history of PE/DVT - No  Family history of PE/DVT - No  Family history of heart complications - No  Recent travel - No  Recent surgery - No  Other immobilizations - No  Cancer - No     Allergies:  Duloxetine  Sertraline    Medications:   Lopressor  Paxil  Seroquel     Past Medical History:     ADHD  Depression  Anxiety   Migraine  Pancreatitis   Paroxysmal atrial fibrillation  Alcohol abuse  PTSD  Substance abuse   Tobacco abuse  Depression  Chronic low back pain  Perirectal abscess   Cannabis abuse      Past Surgical History:    Soft tissue surgery x 2     Family History:    Father - Alcohol/drug abuse, Diabetes, Leukemia     Social History:  The patient was unaccompanied to the ED.  Smoking Status: Current Every Day Smoker  Smokeless Tobacco: Never Used  Alcohol Use: Yes  Drug Use: Yes - Marijuana   PCP: Sarah Mccormack     Review of Systems   Constitutional: Negative for fever.   Respiratory: Negative for cough and shortness of breath.    Cardiovascular: Positive for palpitations. Negative for chest pain.   Psychiatric/Behavioral: Negative for suicidal ideas. The patient is nervous/anxious.    All other systems reviewed and are negative.      Physical Exam     Patient Vitals for the past 24 hrs:   BP Temp Temp src Pulse Heart Rate Resp SpO2   05/26/20 1600 (!) 144/96 -- -- 85 80 -- 100 %   05/26/20 1545 (!) 146/117 -- -- 90 90 -- 100 %   05/26/20 1530 (!) 154/87 -- -- 79 81 -- 100 %   05/26/20 1515 (!) 148/95 -- -- 85 96 -- 99 %   05/26/20 1500 (!) 163/88 -- -- 85 85 -- 98 %   05/26/20 1445 (!) 150/98 -- -- 89 84 -- 99 %   05/26/20 1430 (!) 136/99 -- -- -- -- -- --   05/26/20 1331 (!) 173/109 98.4  F (36.9  C) Oral 120 -- 18 --     Physical Exam  GEN: alert. Relaxed appearing, but expresses feelings of anxiety. Obese.    HEAD: atraumatic    EYES: pupils reactive, extraocular muscles intact, conjunctivae normal    ENT: TMs normal as are EACs; nares patent; normal posterior pharynx and oral mucosa    NECK: no posterior midline tenderness, no meningeal signs, trachea midline     RESPIRATORY: no tachypnea, breath sounds clear to auscultation    CVS: normal S1/S2, no murmurs/rubs/gallops. Normal cardiac exam.    ABDOMEN: soft, nontender, no masses or organomegaly, no rebound, positive bowel  sounds    MUSCULOSKELETAL: no deformities    SKIN: warm and dry, no acute rashes or ulceration, no erythema     NEURO: GCS 15, cranial nerves intact.  Motor and sensory- good tone; normal gait and coordination; no tremulousness despite feeling anxious.    LYMPH: no lymphadenopathy    PSYCHE:  Patient's expressing being anxious. No thoughts of SI/HI.  No hallucinations.    Emergency Department Course     ECG:  ECG taken at 1341, ECG read at 1538 by Silver Pickering MD  Normal sinus rhythm  Normal ECG  Rate 92 bpm. VT interval 152. QRS duration 84. QT/QTc 370/457. P-R-T axes 67 80 34.      Laboratory:  Laboratory findings were communicated with the patient who voiced understanding of the findings.    CBC: AWNL (WBC 9.6, HGB 16.5, )   BMP: AWNL (Creatinine 0.73)   Magnesium: 1.9    Emergency Department Course:  Past medical records, nursing notes, and vitals reviewed.  IV was inserted and blood was drawn for laboratory testing, results above.     (1539)   I performed an exam of the patient as documented above. History obtained from patient.     (8437)   I rechecked the patient and discussed results and plan of care.     Findings and plan explained to the Patient. Patient discharged home with instructions regarding supportive care, medications, and reasons to return. The importance of close follow-up was reviewed. I personally reviewed the laboratory and imaging results with the Patient and answered all related questions prior to discharge.     Impression & Plan     Medical Decision Making:  Georges Moreno is a 44 year old male with a history of what sounds to be holiday heart. He does abuse alcohol and has since his 20s and he has been through treatment. He went through detox 3 months ago, at which time he had been diagnosed with atrial fibrillation. He spontaneously converted on his own and has been on Eliquis since that time and he does report he has been taking that as prescribed.     Although he feels that he  is having palpitations now, we did not see any PVCs or PACs on the monitor nor does he have evidence of atrial fibrillation on his EKG. Essentially his electrolytes, being creatinine, glucose, cell counts, and magnesium, are normal. I have discussed this with him. I have recommended that he wean off of alcohol permanently and he has not yet followed up with the cardiologist he was referred to 3 weeks ago. He has this cardiologist from previous, by his report, and I have instructed that he do that today or first thing tomorrow and also follow-up with Dr. Mccormack from [facility] to discuss alcohol treatment and to recheck his Paxil dosing/anxiety medication. He does have a history of anxiety and depression. He denies any suicidal ideation at this time. There is no evidence of hallucination.     Diagnosis:    ICD-10-CM    1. Palpitations  R00.2    2. Alcohol abuse  F10.10    3.      History of Anxiety.      Disposition:  Discharged to home.     Scribe Disclosure:  I, Abdullahithomas Campbell, am serving as a scribe at 2:37 PM on 5/26/2020 to document services personally performed by Silver Pickering MD based on my observations and the provider's statements to me.  May 26, 2020   [facility] EMERGENCY DEPARTMENT        Silver Pickering MD  05/27/20 0553

## 2020-05-26 NOTE — ED TRIAGE NOTES
Patient with sensation of heart racing since yesterday afternoon.  Denies dizziness, lightheadedness, shortness of breath and chest pain.

## 2020-05-26 NOTE — ED AVS SNAPSHOT
[facility] Emergency Department  [address]  [facility] [address]  Phone:  [phone]  Fax:  [phone]                                    Georges Moreno   MRN: [id number]    Department:  [facility] Emergency Department   Date of Visit:  5/26/2020           After Visit Summary Signature Page    I have received my discharge instructions, and my questions have been answered. I have discussed any challenges I see with this plan with the nurse or doctor.    ..........................................................................................................................................  Patient/Patient Representative Signature      ..........................................................................................................................................  Patient Representative Print Name and Relationship to Patient    ..................................................               ................................................  Date                                   Time    ..........................................................................................................................................  Reviewed by Signature/Title    ...................................................              ..............................................  Date                                               Time          22EPIC Rev 08/18

## 2020-05-26 NOTE — DISCHARGE INSTRUCTIONS
Continue your anticoagulant.  Follow up with your cardiologist this week.  Keep appointment with Dr Mccormack.  Wean off alcohol.

## 2020-05-27 LAB — INTERPRETATION ECG - MUSE: NORMAL

## 2020-06-27 ENCOUNTER — HOSPITAL ENCOUNTER (EMERGENCY)
Facility: CLINIC | Age: 45
Discharge: HOME | End: 2020-06-28
Attending: EMERGENCY MEDICINE | Admitting: EMERGENCY MEDICINE
Payer: COMMERCIAL

## 2020-06-27 VITALS
DIASTOLIC BLOOD PRESSURE: 79 MMHG | TEMPERATURE: 98.5 F | SYSTOLIC BLOOD PRESSURE: 126 MMHG | OXYGEN SATURATION: 99 % | RESPIRATION RATE: 20 BRPM

## 2020-06-27 DIAGNOSIS — S09.90XA CLOSED HEAD INJURY, INITIAL ENCOUNTER: ICD-10-CM

## 2020-06-27 DIAGNOSIS — S01.01XA LACERATION OF SCALP, INITIAL ENCOUNTER: ICD-10-CM

## 2020-06-27 PROCEDURE — 99283 EMERGENCY DEPT VISIT LOW MDM: CPT | Mod: 25

## 2020-06-27 PROCEDURE — 90471 IMMUNIZATION ADMIN: CPT

## 2020-06-27 PROCEDURE — 12002 RPR S/N/AX/GEN/TRNK2.6-7.5CM: CPT

## 2020-06-27 RX ORDER — BUPIVACAINE HYDROCHLORIDE 5 MG/ML
INJECTION, SOLUTION PERINEURAL
Status: DISCONTINUED
Start: 2020-06-27 | End: 2020-06-28 | Stop reason: HOSPADM

## 2020-06-27 ASSESSMENT — ENCOUNTER SYMPTOMS
VOMITING: 0
HEADACHES: 0
WOUND: 1
NECK PAIN: 0
DIZZINESS: 0
NAUSEA: 0

## 2020-06-27 NOTE — ED AVS SNAPSHOT
[facility] Emergency Department  [address]  [facility] [address]  Phone:  [phone]  Fax:  [phone]                                    Georges Moreno   MRN: [id number]    Department:  [facility] Emergency Department   Date of Visit:  6/27/2020           After Visit Summary Signature Page    I have received my discharge instructions, and my questions have been answered. I have discussed any challenges I see with this plan with the nurse or doctor.    ..........................................................................................................................................  Patient/Patient Representative Signature      ..........................................................................................................................................  Patient Representative Print Name and Relationship to Patient    ..................................................               ................................................  Date                                   Time    ..........................................................................................................................................  Reviewed by Signature/Title    ...................................................              ..............................................  Date                                               Time          22EPIC Rev 08/18

## 2020-06-28 PROCEDURE — 90715 TDAP VACCINE 7 YRS/> IM: CPT | Performed by: EMERGENCY MEDICINE

## 2020-06-28 PROCEDURE — 90471 IMMUNIZATION ADMIN: CPT

## 2020-06-28 PROCEDURE — 25000128 H RX IP 250 OP 636: Performed by: EMERGENCY MEDICINE

## 2020-06-28 RX ADMIN — CLOSTRIDIUM TETANI TOXOID ANTIGEN (FORMALDEHYDE INACTIVATED), CORYNEBACTERIUM DIPHTHERIAE TOXOID ANTIGEN (FORMALDEHYDE INACTIVATED), BORDETELLA PERTUSSIS TOXOID ANTIGEN (GLUTARALDEHYDE INACTIVATED), BORDETELLA PERTUSSIS FILAMENTOUS HEMAGGLUTININ ANTIGEN (FORMALDEHYDE INACTIVATED), BORDETELLA PERTUSSIS PERTACTIN ANTIGEN, AND BORDETELLA PERTUSSIS FIMBRIAE 2/3 ANTIGEN 0.5 ML: 5; 2; 2.5; 5; 3; 5 INJECTION, SUSPENSION INTRAMUSCULAR at 00:08
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
## 2020-10-19 ENCOUNTER — COMMUNICATION - HEALTHEAST (OUTPATIENT)
Dept: SCHEDULING | Facility: CLINIC | Age: 45
End: 2020-10-19

## 2022-06-23 ENCOUNTER — OFFICE VISIT (OUTPATIENT)
Dept: INTERNAL MEDICINE | Facility: CLINIC | Age: 47
End: 2022-06-23
Payer: COMMERCIAL

## 2022-06-23 VITALS
DIASTOLIC BLOOD PRESSURE: 76 MMHG | RESPIRATION RATE: 22 BRPM | SYSTOLIC BLOOD PRESSURE: 121 MMHG | OXYGEN SATURATION: 98 % | WEIGHT: 202.7 LBS | BODY MASS INDEX: 29.02 KG/M2 | HEIGHT: 70 IN | TEMPERATURE: 97.9 F | HEART RATE: 75 BPM

## 2022-06-23 DIAGNOSIS — Z01.818 PREOP GENERAL PHYSICAL EXAM: Primary | ICD-10-CM

## 2022-06-23 DIAGNOSIS — I48.0 PAROXYSMAL ATRIAL FIBRILLATION (H): ICD-10-CM

## 2022-06-23 DIAGNOSIS — M54.12 CERVICAL RADICULOPATHY: ICD-10-CM

## 2022-06-23 DIAGNOSIS — M54.2 NECK PAIN: ICD-10-CM

## 2022-06-23 DIAGNOSIS — F41.1 GAD (GENERALIZED ANXIETY DISORDER): ICD-10-CM

## 2022-06-23 DIAGNOSIS — F10.21 PERSONAL HISTORY OF ALCOHOLISM (H): ICD-10-CM

## 2022-06-23 LAB
ALBUMIN SERPL-MCNC: 4.5 G/DL (ref 3.4–5)
ALP SERPL-CCNC: 56 U/L (ref 40–150)
ALT SERPL W P-5'-P-CCNC: 37 U/L (ref 0–70)
ANION GAP SERPL CALCULATED.3IONS-SCNC: 1 MMOL/L (ref 3–14)
AST SERPL W P-5'-P-CCNC: 26 U/L (ref 0–45)
BILIRUB SERPL-MCNC: 0.8 MG/DL (ref 0.2–1.3)
BUN SERPL-MCNC: 18 MG/DL (ref 7–30)
CALCIUM SERPL-MCNC: 10.5 MG/DL (ref 8.5–10.1)
CHLORIDE BLD-SCNC: 105 MMOL/L (ref 94–109)
CO2 SERPL-SCNC: 30 MMOL/L (ref 20–32)
CREAT SERPL-MCNC: 0.85 MG/DL (ref 0.66–1.25)
GFR SERPL CREATININE-BSD FRML MDRD: >90 ML/MIN/1.73M2
GLUCOSE BLD-MCNC: 102 MG/DL (ref 70–99)
HGB BLD-MCNC: 17 G/DL (ref 13.3–17.7)
POTASSIUM BLD-SCNC: 5 MMOL/L (ref 3.4–5.3)
PROT SERPL-MCNC: 8.4 G/DL (ref 6.8–8.8)
SODIUM SERPL-SCNC: 136 MMOL/L (ref 133–144)

## 2022-06-23 PROCEDURE — 99204 OFFICE O/P NEW MOD 45 MIN: CPT | Performed by: FAMILY MEDICINE

## 2022-06-23 PROCEDURE — 36415 COLL VENOUS BLD VENIPUNCTURE: CPT | Performed by: FAMILY MEDICINE

## 2022-06-23 PROCEDURE — 80053 COMPREHEN METABOLIC PANEL: CPT | Performed by: FAMILY MEDICINE

## 2022-06-23 PROCEDURE — 85018 HEMOGLOBIN: CPT | Performed by: FAMILY MEDICINE

## 2022-06-23 ASSESSMENT — PATIENT HEALTH QUESTIONNAIRE - PHQ9
SUM OF ALL RESPONSES TO PHQ QUESTIONS 1-9: 2
10. IF YOU CHECKED OFF ANY PROBLEMS, HOW DIFFICULT HAVE THESE PROBLEMS MADE IT FOR YOU TO DO YOUR WORK, TAKE CARE OF THINGS AT HOME, OR GET ALONG WITH OTHER PEOPLE: NOT DIFFICULT AT ALL
SUM OF ALL RESPONSES TO PHQ QUESTIONS 1-9: 2

## 2022-06-23 NOTE — PROGRESS NOTES
[place] [address] [facility] [address]  Phone: [phone]  Primary Provider: Sarah Mccormack  Pre-op Performing Provider: SHAISTA OSUNA      :[id number]}  PREOPERATIVE EVALUATION:  Today's date: 6/23/2022    Georges Moreno is a 46 year old male who presents for a preoperative evaluation.    Surgical Information:  Surgery/Procedure: MRI   Surgery Location:   Surgeon: to be determined  Surgery Date:   Time of Surgery:   Where patient plans to recover: Other: home  Fax number for surgical facility:       Type of Anesthesia Anticipated:  Sedation    Assessment & Plan     The proposed surgical procedure is considered LOW risk.    Problem List Items Addressed This Visit     Paroxysmal atrial fibrillation (H)    PUSHPA (generalized anxiety disorder)    Personal history of alcoholism (H)      Other Visit Diagnoses     Preop general physical exam    -  Primary    Relevant Orders    Comprehensive metabolic panel (BMP + Alb, Alk Phos, ALT, AST, Total. Bili, TP) (Completed)    Hemoglobin (Completed)    Neck pain        Cervical radiculopathy                     Risks and Recommendations:  The patient has the following additional risks and recommendations for perioperative complications:   - No identified additional risk factors other than previously addressed      Medications -   Takes metoprolol and paroxetine.  May take these with small amount of water prior to procedure.      RECOMMENDATION:    APPROVAL GIVEN to proceed with proposed procedure, without further diagnostic evaluation.    [id number]}  This was a 25-minute visit with review of record, interview and exam of patient, review lab, review heart rhythm, review alcohol situation, prepare medical record.      Subjective     HPI related to upcoming procedure:     This patient is having neck pain which radiates into proximal upper extremities probably a bit worse on the left side.  Pain worsens with motion, cough.  He has some hand  tingling.    Patient has an anxiety disorder and has had to have some sedation prior to MRI.    He was seen in orthopedic urgent care at [facility].  MRI was proposed.  He was asked to have a clearance prior to this procedure with sedation.    He does not have a history of diabetes, CVA.  An episode of A. fib, now in NSR.  He is a 1 pack/day smoker.  Does use some marijuana.  Has a history of alcohol problems but has been sober for 8 months.      Preop Questions 6/23/2022   1. Have you ever had a heart attack or stroke? No   2. Have you ever had surgery on your heart or blood vessels, such as a stent placement, a coronary artery bypass, or surgery on an artery in your head, neck, heart, or legs? No   3. Do you have chest pain with activity? No   4. Do you have a history of  heart failure? No   5. Do you currently have a cold, bronchitis or symptoms of other infection? No   6. Do you have a cough, shortness of breath, or wheezing? No   7. Do you or anyone in your family have previous history of blood clots? No   8. Do you or does anyone in your family have a serious bleeding problem such as prolonged bleeding following surgeries or cuts? No   9. Have you ever had problems with anemia or been told to take iron pills? No   10. Have you had any abnormal blood loss such as black, tarry or bloody stools? No   11. Have you ever had a blood transfusion? No   12. Are you willing to have a blood transfusion if it is medically needed before, during, or after your surgery? Yes   13. Have you or any of your relatives ever had problems with anesthesia? No   14. Do you have sleep apnea, excessive snoring or daytime drowsiness? YES    14a. Do you have a CPAP machine? No   15. Do you have any artifical heart valves or other implanted medical devices like a pacemaker, defibrillator, or continuous glucose monitor? No   16. Do you have artificial joints? No   17. Are you allergic to latex? No       Health Care Directive:  Patient does not  have a Health Care Directive or Living Will: Discussed advance care planning with patient; however, patient declined at this time.    Preoperative Review of :   reviewed - 1 Rx for 15 Percocet 5/325 in January 2022.          Review of Systems    No fever or chills.  No significant shortness of breath.  No chest pain, palpitations, edema.  No abdominal pain.  No focal weakness.      Patient Active Problem List    Diagnosis Date Noted     Palpitation 04/30/2020     Priority: Medium     Palpitations 04/29/2020     Priority: Medium     Alcohol withdrawal with complication with inpatient treatment, with unspecified complication (H) 02/26/2020     Priority: Medium     Alcohol withdrawal syndrome without complication (H) 08/17/2019     Priority: Medium     Cannabis use disorder, moderate, dependence (H) 08/17/2019     Priority: Medium     PUSHPA (generalized anxiety disorder)      Priority: Medium     Personal history of alcoholism (H)      Priority: Medium     Cigarette nicotine dependence with withdrawal      Priority: Medium     Paroxysmal atrial fibrillation (H)      Priority: Medium     Atrial fibrillation (H)      Priority: Medium     Alcohol use disorder, severe, dependence (H) 05/29/2018     Priority: Medium     Screening for cardiovascular condition 02/28/2018     Priority: Medium     Thoracic or lumbosacral neuritis or radiculitis, unspecified 06/01/2005     Priority: Medium      Past Medical History:   Diagnosis Date     ADHD      Alcohol use disorder, severe, dependence (H)      Atrial fibrillation (H)      Depression, major, single episode     moderate     PUSHPA (generalized anxiety disorder)      HA (headache)      Pancreatitis      Paroxysmal atrial fibrillation (H)      Perirectal abscess      Personal history of alcoholism (H)      Polysubstance (excluding opioids) dependence, daily use (H)      PTSD (post-traumatic stress disorder)      Substance abuse (H)      Tobacco dependence      Past Surgical  "History:   Procedure Laterality Date     INCISION AND DRAINAGE PERIRECTAL ABSCESS       SOFT TISSUE SURGERY      Excision of pilonidal cyst     SOFT TISSUE SURGERY      removal of rectal cyst     Current Outpatient Medications   Medication Sig Dispense Refill     metoprolol tartrate (LOPRESSOR) 25 MG tablet Take 1 tablet (25 mg) by mouth 2 times daily 60 tablet 1     nicotine (NICODERM CQ) 21 MG/24HR 24 hr patch Place 1 patch onto the skin every 24 hours       PARoxetine (PAXIL) 20 MG tablet Take 1 tablet (20 mg) by mouth daily 30 tablet 0     QUEtiapine (SEROQUEL) 50 MG tablet Take  mg by mouth daily as needed         Allergies   Allergen Reactions     Sertraline Other (See Comments)     Excessive fatigue  Reaction(s): Excessive fatigue.     Duloxetine Hives and Rash     Rash, hives        Social History     Tobacco Use     Smoking status: Current Every Day Smoker     Packs/day: 1.50     Years: 15.00     Pack years: 22.50     Types: Cigarettes     Smokeless tobacco: Former User     Tobacco comment: a little over a pack a day   Substance Use Topics     Alcohol use: Yes     Alcohol/week: 14.0 standard drinks     Comment: for past 4 days, excessive       History   Drug Use     Types: Marijuana     Comment: today         Objective     /76 (BP Location: Right arm, Patient Position: Sitting, Cuff Size: Adult Regular)   Pulse 75   Temp 97.9  F (36.6  C) (Tympanic)   Resp 22   Ht 1.778 m (5' 10\")   Wt 91.9 kg (202 lb 11.2 oz)   SpO2 98%   BMI 29.08 kg/m      Physical Exam    Patient peers well nourished.  NAD.  HEENT unremarkable.  Neck no mass or thyromegaly.  Some diminished range of motion.  Lungs occasional rhonchi, no rales or wheezes, nonlabored  Cardiac RSR without murmur, rate normal.  Abdomen nontender.  Extremities without edema.      Recent Labs   Lab Test 10/18/20  0715 10/17/20  0638 10/16/20  0508   HGB 18.2* 19.1* 18.4*   PLT  --  167 236   INR  --   --  0.94   NA  --  137 140   POTASSIUM  " --  3.9 4.2   CR  --  0.88 0.91        Diagnostics:    Results for orders placed or performed in visit on 06/23/22   Comprehensive metabolic panel (BMP + Alb, Alk Phos, ALT, AST, Total. Bili, TP)     Status: Abnormal   Result Value Ref Range    Sodium 136 133 - 144 mmol/L    Potassium 5.0 3.4 - 5.3 mmol/L    Chloride 105 94 - 109 mmol/L    Carbon Dioxide (CO2) 30 20 - 32 mmol/L    Anion Gap 1 (L) 3 - 14 mmol/L    Urea Nitrogen 18 7 - 30 mg/dL    Creatinine 0.85 0.66 - 1.25 mg/dL    Calcium 10.5 (H) 8.5 - 10.1 mg/dL    Glucose 102 (H) 70 - 99 mg/dL    Alkaline Phosphatase 56 40 - 150 U/L    AST 26 0 - 45 U/L    ALT 37 0 - 70 U/L    Protein Total 8.4 6.8 - 8.8 g/dL    Albumin 4.5 3.4 - 5.0 g/dL    Bilirubin Total 0.8 0.2 - 1.3 mg/dL    GFR Estimate >90 >60 mL/min/1.73m2   Hemoglobin     Status: Normal   Result Value Ref Range    Hemoglobin 17.0 13.3 - 17.7 g/dL       No EKG required for low risk surgery (cataract, skin procedure, breast biopsy, etc).    Revised Cardiac Risk Index (RCRI):  The patient has the following serious cardiovascular risks for perioperative complications:   - No serious cardiac risks = 0 points     RCRI Interpretation: 0 points: Class I (very low risk - 0.4% complication rate)           Signed Electronically by: Bob Schroeder MD  Copy of this evaluation report is provided to requesting physician.      Answers for HPI/ROS submitted by the patient on 6/23/2022  If you checked off any problems, how difficult have these problems made it for you to do your work, take care of things at home, or get along with other people?: Not difficult at all  PHQ9 TOTAL SCORE: 2

## 2022-06-23 NOTE — PATIENT INSTRUCTIONS
Preparing for Your Surgery  Getting started  A nurse will call you to review your health history and instructions. They will give you an arrival time based on your scheduled surgery time. Please be ready to share:    Your doctor's clinic name and phone number    Your medical, surgical and anesthesia history    A list of allergies and sensitivities    A list of medicines, including herbal treatments and over-the-counter drugs    Whether the patient has a legal guardian (ask how to send us the papers in advance)  Please tell us if you're pregnant--or if there's any chance you might be pregnant. Some surgeries may injure a fetus (unborn baby), so they require a pregnancy test. Surgeries that are safe for a fetus don't always need a test, and you can choose whether to have one.   If you have a child who's having surgery, please ask for a copy of Preparing for Your Child's Surgery.    Preparing for surgery    Within 30 days of surgery: Have a pre-op exam (sometimes called an H&P, or History and Physical). This can be done at a clinic or pre-operative center.  ? If you're having a , you may not need this exam. Talk to your care team.    At your pre-op exam, talk to your care team about all medicines you take. If you need to stop any medicines before surgery, ask when to start taking them again.  ? We do this for your safety. Many medicines can make you bleed too much during surgery. Some change how well surgery (anesthesia) drugs work.    Call your insurance company to let them know you're having surgery. (If you don't have insurance, call [phone].)    Call your clinic if there's any change in your health. This includes signs of a cold or flu (sore throat, runny nose, cough, rash, fever). It also includes a scrape or scratch near the surgery site.    If you have questions on the day of surgery, call your hospital or surgery center.  COVID testing  You may need to be tested for COVID-19 before having  surgery. If so, we will give you instructions.  Eating and drinking guidelines  For your safety: Unless your surgeon tells you otherwise, follow the guidelines below.    Eat and drink as usual until 8 hours before surgery. After that, no food or milk.    Drink clear liquids until 2 hours before surgery. These are liquids you can see through, like water, Gatorade and Propel Water. You may also have black coffee and tea (no cream or milk).    Nothing by mouth within 2 hours of surgery. This includes gum, candy and breath mints.    If you drink alcohol: Stop drinking it the night before surgery.    If your care team tells you to take medicine on the morning of surgery, it's okay to take it with a sip of water.  Preventing infection    Shower or bathe the night before and morning of your surgery. Follow the instructions your clinic gave you. (If no instructions, use regular soap.)    Don't shave or clip hair near your surgery site. We'll remove the hair if needed.    Don't smoke or vape the morning of surgery. You may chew nicotine gum up to 2 hours before surgery. A nicotine patch is okay.  ? Note: Some surgeries require you to completely quit smoking and nicotine. Check with your surgeon.    Your care team will make every effort to keep you safe from infection. We will:  ? Clean our hands often with soap and water (or an alcohol-based hand rub).  ? Clean the skin at your surgery site with a special soap that kills germs.  ? Give you a special gown to keep you warm. (Cold raises the risk of infection.)  ? Wear special hair covers, masks, gowns and gloves during surgery.  ? Give antibiotic medicine, if prescribed. Not all surgeries need antibiotics.  What to bring on the day of surgery    Photo ID and insurance card    Copy of your health care directive, if you have one    Glasses and hearing aides (bring cases)  ? You can't wear contacts during surgery    Inhaler and eye drops, if you use them (tell us about these when  you arrive)    CPAP machine or breathing device, if you use them    A few personal items, if spending the night    If you have . . .  ? A pacemaker, ICD (cardiac defibrillator) or other implant: Bring the ID card.  ? An implanted stimulator: Bring the remote control.  ? A legal guardian: Bring a copy of the certified (court-stamped) guardianship papers.  Please remove any jewelry, including body piercings. Leave jewelry and other valuables at home.  If you're going home the day of surgery    You must have a responsible adult drive you home. They should stay with you overnight as well.    If you don't have someone to stay with you, and you aren't safe to go home alone, we may keep you overnight. Insurance often won't pay for this.  After surgery  If it's hard to control your pain or you need more pain medicine, please call your surgeon's office.  Questions?   If you have any questions for your care team, list them here: _________________________________________________________________________________________________________________________________________________________________________ ____________________________________ ____________________________________ ____________________________________  For informational purposes only. Not to replace the advice of your health care provider. Copyright   2003, 2019 [facility]. All rights reserved. Clinically reviewed by Wendy Cabrales MD. [employer] [id number] - REV 07/21.

## 2022-12-13 ENCOUNTER — APPOINTMENT (OUTPATIENT)
Dept: MRI IMAGING | Facility: CLINIC | Age: 47
End: 2022-12-13
Attending: EMERGENCY MEDICINE
Payer: COMMERCIAL

## 2022-12-13 ENCOUNTER — HOSPITAL ENCOUNTER (EMERGENCY)
Facility: CLINIC | Age: 47
Discharge: LEFT BEFORE DISCHARGE | End: 2022-12-13
Attending: EMERGENCY MEDICINE | Admitting: EMERGENCY MEDICINE
Payer: COMMERCIAL

## 2022-12-13 VITALS
TEMPERATURE: 98 F | OXYGEN SATURATION: 98 % | SYSTOLIC BLOOD PRESSURE: 169 MMHG | HEART RATE: 76 BPM | BODY MASS INDEX: 29.41 KG/M2 | DIASTOLIC BLOOD PRESSURE: 125 MMHG | WEIGHT: 205 LBS | RESPIRATION RATE: 18 BRPM

## 2022-12-13 LAB
ANION GAP SERPL CALCULATED.3IONS-SCNC: 15 MMOL/L (ref 7–15)
BASOPHILS # BLD AUTO: 0 10E3/UL (ref 0–0.2)
BASOPHILS NFR BLD AUTO: 0 %
BUN SERPL-MCNC: 13.5 MG/DL (ref 6–20)
CALCIUM SERPL-MCNC: 9.7 MG/DL (ref 8.6–10)
CHLORIDE SERPL-SCNC: 99 MMOL/L (ref 98–107)
CK SERPL-CCNC: 622 U/L (ref 39–308)
CREAT SERPL-MCNC: 0.9 MG/DL (ref 0.67–1.17)
DEPRECATED HCO3 PLAS-SCNC: 25 MMOL/L (ref 22–29)
EOSINOPHIL # BLD AUTO: 0 10E3/UL (ref 0–0.7)
EOSINOPHIL NFR BLD AUTO: 0 %
ERYTHROCYTE [DISTWIDTH] IN BLOOD BY AUTOMATED COUNT: 12.2 % (ref 10–15)
GFR SERPL CREATININE-BSD FRML MDRD: >90 ML/MIN/1.73M2
GLUCOSE SERPL-MCNC: 118 MG/DL (ref 70–99)
HCT VFR BLD AUTO: 50.6 % (ref 40–53)
HGB BLD-MCNC: 17.5 G/DL (ref 13.3–17.7)
HOLD SPECIMEN: NORMAL
HOLD SPECIMEN: NORMAL
IMM GRANULOCYTES # BLD: 0 10E3/UL
IMM GRANULOCYTES NFR BLD: 0 %
LYMPHOCYTES # BLD AUTO: 1.2 10E3/UL (ref 0.8–5.3)
LYMPHOCYTES NFR BLD AUTO: 11 %
MCH RBC QN AUTO: 32.1 PG (ref 26.5–33)
MCHC RBC AUTO-ENTMCNC: 34.6 G/DL (ref 31.5–36.5)
MCV RBC AUTO: 93 FL (ref 78–100)
MONOCYTES # BLD AUTO: 0.5 10E3/UL (ref 0–1.3)
MONOCYTES NFR BLD AUTO: 5 %
NEUTROPHILS # BLD AUTO: 8.6 10E3/UL (ref 1.6–8.3)
NEUTROPHILS NFR BLD AUTO: 84 %
NRBC # BLD AUTO: 0 10E3/UL
NRBC BLD AUTO-RTO: 0 /100
PLATELET # BLD AUTO: 237 10E3/UL (ref 150–450)
POTASSIUM SERPL-SCNC: 4.5 MMOL/L (ref 3.4–5.3)
RBC # BLD AUTO: 5.46 10E6/UL (ref 4.4–5.9)
SODIUM SERPL-SCNC: 139 MMOL/L (ref 136–145)
WBC # BLD AUTO: 10.4 10E3/UL (ref 4–11)

## 2022-12-13 PROCEDURE — 72148 MRI LUMBAR SPINE W/O DYE: CPT

## 2022-12-13 PROCEDURE — 80048 BASIC METABOLIC PNL TOTAL CA: CPT | Performed by: EMERGENCY MEDICINE

## 2022-12-13 PROCEDURE — 96374 THER/PROPH/DIAG INJ IV PUSH: CPT

## 2022-12-13 PROCEDURE — 85025 COMPLETE CBC W/AUTO DIFF WBC: CPT | Performed by: EMERGENCY MEDICINE

## 2022-12-13 PROCEDURE — 36415 COLL VENOUS BLD VENIPUNCTURE: CPT | Performed by: EMERGENCY MEDICINE

## 2022-12-13 PROCEDURE — 99285 EMERGENCY DEPT VISIT HI MDM: CPT | Mod: 25

## 2022-12-13 PROCEDURE — 82550 ASSAY OF CK (CPK): CPT | Performed by: EMERGENCY MEDICINE

## 2022-12-13 PROCEDURE — 250N000011 HC RX IP 250 OP 636: Performed by: EMERGENCY MEDICINE

## 2022-12-13 RX ADMIN — MIDAZOLAM HYDROCHLORIDE 1 MG: 1 INJECTION, SOLUTION INTRAMUSCULAR; INTRAVENOUS at 13:11

## 2022-12-13 ASSESSMENT — ACTIVITIES OF DAILY LIVING (ADL)
ADLS_ACUITY_SCORE: 37
ADLS_ACUITY_SCORE: 35

## 2022-12-13 NOTE — ED PROVIDER NOTES
PIT/Triage Evaluation    Patient presented with continued numbness and tingling and weakness in the lower extremities.  He does have surgery, scheduled for early 2023 but symptoms are worsening.    Exam is notable for bilateral lower extremity weakness and shaking.  He has bilateral patellar reflexes.  Normal light touch sensation strength throughout the lower extremities.      Appropriate interventions for symptom management were initiated if applicable.  Appropriate diagnostic tests were initiated if indicated.    Important information for subsequent clinician MRI of the C-spine's have been ordered prior to my seeing him.  I did add a CK to urine on top of basic labs.  He reports 1 episode where he got up out of the chair and had to the bathroom really badly did not quite make it to the bathroom on time a few weeks ago.  As such I have ordered lumbar MRI as well.  Again he has known cervical spine disease and surgery is scheduled for early 2023.    I briefly evaluated the patient and developed an initial plan of care. I discussed this plan and explained that this brief interaction does not constitute a full evaluation. Patient/family understands that they should wait to be fully evaluated and discuss any test results with another clinician prior to leaving the hospital.       Geovanni Chavez MD  12/13/22 1119       Geovanni Chavez MD  12/13/22 1123

## 2022-12-13 NOTE — ED TRIAGE NOTES
PT reports that he has been dealing with bilateral tingling and numbness that has been going on for 10 gino months per pt. Pt reports that the tingling and numbness has progressively gotten worse every month. PT reports that he is in line for surgery due to narrowing of the spinal cord. PT VSS and ABC's intact, pt reports that 3 wks ago he had an episode of loss of bladder but none since then.

## 2023-04-17 ENCOUNTER — HOSPITAL ENCOUNTER (EMERGENCY)
Facility: CLINIC | Age: 48
Discharge: HOME | End: 2023-04-17
Attending: EMERGENCY MEDICINE | Admitting: EMERGENCY MEDICINE
Payer: COMMERCIAL

## 2023-04-17 VITALS
SYSTOLIC BLOOD PRESSURE: 167 MMHG | TEMPERATURE: 97.8 F | RESPIRATION RATE: 22 BRPM | OXYGEN SATURATION: 96 % | DIASTOLIC BLOOD PRESSURE: 116 MMHG | HEART RATE: 77 BPM

## 2023-04-17 DIAGNOSIS — K62.5 BRBPR (BRIGHT RED BLOOD PER RECTUM): ICD-10-CM

## 2023-04-17 DIAGNOSIS — R20.2 PARESTHESIAS: ICD-10-CM

## 2023-04-17 DIAGNOSIS — F41.9 ANXIETY: ICD-10-CM

## 2023-04-17 LAB
ALBUMIN UR-MCNC: 70 MG/DL
ANION GAP SERPL CALCULATED.3IONS-SCNC: 15 MMOL/L (ref 7–15)
APPEARANCE UR: CLEAR
ATRIAL RATE - MUSE: 63 BPM
BACTERIA #/AREA URNS HPF: ABNORMAL /HPF
BASOPHILS # BLD AUTO: 0 10E3/UL (ref 0–0.2)
BASOPHILS NFR BLD AUTO: 0 %
BILIRUB UR QL STRIP: ABNORMAL
BUN SERPL-MCNC: 7.6 MG/DL (ref 6–20)
CALCIUM SERPL-MCNC: 10.1 MG/DL (ref 8.6–10)
CHLORIDE SERPL-SCNC: 94 MMOL/L (ref 98–107)
COLOR UR AUTO: ABNORMAL
CREAT SERPL-MCNC: 0.71 MG/DL (ref 0.67–1.17)
DEPRECATED HCO3 PLAS-SCNC: 26 MMOL/L (ref 22–29)
DIASTOLIC BLOOD PRESSURE - MUSE: NORMAL MMHG
EOSINOPHIL # BLD AUTO: 0 10E3/UL (ref 0–0.7)
EOSINOPHIL NFR BLD AUTO: 0 %
ERYTHROCYTE [DISTWIDTH] IN BLOOD BY AUTOMATED COUNT: 12.2 % (ref 10–15)
GFR SERPL CREATININE-BSD FRML MDRD: >90 ML/MIN/1.73M2
GLUCOSE SERPL-MCNC: 122 MG/DL (ref 70–99)
GLUCOSE UR STRIP-MCNC: NEGATIVE MG/DL
HCT VFR BLD AUTO: 48 % (ref 40–53)
HGB BLD-MCNC: 17.3 G/DL (ref 13.3–17.7)
HGB UR QL STRIP: NEGATIVE
HOLD SPECIMEN: NORMAL
HOLD SPECIMEN: NORMAL
HYALINE CASTS: 2 /LPF
IMM GRANULOCYTES # BLD: 0 10E3/UL
IMM GRANULOCYTES NFR BLD: 0 %
INTERPRETATION ECG - MUSE: NORMAL
KETONES UR STRIP-MCNC: 80 MG/DL
LEUKOCYTE ESTERASE UR QL STRIP: NEGATIVE
LYMPHOCYTES # BLD AUTO: 1.6 10E3/UL (ref 0.8–5.3)
LYMPHOCYTES NFR BLD AUTO: 17 %
MCH RBC QN AUTO: 32.8 PG (ref 26.5–33)
MCHC RBC AUTO-ENTMCNC: 36 G/DL (ref 31.5–36.5)
MCV RBC AUTO: 91 FL (ref 78–100)
MONOCYTES # BLD AUTO: 0.7 10E3/UL (ref 0–1.3)
MONOCYTES NFR BLD AUTO: 8 %
MUCOUS THREADS #/AREA URNS LPF: PRESENT /LPF
NEUTROPHILS # BLD AUTO: 7.4 10E3/UL (ref 1.6–8.3)
NEUTROPHILS NFR BLD AUTO: 75 %
NITRATE UR QL: NEGATIVE
NRBC # BLD AUTO: 0 10E3/UL
NRBC BLD AUTO-RTO: 0 /100
P AXIS - MUSE: 55 DEGREES
PH UR STRIP: 6 [PH] (ref 5–7)
PLATELET # BLD AUTO: 205 10E3/UL (ref 150–450)
POTASSIUM SERPL-SCNC: 4.2 MMOL/L (ref 3.4–5.3)
PR INTERVAL - MUSE: 166 MS
QRS DURATION - MUSE: 86 MS
QT - MUSE: 410 MS
QTC - MUSE: 419 MS
R AXIS - MUSE: 67 DEGREES
RBC # BLD AUTO: 5.28 10E6/UL (ref 4.4–5.9)
RBC URINE: 1 /HPF
SODIUM SERPL-SCNC: 135 MMOL/L (ref 136–145)
SP GR UR STRIP: 1.03 (ref 1–1.03)
SQUAMOUS EPITHELIAL: <1 /HPF
SYSTOLIC BLOOD PRESSURE - MUSE: NORMAL MMHG
T AXIS - MUSE: 56 DEGREES
UROBILINOGEN UR STRIP-MCNC: 4 MG/DL
VENTRICULAR RATE- MUSE: 63 BPM
WBC # BLD AUTO: 9.8 10E3/UL (ref 4–11)
WBC URINE: 1 /HPF

## 2023-04-17 PROCEDURE — 93005 ELECTROCARDIOGRAM TRACING: CPT

## 2023-04-17 PROCEDURE — 80048 BASIC METABOLIC PNL TOTAL CA: CPT | Performed by: EMERGENCY MEDICINE

## 2023-04-17 PROCEDURE — 36415 COLL VENOUS BLD VENIPUNCTURE: CPT | Performed by: EMERGENCY MEDICINE

## 2023-04-17 PROCEDURE — 81001 URINALYSIS AUTO W/SCOPE: CPT | Performed by: EMERGENCY MEDICINE

## 2023-04-17 PROCEDURE — 82310 ASSAY OF CALCIUM: CPT | Performed by: EMERGENCY MEDICINE

## 2023-04-17 PROCEDURE — 99284 EMERGENCY DEPT VISIT MOD MDM: CPT

## 2023-04-17 PROCEDURE — 85025 COMPLETE CBC W/AUTO DIFF WBC: CPT | Performed by: EMERGENCY MEDICINE

## 2023-04-17 RX ORDER — HYDROXYZINE HYDROCHLORIDE 25 MG/1
25 TABLET, FILM COATED ORAL 3 TIMES DAILY PRN
Qty: 15 TABLET | Refills: 0 | Status: ON HOLD | OUTPATIENT
Start: 2023-04-17 | End: 2023-04-22

## 2023-04-17 ASSESSMENT — ACTIVITIES OF DAILY LIVING (ADL): ADLS_ACUITY_SCORE: 37

## 2023-04-17 NOTE — ED NOTES
PIT/Triage Evaluation    Patient presented with longstanding numbness to the hands and feet, discoloration to the elbows, occasional muscle cramps.    Exam is notable for:  Normal strength and sensation. Nonspecific discoloration to the elbows.    Appropriate interventions for symptom management were initiated if applicable.  Appropriate diagnostic tests were initiated if indicated.    Important information for subsequent clinician:  Plan check labs.  Patient requests anti-anxiety medication (eg, hydralazine) as chronicity of symptoms are making him anxious.  Plan outpt Neuro referral for ? Neuropathy workup.    I briefly evaluated the patient and developed an initial plan of care. I discussed this plan and explained that this brief interaction does not constitute a full evaluation. Patient/family understands that they should wait to be fully evaluated and discuss any test results with another clinician prior to leaving the hospital.       Garth Feldman MD  04/17/23 5643

## 2023-04-17 NOTE — ED PROVIDER NOTES
History     Chief Complaint:  No chief complaint on file.       HPI   Georges Moreno is a 47 year old male with history of anxiety, atrial fibrillation, alcohol abuse, hemorrhoids, and cervical myelopathy on Paxil who presents for multiple concerns. The patient reports he has had intermittent pain and numbness to his hands and feet for the last two years. He also states concern for dark marks to his elbows and occasional blood with wiping. The patient also reports he has had decreased appetite, decreased sleep, increased muscle cramping, and increased anxiety. The patient smokes 1.5 packs of cigarettes a day.    Independent Historian:   None - Patient Only    Review of External Notes:   Reviewed 2/15/23 pain clinic visit    ROS:  Review of Systems  See HPI    Allergies:  Sertraline  Duloxetine     Medications:    Lopressor  Nicoderm  Paxil  Seroquel    Past Medical History:   ADHD  Anxiety  Alcohol Use Disorder  Atrial Fibrillation, Paroxysmal   Depression  Headaches  Pancreatitis  Perirectal Abscess  Polysubstance Abuse  PTSD  Tobacco Use    Past Surgical History:    Perirectal Abscess I&D  Pilonidal Cyst Excision    Family History:    Father: Alcohol and Drug abuse, Diabetes, Leukemia  Brother: Alcohol abuse    Social History:  The patient presents to the ED alone.  He endorses roughly 1.5 packs of cigarettes a day  PCP: Clinic, [facility]     Physical Exam     Patient Vitals for the past 24 hrs:   BP Temp Pulse Resp SpO2   04/17/23 1258 (!) 167/116 -- 77 -- --   04/17/23 1118 (!) 137/104 -- 75 -- 96 %   04/17/23 0910 (!) 153/103 -- -- -- --   04/17/23 0907 -- 97.8  F (36.6  C) 63 22 96 %        Physical Exam  Constitutional: Alert, attentive  HENT:    Nose: Nose normal.    Mouth/Throat: Oropharynx is clear, mucous membranes are moist   Eyes: EOM are normal.   CV: regular rate and rhythm; no murmurs, rubs or gallups  Chest: Effort normal and breath sounds normal.   GI:  There is no tenderness. No  distension. Normal bowel sounds   Exam chaperoned by Di Duval PA-C: Normal inspection to the perirectal area, no obvious fissure, abscess, external hemorrhoid, or other abnormality noted  MSK: Normal range of motion.   Neurological: Alert, attentive   Normal strength to the upper and lower extremities   Normal sensation to the upper and lower extremities  Skin: Skin is warm and dry.      Emergency Department Course   ECG  ECG results from 04/17/23   EKG 12 lead     Value    Systolic Blood Pressure     Diastolic Blood Pressure     Ventricular Rate 63    Atrial Rate 63    NH Interval 166    QRS Duration 86        QTc 419    P Axis 55    R AXIS 67    T Axis 56    Interpretation ECG      Sinus rhythm  Normal ECG  When compared with ECG of 16-OCT-2020 05:01,  Sinus rhythm has replaced Atrial fibrillation  Vent. rate has decreased BY  40 BPM       Laboratory:  Labs Ordered and Resulted from Time of ED Arrival to Time of ED Departure   BASIC METABOLIC PANEL - Abnormal       Result Value    Sodium 135 (*)     Potassium 4.2      Chloride 94 (*)     Carbon Dioxide (CO2) 26      Anion Gap 15      Urea Nitrogen 7.6      Creatinine 0.71      Calcium 10.1 (*)     Glucose 122 (*)     GFR Estimate >90     ROUTINE UA WITH MICROSCOPIC REFLEX TO CULTURE - Abnormal    Color Urine Orange (*)     Appearance Urine Clear      Glucose Urine Negative      Bilirubin Urine Small (*)     Ketones Urine 80 (*)     Specific Gravity Urine 1.033      Blood Urine Negative      pH Urine 6.0      Protein Albumin Urine 70 (*)     Urobilinogen Urine 4.0 (*)     Nitrite Urine Negative      Leukocyte Esterase Urine Negative      Bacteria Urine Few (*)     Mucus Urine Present (*)     RBC Urine 1      WBC Urine 1      Squamous Epithelials Urine <1      Hyaline Casts Urine 2     CBC WITH PLATELETS AND DIFFERENTIAL    WBC Count 9.8      RBC Count 5.28      Hemoglobin 17.3      Hematocrit 48.0      MCV 91      MCH 32.8      MCHC 36.0      RDW 12.2       Platelet Count 205      % Neutrophils 75      % Lymphocytes 17      % Monocytes 8      % Eosinophils 0      % Basophils 0      % Immature Granulocytes 0      NRBCs per 100 WBC 0      Absolute Neutrophils 7.4      Absolute Lymphocytes 1.6      Absolute Monocytes 0.7      Absolute Eosinophils 0.0      Absolute Basophils 0.0      Absolute Immature Granulocytes 0.0      Absolute NRBCs 0.0        Emergency Department Course & Assessments:     Assessments:  1133 I assessed and examined the patient.    Independent Interpretation (X-rays, CTs, rhythm strip):  None    Consultations/Discussion of Management or Tests:  None      Disposition:  The patient was discharged to home.     Impression & Plan      Medical Decision Making:  Georges Moreno is a 47 year old male who presents for evaluation of paresthesias and BRBPR, noting chronicity of symptoms but that anxiety is worsening and perhaps magnifying symptoms.  Screening labs show no hematologic or metabolic abnormality to explain his symptoms.  He has normal neurologic exam.  Regarding his BRBPR, the patient is is hemodynamically stable, has a normal perirectal exam, normal hemoglobin, and has had normal colonoscopy in the last few years.  Recommended colorectal follow-up this is certainly could be an indication of internal hemorrhoid or other abnormality and he may benefit from repeat lower endoscopy.  Regarding anxiety symptoms, will trial short course of hydroxyzine recommended primary care follow-up for recheck.  Return precautions for weakness, pain, or any other concerns.          Diagnosis:    ICD-10-CM    1. Paresthesias  R20.2       2. BRBPR (bright red blood per rectum)  K62.5       3. Anxiety  F41.9            Discharge Medications:  Discharge Medication List as of 4/17/2023  1:07 PM      START taking these medications    Details   hydrOXYzine (ATARAX) 25 MG tablet Take 1 tablet (25 mg) by mouth 3 times daily as needed for itching, Disp-15 tablet, R-0, E-Prescribe             Scribe Disclosure:  I, Maurilio Yates, am serving as a scribe at 12:59 PM on 4/17/2023 to document services personally performed by Garth Feldman MD based on my observations and the provider's statements to me.     4/17/2023   Garth Feldman MD [place], John Eric, MD  04/17/23 183

## 2023-04-17 NOTE — ED TRIAGE NOTES
Pt presents to ED with multiple complaints.   1.5 years ago started having tingling in arms. Had neck surgery, symptoms have not resolved.   C/O feeling like his muscles cramp with certain movements.   Discoloration to bilateral elbows. Bright red blood when wiping bottom.   Joint and muscle pain.   Not sleeping well.   Hx of afib, feels intermittent palpitations.   Overall, does not feel well.

## 2023-04-21 ENCOUNTER — HOSPITAL ENCOUNTER (OUTPATIENT)
Facility: CLINIC | Age: 48
Setting detail: OBSERVATION
Discharge: HOME | End: 2023-04-22
Attending: EMERGENCY MEDICINE | Admitting: EMERGENCY MEDICINE
Payer: COMMERCIAL

## 2023-04-21 ENCOUNTER — APPOINTMENT (OUTPATIENT)
Dept: GENERAL RADIOLOGY | Facility: CLINIC | Age: 48
End: 2023-04-21
Attending: EMERGENCY MEDICINE
Payer: COMMERCIAL

## 2023-04-21 DIAGNOSIS — J98.01 ACUTE BRONCHOSPASM: ICD-10-CM

## 2023-04-21 DIAGNOSIS — G60.9 IDIOPATHIC PERIPHERAL NEUROPATHY: Primary | ICD-10-CM

## 2023-04-21 DIAGNOSIS — F41.9 ANXIETY: ICD-10-CM

## 2023-04-21 DIAGNOSIS — R79.89 ELEVATED TROPONIN: ICD-10-CM

## 2023-04-21 DIAGNOSIS — F10.10 ALCOHOL ABUSE: ICD-10-CM

## 2023-04-21 DIAGNOSIS — F10.939 ALCOHOL WITHDRAWAL SYNDROME WITH COMPLICATION (H): ICD-10-CM

## 2023-04-21 LAB
ALBUMIN SERPL BCG-MCNC: 4.4 G/DL (ref 3.5–5.2)
ALP SERPL-CCNC: 63 U/L (ref 40–129)
ALT SERPL W P-5'-P-CCNC: 92 U/L (ref 10–50)
ANION GAP SERPL CALCULATED.3IONS-SCNC: 18 MMOL/L (ref 7–15)
AST SERPL W P-5'-P-CCNC: 87 U/L (ref 10–50)
BASOPHILS # BLD AUTO: 0.1 10E3/UL (ref 0–0.2)
BASOPHILS NFR BLD AUTO: 1 %
BILIRUB DIRECT SERPL-MCNC: <0.2 MG/DL (ref 0–0.3)
BILIRUB SERPL-MCNC: 0.5 MG/DL
BUN SERPL-MCNC: 12.1 MG/DL (ref 6–20)
CALCIUM SERPL-MCNC: 8.9 MG/DL (ref 8.6–10)
CHLORIDE SERPL-SCNC: 102 MMOL/L (ref 98–107)
CREAT SERPL-MCNC: 0.7 MG/DL (ref 0.67–1.17)
D DIMER PPP FEU-MCNC: 0.31 UG/ML FEU (ref 0–0.5)
DEPRECATED HCO3 PLAS-SCNC: 20 MMOL/L (ref 22–29)
EOSINOPHIL # BLD AUTO: 0 10E3/UL (ref 0–0.7)
EOSINOPHIL NFR BLD AUTO: 0 %
ERYTHROCYTE [DISTWIDTH] IN BLOOD BY AUTOMATED COUNT: 12.5 % (ref 10–15)
ETHANOL SERPL-MCNC: 0.02 G/DL
GFR SERPL CREATININE-BSD FRML MDRD: >90 ML/MIN/1.73M2
GLUCOSE SERPL-MCNC: 110 MG/DL (ref 70–99)
HCT VFR BLD AUTO: 44.2 % (ref 40–53)
HGB BLD-MCNC: 15.6 G/DL (ref 13.3–17.7)
HOLD SPECIMEN: NORMAL
IMM GRANULOCYTES # BLD: 0 10E3/UL
IMM GRANULOCYTES NFR BLD: 0 %
LIPASE SERPL-CCNC: 63 U/L (ref 13–60)
LYMPHOCYTES # BLD AUTO: 1.4 10E3/UL (ref 0.8–5.3)
LYMPHOCYTES NFR BLD AUTO: 16 %
MCH RBC QN AUTO: 32.6 PG (ref 26.5–33)
MCHC RBC AUTO-ENTMCNC: 35.3 G/DL (ref 31.5–36.5)
MCV RBC AUTO: 92 FL (ref 78–100)
MONOCYTES # BLD AUTO: 0.5 10E3/UL (ref 0–1.3)
MONOCYTES NFR BLD AUTO: 6 %
NEUTROPHILS # BLD AUTO: 6.9 10E3/UL (ref 1.6–8.3)
NEUTROPHILS NFR BLD AUTO: 77 %
NRBC # BLD AUTO: 0 10E3/UL
NRBC BLD AUTO-RTO: 0 /100
PLATELET # BLD AUTO: 213 10E3/UL (ref 150–450)
POTASSIUM SERPL-SCNC: 4.2 MMOL/L (ref 3.4–5.3)
PROT SERPL-MCNC: 7.3 G/DL (ref 6.4–8.3)
RBC # BLD AUTO: 4.79 10E6/UL (ref 4.4–5.9)
SODIUM SERPL-SCNC: 140 MMOL/L (ref 136–145)
TROPONIN T SERPL HS-MCNC: 72 NG/L
WBC # BLD AUTO: 8.9 10E3/UL (ref 4–11)

## 2023-04-21 PROCEDURE — 99223 1ST HOSP IP/OBS HIGH 75: CPT | Mod: AI | Performed by: HOSPITALIST

## 2023-04-21 PROCEDURE — 85379 FIBRIN DEGRADATION QUANT: CPT | Performed by: EMERGENCY MEDICINE

## 2023-04-21 PROCEDURE — 250N000011 HC RX IP 250 OP 636: Performed by: EMERGENCY MEDICINE

## 2023-04-21 PROCEDURE — G0378 HOSPITAL OBSERVATION PER HR: HCPCS

## 2023-04-21 PROCEDURE — 258N000003 HC RX IP 258 OP 636: Performed by: EMERGENCY MEDICINE

## 2023-04-21 PROCEDURE — 83690 ASSAY OF LIPASE: CPT | Performed by: EMERGENCY MEDICINE

## 2023-04-21 PROCEDURE — 96361 HYDRATE IV INFUSION ADD-ON: CPT

## 2023-04-21 PROCEDURE — 36415 COLL VENOUS BLD VENIPUNCTURE: CPT | Performed by: EMERGENCY MEDICINE

## 2023-04-21 PROCEDURE — 99285 EMERGENCY DEPT VISIT HI MDM: CPT | Mod: 25

## 2023-04-21 PROCEDURE — 80053 COMPREHEN METABOLIC PANEL: CPT | Performed by: EMERGENCY MEDICINE

## 2023-04-21 PROCEDURE — 71046 X-RAY EXAM CHEST 2 VIEWS: CPT

## 2023-04-21 PROCEDURE — 250N000011 HC RX IP 250 OP 636: Performed by: HOSPITALIST

## 2023-04-21 PROCEDURE — 250N000013 HC RX MED GY IP 250 OP 250 PS 637

## 2023-04-21 PROCEDURE — 82077 ASSAY SPEC XCP UR&BREATH IA: CPT | Performed by: EMERGENCY MEDICINE

## 2023-04-21 PROCEDURE — 93005 ELECTROCARDIOGRAM TRACING: CPT

## 2023-04-21 PROCEDURE — 96376 TX/PRO/DX INJ SAME DRUG ADON: CPT

## 2023-04-21 PROCEDURE — 82248 BILIRUBIN DIRECT: CPT | Performed by: EMERGENCY MEDICINE

## 2023-04-21 PROCEDURE — 85025 COMPLETE CBC W/AUTO DIFF WBC: CPT | Performed by: EMERGENCY MEDICINE

## 2023-04-21 PROCEDURE — 96374 THER/PROPH/DIAG INJ IV PUSH: CPT

## 2023-04-21 PROCEDURE — 84484 ASSAY OF TROPONIN QUANT: CPT | Performed by: EMERGENCY MEDICINE

## 2023-04-21 PROCEDURE — 250N000013 HC RX MED GY IP 250 OP 250 PS 637: Performed by: HOSPITALIST

## 2023-04-21 RX ORDER — ONDANSETRON 4 MG/1
4 TABLET, ORALLY DISINTEGRATING ORAL EVERY 6 HOURS PRN
Status: DISCONTINUED | OUTPATIENT
Start: 2023-04-21 | End: 2023-04-22 | Stop reason: HOSPADM

## 2023-04-21 RX ORDER — NICOTINE 21 MG/24HR
1 PATCH, TRANSDERMAL 24 HOURS TRANSDERMAL DAILY
Status: DISCONTINUED | OUTPATIENT
Start: 2023-04-21 | End: 2023-04-22 | Stop reason: HOSPADM

## 2023-04-21 RX ORDER — ASPIRIN 81 MG/1
324 TABLET, CHEWABLE ORAL ONCE
Status: COMPLETED | OUTPATIENT
Start: 2023-04-21 | End: 2023-04-21

## 2023-04-21 RX ORDER — ALPRAZOLAM 0.5 MG
0.5 TABLET ORAL 2 TIMES DAILY PRN
Status: DISCONTINUED | OUTPATIENT
Start: 2023-04-21 | End: 2023-04-22

## 2023-04-21 RX ORDER — LORAZEPAM 2 MG/ML
2 INJECTION INTRAMUSCULAR EVERY 4 HOURS PRN
Status: DISCONTINUED | OUTPATIENT
Start: 2023-04-21 | End: 2023-04-22

## 2023-04-21 RX ORDER — SODIUM CHLORIDE 9 MG/ML
INJECTION, SOLUTION INTRAVENOUS CONTINUOUS
Status: DISCONTINUED | OUTPATIENT
Start: 2023-04-21 | End: 2023-04-22

## 2023-04-21 RX ORDER — ONDANSETRON 2 MG/ML
4 INJECTION INTRAMUSCULAR; INTRAVENOUS EVERY 30 MIN PRN
Status: DISCONTINUED | OUTPATIENT
Start: 2023-04-21 | End: 2023-04-22

## 2023-04-21 RX ORDER — LORAZEPAM 2 MG/ML
1 INJECTION INTRAMUSCULAR ONCE
Status: COMPLETED | OUTPATIENT
Start: 2023-04-21 | End: 2023-04-21

## 2023-04-21 RX ORDER — METOPROLOL TARTRATE 25 MG/1
25 TABLET, FILM COATED ORAL 2 TIMES DAILY
Status: DISCONTINUED | OUTPATIENT
Start: 2023-04-21 | End: 2023-04-22 | Stop reason: HOSPADM

## 2023-04-21 RX ORDER — ASPIRIN 81 MG/1
TABLET, CHEWABLE ORAL
Status: COMPLETED
Start: 2023-04-21 | End: 2023-04-21

## 2023-04-21 RX ORDER — GABAPENTIN 100 MG/1
200 CAPSULE ORAL 3 TIMES DAILY
Status: DISCONTINUED | OUTPATIENT
Start: 2023-04-21 | End: 2023-04-22 | Stop reason: HOSPADM

## 2023-04-21 RX ORDER — ALBUTEROL SULFATE 90 UG/1
2 AEROSOL, METERED RESPIRATORY (INHALATION) EVERY 6 HOURS PRN
Status: DISCONTINUED | OUTPATIENT
Start: 2023-04-21 | End: 2023-04-22 | Stop reason: HOSPADM

## 2023-04-21 RX ORDER — IPRATROPIUM BROMIDE AND ALBUTEROL SULFATE 2.5; .5 MG/3ML; MG/3ML
3 SOLUTION RESPIRATORY (INHALATION) 3 TIMES DAILY
Status: DISCONTINUED | OUTPATIENT
Start: 2023-04-21 | End: 2023-04-21

## 2023-04-21 RX ORDER — PAROXETINE 20 MG/1
20 TABLET, FILM COATED ORAL DAILY
Status: DISCONTINUED | OUTPATIENT
Start: 2023-04-21 | End: 2023-04-22

## 2023-04-21 RX ORDER — MUPIROCIN 20 MG/G
OINTMENT TOPICAL 2 TIMES DAILY
Status: ON HOLD | COMMUNITY
Start: 2023-04-15 | End: 2023-04-22

## 2023-04-21 RX ORDER — ONDANSETRON 2 MG/ML
4 INJECTION INTRAMUSCULAR; INTRAVENOUS EVERY 6 HOURS PRN
Status: DISCONTINUED | OUTPATIENT
Start: 2023-04-21 | End: 2023-04-22 | Stop reason: HOSPADM

## 2023-04-21 RX ADMIN — ASPIRIN 324 MG: 81 TABLET, CHEWABLE ORAL at 11:58

## 2023-04-21 RX ADMIN — LORAZEPAM 2 MG: 2 INJECTION INTRAMUSCULAR; INTRAVENOUS at 16:49

## 2023-04-21 RX ADMIN — LORAZEPAM 1 MG: 2 INJECTION INTRAMUSCULAR; INTRAVENOUS at 09:33

## 2023-04-21 RX ADMIN — ALPRAZOLAM 0.5 MG: 0.5 TABLET ORAL at 20:45

## 2023-04-21 RX ADMIN — SODIUM CHLORIDE 1000 ML: 9 INJECTION, SOLUTION INTRAVENOUS at 09:22

## 2023-04-21 RX ADMIN — SODIUM CHLORIDE: 9 INJECTION, SOLUTION INTRAVENOUS at 10:37

## 2023-04-21 RX ADMIN — SODIUM CHLORIDE: 9 INJECTION, SOLUTION INTRAVENOUS at 20:46

## 2023-04-21 RX ADMIN — PAROXETINE HYDROCHLORIDE 20 MG: 20 TABLET, FILM COATED ORAL at 16:18

## 2023-04-21 RX ADMIN — NICOTINE 1 PATCH: 21 PATCH, EXTENDED RELEASE TRANSDERMAL at 16:18

## 2023-04-21 RX ADMIN — GABAPENTIN 200 MG: 100 CAPSULE ORAL at 20:37

## 2023-04-21 RX ADMIN — ASPIRIN 81 MG CHEWABLE TABLET 324 MG: 81 TABLET CHEWABLE at 11:58

## 2023-04-21 RX ADMIN — METOPROLOL TARTRATE 25 MG: 25 TABLET, FILM COATED ORAL at 20:37

## 2023-04-21 RX ADMIN — GABAPENTIN 200 MG: 100 CAPSULE ORAL at 16:18

## 2023-04-21 ASSESSMENT — ENCOUNTER SYMPTOMS
FATIGUE: 1
DIAPHORESIS: 1
SHORTNESS OF BREATH: 1
HEADACHES: 1
CONFUSION: 1
VOMITING: 1
DIARRHEA: 0
NUMBNESS: 1
ABDOMINAL PAIN: 1
NERVOUS/ANXIOUS: 1
CONSTIPATION: 0
COUGH: 1
CHEST TIGHTNESS: 1
BLOOD IN STOOL: 0

## 2023-04-21 ASSESSMENT — ACTIVITIES OF DAILY LIVING (ADL)
ADLS_ACUITY_SCORE: 31
ADLS_ACUITY_SCORE: 37
ADLS_ACUITY_SCORE: 35
ADLS_ACUITY_SCORE: 37
ADLS_ACUITY_SCORE: 31

## 2023-04-21 NOTE — PLAN OF CARE
ROOM # 228    Living Situation (if not independent, order SW consult): home   Facility name:  : [place] (mom)    Activity level at baseline: independent   Activity level on admit: SBA     Who will be transporting you at discharge: [place]     Patient registered to observation; given Patient Bill of Rights; given the opportunity to ask questions about observation status and their plan of care.  Patient has been oriented to the observation room, bathroom and call light is in place.    Discussed discharge goals and expectations with patient/family.

## 2023-04-21 NOTE — PLAN OF CARE
PRIMARY DIAGNOSIS: Anxiety & Neuropathy   OUTPATIENT/OBSERVATION GOALS TO BE MET BEFORE DISCHARGE:  1. ADLs back to baseline: Yes    2. Activity and level of assistance: Up with standby assistance.    3. Pain status: Pain free.    4. Return to near baseline physical activity: Yes     Discharge Planner Nurse   Safe discharge environment identified: Yes  Barriers to discharge: Yes       Entered by: Shelly Meeks RN 04/21/2023    Please review provider order for any additional goals.   Nurse to notify provider when observation goals have been met and patient is ready for discharge.    Pt a&o x4. Iv infusing  ml/hr. SBA in room. CIWA-10, 2. Ativan given. Nicotine patch on R shoulder. On regular diet. Currently resting in bed, able to make needs known.

## 2023-04-21 NOTE — ED TRIAGE NOTES
Pt arrives via EMS from home d/t HA, diaphoresis x3 days. Also c/o bilateral hand tingling since Jan. States he was seen here Monday, but states his symptoms haven't gotten better. Pt endorses drinking PTA and most days, but unable to state how much. Denies hx seizures from withdrawls. 4mg zofran given 20 g L hand. C/o some SOB, denies CP, n/v/d. ABC intact. A&Ox4.

## 2023-04-21 NOTE — PHARMACY-ADMISSION MEDICATION HISTORY
Pharmacist Admission Medication History    Admission medication history is complete. The information provided in this note is only as accurate as the sources available at the time of the update.    Medication reconciliation/reorder completed by provider prior to medication history? No    Information Source(s): Patient via in-person    Pertinent Information: Pt took 3 days of augmentin therapy, then stopped. He felt that the medication was not effective.    Changes made to PTA medication list:    Added: Mupirocin ointment, Augmentin     Deleted: Nicotine, quetiapine    Changed: None    Medication Affordability:  Not including over the counter (OTC) medications, was there a time in the past 12 months when you did not take your medications as prescribed because of cost?: No    Allergies reviewed with patient and updates made in EHR: yes    Medication History Completed By: Rosa Barry [facility] 4/21/2023 12:05 PM    Prior to Admission medications    Medication Sig Last Dose Taking? Auth Provider Long Term End Date   amoxicillin-clavulanate (AUGMENTIN) 875-125 MG tablet Take 1 tablet by mouth 2 times daily Lot: 7 days  Pt took 3 days of abx, then stopped 4/17/2023 at am Yes Unknown, Entered By History     hydrOXYzine (ATARAX) 25 MG tablet Take 1 tablet (25 mg) by mouth 3 times daily as needed for anxiety  Yes Garth Feldman MD     metoprolol tartrate (LOPRESSOR) 25 MG tablet Take 1 tablet (25 mg) by mouth 2 times daily 4/18/2023 at am Yes Garth Cano DO Yes    mupirocin (BACTROBAN) 2 % external ointment Apply topically 2 times daily Lot: 10 days 4/20/2023 at am Yes Unknown, Entered By History     PARoxetine (PAXIL) 20 MG tablet Take 1 tablet (20 mg) by mouth daily 4/18/2023 at am Yes Jacquelyn Justin MD Yes

## 2023-04-21 NOTE — ED NOTES
[facility]  ED Nurse Handoff Report    Georges Moreno is a 47 year old male   ED Chief complaint: Headache  . ED Diagnosis:   Final diagnoses:   Alcohol abuse   Alcohol withdrawal syndrome with complication (H)   Elevated troponin   Anxiety     Allergies:   Allergies   Allergen Reactions     Sertraline Other (See Comments)     Excessive fatigue  Reaction(s): Excessive fatigue.     Duloxetine Hives and Rash     Rash, hives       Code Status: Full Code  Activity level - Baseline/Home:  Independent. Activity Level - Current:   Stand by Assist. Lift room needed: No. Bariatric: No   Needed: No   Isolation: No. Infection: Not Applicable.     Vital Signs:   Vitals:    04/21/23 0827 04/21/23 0829 04/21/23 1015   BP: (!) 167/126 (!) 159/126 (!) 172/83   Pulse: 100  92   Resp: 20     Temp: 97.3  F (36.3  C)     TempSrc: Temporal     SpO2: 98%  95%   Weight: 95.3 kg (210 lb)     Height: 1.829 m (6')         Cardiac Rhythm:  ,      Pain level:    Patient confused: No. Patient Falls Risk: Yes.   Elimination Status: Has voided   Patient Report - Initial Complaint: Anxiety, headache. Focused Assessment: Georges Moreno is a 47 year old male with a history of Afib, alcohol use disorder and polysubstance dependence who presents via EMS with multiple complaints. Patient reports that for the past year and a half he has been experiencing numbness in his bilateral elbows radiating into his fingers. He states that he had a cervical spine fusion in January 2023, but has not had any improvement in his numbness. The numbness is constant. No recent falls. He states that he is a smoker and has been coughing up pink tinged phlegm, with associated shortness of breath and chest tightness. No chest pain. He adds mild abdominal pain and vomiting, but no weight loss/gain, diarrhea, constipation or current black/bloody stools. He has had blood in the past on the toilet paper after wiping. He reports that two nights ago he woke  up with a headache, as well as had other symptoms including diaphoresis, blurred vision and lethargy. Patient is a chronic alcohol user and for the past 10 days, with his mom out of the house, he has been drinking a pint of tequila daily. Prior to 10 days ago, he had only been using alcohol socially, around 3x a week. He has been through treatment multiple times and is not interested in returning. He is feeling anxious and confused. No suicidal thoughts.   Tests Performed:   XR Chest 2 Views   Preliminary Result   IMPRESSION: A calcified nodule projected over the right midlung is new   since the previous exam. The lungs are otherwise clear. No pleural   effusions. No pneumothorax. Pulmonary vascularity is within normal   limits.       . Abnormal Results:   Labs Ordered and Resulted from Time of ED Arrival to Time of ED Departure   BASIC METABOLIC PANEL - Abnormal       Result Value    Sodium 140      Potassium 4.2      Chloride 102      Carbon Dioxide (CO2) 20 (*)     Anion Gap 18 (*)     Urea Nitrogen 12.1      Creatinine 0.70      Calcium 8.9      Glucose 110 (*)     GFR Estimate >90     HEPATIC FUNCTION PANEL - Abnormal    Protein Total 7.3      Albumin 4.4      Bilirubin Total 0.5      Alkaline Phosphatase 63      AST 87 (*)     ALT 92 (*)     Bilirubin Direct <0.20     LIPASE - Abnormal    Lipase 63 (*)    ETHYL ALCOHOL LEVEL - Abnormal    Alcohol ethyl 0.02 (*)    TROPONIN T, HIGH SENSITIVITY - Abnormal    Troponin T, High Sensitivity 72 (*)    D DIMER QUANTITATIVE - Normal    D-Dimer Quantitative 0.31     CBC WITH PLATELETS AND DIFFERENTIAL    WBC Count 8.9      RBC Count 4.79      Hemoglobin 15.6      Hematocrit 44.2      MCV 92      MCH 32.6      MCHC 35.3      RDW 12.5      Platelet Count 213      % Neutrophils 77      % Lymphocytes 16      % Monocytes 6      % Eosinophils 0      % Basophils 1      % Immature Granulocytes 0      NRBCs per 100 WBC 0      Absolute Neutrophils 6.9      Absolute Lymphocytes  1.4      Absolute Monocytes 0.5      Absolute Eosinophils 0.0      Absolute Basophils 0.1      Absolute Immature Granulocytes 0.0      Absolute NRBCs 0.0      .   Treatments provided: See MAR  Family Comments: N/A  OBS brochure/video discussed/provided to patient:  Yes  ED Medications:   Medications   0.9% sodium chloride BOLUS (0 mLs Intravenous Stopped 4/21/23 1035)     Followed by   sodium chloride 0.9% infusion ( Intravenous $New Bag 4/21/23 1037)   ondansetron (ZOFRAN) injection 4 mg (4 mg Intravenous Not Given 4/21/23 0926)   aspirin (ASA) chewable tablet 324 mg (has no administration in time range)   LORazepam (ATIVAN) injection 1 mg (1 mg Intravenous $Given 4/21/23 0933)     Drips infusing:  No  For the majority of the shift, the patient's behavior Green. Interventions performed were N/A.    Sepsis treatment initiated: No     Patient tested for COVID 19 prior to admission: NO    ED Nurse Name/Phone Number: Barbara Orantes RN,   11:55 AM   RECEIVING UNIT ED HANDOFF REVIEW    Above ED Nurse Handoff Report was reviewed: Yes  Reviewed by: Shelly Meeks RN on April 21, 2023 at 2:12 PM

## 2023-04-21 NOTE — H&P
"[facility]    History and Physical - Hospitalist Service       Date of Admission:  4/21/2023    Assessment & Plan      Georges Moreno is a 47 year old male with history of EtOH abuse, history of withdrawal, parox atrial fibrillation, anxiety, depression and Anterior Cervical C4-6 Anterior Cervical Discectomy and Fusion Decompression (1/12/2023) admitted on 4/21/2023 with vague symptoms of anxiety and neuropathy.    EtOH abuse    - patient uses alcohol to treat his anxiety    - has been house sitting for his mom and drinking more     - states he has had withdrawal in the past while hospitalized    - I do not see history of this and he did not have withdrawal symptoms when he had his cervical surgery in Jan or during hospitalization in April 2020    - will have nurse check his CIWA scores    - will have some PRN ativan available for withdrawal symptoms    - will try some gabapentin for his neuropathy which could help with any withdrawal symptoms as well    - declining chem dep, states \"I know what to do\"    Wheezing on exam  Smoker    - may have some underlying lung disease    - PRN nebs    - nicotine patch    Cervical disk disease    - s/p anterior cervical C4-6 anterior cervical discectomy and fusion decompression Instrumented fusion allograft    - patient is supposed to have follow-up with his surgeon this month (already saw once)    - states his surgery did not help    - will try some gabapentin for his neuropathy    Anxiety/Depression    - cont paxil    - he states he already has follow-up with a counselor    - prn xanax while here    Parox a fib    - currently sinus    - likely triggered by his alcohol use    - cont metoprolol    HTN    - cont metoprolol    Elevated trop    - no symptoms    - likely due to drinking    - will not follow    Full code    Called and updated his mom       Diet: Regular Diet Adult    DVT Prophylaxis: Low Risk/Ambulatory with no VTE prophylaxis indicated  Cody " Catheter: Not present  Lines: None     Cardiac Monitoring: None  Code Status: Full Code      Clinically Significant Risk Factors Present on Admission                       # Overweight: Estimated body mass index is 27.72 kg/m  as calculated from the following:    Height as of this encounter: 1.829 m (6').    Weight as of this encounter: 92.7 kg (204 lb 6.4 oz).           Disposition Plan      Expected Discharge Date: 04/22/2023                  Mahamed Mcneil MD  Hospitalist Service  [facility]  Securely message with [employer] (more info)  Text page via [employer] Paging/Directory     ______________________________________________________________________    Chief Complaint   neuropathy and anxiety    History is obtained from the patient    History of Present Illness   Georges Moreno is a 47 year old male with history of EtOH abuse, history of withdrawal, parox atrial fibrillation, anxiety, depression and Anterior Cervical C4-6 Anterior Cervical Discectomy and Fusion Decompression (1/12/2023) admitted on 4/21/2023 with vague symptoms of anxiety and neuropathy.  Patient lives with his mom.  He states that she has been out of town for 10 days in [place].  He states since she has been gone he has been drinking more.  He states he drinks tequila daily.  Anywhere from half a pint to a pint a day.  He states that his neuropathy has been bothering him.  It is in his arms.  He was in the emergency room on April 17 with similar symptoms as well as some bright red blood per rectum.  He was discharged and told to follow-up as an outpatient.  He denies any chest pain.  He states he has some chronic shortness of breath because of his smoking.  He states he has some stomach pain and nausea that he states he always gets when he drinks heavily.  He has been eating and drinking.  No diarrhea.  He states he has a chronic cough for decades because of his smoking.  No change in his cough.  No sore throat.  No fevers or  chills.  No urinary symptoms.      Past Medical History    Past Medical History:   Diagnosis Date     ADHD      Alcohol use disorder, severe, dependence (H)      Atrial fibrillation (H)      Depression, major, single episode     moderate     PUSHPA (generalized anxiety disorder)      HA (headache)      Pancreatitis      Paroxysmal atrial fibrillation (H)      Perirectal abscess      Personal history of alcoholism (H)      Polysubstance (excluding opioids) dependence, daily use (H)      PTSD (post-traumatic stress disorder)      Substance abuse (H)      Tobacco dependence        Past Surgical History   Past Surgical History:   Procedure Laterality Date     INCISION AND DRAINAGE PERIRECTAL ABSCESS       SOFT TISSUE SURGERY      Excision of pilonidal cyst     SOFT TISSUE SURGERY      removal of rectal cyst       Prior to Admission Medications   Prior to Admission Medications   Prescriptions Last Dose Informant Patient Reported? Taking?   PARoxetine (PAXIL) 20 MG tablet 4/18/2023 at am  No Yes   Sig: Take 1 tablet (20 mg) by mouth daily   amoxicillin-clavulanate (AUGMENTIN) 875-125 MG tablet 4/17/2023 at am  Yes Yes   Sig: Take 1 tablet by mouth 2 times daily Lot: 7 days  Pt took 3 days of abx, then stopped   hydrOXYzine (ATARAX) 25 MG tablet   No Yes   Sig: Take 1 tablet (25 mg) by mouth 3 times daily as needed for itching   Patient taking differently: Take 25 mg by mouth 3 times daily as needed for anxiety   metoprolol tartrate (LOPRESSOR) 25 MG tablet 4/18/2023 at am  No Yes   Sig: Take 1 tablet (25 mg) by mouth 2 times daily   mupirocin (BACTROBAN) 2 % external ointment 4/20/2023 at am  Yes Yes   Sig: Apply topically 2 times daily Lot: 10 days      Facility-Administered Medications: None        Review of Systems    The 10 point Review of Systems is negative other than noted in the HPI or here.   Social History   I have reviewed this patient's social history and updated it with pertinent information if needed.  Social  History     Tobacco Use     Smoking status: Every Day     Packs/day: 1.50     Years: 15.00     Pack years: 22.50     Types: Cigarettes     Smokeless tobacco: Former     Tobacco comments:     a little over a pack a day   Vaping Use     Vaping status: Never Used   Substance Use Topics     Alcohol use: Yes     Alcohol/week: 14.0 standard drinks of alcohol     Comment: for past 4 days, excessive     Drug use: Yes     Types: Marijuana     Comment: today       Family History   I have reviewed this patient's family history and updated it with pertinent information if needed.  Family History   Problem Relation Age of Onset     Family History Negative Mother      Alcohol/Drug Father      Diabetes Father      Leukemia Father      Alcohol/Drug Paternal Uncle      Alcoholism Father      Alcoholism Brother        Allergies   Allergies   Allergen Reactions     Sertraline Other (See Comments)     Excessive fatigue  Reaction(s): Excessive fatigue.     Duloxetine Hives and Rash     Rash, hives        Physical Exam   Vital Signs: Temp: 98.3  F (36.8  C) Temp src: Oral BP: (!) 155/92 Pulse: 84   Resp: 20 SpO2: 99 % O2 Device: None (Room air)    Weight: 204 lbs 6.4 oz    Constitutional: awake, alert, cooperative, no apparent distress, and appears stated age. No tremor  Eyes: Lids and lashes normal, pupils equal, round and reactive to light, extra ocular muscles intact, sclera clear, conjunctiva normal  ENT: Normocephalic, without obvious abnormality, atraumatic, sinuses nontender on palpation, external ears without lesions, oral pharynx with moist mucous membranes, tonsils without erythema or exudates, gums normal and good dentition.  Respiratory: good air movement. No crackles. Some exp wheezing  Cardiovascular: Normal apical impulse, regular rate and rhythm, normal S1 and S2, no S3 or S4, and no murmur noted  GI: No scars, normal bowel sounds, soft, non-distended, non-tender, no masses palpated, no hepatosplenomegally  Skin: no  bruising or bleeding  Musculoskeletal: no lower extremity pitting edema present    Medical Decision Making       60 MINUTES SPENT BY ME on the date of service doing chart review, history, exam, documentation & further activities per the note.      Data     I have personally reviewed the following data over the past 24 hrs:    8.9  \   15.6   / 213     140 102 12.1 /  110 (H)   4.2 20 (L) 0.70 \       ALT: 92 (H) AST: 87 (H) AP: 63 TBILI: 0.5   ALB: 4.4 TOT PROTEIN: 7.3 LIPASE: 63 (H)       Trop: 72 (H) BNP: N/A       INR:  N/A PTT:  N/A   D-dimer:  0.31 Fibrinogen:  N/A       Imaging results reviewed over the past 24 hrs:   Recent Results (from the past 24 hour(s))   XR Chest 2 Views    Narrative    CHEST TWO VIEWS  4/21/2023 11:27 AM     HISTORY:  Chest pain. Shortness of breath.    COMPARISON: 4/29/2020.      Impression    IMPRESSION: A calcified nodule projected over the right midlung is new  since the previous exam. The lungs are otherwise clear. No pleural  effusions. No pneumothorax. Pulmonary vascularity is within normal  limits.    JENA AMEZQUITA MD         SYSTEM ID:  [id number]

## 2023-04-21 NOTE — PROGRESS NOTES
"[facility]  Respiratory Care Note    [facility]     Date: 04/21/2023  Admission Dx:numbness in his bilateral elbows   Pulmonary History: smoking  Home Nebulizer/MDI Use: N/A  Home Oxygen: None  Acuity Level (RCAT flow sheet): 4  Aerosol Therapy initiated: Albuterol MDI Q6 prn  Pulmonary Hygiene initiated: Cough and deep breathing techniques TID  Volume Expansion initiated: IS TID   Current Oxygen Requirements: Room air  Current SpO2: 99%  Re-evaluation date: 04/24/2023  Patient Education: Education was performed with the patient in regards to indications/benefits and possible side effects of bronchodilators. Will continue to do education with patient.     See \"RT Assessments\" flow sheet for patient assessment scoring and Acuity Level Details.    Vital signs:  Temp: 98.3  F (36.8  C) Temp src: Oral BP: (!) 155/92 Pulse: 84   Resp: 20 SpO2: 99 % O2 Device: None (Room air)   Height: 182.9 cm (6') Weight: 92.7 kg (204 lb 6.4 oz)  Estimated body mass index is 27.72 kg/m  as calculated from the following:    Height as of this encounter: 1.829 m (6').    Weight as of this encounter: 92.7 kg (204 lb 6.4 oz).      Past Medical History:   Diagnosis Date     ADHD      Alcohol use disorder, severe, dependence (H)      Atrial fibrillation (H)      Depression, major, single episode     moderate     PUSHPA (generalized anxiety disorder)      HA (headache)      Pancreatitis      Paroxysmal atrial fibrillation (H)      Perirectal abscess      Personal history of alcoholism (H)      Polysubstance (excluding opioids) dependence, daily use (H)      PTSD (post-traumatic stress disorder)      Substance abuse (H)      Tobacco dependence        Past Surgical History:   Procedure Laterality Date     INCISION AND DRAINAGE PERIRECTAL ABSCESS       SOFT TISSUE SURGERY      Excision of pilonidal cyst     SOFT TISSUE SURGERY      removal of rectal cyst       Family History   Problem Relation Age of Onset     Family " History Negative Mother      Alcohol/Drug Father      Diabetes Father      Leukemia Father      Alcohol/Drug Paternal Uncle      Alcoholism Father      Alcoholism Brother        Social History     Tobacco Use     Smoking status: Every Day     Packs/day: 1.50     Years: 15.00     Pack years: 22.50     Types: Cigarettes     Smokeless tobacco: Former     Tobacco comments:     a little over a pack a day   Vaping Use     Vaping status: Never Used   Substance Use Topics     Alcohol use: Yes     Alcohol/week: 14.0 standard drinks of alcohol     Comment: for past 4 days, excessive     Study Result    Narrative & Impression   CHEST TWO VIEWS  4/21/2023 11:27 AM      HISTORY:  Chest pain. Shortness of breath.     COMPARISON: 4/29/2020.                                                                      IMPRESSION: A calcified nodule projected over the right midlung is new  since the previous exam. The lungs are otherwise clear. No pleural  effusions. No pneumothorax. Pulmonary vascularity is within normal  limits.     JENA AMEZQUITA MD      Prior to Admission medications    Medication Sig Last Dose Taking? Auth Provider Long Term End Date   amoxicillin-clavulanate (AUGMENTIN) 875-125 MG tablet Take 1 tablet by mouth 2 times daily Lot: 7 days  Pt took 3 days of abx, then stopped 4/17/2023 at am Yes Unknown, Entered By History       hydrOXYzine (ATARAX) 25 MG tablet Take 1 tablet (25 mg) by mouth 3 times daily as needed for anxiety   Yes Garth Feldman MD       metoprolol tartrate (LOPRESSOR) 25 MG tablet Take 1 tablet (25 mg) by mouth 2 times daily 4/18/2023 at am Yes Garth Cano DO Yes     mupirocin (BACTROBAN) 2 % external ointment Apply topically 2 times daily Lot: 10 days 4/20/2023 at am Yes Unknown, Entered By History       PARoxetine (PAXIL) 20 MG tablet Take 1 tablet (20 mg) by mouth daily 4/18/2023 at am Yes Jacquelyn Justin MD Yes                 Jena Sheth [facility]  [facility]  4/21/2023

## 2023-04-21 NOTE — ED PROVIDER NOTES
History   Chief Complaint:  Multiple Complaints     The history is provided by the patient.      Georges Moreno is a 47 year old male with a history of Afib, alcohol use disorder and polysubstance dependence who presents via EMS with multiple complaints. Patient reports that for the past year and a half he has been experiencing numbness in his bilateral elbows radiating into his fingers. He states that he had a cervical spine fusion in January 2023, but has not had any improvement in his numbness. The numbness is constant. No recent falls. He states that he is a smoker and has been coughing up pink tinged phlegm, with associated shortness of breath and chest tightness. No chest pain. He adds mild abdominal pain and vomiting, but no weight loss/gain, diarrhea, constipation or current black/bloody stools. He has had blood in the past on the toilet paper after wiping. He reports that two nights ago he woke up with a headache, as well as had other symptoms including diaphoresis, blurred vision and lethargy. Patient is a chronic alcohol user and for the past 10 days, with his mom out of the house, he has been drinking a pint of tequila daily. Prior to 10 days ago, he had only been using alcohol socially, around 3x a week. He has been through treatment multiple times and is not interested in returning. He is feeling anxious and confused. No suicidal thoughts.    Independent Historian:   None - Patient Only    Review of External Notes: Reviewed from a neurosurgical follow-up noting patient was doing well still having on and off paresthesias but no persistent paresthesias    ROS:  Review of Systems   Constitutional: Positive for diaphoresis and fatigue.   Eyes: Positive for visual disturbance.   Respiratory: Positive for cough, chest tightness and shortness of breath.    Cardiovascular: Negative for chest pain.   Gastrointestinal: Positive for abdominal pain and vomiting. Negative for blood in stool, constipation and  diarrhea.   Neurological: Positive for numbness and headaches.   Psychiatric/Behavioral: Positive for confusion. Negative for suicidal ideas. The patient is nervous/anxious.    All other systems reviewed and are negative.    Allergies:  Sertraline  Duloxetine     Medications:    Paroxetine  Metoprolol tartrate  Gabapentin   Augmentin     Past Medical History:    ADHD  Alcohol use disorder  Afib  Major depression  Generalized anxiety disorder  Pancreatitis  Perirectal abscess  Polysubstance dependence  PTSD  Tobacco dependence   Cannabis use disorder  Pulmonary nodule   Thoracic or lumbosacral neuritis or radiculitis  Pyogenic granuloma of skin and subcutaneous tissue    Past Surgical History:    I&D perirectal abscess  Excision of pilonidal cyst    Family History:    Father - alcohol/drug dependence, diabetes, leukemia    Social History:  Presents alone  Presents via EMS  Alcohol use   Lives with mother  [facility]: [facility]     Physical Exam     Patient Vitals for the past 24 hrs:   BP Temp Temp src Pulse Resp SpO2 Height Weight   04/21/23 1215 (!) 185/116 -- -- 85 19 97 % -- --   04/21/23 1200 (!) 176/107 -- -- 85 17 92 % -- --   04/21/23 1130 (!) 181/91 -- -- -- -- 99 % -- --   04/21/23 1100 (!) 174/91 -- -- 92 21 96 % -- --   04/21/23 1030 (!) 176/126 -- -- 89 11 97 % -- --   04/21/23 1015 (!) 172/83 -- -- 92 -- 95 % -- --   04/21/23 0829 (!) 159/126 -- -- -- -- -- -- --   04/21/23 0827 (!) 167/126 97.3  F (36.3  C) Temporal 100 20 98 % 1.829 m (6') 95.3 kg (210 lb)        Physical Exam  GENERAL: well developed, pleasant, diaphoretic  HEAD: atraumatic  EYES: pupils reactive, extraocular muscles intact, conjunctivae normal  ENT:  mucus membranes moist  NECK:  trachea midline, normal range of motion  RESPIRATORY: no tachypnea, breath sounds clear to auscultation   CVS: normal S1/S2, no murmurs, intact distal pulses  ABDOMEN: soft, nontender, nondistention  MUSCULOSKELETAL: no deformities, 5/5 motor  testing in biceps, triceps, ulnar and radial  SKIN: warm and dry, no acute rashes or ulceration  NEURO: GCS 15, cranial nerves intact, alert and oriented x3  PSYCH:  Mood/affect normal    Emergency Department Course   ECG  ECG taken at 0942, ECG read at 0955  NSR   Rate 78 bpm. WA interval 168 ms. QRS duration 90 ms. QT/QTc 404/460 ms. P-R-T axes 70 69 55.     Imaging:  XR Chest 2 Views   Preliminary Result   IMPRESSION: A calcified nodule projected over the right midlung is new   since the previous exam. The lungs are otherwise clear. No pleural   effusions. No pneumothorax. Pulmonary vascularity is within normal   limits.         Report per radiology    Laboratory:  Labs Ordered and Resulted from Time of ED Arrival to Time of ED Departure   BASIC METABOLIC PANEL - Abnormal       Result Value    Sodium 140      Potassium 4.2      Chloride 102      Carbon Dioxide (CO2) 20 (*)     Anion Gap 18 (*)     Urea Nitrogen 12.1      Creatinine 0.70      Calcium 8.9      Glucose 110 (*)     GFR Estimate >90     HEPATIC FUNCTION PANEL - Abnormal    Protein Total 7.3      Albumin 4.4      Bilirubin Total 0.5      Alkaline Phosphatase 63      AST 87 (*)     ALT 92 (*)     Bilirubin Direct <0.20     LIPASE - Abnormal    Lipase 63 (*)    ETHYL ALCOHOL LEVEL - Abnormal    Alcohol ethyl 0.02 (*)    TROPONIN T, HIGH SENSITIVITY - Abnormal    Troponin T, High Sensitivity 72 (*)    D DIMER QUANTITATIVE - Normal    D-Dimer Quantitative 0.31     CBC WITH PLATELETS AND DIFFERENTIAL    WBC Count 8.9      RBC Count 4.79      Hemoglobin 15.6      Hematocrit 44.2      MCV 92      MCH 32.6      MCHC 35.3      RDW 12.5      Platelet Count 213      % Neutrophils 77      % Lymphocytes 16      % Monocytes 6      % Eosinophils 0      % Basophils 1      % Immature Granulocytes 0      NRBCs per 100 WBC 0      Absolute Neutrophils 6.9      Absolute Lymphocytes 1.4      Absolute Monocytes 0.5      Absolute Eosinophils 0.0      Absolute Basophils 0.1       Absolute Immature Granulocytes 0.0      Absolute NRBCs 0.0       Emergency Department Course & Assessments:     Interventions:  Medications   0.9% sodium chloride BOLUS (0 mLs Intravenous Stopped 4/21/23 1035)     Followed by   sodium chloride 0.9% infusion ( Intravenous $New Bag 4/21/23 1037)   ondansetron (ZOFRAN) injection 4 mg (4 mg Intravenous Not Given 4/21/23 0926)   LORazepam (ATIVAN) injection 1 mg (1 mg Intravenous $Given 4/21/23 0933)   aspirin (ASA) chewable tablet 324 mg (324 mg Oral $Given 4/21/23 1158)      Assessments:  0845 I obtained history and examined the patient, as above.  1040 I rechecked the patient and updated them on findings.     Independent Interpretation (X-rays, CTs, rhythm strip):  N/A    Consultations/Discussion of Management or Tests:  1114 I spoke to Dr. Mcneil, hospitalist, who accepts admission.      Social Determinants of Health affecting care:   None and Stress/Adjustment Disorders    Disposition:  The patient was admitted to the hospital under the care of Dr. Mcneil.     Impression & Plan    Medical Decision Making:  Patient presents with several complaints.  He is diaphoretic and hypertensive.  He does have a history of alcohol abuse and notes that his mother's been out of town for the last 10 days and his 40 cans of beer a week has increased to a pint of tequila daily.  Patient notes feelings of anxiety and stress, nausea, lightheadedness, general feeling of not well.  He also notes ongoing paresthesias to both of his hands but this looks to be chronic.  When discussing the note from his neurosurgical visit noting just intermittent symptoms and was doing fairly well he disagrees that he has been having constant symptoms.  Patient's motor function is intact with biceps triceps interosseous and no acute neurosurgical findings.  He denies any trauma.  I do not feel that advanced imaging is needed to rule out stability of his recent surgery.  Patient also has some sputum in the  room on the floor with an emesis bag clear how he missed his emesis bag and warmed up on the floor but nonetheless D-dimer was obtained for possible PE which is normal/negative.  Patient denies any melena and hemoglobin is stable in terms of GI bleed risk.  Patient was given Ativan and fluids.  He is feeling somewhat improved.  Troponin came back elevated.  EKG does not show any ischemic changes.  Patient admits she has a problem with alcohol but does not want to go through treatment.  Also notes that he has a problem with anxiety.  Discussed with him his alcohol addiction and problems are likely the leading problem for all of his issues.  Rest of his work-up has been negative except for the troponin.  Patient be admitted for observation repeat troponins and monitoring possibly further benzos/Ativan.    Diagnosis:    ICD-10-CM    1. Alcohol abuse  F10.10       2. Alcohol withdrawal syndrome with complication (H)  F10.939       3. Elevated troponin  R77.8       4. Anxiety  F41.9          Scribe Disclosure:  Alexis TROTTER, am serving as a scribe at 8:36 AM on 4/21/2023 to document services personally performed by Cal Valencia MD based on my observations and the provider's statements to me.        Cal Valencia MD  04/21/23 7978

## 2023-04-22 VITALS
WEIGHT: 204.4 LBS | HEIGHT: 72 IN | RESPIRATION RATE: 16 BRPM | OXYGEN SATURATION: 100 % | TEMPERATURE: 97.9 F | DIASTOLIC BLOOD PRESSURE: 88 MMHG | HEART RATE: 78 BPM | BODY MASS INDEX: 27.68 KG/M2 | SYSTOLIC BLOOD PRESSURE: 168 MMHG

## 2023-04-22 LAB
ANION GAP SERPL CALCULATED.3IONS-SCNC: 8 MMOL/L (ref 7–15)
BUN SERPL-MCNC: 9.5 MG/DL (ref 6–20)
CALCIUM SERPL-MCNC: 8.4 MG/DL (ref 8.6–10)
CHLORIDE SERPL-SCNC: 102 MMOL/L (ref 98–107)
CREAT SERPL-MCNC: 0.83 MG/DL (ref 0.67–1.17)
DEPRECATED HCO3 PLAS-SCNC: 28 MMOL/L (ref 22–29)
GFR SERPL CREATININE-BSD FRML MDRD: >90 ML/MIN/1.73M2
GLUCOSE SERPL-MCNC: 105 MG/DL (ref 70–99)
POTASSIUM SERPL-SCNC: 4 MMOL/L (ref 3.4–5.3)
SODIUM SERPL-SCNC: 138 MMOL/L (ref 136–145)

## 2023-04-22 PROCEDURE — 250N000013 HC RX MED GY IP 250 OP 250 PS 637: Performed by: HOSPITALIST

## 2023-04-22 PROCEDURE — 258N000003 HC RX IP 258 OP 636: Performed by: EMERGENCY MEDICINE

## 2023-04-22 PROCEDURE — 96376 TX/PRO/DX INJ SAME DRUG ADON: CPT

## 2023-04-22 PROCEDURE — 250N000013 HC RX MED GY IP 250 OP 250 PS 637: Performed by: INTERNAL MEDICINE

## 2023-04-22 PROCEDURE — 80048 BASIC METABOLIC PNL TOTAL CA: CPT | Performed by: HOSPITALIST

## 2023-04-22 PROCEDURE — 250N000011 HC RX IP 250 OP 636: Performed by: HOSPITALIST

## 2023-04-22 PROCEDURE — 96361 HYDRATE IV INFUSION ADD-ON: CPT

## 2023-04-22 PROCEDURE — 99238 HOSP IP/OBS DSCHRG MGMT 30/<: CPT | Performed by: INTERNAL MEDICINE

## 2023-04-22 PROCEDURE — 250N000013 HC RX MED GY IP 250 OP 250 PS 637

## 2023-04-22 PROCEDURE — G0378 HOSPITAL OBSERVATION PER HR: HCPCS

## 2023-04-22 PROCEDURE — 36415 COLL VENOUS BLD VENIPUNCTURE: CPT | Performed by: HOSPITALIST

## 2023-04-22 RX ORDER — PAROXETINE 10 MG/1
10 TABLET, FILM COATED ORAL ONCE
Status: COMPLETED | OUTPATIENT
Start: 2023-04-22 | End: 2023-04-22

## 2023-04-22 RX ORDER — HYDROXYZINE HYDROCHLORIDE 10 MG/1
10 TABLET, FILM COATED ORAL 3 TIMES DAILY PRN
Status: DISCONTINUED | OUTPATIENT
Start: 2023-04-22 | End: 2023-04-22 | Stop reason: HOSPADM

## 2023-04-22 RX ORDER — ALBUTEROL SULFATE 0.83 MG/ML
2.5 SOLUTION RESPIRATORY (INHALATION) EVERY 4 HOURS PRN
Status: DISCONTINUED | OUTPATIENT
Start: 2023-04-22 | End: 2023-04-22 | Stop reason: HOSPADM

## 2023-04-22 RX ORDER — OLANZAPINE 5 MG/1
5-10 TABLET, ORALLY DISINTEGRATING ORAL EVERY 6 HOURS PRN
Status: DISCONTINUED | OUTPATIENT
Start: 2023-04-22 | End: 2023-04-22 | Stop reason: HOSPADM

## 2023-04-22 RX ORDER — FOLIC ACID 1 MG/1
1 TABLET ORAL DAILY
Status: DISCONTINUED | OUTPATIENT
Start: 2023-04-22 | End: 2023-04-22 | Stop reason: HOSPADM

## 2023-04-22 RX ORDER — HYDROXYZINE HYDROCHLORIDE 25 MG/1
25 TABLET, FILM COATED ORAL 3 TIMES DAILY PRN
Start: 2023-04-22 | End: 2023-08-26

## 2023-04-22 RX ORDER — ALBUTEROL SULFATE 90 UG/1
2 AEROSOL, METERED RESPIRATORY (INHALATION) EVERY 6 HOURS PRN
Qty: 18 G | Refills: 1 | Status: ON HOLD | OUTPATIENT
Start: 2023-04-22 | End: 2024-01-31

## 2023-04-22 RX ORDER — MULTIPLE VITAMINS W/ MINERALS TAB 9MG-400MCG
1 TAB ORAL DAILY
Status: DISCONTINUED | OUTPATIENT
Start: 2023-04-22 | End: 2023-04-22 | Stop reason: HOSPADM

## 2023-04-22 RX ORDER — LORAZEPAM 2 MG/ML
1-2 INJECTION INTRAMUSCULAR EVERY 30 MIN PRN
Status: DISCONTINUED | OUTPATIENT
Start: 2023-04-22 | End: 2023-04-22 | Stop reason: HOSPADM

## 2023-04-22 RX ORDER — LORAZEPAM 1 MG/1
1-2 TABLET ORAL EVERY 30 MIN PRN
Status: DISCONTINUED | OUTPATIENT
Start: 2023-04-22 | End: 2023-04-22 | Stop reason: HOSPADM

## 2023-04-22 RX ORDER — FLUMAZENIL 0.1 MG/ML
0.2 INJECTION, SOLUTION INTRAVENOUS
Status: DISCONTINUED | OUTPATIENT
Start: 2023-04-22 | End: 2023-04-22 | Stop reason: HOSPADM

## 2023-04-22 RX ORDER — GABAPENTIN 100 MG/1
200 CAPSULE ORAL 3 TIMES DAILY
Qty: 180 CAPSULE | Refills: 1 | Status: SHIPPED | OUTPATIENT
Start: 2023-04-22 | End: 2023-08-26

## 2023-04-22 RX ORDER — HALOPERIDOL 5 MG/ML
2.5-5 INJECTION INTRAMUSCULAR EVERY 6 HOURS PRN
Status: DISCONTINUED | OUTPATIENT
Start: 2023-04-22 | End: 2023-04-22 | Stop reason: HOSPADM

## 2023-04-22 RX ADMIN — GABAPENTIN 200 MG: 100 CAPSULE ORAL at 08:20

## 2023-04-22 RX ADMIN — GABAPENTIN 200 MG: 100 CAPSULE ORAL at 15:11

## 2023-04-22 RX ADMIN — NICOTINE 1 PATCH: 21 PATCH, EXTENDED RELEASE TRANSDERMAL at 08:19

## 2023-04-22 RX ADMIN — LORAZEPAM 2 MG: 2 INJECTION INTRAMUSCULAR; INTRAVENOUS at 03:21

## 2023-04-22 RX ADMIN — FOLIC ACID 1 MG: 1 TABLET ORAL at 13:00

## 2023-04-22 RX ADMIN — ALPRAZOLAM 0.5 MG: 0.5 TABLET ORAL at 06:18

## 2023-04-22 RX ADMIN — METOPROLOL TARTRATE 25 MG: 25 TABLET, FILM COATED ORAL at 08:19

## 2023-04-22 RX ADMIN — SODIUM CHLORIDE: 9 INJECTION, SOLUTION INTRAVENOUS at 03:21

## 2023-04-22 RX ADMIN — MULTIPLE VITAMINS W/ MINERALS TAB 1 TABLET: TAB at 13:00

## 2023-04-22 RX ADMIN — THIAMINE HCL TAB 100 MG 100 MG: 100 TAB at 13:00

## 2023-04-22 RX ADMIN — PAROXETINE HYDROCHLORIDE 10 MG: 10 TABLET, FILM COATED ORAL at 13:00

## 2023-04-22 RX ADMIN — PAROXETINE HYDROCHLORIDE 20 MG: 20 TABLET, FILM COATED ORAL at 08:19

## 2023-04-22 ASSESSMENT — ACTIVITIES OF DAILY LIVING (ADL)
ADLS_ACUITY_SCORE: 31

## 2023-04-22 NOTE — PLAN OF CARE
Patient's After Visit Summary was reviewed with patient.   Patient verbalized understanding of After Visit Summary, recommended follow up and was given an opportunity to ask questions.   Discharge medications sent home with patient/family: YES, albuterol & gabapentin   Discharged with mother      Pt a&o x4. VSS, on ra. IV removed. Denies pain or discomfort. Belongings sent home with pt. Declined WC ride, walked down to front lobby with staff.     BP (!) 168/88 (BP Location: Right arm)   Pulse 78   Temp 97.9  F (36.6  C) (Oral)   Resp 16   Ht 1.829 m (6')   Wt 92.7 kg (204 lb 6.4 oz)   SpO2 100%   BMI 27.72 kg/m       OBSERVATION patient END time: 7038

## 2023-04-22 NOTE — PLAN OF CARE
"PRIMARY DIAGNOSIS: \"Anxiety & Neuropathy   OUTPATIENT/OBSERVATION GOALS TO BE MET BEFORE DISCHARGE:  1. ADLs back to baseline: Yes    2. Activity and level of assistance: Up with standby assistance.    3. Pain status: Pain free.    4. Return to near baseline physical activity: Yes     Discharge Planner Nurse   Safe discharge environment identified: Yes  Barriers to discharge: Yes       Entered by: Shelly Meeks RN 04/22/2023    Please review provider order for any additional goals.   Nurse to notify provider when observation goals have been met and patient is ready for discharge.    Pt A&O x4. Denies pain. On RA. IV SL. On regular diet, tolerating well. SBA in room. Tolerates medication well. Currently resting in room with eyes closed, call light within reach, able to make needs known.   "

## 2023-04-22 NOTE — PLAN OF CARE
PRIMARY DIAGNOSIS: Anxiety and Neuropathy   OUTPATIENT/OBSERVATION GOALS TO BE MET BEFORE DISCHARGE:  1. ADLs back to baseline: Yes    2. Activity and level of assistance: Up with standby assistance.    3. Pain status: Pain free.    4. Return to near baseline physical activity: Yes     Discharge Planner Nurse   Safe discharge environment identified: Yes  Barriers to discharge: Yes       Entered by: Ninfa Pierre RN 04/22/2023 12:30 AM     Please review provider order for any additional goals.   Nurse to notify provider when observation goals have been met and patient is ready for discharge.    Vitals are Temp: 98.3  F (36.8  C) Temp src: Oral BP: (!) 147/95 Pulse: 57   Resp: 16 SpO2: 97 %.  Patient is Alert and Oriented x4. They are SBA with no assistive devices . Pt is a Regular diet.  No c/o pain or discomfort.  Patient has Normal Saline 0.9% running at 125 mL per hour.

## 2023-04-22 NOTE — DISCHARGE SUMMARY
[facility]  Hospitalist Discharge Summary      Date of Admission:  4/21/2023  Date of Discharge:  4/22/2023  Discharging Provider: Silas Pugh MD  Discharge Service: Hospitalist Service    Discharge Diagnoses     Alcohol use disorder with dependence  Possible mild acute alcohol withdrawal  Acute bronchospasm  Tobacco use disorder  Cervical disk disease  Anxiety/Depression  Paroxysmal atrial fibrillationb  Hypertension   Elevated troponin without suspicion of ACS    Follow-ups Needed After Discharge   Follow-up Appointments     Follow-up and recommended labs and tests       Follow up with primary care provider, [facility], within   7-14 days for hospital follow- up.  No follow up labs or test are needed.               Unresulted Labs Ordered in the Past 30 Days of this Admission     No orders found for last 31 day(s).          Discharge Disposition   Discharged to home  Condition at discharge: Stable      Hospital Course     Georges Moreno is a 47 year old male with history of EtOH abuse, alcohol withdrawal, paroxysmal atrial fibrillation (not on chronic anticoagulation), anxiety, depression and anterior cervical C4-6 discectomy and fusion with decompression (1/12/2023). He presented to the ED on 4/21/2023 with vague symptoms of anxiety and neuropathy. He was admitted with concern for possible acute alcohol withdrawal. He had some prn ativan and felt better. He never really scored high enough to get symptom triggered benzodiazapine. He was started on neurontin for possible new peripheral neuropathy. He will discharge home today and follow up with primary care. Further evaluation or neurology referral for peripheral neuropathy can be considered at that time.           Consultations This Hospital Stay   None    Code Status   Full Code    Time Spent on this Encounter   I, Silas Pugh MD, personally saw the patient today and spent less than or equal to 30 minutes  discharging this patient.       Silas Pugh MD  [facility] OBSERVATION DEPT  [address]  [facility] [address]  Phone: [phone]  ______________________________________________________________________    Physical Exam   Vital Signs: Temp: 97.6  F (36.4  C) Temp src: Oral BP: (!) 165/69 Pulse: 75   Resp: 16 SpO2: 100 % O2 Device: None (Room air)    Weight: 204 lbs 6.4 oz  GENERAL:  Comfortable. Cooperative.  PSYCH: pleasant, oriented, No acute distress.  EYES: PERRLA, Normal conjunctiva.  HEART:  Regular rate and rhythm. No JVD. Pulses normal. No edema.  LUNGS:  Clear to auscultation, normal Respiratory effort.  ABDOMEN:  Soft, no hepatosplenomegaly, normal bowel sounds.  EXTREMETIES: No clubbing, cyanosis or ischemia  SKIN:  Dry to touch, No rash.         Primary Care Physician   [facility]    Discharge Orders      Reason for your hospital stay    Anxiety, concern for alcohol withdrawal, peripheral neuropathy     Follow-up and recommended labs and tests     Follow up with primary care provider, [facility], within 7-14 days for hospital follow- up.  No follow up labs or test are needed.     Activity    Your activity upon discharge: activity as tolerated     Diet    Follow this diet upon discharge: Regular       Significant Results and Procedures   Most Recent 3 CBC's:Recent Labs   Lab Test 04/21/23  0828 04/17/23  1202 12/13/22  1140   WBC 8.9 9.8 10.4   HGB 15.6 17.3 17.5   MCV 92 91 93    205 237     Most Recent 3 BMP's:Recent Labs   Lab Test 04/22/23  0539 04/21/23  0828 04/17/23  1202    140 135*   POTASSIUM 4.0 4.2 4.2   CHLORIDE 102 102 94*   CO2 28 20* 26   BUN 9.5 12.1 7.6   CR 0.83 0.70 0.71   ANIONGAP 8 18* 15   LEILA 8.4* 8.9 10.1*   * 110* 122*     Most Recent 2 LFT's:Recent Labs   Lab Test 04/21/23  0828 06/23/22  1406   AST 87* 26   ALT 92* 37   ALKPHOS 63 56   BILITOTAL 0.5 0.8   ,   Results for orders placed or  performed during the hospital encounter of 04/21/23   XR Chest 2 Views    Narrative    CHEST TWO VIEWS  4/21/2023 11:27 AM     HISTORY:  Chest pain. Shortness of breath.    COMPARISON: 4/29/2020.      Impression    IMPRESSION: A calcified nodule projected over the right midlung is new  since the previous exam. The lungs are otherwise clear. No pleural  effusions. No pneumothorax. Pulmonary vascularity is within normal  limits.    JENA AMEZQUITA MD         SYSTEM ID:  [id number]       Discharge Medications   Current Discharge Medication List      START taking these medications    Details   albuterol (PROAIR HFA/PROVENTIL HFA/VENTOLIN HFA) 108 (90 Base) MCG/ACT inhaler Inhale 2 puffs into the lungs every 6 hours as needed for shortness of breath, wheezing or cough  Qty: 18 g, Refills: 1    Comments: Pharmacy may dispense brand covered by insurance (Proair, or proventil or ventolin or generic albuterol inhaler)  Associated Diagnoses: Acute bronchospasm      gabapentin (NEURONTIN) 100 MG capsule Take 2 capsules (200 mg) by mouth 3 times daily  Qty: 180 capsule, Refills: 1    Associated Diagnoses: Idiopathic peripheral neuropathy         CONTINUE these medications which have CHANGED    Details   hydrOXYzine (ATARAX) 25 MG tablet Take 1 tablet (25 mg) by mouth 3 times daily as needed for anxiety    Associated Diagnoses: Anxiety         CONTINUE these medications which have NOT CHANGED    Details   metoprolol tartrate (LOPRESSOR) 25 MG tablet Take 1 tablet (25 mg) by mouth 2 times daily  Qty: 60 tablet, Refills: 1    Associated Diagnoses: Palpitations      PARoxetine (PAXIL) 20 MG tablet Take 1 tablet (20 mg) by mouth daily  Qty: 30 tablet, Refills: 0    Associated Diagnoses: Alcohol withdrawal with complication with inpatient treatment, with unspecified complication (H)         STOP taking these medications       amoxicillin-clavulanate (AUGMENTIN) 875-125 MG tablet Comments:   Reason for Stopping:         mupirocin  (BACTROBAN) 2 % external ointment Comments:   Reason for Stopping:             Allergies   Allergies   Allergen Reactions     Sertraline Other (See Comments)     Excessive fatigue  Reaction(s): Excessive fatigue.     Duloxetine Hives and Rash     Rash, hives

## 2023-04-22 NOTE — PLAN OF CARE
PRIMARY DIAGNOSIS: Anxiety & Neuropathy   OUTPATIENT/OBSERVATION GOALS TO BE MET BEFORE DISCHARGE:  1. ADLs back to baseline: Yes    2. Activity and level of assistance: Up with standby assistance.    3. Pain status: 2/10 pain in back due to bed per pt, repositioning improved pain     4. Return to near baseline physical activity: Yes     Discharge Planner Nurse   Safe discharge environment identified: Yes  Barriers to discharge: Yes       Entered by: Shelly Meeks RN 04/22/2023    Please review provider order for any additional goals.   Nurse to notify provider when observation goals have been met and patient is ready for discharge.    Pt a&o x4. IV infusing  ml/hr. On Ra. On regular diet. SBA in room. Patient currently resting in bed, call light within reach- able to make needs known.

## 2023-04-22 NOTE — PLAN OF CARE
PRIMARY DIAGNOSIS: Anxiety and Neuropathy   OUTPATIENT/OBSERVATION GOALS TO BE MET BEFORE DISCHARGE:  1. ADLs back to baseline: Yes    2. Activity and level of assistance: Up with standby assistance.    3. Pain status: Pain free.    4. Return to near baseline physical activity: Yes     Discharge Planner Nurse   Safe discharge environment identified: Yes  Barriers to discharge: Yes       Entered by: Ninfa Pierre RN 04/21/2023 9:45 PM     Please review provider order for any additional goals.   Nurse to notify provider when observation goals have been met and patient is ready for discharge.    Vitals are Temp: 98.2  F (36.8  C) Temp src: Oral BP: (!) 184/100 Pulse: 74   Resp: 18 SpO2: 98 %.  Patient is Alert and Oriented x4. They are SBA with no assistive devices . Pt is a Regular diet.  They are denying pain.  Patient has Normal Saline 0.9% running at 125 mL per hour. CIWA completed with score of 5. PRN Xanax administered. Wheezing noted to YANG, RUL, RML, and RLL. Denies SOB.

## 2023-04-22 NOTE — PLAN OF CARE
PRIMARY DIAGNOSIS: Anxiety and Neuropathy   OUTPATIENT/OBSERVATION GOALS TO BE MET BEFORE DISCHARGE:  1. ADLs back to baseline: Yes    2. Activity and level of assistance: Up with standby assistance.    3. Pain status: Pain free.    4. Return to near baseline physical activity: Yes     Discharge Planner Nurse   Safe discharge environment identified: Yes  Barriers to discharge: Yes       Entered by: Ninfa Pierre RN 04/22/2023 4:25 AM     Please review provider order for any additional goals.   Nurse to notify provider when observation goals have been met and patient is ready for discharge.    Vitals are Temp: 97.9  F (36.6  C) Temp src: Oral BP: (!) 173/95 Pulse: 65   Resp: 18 SpO2: 96 %.  Patient is Alert and Oriented x4. They are SBA with no assistive devices. Pt is a Regular diet.  C/o some chest tightness and increased anxiety. CIWA score of 5. Ativan administered. Patient resting in bed. Patient has Normal Saline 0.9% running at 125 mL per hour. Elevated BP, metoprolol given last evening. Continue to monitor BP.

## 2023-04-25 ENCOUNTER — PATIENT OUTREACH (OUTPATIENT)
Dept: CARE COORDINATION | Facility: CLINIC | Age: 48
End: 2023-04-25
Payer: MEDICAID

## 2023-04-25 NOTE — PROGRESS NOTES
Connected Care Resource Center Contact  [facility]/Voicemail     Clinical Data: Transitional Care Management Outreach     Outreach attempted x 2.  Left message on patient's voicemail, providing [facility]'s 24/7 scheduling and nurse triage phone number [phone] ([phone]) for questions/concerns and/or to schedule an appt with an [facility] provider, if they do not have a PCP.      Plan:  [facility] will do no further outreaches at this time.       Pat Bullock  Connected Care Resource Center, [facility]    *Connected Care Resource Team does NOT follow patient ongoing. Referrals are identified based on internal discharge reports and the outreach is to ensure patient has an understanding of their discharge instructions.

## 2023-05-06 NOTE — ED PROVIDER NOTES
History     Chief Complaint:  Anxiety      HPI   Georges Moreno is a 43 year old male with a history of depression, anxiety and pancreatitis who presents with anxiety. Per chart review the patient has been to the emergency department on 4/7 for heart palpations and on 12/20/2018 for alcohol dependence, he was monitored and discharged to home on both occasions. The patient reports that he gets lots of anxiety and that the last time he had a bout of anxiety as bad as this, he was diagnosed with having an episode of Afib. The patient says that he has experienced that more times in the past, but they usually resolve by themselves pretty quickly.The patient reports getting into an argument with a friend 2 days ago about money, and after the argument he was shaky, in tears, unable to calm down, and reports not feeling normal or sleeping well since then. The patient endorses the use of alcohol and marijuana in order to calm down, however they did not work. The patient says that he has been given Celexa and Lexapro before for his anxiety to no effect.  He reports that Ativan did help calm him down, although notes that he was weaned from this medication months ago, and has not had it refilled since that time. The patient does reports a decrease in appetite, palpations, chest tightness, and shakiness in his right leg. The patient denies any thoughts of self harm or suicide. Of note, the patient says that he is seeing a new psychologist at the [facility], he also says that a cause for his anxiety may also be waiting for the results of a colonoscopy he had last week.    Allergies:  No Known Drug Allergies    Medications:    Tambocor  Neurontin  Ativan  Seroquel     Past Medical History:    Depression  Generalized anxiety disorder  Headache  Pancreatitis   Paroxysmal atrial fibrillation  Personal history of alcoholism  Substance abuse  Tobacco dependence   Thoracic or lumbosacral neuritis or radiculitis  Alcohol  dependence    Past Surgical History:    Excision of pilonidal cyst  Removal of rectal cyst    Family History:    Father: alcohol/drug, diabetes, leukemia  Paternal uncle: alcohol/drud       Social History:  The patient was accompanied to the ED by mother.  Smoking Status: Current Smoker  Smokeless Tobacco: Never Used  Alcohol Use: Positive  Drug Use: Positive   Types: marijuana  PCP: Sarah Mccormack  Marital Status:  Single     Review of Systems   Constitutional: Positive for appetite change.   Respiratory: Positive for chest tightness.    Cardiovascular: Positive for palpitations.   Neurological: Positive for tremors.   Psychiatric/Behavioral: Negative for self-injury and suicidal ideas. The patient is nervous/anxious.    All other systems reviewed and are negative.    Physical Exam     Patient Vitals for the past 24 hrs:   BP Temp Temp src Pulse Resp SpO2 Weight   05/08/19 0515 (!) 140/93 -- -- 77 -- 96 % --   05/08/19 0500 (!) 132/114 -- -- 94 -- 96 % --   05/08/19 0445 (!) 144/95 -- -- 100 -- 97 % --   05/08/19 0430 148/84 -- -- 73 -- 97 % --   05/08/19 0415 146/83 -- -- 80 -- 95 % --   05/08/19 0400 (!) 143/93 -- -- 89 -- 95 % --   05/08/19 0345 146/88 -- -- 78 -- 95 % --   05/08/19 0330 156/88 -- -- 71 -- 95 % --   05/08/19 0315 (!) 129/99 -- -- 76 -- 96 % --   05/08/19 0300 (!) 125/94 -- -- 73 -- 98 % --   05/08/19 0245 133/84 -- -- -- -- -- --   05/08/19 0142 (!) 154/107 98.9  F (37.2  C) Temporal 70 16 97 % 99.8 kg (220 lb)     Physical Exam  General:              Well-nourished              Speaking in full sentences              Not clinically intoxicated  Eyes:              Conjunctiva without injection or scleral icterus  ENT:              Moist mucous membranes              Nares patent              Pinnae normal  Neck:              Full ROM              No stiffness appreciated  Resp:              Lungs CTAB              No crackles, wheezing or audible rubs              Good air movement  CV:                     Normal rate, regular rhythm              S1 and S2 present              No murmur, gallop or rub  GI:              BS present              Abdomen soft without distention              Non-tender to light and deep palpation              No guarding or rebound tenderness  Skin:              Warm, dry, well perfused              No rashes or open wounds on exposed skin  MSK:              Moves all extremities              No focal deformities or swelling  Neuro:              Alert              Answers questions appropriately              Moves all extremities equally              Gait stable  Psych:              Normal affect, normal mood              Anxious appearing              Denies SI or HI      Emergency Department Course     ECG:  ECG taken at 0131, ECG read at 0304  Sinus rhythm with marked sinus arrhythmia  Nonspecific T wave abnormality  Abnormal ECG  Rate 70 bpm. WI interval 170 ms. QRS duration 10 ms. QT/QTc 402/434 ms. P-R-T axes 67 83 59.    Laboratory:  Laboratory findings were communicated with the patient who voiced understanding of the findings.    CBC: WBC 8.5, HGB 16.6,   Troponin (Collected 0248): <0.015  Lipase: 190  CMP: glucose 106 (H) o/w WNL (Creatinine 0.83)    Interventions:  0248 Ativan 2 mg Oral    Emergency Department Course:    0131 EKG obtained as noted above.    0204 Nursing notes and vitals reviewed.    0221 I performed an exam of the patient as documented above.     0248 IV was inserted and blood was drawn for laboratory testing, results above.    0432 I spoke with Caitlyn of the DEC service regarding patient's presentation, findings, and plan of care.    0534 Patient rechecked and updated.     0607 I personally reviewed the laboratory results with the patient and answered all related questions prior to discharge.    Impression & Plan      Medical Decision Making:  Georges Moreno is a 43 year old male who presents to the emergency department today for evaluation of anxiety.   VS on presentation reveal elevated BP which improved during patient's emergency department course, and otherwise are unremarkable.  Presently, patient symptoms is most likely related to worsened anxiety.  He acknowledges potential triggers including a recent argument with a close friend regarding money, concern over whether or not his anxiety is related to underlying atrial fibrillation, and is anxious regarding the results of a recent colonoscopy.  At present, I do not appreciate an acute organic etiology to explain the patient's symptoms.  Specifically, EKG demonstrates sinus rhythm without evidence of recurrent atrial fibrillation.  Current presentation is not felt consistent with ACS and troponin is undetectable.  He does report consumption of alcohol prep to assist with anxiety symptoms and has a history of pancreatitis, the lipase presently normal.  Labs as above otherwise are grossly unremarkable.  Here in the ED, he was provided 1 dose of oral Ativan.  On reassessment, he appeared to be resting comfortably on the gurney.  He has already met with a therapist and a psychiatrist, and in fact has an appointment later this morning at 10 AM.  I discussed the case with DEC, who have seen and evaluated the patient.  Please refer to their associated documentation.  They have provided resources including crisis phone numbers should he developed acute worsening of his anxiety.  Patient is not suicidal and is not felt to require inpatient hospitalization.  Patient does appear fixated on a prescription for Ativan.  I discussed with him at length that I do not feel this is appropriate long-term management of anxiety.  I discussed with the patient it is a multimodal approach including both behavioral therapies as well as consideration of alternative medications.  I recommended he continue with his appointment scheduled for later today.  He is certainly welcome to return to the ER in the meantime with any new or troubling  symptoms.    Diagnosis:    ICD-10-CM    1. Acute reaction to stress F43.0      Disposition:   The patient is discharged to home.    Discharge Medications:  The patient is discharged without medications.      Scribe Disclosure:  I, Minh Weller, am serving as a scribe at 2:11 AM on 5/8/2019 to document services personally performed by Radames Kingsley MD based on my observations and the provider's statements to me.    [facility] EMERGENCY DEPARTMENT       Radames Kingsley MD  05/08/19 0701     Surgery

## 2023-05-22 ENCOUNTER — NURSE TRIAGE (OUTPATIENT)
Dept: NURSING | Facility: CLINIC | Age: 48
End: 2023-05-22
Payer: MEDICAID

## 2023-05-22 NOTE — TELEPHONE ENCOUNTER
Pt is phoning stating that he has an abscess in his nose that is very painful     Pt states that he has had white pus coming out of nose at times     Rates pain 6/10    No fever     Per disposition: Go To [facility]    Transferred to scheduling and if no appointments available, [place] [facility] [place]     Care advice given per protocol and when to call back. Pt verbalized understanding and agrees to plan of care.    Anamaria Catherine RN  [facility] Nurse Advisor  12:30 PM 5/22/2023        Reason for Disposition    SEVERE sinus pain    Additional Information    Negative: Sounds like a life-threatening emergency to the triager    Negative: Difficulty breathing, and not from stuffy nose (e.g., not relieved by cleaning out the nose)    Negative: SEVERE headache and has fever    Negative: Patient sounds very sick or weak to the triager    Protocols used: SINUS PAIN OR CONGESTION-A-OH

## 2023-08-26 ENCOUNTER — HOSPITAL ENCOUNTER (OUTPATIENT)
Facility: CLINIC | Age: 48
Setting detail: OBSERVATION
Discharge: HOME | End: 2023-08-27
Attending: EMERGENCY MEDICINE | Admitting: INTERNAL MEDICINE
Payer: COMMERCIAL

## 2023-08-26 DIAGNOSIS — F10.10 ALCOHOL ABUSE: Primary | ICD-10-CM

## 2023-08-26 DIAGNOSIS — F10.939 ALCOHOL WITHDRAWAL WITH COMPLICATION WITH INPATIENT TREATMENT, WITH UNSPECIFIED COMPLICATION (H): ICD-10-CM

## 2023-08-26 DIAGNOSIS — F10.930 ALCOHOL WITHDRAWAL SYNDROME WITHOUT COMPLICATION (H): ICD-10-CM

## 2023-08-26 DIAGNOSIS — R79.89 ELEVATED LACTIC ACID LEVEL: ICD-10-CM

## 2023-08-26 LAB
ALBUMIN SERPL BCG-MCNC: 4.5 G/DL (ref 3.5–5.2)
ALP SERPL-CCNC: 58 U/L (ref 40–129)
ALT SERPL W P-5'-P-CCNC: 23 U/L (ref 0–70)
ANION GAP SERPL CALCULATED.3IONS-SCNC: 20 MMOL/L (ref 7–15)
AST SERPL W P-5'-P-CCNC: 36 U/L (ref 0–45)
B-OH-BUTYR SERPL-SCNC: <0.18 MMOL/L
BASOPHILS # BLD AUTO: 0 10E3/UL (ref 0–0.2)
BASOPHILS NFR BLD AUTO: 0 %
BILIRUB SERPL-MCNC: 0.4 MG/DL
BUN SERPL-MCNC: 11.8 MG/DL (ref 6–20)
CALCIUM SERPL-MCNC: 9.2 MG/DL (ref 8.6–10)
CHLORIDE SERPL-SCNC: 96 MMOL/L (ref 98–107)
CREAT SERPL-MCNC: 0.74 MG/DL (ref 0.67–1.17)
DEPRECATED HCO3 PLAS-SCNC: 20 MMOL/L (ref 22–29)
EOSINOPHIL # BLD AUTO: 0 10E3/UL (ref 0–0.7)
EOSINOPHIL NFR BLD AUTO: 0 %
ERYTHROCYTE [DISTWIDTH] IN BLOOD BY AUTOMATED COUNT: 12.1 % (ref 10–15)
ETHANOL SERPL-MCNC: 0.15 G/DL
GFR SERPL CREATININE-BSD FRML MDRD: >90 ML/MIN/1.73M2
GLUCOSE SERPL-MCNC: 121 MG/DL (ref 70–99)
HCT VFR BLD AUTO: 44.1 % (ref 40–53)
HGB BLD-MCNC: 15.8 G/DL (ref 13.3–17.7)
IMM GRANULOCYTES # BLD: 0 10E3/UL
IMM GRANULOCYTES NFR BLD: 0 %
LACTATE SERPL-SCNC: 1.1 MMOL/L (ref 0.7–2)
LACTATE SERPL-SCNC: 4.2 MMOL/L (ref 0.7–2)
LYMPHOCYTES # BLD AUTO: 1.8 10E3/UL (ref 0.8–5.3)
LYMPHOCYTES NFR BLD AUTO: 18 %
MAGNESIUM SERPL-MCNC: 2.1 MG/DL (ref 1.7–2.3)
MCH RBC QN AUTO: 32.2 PG (ref 26.5–33)
MCHC RBC AUTO-ENTMCNC: 35.8 G/DL (ref 31.5–36.5)
MCV RBC AUTO: 90 FL (ref 78–100)
MONOCYTES # BLD AUTO: 0.7 10E3/UL (ref 0–1.3)
MONOCYTES NFR BLD AUTO: 7 %
NEUTROPHILS # BLD AUTO: 7.2 10E3/UL (ref 1.6–8.3)
NEUTROPHILS NFR BLD AUTO: 75 %
NRBC # BLD AUTO: 0 10E3/UL
NRBC BLD AUTO-RTO: 0 /100
PHOSPHATE SERPL-MCNC: 2.5 MG/DL (ref 2.5–4.5)
PLATELET # BLD AUTO: 226 10E3/UL (ref 150–450)
POTASSIUM SERPL-SCNC: 3.8 MMOL/L (ref 3.4–5.3)
PROT SERPL-MCNC: 7.2 G/DL (ref 6.4–8.3)
RBC # BLD AUTO: 4.9 10E6/UL (ref 4.4–5.9)
SODIUM SERPL-SCNC: 136 MMOL/L (ref 136–145)
WBC # BLD AUTO: 9.7 10E3/UL (ref 4–11)

## 2023-08-26 PROCEDURE — 258N000003 HC RX IP 258 OP 636: Performed by: INTERNAL MEDICINE

## 2023-08-26 PROCEDURE — 83605 ASSAY OF LACTIC ACID: CPT | Performed by: EMERGENCY MEDICINE

## 2023-08-26 PROCEDURE — 96375 TX/PRO/DX INJ NEW DRUG ADDON: CPT

## 2023-08-26 PROCEDURE — 250N000011 HC RX IP 250 OP 636: Mod: JZ | Performed by: INTERNAL MEDICINE

## 2023-08-26 PROCEDURE — 120N000001 HC R&B MED SURG/OB

## 2023-08-26 PROCEDURE — 82010 KETONE BODYS QUAN: CPT | Performed by: EMERGENCY MEDICINE

## 2023-08-26 PROCEDURE — 250N000013 HC RX MED GY IP 250 OP 250 PS 637: Performed by: EMERGENCY MEDICINE

## 2023-08-26 PROCEDURE — 82077 ASSAY SPEC XCP UR&BREATH IA: CPT | Performed by: EMERGENCY MEDICINE

## 2023-08-26 PROCEDURE — 258N000003 HC RX IP 258 OP 636: Performed by: EMERGENCY MEDICINE

## 2023-08-26 PROCEDURE — 96361 HYDRATE IV INFUSION ADD-ON: CPT

## 2023-08-26 PROCEDURE — 250N000011 HC RX IP 250 OP 636: Performed by: EMERGENCY MEDICINE

## 2023-08-26 PROCEDURE — 83735 ASSAY OF MAGNESIUM: CPT | Performed by: EMERGENCY MEDICINE

## 2023-08-26 PROCEDURE — 96374 THER/PROPH/DIAG INJ IV PUSH: CPT

## 2023-08-26 PROCEDURE — 93005 ELECTROCARDIOGRAM TRACING: CPT

## 2023-08-26 PROCEDURE — 99285 EMERGENCY DEPT VISIT HI MDM: CPT | Mod: 25

## 2023-08-26 PROCEDURE — 83605 ASSAY OF LACTIC ACID: CPT | Performed by: INTERNAL MEDICINE

## 2023-08-26 PROCEDURE — 99222 1ST HOSP IP/OBS MODERATE 55: CPT | Performed by: INTERNAL MEDICINE

## 2023-08-26 PROCEDURE — 85025 COMPLETE CBC W/AUTO DIFF WBC: CPT | Performed by: EMERGENCY MEDICINE

## 2023-08-26 PROCEDURE — 250N000013 HC RX MED GY IP 250 OP 250 PS 637: Performed by: INTERNAL MEDICINE

## 2023-08-26 PROCEDURE — 80053 COMPREHEN METABOLIC PANEL: CPT | Performed by: EMERGENCY MEDICINE

## 2023-08-26 PROCEDURE — 36415 COLL VENOUS BLD VENIPUNCTURE: CPT | Performed by: INTERNAL MEDICINE

## 2023-08-26 PROCEDURE — 84100 ASSAY OF PHOSPHORUS: CPT | Performed by: EMERGENCY MEDICINE

## 2023-08-26 PROCEDURE — 36415 COLL VENOUS BLD VENIPUNCTURE: CPT | Performed by: EMERGENCY MEDICINE

## 2023-08-26 RX ORDER — THIAMINE HYDROCHLORIDE 100 MG/ML
100 INJECTION, SOLUTION INTRAMUSCULAR; INTRAVENOUS ONCE
Status: COMPLETED | OUTPATIENT
Start: 2023-08-26 | End: 2023-08-26

## 2023-08-26 RX ORDER — PROCHLORPERAZINE 25 MG
25 SUPPOSITORY, RECTAL RECTAL EVERY 12 HOURS PRN
Status: DISCONTINUED | OUTPATIENT
Start: 2023-08-26 | End: 2023-08-27 | Stop reason: HOSPADM

## 2023-08-26 RX ORDER — HALOPERIDOL 5 MG/ML
2.5-5 INJECTION INTRAMUSCULAR EVERY 6 HOURS PRN
Status: DISCONTINUED | OUTPATIENT
Start: 2023-08-26 | End: 2023-08-27 | Stop reason: HOSPADM

## 2023-08-26 RX ORDER — PAROXETINE 20 MG/1
20 TABLET, FILM COATED ORAL DAILY
Status: DISCONTINUED | OUTPATIENT
Start: 2023-08-27 | End: 2023-08-27 | Stop reason: HOSPADM

## 2023-08-26 RX ORDER — FOLIC ACID 1 MG/1
1 TABLET ORAL DAILY
Status: DISCONTINUED | OUTPATIENT
Start: 2023-08-27 | End: 2023-08-26

## 2023-08-26 RX ORDER — DIAZEPAM 10 MG/2ML
5-10 INJECTION, SOLUTION INTRAMUSCULAR; INTRAVENOUS EVERY 30 MIN PRN
Status: DISCONTINUED | OUTPATIENT
Start: 2023-08-26 | End: 2023-08-27 | Stop reason: HOSPADM

## 2023-08-26 RX ORDER — FOLIC ACID 5 MG/ML
1 INJECTION, SOLUTION INTRAMUSCULAR; INTRAVENOUS; SUBCUTANEOUS ONCE
Status: COMPLETED | OUTPATIENT
Start: 2023-08-26 | End: 2023-08-26

## 2023-08-26 RX ORDER — GABAPENTIN 300 MG/1
900 CAPSULE ORAL EVERY 8 HOURS
Status: DISCONTINUED | OUTPATIENT
Start: 2023-08-27 | End: 2023-08-26

## 2023-08-26 RX ORDER — HYDRALAZINE HYDROCHLORIDE 20 MG/ML
5 INJECTION INTRAMUSCULAR; INTRAVENOUS EVERY 4 HOURS PRN
Status: DISCONTINUED | OUTPATIENT
Start: 2023-08-26 | End: 2023-08-27 | Stop reason: HOSPADM

## 2023-08-26 RX ORDER — ACETAMINOPHEN 325 MG/1
650 TABLET ORAL EVERY 6 HOURS PRN
COMMUNITY
End: 2024-09-09

## 2023-08-26 RX ORDER — ACETAMINOPHEN 650 MG/1
650 SUPPOSITORY RECTAL EVERY 6 HOURS PRN
Status: DISCONTINUED | OUTPATIENT
Start: 2023-08-26 | End: 2023-08-27 | Stop reason: HOSPADM

## 2023-08-26 RX ORDER — MULTIPLE VITAMINS W/ MINERALS TAB 9MG-400MCG
1 TAB ORAL DAILY
Status: DISCONTINUED | OUTPATIENT
Start: 2023-08-26 | End: 2023-08-27 | Stop reason: HOSPADM

## 2023-08-26 RX ORDER — GABAPENTIN 100 MG/1
300 CAPSULE ORAL EVERY 8 HOURS
Status: DISCONTINUED | OUTPATIENT
Start: 2023-09-01 | End: 2023-08-26

## 2023-08-26 RX ORDER — FLUMAZENIL 0.1 MG/ML
0.2 INJECTION, SOLUTION INTRAVENOUS
Status: DISCONTINUED | OUTPATIENT
Start: 2023-08-26 | End: 2023-08-27 | Stop reason: HOSPADM

## 2023-08-26 RX ORDER — ONDANSETRON 4 MG/1
4 TABLET, ORALLY DISINTEGRATING ORAL EVERY 6 HOURS PRN
Status: DISCONTINUED | OUTPATIENT
Start: 2023-08-26 | End: 2023-08-27 | Stop reason: HOSPADM

## 2023-08-26 RX ORDER — PROCHLORPERAZINE MALEATE 10 MG
10 TABLET ORAL EVERY 6 HOURS PRN
Status: DISCONTINUED | OUTPATIENT
Start: 2023-08-26 | End: 2023-08-27 | Stop reason: HOSPADM

## 2023-08-26 RX ORDER — AMOXICILLIN 250 MG
1 CAPSULE ORAL 2 TIMES DAILY PRN
Status: DISCONTINUED | OUTPATIENT
Start: 2023-08-26 | End: 2023-08-27 | Stop reason: HOSPADM

## 2023-08-26 RX ORDER — OMEPRAZOLE 20 MG/1
20 TABLET, DELAYED RELEASE ORAL
COMMUNITY
End: 2023-08-26

## 2023-08-26 RX ORDER — GABAPENTIN 300 MG/1
300 CAPSULE ORAL EVERY 8 HOURS
Status: DISCONTINUED | OUTPATIENT
Start: 2023-09-01 | End: 2023-08-27 | Stop reason: HOSPADM

## 2023-08-26 RX ORDER — PHENOBARBITAL SODIUM 130 MG/ML
130 INJECTION, SOLUTION INTRAMUSCULAR; INTRAVENOUS ONCE
Status: COMPLETED | OUTPATIENT
Start: 2023-08-26 | End: 2023-08-26

## 2023-08-26 RX ORDER — OLANZAPINE 5 MG/1
5-10 TABLET, ORALLY DISINTEGRATING ORAL EVERY 6 HOURS PRN
Status: DISCONTINUED | OUTPATIENT
Start: 2023-08-26 | End: 2023-08-27 | Stop reason: HOSPADM

## 2023-08-26 RX ORDER — DIAZEPAM 10 MG
10 TABLET ORAL EVERY 30 MIN PRN
Status: DISCONTINUED | OUTPATIENT
Start: 2023-08-26 | End: 2023-08-27 | Stop reason: HOSPADM

## 2023-08-26 RX ORDER — GABAPENTIN 300 MG/1
600 CAPSULE ORAL EVERY 8 HOURS
Status: DISCONTINUED | OUTPATIENT
Start: 2023-08-30 | End: 2023-08-26

## 2023-08-26 RX ORDER — GABAPENTIN 100 MG/1
100 CAPSULE ORAL EVERY 8 HOURS
Status: DISCONTINUED | OUTPATIENT
Start: 2023-09-03 | End: 2023-08-26

## 2023-08-26 RX ORDER — GABAPENTIN 600 MG/1
1200 TABLET ORAL ONCE
Status: COMPLETED | OUTPATIENT
Start: 2023-08-26 | End: 2023-08-26

## 2023-08-26 RX ORDER — SODIUM CHLORIDE, SODIUM LACTATE, POTASSIUM CHLORIDE, CALCIUM CHLORIDE 600; 310; 30; 20 MG/100ML; MG/100ML; MG/100ML; MG/100ML
INJECTION, SOLUTION INTRAVENOUS CONTINUOUS
Status: DISCONTINUED | OUTPATIENT
Start: 2023-08-26 | End: 2023-08-27

## 2023-08-26 RX ORDER — METOPROLOL TARTRATE 25 MG/1
25 TABLET, FILM COATED ORAL 2 TIMES DAILY
Status: DISCONTINUED | OUTPATIENT
Start: 2023-08-26 | End: 2023-08-27 | Stop reason: HOSPADM

## 2023-08-26 RX ORDER — ONDANSETRON 2 MG/ML
4 INJECTION INTRAMUSCULAR; INTRAVENOUS EVERY 6 HOURS PRN
Status: DISCONTINUED | OUTPATIENT
Start: 2023-08-26 | End: 2023-08-27 | Stop reason: HOSPADM

## 2023-08-26 RX ORDER — AMOXICILLIN 250 MG
2 CAPSULE ORAL 2 TIMES DAILY PRN
Status: DISCONTINUED | OUTPATIENT
Start: 2023-08-26 | End: 2023-08-27 | Stop reason: HOSPADM

## 2023-08-26 RX ORDER — CLONIDINE HYDROCHLORIDE 0.1 MG/1
0.1 TABLET ORAL EVERY 8 HOURS
Status: DISCONTINUED | OUTPATIENT
Start: 2023-08-26 | End: 2023-08-27 | Stop reason: HOSPADM

## 2023-08-26 RX ORDER — GABAPENTIN 300 MG/1
600 CAPSULE ORAL EVERY 8 HOURS
Status: DISCONTINUED | OUTPATIENT
Start: 2023-08-30 | End: 2023-08-27 | Stop reason: HOSPADM

## 2023-08-26 RX ORDER — FOLIC ACID 1 MG/1
1 TABLET ORAL DAILY
Status: DISCONTINUED | OUTPATIENT
Start: 2023-08-27 | End: 2023-08-27 | Stop reason: HOSPADM

## 2023-08-26 RX ORDER — MULTIPLE VITAMINS W/ MINERALS TAB 9MG-400MCG
1 TAB ORAL DAILY
Status: DISCONTINUED | OUTPATIENT
Start: 2023-08-26 | End: 2023-08-26

## 2023-08-26 RX ORDER — ACETAMINOPHEN 325 MG/1
650 TABLET ORAL EVERY 6 HOURS PRN
Status: DISCONTINUED | OUTPATIENT
Start: 2023-08-26 | End: 2023-08-27 | Stop reason: HOSPADM

## 2023-08-26 RX ORDER — LIDOCAINE 40 MG/G
CREAM TOPICAL
Status: DISCONTINUED | OUTPATIENT
Start: 2023-08-26 | End: 2023-08-27 | Stop reason: HOSPADM

## 2023-08-26 RX ORDER — GABAPENTIN 300 MG/1
900 CAPSULE ORAL EVERY 8 HOURS
Status: DISCONTINUED | OUTPATIENT
Start: 2023-08-27 | End: 2023-08-27 | Stop reason: HOSPADM

## 2023-08-26 RX ORDER — DIAZEPAM 10 MG/2ML
10 INJECTION, SOLUTION INTRAMUSCULAR; INTRAVENOUS ONCE
Status: COMPLETED | OUTPATIENT
Start: 2023-08-26 | End: 2023-08-26

## 2023-08-26 RX ORDER — GABAPENTIN 100 MG/1
100 CAPSULE ORAL EVERY 8 HOURS
Status: DISCONTINUED | OUTPATIENT
Start: 2023-09-03 | End: 2023-08-27 | Stop reason: HOSPADM

## 2023-08-26 RX ADMIN — SODIUM CHLORIDE, POTASSIUM CHLORIDE, SODIUM LACTATE AND CALCIUM CHLORIDE: 600; 310; 30; 20 INJECTION, SOLUTION INTRAVENOUS at 19:15

## 2023-08-26 RX ADMIN — FOLIC ACID 1 MG: 5 INJECTION, SOLUTION INTRAMUSCULAR; INTRAVENOUS; SUBCUTANEOUS at 16:44

## 2023-08-26 RX ADMIN — DIAZEPAM 10 MG: 5 INJECTION INTRAMUSCULAR; INTRAVENOUS at 15:17

## 2023-08-26 RX ADMIN — DIAZEPAM 10 MG: 10 TABLET ORAL at 19:06

## 2023-08-26 RX ADMIN — SODIUM CHLORIDE, POTASSIUM CHLORIDE, SODIUM LACTATE AND CALCIUM CHLORIDE 1000 ML: 600; 310; 30; 20 INJECTION, SOLUTION INTRAVENOUS at 15:18

## 2023-08-26 RX ADMIN — DIAZEPAM 10 MG: 10 TABLET ORAL at 23:53

## 2023-08-26 RX ADMIN — GABAPENTIN 900 MG: 300 CAPSULE ORAL at 23:53

## 2023-08-26 RX ADMIN — THIAMINE HYDROCHLORIDE 100 MG: 100 INJECTION, SOLUTION INTRAMUSCULAR; INTRAVENOUS at 16:44

## 2023-08-26 RX ADMIN — PHENOBARBITAL SODIUM 130 MG: 130 INJECTION INTRAMUSCULAR; INTRAVENOUS at 16:44

## 2023-08-26 RX ADMIN — MULTIPLE VITAMINS W/ MINERALS TAB 1 TABLET: TAB at 16:43

## 2023-08-26 RX ADMIN — CLONIDINE HYDROCHLORIDE 0.1 MG: 0.1 TABLET ORAL at 16:43

## 2023-08-26 RX ADMIN — GABAPENTIN 1200 MG: 600 TABLET, FILM COATED ORAL at 16:43

## 2023-08-26 RX ADMIN — METOPROLOL TARTRATE 25 MG: 25 TABLET, FILM COATED ORAL at 19:06

## 2023-08-26 RX ADMIN — CLONIDINE HYDROCHLORIDE 0.1 MG: 0.1 TABLET ORAL at 23:53

## 2023-08-26 RX ADMIN — FAMOTIDINE 20 MG: 10 INJECTION, SOLUTION INTRAVENOUS at 19:18

## 2023-08-26 RX ADMIN — DIAZEPAM 10 MG: 10 TABLET ORAL at 21:37

## 2023-08-26 ASSESSMENT — ACTIVITIES OF DAILY LIVING (ADL)
NUMBER_OF_TIMES_PATIENT_HAS_FALLEN_WITHIN_LAST_SIX_MONTHS: 2
CHANGE_IN_FUNCTIONAL_STATUS_SINCE_ONSET_OF_CURRENT_ILLNESS/INJURY: NO
DRESS: 0-->INDEPENDENT
DRESSING/BATHING_DIFFICULTY: YES
WALKING_OR_CLIMBING_STAIRS_DIFFICULTY: YES
TRANSFERRING: 0-->INDEPENDENT
ADLS_ACUITY_SCORE: 26
DRESSING/BATHING: DRESSING DIFFICULTY, REQUIRES EQUIPMENT
DRESS: 0-->INDEPENDENT
ADLS_ACUITY_SCORE: 37
DIFFICULTY_EATING/SWALLOWING: NO
DOING_ERRANDS_INDEPENDENTLY_DIFFICULTY: NO
BATHING: 0-->INDEPENDENT
ADLS_ACUITY_SCORE: 37
WALKING_OR_CLIMBING_STAIRS: STAIR CLIMBING DIFFICULTY, REQUIRES EQUIPMENT
TOILETING_ISSUES: NO
ADLS_ACUITY_SCORE: 26
VISION_MANAGEMENT: READING
CONCENTRATING,_REMEMBERING_OR_MAKING_DECISIONS_DIFFICULTY: YES
FALL_HISTORY_WITHIN_LAST_SIX_MONTHS: YES
WEAR_GLASSES_OR_BLIND: YES
ADLS_ACUITY_SCORE: 37
TRANSFERRING: 0-->INDEPENDENT

## 2023-08-26 NOTE — ED NOTES
"[facility]  ED Nurse Handoff Report    ED Chief complaint: Alcohol Problem, Shortness of Breath, and Insomnia  . ED Diagnosis:   Final diagnoses:   Alcohol withdrawal syndrome without complication (H)   Elevated lactic acid level       Allergies:   Allergies   Allergen Reactions    Sertraline Other (See Comments)     Excessive fatigue  Reaction(s): Excessive fatigue.    Duloxetine Hives and Rash     Rash, hives       Code Status: Full Code    Activity level - Baseline/Home:  independent.  Activity Level - Current:   independent.   Lift room needed: No.   Bariatric: No   Needed: No   Isolation: No.   Infection: Not Applicable.     Respiratory status: Room air    Vital Signs (within 30 minutes):   Vitals:    08/26/23 1559 08/26/23 1603 08/26/23 1629 08/26/23 1659   BP: (!) 149/96  (!) 174/96    Pulse: 103  91 92   Resp:       Temp:       TempSrc:       SpO2: 96% 96% 95% 96%       Cardiac Rhythm:  ,   Cardiac  Cardiac Rhythm: Normal sinus rhythm  Pain level:    Patient confused: No.   Patient Falls Risk: nonskid shoes/slippers when out of bed.   Elimination Status: Has voided     Patient Report - Initial Complaint: Georges Moreno is a 47 year old male with history of polysubstance dependence and atrial fibrillation  who presents with alcohol withdrawal. Patient states he had a few drinks this morning. Georges has been in treatment 6 times. He mentions not having anything to eat for the past 3-4 days because \"[he] could not keep anything down\". Denies having seizures due to withdrawal. He reports being hospitalized in [facility] 3 months ago for similar symptoms. Georges is taking Paxil and Lopresor regularly.  .   Focused Assessment: Behavioral HealthGeneral Appearance WDL: .WDL except; appearanceGeneral Appearance: unclean  Behavior WDLBehavior WDL: WDL  Emotion Mood WDLEmotion/Mood/Affect WDL: .WDL except; allEmotion/Mood: anxious; sad  Speech WDLSpeech WDL: WDL  Perceptual State " WDLPerceptual State WDL: WDL  Thought Process WDLThought Process WDL: WDL   Cardiac (Adult)Cardiac WDL: WDLCardiac Rhythm: NSR   Neuro Cognitive (Adult)Cognitive/Neuro/Behavioral WDL: WDL (pt. appears anxious.)  Jakub Coma ScaleBest Eye Response: 4-->(E4) spontaneousBest Motor Response: 6-->(M6) obeys commandsBest Verbal Response: 5-->(V5) orientedGlasgow Coma Scale Score: 15  Abnormal Results:   Labs Ordered and Resulted from Time of ED Arrival to Time of ED Departure   COMPREHENSIVE METABOLIC PANEL - Abnormal       Result Value    Sodium 136      Potassium 3.8      Chloride 96 (*)     Carbon Dioxide (CO2) 20 (*)     Anion Gap 20 (*)     Urea Nitrogen 11.8      Creatinine 0.74      Calcium 9.2      Glucose 121 (*)     Alkaline Phosphatase 58      AST 36      ALT 23      Protein Total 7.2      Albumin 4.5      Bilirubin Total 0.4      GFR Estimate >90     LACTIC ACID WHOLE BLOOD - Abnormal    Lactic Acid 4.2 (*)    ETHYL ALCOHOL LEVEL - Abnormal    Alcohol ethyl 0.15 (*)    KETONE BETA-HYDROXYBUTYRATE QUANTITATIVE, RAPID - Normal    Ketone (Beta-Hydroxybutyrate) Quantitative <0.18     MAGNESIUM - Normal    Magnesium 2.1     PHOSPHORUS - Normal    Phosphorus 2.5     CBC WITH PLATELETS AND DIFFERENTIAL    WBC Count 9.7      RBC Count 4.90      Hemoglobin 15.8      Hematocrit 44.1      MCV 90      MCH 32.2      MCHC 35.8      RDW 12.1      Platelet Count 226      % Neutrophils 75      % Lymphocytes 18      % Monocytes 7      % Eosinophils 0      % Basophils 0      % Immature Granulocytes 0      NRBCs per 100 WBC 0      Absolute Neutrophils 7.2      Absolute Lymphocytes 1.8      Absolute Monocytes 0.7      Absolute Eosinophils 0.0      Absolute Basophils 0.0      Absolute Immature Granulocytes 0.0      Absolute NRBCs 0.0          No orders to display       Treatments provided: labs, see MAR  Family Comments: mom went home, will be back later  OBS brochure/video discussed/provided to patient:  N/A  ED Medications:    Medications   cloNIDine (CATAPRES) tablet 0.1 mg (0.1 mg Oral $Given 8/26/23 1643)   OLANZapine zydis (zyPREXA) ODT tab 5-10 mg (has no administration in time range)     Or   haloperidol lactate (HALDOL) injection 2.5-5 mg (has no administration in time range)   flumazenil (ROMAZICON) injection 0.2 mg (has no administration in time range)   melatonin tablet 5 mg (has no administration in time range)   gabapentin (NEURONTIN) capsule 900 mg (has no administration in time range)   gabapentin (NEURONTIN) capsule 600 mg (has no administration in time range)   gabapentin (NEURONTIN) capsule 300 mg (has no administration in time range)   gabapentin (NEURONTIN) capsule 100 mg (has no administration in time range)   diazepam (VALIUM) tablet 10 mg (has no administration in time range)     Or   diazepam (VALIUM) injection 5-10 mg (has no administration in time range)   multivitamin w/minerals (THERA-VIT-M) tablet 1 tablet (1 tablet Oral $Given 8/26/23 1643)   folic acid (FOLVITE) tablet 1 mg (has no administration in time range)   thiamine (B-1) tablet 100 mg (has no administration in time range)   lactated ringers BOLUS 1,000 mL (0 mLs Intravenous Stopped 8/26/23 1633)   diazepam (VALIUM) injection 10 mg (10 mg Intravenous $Given 8/26/23 1517)   thiamine (B-1) injection 100 mg (100 mg Intravenous $Given 8/26/23 1644)   folic acid injection 1 mg (1 mg Intravenous $Given 8/26/23 1644)   gabapentin (NEURONTIN) tablet 1,200 mg (1,200 mg Oral $Given 8/26/23 1643)   PHENobarbital (LUMINAL) injection 130 mg (130 mg Intravenous $Given 8/26/23 1644)       Drips infusing:  No  For the majority of the shift this patient was Green.   Interventions performed were N/A.    Sepsis treatment initiated: No    Cares/treatment/interventions/medications to be completed following ED care: N/A    ED Nurse Name: Celia Small RN  5:45 PM     RECEIVING UNIT ED HANDOFF REVIEW    Above ED Nurse Handoff Report was reviewed: Yes  Reviewed by: Barbara  SHARYN Gloria RN on August 26, 2023 at 6:25 PM

## 2023-08-26 NOTE — ED PROVIDER NOTES
"  History     Chief Complaint:  Alcohol Problem, Shortness of Breath, and Insomnia       The history is provided by the patient.      Georges Moreno is a 47 year old male with history of polysubstance dependence and atrial fibrillation  who presents with alcohol withdrawal. Patient states he had a few drinks this morning. Georges has been in treatment 6 times. He mentions not having anything to eat for the past 3-4 days because \"[he] could not keep anything down\". Denies having seizures due to withdrawal. He reports being hospitalized in [facility] 3 months ago for similar symptoms. Georges is taking Paxil and Lopresor regularly.       Independent Historian:   None - Patient Only    Review of External Notes:   None       Medications:    Albuterol   Neurontin  Atarax  Lopresor   Paxil  Medical cannabis   Prilosec  Gabapentin    Past Medical History:    ADHD  Alcohol use disorder  Atrial fibrillation  Depression  Anxiety   Pancreatitis  Perirectal abscess  Polysubstance dependence  PTSD   Tobacco dependence  Pyogenic granuloma   Chronic cough   Cervical myelopathy   Cannabis use disorder   Palpitations     Past Surgical History:    Rectal cyst removal   Pilonidal cyst removal   Incision and drainage       Physical Exam   Patient Vitals for the past 24 hrs:   BP Temp Temp src Pulse Resp SpO2   08/26/23 1659 -- -- -- 92 -- 96 %   08/26/23 1629 (!) 174/96 -- -- 91 -- 95 %   08/26/23 1603 -- -- -- -- -- 96 %   08/26/23 1559 (!) 149/96 -- -- 103 -- 96 %   08/26/23 1558 (!) 149/96 -- -- -- -- --   08/26/23 1553 -- -- -- 95 -- --   08/26/23 1501 (!) 157/113 -- -- 102 -- 98 %   08/26/23 1438 (!) 183/116 98  F (36.7  C) Temporal 118 20 95 %        Physical Exam  Constitutional: Alert, attentive, GCS 15   HENT:    Mouth/Throat: Tongue fasciculations noted  Eyes: EOM are normal, anicteric, conjugate gaze  CV: Tachycardic, distal extremities warm, well perfused  Chest: Clear to auscultation bilaterally non-labored breathing on RA  GI:  " non tender. No distension. No guarding or rebound.    MSK: No significant leg swelling  Neurological: Alert, attentive, moving all extremities equally.   Skin: Skin is warm and dry.      Emergency Department Course   ECG  ECG taken at 1621, ECG read at 1621  Normal sinus rhythm with sinus arrhythmia  Normal ECG  U-wave-chronic  Rate 88 bpm. MA interval 166 ms. QRS duration 88 ms. QT/QTc 384/464 ms. P-R-T axes 59 61 50.       Laboratory:  Labs Ordered and Resulted from Time of ED Arrival to Time of ED Departure   COMPREHENSIVE METABOLIC PANEL - Abnormal       Result Value    Sodium 136      Potassium 3.8      Chloride 96 (*)     Carbon Dioxide (CO2) 20 (*)     Anion Gap 20 (*)     Urea Nitrogen 11.8      Creatinine 0.74      Calcium 9.2      Glucose 121 (*)     Alkaline Phosphatase 58      AST 36      ALT 23      Protein Total 7.2      Albumin 4.5      Bilirubin Total 0.4      GFR Estimate >90     LACTIC ACID WHOLE BLOOD - Abnormal    Lactic Acid 4.2 (*)    ETHYL ALCOHOL LEVEL - Abnormal    Alcohol ethyl 0.15 (*)    KETONE BETA-HYDROXYBUTYRATE QUANTITATIVE, RAPID - Normal    Ketone (Beta-Hydroxybutyrate) Quantitative <0.18     MAGNESIUM - Normal    Magnesium 2.1     PHOSPHORUS - Normal    Phosphorus 2.5     CBC WITH PLATELETS AND DIFFERENTIAL    WBC Count 9.7      RBC Count 4.90      Hemoglobin 15.8      Hematocrit 44.1      MCV 90      MCH 32.2      MCHC 35.8      RDW 12.1      Platelet Count 226      % Neutrophils 75      % Lymphocytes 18      % Monocytes 7      % Eosinophils 0      % Basophils 0      % Immature Granulocytes 0      NRBCs per 100 WBC 0      Absolute Neutrophils 7.2      Absolute Lymphocytes 1.8      Absolute Monocytes 0.7      Absolute Eosinophils 0.0      Absolute Basophils 0.0      Absolute Immature Granulocytes 0.0      Absolute NRBCs 0.0              Emergency Department Course & Assessments:      Interventions:       Assessments:  1455 I obtained history and examined the patient as noted above.      Independent Interpretation (X-rays, CTs, rhythm strip):  None    Consultations/Discussion of Management or Tests:  I spoke with Dr. Duque, hospitalist       Social Determinants of Health affecting care:   None    Disposition:  We will plan medicine admission to Dr. Stokes's    Impression & Plan      Medical Decision Makin-year-old male past medical history seen for anxiety, depression, A-fib not on anticoagulation, alcohol abuse presenting for alcohol withdrawal.  He reports his last drink was several days ago but did have a drink this morning to help manage his symptoms.  He arrives hypertensive, tachycardic tremulous with tongue fasciculations consistent with alcohol withdrawal.  Screening EKG shows no evidence of A-fib, he does have chronic U wave however electrolytes including sodium and potassium and magnesium are within normal limits.  He does however significant anion gap with a type B elevation in his lactate at 4.2, ketones are within normal limits.  Serum ethanol is 0.15, given history of admission though no alcohol withdrawal seizures, severe symptoms at 0.15, will plan for medicine admission for continued treatment.  He was given a phenobarb load as this is worked in the past for him.  He was placed on CIWA.    Diagnosis:    ICD-10-CM    1. Alcohol withdrawal syndrome without complication (H)  F10.930       2. Elevated lactic acid level  R79.89            Garth Mccracken MD  [employer]  5:41 PM 23       Scribe Disclosure:  ROSE MARIE, Farhat Parrish, am serving as a scribe at 3:15 PM on 2023 to document services personally performed by Garth Mccracken MD based on my observations and the provider's statements to me.   2023   Garth Mccracken MD Dunbar, John Forrest, MD  [id number]

## 2023-08-26 NOTE — PLAN OF CARE
Goal Outcome Evaluation:         Patient Transfer Information  Patient connected to monitoring equipment on arrival: yes Vital signs monitor     Patient connected to wall oxygen on arrival: N/A    Belongings: Transferred with patient    Safety check completed: Yes

## 2023-08-26 NOTE — H&P
[facility]    History and Physical - Hospitalist Service       Date of Admission:  8/26/2023    Assessment & Plan      Georges Moreno is a 47 year old male with ADD, depression, history of polysubstance abuse, PTSD, PUSHPA, paroxysmal atrial fibrillation not on anticoagulation due to history of high risk for fall events,, alcohol abuse, alcohol dependence, prior hospitalization for alcohol withdrawals who still drinks regularly and unfortunately has been having issues of difficulty keeping his food, nausea and vomiting at least for the past 3 to 4 days.  He presented here for concerns for alcohol withdrawal symptomatology.    Problem list:  #Alcohol withdrawals  #Lactic acidosis secondary to alcohol intoxication alcohol withdrawals  #Alcohol abuse   #alcohol dependence  #Prior history of polysubstance abuse    -Patient will be requiring inpatient hospitalization as he remained high risk for clinical deterioration will be needing close monitoring and care.  IV fluids support.  Closely monitor  -He is still volume status.  Anticipating his lactic acidosis will continue to improve once alcohol withdrawals continues to resolve and improved together with his hydration status.  -Will continue on gabapentin pathway.  He already received 1200 mg of gabapentin earlier.  Benzodiazepine CIWA triggered protocol will be continued  -Fall precautions please.  -Will offer chemical dependency evaluation, social service evaluation for community resources and discharge planning disposition if interested.  Will clearly benefit for alcohol cessation program.  I did review prior documentation in chart did show that he has an established AA meetings in the past.  -Closely monitor electrolytes, electrolyte supplementation protocol  -IV Pepcid.  -As needed APAP.  Clonidine as per alcohol withdrawals protocol ordered  -No seizure-like activity noted.    #History of paroxysmal atrial fibrillation  -Historically he is being  treated for rate control medication approach.  -He is not a good candidate for any chronic anticoagulation.  Nevertheless his CHADS2 score is 0.  He remained high risk for fall events and complications from it.  -Resume his rate controlling medications of metoprolol once reconciled    #History of neuropathy  -Previously on gabapentin  -Currently receiving gabapentin pathway we will hold his home regimen    #History of polysubstance abuse  #Tobacco abuse    #History of ADD  #History of depression and anxiety  -Resume home meds once reconciled             Diet:  Regular diet if he is fully awake  DVT Prophylaxis: Pneumatic Compression Devices and Ambulate every shift  Ross Catheter: Not present  Lines: None     Cardiac Monitoring: None  Code Status:  Full code    Clinically Significant Risk Factors Present on Admission             # Anion Gap Metabolic Acidosis: Highest Anion Gap = 20 mmol/L in last 2 days, will monitor and treat as appropriate                      Disposition Plan      Expected Discharge Date: 08/28/2023                  Romel Duque MD, MD  Hospitalist Service  [facility]  Securely message with [employer] (more info)  Text page via [employer] Paging/Directory     ______________________________________________________________________    Chief Complaint   Alcohol withdrawals  Nausea, vomiting    History is obtained from the patient    History of Present Illness   Georges Moreno is a 47 year old male with ADD, depression, history of polysubstance abuse, PTSD, PUSHPA, paroxysmal atrial fibrillation not on anticoagulation due to history of high risk for fall events,, alcohol abuse, alcohol dependence, prior hospitalization for alcohol withdrawals who still drinks regularly and unfortunately has been having issues of difficulty keeping his food, nausea and vomiting at least for the past 3 to 4 days.  He presented here for concerns for alcohol withdrawal symptomatology.  Upon  presentation he appears to be still intoxicated with positive blood alcohol level.  Demonstrated stable hemodynamics.  Afebrile and not hypoxic.  He was found to be having symptomatology consistent with alcohol withdrawals.  Subsequent laboratory findings showing type B lactic acidosis with no concerns of underlying sepsis.  Rest of the blood works did reveal stable electrolytes, kidney function.  He was provided with IV fluid support, being started on phenobarbital approach.  He mentioned that he tolerated phenobarbital administration in the past with prior hospitalization at outside facility.  Also started on gabapentin.  Provided with benzodiazepine triggered CIWA protocol.  His case was referred to us for further evaluation and care hence this hospitalization.      Past Medical History    Past Medical History:   Diagnosis Date    ADHD     Alcohol use disorder, severe, dependence (H)     Atrial fibrillation (H)     Depression, major, single episode     moderate    PUSHPA (generalized anxiety disorder)     HA (headache)     Pancreatitis     Paroxysmal atrial fibrillation (H)     Perirectal abscess     Personal history of alcoholism (H)     Polysubstance (excluding opioids) dependence, daily use (H)     PTSD (post-traumatic stress disorder)     Substance abuse (H)     Tobacco dependence        Past Surgical History   Past Surgical History:   Procedure Laterality Date    INCISION AND DRAINAGE PERIRECTAL ABSCESS      SOFT TISSUE SURGERY      Excision of pilonidal cyst    SOFT TISSUE SURGERY      removal of rectal cyst       Prior to Admission Medications   Prior to Admission Medications   Prescriptions Last Dose Informant Patient Reported? Taking?   PARoxetine (PAXIL) 20 MG tablet   No No   Sig: Take 1 tablet (20 mg) by mouth daily   albuterol (PROAIR HFA/PROVENTIL HFA/VENTOLIN HFA) 108 (90 Base) MCG/ACT inhaler   No No   Sig: Inhale 2 puffs into the lungs every 6 hours as needed for shortness of breath, wheezing or  cough   gabapentin (NEURONTIN) 100 MG capsule   No No   Sig: Take 2 capsules (200 mg) by mouth 3 times daily   hydrOXYzine (ATARAX) 25 MG tablet   No No   Sig: Take 1 tablet (25 mg) by mouth 3 times daily as needed for anxiety   metoprolol tartrate (LOPRESSOR) 25 MG tablet   No No   Sig: Take 1 tablet (25 mg) by mouth 2 times daily      Facility-Administered Medications: None        Review of Systems    The 10 point Review of Systems is negative other than noted in the HPI or here.     Social History   I have reviewed this patient's social history and updated it with pertinent information if needed.  Social History     Tobacco Use    Smoking status: Every Day     Packs/day: 1.50     Years: 15.00     Pack years: 22.50     Types: Cigarettes    Smokeless tobacco: Former    Tobacco comments:     a little over a pack a day   Vaping Use    Vaping Use: Never used   Substance Use Topics    Alcohol use: Yes     Alcohol/week: 14.0 standard drinks of alcohol     Comment: for past 4 days, excessive    Drug use: Yes     Types: Marijuana     Comment: today         Allergies   Allergies   Allergen Reactions    Sertraline Other (See Comments)     Excessive fatigue  Reaction(s): Excessive fatigue.    Duloxetine Hives and Rash     Rash, hives        Physical Exam   Vital Signs: Temp: 98  F (36.7  C) Temp src: Temporal BP: (!) 149/96 Pulse: 95   Resp: 20 SpO2: 96 % O2 Device: None (Room air)    Weight: 0 lbs 0 oz    HEENT; Atraumatic, normocephalic, pinkish conjuctiva, pupils bilateral reactive   Skin: warm and moist, no rashes  Dry skin, multiple skin excoriations on extremities  Lymphatics: no cervical or axillary lymphandenopathy  Lungs: equal chest expansion, clear to auscultation, no wheezes, no stridor, no crackles,   Heart: normal rate, normal rhythm, no rubs or gallops.   Abdomen: normal bowel sounds, no tenderness, no peritoneal signs, no guarding  Extremities: no deformities, ecchymosis  Neuro; follow commands, alert and  oriented x, spontaneous speech, coherent, moves all extremities spontaneously  Psych; no hallucination, euthymic mood, not agitated      Medical Decision Making       50 MINUTES SPENT BY ME on the date of service doing chart review, history, exam, documentation & further activities per the note.  MANAGEMENT DISCUSSED with the following over the past 24 hours: Yes   NOTE(S)/MEDICAL RECORDS REVIEWED over the past 24 hours: Yes       Data     I have personally reviewed the following data over the past 24 hrs:    9.7  \   15.8   / 226     136 96 (L) 11.8 /  121 (H)   3.8 20 (L) 0.74 \     ALT: 23 AST: 36 AP: 58 TBILI: 0.4   ALB: 4.5 TOT PROTEIN: 7.2 LIPASE: N/A     Procal: N/A CRP: N/A Lactic Acid: 4.2 (HH)         Imaging results reviewed over the past 24 hrs:   No results found for this or any previous visit (from the past 24 hour(s)).    I reviewed and discussed patient's EKG findings as well

## 2023-08-26 NOTE — PHARMACY-ADMISSION MEDICATION HISTORY
Pharmacist Admission Medication History    Admission medication history is complete. The information provided in this note is only as accurate as the sources available at the time of the update.    Medication reconciliation/reorder completed by provider prior to medication history? No    Information Source(s): Patient and CareEverywhere/SureScripts via in-person    Pertinent Information: None    Changes made to PTA medication list:  Added: Tylenol  Deleted: Gabapentin, Hydroxyzine  Changed: None       Allergies reviewed with patient and updates made in EHR: yes    Medication History Completed By: Lucio Perez RPH 8/26/2023 5:14 PM    Prior to Admission medications    Medication Sig Last Dose Taking? Auth Provider Long Term End Date   acetaminophen (TYLENOL) 325 MG tablet Take 650 mg by mouth every 6 hours as needed for pain or headaches 8/25/2023 Yes Unknown, Entered By History     albuterol (PROAIR HFA/PROVENTIL HFA/VENTOLIN HFA) 108 (90 Base) MCG/ACT inhaler Inhale 2 puffs into the lungs every 6 hours as needed for shortness of breath, wheezing or cough  Yes Silas Pugh MD Yes    metoprolol tartrate (LOPRESSOR) 25 MG tablet Take 1 tablet (25 mg) by mouth 2 times daily 8/26/2023 at x1 Yes Garth Cano DO Yes    PARoxetine (PAXIL) 20 MG tablet Take 1 tablet (20 mg) by mouth daily 8/26/2023 at AM Yes Jacquelyn Justin MD Yes

## 2023-08-27 VITALS
HEIGHT: 72 IN | RESPIRATION RATE: 16 BRPM | SYSTOLIC BLOOD PRESSURE: 137 MMHG | TEMPERATURE: 97.6 F | OXYGEN SATURATION: 98 % | WEIGHT: 195.3 LBS | DIASTOLIC BLOOD PRESSURE: 102 MMHG | BODY MASS INDEX: 26.45 KG/M2 | HEART RATE: 81 BPM

## 2023-08-27 LAB
ALBUMIN SERPL BCG-MCNC: 4 G/DL (ref 3.5–5.2)
ALP SERPL-CCNC: 59 U/L (ref 40–129)
ALT SERPL W P-5'-P-CCNC: 19 U/L (ref 0–70)
ANION GAP SERPL CALCULATED.3IONS-SCNC: 9 MMOL/L (ref 7–15)
AST SERPL W P-5'-P-CCNC: 31 U/L (ref 0–45)
ATRIAL RATE - MUSE: 88 BPM
BILIRUB SERPL-MCNC: 1.3 MG/DL
BUN SERPL-MCNC: 8.4 MG/DL (ref 6–20)
CALCIUM SERPL-MCNC: 8.9 MG/DL (ref 8.6–10)
CHLORIDE SERPL-SCNC: 105 MMOL/L (ref 98–107)
CREAT SERPL-MCNC: 1.02 MG/DL (ref 0.67–1.17)
DEPRECATED HCO3 PLAS-SCNC: 27 MMOL/L (ref 22–29)
DIASTOLIC BLOOD PRESSURE - MUSE: NORMAL MMHG
ERYTHROCYTE [DISTWIDTH] IN BLOOD BY AUTOMATED COUNT: 12.4 % (ref 10–15)
GFR SERPL CREATININE-BSD FRML MDRD: 90 ML/MIN/1.73M2
GLUCOSE SERPL-MCNC: 116 MG/DL (ref 70–99)
HCT VFR BLD AUTO: 47.9 % (ref 40–53)
HGB BLD-MCNC: 16.5 G/DL (ref 13.3–17.7)
INTERPRETATION ECG - MUSE: NORMAL
MAGNESIUM SERPL-MCNC: 2.1 MG/DL (ref 1.7–2.3)
MCH RBC QN AUTO: 32 PG (ref 26.5–33)
MCHC RBC AUTO-ENTMCNC: 34.4 G/DL (ref 31.5–36.5)
MCV RBC AUTO: 93 FL (ref 78–100)
P AXIS - MUSE: 59 DEGREES
PLATELET # BLD AUTO: 181 10E3/UL (ref 150–450)
POTASSIUM SERPL-SCNC: 4.3 MMOL/L (ref 3.4–5.3)
PR INTERVAL - MUSE: 166 MS
PROT SERPL-MCNC: 6.4 G/DL (ref 6.4–8.3)
QRS DURATION - MUSE: 88 MS
QT - MUSE: 384 MS
QTC - MUSE: 464 MS
R AXIS - MUSE: 61 DEGREES
RBC # BLD AUTO: 5.16 10E6/UL (ref 4.4–5.9)
SODIUM SERPL-SCNC: 141 MMOL/L (ref 136–145)
SYSTOLIC BLOOD PRESSURE - MUSE: NORMAL MMHG
T AXIS - MUSE: 50 DEGREES
VENTRICULAR RATE- MUSE: 88 BPM
WBC # BLD AUTO: 6.4 10E3/UL (ref 4–11)

## 2023-08-27 PROCEDURE — 83735 ASSAY OF MAGNESIUM: CPT | Performed by: INTERNAL MEDICINE

## 2023-08-27 PROCEDURE — 96372 THER/PROPH/DIAG INJ SC/IM: CPT | Performed by: INTERNAL MEDICINE

## 2023-08-27 PROCEDURE — 85014 HEMATOCRIT: CPT | Performed by: INTERNAL MEDICINE

## 2023-08-27 PROCEDURE — 99239 HOSP IP/OBS DSCHRG MGMT >30: CPT | Performed by: INTERNAL MEDICINE

## 2023-08-27 PROCEDURE — 250N000011 HC RX IP 250 OP 636: Mod: JZ | Performed by: INTERNAL MEDICINE

## 2023-08-27 PROCEDURE — 96361 HYDRATE IV INFUSION ADD-ON: CPT

## 2023-08-27 PROCEDURE — G0378 HOSPITAL OBSERVATION PER HR: HCPCS

## 2023-08-27 PROCEDURE — 36415 COLL VENOUS BLD VENIPUNCTURE: CPT | Performed by: INTERNAL MEDICINE

## 2023-08-27 PROCEDURE — 250N000011 HC RX IP 250 OP 636: Performed by: INTERNAL MEDICINE

## 2023-08-27 PROCEDURE — 258N000003 HC RX IP 258 OP 636: Performed by: INTERNAL MEDICINE

## 2023-08-27 PROCEDURE — 80053 COMPREHEN METABOLIC PANEL: CPT | Performed by: INTERNAL MEDICINE

## 2023-08-27 PROCEDURE — 250N000013 HC RX MED GY IP 250 OP 250 PS 637: Performed by: EMERGENCY MEDICINE

## 2023-08-27 PROCEDURE — 250N000013 HC RX MED GY IP 250 OP 250 PS 637: Performed by: INTERNAL MEDICINE

## 2023-08-27 PROCEDURE — 96376 TX/PRO/DX INJ SAME DRUG ADON: CPT

## 2023-08-27 RX ORDER — LANOLIN ALCOHOL/MO/W.PET/CERES
6 CREAM (GRAM) TOPICAL AT BEDTIME
Qty: 60 TABLET | Refills: 0 | Status: SHIPPED | OUTPATIENT
Start: 2023-08-27 | End: 2023-09-26

## 2023-08-27 RX ORDER — QUETIAPINE FUMARATE 25 MG/1
TABLET, FILM COATED ORAL
Qty: 120 TABLET | Refills: 0 | Status: ON HOLD | OUTPATIENT
Start: 2023-08-27 | End: 2024-01-31

## 2023-08-27 RX ORDER — PAROXETINE 20 MG/1
TABLET, FILM COATED ORAL
Qty: 30 TABLET | Refills: 0 | Status: ON HOLD | OUTPATIENT
Start: 2023-08-27 | End: 2024-01-31

## 2023-08-27 RX ORDER — FAMOTIDINE 20 MG/1
20 TABLET, FILM COATED ORAL 2 TIMES DAILY
Qty: 60 TABLET | Refills: 0 | Status: SHIPPED | OUTPATIENT
Start: 2023-08-27 | End: 2023-09-26

## 2023-08-27 RX ORDER — LORAZEPAM 0.5 MG/1
1 TABLET ORAL EVERY 8 HOURS PRN
Qty: 12 TABLET | Refills: 0 | Status: SHIPPED | OUTPATIENT
Start: 2023-08-27 | End: 2023-09-01

## 2023-08-27 RX ORDER — HYDROXYZINE HYDROCHLORIDE 25 MG/1
25 TABLET, FILM COATED ORAL 3 TIMES DAILY
Qty: 90 TABLET | Refills: 0 | Status: SHIPPED | OUTPATIENT
Start: 2023-08-27 | End: 2023-09-26

## 2023-08-27 RX ORDER — LANOLIN ALCOHOL/MO/W.PET/CERES
100 CREAM (GRAM) TOPICAL DAILY
Qty: 30 TABLET | Refills: 0 | Status: SHIPPED | OUTPATIENT
Start: 2023-08-27 | End: 2023-09-26

## 2023-08-27 RX ORDER — SERTRALINE HYDROCHLORIDE 25 MG/1
25 TABLET, FILM COATED ORAL DAILY
Status: DISCONTINUED | OUTPATIENT
Start: 2023-08-27 | End: 2023-08-27

## 2023-08-27 RX ADMIN — FOLIC ACID 1 MG: 1 TABLET ORAL at 08:12

## 2023-08-27 RX ADMIN — THIAMINE HCL TAB 100 MG 100 MG: 100 TAB at 08:12

## 2023-08-27 RX ADMIN — MULTIPLE VITAMINS W/ MINERALS TAB 1 TABLET: TAB at 08:12

## 2023-08-27 RX ADMIN — SODIUM CHLORIDE, POTASSIUM CHLORIDE, SODIUM LACTATE AND CALCIUM CHLORIDE: 600; 310; 30; 20 INJECTION, SOLUTION INTRAVENOUS at 04:28

## 2023-08-27 RX ADMIN — PAROXETINE 20 MG: 20 TABLET, FILM COATED ORAL at 08:12

## 2023-08-27 RX ADMIN — FAMOTIDINE 20 MG: 10 INJECTION, SOLUTION INTRAVENOUS at 06:26

## 2023-08-27 RX ADMIN — GABAPENTIN 900 MG: 300 CAPSULE ORAL at 08:12

## 2023-08-27 RX ADMIN — CLONIDINE HYDROCHLORIDE 0.1 MG: 0.1 TABLET ORAL at 08:12

## 2023-08-27 RX ADMIN — NALTREXONE 380 MG: KIT at 15:11

## 2023-08-27 RX ADMIN — METOPROLOL TARTRATE 25 MG: 25 TABLET, FILM COATED ORAL at 08:12

## 2023-08-27 ASSESSMENT — ACTIVITIES OF DAILY LIVING (ADL)
ADLS_ACUITY_SCORE: 26

## 2023-08-27 NOTE — PROGRESS NOTES
Patient's After Visit Summary was reviewed with patient.   Patient verbalized understanding of After Visit Summary, recommended follow up and was given an opportunity to ask questions.   Discharge medications sent home with patient/family: YES   Melatonin,Atarax, Seroquel, Thiamine,Pepcid filled here and Ativan paper script sent with patient.   Discharged with mother.    OBSERVATION patient END time: [id number]

## 2023-08-27 NOTE — PLAN OF CARE
Pt progressing through withdrawal.  Pt CIWA at time of writing has been  between 0 and 9.  Pt alert and oriented.  Calling to make needs known at this time.

## 2023-08-27 NOTE — PLAN OF CARE
"PRIMARY DIAGNOSIS: \"GENERIC\" NURSING  OUTPATIENT/OBSERVATION GOALS TO BE MET BEFORE DISCHARGE:  ADLs back to baseline: Yes    Activity and level of assistance: Ambulating with stand-by assistance    Pain status: Pain free.    Return to near baseline physical activity: Yes     Discharge Planner Nurse   Safe discharge environment identified: Yes  Barriers to discharge: No     Please review provider order for any additional goals.   Nurse to notify provider when observation goals have been met and patient is ready for discharge.      /102, scheduled clonidine given. Denies pain. CIWA-3, no Valium required. Toleration regular diet. Voiding and had bowel movement. IM Naltrexone given, patient was observed for 30 mins post administration with no complications.   "

## 2023-08-27 NOTE — DISCHARGE SUMMARY
[facility]  Hospitalist Discharge Summary      Date of Admission:  8/26/2023  Date of Discharge:  8/27/2023  Discharging Provider: Lindsey Jennings MD  Discharge Service: Hospitalist Service    Discharge Diagnoses   Chronic alcohol abuse without complicated withdrawals  Paroxysmal A-fib secondary to alcohol abuse  Severe depression  Chronic tobacco use  Lactic acidosis now resolved    Clinically Significant Risk Factors     # Overweight: Estimated body mass index is 26.49 kg/m  as calculated from the following:    Height as of this encounter: 1.829 m (6').    Weight as of this encounter: 88.6 kg (195 lb 4.8 oz).       Follow-ups Needed After Discharge   Needs to establish primary care  Needs to be contacting [facility] where he is well connected for the resources including use of Vivitrol      Discharge Disposition   Discharged to home  Condition at discharge: Stable    Hospital Course   Georges Moreno is a 47 year old male admitted on 8/26/2023. He has a past medical history of chronic alcohol abuse for 30+ years, paroxysmal atrial fibrillation secondary to alcohol abuse, polyneuropathy secondary to alcohol abuse, tobacco abuse.  Also has a history of failed multiple outpatient detox and rehab treatments, currently enrolled in [facility] is [facility] resources.    Patient was admitted to the emergency department where he presented with active alcohol use and lactic acidosis in the setting of nausea and vomiting.  He was administered IV fluids and conservative measures were instituted.  He rapidly improved but is consistently scoring between 1 and 5 on CIWA.  He denied any history of alcohol withdrawal seizures or profound delirium.  He went on to tell me that he has been through treatment at least 6 times and he has often checked himself out AMA and has no interest in pursuing chemical dependency evaluation.  He went on to also tell me that he has been well instituted in the [facility]  substance abuse program and has multiple resources available to him including weekly counseling sessions.  He refused the use of oral naltrexone or disulfiram stating that he just stops taking those medications when he is drinking alcohol or intends to drink alcohol.  He acknowledges that most of his friends are alcoholics he also goes on to tell me that all the male members in his family are alcoholics and several of them  of alcoholism  He lives at home with his mother and has been unemployed for at least 5+ years.  After extended discussion with the patient it becomes evident to me that he is well supported in terms of resources he continues to drink because of possibly a true alcoholism disease in his situation.  He will be administered Vivitrol prior to discharge.  He is as mentioned well connected with the resources available in the county he is advised to keep his appointment for this coming Tuesday.  I do hope that they are able to administer Vivitrol on a monthly basis for him which would definitely prevent him from alcohol related cravings and relapses.  After all this conversation and shared decision making patient will be discharged home later today.  He is being prescribed a small fill of lorazepam in addition he has been provided with hydroxyzine as well as thiamine and Pepcid he is also being given melatonin to help him with the sleep because disruption of sleep is what really causes his drinking to increase.  Per his request I am tapering him off Paxil.  He is advised to seek primary care establishment    Consultations This Hospital Stay   CARE MANAGEMENT / SOCIAL WORK IP CONSULT  CHEMICAL DEPENDENCY IP CONSULT    Code Status   Full Code    Time Spent on this Encounter   ILindsey MD, personally saw the patient today and spent greater than 30 minutes discharging this patient.       Lindsey Jennings MD  [place] [id number] [facility]  [address] [place]  [address]  Phone: [phone]  Fax: [phone]  ______________________________________________________________________    Physical Exam   Vital Signs: Temp: 97.6  F (36.4  C) Temp src: Oral BP: (!) 137/102 Pulse: 81   Resp: 16 SpO2: 98 % O2 Device: None (Room air)    Weight: 195 lbs 4.8 oz  General Appearance: Alert awake oriented x3 no acute distress appears much older than stated age  Respiratory: Clear to auscultation  Cardiovascular: S1-S2  GI: Obese nontender bowel sounds are present  Skin: No rash or lesions  Other: No neurological deficits       Primary Care Physician   [facility]    Discharge Orders   No discharge procedures on file.    Significant Results and Procedures   Most Recent 3 CBC's:  Recent Labs   Lab Test 08/27/23  0659 08/26/23  1510 04/21/23  0828   WBC 6.4 9.7 8.9   HGB 16.5 15.8 15.6   MCV 93 90 92    226 213     Most Recent 3 BMP's:  Recent Labs   Lab Test 08/27/23  0659 08/26/23  1510 04/22/23  0539    136 138   POTASSIUM 4.3 3.8 4.0   CHLORIDE 105 96* 102   CO2 27 20* 28   BUN 8.4 11.8 9.5   CR 1.02 0.74 0.83   ANIONGAP 9 20* 8   LEILA 8.9 9.2 8.4*   * 121* 105*     Most Recent 2 LFT's:  Recent Labs   Lab Test 08/27/23  0659 08/26/23  1510   AST 31 36   ALT 19 23   ALKPHOS 59 58   BILITOTAL 1.3* 0.4   ,   Results for orders placed or performed during the hospital encounter of 04/21/23   XR Chest 2 Views    Narrative    CHEST TWO VIEWS  4/21/2023 11:27 AM     HISTORY:  Chest pain. Shortness of breath.    COMPARISON: 4/29/2020.      Impression    IMPRESSION: A calcified nodule projected over the right midlung is new  since the previous exam. The lungs are otherwise clear. No pleural  effusions. No pneumothorax. Pulmonary vascularity is within normal  limits.    JENA AMEZQUITA MD         SYSTEM ID:  [id number]       Discharge Medications   Current Discharge Medication List        START taking these medications    Details   famotidine (PEPCID) 20 MG tablet  Take 1 tablet (20 mg) by mouth 2 times daily for 30 days  Qty: 60 tablet, Refills: 0    Associated Diagnoses: Alcohol abuse      hydrOXYzine (ATARAX) 25 MG tablet Take 1 tablet (25 mg) by mouth 3 times daily for 30 days  Qty: 90 tablet, Refills: 0    Associated Diagnoses: Alcohol abuse      LORazepam (ATIVAN) 0.5 MG tablet Take 2 tablets (1 mg) by mouth every 8 hours as needed for anxiety or withdrawal  Qty: 12 tablet, Refills: 0    Associated Diagnoses: Alcohol withdrawal with complication with inpatient treatment, with unspecified complication (H)      melatonin 3 MG tablet Take 2 tablets (6 mg) by mouth At Bedtime for 30 days  Qty: 60 tablet, Refills: 0    Associated Diagnoses: Alcohol abuse      QUEtiapine (SEROQUEL) 25 MG tablet Take 1 tablet (25 mg) by mouth 2 times daily AND 2 tablets (50 mg) At Bedtime. Do all this for 30 days.  Qty: 120 tablet, Refills: 0    Associated Diagnoses: Alcohol abuse      thiamine (B-1) 100 MG tablet Take 1 tablet (100 mg) by mouth daily for 30 days  Qty: 30 tablet, Refills: 0    Associated Diagnoses: Alcohol abuse           CONTINUE these medications which have CHANGED    Details   PARoxetine (PAXIL) 20 MG tablet Take 1 tablet (20 mg) by mouth daily for 3 days, THEN 0.5 tablets (10 mg) daily for 4 days.  Qty: 30 tablet, Refills: 0    Associated Diagnoses: Alcohol withdrawal with complication with inpatient treatment, with unspecified complication (H)           CONTINUE these medications which have NOT CHANGED    Details   acetaminophen (TYLENOL) 325 MG tablet Take 650 mg by mouth every 6 hours as needed for pain or headaches      albuterol (PROAIR HFA/PROVENTIL HFA/VENTOLIN HFA) 108 (90 Base) MCG/ACT inhaler Inhale 2 puffs into the lungs every 6 hours as needed for shortness of breath, wheezing or cough  Qty: 18 g, Refills: 1    Comments: Pharmacy may dispense brand covered by insurance (Proair, or proventil or ventolin or generic albuterol inhaler)  Associated Diagnoses: Acute  bronchospasm      metoprolol tartrate (LOPRESSOR) 25 MG tablet Take 1 tablet (25 mg) by mouth 2 times daily  Qty: 60 tablet, Refills: 1    Associated Diagnoses: Palpitations           Allergies   Allergies   Allergen Reactions    Sertraline Other (See Comments)     Excessive fatigue  Reaction(s): Excessive fatigue.    Duloxetine Hives and Rash     Rash, hives

## 2023-08-27 NOTE — PLAN OF CARE
Goal Outcome Evaluation:    AO x4. On K, Mg protocols, checks in AM. CIWAs: 10, 10. LR running 100 mL/hr. Urinal in reach. SBA-independent. Plan: continue POC, SW/CM consult, CD consult

## 2023-08-27 NOTE — CONSULTS
Care Management Discharge Note    Discharge Date: 08/27/2023       Discharge Disposition:  home    Additional Information:  Social Work consult received. Patient's status and needs were reviewed with nursing staff. Nursing does not anticipate any needs upon discharge. CD resource booklet given. Nursing states patient connected with [facility] and has no concerns at this time.  No further Social Work needs identified. If further needs arise, please alert SW department.    Soledad Chaudhry RN Case Manager  Inpatient Care Coordination   [facility]   [phone]        Soledad Chaudhry RN

## 2023-10-25 ENCOUNTER — HOSPITAL ENCOUNTER (EMERGENCY)
Facility: CLINIC | Age: 48
Discharge: HOME | End: 2023-10-26
Attending: EMERGENCY MEDICINE | Admitting: EMERGENCY MEDICINE
Payer: COMMERCIAL

## 2023-10-25 DIAGNOSIS — F10.920 ALCOHOLIC INTOXICATION WITHOUT COMPLICATION (H): ICD-10-CM

## 2023-10-25 DIAGNOSIS — F43.20 EMOTIONAL CRISIS: ICD-10-CM

## 2023-10-25 LAB
ALBUMIN SERPL BCG-MCNC: 4.8 G/DL (ref 3.5–5.2)
ALP SERPL-CCNC: 53 U/L (ref 40–129)
ALT SERPL W P-5'-P-CCNC: 29 U/L (ref 0–70)
AMPHETAMINES UR QL SCN: ABNORMAL
ANION GAP SERPL CALCULATED.3IONS-SCNC: 12 MMOL/L (ref 7–15)
AST SERPL W P-5'-P-CCNC: 35 U/L (ref 0–45)
BARBITURATES UR QL SCN: ABNORMAL
BASOPHILS # BLD AUTO: 0.1 10E3/UL (ref 0–0.2)
BASOPHILS NFR BLD AUTO: 1 %
BENZODIAZ UR QL SCN: ABNORMAL
BILIRUB SERPL-MCNC: 0.5 MG/DL
BUN SERPL-MCNC: 9.2 MG/DL (ref 6–20)
BZE UR QL SCN: ABNORMAL
CALCIUM SERPL-MCNC: 8.8 MG/DL (ref 8.6–10)
CANNABINOIDS UR QL SCN: ABNORMAL
CHLORIDE SERPL-SCNC: 100 MMOL/L (ref 98–107)
CREAT SERPL-MCNC: 0.65 MG/DL (ref 0.67–1.17)
DEPRECATED HCO3 PLAS-SCNC: 23 MMOL/L (ref 22–29)
EGFRCR SERPLBLD CKD-EPI 2021: >90 ML/MIN/1.73M2
EOSINOPHIL # BLD AUTO: 0.1 10E3/UL (ref 0–0.7)
EOSINOPHIL NFR BLD AUTO: 1 %
ERYTHROCYTE [DISTWIDTH] IN BLOOD BY AUTOMATED COUNT: 12.1 % (ref 10–15)
ETHANOL SERPL-MCNC: 0.32 G/DL
FENTANYL UR QL: ABNORMAL
GLUCOSE SERPL-MCNC: 99 MG/DL (ref 70–99)
HCT VFR BLD AUTO: 46.3 % (ref 40–53)
HGB BLD-MCNC: 16.1 G/DL (ref 13.3–17.7)
IMM GRANULOCYTES # BLD: 0 10E3/UL
IMM GRANULOCYTES NFR BLD: 0 %
LYMPHOCYTES # BLD AUTO: 2.4 10E3/UL (ref 0.8–5.3)
LYMPHOCYTES NFR BLD AUTO: 31 %
MAGNESIUM SERPL-MCNC: 1.9 MG/DL (ref 1.7–2.3)
MCH RBC QN AUTO: 32.5 PG (ref 26.5–33)
MCHC RBC AUTO-ENTMCNC: 34.8 G/DL (ref 31.5–36.5)
MCV RBC AUTO: 93 FL (ref 78–100)
MONOCYTES # BLD AUTO: 0.7 10E3/UL (ref 0–1.3)
MONOCYTES NFR BLD AUTO: 9 %
NEUTROPHILS # BLD AUTO: 4.4 10E3/UL (ref 1.6–8.3)
NEUTROPHILS NFR BLD AUTO: 58 %
NRBC # BLD AUTO: 0 10E3/UL
NRBC BLD AUTO-RTO: 0 /100
OPIATES UR QL SCN: ABNORMAL
PCP QUAL URINE (ROCHE): ABNORMAL
PHOSPHATE SERPL-MCNC: 2.1 MG/DL (ref 2.5–4.5)
PLATELET # BLD AUTO: 197 10E3/UL (ref 150–450)
POTASSIUM SERPL-SCNC: 3.8 MMOL/L (ref 3.4–5.3)
PROT SERPL-MCNC: 7.1 G/DL (ref 6.4–8.3)
RBC # BLD AUTO: 4.96 10E6/UL (ref 4.4–5.9)
SODIUM SERPL-SCNC: 135 MMOL/L (ref 135–145)
WBC # BLD AUTO: 7.6 10E3/UL (ref 4–11)

## 2023-10-25 PROCEDURE — 36415 COLL VENOUS BLD VENIPUNCTURE: CPT | Performed by: EMERGENCY MEDICINE

## 2023-10-25 PROCEDURE — 84100 ASSAY OF PHOSPHORUS: CPT | Performed by: EMERGENCY MEDICINE

## 2023-10-25 PROCEDURE — 83735 ASSAY OF MAGNESIUM: CPT | Performed by: EMERGENCY MEDICINE

## 2023-10-25 PROCEDURE — 250N000013 HC RX MED GY IP 250 OP 250 PS 637: Performed by: EMERGENCY MEDICINE

## 2023-10-25 PROCEDURE — 99283 EMERGENCY DEPT VISIT LOW MDM: CPT

## 2023-10-25 PROCEDURE — 82077 ASSAY SPEC XCP UR&BREATH IA: CPT | Performed by: EMERGENCY MEDICINE

## 2023-10-25 PROCEDURE — 96372 THER/PROPH/DIAG INJ SC/IM: CPT | Performed by: EMERGENCY MEDICINE

## 2023-10-25 PROCEDURE — 250N000011 HC RX IP 250 OP 636: Performed by: EMERGENCY MEDICINE

## 2023-10-25 PROCEDURE — 80053 COMPREHEN METABOLIC PANEL: CPT | Performed by: EMERGENCY MEDICINE

## 2023-10-25 PROCEDURE — 80307 DRUG TEST PRSMV CHEM ANLYZR: CPT | Performed by: EMERGENCY MEDICINE

## 2023-10-25 PROCEDURE — 85025 COMPLETE CBC W/AUTO DIFF WBC: CPT | Performed by: EMERGENCY MEDICINE

## 2023-10-25 RX ORDER — DIAZEPAM 5 MG
10 TABLET ORAL EVERY 30 MIN PRN
Status: DISCONTINUED | OUTPATIENT
Start: 2023-10-25 | End: 2023-10-26 | Stop reason: HOSPADM

## 2023-10-25 RX ORDER — DIAZEPAM 10 MG/2ML
5-10 INJECTION, SOLUTION INTRAMUSCULAR; INTRAVENOUS EVERY 30 MIN PRN
Status: DISCONTINUED | OUTPATIENT
Start: 2023-10-25 | End: 2023-10-26 | Stop reason: HOSPADM

## 2023-10-25 RX ORDER — MULTIPLE VITAMINS W/ MINERALS TAB 9MG-400MCG
1 TAB ORAL DAILY
Status: DISCONTINUED | OUTPATIENT
Start: 2023-10-25 | End: 2023-10-26 | Stop reason: HOSPADM

## 2023-10-25 RX ORDER — ONDANSETRON 4 MG/1
4 TABLET, ORALLY DISINTEGRATING ORAL EVERY 6 HOURS PRN
Status: DISCONTINUED | OUTPATIENT
Start: 2023-10-25 | End: 2023-10-26 | Stop reason: HOSPADM

## 2023-10-25 RX ORDER — FOLIC ACID 1 MG/1
1 TABLET ORAL DAILY
Status: DISCONTINUED | OUTPATIENT
Start: 2023-10-25 | End: 2023-10-26 | Stop reason: HOSPADM

## 2023-10-25 RX ORDER — OLANZAPINE 10 MG/2ML
10 INJECTION, POWDER, FOR SOLUTION INTRAMUSCULAR DAILY PRN
Status: DISCONTINUED | OUTPATIENT
Start: 2023-10-25 | End: 2023-10-26 | Stop reason: HOSPADM

## 2023-10-25 RX ADMIN — NICOTINE POLACRILEX 2 MG: 2 GUM, CHEWING BUCCAL at 15:59

## 2023-10-25 RX ADMIN — ONDANSETRON 4 MG: 4 TABLET, ORALLY DISINTEGRATING ORAL at 20:55

## 2023-10-25 RX ADMIN — MULTIPLE VITAMINS W/ MINERALS TAB 1 TABLET: TAB at 15:58

## 2023-10-25 RX ADMIN — DIAZEPAM 10 MG: 5 TABLET ORAL at 20:35

## 2023-10-25 RX ADMIN — FOLIC ACID 1 MG: 1 TABLET ORAL at 15:58

## 2023-10-25 RX ADMIN — DIAZEPAM 10 MG: 5 TABLET ORAL at 15:30

## 2023-10-25 RX ADMIN — Medication 5 MG: at 20:35

## 2023-10-25 RX ADMIN — OLANZAPINE 10 MG: 10 INJECTION, POWDER, FOR SOLUTION INTRAMUSCULAR at 20:54

## 2023-10-25 RX ADMIN — THIAMINE HCL TAB 100 MG 100 MG: 100 TAB at 15:58

## 2023-10-25 ASSESSMENT — ACTIVITIES OF DAILY LIVING (ADL)
ADLS_ACUITY_SCORE: 37

## 2023-10-25 NOTE — ED TRIAGE NOTES
A&O x4, ABCs intact. Pt presents with alcohol intoxication. Pt states that he thinks he's having a panic attack. Pt reports last drink 4 minutes ago. Pt family dropped him off..        Triage Assessment (Adult)       Row Name 10/25/23 1401          Triage Assessment    Airway WDL WDL        Respiratory WDL    Respiratory WDL WDL        Cardiac WDL    Cardiac WDL WDL

## 2023-10-25 NOTE — ED NOTES
RN ED Mental Health Handoff Note    MARLY    Does patient require 1:1? No    Hold and rights been given and documented for patient: Yes    Is the patient in  scrubs? No -pt declined scrubs    Has the patient been searched? Yes    Is the 15 minute observation tool up to date? Yes    Was patient issued a welcome folder? Yes    Room check completed this shift: Yes    PSS3 and Isabella Assessment/Reassessment this shift:    PSS-3      Date and Time Over the past 2 weeks have you felt down, depressed, or hopeless? Over the past 2 weeks have you had thoughts of killing yourself? Have you ever attempted to kill yourself? When did this last happen? User   10/25/23 8969 yes no no -- TE            Behavioral status of patient: Green    Code 21 called this shift? No    Use of restraints/seclusion this shift? No    Most recent vital signs:  Temp: 98.9  F (37.2  C) Temp src: Temporal BP: (!) 139/106 Pulse: 99   Resp: 18 SpO2: 98 % O2 Device: None (Room air)      Medications:  Scheduled medication compliance? Yes    PRN Meds administered this shift? Yes    Medications   melatonin tablet 5 mg (has no administration in time range)   diazepam (VALIUM) tablet 10 mg (10 mg Oral $Given 10/25/23 1530)     Or   diazepam (VALIUM) injection 5-10 mg ( Intravenous See Alternative 10/25/23 1530)   thiamine (B-1) tablet 100 mg (100 mg Oral $Given 10/25/23 1558)   folic acid (FOLVITE) tablet 1 mg (1 mg Oral $Given 10/25/23 1558)   multivitamin w/minerals (THERA-VIT-M) tablet 1 tablet (1 tablet Oral $Given 10/25/23 1558)   nicotine (NICORETTE) gum 2 mg (2 mg Buccal $Given 10/25/23 1559)         ADLs    Meal Provided this shift? Yes    Hygiene items provided? Yes    ADLs completed? No    Date of last shower: PTA    Any significant events this shift? No    Any information that would be helpful in caring for this patient?  Pt requesting detox    Family present/updated? No    Location of patient's belongings: DEC office    Critical Care  Minutes:  Does the patient need critical care minutes documented? No

## 2023-10-25 NOTE — ED PROVIDER NOTES
History     Chief Complaint:  Alcohol Intoxication    The history is provided by the patient.     Georges Moreno is a 47 year old male with history of alcohol use disorder, depression, anxiety, PTSD, and atrial fibrillation presenting for evaluation of alcohol intoxication. Georges explains that he drank 12 beers today because he is trying to wean himself off of tequila, of which he typically drinks one quart daily. He also reports cannabis use and occasional cocaine use. He reports a history of alcohol withdrawal but denies seizures. Georges states that he would like to get his life under control. He denies self harm or suicidal ideation but does note that he sometimes feels like things might be better if he was not alive. He denies chest pain or shortness of breath. He believes that he fell recently.    Independent Historian:   None - Patient Only    Review of External Notes:   I reviewed the patient's 8/26/23 hospital discharge note where he was admitted for alcohol withdrawal and discharged the next day.    Medications:    Albuterol  Lopressor  Paxil  Seroquel  Prilosec  Ativan  Neurontin    Past Medical History:    ADHD  Alcohol use disorder  Atrial fibrillation  Depression  PUSHPA  Pancreatitis  Paroxysmal atrial fibrillation  Polysubstance dependence  PTSD  Palpitations  Alcohol withdrawal syndrome with complication  Peripheral neuropathy  Cervical myelopathy  Adenomatous polyp of colon  Pulmonary nodule  Thoracis of lumbosacral neuritis or radiculitis    Past Surgical History:    Incision and drainage perirectal abscess  Excision of pilonidal cyst  Removal of rectal cyst    Physical Exam   Patient Vitals for the past 24 hrs:   BP Temp Temp src Pulse Resp SpO2   10/25/23 2038 (!) 145/85 -- -- 98 20 98 %   10/25/23 1409 (!) 139/106 98.9  F (37.2  C) Temporal 99 18 98 %      Physical Exam  General: Resting on the bed. Periodically tearful.  Head: No obvious trauma to head.  Ears, Nose, Throat:  External ears normal.   Nose normal.  No pharyngeal erythema, swelling or exudate.  Midline uvula. Moist mucus membranes. No tongue fasciculations.   Eyes:  Bilateral injucted conjunctiva. No discharge. EOMI PERRL.  Neck: Normal range of motion.  Neck supple.   CV: Regular rate and rhythm.  No murmurs.      Respiratory: Effort normal and breath sounds normal.  No wheezing or crackles.   Gastrointestinal: Soft.  No distension. There is no tenderness.  There is no rigidity, no rebound and no guarding.   Musculoskeletal: Normal range of motion.  Non tender extremities to palpations.    Neuro: Alert. Moving all extremities appropriately.  Normal speech.    Skin: Skin is warm and dry.  No rash noted.   Psych: Voices desire to be alive but also endorse thoughts of feeling better if he were dead. No active suicidal ideations.    Emergency Department Course   Laboratory:  Labs Ordered and Resulted from Time of ED Arrival to Time of ED Departure   PHOSPHORUS - Abnormal       Result Value    Phosphorus 2.1 (*)    COMPREHENSIVE METABOLIC PANEL - Abnormal    Sodium 135      Potassium 3.8      Carbon Dioxide (CO2) 23      Anion Gap 12      Urea Nitrogen 9.2      Creatinine 0.65 (*)     GFR Estimate >90      Calcium 8.8      Chloride 100      Glucose 99      Alkaline Phosphatase 53      AST 35      ALT 29      Protein Total 7.1      Albumin 4.8      Bilirubin Total 0.5     ETHYL ALCOHOL LEVEL - Abnormal    Alcohol ethyl 0.32 (*)    URINE DRUG SCREEN PANEL - Abnormal    Amphetamines Urine Screen Negative      Barbituates Urine Screen Negative      Benzodiazepine Urine Screen Negative      Cannabinoids Urine Screen Positive (*)     Cocaine Urine Screen Negative      Fentanyl Qual Urine Screen Negative      Opiates Urine Screen Negative      PCP Urine Screen Negative     MAGNESIUM - Normal    Magnesium 1.9     CBC WITH PLATELETS AND DIFFERENTIAL    WBC Count 7.6      RBC Count 4.96      Hemoglobin 16.1      Hematocrit 46.3      MCV 93      MCH 32.5       MCHC 34.8      RDW 12.1      Platelet Count 197      % Neutrophils 58      % Lymphocytes 31      % Monocytes 9      % Eosinophils 1      % Basophils 1      % Immature Granulocytes 0      NRBCs per 100 WBC 0      Absolute Neutrophils 4.4      Absolute Lymphocytes 2.4      Absolute Monocytes 0.7      Absolute Eosinophils 0.1      Absolute Basophils 0.1      Absolute Immature Granulocytes 0.0      Absolute NRBCs 0.0       Emergency Department Course & Assessments:       Interventions:  Medications   melatonin tablet 5 mg (5 mg Oral $Given 10/25/23 2035)   diazepam (VALIUM) tablet 10 mg (10 mg Oral $Given 10/25/23 2035)     Or   diazepam (VALIUM) injection 5-10 mg ( Intravenous See Alternative 10/25/23 2035)   thiamine (B-1) tablet 100 mg (100 mg Oral $Given 10/25/23 1558)   folic acid (FOLVITE) tablet 1 mg (1 mg Oral $Given 10/25/23 1558)   multivitamin w/minerals (THERA-VIT-M) tablet 1 tablet (1 tablet Oral $Given 10/25/23 1558)   nicotine (NICORETTE) gum 2 mg (2 mg Buccal $Given 10/25/23 1559)   ondansetron (ZOFRAN ODT) ODT tab 4 mg (4 mg Oral $Given 10/25/23 2055)   OLANZapine (zyPREXA) injection 10 mg (10 mg Intramuscular $Given 10/25/23 2054)     Assessments:  1425 I obtained history and examined the patient as noted above.  2055 I rechecked the patient. He was getting frustrated about the uncomfortable bed and was questioning why he came here. He is now deescalated.    Independent Interpretation (X-rays, CTs, rhythm strip):  None    Consultations/Discussion of Management or Tests:  None  ED Course as of 10/25/23 2245   Wed Oct 25, 2023   2245 Patient is handed off to my oncoming colleague, Dr. Patino.       Social Determinants of Health affecting care:   Substance abuse    Disposition:  Care of the patient was transferred to my colleague Dr. Patino pending clinical sobriety and reassessment.     Impression & Plan    Medical Decision Making:  Patient is calm and cooperative upon arrival.  He is periodically  tearful and voices desire for alcohol detox.  He denies history of alcohol withdrawal seizure.  He is placed on the CIWA protocol and is given Valium.  He is also given folic acid, multivitamin, thiamine.  Alcohol level is 0.32.  CBC, CMP are grossly unremarkable.  Multiple detox centers were contacted, and unfortunate none of which had any available beds to transfer the patient to.  Patient then becomes agitated, stating that he has been comfortable here and he should leave.  He is given IM Zyprexa, and he called his down.  Plan will be to allow the patient to sober up, and be reevaluated.  When he is clinically sober, if he still voices desire for detox, he can hopefully find an open bed tomorrow morning.  If he denies any suicidal ideation, and no longer desires detox, he can be discharged when clinically sober.        Diagnosis:    ICD-10-CM    1. Alcoholic intoxication without complication (H24)  F10.920            Discharge Medications:  New Prescriptions    No medications on file      Scribe Disclosure:  I, Zo Varela, am serving as a scribe at 2:14 PM on 10/25/2023 to document services personally performed by Javier Venegas MD based on my observations and the provider's statements to me.   10/25/2023   Javier Venegas MD Peery, Stephen, MD  10/25/23 3233

## 2023-10-26 VITALS
TEMPERATURE: 98.9 F | SYSTOLIC BLOOD PRESSURE: 158 MMHG | DIASTOLIC BLOOD PRESSURE: 100 MMHG | OXYGEN SATURATION: 99 % | HEART RATE: 78 BPM | RESPIRATION RATE: 20 BRPM

## 2023-10-26 PROCEDURE — 250N000013 HC RX MED GY IP 250 OP 250 PS 637: Performed by: EMERGENCY MEDICINE

## 2023-10-26 RX ORDER — CHLORDIAZEPOXIDE HYDROCHLORIDE 25 MG/1
CAPSULE, GELATIN COATED ORAL
Qty: 12 CAPSULE | Refills: 0 | Status: SHIPPED | OUTPATIENT
Start: 2023-10-26 | End: 2023-10-30

## 2023-10-26 RX ADMIN — FOLIC ACID 1 MG: 1 TABLET ORAL at 11:29

## 2023-10-26 RX ADMIN — DIAZEPAM 10 MG: 5 TABLET ORAL at 06:48

## 2023-10-26 RX ADMIN — MULTIPLE VITAMINS W/ MINERALS TAB 1 TABLET: TAB at 11:29

## 2023-10-26 RX ADMIN — THIAMINE HCL TAB 100 MG 100 MG: 100 TAB at 11:29

## 2023-10-26 ASSESSMENT — ACTIVITIES OF DAILY LIVING (ADL)
ADLS_ACUITY_SCORE: 37

## 2023-10-26 NOTE — ED NOTES
Patient becoming increasingly more agitated. Cursing, throwing pillow, and rolling around in bed. MD aware.

## 2023-10-26 NOTE — ED NOTES
[facility]  ED Nurse Handoff Report    ED Chief complaint: Alcohol Intoxication  . ED Diagnosis:   Final diagnoses:   Alcoholic intoxication without complication (H24)       Allergies:   Allergies   Allergen Reactions    Sertraline Other (See Comments)     Excessive fatigue  Reaction(s): Excessive fatigue.    Duloxetine Hives and Rash     Rash, hives       Code Status: Full Code    Activity level - Baseline/Home:  independent.  Activity Level - Current:   independent.   Lift room needed: No.   Bariatric: No   Needed: No   Isolation: No.   Infection: Not Applicable.     Respiratory status: Room air    Vital Signs (within 30 minutes):   Vitals:    10/25/23 1409 10/25/23 2038 10/25/23 2305   BP: (!) 139/106 (!) 145/85 113/71   Pulse: 99 98 78   Resp: 18 20    Temp: 98.9  F (37.2  C)     TempSrc: Temporal     SpO2: 98% 98% 93%       Cardiac Rhythm:  ,      Pain level:    Patient confused: No.   Patient Falls Risk: nonskid shoes/slippers when out of bed.   Elimination Status: Has voided     Patient Report - Initial Complaint: ETOH abuse.   Focused Assessment: Resp, Cards     Abnormal Results:   Labs Ordered and Resulted from Time of ED Arrival to Time of ED Departure   PHOSPHORUS - Abnormal       Result Value    Phosphorus 2.1 (*)    COMPREHENSIVE METABOLIC PANEL - Abnormal    Sodium 135      Potassium 3.8      Carbon Dioxide (CO2) 23      Anion Gap 12      Urea Nitrogen 9.2      Creatinine 0.65 (*)     GFR Estimate >90      Calcium 8.8      Chloride 100      Glucose 99      Alkaline Phosphatase 53      AST 35      ALT 29      Protein Total 7.1      Albumin 4.8      Bilirubin Total 0.5     ETHYL ALCOHOL LEVEL - Abnormal    Alcohol ethyl 0.32 (*)    URINE DRUG SCREEN PANEL - Abnormal    Amphetamines Urine Screen Negative      Barbituates Urine Screen Negative      Benzodiazepine Urine Screen Negative      Cannabinoids Urine Screen Positive (*)     Cocaine Urine Screen Negative       Fentanyl Qual Urine Screen Negative      Opiates Urine Screen Negative      PCP Urine Screen Negative     MAGNESIUM - Normal    Magnesium 1.9     CBC WITH PLATELETS AND DIFFERENTIAL    WBC Count 7.6      RBC Count 4.96      Hemoglobin 16.1      Hematocrit 46.3      MCV 93      MCH 32.5      MCHC 34.8      RDW 12.1      Platelet Count 197      % Neutrophils 58      % Lymphocytes 31      % Monocytes 9      % Eosinophils 1      % Basophils 1      % Immature Granulocytes 0      NRBCs per 100 WBC 0      Absolute Neutrophils 4.4      Absolute Lymphocytes 2.4      Absolute Monocytes 0.7      Absolute Eosinophils 0.1      Absolute Basophils 0.1      Absolute Immature Granulocytes 0.0      Absolute NRBCs 0.0          No orders to display       Treatments provided: Meds  Family Comments: None  OBS brochure/video discussed/provided to patient:  Yes  ED Medications:   Medications   melatonin tablet 5 mg (5 mg Oral $Given 10/25/23 2035)   diazepam (VALIUM) tablet 10 mg (10 mg Oral $Given 10/25/23 2035)     Or   diazepam (VALIUM) injection 5-10 mg ( Intravenous See Alternative 10/25/23 2035)   thiamine (B-1) tablet 100 mg (100 mg Oral $Given 10/25/23 1558)   folic acid (FOLVITE) tablet 1 mg (1 mg Oral $Given 10/25/23 1558)   multivitamin w/minerals (THERA-VIT-M) tablet 1 tablet (1 tablet Oral $Given 10/25/23 1558)   nicotine (NICORETTE) gum 2 mg (2 mg Buccal $Given 10/25/23 1559)   ondansetron (ZOFRAN ODT) ODT tab 4 mg (4 mg Oral $Given 10/25/23 2055)   OLANZapine (zyPREXA) injection 10 mg (10 mg Intramuscular $Given 10/25/23 2054)       Drips infusing:  No  For the majority of the shift this patient was Green.   Interventions performed were None.    Sepsis treatment initiated: No    Cares/treatment/interventions/medications to be completed following ED care: None    ED Nurse Name: Roge Selby RN  4:51 AM

## 2023-10-26 NOTE — ED PROVIDER NOTES
Patient signed out to me this morning 6 AM.  Apparently came in with alcohol intoxication yesterday, got bit agitated was placed on a hold and give medications to treat agitation.  Overnight he slept mostly it sounds like, felt a bit shaky but given a low-dose of Valium.  I good conversation with this patient this morning at 11 AM.  He is awake and alert, appears very clinically sober.  Apparently was sober for 3 to 4 months and then about a week ago started drinking daily again.  Had a recent break-up with his significant other and that is added to his chronic anxiety and made a monitor again.  Reports he is currently living at home with his mother.  He declines the need for detox at this point, I offered follow-[facility] with [facility] and he declined this as well.  He has counseling resources including AA which she would like to utilize.  I am not seeing any evidence of alcohol withdrawal syndrome at this time.  I do think he can safely be discharged from the ED.  He admits to having passive thoughts of suicide yesterday while intoxicated but reports he does not want to die does not want kill self.  I think he is low risk for suicide in the near future and we will plan on canceling the current hold and discharging on his own.  We will discharge with a Librium taper for the next 4 days.  Discussed with patient he should not use alcohol with this medication and he is in agreement with that plan.     Radhames Burnham MD  10/26/23 8484

## 2024-01-31 ENCOUNTER — TELEPHONE (OUTPATIENT)
Dept: BEHAVIORAL HEALTH | Facility: CLINIC | Age: 49
End: 2024-01-31

## 2024-01-31 ENCOUNTER — HOSPITAL ENCOUNTER (INPATIENT)
Facility: CLINIC | Age: 49
LOS: 2 days | Discharge: HOME | End: 2024-02-02
Attending: EMERGENCY MEDICINE | Admitting: PSYCHIATRY & NEUROLOGY
Payer: COMMERCIAL

## 2024-01-31 DIAGNOSIS — F10.939 ALCOHOL WITHDRAWAL SYNDROME WITH COMPLICATION (H): ICD-10-CM

## 2024-01-31 DIAGNOSIS — G89.4 CHRONIC PAIN SYNDROME: ICD-10-CM

## 2024-01-31 DIAGNOSIS — F10.220 ALCOHOL DEPENDENCE WITH UNCOMPLICATED INTOXICATION (H): ICD-10-CM

## 2024-01-31 DIAGNOSIS — R00.2 PALPITATIONS: ICD-10-CM

## 2024-01-31 DIAGNOSIS — F41.1 GAD (GENERALIZED ANXIETY DISORDER): ICD-10-CM

## 2024-01-31 DIAGNOSIS — F10.10 ALCOHOL ABUSE: Primary | ICD-10-CM

## 2024-01-31 LAB
ALBUMIN SERPL BCG-MCNC: 4.6 G/DL (ref 3.5–5.2)
ALCOHOL BREATH TEST: 0.11 (ref 0–0.01)
ALP SERPL-CCNC: 59 U/L (ref 40–150)
ALT SERPL W P-5'-P-CCNC: 27 U/L (ref 0–70)
AMPHETAMINES UR QL SCN: ABNORMAL
ANION GAP SERPL CALCULATED.3IONS-SCNC: 18 MMOL/L (ref 7–15)
AST SERPL W P-5'-P-CCNC: 39 U/L (ref 0–45)
BARBITURATES UR QL SCN: ABNORMAL
BASOPHILS # BLD AUTO: 0 10E3/UL (ref 0–0.2)
BASOPHILS NFR BLD AUTO: 0 %
BENZODIAZ UR QL SCN: ABNORMAL
BILIRUB SERPL-MCNC: 0.6 MG/DL
BUN SERPL-MCNC: 8.8 MG/DL (ref 6–20)
BZE UR QL SCN: ABNORMAL
CALCIUM SERPL-MCNC: 9.6 MG/DL (ref 8.6–10)
CANNABINOIDS UR QL SCN: ABNORMAL
CHLORIDE SERPL-SCNC: 99 MMOL/L (ref 98–107)
CREAT SERPL-MCNC: 0.78 MG/DL (ref 0.67–1.17)
DEPRECATED HCO3 PLAS-SCNC: 20 MMOL/L (ref 22–29)
EGFRCR SERPLBLD CKD-EPI 2021: >90 ML/MIN/1.73M2
EOSINOPHIL # BLD AUTO: 0 10E3/UL (ref 0–0.7)
EOSINOPHIL NFR BLD AUTO: 0 %
ERYTHROCYTE [DISTWIDTH] IN BLOOD BY AUTOMATED COUNT: 12.4 % (ref 10–15)
FENTANYL UR QL: ABNORMAL
GLUCOSE SERPL-MCNC: 105 MG/DL (ref 70–99)
HCT VFR BLD AUTO: 44.9 % (ref 40–53)
HGB BLD-MCNC: 16.1 G/DL (ref 13.3–17.7)
IMM GRANULOCYTES # BLD: 0 10E3/UL
IMM GRANULOCYTES NFR BLD: 0 %
LYMPHOCYTES # BLD AUTO: 1.6 10E3/UL (ref 0.8–5.3)
LYMPHOCYTES NFR BLD AUTO: 26 %
MAGNESIUM SERPL-MCNC: 1.8 MG/DL (ref 1.7–2.3)
MCH RBC QN AUTO: 32.7 PG (ref 26.5–33)
MCHC RBC AUTO-ENTMCNC: 35.9 G/DL (ref 31.5–36.5)
MCV RBC AUTO: 91 FL (ref 78–100)
MONOCYTES # BLD AUTO: 0.4 10E3/UL (ref 0–1.3)
MONOCYTES NFR BLD AUTO: 7 %
NEUTROPHILS # BLD AUTO: 4.1 10E3/UL (ref 1.6–8.3)
NEUTROPHILS NFR BLD AUTO: 67 %
NRBC # BLD AUTO: 0 10E3/UL
NRBC BLD AUTO-RTO: 0 /100
OPIATES UR QL SCN: ABNORMAL
PCP QUAL URINE (ROCHE): ABNORMAL
PLATELET # BLD AUTO: 195 10E3/UL (ref 150–450)
POTASSIUM SERPL-SCNC: 3.8 MMOL/L (ref 3.4–5.3)
PROT SERPL-MCNC: 7.4 G/DL (ref 6.4–8.3)
RBC # BLD AUTO: 4.92 10E6/UL (ref 4.4–5.9)
SARS-COV-2 RNA RESP QL NAA+PROBE: NEGATIVE
SODIUM SERPL-SCNC: 137 MMOL/L (ref 135–145)
WBC # BLD AUTO: 6.1 10E3/UL (ref 4–11)

## 2024-01-31 PROCEDURE — 83735 ASSAY OF MAGNESIUM: CPT | Performed by: EMERGENCY MEDICINE

## 2024-01-31 PROCEDURE — 87635 SARS-COV-2 COVID-19 AMP PRB: CPT | Performed by: FAMILY MEDICINE

## 2024-01-31 PROCEDURE — 250N000013 HC RX MED GY IP 250 OP 250 PS 637: Performed by: EMERGENCY MEDICINE

## 2024-01-31 PROCEDURE — 85025 COMPLETE CBC W/AUTO DIFF WBC: CPT | Performed by: EMERGENCY MEDICINE

## 2024-01-31 PROCEDURE — 80053 COMPREHEN METABOLIC PANEL: CPT | Performed by: EMERGENCY MEDICINE

## 2024-01-31 PROCEDURE — 99285 EMERGENCY DEPT VISIT HI MDM: CPT | Performed by: EMERGENCY MEDICINE

## 2024-01-31 PROCEDURE — 80307 DRUG TEST PRSMV CHEM ANLYZR: CPT | Performed by: EMERGENCY MEDICINE

## 2024-01-31 PROCEDURE — 99285 EMERGENCY DEPT VISIT HI MDM: CPT | Mod: 25 | Performed by: EMERGENCY MEDICINE

## 2024-01-31 PROCEDURE — 36415 COLL VENOUS BLD VENIPUNCTURE: CPT | Performed by: EMERGENCY MEDICINE

## 2024-01-31 PROCEDURE — 250N000013 HC RX MED GY IP 250 OP 250 PS 637: Performed by: NURSE PRACTITIONER

## 2024-01-31 PROCEDURE — 82075 ASSAY OF BREATH ETHANOL: CPT | Performed by: EMERGENCY MEDICINE

## 2024-01-31 PROCEDURE — 128N000004 HC R&B CD ADULT

## 2024-01-31 RX ORDER — HYDROXYZINE HYDROCHLORIDE 25 MG/1
25 TABLET, FILM COATED ORAL EVERY 4 HOURS PRN
Status: DISCONTINUED | OUTPATIENT
Start: 2024-01-31 | End: 2024-02-01

## 2024-01-31 RX ORDER — FLUMAZENIL 0.1 MG/ML
0.2 INJECTION, SOLUTION INTRAVENOUS
Status: DISCONTINUED | OUTPATIENT
Start: 2024-01-31 | End: 2024-02-02 | Stop reason: HOSPADM

## 2024-01-31 RX ORDER — TRAZODONE HYDROCHLORIDE 50 MG/1
50 TABLET, FILM COATED ORAL
Status: DISCONTINUED | OUTPATIENT
Start: 2024-01-31 | End: 2024-02-02 | Stop reason: HOSPADM

## 2024-01-31 RX ORDER — PAROXETINE 20 MG/1
20 TABLET, FILM COATED ORAL EVERY MORNING
Status: ON HOLD | COMMUNITY
End: 2024-02-02

## 2024-01-31 RX ORDER — LORAZEPAM 2 MG/ML
1-2 INJECTION INTRAMUSCULAR EVERY 30 MIN PRN
Status: DISCONTINUED | OUTPATIENT
Start: 2024-01-31 | End: 2024-01-31 | Stop reason: ALTCHOICE

## 2024-01-31 RX ORDER — DIAZEPAM 5 MG
5-20 TABLET ORAL EVERY 30 MIN PRN
Status: DISCONTINUED | OUTPATIENT
Start: 2024-01-31 | End: 2024-02-02 | Stop reason: HOSPADM

## 2024-01-31 RX ORDER — QUETIAPINE FUMARATE 50 MG/1
50-150 TABLET, FILM COATED ORAL
Status: ON HOLD | COMMUNITY
End: 2024-01-31

## 2024-01-31 RX ORDER — FOLIC ACID 1 MG/1
1 TABLET ORAL DAILY
Status: DISCONTINUED | OUTPATIENT
Start: 2024-01-31 | End: 2024-02-02 | Stop reason: HOSPADM

## 2024-01-31 RX ORDER — ATENOLOL 50 MG/1
50 TABLET ORAL DAILY PRN
Status: DISCONTINUED | OUTPATIENT
Start: 2024-01-31 | End: 2024-02-01

## 2024-01-31 RX ORDER — LOPERAMIDE HCL 2 MG
2 CAPSULE ORAL 4 TIMES DAILY PRN
Status: DISCONTINUED | OUTPATIENT
Start: 2024-01-31 | End: 2024-02-02 | Stop reason: HOSPADM

## 2024-01-31 RX ORDER — GABAPENTIN 100 MG/1
200 CAPSULE ORAL 3 TIMES DAILY
Status: ON HOLD | COMMUNITY
End: 2024-02-02

## 2024-01-31 RX ORDER — MULTIPLE VITAMINS W/ MINERALS TAB 9MG-400MCG
1 TAB ORAL DAILY
Status: DISCONTINUED | OUTPATIENT
Start: 2024-01-31 | End: 2024-02-02 | Stop reason: HOSPADM

## 2024-01-31 RX ORDER — AMOXICILLIN 250 MG
1 CAPSULE ORAL 2 TIMES DAILY PRN
Status: DISCONTINUED | OUTPATIENT
Start: 2024-01-31 | End: 2024-02-02 | Stop reason: HOSPADM

## 2024-01-31 RX ORDER — MAGNESIUM HYDROXIDE/ALUMINUM HYDROXICE/SIMETHICONE 120; 1200; 1200 MG/30ML; MG/30ML; MG/30ML
30 SUSPENSION ORAL EVERY 4 HOURS PRN
Status: DISCONTINUED | OUTPATIENT
Start: 2024-01-31 | End: 2024-02-02 | Stop reason: HOSPADM

## 2024-01-31 RX ORDER — LORAZEPAM 1 MG/1
1-2 TABLET ORAL EVERY 30 MIN PRN
Status: DISCONTINUED | OUTPATIENT
Start: 2024-01-31 | End: 2024-01-31 | Stop reason: ALTCHOICE

## 2024-01-31 RX ORDER — ONDANSETRON 4 MG/1
4 TABLET, FILM COATED ORAL EVERY 6 HOURS PRN
Status: DISCONTINUED | OUTPATIENT
Start: 2024-01-31 | End: 2024-02-02 | Stop reason: HOSPADM

## 2024-01-31 RX ADMIN — DIAZEPAM 10 MG: 5 TABLET ORAL at 20:36

## 2024-01-31 RX ADMIN — LORAZEPAM 2 MG: 1 TABLET ORAL at 15:14

## 2024-01-31 RX ADMIN — TRAZODONE HYDROCHLORIDE 50 MG: 50 TABLET ORAL at 20:36

## 2024-01-31 RX ADMIN — FOLIC ACID 1 MG: 1 TABLET ORAL at 14:06

## 2024-01-31 RX ADMIN — Medication 1 TABLET: at 14:06

## 2024-01-31 RX ADMIN — NICOTINE POLACRILEX 2 MG: 2 GUM, CHEWING BUCCAL at 17:59

## 2024-01-31 RX ADMIN — LORAZEPAM 1 MG: 1 TABLET ORAL at 14:06

## 2024-01-31 RX ADMIN — DIAZEPAM 10 MG: 5 TABLET ORAL at 17:50

## 2024-01-31 RX ADMIN — THIAMINE HCL TAB 100 MG 100 MG: 100 TAB at 14:06

## 2024-01-31 ASSESSMENT — LIFESTYLE VARIABLES
NAUSEA AND VOMITING: NO NAUSEA AND NO VOMITING
ANXIETY: MILDLY ANXIOUS
ANXIETY: MODERATELY ANXIOUS, OR GUARDED, SO ANXIETY IS INFERRED
HEADACHE, FULLNESS IN HEAD: NOT PRESENT
AGITATION: MODERATELY FIDGETY AND RESTLESS
AGITATION: SOMEWHAT MORE THAN NORMAL ACTIVITY
NAUSEA AND VOMITING: NO NAUSEA AND NO VOMITING
PAROXYSMAL SWEATS: BARELY PERCEPTIBLE SWEATING, PALMS MOIST
VISUAL DISTURBANCES: NOT PRESENT
SKIP TO QUESTIONS 9-10: 0
AUDITORY DISTURBANCES: NOT PRESENT
HEADACHE, FULLNESS IN HEAD: MODERATE
AGITATION: NORMAL ACTIVITY
HEADACHE, FULLNESS IN HEAD: NOT PRESENT
ORIENTATION AND CLOUDING OF SENSORIUM: ORIENTED AND CAN DO SERIAL ADDITIONS
ANXIETY: MODERATELY ANXIOUS, OR GUARDED, SO ANXIETY IS INFERRED
AUDITORY DISTURBANCES: NOT PRESENT
ORIENTATION AND CLOUDING OF SENSORIUM: ORIENTED AND CAN DO SERIAL ADDITIONS
TREMOR: NOT VISIBLE, BUT CAN BE FELT FINGERTIP TO FINGERTIP
VISUAL DISTURBANCES: NOT PRESENT
AUDITORY DISTURBANCES: NOT PRESENT
PAROXYSMAL SWEATS: NO SWEAT VISIBLE
NAUSEA AND VOMITING: NO NAUSEA AND NO VOMITING
PAROXYSMAL SWEATS: NO SWEAT VISIBLE
TOTAL SCORE: 14
TOTAL SCORE: 9
VISUAL DISTURBANCES: MODERATE SENSITIVITY
ORIENTATION AND CLOUDING OF SENSORIUM: ORIENTED AND CAN DO SERIAL ADDITIONS
TREMOR: NOT VISIBLE, BUT CAN BE FELT FINGERTIP TO FINGERTIP
TOTAL SCORE: 2
TREMOR: 2

## 2024-01-31 ASSESSMENT — ACTIVITIES OF DAILY LIVING (ADL)
DRESS: PROMPTS;INDEPENDENT
ADLS_ACUITY_SCORE: 35
ADLS_ACUITY_SCORE: 37
HYGIENE/GROOMING: INDEPENDENT
ADLS_ACUITY_SCORE: 54
ADLS_ACUITY_SCORE: 57
ADLS_ACUITY_SCORE: 54
ADLS_ACUITY_SCORE: 54
ORAL_HYGIENE: INDEPENDENT

## 2024-01-31 NOTE — ED TRIAGE NOTES
Patient denies hx of withdrawal seizure.      Triage Assessment (Adult)       Row Name 01/31/24 1131          Triage Assessment    Airway WDL WDL        Respiratory WDL    Respiratory WDL WDL        Skin Circulation/Temperature WDL    Skin Circulation/Temperature WDL WDL        Cardiac WDL    Cardiac WDL WDL

## 2024-01-31 NOTE — TELEPHONE ENCOUNTER
S: [facility] , Provider calling at 2:05 PM with clinical on a 48 year old/Male presenting for alcohol detox.     B: Pt presents for ETOH detox.   Currently reports drinking pint of tequila everyday for last few months.    Patient reports last use was this morning.  Pt JIMMY: 0.113  Pt  denies hx of DT  Pt  denies hx of seizures. Last seizure:   Pt endorsing the following symptoms of withdrawal: Anxious  MSSA Score: 10    Pt denies acute mental health or medical concerns.   Pt endorses other drug use: (!) CANNABIS PRODUCTS Amount/frequency: N/A    Does Pt have a detox care plan in [facility]? no  Does pt present with specific needs, assistive devices, or exclusionary criteria? None  Is the patient ambulating, eating and drinking in the ED? yes    A: Pt meets criteria to be presented for IP detox admission. Patient is voluntary    COVID Symptoms: No  If yes, COVID test required   Utox: Positive for barbiturates and cannabinoids  Magnesium: WNL  CMP: Abnormalities: CO2: 20; Anion gap: 18; glucose: 105  CBC: WNL  HCG: N/A     R: Patient cleared and ready for behavioral bed placement: Yes    Pt is meeting criteria for presentation to 3A/CD    Does Patient need a Transfer Center request created? No, Pt is located within [facility] ED, [facility] ED, or [place] ED           2:40 PM Paged [facility] to review for [facility]  2:51 PM [facility] accepted pt for admission to 3[facility].  2:59 PM Called 3A to inform them of pt in queue. ETHEL stated that there is an admission ahead of Georges and that pt will be accommodated on evening shift.   2:59 PM Called ED to provide pt placement info.

## 2024-01-31 NOTE — COMMUNITY RESOURCES LIST (ENGLISH)
01/31/2024   [facility]  N/A  For questions about this resource list or additional care needs, please contact your primary care clinic or care manager.  Phone: [phone]   Email: N/A   Address: [address] [place] [id number]   Hours: N/A        Substance Use       Outpatient substance use treatment  1  [facility] Distance: 1.52 miles      In-Person, Phone/Virtual   [address] [place] [address]  Language: English  Hours: Mon - Fri 8:00 AM - 8:00 PM  Fees: Insurance, Self Pay, State or [facility] Child Welfare Agency   Phone: (654) 923-3901 Email: lenny@[employer] Website: https://[employer]/project/[place]-location/     2  [facility] Distance: 2.04 miles      Phone/Virtual   [address] [place] [address]  Language: English, Croatian  Hours: Mon - Fri 7:30 AM - 6:00 PM  Fees: Insurance, Self Pay   Phone: (399) 819-9733 Email: [employer] Website: http://www.[employer]/     Substance use support group  3  [facility] Distance: 5.24 miles      Phone/Virtual   [address] [place] [address]  Language: English  Hours: Tue 10:00 AM - 11:30 AM  Fees: Insurance, Self Pay   Phone: (897) 845-3588 Email: [employer] Website: http://www.[employer].org/     4  [facility] Distance: 7.83 miles      In-Person   [address] [place] [address]  Language: English  Hours: Tue 6:45 PM - 8:30 PM  Fees: Free   Phone: (512) 827-5219 Email: office@[employer].com Website: http://[employer].org/          Important Numbers & Websites       Emergency Services   911  City Services   311  Poison Control   (671) 844-2070  Suicide Prevention Lifeline   (771) 815-7142 (TALK)  Child Abuse Hotline   (966) 630-6093 (4-A-Child)  Sexual Assault  Hotline   (191) 529-7080 (HOPE)  National Runaway Safeline   (320) 497-1151 (RUNAWAY)  All-Options Talkline   (654) 306-2016  Substance Abuse Referral   (540) 378-5160 (HELP)

## 2024-01-31 NOTE — ED PROVIDER NOTES
[facility] [place] [facility] ([facility])  1/31/24        History     Chief Complaint   Patient presents with    Alcohol Problem     Here for detox from alcohol , drinks a pint up to a quart of Tequila daily. Last drink was this morning around 9:30 am     HPI  Georges Moreno is a 48 year old male with a past medical history significant for alcohol use disorder, PAF, anxiety and depression who presents to the Emergency Department seeking detox. He states he has been drinking daily about a pint of tequila for the last few months. He has had withdrawal before but no seizures or DT's. He reports wanting to stop drinking so came to the ED. His last drink was this morning. History of cannabis use as well.      Past Medical History  Past Medical History:   Diagnosis Date    ADHD     Alcohol use disorder, severe, dependence (H)     Atrial fibrillation (H)     Depression, major, single episode     moderate    PUSHPA (generalized anxiety disorder)     HA (headache)     Pancreatitis     Paroxysmal atrial fibrillation (H)     Perirectal abscess     Personal history of alcoholism (H)     Polysubstance (excluding opioids) dependence, daily use (H)     PTSD (post-traumatic stress disorder)     Substance abuse (H)     Tobacco dependence      Past Surgical History:   Procedure Laterality Date    INCISION AND DRAINAGE PERIRECTAL ABSCESS      SOFT TISSUE SURGERY      Excision of pilonidal cyst    SOFT TISSUE SURGERY      removal of rectal cyst     acetaminophen (TYLENOL) 325 MG tablet  albuterol (PROAIR HFA/PROVENTIL HFA/VENTOLIN HFA) 108 (90 Base) MCG/ACT inhaler  metoprolol tartrate (LOPRESSOR) 25 MG tablet  PARoxetine (PAXIL) 20 MG tablet  QUEtiapine (SEROQUEL) 25 MG tablet      Allergies   Allergen Reactions    Sertraline Other (See Comments)     Excessive fatigue  Reaction(s): Excessive fatigue.    Duloxetine Hives and Rash     Rash, hives     Family History  Family History   Problem Relation Age of Onset    Family  History Negative Mother     Alcohol/Drug Father     Diabetes Father     Leukemia Father     Alcohol/Drug Paternal Uncle     Alcoholism Father     Alcoholism Brother      Social History   Social History     Tobacco Use    Smoking status: Every Day     Packs/day: 1.50     Years: 15.00     Additional pack years: 0.00     Total pack years: 22.50     Types: Cigarettes    Smokeless tobacco: Former    Tobacco comments:     a little over a pack a day   Vaping Use    Vaping Use: Never used   Substance Use Topics    Alcohol use: Yes     Alcohol/week: 14.0 standard drinks of alcohol     Comment: pint of tequila    Drug use: Yes     Types: Marijuana     Comment: today         A medically appropriate review of systems was performed with pertinent positives and negatives noted in the HPI, and all other systems negative.    Physical Exam   BP: 118/75  Pulse: 92  Temp: 98.5  F (36.9  C)  Resp: 16  Height: 182.9 cm (6')  Weight: 88.5 kg (195 lb)  SpO2: 96 %  Physical Exam  Constitutional:       General: He is not in acute distress.     Appearance: Normal appearance. He is not toxic-appearing.   HENT:      Head: Normocephalic and atraumatic.      Nose: Nose normal. No congestion.      Mouth/Throat:      Mouth: Mucous membranes are moist.      Pharynx: Oropharynx is clear.   Eyes:      Extraocular Movements: Extraocular movements intact.      Pupils: Pupils are equal, round, and reactive to light.   Cardiovascular:      Rate and Rhythm: Normal rate and regular rhythm.   Pulmonary:      Effort: Pulmonary effort is normal. No respiratory distress.      Breath sounds: No wheezing or rales.   Musculoskeletal:         General: Normal range of motion.      Cervical back: Normal range of motion.   Skin:     General: Skin is warm and dry.   Neurological:      General: No focal deficit present.      Mental Status: He is alert and oriented to person, place, and time.   Psychiatric:         Thought Content: Thought content does not include  homicidal or suicidal ideation.           ED Course, Procedures, & Data      Procedures                      Results for orders placed or performed during the hospital encounter of 01/31/24   Comprehensive metabolic panel     Status: Abnormal   Result Value Ref Range    Sodium 137 135 - 145 mmol/L    Potassium 3.8 3.4 - 5.3 mmol/L    Carbon Dioxide (CO2) 20 (L) 22 - 29 mmol/L    Anion Gap 18 (H) 7 - 15 mmol/L    Urea Nitrogen 8.8 6.0 - 20.0 mg/dL    Creatinine 0.78 0.67 - 1.17 mg/dL    GFR Estimate >90 >60 mL/min/1.73m2    Calcium 9.6 8.6 - 10.0 mg/dL    Chloride 99 98 - 107 mmol/L    Glucose 105 (H) 70 - 99 mg/dL    Alkaline Phosphatase 59 40 - 150 U/L    AST 39 0 - 45 U/L    ALT 27 0 - 70 U/L    Protein Total 7.4 6.4 - 8.3 g/dL    Albumin 4.6 3.5 - 5.2 g/dL    Bilirubin Total 0.6 <=1.2 mg/dL   Magnesium     Status: Normal   Result Value Ref Range    Magnesium 1.8 1.7 - 2.3 mg/dL   Asymptomatic COVID-19 Virus (Coronavirus) by PCR Nasopharyngeal     Status: Normal    Specimen: Nasopharyngeal; Swab   Result Value Ref Range    SARS CoV2 PCR Negative Negative    Narrative    Testing was performed using the Xpert Xpress SARS-CoV-2 Assay on the Cepheid Gene-Xpert Instrument Systems. Additional information about this Emergency Use Authorization (EUA) assay can be found via the Lab Guide. This test should be ordered for the detection of SARS-CoV-2 in individuals who meet SARS-CoV-2 clinical and/or epidemiological criteria as well as from individuals without symptoms or other reasons to suspect COVID-19. Test performance for asymptomatic patients has only been established in anterior nasal swab specimens. This test is for in vitro diagnostic use under the FDA EUA for laboratories certified under CLIA to perform high complexity testing. This test has not been FDA cleared or approved. A negative result does not rule out the presence of PCR inhibitors in the specimen or target RNA concentration below the limit of detection for  the assay. The possibility of a false negative should be considered if the patient's recent exposure or clinical presentation suggests COVID-19. This test was validated by the [facility]. This laboratory is certified under the Clinical Laboratory Improvement Amendments (CLIA) as qualified to perform high complexity laboratory testing.     Urine Drug Screen Panel     Status: Abnormal   Result Value Ref Range    Amphetamines Urine Screen Negative Screen Negative    Barbituates Urine Screen Positive (A) Screen Negative    Benzodiazepine Urine Screen Negative Screen Negative    Cannabinoids Urine Screen Positive (A) Screen Negative    Cocaine Urine Screen Negative Screen Negative    Fentanyl Qual Urine Screen Negative Screen Negative    Opiates Urine Screen Negative Screen Negative    PCP Urine Screen Negative Screen Negative   CBC with platelets and differential     Status: None   Result Value Ref Range    WBC Count 6.1 4.0 - 11.0 10e3/uL    RBC Count 4.92 4.40 - 5.90 10e6/uL    Hemoglobin 16.1 13.3 - 17.7 g/dL    Hematocrit 44.9 40.0 - 53.0 %    MCV 91 78 - 100 fL    MCH 32.7 26.5 - 33.0 pg    MCHC 35.9 31.5 - 36.5 g/dL    RDW 12.4 10.0 - 15.0 %    Platelet Count 195 150 - 450 10e3/uL    % Neutrophils 67 %    % Lymphocytes 26 %    % Monocytes 7 %    % Eosinophils 0 %    % Basophils 0 %    % Immature Granulocytes 0 %    NRBCs per 100 WBC 0 <1 /100    Absolute Neutrophils 4.1 1.6 - 8.3 10e3/uL    Absolute Lymphocytes 1.6 0.8 - 5.3 10e3/uL    Absolute Monocytes 0.4 0.0 - 1.3 10e3/uL    Absolute Eosinophils 0.0 0.0 - 0.7 10e3/uL    Absolute Basophils 0.0 0.0 - 0.2 10e3/uL    Absolute Immature Granulocytes 0.0 <=0.4 10e3/uL    Absolute NRBCs 0.0 10e3/uL   Alcohol breath test POCT     Status: Abnormal   Result Value Ref Range    Alcohol Breath Test 0.113 (A) 0.00 - 0.01   Urine Drug Screen     Status: Abnormal    Narrative    The following orders were created for panel order Urine Drug  Screen.  Procedure                               Abnormality         Status                     ---------                               -----------         ------                     Urine Drug Screen Panel[[id number]]      Abnormal            Final result                 Please view results for these tests on the individual orders.   CBC with platelets differential     Status: None    Narrative    The following orders were created for panel order CBC with platelets differential.  Procedure                               Abnormality         Status                     ---------                               -----------         ------                     CBC with platelets and d...[[id number]]                      Final result                 Please view results for these tests on the individual orders.     Medications   flumazenil (ROMAZICON) injection 0.2 mg (has no administration in time range)   melatonin tablet 5 mg (has no administration in time range)   LORazepam (ATIVAN) tablet 1-2 mg (2 mg Oral $Given 1/31/24 1514)     Or   LORazepam (ATIVAN) injection 1-2 mg ( Intravenous See Alternative 1/31/24 1514)   thiamine (B-1) tablet 100 mg (100 mg Oral $Given 1/31/24 1406)   folic acid (FOLVITE) tablet 1 mg (1 mg Oral $Given 1/31/24 1406)   multivitamin w/minerals (THERA-VIT-M) tablet 1 tablet (1 tablet Oral $Given 1/31/24 1406)     Labs Ordered and Resulted from Time of ED Arrival to Time of ED Departure   COMPREHENSIVE METABOLIC PANEL - Abnormal       Result Value    Sodium 137      Potassium 3.8      Carbon Dioxide (CO2) 20 (*)     Anion Gap 18 (*)     Urea Nitrogen 8.8      Creatinine 0.78      GFR Estimate >90      Calcium 9.6      Chloride 99      Glucose 105 (*)     Alkaline Phosphatase 59      AST 39      ALT 27      Protein Total 7.4      Albumin 4.6      Bilirubin Total 0.6     URINE DRUG SCREEN PANEL - Abnormal    Amphetamines Urine Screen Negative      Barbituates Urine Screen Positive (*)      Benzodiazepine Urine Screen Negative      Cannabinoids Urine Screen Positive (*)     Cocaine Urine Screen Negative      Fentanyl Qual Urine Screen Negative      Opiates Urine Screen Negative      PCP Urine Screen Negative     ALCOHOL BREATH TEST POCT - Abnormal    Alcohol Breath Test 0.113 (*)    MAGNESIUM - Normal    Magnesium 1.8     COVID-19 VIRUS (CORONAVIRUS) BY PCR - Normal    SARS CoV2 PCR Negative     CBC WITH PLATELETS AND DIFFERENTIAL    WBC Count 6.1      RBC Count 4.92      Hemoglobin 16.1      Hematocrit 44.9      MCV 91      MCH 32.7      MCHC 35.9      RDW 12.4      Platelet Count 195      % Neutrophils 67      % Lymphocytes 26      % Monocytes 7      % Eosinophils 0      % Basophils 0      % Immature Granulocytes 0      NRBCs per 100 WBC 0      Absolute Neutrophils 4.1      Absolute Lymphocytes 1.6      Absolute Monocytes 0.4      Absolute Eosinophils 0.0      Absolute Basophils 0.0      Absolute Immature Granulocytes 0.0      Absolute NRBCs 0.0       No orders to display          Critical care was not performed.     Medical Decision Making  The patient's presentation was of high complexity (a chronic illness severe exacerbation, progression, or side effect of treatment).    The patient's evaluation involved:  review of external note(s) from 3+ sources (outside ED note, family medicine clinic note, telephone encounter)  review of 3+ test result(s) ordered prior to this encounter (cbc, bmp, hepatic panel)  ordering and/or review of 3+ test(s) in this encounter (see separate area of note for details)  discussion of management or test interpretation with another health professional (see separate area of note for details)    The patient's management necessitated high risk (a decision regarding hospitalization).    Assessment & Plan    This is a 48-year-old male with history of alcohol use presenting seeking detox.  He was afebrile, hemodynamically stable.  No known history of withdrawal seizures or DTs.   Screening blood work was obtained including a CBC, CMP, magnesium which were not suggestive of acute process.  Alcohol point-of-care was 0.113.  UDS was positive for cannabis and barbiturates.    The patient was started on MSSA protocol.  Patient was discussed with mental health intake and accepted to detox.    I have reviewed the nursing notes. I have reviewed the findings, diagnosis, plan and need for follow up with the patient.    New Prescriptions    No medications on file       Final diagnoses:   Alcohol abuse         [facility] EMERGENCY DEPARTMENT  1/31/2024     Aditya Tam MD  01/31/24 [id number]

## 2024-01-31 NOTE — PROGRESS NOTES
01/31/24 1737   Patient Belongings   Did you bring any home meds/supplements to the hospital?  No   Patient Belongings other (see comments)  (Storage bin and Med bin)   Belongings Search Yes   Clothing Search Yes   Second Staff Rodri Moreno       Storage Bin: Stringed pants and jacket    Med Bin: Cigarettes, 2 lighters, cell phone, cell phone , paper slip with mothers phone number, wallet; Env #[id number]: [place], [place] Card (Blue), and Visa Platinum Card    A               Admission:  I am responsible for any personal items that are not sent to the safe or pharmacy.  [facility] is not responsible for loss, theft or damage of any property in my possession.    Signature:  _________________________________ Date: _______  Time: _____                                              Staff Signature:  ____________________________ Date: ________  Time: _____      2nd Staff person, if patient is unable/unwilling to sign:    Signature: ________________________________ Date: ________  Time: _____     Discharge:  [facility] has returned all of my personal belongings:    Signature: _________________________________ Date: ________  Time: _____                                          Staff Signature:  ____________________________ Date: ________  Time: _____

## 2024-02-01 LAB
CHOLEST SERPL-MCNC: 176 MG/DL
GGT SERPL-CCNC: 54 U/L (ref 8–61)
HDLC SERPL-MCNC: 44 MG/DL
LDLC SERPL CALC-MCNC: 91 MG/DL
NONHDLC SERPL-MCNC: 132 MG/DL
TRIGL SERPL-MCNC: 206 MG/DL
TSH SERPL DL<=0.005 MIU/L-ACNC: 1.21 UIU/ML (ref 0.3–4.2)

## 2024-02-01 PROCEDURE — 36415 COLL VENOUS BLD VENIPUNCTURE: CPT | Performed by: NURSE PRACTITIONER

## 2024-02-01 PROCEDURE — 250N000013 HC RX MED GY IP 250 OP 250 PS 637: Performed by: PSYCHIATRY & NEUROLOGY

## 2024-02-01 PROCEDURE — 84443 ASSAY THYROID STIM HORMONE: CPT | Performed by: NURSE PRACTITIONER

## 2024-02-01 PROCEDURE — HZ2ZZZZ DETOXIFICATION SERVICES FOR SUBSTANCE ABUSE TREATMENT: ICD-10-PCS | Performed by: PSYCHIATRY & NEUROLOGY

## 2024-02-01 PROCEDURE — 250N000013 HC RX MED GY IP 250 OP 250 PS 637: Performed by: EMERGENCY MEDICINE

## 2024-02-01 PROCEDURE — 99223 1ST HOSP IP/OBS HIGH 75: CPT | Mod: AI | Performed by: PSYCHIATRY & NEUROLOGY

## 2024-02-01 PROCEDURE — 250N000013 HC RX MED GY IP 250 OP 250 PS 637: Performed by: NURSE PRACTITIONER

## 2024-02-01 PROCEDURE — 250N000013 HC RX MED GY IP 250 OP 250 PS 637

## 2024-02-01 PROCEDURE — 80061 LIPID PANEL: CPT | Performed by: NURSE PRACTITIONER

## 2024-02-01 PROCEDURE — 128N000004 HC R&B CD ADULT

## 2024-02-01 PROCEDURE — 82977 ASSAY OF GGT: CPT | Performed by: NURSE PRACTITIONER

## 2024-02-01 PROCEDURE — 99254 IP/OBS CNSLTJ NEW/EST MOD 60: CPT

## 2024-02-01 RX ORDER — METHOCARBAMOL 750 MG/1
750 TABLET, FILM COATED ORAL 4 TIMES DAILY PRN
Status: DISCONTINUED | OUTPATIENT
Start: 2024-02-01 | End: 2024-02-02 | Stop reason: HOSPADM

## 2024-02-01 RX ORDER — PAROXETINE 20 MG/1
20 TABLET, FILM COATED ORAL EVERY MORNING
Status: DISCONTINUED | OUTPATIENT
Start: 2024-02-01 | End: 2024-02-01

## 2024-02-01 RX ORDER — ACETAMINOPHEN 325 MG/1
975 TABLET ORAL EVERY 8 HOURS PRN
Status: DISCONTINUED | OUTPATIENT
Start: 2024-02-01 | End: 2024-02-02 | Stop reason: HOSPADM

## 2024-02-01 RX ORDER — ACETAMINOPHEN 650 MG/1
650 SUPPOSITORY RECTAL EVERY 4 HOURS PRN
Status: DISCONTINUED | OUTPATIENT
Start: 2024-02-01 | End: 2024-02-01

## 2024-02-01 RX ORDER — GABAPENTIN 400 MG/1
400 CAPSULE ORAL 3 TIMES DAILY
Status: DISCONTINUED | OUTPATIENT
Start: 2024-02-01 | End: 2024-02-02 | Stop reason: HOSPADM

## 2024-02-01 RX ORDER — HYDROXYZINE HYDROCHLORIDE 25 MG/1
25-50 TABLET, FILM COATED ORAL EVERY 4 HOURS PRN
Status: DISCONTINUED | OUTPATIENT
Start: 2024-02-01 | End: 2024-02-02 | Stop reason: HOSPADM

## 2024-02-01 RX ORDER — FLUOXETINE 10 MG/1
10 CAPSULE ORAL DAILY
Status: DISCONTINUED | OUTPATIENT
Start: 2024-02-01 | End: 2024-02-02 | Stop reason: HOSPADM

## 2024-02-01 RX ORDER — METOPROLOL TARTRATE 25 MG/1
25 TABLET, FILM COATED ORAL 2 TIMES DAILY
Status: DISCONTINUED | OUTPATIENT
Start: 2024-02-01 | End: 2024-02-02 | Stop reason: HOSPADM

## 2024-02-01 RX ADMIN — FLUOXETINE 10 MG: 10 CAPSULE ORAL at 08:58

## 2024-02-01 RX ADMIN — GABAPENTIN 400 MG: 400 CAPSULE ORAL at 08:58

## 2024-02-01 RX ADMIN — DIAZEPAM 10 MG: 5 TABLET ORAL at 11:54

## 2024-02-01 RX ADMIN — PAROXETINE 15 MG: 30 TABLET, FILM COATED ORAL at 08:59

## 2024-02-01 RX ADMIN — DIAZEPAM 10 MG: 5 TABLET ORAL at 08:59

## 2024-02-01 RX ADMIN — METOPROLOL TARTRATE 25 MG: 25 TABLET, FILM COATED ORAL at 20:31

## 2024-02-01 RX ADMIN — NICOTINE POLACRILEX 4 MG: 4 GUM, CHEWING BUCCAL at 09:00

## 2024-02-01 RX ADMIN — GABAPENTIN 400 MG: 400 CAPSULE ORAL at 20:31

## 2024-02-01 RX ADMIN — METOPROLOL TARTRATE 25 MG: 25 TABLET, FILM COATED ORAL at 08:58

## 2024-02-01 RX ADMIN — FOLIC ACID 1 MG: 1 TABLET ORAL at 08:58

## 2024-02-01 RX ADMIN — THIAMINE HCL TAB 100 MG 100 MG: 100 TAB at 08:59

## 2024-02-01 RX ADMIN — GABAPENTIN 400 MG: 400 CAPSULE ORAL at 14:25

## 2024-02-01 RX ADMIN — Medication 1 TABLET: at 08:59

## 2024-02-01 RX ADMIN — DIAZEPAM 10 MG: 5 TABLET ORAL at 00:14

## 2024-02-01 ASSESSMENT — ACTIVITIES OF DAILY LIVING (ADL)
ADLS_ACUITY_SCORE: 54
ADLS_ACUITY_SCORE: 54
ADLS_ACUITY_SCORE: 45
DRESS: STREET CLOTHES
ORAL_HYGIENE: INDEPENDENT
LAUNDRY: WITH SUPERVISION
ORAL_HYGIENE: INDEPENDENT
ADLS_ACUITY_SCORE: 44
ADLS_ACUITY_SCORE: 54
ADLS_ACUITY_SCORE: 44
ADLS_ACUITY_SCORE: 54
HYGIENE/GROOMING: INDEPENDENT
HYGIENE/GROOMING: INDEPENDENT
ADLS_ACUITY_SCORE: 45
ADLS_ACUITY_SCORE: 54
ADLS_ACUITY_SCORE: 44
DRESS: SCRUBS (BEHAVIORAL HEALTH);INDEPENDENT
LAUNDRY: UNABLE TO COMPLETE
ADLS_ACUITY_SCORE: 45
ADLS_ACUITY_SCORE: 45

## 2024-02-01 NOTE — PLAN OF CARE
Goal Outcome Evaluation:    Plan of Care Reviewed With: patient      Patient pleasant and cooperative, visible in milieu.  Flat affect, denies SI/SIB, rated depression 2/10, anxiety 6/10, denies pain.  MSSA 8 and 10 this shift.  Received 10 mg valium x2 per protocol.  Patient socialized and watched T.V with peers.  Attended group meetings and participated in activities.  Patient was seeing by both internal medicine and psychiatrist, new medication order.  Good appetite, medication compliant.  Patient resting comfortably in room.  Will continue to monitor closely.
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
## 2024-02-01 NOTE — PLAN OF CARE
S:  The Patient presented to room 329-2 at 1652 pm on 1/31/2024 for alcohol detox under Dr. Anaya's care voluntarily. The [facility] ED department referred to the Patient as a person with chemical dependency problems.    B: Based on the E.D. notes, RN-to-RN report, and the assessment interview, Georges Moreno is a 48-year-old male with a past medical history significant for alcohol use disorder, anxiety, ADHD, Atrial fibrillation (H), HA (headache), Paroxysmal atrial fibrillation (PAF), PTSD, Pancreatitis, Tobacco dependence, Polysubstance (excluding opioids) dependence, cannabis, Perirectal abscess, and depression who presents to the Emergency Department seeking alcohol detox. He states he has been drinking daily about a pint of tequila for the last few months, with the most recent drink this morning(1/31/24). He started drinking at 9 and it became a problem at 14. Georges has had withdrawal before but no seizures. He reports wanting to stop drinking, so he came to the ED. His last drink was this morning.  Lab results:  JIMMY: 0.113  COVID Symptoms: No   Utox: Positive for barbiturates and cannabinoids  Magnesium: WNL  CMP: Abnormalities: CO2: 20; Anion gap: 18; glucose: 105  CBC: WNL  HCG: N/A    A: The patient cooperated with the admission process and safety search. He denied SI, HI, AVHs, depression, decreased appetite, and pain but endorsed anxiety, sadness, loneliness, and abnormal sleeping patterns. Georges does not work, but he lives with his mother in [place].  This hospitalization is not his first detox( He was in 2020). He has detoxed ten times, been to residential treatment x6, and has had OP Care treatment history but no  hospitalization.  He is unsure if he wants treatment. His latest stressors include but are not limited to GF breaking up with him, no work, car breaking down, neuropathy in the hands, etc. He is placed on assault precaution because he has a history of anger, bar fighting, and street fighting.    R:  The Patient will be seen in the morning by a team of professionals, including a psychiatrist, an internal medicine provider, and a CTC. The primary R.N. will use the [facility] Protocol to assess for alcohol withdrawal SxS. Valium has been ordered to treat the SxS. The team will assist the Patient in developing healthy coping skills, setting daily goals, adhering to medication, reporting any side effects, building rapport, and starting the 15-minute safety checks.

## 2024-02-01 NOTE — PROGRESS NOTES
Triage & Transition Services, [address]     Georges Moreno  February 1, 2024    Insurance: [employer]      Legal Status: Voluntary      SUDs Assessment Status: None is needed at this time.     ROIs on file: None     Living Situation: Patient reports he lives in a home and reports the home is stable.      Current Providers and Supports:  None    Encounter: Writer met with patient to discuss discharge planning and aftercare planning, patient stated he would like to detox only and return home. Patient stated that he has a support system at home his mother is sober and he attends AA meetings and has a sponsor. At this time patient is declining all case management resources and services.     Collateral: None      Current Plan: Discharge home.      RN updated.    LAURA ROCHA  Triage & Transition Services - Mental Health and Addiction Service Line  [address] - Adult Inpatient Addiction Psychiatry Unit

## 2024-02-01 NOTE — PLAN OF CARE
Problem: Alcohol Withdrawal  Goal: Alcohol Withdrawal Symptom Control  2/1/2024 0021 by Guy Robledo RN  Outcome: Progressing     Problem: Pain Acute  Goal: Optimal Pain Control and Function  2/1/2024 0021 by Guy Robledo RN  Outcome: Progressing     Problem: Fall Injury Risk  Goal: Absence of Fall and Fall-Related Injury  2/1/2024 0021 by Guy Robledo, RN  Outcome: Progressing     Problem: Sleep Disturbance  Goal: Adequate Sleep/Rest  Outcome: Progressing   Goal Outcome Evaluation:   Plan of Care Reviewed With: patient      The Pt's MSSA scores were 8 and 5; hence, he received Valium 10 mg once. He slept 7 hours.The 15-minute safety checks are in progress with no related incidents.

## 2024-02-01 NOTE — PLAN OF CARE
Behavioral Team Discussion: (2/1/2024)    Continued Stay Criteria/Rationale: Patient admitted for  Alcohol Use Disorder.  Plan: The following services will be provided to the patient; psychiatric assessment, medication management, therapeutic milieu, individual and group support, and skills groups.   Participants: 3A Provider: Dr Coby Gibson MD; 3A RN: Migdalia Fontenot, RN; 3A CM's: Emma Rose.  Summary/Recommendation: Providers will assess today for treatment recommendations, discharge planning, and aftercare plans. CM will meet with pt for discharge planning.   Medical/Physical: Patient denies any medical or physical concerns at this time.  Precautions:   Behavioral Orders   Procedures    Assault precautions    Code 1 - Restrict to Unit    Fall precautions    Routine Programming     As clinically indicated    Status 15     Every 15 minutes.    Withdrawal precautions     Rationale for change in precautions or plan: N/A  Progress: Initial.    ASAM Dimension Scale Ratings:  Dimension 1: 3 Client tolerates and parveen with withdrawal discomfort poorly. Client has severe intoxication, such that the client endangers self or others, or intoxication has not abated with less intensive levels of services. Client displays severe signs and symptoms; or risk of severe, but manageable withdrawal; or withdrawal worsening despite detox at less intensive level.  Dimension 2: 1 Client tolerates and parveen with physical discomfort and is able to get the services that the client needs.  Dimension 3: 2 Client has difficulty with impulse control and lacks coping skills. Client has thoughts of suicide or harm to others without means; however, the thoughts may interfere with participation in some treatment activities. Client has difficulty functioning in significant life areas. Client has moderate symptoms of emotional, behavioral, or cognitive problems. Client is able to participate in most treatment activities.  Dimension 4: 3 Client  displays inconsistent compliance, minimal awareness of either the client's addiction or mental disorder, and is minimally cooperative.  Dimension 5: 3 Client has poor recognition and understanding of relapse and recidivism issues and displays moderately high vulnerability for further substance use or mental health problems. Client has few coping skills and rarely applies coping skills.  Dimension 6: 3 Client is not engaged in structured, meaningful activity and the client's peers, family, significant other, and living environment are unsupportive, or there is significant criminal justice system involvement.

## 2024-02-01 NOTE — DISCHARGE INSTRUCTIONS
Behavioral Discharge Planning and Instructions  THANK YOU FOR CHOOSING [facility]  3AW  [phone]    Summary: You were admitted to Station 3A on 01/31/2024 for detoxification from Alcohol Use Disorder.  A medical exam was performed that included lab work. You have met with a  and opted to attending AA meetings and declining all case management services and resources at this time.  Please take care and make your recovery a daily priority, Georges!  It was a pleasure working with you and the entire treatment team here wishes you the very best in your recovery!     Recommendation:  Please seek CD treatment and or assessment as needed and and required. Please follow any and all recommendations as needed and required from either the assessment or treatment recommendations.         To schedule an assessment:  Call the [place] assessment center at [phone] and ask to schedule a Chemical Health Assessment.    You can also call your [facility] Chemical Dependency unit (these are usually included under adult mental health services). Most [facility] have a number for you to call to schedule an assessment.  If you have private insurance or a PMAP you can call the customer service number on your insurance  card and they can give you a list of places to call for an assessment that are in network with your  insurance.      To find a treatment program:     SAMHSA.gov   Click on find treatment   Enter your zip code and select your city/state.  The Substance Abuse and Mental Health Services Administration (SAMHSA) is the agency within  the U.S. Department of Health and Human Services that leads public health efforts to advance the  behavioral health of the nation. SAMHSA's mission is to reduce the impact of substance abuse and  mental illness on [place] communities.    [facility].org select substance use disorder programs then select find services.  Fast Tracker is a virtual community and health care connection  resource. We connect  individuals, families, mental health and substance use disorder providers, physicians, care coordinators,  and others with a real-time, searchable directory of mental health and substance use disorder resources  and their availability within [place].     Main Diagnoses:  Per Dr. Coby Gibson MD;  303.90 (F10.20) Alcohol Use Disorder Severe    Major Treatments, Procedures and Findings:  You were treated for alcohol detoxification using valium. You did not complete a chemical dependency assessment. You had labs drawn and those results were reviewed with you. Please take a copy of your lab work with you to your next primary care provider appointment.    Symptoms to Report:  If you experience more anxiety, confusion, sleeplessness, deep sadness or thoughts of suicide, notify your treatment team or notify your primary care provider. IF ANY OF THE SYMPTOMS YOU ARE EXPERIENCING ARE A MEDICAL EMERGENCY CALL 911 IMMEDIATELY.     Lifestyle Adjustment: Adjust your lifestyle to get enough sleep, relaxation, exercise and good nutrition. Continue to develop healthy coping skills to decrease stress and promote a sober living environment. Do not use mood altering substances including alcohol, illegal drugs or addictive medications other than what is currently prescribed.     Disposition: Return Home    Facts about COVID19 at www.cdc.gov/COVID19 and www.[place].gov/covid19    Keeping hands clean is one of the most important steps we can take to avoid getting sick and spreading germs to others.  Please wash your hands frequently and lather with soap for at least 20 seconds!    Follow-up Appointment:   Please make a follow up appointment with your primary provider    Recovery apps for your phone to locate current in person and zoom recovery meetings  [place] - meeting loyd  AA  - meeting loyd  Meeting guide - meeting loyd  Quick NA meeting - meeting loyd  Nicolas- has various apps    Resources:  Some  "AA/NA meetings are being held online however most have returned to in-person or a hybrid combination please check online to verify*  Need Support Now? If you or someone you know is struggling or in crisis, help is available. Call or text 837 or chat [employer].org  AA meetings search for them at: https://aa-intergroup.org (worldwide meeting listings)  AA meetings for [place] area can be found online at: https://a[place].org (click local online meetings listings)  NA meetings for [place] area can be found online at: https://www.nam[place].org  (click find a meeting)  AA and NA Sponsors are excellent resources for support and you can find one at any support group meeting.   Alcoholics Anonymous (https://aa.org/): for information 24 hours/day  AA Intergroup service office in [place] (http://www.aastpaul.org/) 865.454.8504  AA Intergroup service office in [facility]: 626.237.1556. (http://www.aaCaptronic Systems.org/)  Narcotics Anonymous (www.nam[place].org) (185) 109-9693  https://aafairviewriverside.org/meetings  SMART Recovery - self management for addiction recovery:  www.[employer]recovery.org  Pathways ~ A Health Crisis Resource & Support Center:  746.872.3533.  https://prescribetoprevent.org/patient-education/videos/  http://www.harmreduction.org  Crisis Text Line  Text 273715  You will be connected with a trained live crisis counselor to provide support. Por espanol, texto  JACK a 680274 o texto a 442-AYUDAME en WhatsApp  National Hope Line  1.800.SUICIDE [6598179]  Located within [facility] [phone]  Support Group:  AA/NA and Sponsor/support.  Fast Tracker  Linking people to mental health and substance use disorder resources  HealthboxckSwiftPayMD(TM) by [employer].org   [place] Mental Health Warm Line  Peer to peer support  Monday thru Saturday, 12 pm to 10 pm  816.621.9595 or 1.958.936.8336  Text \"Support\" to 35961  National Garner on Mental Illness (KAROL)  439.685.0384 or 1.888.KAROL.HELPS  Alcoholics Anonymous " (www.alcoholics-anonymous.org): Check your phone book for your local chapter.  Suicide Awareness Voices of Education (SAVE) (www.save.org): 163-258-VNOT (2516)  National Suicide Prevention Line (www.mentalhealthmn.org): 680-219-UIOW (0548)  Mental Health Consumer/Survivor Network of [place] (www.mhcsn.net): 526.960.5304 or 126-434-6889  Mental Health Association of [place] (www.mentalhealth.org): 463.399.7835 or 067-681-3836   Substance Abuse and Mental Health Services (www.samhsa.gov)  [place] Opioid Prevention Coalition: www.opioidcoalition.org    [place] Recovery Connection (MRC)  [facility] connects people seeking recovery to resources that help foster and sustain long-term recovery.  Whether you are seeking resources for treatment, transportation, housing, job training, education, health care or other pathways to recovery, [facility] is a great place to start.  418.982.4660.  www.Simple-Fill.Broadlink Pod casts for nutrition and wellness  Listen on Apple Podcasts  [employer]   Nutrition       Understand the connection between what you eat and how you feel. Hosted by licensed nutritionists and dietitians from [employer] [facility] we share practical, real-life solutions for healthier living through nutrition.     General Medication Instructions:   See your medication sheet(s) for instructions.   Take all medications as prescribed.  Make no changes unless your primary care provider suggests them.   Go to all your primary care provider visits.  Be sure to have all your required lab tests. This way, your medicines can be refilled on time.  Do not use any forms of alcohol.    Please Note:  If you have any questions at anytime after you are discharged please call [facility] detox unit 3AW at [phone].  [facility], Behavioral Intake [phone]  Medical Records call [phone]  Outpatient Behavioral Intake call [phone]  LP+ Wait List/Bed  Availability call [phone]    Please remember to take all of your behavioral discharge planning and lab paperwork to any follow up appointments, it contains your lab results, diagnosis, medication list and discharge recommendations.      THANK YOU FOR CHOOSING "[employer]URSULA

## 2024-02-01 NOTE — PLAN OF CARE
Problem: Alcohol Withdrawal  Goal: Alcohol Withdrawal Symptom Control  Outcome: Progressing     Problem: Pain Acute  Goal: Optimal Pain Control and Function  Outcome: Progressing     Problem: Fall Injury Risk  Goal: Absence of Fall and Fall-Related Injury  Outcome: Progressing   Goal Outcome Evaluation:    Plan of Care Reviewed With: patient      He ate supper and returned to his room. During the admission interview, he got angry, got out of his seat, paced, and began swearing because he felt that every time he came into the hospital, they had a way of tempering how he took his medications at home. His MSSA scores were 9 and 8. He received Valium 10 mg x2 and Trazodone at HS.

## 2024-02-01 NOTE — PHARMACY-ADMISSION MEDICATION HISTORY
Pharmacist Admission Medication History    Admission medication history is complete. The information provided in this note is only as accurate as the sources available at the time of the update.    Medication reconciliation/reorder completed by provider prior to medication history? No    Information Source(s): Patient and Patient's pharmacy via in-person    Pertinent Information: Discussed medication history with patient and updated list accordingly. Patient reported to not consistently take metoprolol.    Changes made to PTA medication list:  Added: Gabapentin  Deleted: Albuterol and quetiapine  Changed: None    Allergies reviewed with patient and updates made in EHR: yes    Medication History Completed By: JOSEPH FIGUEROA RPH 1/31/2024 6:18 PM    Prior to Admission medications    Medication Sig Last Dose Taking? Auth Provider Long Term End Date   acetaminophen (TYLENOL) 325 MG tablet Take 650 mg by mouth every 6 hours as needed for pain or headaches 1/31/2024 Yes Unknown, Entered By History     gabapentin (NEURONTIN) 100 MG capsule Take 200 mg by mouth 3 times daily 1/31/2024 Yes Unknown, Entered By History No    metoprolol tartrate (LOPRESSOR) 25 MG tablet Take 1 tablet (25 mg) by mouth 2 times daily Past Week Yes Garth Cano, DO Yes    PARoxetine (PAXIL) 20 MG tablet Take 20 mg by mouth every morning 1/31/2024 Yes Unknown, Entered By History Yes
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
## 2024-02-01 NOTE — PROGRESS NOTES
"CLINICAL NUTRITION SERVICES - ASSESSMENT NOTE     Nutrition Prescription    RECOMMENDATIONS FOR MDs/PROVIDERS TO ORDER:  RD to sign off at this time. Please consult if nutrition needs arise.     Malnutrition Status:    Patient does not meet two of the established criteria necessary for diagnosing malnutrition    Recommendations already ordered by Registered Dietitian (RD):  None at this time     REASON FOR ASSESSMENT  Georges Moreno is a/an 48 year old male assessed by the dietitian for Admission Nutrition Risk Screen for positive (unsure wt loss, decreased appetite)    NUTRITION HISTORY  Met with pt in room. Pt endorses decreased appetite when drinking. He thinks he has maybe lost 7-10 lbs in the last few weeks as well. When drinking, he does not typically eat meals. When he is not drinking, he will eat 3 meals/day. Breakfast is typically fruit + yogurt. Lunch will be a sandwich and apple. His mom typically makes a well balanced dinner meal for them.     CURRENT NUTRITION ORDERS  Diet: Regular  Intake/Tolerance: no intakes documented thus far   Per pt, he has been able to eat a bit of his meal trays so far. He is tolerating a regular diet well.     LABS  Labs reviewed 2/1    MEDICATIONS  Medications reviewed  Folic acid  Thera-vit-m  Thiamine    ANTHROPOMETRICS  Height: 182.9 cm (6' 0\")  Most Recent Weight: 88.5 kg (195 lb)    IBW: 80.9 kg  BMI: Overweight BMI 25-29.9  Weight History:   Wt Readings from Last 5 Encounters:   01/31/24 88.5 kg (195 lb)   08/27/23 88.6 kg (195 lb 4.8 oz)   04/21/23 92.7 kg (204 lb 6.4 oz)   12/13/22 93 kg (205 lb)   06/23/22 91.9 kg (202 lb 11.2 oz)     Care Everywhere:  1/8/24: 91.8 kg (202 lb 4.8 oz)   9/6/23: 91 kg (200 lb 9.6 oz)   5/25/23: 92.6 kg (204 lb 2.3 oz)     3.6% wt loss in ~1 month, non significant     Dosing Weight: 88.5 kg    ASSESSED NUTRITION NEEDS  Estimated Energy Needs: 6573-9672 kcals/day (25 - 30 kcals/kg)  Justification: Maintenance  Estimated Protein Needs: "  grams protein/day (1 - 1.2 grams of pro/kg)  Justification: Maintenance  Estimated Fluid Needs: 1 mL/kcal  Justification: Maintenance    PHYSICAL FINDINGS  See malnutrition section below.    MALNUTRITION  % Intake: </= 50% for >/= 5 days (severe)  % Weight Loss: Weight loss does not meet criteria  Subcutaneous Fat Loss: None observed  Muscle Loss: None observed  Fluid Accumulation/Edema: None noted  Malnutrition Diagnosis: Patient does not meet two of the established criteria necessary for diagnosing malnutrition    NUTRITION DIAGNOSIS  No nutrition diagnosis at this time     INTERVENTIONS  Implementation  Nutrition Education: Provided education on reason for RD visit and role of RD in care. Discussed importance of adequate nutrition intakes.  Encouraged meals TID. Discussed snack/supplement options - pt declined need at this time.      Goals  Patient to consume % of nutritionally adequate meal trays TID, or the equivalent with supplements/snacks.     RD to sign off at this time. Please consult if nutrition needs arise.     Oksana Butler RD, LD  Mental Health RD pager: [phone]  Weekend/Holiday RD pager: [phone]

## 2024-02-02 VITALS
BODY MASS INDEX: 26.41 KG/M2 | HEIGHT: 72 IN | SYSTOLIC BLOOD PRESSURE: 145 MMHG | DIASTOLIC BLOOD PRESSURE: 94 MMHG | OXYGEN SATURATION: 96 % | HEART RATE: 102 BPM | RESPIRATION RATE: 16 BRPM | TEMPERATURE: 98.6 F | WEIGHT: 195 LBS

## 2024-02-02 PROCEDURE — 99239 HOSP IP/OBS DSCHRG MGMT >30: CPT | Performed by: PSYCHIATRY & NEUROLOGY

## 2024-02-02 PROCEDURE — 250N000013 HC RX MED GY IP 250 OP 250 PS 637: Performed by: EMERGENCY MEDICINE

## 2024-02-02 PROCEDURE — 250N000013 HC RX MED GY IP 250 OP 250 PS 637: Performed by: PSYCHIATRY & NEUROLOGY

## 2024-02-02 PROCEDURE — 250N000013 HC RX MED GY IP 250 OP 250 PS 637: Performed by: NURSE PRACTITIONER

## 2024-02-02 PROCEDURE — 250N000013 HC RX MED GY IP 250 OP 250 PS 637

## 2024-02-02 RX ORDER — GABAPENTIN 400 MG/1
400 CAPSULE ORAL 3 TIMES DAILY
Qty: 90 CAPSULE | Refills: 0 | Status: ON HOLD | OUTPATIENT
Start: 2024-02-02 | End: 2024-03-18

## 2024-02-02 RX ORDER — MULTIPLE VITAMINS W/ MINERALS TAB 9MG-400MCG
1 TAB ORAL DAILY
Qty: 30 TABLET | Refills: 0 | Status: ON HOLD | OUTPATIENT
Start: 2024-02-03 | End: 2024-03-18

## 2024-02-02 RX ORDER — PAROXETINE 10 MG/1
TABLET, FILM COATED ORAL
Qty: 18 TABLET | Refills: 0 | Status: SHIPPED | OUTPATIENT
Start: 2024-02-02 | End: 2024-03-05

## 2024-02-02 RX ORDER — FOLIC ACID 1 MG/1
1 TABLET ORAL DAILY
Qty: 30 TABLET | Refills: 0 | Status: SHIPPED | OUTPATIENT
Start: 2024-02-03 | End: 2024-03-05

## 2024-02-02 RX ORDER — METOPROLOL TARTRATE 25 MG/1
25 TABLET, FILM COATED ORAL 2 TIMES DAILY
Qty: 60 TABLET | Refills: 0 | Status: SHIPPED | OUTPATIENT
Start: 2024-02-02 | End: 2024-05-25

## 2024-02-02 RX ORDER — LANOLIN ALCOHOL/MO/W.PET/CERES
100 CREAM (GRAM) TOPICAL DAILY
Qty: 30 TABLET | Refills: 0 | Status: SHIPPED | OUTPATIENT
Start: 2024-02-03 | End: 2024-03-16

## 2024-02-02 RX ADMIN — FOLIC ACID 1 MG: 1 TABLET ORAL at 08:32

## 2024-02-02 RX ADMIN — PAROXETINE 15 MG: 30 TABLET, FILM COATED ORAL at 08:32

## 2024-02-02 RX ADMIN — GABAPENTIN 400 MG: 400 CAPSULE ORAL at 08:32

## 2024-02-02 RX ADMIN — Medication 1 TABLET: at 08:32

## 2024-02-02 RX ADMIN — NICOTINE POLACRILEX 4 MG: 4 GUM, CHEWING BUCCAL at 08:35

## 2024-02-02 RX ADMIN — METOPROLOL TARTRATE 25 MG: 25 TABLET, FILM COATED ORAL at 08:32

## 2024-02-02 RX ADMIN — FLUOXETINE 10 MG: 10 CAPSULE ORAL at 08:32

## 2024-02-02 RX ADMIN — LOPERAMIDE HYDROCHLORIDE 2 MG: 2 CAPSULE ORAL at 08:32

## 2024-02-02 RX ADMIN — THIAMINE HCL TAB 100 MG 100 MG: 100 TAB at 08:32

## 2024-02-02 ASSESSMENT — ACTIVITIES OF DAILY LIVING (ADL)
ADLS_ACUITY_SCORE: 45
HYGIENE/GROOMING: INDEPENDENT
ADLS_ACUITY_SCORE: 45
ADLS_ACUITY_SCORE: 45
ORAL_HYGIENE: INDEPENDENT
ADLS_ACUITY_SCORE: 45
LAUNDRY: UNABLE TO COMPLETE
ADLS_ACUITY_SCORE: 45
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
## 2024-02-02 NOTE — PLAN OF CARE
Problem: Adult Behavioral Health Plan of Care  Goal: Plan of Care Review  Outcome: Progressing  Flowsheets (Taken 2/1/2024 1608)  Patient Agreement with Plan of Care: agrees     Problem: Alcohol Withdrawal  Goal: Alcohol Withdrawal Symptom Control  Outcome: Progressing   Goal Outcome Evaluation:    Plan of Care Reviewed With: patient        Pt continues to seek detox from alcohol. MSSA score = 3 and 1 respectively. No intervention needed. Up and visible on the unit all shift attending groups, watching TV and socializing with peers. Pt stated that he has an ongoing anxiety and that the reason that he drinks is because of high anxiety.  He continues to be pleasant, calm, cooperative and medication compliance. Will continue with same plan of care.

## 2024-02-02 NOTE — PROGRESS NOTES
Pt MSSA is 4 and 6. He plans to restart AA and utilize support. He said his goal has been to stop drinking when he turns 48. He is hopeful for his future. He plans to followup with his PCP. He denies SI, HI, AVH, and pain.    BP (!) 145/94   Pulse 102   Temp 98.6  F (37  C) (Oral)   Resp 16   Ht 1.829 m (6')   Wt 88.5 kg (195 lb)   SpO2 96%   BMI 26.45 kg/m

## 2024-02-02 NOTE — PLAN OF CARE
Problem: Alcohol Withdrawal  Goal: Alcohol Withdrawal Symptom Control  Outcome: Progressing   Goal Outcome Evaluation:         Pt.appeared asleep through the night. He scored 1 on MSSA. No prn medication administered. No safety or behavioral concerns observed or reported. Will continue to monitor.

## 2024-02-02 NOTE — PLAN OF CARE
Patient has not required any valium for alcohol detox since 2/1 1154. All MSSA scores since that time have been less than 8. Pt is now removed from alcohol detox monitoring status. Await disposition likely to home.

## 2024-02-04 ENCOUNTER — PATIENT OUTREACH (OUTPATIENT)
Dept: CARE COORDINATION | Facility: CLINIC | Age: 49
End: 2024-02-04
Payer: COMMERCIAL

## 2024-02-04 NOTE — PROGRESS NOTES
[facility]    Background: Transitional Care Management program identified per system criteria and reviewed by [facility] team for possible outreach.    Assessment: Upon chart review, [facility] Team member will not proceed with patient outreach related to this episode of Transitional Care Management program due to reason below:    Patient declined to answer all post hospital discharge questions with CCRC team member and disconnected call.    Plan: Transitional Care Management episode addressed appropriately per reason noted above.      AZEEM Lozano  [facility]    *Connected Care Resource Team does NOT follow patient ongoing. Referrals are identified based on internal discharge reports and the outreach is to ensure patient has an understanding of their discharge instructions.

## 2024-02-18 ENCOUNTER — OFFICE VISIT (OUTPATIENT)
Dept: URGENT CARE | Facility: URGENT CARE | Age: 49
End: 2024-02-18
Payer: COMMERCIAL

## 2024-02-18 VITALS
BODY MASS INDEX: 27.46 KG/M2 | WEIGHT: 202.5 LBS | DIASTOLIC BLOOD PRESSURE: 71 MMHG | OXYGEN SATURATION: 98 % | HEART RATE: 54 BPM | SYSTOLIC BLOOD PRESSURE: 114 MMHG | TEMPERATURE: 97.8 F

## 2024-02-18 DIAGNOSIS — T14.8XXA LOCAL INFECTION OF WOUND: ICD-10-CM

## 2024-02-18 DIAGNOSIS — K61.1 RECTAL ABSCESS: Primary | ICD-10-CM

## 2024-02-18 DIAGNOSIS — L08.9 LOCAL INFECTION OF WOUND: ICD-10-CM

## 2024-02-18 PROCEDURE — 99213 OFFICE O/P EST LOW 20 MIN: CPT | Performed by: FAMILY MEDICINE

## 2024-02-18 RX ORDER — HYDROCORTISONE 25 MG/G
CREAM TOPICAL 2 TIMES DAILY PRN
Qty: 30 G | Refills: 0 | Status: CANCELLED | OUTPATIENT
Start: 2024-02-18

## 2024-02-18 NOTE — PROGRESS NOTES
SUBJECTIVE:  Chief Complaint   Patient presents with    Rectal Problem     ONSET 5 DAYS      Georges Moreno is a 48 year old male who presents with a chief complaint of rectal pain X 5 days.    Has been struggling with probable hemorrhoid for the past 1 year, has been intermittent.  Wondering about fissure as this has been worsening, noticed a small hole when he looked yesterday.  Hurts to sit.    Struggled with alcohol and had more diarrhea.  Had been able to stay sober since January and stools have been better.    Also sustained a wound on left index finger yesterday, accidentally hit with a saw while cutting.  Did clean wound out and then applied topical ointment and bandaid.      Past Medical History:   Diagnosis Date    ADHD     Alcohol use disorder, severe, dependence (H)     Atrial fibrillation (H)     Depression, major, single episode     moderate    PUSHPA (generalized anxiety disorder)     HA (headache)     Pancreatitis     Paroxysmal atrial fibrillation (H)     Perirectal abscess     Personal history of alcoholism (H)     Polysubstance (excluding opioids) dependence, daily use (H)     PTSD (post-traumatic stress disorder)     Substance abuse (H)     Tobacco dependence      Current Outpatient Medications   Medication Sig Dispense Refill    acetaminophen (TYLENOL) 325 MG tablet Take 650 mg by mouth every 6 hours as needed for pain or headaches      diclofenac (VOLTAREN) 1 % topical gel Apply 4 g topically 4 times daily as needed for moderate pain 100 g 0    FLUoxetine (PROZAC) 20 MG capsule Take 1 capsule (20 mg) by mouth daily 30 capsule 0    gabapentin (NEURONTIN) 400 MG capsule Take 1 capsule (400 mg) by mouth 3 times daily 90 capsule 0    metoprolol tartrate (LOPRESSOR) 25 MG tablet Take 1 tablet (25 mg) by mouth 2 times daily 60 tablet 0    multivitamin w/minerals (THERA-VIT-M) tablet Take 1 tablet by mouth daily 30 tablet 0    PARoxetine (PAXIL) 10 MG tablet Take 1.5 tabs (15mg) for 5 days then take 1 tab  (10 mg) for one week then take 0.5 tabs (5 mg) for one week then stop 18 tablet 0    folic acid (FOLVITE) 1 MG tablet Take 1 tablet (1 mg) by mouth daily (Patient not taking: Reported on 2/18/2024) 30 tablet 0    thiamine (B-1) 100 MG tablet Take 1 tablet (100 mg) by mouth daily (Patient not taking: Reported on 2/18/2024) 30 tablet 0     Social History     Tobacco Use    Smoking status: Every Day     Packs/day: 1.50     Years: 15.00     Additional pack years: 0.00     Total pack years: 22.50     Types: Cigarettes    Smokeless tobacco: Former    Tobacco comments:     a little over a pack a day   Substance Use Topics    Alcohol use: Yes     Alcohol/week: 14.0 standard drinks of alcohol     Comment: pint of tequila       ROS:  Review of systems negative except as stated above.    EXAM:   /71   Pulse 54   Temp 97.8  F (36.6  C)   Wt 91.9 kg (202 lb 8 oz)   SpO2 98%   BMI 27.46 kg/m    GENERAL APPEARANCE: healthy, alert and no distress  RECTAL: right side  4-5 o'clock with indurated, tender, faint pink swelling.  Small flat hemorrhoid at 6 o'clock  EXTREMITIES: peripheral pulses normal  SKIN: left index finger middle phalanx dorsal aspect with linear  laceration with mild pink surrounding wound with faint yellowed crusting, no bleeding.  No vesicles or blistering  PSYCH:alert, affect bright      ASSESSMENT/PLAN:  (K61.1) Rectal abscess  (primary encounter diagnosis)  Comment: right side  Plan: amoxicillin-clavulanate (AUGMENTIN) 875-125 MG         tablet            (T14.8XXA,  L08.9) Local infection of wound  Comment: left index finger  Plan: amoxicillin-clavulanate (AUGMENTIN) 875-125 MG         tablet            Reassurance given, reviewed that will require antibiotic treatment for both rectal abscess infection and left index finger wound infection - RX Augmentin given.  Due to indurated tissue with no fluctuance, will defer I&D.  Encourage Sitz bath/epsom salt soak, tylenol for discomfort.    Follow up with  primary provider if no improvement of symptoms in 1 week    Ned Dolan MD  February 18, 2024 10:02 AM

## 2024-03-05 ENCOUNTER — APPOINTMENT (OUTPATIENT)
Dept: GENERAL RADIOLOGY | Facility: CLINIC | Age: 49
End: 2024-03-05
Attending: EMERGENCY MEDICINE
Payer: COMMERCIAL

## 2024-03-05 ENCOUNTER — HOSPITAL ENCOUNTER (OUTPATIENT)
Facility: CLINIC | Age: 49
Setting detail: OBSERVATION
Discharge: HOME | End: 2024-03-07
Attending: EMERGENCY MEDICINE | Admitting: INTERNAL MEDICINE
Payer: COMMERCIAL

## 2024-03-05 DIAGNOSIS — S61.211A LACERATION OF LEFT INDEX FINGER WITHOUT DAMAGE TO NAIL, FOREIGN BODY PRESENCE UNSPECIFIED, INITIAL ENCOUNTER: ICD-10-CM

## 2024-03-05 DIAGNOSIS — F10.930 ALCOHOL WITHDRAWAL, UNCOMPLICATED (H): ICD-10-CM

## 2024-03-05 DIAGNOSIS — F41.9 ANXIETY: Primary | ICD-10-CM

## 2024-03-05 PROBLEM — F10.239 ALCOHOL DEPENDENCE WITH WITHDRAWAL (H): Status: ACTIVE | Noted: 2018-05-29

## 2024-03-05 PROBLEM — F33.9 RECURRENT MAJOR DEPRESSION (H): Status: ACTIVE | Noted: 2024-03-05

## 2024-03-05 LAB
ALBUMIN SERPL BCG-MCNC: 5 G/DL (ref 3.5–5.2)
ALP SERPL-CCNC: 59 U/L (ref 40–150)
ALT SERPL W P-5'-P-CCNC: 32 U/L (ref 0–70)
ANION GAP SERPL CALCULATED.3IONS-SCNC: 20 MMOL/L (ref 7–15)
AST SERPL W P-5'-P-CCNC: 39 U/L (ref 0–45)
BASOPHILS # BLD AUTO: 0 10E3/UL (ref 0–0.2)
BASOPHILS NFR BLD AUTO: 0 %
BILIRUB SERPL-MCNC: 0.7 MG/DL
BUN SERPL-MCNC: 17.9 MG/DL (ref 6–20)
CALCIUM SERPL-MCNC: 9.3 MG/DL (ref 8.6–10)
CHLORIDE SERPL-SCNC: 96 MMOL/L (ref 98–107)
CREAT SERPL-MCNC: 0.81 MG/DL (ref 0.67–1.17)
DEPRECATED HCO3 PLAS-SCNC: 23 MMOL/L (ref 22–29)
EGFRCR SERPLBLD CKD-EPI 2021: >90 ML/MIN/1.73M2
EOSINOPHIL # BLD AUTO: 0 10E3/UL (ref 0–0.7)
EOSINOPHIL NFR BLD AUTO: 0 %
ERYTHROCYTE [DISTWIDTH] IN BLOOD BY AUTOMATED COUNT: 11.9 % (ref 10–15)
GLUCOSE SERPL-MCNC: 124 MG/DL (ref 70–99)
HCT VFR BLD AUTO: 52.3 % (ref 40–53)
HGB BLD-MCNC: 18.3 G/DL (ref 13.3–17.7)
HOLD SPECIMEN: NORMAL
IMM GRANULOCYTES # BLD: 0 10E3/UL
IMM GRANULOCYTES NFR BLD: 0 %
LYMPHOCYTES # BLD AUTO: 1.7 10E3/UL (ref 0.8–5.3)
LYMPHOCYTES NFR BLD AUTO: 18 %
MAGNESIUM SERPL-MCNC: 1.8 MG/DL (ref 1.7–2.3)
MAGNESIUM SERPL-MCNC: 1.8 MG/DL (ref 1.7–2.3)
MCH RBC QN AUTO: 32.3 PG (ref 26.5–33)
MCHC RBC AUTO-ENTMCNC: 35 G/DL (ref 31.5–36.5)
MCV RBC AUTO: 92 FL (ref 78–100)
MONOCYTES # BLD AUTO: 0.5 10E3/UL (ref 0–1.3)
MONOCYTES NFR BLD AUTO: 5 %
NEUTROPHILS # BLD AUTO: 7.1 10E3/UL (ref 1.6–8.3)
NEUTROPHILS NFR BLD AUTO: 77 %
NRBC # BLD AUTO: 0 10E3/UL
NRBC BLD AUTO-RTO: 0 /100
PHOSPHATE SERPL-MCNC: 3.1 MG/DL (ref 2.5–4.5)
PHOSPHATE SERPL-MCNC: 3.4 MG/DL (ref 2.5–4.5)
PLATELET # BLD AUTO: 220 10E3/UL (ref 150–450)
POTASSIUM SERPL-SCNC: 4.1 MMOL/L (ref 3.4–5.3)
PROT SERPL-MCNC: 8.1 G/DL (ref 6.4–8.3)
RBC # BLD AUTO: 5.67 10E6/UL (ref 4.4–5.9)
SODIUM SERPL-SCNC: 139 MMOL/L (ref 135–145)
WBC # BLD AUTO: 9.4 10E3/UL (ref 4–11)

## 2024-03-05 PROCEDURE — 71046 X-RAY EXAM CHEST 2 VIEWS: CPT

## 2024-03-05 PROCEDURE — 96361 HYDRATE IV INFUSION ADD-ON: CPT

## 2024-03-05 PROCEDURE — 83735 ASSAY OF MAGNESIUM: CPT | Performed by: EMERGENCY MEDICINE

## 2024-03-05 PROCEDURE — 250N000013 HC RX MED GY IP 250 OP 250 PS 637: Performed by: INTERNAL MEDICINE

## 2024-03-05 PROCEDURE — 96374 THER/PROPH/DIAG INJ IV PUSH: CPT

## 2024-03-05 PROCEDURE — 99223 1ST HOSP IP/OBS HIGH 75: CPT | Mod: AI | Performed by: INTERNAL MEDICINE

## 2024-03-05 PROCEDURE — 250N000011 HC RX IP 250 OP 636: Performed by: EMERGENCY MEDICINE

## 2024-03-05 PROCEDURE — 82374 ASSAY BLOOD CARBON DIOXIDE: CPT | Performed by: EMERGENCY MEDICINE

## 2024-03-05 PROCEDURE — 84100 ASSAY OF PHOSPHORUS: CPT | Performed by: EMERGENCY MEDICINE

## 2024-03-05 PROCEDURE — 96375 TX/PRO/DX INJ NEW DRUG ADDON: CPT

## 2024-03-05 PROCEDURE — 85025 COMPLETE CBC W/AUTO DIFF WBC: CPT | Performed by: EMERGENCY MEDICINE

## 2024-03-05 PROCEDURE — 250N000013 HC RX MED GY IP 250 OP 250 PS 637: Performed by: EMERGENCY MEDICINE

## 2024-03-05 PROCEDURE — 83735 ASSAY OF MAGNESIUM: CPT | Performed by: INTERNAL MEDICINE

## 2024-03-05 PROCEDURE — 82247 BILIRUBIN TOTAL: CPT | Performed by: EMERGENCY MEDICINE

## 2024-03-05 PROCEDURE — 258N000003 HC RX IP 258 OP 636: Performed by: EMERGENCY MEDICINE

## 2024-03-05 PROCEDURE — 36415 COLL VENOUS BLD VENIPUNCTURE: CPT | Performed by: EMERGENCY MEDICINE

## 2024-03-05 PROCEDURE — 73130 X-RAY EXAM OF HAND: CPT | Mod: LT

## 2024-03-05 PROCEDURE — 99285 EMERGENCY DEPT VISIT HI MDM: CPT | Mod: 25

## 2024-03-05 PROCEDURE — 120N000001 HC R&B MED SURG/OB

## 2024-03-05 PROCEDURE — 84100 ASSAY OF PHOSPHORUS: CPT | Mod: 91 | Performed by: INTERNAL MEDICINE

## 2024-03-05 RX ORDER — DIAZEPAM 10 MG/2ML
5-10 INJECTION, SOLUTION INTRAMUSCULAR; INTRAVENOUS EVERY 30 MIN PRN
Status: DISCONTINUED | OUTPATIENT
Start: 2024-03-05 | End: 2024-03-05

## 2024-03-05 RX ORDER — ALBUTEROL SULFATE 90 UG/1
2 AEROSOL, METERED RESPIRATORY (INHALATION) EVERY 6 HOURS PRN
COMMUNITY
End: 2024-03-16

## 2024-03-05 RX ORDER — FLUMAZENIL 0.1 MG/ML
0.2 INJECTION, SOLUTION INTRAVENOUS
Status: DISCONTINUED | OUTPATIENT
Start: 2024-03-05 | End: 2024-03-05

## 2024-03-05 RX ORDER — HYDROXYZINE HYDROCHLORIDE 25 MG/1
25 TABLET, FILM COATED ORAL ONCE
Status: DISCONTINUED | OUTPATIENT
Start: 2024-03-05 | End: 2024-03-05

## 2024-03-05 RX ORDER — MULTIPLE VITAMINS W/ MINERALS TAB 9MG-400MCG
1 TAB ORAL DAILY
Status: DISCONTINUED | OUTPATIENT
Start: 2024-03-05 | End: 2024-03-07 | Stop reason: HOSPADM

## 2024-03-05 RX ORDER — CALCIUM CARBONATE 500 MG/1
1000 TABLET, CHEWABLE ORAL 4 TIMES DAILY PRN
Status: DISCONTINUED | OUTPATIENT
Start: 2024-03-05 | End: 2024-03-07 | Stop reason: HOSPADM

## 2024-03-05 RX ORDER — MULTIPLE VITAMINS W/ MINERALS TAB 9MG-400MCG
1 TAB ORAL DAILY
Status: DISCONTINUED | OUTPATIENT
Start: 2024-03-05 | End: 2024-03-05

## 2024-03-05 RX ORDER — OLANZAPINE 5 MG/1
5-10 TABLET, ORALLY DISINTEGRATING ORAL EVERY 6 HOURS PRN
Status: DISCONTINUED | OUTPATIENT
Start: 2024-03-05 | End: 2024-03-07 | Stop reason: HOSPADM

## 2024-03-05 RX ORDER — LIDOCAINE 40 MG/G
CREAM TOPICAL
Status: DISCONTINUED | OUTPATIENT
Start: 2024-03-05 | End: 2024-03-07 | Stop reason: HOSPADM

## 2024-03-05 RX ORDER — LORAZEPAM 1 MG/1
1 TABLET ORAL ONCE
Status: COMPLETED | OUTPATIENT
Start: 2024-03-05 | End: 2024-03-05

## 2024-03-05 RX ORDER — FOLIC ACID 1 MG/1
1 TABLET ORAL DAILY
Status: DISCONTINUED | OUTPATIENT
Start: 2024-03-05 | End: 2024-03-07 | Stop reason: HOSPADM

## 2024-03-05 RX ORDER — AMOXICILLIN 250 MG
1 CAPSULE ORAL 2 TIMES DAILY PRN
Status: DISCONTINUED | OUTPATIENT
Start: 2024-03-05 | End: 2024-03-07 | Stop reason: HOSPADM

## 2024-03-05 RX ORDER — ACETAMINOPHEN 325 MG/1
650 TABLET ORAL EVERY 6 HOURS PRN
Status: DISCONTINUED | OUTPATIENT
Start: 2024-03-05 | End: 2024-03-05

## 2024-03-05 RX ORDER — FOLIC ACID 1 MG/1
1 TABLET ORAL DAILY
Status: DISCONTINUED | OUTPATIENT
Start: 2024-03-05 | End: 2024-03-05

## 2024-03-05 RX ORDER — DIAZEPAM 10 MG/2ML
5-10 INJECTION, SOLUTION INTRAMUSCULAR; INTRAVENOUS EVERY 30 MIN PRN
Status: DISCONTINUED | OUTPATIENT
Start: 2024-03-05 | End: 2024-03-07 | Stop reason: HOSPADM

## 2024-03-05 RX ORDER — ONDANSETRON 2 MG/ML
4 INJECTION INTRAMUSCULAR; INTRAVENOUS ONCE
Status: COMPLETED | OUTPATIENT
Start: 2024-03-05 | End: 2024-03-05

## 2024-03-05 RX ORDER — HALOPERIDOL 5 MG/ML
2.5-5 INJECTION INTRAMUSCULAR EVERY 6 HOURS PRN
Status: DISCONTINUED | OUTPATIENT
Start: 2024-03-05 | End: 2024-03-07 | Stop reason: HOSPADM

## 2024-03-05 RX ORDER — GABAPENTIN 400 MG/1
400 CAPSULE ORAL 3 TIMES DAILY
Status: DISCONTINUED | OUTPATIENT
Start: 2024-03-05 | End: 2024-03-07 | Stop reason: HOSPADM

## 2024-03-05 RX ORDER — DIAZEPAM 10 MG
10 TABLET ORAL EVERY 30 MIN PRN
Status: DISCONTINUED | OUTPATIENT
Start: 2024-03-05 | End: 2024-03-05

## 2024-03-05 RX ORDER — FLUMAZENIL 0.1 MG/ML
0.2 INJECTION, SOLUTION INTRAVENOUS
Status: DISCONTINUED | OUTPATIENT
Start: 2024-03-05 | End: 2024-03-07 | Stop reason: HOSPADM

## 2024-03-05 RX ORDER — CLONIDINE HYDROCHLORIDE 0.1 MG/1
0.1 TABLET ORAL EVERY 8 HOURS
Status: DISCONTINUED | OUTPATIENT
Start: 2024-03-05 | End: 2024-03-06

## 2024-03-05 RX ORDER — METOPROLOL TARTRATE 25 MG/1
25 TABLET, FILM COATED ORAL 2 TIMES DAILY
Status: DISCONTINUED | OUTPATIENT
Start: 2024-03-05 | End: 2024-03-07 | Stop reason: HOSPADM

## 2024-03-05 RX ORDER — DIAZEPAM 10 MG
10 TABLET ORAL EVERY 30 MIN PRN
Status: DISCONTINUED | OUTPATIENT
Start: 2024-03-05 | End: 2024-03-07 | Stop reason: HOSPADM

## 2024-03-05 RX ORDER — AMOXICILLIN 250 MG
2 CAPSULE ORAL 2 TIMES DAILY PRN
Status: DISCONTINUED | OUTPATIENT
Start: 2024-03-05 | End: 2024-03-07 | Stop reason: HOSPADM

## 2024-03-05 RX ADMIN — FOLIC ACID 1 MG: 1 TABLET ORAL at 10:51

## 2024-03-05 RX ADMIN — DIAZEPAM 10 MG: 5 TABLET ORAL at 14:58

## 2024-03-05 RX ADMIN — DIAZEPAM 10 MG: 5 TABLET ORAL at 10:51

## 2024-03-05 RX ADMIN — AMOXICILLIN AND CLAVULANATE POTASSIUM 1 TABLET: 875; 125 TABLET, FILM COATED ORAL at 16:16

## 2024-03-05 RX ADMIN — FLUOXETINE 20 MG: 20 CAPSULE ORAL at 17:35

## 2024-03-05 RX ADMIN — THIAMINE HCL TAB 100 MG 100 MG: 100 TAB at 10:51

## 2024-03-05 RX ADMIN — GABAPENTIN 400 MG: 400 CAPSULE ORAL at 17:35

## 2024-03-05 RX ADMIN — DIAZEPAM 10 MG: 5 TABLET ORAL at 12:56

## 2024-03-05 RX ADMIN — DIAZEPAM 5 MG: 5 INJECTION, SOLUTION INTRAMUSCULAR; INTRAVENOUS at 15:51

## 2024-03-05 RX ADMIN — METOPROLOL TARTRATE 25 MG: 25 TABLET, FILM COATED ORAL at 21:25

## 2024-03-05 RX ADMIN — SODIUM CHLORIDE 1000 ML: 9 INJECTION, SOLUTION INTRAVENOUS at 09:09

## 2024-03-05 RX ADMIN — GABAPENTIN 400 MG: 400 CAPSULE ORAL at 21:25

## 2024-03-05 RX ADMIN — ONDANSETRON 4 MG: 2 INJECTION INTRAMUSCULAR; INTRAVENOUS at 12:47

## 2024-03-05 RX ADMIN — Medication 1 TABLET: at 10:51

## 2024-03-05 RX ADMIN — CLONIDINE HYDROCHLORIDE 0.1 MG: 0.1 TABLET ORAL at 17:35

## 2024-03-05 RX ADMIN — OLANZAPINE 10 MG: 5 TABLET, ORALLY DISINTEGRATING ORAL at 19:41

## 2024-03-05 ASSESSMENT — ACTIVITIES OF DAILY LIVING (ADL)
HEARING_DIFFICULTY_OR_DEAF: NO
ADLS_ACUITY_SCORE: 37
DIFFICULTY_EATING/SWALLOWING: NO
FALL_HISTORY_WITHIN_LAST_SIX_MONTHS: NO
ADLS_ACUITY_SCORE: 37
ADLS_ACUITY_SCORE: 20
ADLS_ACUITY_SCORE: 20
ADLS_ACUITY_SCORE: 37
DRESSING/BATHING_DIFFICULTY: NO
ADLS_ACUITY_SCORE: 37
WEAR_GLASSES_OR_BLIND: NO
CONCENTRATING,_REMEMBERING_OR_MAKING_DECISIONS_DIFFICULTY: NO
ADLS_ACUITY_SCORE: 20
WALKING_OR_CLIMBING_STAIRS_DIFFICULTY: NO
ADLS_ACUITY_SCORE: 20
ADLS_ACUITY_SCORE: 20
DIFFICULTY_COMMUNICATING: NO
TOILETING_ISSUES: NO
ADLS_ACUITY_SCORE: 37
ADLS_ACUITY_SCORE: 20
ADLS_ACUITY_SCORE: 37
ADLS_ACUITY_SCORE: 37
CHANGE_IN_FUNCTIONAL_STATUS_SINCE_ONSET_OF_CURRENT_ILLNESS/INJURY: NO
DOING_ERRANDS_INDEPENDENTLY_DIFFICULTY: NO

## 2024-03-05 ASSESSMENT — LIFESTYLE VARIABLES
ORIENTATION AND CLOUDING OF SENSORIUM: ORIENTED AND CAN DO SERIAL ADDITIONS
PAROXYSMAL SWEATS: NO SWEAT VISIBLE
HEADACHE, FULLNESS IN HEAD: VERY MILD
AUDITORY DISTURBANCES: NOT PRESENT
VISUAL DISTURBANCES: NOT PRESENT
AUDITORY DISTURBANCES: NOT PRESENT
AGITATION: SOMEWHAT MORE THAN NORMAL ACTIVITY
ORIENTATION AND CLOUDING OF SENSORIUM: ORIENTED AND CAN DO SERIAL ADDITIONS
TREMOR: NO TREMOR
VISUAL DISTURBANCES: NOT PRESENT
VISUAL DISTURBANCES: VERY MILD SENSITIVITY
AGITATION: MODERATELY FIDGETY AND RESTLESS
PAROXYSMAL SWEATS: NO SWEAT VISIBLE
HEADACHE, FULLNESS IN HEAD: NOT PRESENT
AGITATION: NORMAL ACTIVITY
HEADACHE, FULLNESS IN HEAD: MILD
ANXIETY: MILDLY ANXIOUS
TOTAL SCORE: 16
ANXIETY: MODERATELY ANXIOUS, OR GUARDED, SO ANXIETY IS INFERRED
NAUSEA AND VOMITING: NO NAUSEA AND NO VOMITING
NAUSEA AND VOMITING: INTERMITTENT NAUSEA WITH DRY HEAVES
ANXIETY: 3
ORIENTATION AND CLOUDING OF SENSORIUM: ORIENTED AND CAN DO SERIAL ADDITIONS
AGITATION: 6
TREMOR: NOT VISIBLE, BUT CAN BE FELT FINGERTIP TO FINGERTIP
TOTAL SCORE: 13
TACTILE DISTURBANCES: VERY MILD ITCHING, PINS AND NEEDLES, BURNING OR NUMBNESS
PAROXYSMAL SWEATS: NO SWEAT VISIBLE
HEADACHE, FULLNESS IN HEAD: NOT PRESENT
HEADACHE, FULLNESS IN HEAD: NOT PRESENT
ORIENTATION AND CLOUDING OF SENSORIUM: CANNOT DO SERIAL ADDITIONS OR IS UNCERTAIN ABOUT DATE
AUDITORY DISTURBANCES: NOT PRESENT
VISUAL DISTURBANCES: NOT PRESENT
AUDITORY DISTURBANCES: NOT PRESENT
TOTAL SCORE: 1
PAROXYSMAL SWEATS: NO SWEAT VISIBLE
TOTAL SCORE: 11
TREMOR: NO TREMOR
TREMOR: NO TREMOR
PAROXYSMAL SWEATS: NO SWEAT VISIBLE
ANXIETY: 5
NAUSEA AND VOMITING: MILD NAUSEA WITH NO VOMITING
VISUAL DISTURBANCES: NOT PRESENT
TOTAL SCORE: 13
ANXIETY: 6
AUDITORY DISTURBANCES: NOT PRESENT
TREMOR: NO TREMOR
AGITATION: MODERATELY FIDGETY AND RESTLESS
NAUSEA AND VOMITING: INTERMITTENT NAUSEA WITH DRY HEAVES
NAUSEA AND VOMITING: INTERMITTENT NAUSEA WITH DRY HEAVES
ORIENTATION AND CLOUDING OF SENSORIUM: ORIENTED AND CAN DO SERIAL ADDITIONS

## 2024-03-05 ASSESSMENT — COLUMBIA-SUICIDE SEVERITY RATING SCALE - C-SSRS
1. IN THE PAST MONTH, HAVE YOU WISHED YOU WERE DEAD OR WISHED YOU COULD GO TO SLEEP AND NOT WAKE UP?: NO
2. HAVE YOU ACTUALLY HAD ANY THOUGHTS OF KILLING YOURSELF IN THE PAST MONTH?: NO
6. HAVE YOU EVER DONE ANYTHING, STARTED TO DO ANYTHING, OR PREPARED TO DO ANYTHING TO END YOUR LIFE?: NO

## 2024-03-05 NOTE — ED TRIAGE NOTES
4-5 days ago started drinking again. Last drink was 0700 this morning. No appetite and unable to sleep with severe anxiety. Pt has been in detox and treatment centers in the past, unsure if he wants to go back to one. Hx afib. ABCs intact.     Pt also has laceration to left index finger from a saw from last week that he is concerned it's not healing properly.

## 2024-03-05 NOTE — PHARMACY-ADMISSION MEDICATION HISTORY
Pharmacist Admission Medication History    Admission medication history is complete. The information provided in this note is only as accurate as the sources available at the time of the update.    Information Source(s): Patient via in-person    Pertinent Information: Pt states he never started taking methocarbamol 750 mg - not picked up from pharmacy.    Changes made to PTA medication list:  Added: Ventolin  Deleted: folic acid, paroxetine  Changed: None    Allergies reviewed with patient and updates made in EHR: yes    Medication History Completed By: Raiza Ness RPH 3/5/2024 1:41 PM    PTA Med List   Medication Sig Last Dose    acetaminophen (TYLENOL) 325 MG tablet Take 650 mg by mouth every 6 hours as needed for pain or headaches prn at prn    albuterol (PROAIR HFA/PROVENTIL HFA/VENTOLIN HFA) 108 (90 Base) MCG/ACT inhaler Inhale 2 puffs into the lungs every 6 hours as needed for shortness of breath, wheezing or cough prn at prn    diclofenac (VOLTAREN) 1 % topical gel Apply 4 g topically 4 times daily as needed for moderate pain prn at prn    FLUoxetine (PROZAC) 20 MG capsule Take 1 capsule (20 mg) by mouth daily 3/4/2024 at am    gabapentin (NEURONTIN) 400 MG capsule Take 1 capsule (400 mg) by mouth 3 times daily 3/4/2024 at x3    metoprolol tartrate (LOPRESSOR) 25 MG tablet Take 1 tablet (25 mg) by mouth 2 times daily 3/4/2024 at x2    multivitamin w/minerals (THERA-VIT-M) tablet Take 1 tablet by mouth daily 3/4/2024 at am    thiamine (B-1) 100 MG tablet Take 1 tablet (100 mg) by mouth daily 3/4/2024 at am

## 2024-03-05 NOTE — H&P
[facility]    History and Physical - Hospitalist Service       Date of Admission:  3/5/2024  Primary Care Physician   [facility]  CONSULTANTS: psychiatry, chem dep    Assessment & Plan      Georges Moreno is a 48 year old male who has a history of alcohol dependence, tobacco abuse, neuropathy of the hands, anxiety, PTSD, paroxysmal atrial fibrillation, hypertension presenting with falling off the wagon leading to alcohol abuse and withdrawal.  Patient has a history of alcohol dependence and has previously been in detox for this.  Has been sober for 2 months but started drinking again after missing an AA meeting.  He has been on a 10-day binge.  He had a little bit left in the bowel this morning and he finished it off but has had increased anxiety and some withdrawal symptoms.  Was unable to sleep.  Per his report, patient recent was at [facility] and at that time had his Paxil weaned down and now is on Prozac.  He states that his anxiety has worsened any depression.  In the emergency room, what they had a plan of going to [facility] but he refused to go as this was not helpful in the past.  Patient also has a left index finger dorsal laceration which occurred on 2/17/2024 when he was using a reciprocating saw.  He went to clinic the following day and was treated with Augmentin.  This area has not healed as of yet.  Patient did receive a tetanus shot in the emergency room.   In the emergency room patient underwent lab testing with normal electrolytes with an anion gap slowly up at 20 likely related to his alcohol abuse, his electrolytes were otherwise normal.  Patient has a hemoglobin of 18.3 in the setting of tobacco abuse.  Otherwise his CBC was normal.  Patient underwent x-rays of his left hand showing deep skin laceration over the second middle phalangeal diaphysis with surrounding soft tissue swelling and radiodensities compatible with foreign bodies.  There is concern  that the injury extends to the bone.  Patient is able to extend and flex his left index finger.  Has no signs of drainage at this point.  Ortho was called by the ER and recommend following up with outpatient hand after discharge.  Patient was given a tetanus shot and started on Augmentin.  Patient is can be admitted for ongoing alcohol withdrawal       Alcohol dependence with withdrawal  Patient presents with a long history of alcohol dependence had been sober for 2 months and has attended AA meetings.  He missed his last AA meeting and started on a ryna 10 days ago.  Now is having some withdrawal symptoms.  Will follow patient CIWA score and placed him on clonidine as well as benzodiazepines.  Patient will continue on his thiamine from home and I will add folic acid.  Patient is open to talking to chemical dependency which they tried doing the emergency room but he was in not a mood to talk so we will try again tomorrow.  Patient is already on Neurontin at home which we will continue.    Left index finger laceration  Patient cut his left index finger on 2/17/2024 using a reciprocating saw while cutting off a bolt.  He went to clinic the following day and was treated with Augmentin.  The area has not healed.  X-rays done this admission showed debris in the wound.  There is concern that the wound extends down to the bone so we will treat with Augmentin and have the patient follow-up with hand Ortho as an outpatient.  The ER did discuss this with Ortho on admission.  He was given a tetanus shot 3/5/2024.    Neuropathy of the hands  Patient has a history of having surgery for this but is most likely related to his alcohol abuse.  He is on Neurontin which we will continue    Anxiety, PTSD  Patient states that his anxiety is out of control.  Had been on Paxil in the past but this was weaned off and started on Prozac when he was at [facility].  He states that his anxiety is his biggest issue and he does not know why  on his chart it says depression.  Will ask psychiatry to see the patient to see if any changes can be made to help him with his anxiety which is a contributing factor to his ongoing alcohol use.    Reactive airway disease, postviral   Patient only uses albuterol after getting a cold.  Denies any history of asthma    Hypertension, paroxysmal atrial fibrillation  Patient has had 2 episodes of A-fib in the past related to his alcohol abuse.  He is on Lopressor for rate control.  He is not on any anticoagulation.  He is hypertensive to the 160s in the emergency room I am not sure if this is more related to his withdrawal versus ongoing untreated hypertension.  Will need to consider adding another medication if he continues to be hypertensive.  Patient is getting a clonidine for his withdrawal.    Tobacco abuse  Patient admits to smoking 1/2 packs a day.  He has asked for nicotine gum.  Patient needs to quit drinking before he can try quitting smoking or he will fail quitting both.    History of anal fissure, hemorrhoids   History noted, was seen in clinic for this previously.      4Ms:  Polypharmacy: Medications reviewed and appropriate  Mentation:    Capacity intact to make his own medical decisions  Social support: Patient lives with his mother and her rambler, his father has passed on.  Patient does not currently work due to his neuropathy, patient is single, has no children  Mobility: Gets around without aids  What is importmant: N/A     Discussed plan of care with Dr. Durant in the emergency room, nursing bedside  Diet:  Regular  DVT Prophylaxis: Pneumatic Compression Devices  Ross Catheter: Not present  Lines: None     Cardiac Monitoring: None    RESTRAINTS: Not indicated  Code Status:  Full, discussed with the patient      This document was created using voice recognition technology.  Please excuse any typographical errors that may have occurred.  Please call with any questions.           Clinically Significant  Risk Factors Present on Admission             # Anion Gap Metabolic Acidosis: Highest Anion Gap = 20 mmol/L in last 2 days, will monitor and treat as appropriate               not acidotic bicarb is normal       Disposition Plan      Expected Discharge Date: 03/07/2024                  Bandar Jenkins MD  Hospitalist Service  [facility]  Securely message with [employer] (more info)  Text page via [email or web address] Paging/Directory     Length of stay: Anticipate greater than 2 midnight hospitalization for evaluation and treatment of alcohol withdrawal          ______________________________________________________________________    Chief Complaint   Alcohol withdrawal, anxiety    History is obtained from the patient  Epic notes reviewed    History of Present Illness   Georges Moreno is a 48 year old male who has a history of alcohol dependence, tobacco abuse, neuropathy of the hands, anxiety, PTSD, paroxysmal atrial fibrillation, hypertension presenting with falling off the wagon leading to alcohol abuse and withdrawal.  Patient has a history of alcohol dependence and has previously been in detox for this.  Has been sober for 2 months but started drinking again after missing an AA meeting.  He has been on a 10-day binge.  He had a little bit left in the bowel this morning and he finished it off but has had increased anxiety and some withdrawal symptoms.  Was unable to sleep.  Per his report, patient recent was at [facility] and at that time had his Paxil weaned down and now is on Prozac.  He states that his anxiety has worsened any depression.  In the emergency room, what they had a plan of going to [facility] but he refused to go as this was not helpful in the past.  Patient also has a left index finger dorsal laceration which occurred on 2/17/2024 when he was using a reciprocating saw.  He went to clinic the following day and was treated with Augmentin.  This area has not healed as of yet.  Patient  did receive a tetanus shot in the emergency room.   In the emergency room patient underwent lab testing with normal electrolytes with an anion gap slowly up at 20 likely related to his alcohol abuse, his electrolytes were otherwise normal.  Patient has a hemoglobin of 18.3 in the setting of tobacco abuse.  Otherwise his CBC was normal.  Patient underwent x-rays of his left hand showing deep skin laceration over the second middle phalangeal diaphysis with surrounding soft tissue swelling and radiodensities compatible with foreign bodies.  There is concern that the injury extends to the bone.  Patient is able to extend and flex his left index finger.  Has no signs of drainage at this point.  Ortho was called by the ER and recommend following up with outpatient hand after discharge.  Patient was given a tetanus shot and started on Augmentin.  Patient is can be admitted for ongoing alcohol withdrawal      Past Medical History    Past Medical History:   Diagnosis Date    ADHD     Alcohol use disorder, severe, dependence (H)     Atrial fibrillation (H)     Depression, major, single episode     moderate    PUSHPA (generalized anxiety disorder)     HA (headache)     Pancreatitis     Paroxysmal atrial fibrillation (H)     Perirectal abscess     Personal history of alcoholism (H)     Polysubstance (excluding opioids) dependence, daily use (H)     PTSD (post-traumatic stress disorder)     Substance abuse (H)     Tobacco dependence        Past Surgical History   Past Surgical History:   Procedure Laterality Date    INCISION AND DRAINAGE PERIRECTAL ABSCESS      SOFT TISSUE SURGERY      Excision of pilonidal cyst    SOFT TISSUE SURGERY      removal of rectal cyst       Prior to Admission Medications pharmacy reconciled  Prior to Admission Medications   Prescriptions Last Dose Informant Patient Reported? Taking?   FLUoxetine (PROZAC) 20 MG capsule 3/4/2024 at am  No Yes   Sig: Take 1 capsule (20 mg) by mouth daily   acetaminophen  (TYLENOL) 325 MG tablet prn at prn  Yes Yes   Sig: Take 650 mg by mouth every 6 hours as needed for pain or headaches   albuterol (PROAIR HFA/PROVENTIL HFA/VENTOLIN HFA) 108 (90 Base) MCG/ACT inhaler prn at prn  Yes Yes   Sig: Inhale 2 puffs into the lungs every 6 hours as needed for shortness of breath, wheezing or cough   diclofenac (VOLTAREN) 1 % topical gel prn at prn  No Yes   Sig: Apply 4 g topically 4 times daily as needed for moderate pain   gabapentin (NEURONTIN) 400 MG capsule 3/4/2024 at x3  No Yes   Sig: Take 1 capsule (400 mg) by mouth 3 times daily   metoprolol tartrate (LOPRESSOR) 25 MG tablet 3/4/2024 at x2  No Yes   Sig: Take 1 tablet (25 mg) by mouth 2 times daily   multivitamin w/minerals (THERA-VIT-M) tablet 3/4/2024 at am  No Yes   Sig: Take 1 tablet by mouth daily   thiamine (B-1) 100 MG tablet 3/4/2024 at am  No Yes   Sig: Take 1 tablet (100 mg) by mouth daily      Facility-Administered Medications: None        Allergies   Allergies   Allergen Reactions    Sertraline Other (See Comments)     Excessive fatigue  Reaction(s): Excessive fatigue.    Duloxetine Hives and Rash     Rash, hives        REVIEW OF SYSTEMS:  A comprehensive review of systems was negative except for items noted in the HPI.  Patient currently denies any fever, chills, sweats, nausea, vomiting, diarrhea, shortness of breath, or chest pain.        Physical Exam   Vital Signs: Temp: 97.2  F (36.2  C) Temp src: Temporal BP: (!) 167/86 Pulse: 88   Resp: 10 SpO2: 96 % O2 Device: None (Room air)    Weight: 0 lbs 0 oz    General appearance: Patient is alert and oriented x3, no apparent distress, pleasant and conversing normally, speaking in full sentences, appears older than stated age  HEENT:    Mucous membranes are moist  NNEUROLOGIC:  Cranial nerves II-XII intact, without any focal deficits, strength 5/5 throughout, has slight tremor  EXTREMITIES:  Moves all extremities, no clubbing, cyanosis, nor edema, left second finger on the  dorsum has a nonhealing laceration that has some associated swelling and minimal redness associated with it without any drainage, denies current pain  :  Ross not present         Data     I have personally reviewed the following data over the past 24 hrs:    9.4  \   18.3 (H)   / 220     139 96 (L) 17.9 /  124 (H)   4.1 23 0.81 \     ALT: 32 AST: 39 AP: 59 TBILI: 0.7   ALB: 5.0 TOT PROTEIN: 8.1 LIPASE: N/A       Imaging:   Results for orders placed or performed during the hospital encounter of 03/05/24   XR Chest 2 Views    Narrative    CHEST TWO VIEWS  3/5/2024 10:45 AM     HISTORY: Cough.    COMPARISON: Chest radiograph 4/21/2023.      Impression    IMPRESSION: No focal consolidation, pleural effusion or pneumothorax.  Cardiomediastinal silhouette is unremarkable. Remote rib fractures.    SANDI GLOVER MD         SYSTEM ID:  [id number]   XR Hand Left G/E 3 Views    Narrative    HAND THREE VIEWS LEFT  3/5/2024 10:46 AM     HISTORY: L index finger recip saw injury, pain  COMPARISON: None.      Impression    IMPRESSION: Deep skin laceration over the second middle phalangeal  diaphysis. There is surrounding soft tissue swelling and tiny  radiodensities compatible with foreign bodies. Linear lucency over the  radial aspect of the second middle phalangeal diaphysis is favored to  represent reported saw injury extending into the bone, although cannot  exclude overlying lucency due to laceration. No displaced fracture or  malalignment.    ZIGGY REZA MD         SYSTEM ID:  [id number]     Procedures: none     I have personally have reviewed the patient's most up to date radiologic exams,  labs, orders, and medications myself

## 2024-03-05 NOTE — ED NOTES
[facility]  ED Nurse Handoff Report    ED Chief complaint: Alcohol Problem  . ED Diagnosis:   Final diagnoses:   Alcohol withdrawal, uncomplicated (H)   Laceration of left index finger without damage to nail, foreign body presence unspecified, initial encounter       Allergies:   Allergies   Allergen Reactions    Sertraline Other (See Comments)     Excessive fatigue  Reaction(s): Excessive fatigue.    Duloxetine Hives and Rash     Rash, hives       Code Status: Full Code    Activity level - Baseline/Home:  independent.  Activity Level - Current:   in bed.   Lift room needed: No.   Bariatric: No   Needed: No   Isolation: No.   Infection: Not Applicable.     Respiratory status: Room air    Vital Signs (within 30 minutes):   Vitals:    03/05/24 1400 03/05/24 1440 03/05/24 1443 03/05/24 1545   BP: (!) 157/86 (!) 157/93  (!) 167/86   Pulse: 86 88     Resp:       Temp:       TempSrc:       SpO2: 94% 98% 95% 96%       Cardiac Rhythm:  ,      Pain level:    Patient confused: No.   Patient Falls Risk: nonskid shoes/slippers when out of bed, arm band in place, patient and family education, assistive device/personal items within reach, and activity supervised.   Elimination Status: Has voided     Patient Report - Initial Complaint: detox   Focused Assessment: etoh withdrawal, laceration of left index finger    Abnormal Results:   Labs Ordered and Resulted from Time of ED Arrival to Time of ED Departure   COMPREHENSIVE METABOLIC PANEL - Abnormal       Result Value    Sodium 139      Potassium 4.1      Carbon Dioxide (CO2) 23      Anion Gap 20 (*)     Urea Nitrogen 17.9      Creatinine 0.81      GFR Estimate >90      Calcium 9.3      Chloride 96 (*)     Glucose 124 (*)     Alkaline Phosphatase 59      AST 39      ALT 32      Protein Total 8.1      Albumin 5.0      Bilirubin Total 0.7     CBC WITH PLATELETS AND DIFFERENTIAL - Abnormal    WBC Count 9.4      RBC Count 5.67      Hemoglobin 18.3 (*)      Hematocrit 52.3      MCV 92      MCH 32.3      MCHC 35.0      RDW 11.9      Platelet Count 220      % Neutrophils 77      % Lymphocytes 18      % Monocytes 5      % Eosinophils 0      % Basophils 0      % Immature Granulocytes 0      NRBCs per 100 WBC 0      Absolute Neutrophils 7.1      Absolute Lymphocytes 1.7      Absolute Monocytes 0.5      Absolute Eosinophils 0.0      Absolute Basophils 0.0      Absolute Immature Granulocytes 0.0      Absolute NRBCs 0.0     MAGNESIUM - Normal    Magnesium 1.8     PHOSPHORUS - Normal    Phosphorus 3.1          XR Hand Left G/E 3 Views   Final Result   IMPRESSION: Deep skin laceration over the second middle phalangeal   diaphysis. There is surrounding soft tissue swelling and tiny   radiodensities compatible with foreign bodies. Linear lucency over the   radial aspect of the second middle phalangeal diaphysis is favored to   represent reported saw injury extending into the bone, although cannot   exclude overlying lucency due to laceration. No displaced fracture or   malalignment.      ZIGGY REZA MD            SYSTEM ID:  [id number]      XR Chest 2 Views   Final Result   IMPRESSION: No focal consolidation, pleural effusion or pneumothorax.   Cardiomediastinal silhouette is unremarkable. Remote rib fractures.      SANDI GLOVER MD            SYSTEM ID:  [id number]          Treatments provided: wound care, ciwa  Family Comments:   OBS brochure/video discussed/provided to patient:  N/A  ED Medications:   Medications   flumazenil (ROMAZICON) injection 0.2 mg (has no administration in time range)   diazepam (VALIUM) tablet 10 mg ( Oral See Alternative 3/5/24 1551)     Or   diazepam (VALIUM) injection 5-10 mg (10 mg Intravenous $Given 3/5/24 1551)   thiamine (B-1) tablet 100 mg (100 mg Oral $Given 3/5/24 1051)   folic acid (FOLVITE) tablet 1 mg (1 mg Oral $Given 3/5/24 1051)   multivitamin w/minerals (THERA-VIT-M) tablet 1 tablet (1 tablet Oral $Given 3/5/24 1051)    sodium chloride 0.9% BOLUS 1,000 mL (0 mLs Intravenous Stopped 3/5/24 1032)   LORazepam (ATIVAN) tablet 1 mg (1 mg Oral Not Given 3/5/24 1041)   ondansetron (ZOFRAN) injection 4 mg (4 mg Intravenous $Given 3/5/24 1247)   amoxicillin-clavulanate (AUGMENTIN) 875-125 MG per tablet 1 tablet (1 tablet Oral $Given 3/5/24 1616)       Drips infusing:  No  For the majority of the shift this patient was Green.   Interventions performed were     Sepsis treatment initiated: No    Cares/treatment/interventions/medications to be completed following ED care:     ED Nurse Name: Jeannette Sparrow RN  4:23 PM    RECEIVING UNIT ED HANDOFF REVIEW    Above ED Nurse Handoff Report was reviewed: Yes  Reviewed by: Maribel Marques RN on March 5, 2024 at 4:47 PM   ROSE MARIE Mckenna called the ED to inform them the note was read: Yes
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
## 2024-03-05 NOTE — ED PROVIDER NOTES
History     Chief Complaint:  Alcohol Problem    The history is provided by the patient.     Georges Moreno is a 48 year old male up to date on tetanus (10/2021) with history of alcohol use disorder, polysubstance dependence, atrial fibrillation, and hyperlipidemia presenting for evaluation of an alcohol problem. Georges explains that he was previously sober but began drinking again ten days ago, noting daily use of approximately one quart of alcohol with his last drink this morning. He called detox facilities but there was no availability and he was recommended to the ED. He denies suicidal ideation but states that his mental health has declined, noting difficulty concentrating, sleeping, and eating. He notes a history of anxiety managed with Prozac. He reports baseline smoker's cough but notes new post-tussive vomiting for the past week. He also notes an existing accidental left index finger laceration from a miter saw last week, which he states was not alcohol-related. He adds that he had an anal fissure at the time of injury and was already on antibiotics. He notes existing neuropathy but states that the injured area does feel atypically numb. He denies abdominal pain or chest pain. He denies recent falls or injuries due to alcohol.    Independent Historian:   None - Patient Only    Review of External Notes:   None    Medications:    Prozac  Neurontin  Lopressor  Paxil  Thiamine  Robaxin  Seroquel  Albuterol    Past Medical History:    ADHD  Alcohol use disorder, severe, dependence  Atrial fibrillation  Depression, major, single episode  PUSHPA  Pancreatitis  Perirectal abscess  Polysubstance dependence, daily use  PTSD  Tobacco dependence  Cannabis use disorder, moderate, dependence  Alcohol withdrawal syndrome with complication  Adenomatous polyp of colon  Cervical myelopathy  Hyperlipidemia  Peripheral neuropathy  Pulmonary nodule    Past Surgical History:    Incision and drainage perirectal abscess  Soft tissue  surgery, excision of pilonidal cyst  Soft tissue surgery, removal of rectal cyst    Physical Exam   Patient Vitals for the past 24 hrs:   BP Temp Temp src Pulse Resp SpO2   03/05/24 1443 -- -- -- -- -- 95 %   03/05/24 1440 (!) 157/93 -- -- 88 -- 98 %   03/05/24 1400 (!) 157/86 -- -- 86 -- 94 %   03/05/24 1353 -- -- -- -- -- 94 %   03/05/24 1338 (!) 163/97 -- -- -- -- 94 %   03/05/24 1323 (!) 147/84 -- -- 82 -- 92 %   03/05/24 1320 (!) 147/84 -- -- 82 -- 94 %   03/05/24 1305 -- -- -- -- -- 97 %   03/05/24 1304 -- -- -- -- -- 98 %   03/05/24 1303 -- -- -- -- -- 97 %   03/05/24 1302 -- -- -- -- -- 96 %   03/05/24 1301 -- -- -- -- -- 97 %   03/05/24 1300 (!) 141/109 -- -- 86 -- 98 %   03/05/24 1215 -- -- -- -- -- 97 %   03/05/24 1200 138/80 -- -- 79 -- 92 %   03/05/24 1145 (!) 140/85 -- -- -- -- 94 %   03/05/24 1115 (!) 148/81 -- -- -- -- 93 %   03/05/24 1103 -- -- -- -- -- 95 %   03/05/24 1100 (!) 141/92 -- -- 77 -- 97 %   03/05/24 1057 -- -- -- -- -- 99 %   03/05/24 1056 (!) 151/94 -- -- 85 -- --   03/05/24 1026 -- -- -- 88 10 99 %   03/05/24 1001 (!) 137/96 -- -- 76 18 95 %   03/05/24 0931 (!) 143/116 -- -- 83 10 100 %   03/05/24 0916 (!) 148/106 -- -- 79 20 --   03/05/24 0854 (!) 160/110 97.2  F (36.2  C) Temporal 81 22 98 %     Physical Exam  General: Laying on the ED bed  HEENT: Normocephalic, atraumatic  Cardiac: Radial pulses 2+, regular rate and rhythm  Pulm: Breathing comfortably, no accessory muscle usage, no conversational dyspnea, and lungs clear bilaterally  GI: Abdomen soft, nontender, no rigidity or guarding  MSK: No bony deformities, L index finger with healing laceration over the lateral aspect with slight medial angulation of the finger tip. Mild decreased sensation over the L index finger tip.  Flexor and extensor mechanisms are intact to the left index finger.  Skin: Warm and dry  Neuro: Moves all extremities  Psych: Pleasant mood and affect    Emergency Department Course   Imaging:  XR Hand Left  G/E 3 Views   Final Result   IMPRESSION: Deep skin laceration over the second middle phalangeal   diaphysis. There is surrounding soft tissue swelling and tiny   radiodensities compatible with foreign bodies. Linear lucency over the   radial aspect of the second middle phalangeal diaphysis is favored to   represent reported saw injury extending into the bone, although cannot   exclude overlying lucency due to laceration. No displaced fracture or   malalignment.      ZIGGY REZA MD            SYSTEM ID:  [id number]      XR Chest 2 Views   Final Result   IMPRESSION: No focal consolidation, pleural effusion or pneumothorax.   Cardiomediastinal silhouette is unremarkable. Remote rib fractures.      SANDI GLOVER MD            SYSTEM ID:  [id number]        Laboratory:  Labs Ordered and Resulted from Time of ED Arrival to Time of ED Departure   COMPREHENSIVE METABOLIC PANEL - Abnormal       Result Value    Sodium 139      Potassium 4.1      Carbon Dioxide (CO2) 23      Anion Gap 20 (*)     Urea Nitrogen 17.9      Creatinine 0.81      GFR Estimate >90      Calcium 9.3      Chloride 96 (*)     Glucose 124 (*)     Alkaline Phosphatase 59      AST 39      ALT 32      Protein Total 8.1      Albumin 5.0      Bilirubin Total 0.7     CBC WITH PLATELETS AND DIFFERENTIAL - Abnormal    WBC Count 9.4      RBC Count 5.67      Hemoglobin 18.3 (*)     Hematocrit 52.3      MCV 92      MCH 32.3      MCHC 35.0      RDW 11.9      Platelet Count 220      % Neutrophils 77      % Lymphocytes 18      % Monocytes 5      % Eosinophils 0      % Basophils 0      % Immature Granulocytes 0      NRBCs per 100 WBC 0      Absolute Neutrophils 7.1      Absolute Lymphocytes 1.7      Absolute Monocytes 0.5      Absolute Eosinophils 0.0      Absolute Basophils 0.0      Absolute Immature Granulocytes 0.0      Absolute NRBCs 0.0     MAGNESIUM - Normal    Magnesium 1.8     PHOSPHORUS - Normal    Phosphorus 3.1       Procedures    Emergency  Department Course & Assessments:    Interventions:  Medications   flumazenil (ROMAZICON) injection 0.2 mg (has no administration in time range)   diazepam (VALIUM) tablet 10 mg (10 mg Oral $Given 3/5/24 1458)     Or   diazepam (VALIUM) injection 5-10 mg ( Intravenous See Alternative 3/5/24 1458)   thiamine (B-1) tablet 100 mg (100 mg Oral $Given 3/5/24 1051)   folic acid (FOLVITE) tablet 1 mg (1 mg Oral $Given 3/5/24 1051)   multivitamin w/minerals (THERA-VIT-M) tablet 1 tablet (1 tablet Oral $Given 3/5/24 1051)   amoxicillin-clavulanate (AUGMENTIN) 875-125 MG per tablet 1 tablet (has no administration in time range)   sodium chloride 0.9% BOLUS 1,000 mL (0 mLs Intravenous Stopped 3/5/24 1032)   LORazepam (ATIVAN) tablet 1 mg (1 mg Oral Not Given 3/5/24 1041)   ondansetron (ZOFRAN) injection 4 mg (4 mg Intravenous $Given 3/5/24 1247)     Assessments:  1007 I obtained history and examined the patient as noted above.   1530 Reeval      Independent Interpretation (X-rays, CTs, rhythm strip):  Chest x-ray on my review is clear without lobar infiltrate, pneumothorax, large pleural effusion, or significant edema.  I reviewed the patient's left hand X-ray and note a cortical defect in the left index middle phalanx consistent with a saw injury that does not extend all the way through the bone.  There is also some radiopaque debris around the laceration site..    Consultations/Discussion of Management or Tests:  1131 I discussed the patient with SILVANO Wilhelm for TCO, recommends 7 to 10 days of additional Augmentin coverage and follow-up in the orthopedic clinic late this week or next week depending on the patient's schedule in conjunction with his alcohol detox  1540 I discussed the patient with Dr. Jenkins, who accepts him for admission       Social Determinants of Health affecting care:   Stress/Adjustment Disorders    Disposition:  The patient was admitted to the hospital under the care of Dr. Jenkins.     Impression &  Plan    Medical Decision Makin-year-old male presents with a subacute saw injury to the left index finger and concerns for ongoing alcohol abuse with requests for assistance arranging alcohol detox.  He also is underlying anxiety and depression and has been experiencing an exacerbation of his anxiety in conjunction with the ongoing alcohol use.  His CIWA score today is 11, which is admittedly skewed by his underlying anxiety symptoms.  However, treating with Valium as this will assist with those as well on the CIWA protocol.  He has a worsening cough over the last week, no evidence of pneumonia on chest x-ray.  A left hand x-ray shows evidence of violation of the bony cortex of the left index middle phalanx from the saw injury a week ago.  No current signs of infection and aside from some mild decrease sensation at the distal fingertip, seemingly neurovascularly intact and no signs of tendon injury on exam.  I discussed this with orthopedics who recommended an extended course of Augmentin prophylactically and follow-up in the hand clinic when the patient is able, potentially after he completes detox as described.  DEC was consulted with regard to the patient's anxiety and unfortunately was unable to complete their assessment after being asked to leave by the patient .  We called around to local detox facilities and there was an available detox bed at [place] which the patient declined.  Plan is for admission to the hospitalist service for EtOH withdrawal..    Diagnosis:    ICD-10-CM    1. Alcohol withdrawal, uncomplicated (H)  F10.930       2. Laceration of left index finger without damage to nail, foreign body presence unspecified, initial encounter  S61.211A            Scribe Disclosure:  Zo TROTTER, am serving as a scribe at 9:00 AM on 3/5/2024 to document services personally performed by Bishop Durant MD based on my observations and the provider's statements to me.   3/5/2024   Bishop Durant MD         Bishop Durant MD  03/05/24 1549

## 2024-03-06 PROBLEM — S61.211A: Status: ACTIVE | Noted: 2024-03-06

## 2024-03-06 PROBLEM — F10.930 ALCOHOL WITHDRAWAL, UNCOMPLICATED (H): Status: ACTIVE | Noted: 2024-03-06

## 2024-03-06 LAB
ALBUMIN SERPL BCG-MCNC: 4.8 G/DL (ref 3.5–5.2)
ALP SERPL-CCNC: 59 U/L (ref 40–150)
ALT SERPL W P-5'-P-CCNC: 32 U/L (ref 0–70)
ANION GAP SERPL CALCULATED.3IONS-SCNC: 10 MMOL/L (ref 7–15)
AST SERPL W P-5'-P-CCNC: 43 U/L (ref 0–45)
BASOPHILS # BLD AUTO: 0 10E3/UL (ref 0–0.2)
BASOPHILS NFR BLD AUTO: 0 %
BILIRUB DIRECT SERPL-MCNC: 0.3 MG/DL (ref 0–0.3)
BILIRUB SERPL-MCNC: 1.9 MG/DL
BUN SERPL-MCNC: 11.6 MG/DL (ref 6–20)
CALCIUM SERPL-MCNC: 9.2 MG/DL (ref 8.6–10)
CHLORIDE SERPL-SCNC: 98 MMOL/L (ref 98–107)
CREAT SERPL-MCNC: 1.02 MG/DL (ref 0.67–1.17)
CRP SERPL-MCNC: <3 MG/L
DEPRECATED HCO3 PLAS-SCNC: 31 MMOL/L (ref 22–29)
EGFRCR SERPLBLD CKD-EPI 2021: 90 ML/MIN/1.73M2
EOSINOPHIL # BLD AUTO: 0 10E3/UL (ref 0–0.7)
EOSINOPHIL NFR BLD AUTO: 0 %
ERYTHROCYTE [DISTWIDTH] IN BLOOD BY AUTOMATED COUNT: 11.9 % (ref 10–15)
GLUCOSE SERPL-MCNC: 116 MG/DL (ref 70–99)
HCT VFR BLD AUTO: 51.2 % (ref 40–53)
HGB BLD-MCNC: 17.4 G/DL (ref 13.3–17.7)
IMM GRANULOCYTES # BLD: 0 10E3/UL
IMM GRANULOCYTES NFR BLD: 0 %
LYMPHOCYTES # BLD AUTO: 2 10E3/UL (ref 0.8–5.3)
LYMPHOCYTES NFR BLD AUTO: 24 %
MAGNESIUM SERPL-MCNC: 2 MG/DL (ref 1.7–2.3)
MCH RBC QN AUTO: 31.9 PG (ref 26.5–33)
MCHC RBC AUTO-ENTMCNC: 34 G/DL (ref 31.5–36.5)
MCV RBC AUTO: 94 FL (ref 78–100)
MONOCYTES # BLD AUTO: 0.7 10E3/UL (ref 0–1.3)
MONOCYTES NFR BLD AUTO: 8 %
NEUTROPHILS # BLD AUTO: 5.5 10E3/UL (ref 1.6–8.3)
NEUTROPHILS NFR BLD AUTO: 68 %
NRBC # BLD AUTO: 0 10E3/UL
NRBC BLD AUTO-RTO: 0 /100
PLATELET # BLD AUTO: 199 10E3/UL (ref 150–450)
POTASSIUM SERPL-SCNC: 4.1 MMOL/L (ref 3.4–5.3)
PROT SERPL-MCNC: 7.5 G/DL (ref 6.4–8.3)
RBC # BLD AUTO: 5.45 10E6/UL (ref 4.4–5.9)
SODIUM SERPL-SCNC: 139 MMOL/L (ref 135–145)
WBC # BLD AUTO: 8.2 10E3/UL (ref 4–11)

## 2024-03-06 PROCEDURE — 83735 ASSAY OF MAGNESIUM: CPT | Mod: 91 | Performed by: INTERNAL MEDICINE

## 2024-03-06 PROCEDURE — 99232 SBSQ HOSP IP/OBS MODERATE 35: CPT | Performed by: INTERNAL MEDICINE

## 2024-03-06 PROCEDURE — 250N000013 HC RX MED GY IP 250 OP 250 PS 637: Performed by: EMERGENCY MEDICINE

## 2024-03-06 PROCEDURE — G0378 HOSPITAL OBSERVATION PER HR: HCPCS

## 2024-03-06 PROCEDURE — 82248 BILIRUBIN DIRECT: CPT | Performed by: INTERNAL MEDICINE

## 2024-03-06 PROCEDURE — 250N000013 HC RX MED GY IP 250 OP 250 PS 637: Performed by: STUDENT IN AN ORGANIZED HEALTH CARE EDUCATION/TRAINING PROGRAM

## 2024-03-06 PROCEDURE — 250N000013 HC RX MED GY IP 250 OP 250 PS 637: Performed by: INTERNAL MEDICINE

## 2024-03-06 PROCEDURE — 99254 IP/OBS CNSLTJ NEW/EST MOD 60: CPT | Mod: 95

## 2024-03-06 PROCEDURE — 36415 COLL VENOUS BLD VENIPUNCTURE: CPT | Performed by: INTERNAL MEDICINE

## 2024-03-06 PROCEDURE — 82374 ASSAY BLOOD CARBON DIOXIDE: CPT | Performed by: INTERNAL MEDICINE

## 2024-03-06 PROCEDURE — 85049 AUTOMATED PLATELET COUNT: CPT | Performed by: INTERNAL MEDICINE

## 2024-03-06 PROCEDURE — 86140 C-REACTIVE PROTEIN: CPT | Performed by: INTERNAL MEDICINE

## 2024-03-06 RX ORDER — ALBUTEROL SULFATE 90 UG/1
2 AEROSOL, METERED RESPIRATORY (INHALATION) EVERY 6 HOURS PRN
Status: DISCONTINUED | OUTPATIENT
Start: 2024-03-06 | End: 2024-03-07 | Stop reason: HOSPADM

## 2024-03-06 RX ORDER — NICOTINE 21 MG/24HR
1 PATCH, TRANSDERMAL 24 HOURS TRANSDERMAL DAILY
Status: DISCONTINUED | OUTPATIENT
Start: 2024-03-06 | End: 2024-03-07 | Stop reason: HOSPADM

## 2024-03-06 RX ADMIN — FOLIC ACID 1 MG: 1 TABLET ORAL at 08:35

## 2024-03-06 RX ADMIN — THIAMINE HCL TAB 100 MG 100 MG: 100 TAB at 08:34

## 2024-03-06 RX ADMIN — METOPROLOL TARTRATE 25 MG: 25 TABLET, FILM COATED ORAL at 08:34

## 2024-03-06 RX ADMIN — GABAPENTIN 400 MG: 400 CAPSULE ORAL at 15:31

## 2024-03-06 RX ADMIN — GABAPENTIN 400 MG: 400 CAPSULE ORAL at 21:32

## 2024-03-06 RX ADMIN — ALBUTEROL SULFATE 2 PUFF: 90 AEROSOL, METERED RESPIRATORY (INHALATION) at 21:40

## 2024-03-06 RX ADMIN — GABAPENTIN 400 MG: 400 CAPSULE ORAL at 08:35

## 2024-03-06 RX ADMIN — Medication 1 TABLET: at 08:35

## 2024-03-06 RX ADMIN — NICOTINE POLACRILEX 2 MG: 2 GUM, CHEWING BUCCAL at 17:48

## 2024-03-06 RX ADMIN — CLONIDINE HYDROCHLORIDE 0.1 MG: 0.1 TABLET ORAL at 10:52

## 2024-03-06 RX ADMIN — OLANZAPINE 5 MG: 5 TABLET, ORALLY DISINTEGRATING ORAL at 08:40

## 2024-03-06 RX ADMIN — AMOXICILLIN AND CLAVULANATE POTASSIUM 1 TABLET: 875; 125 TABLET, FILM COATED ORAL at 08:34

## 2024-03-06 RX ADMIN — CLONIDINE HYDROCHLORIDE 0.1 MG: 0.1 TABLET ORAL at 02:00

## 2024-03-06 RX ADMIN — NICOTINE 1 PATCH: 21 PATCH, EXTENDED RELEASE TRANSDERMAL at 18:48

## 2024-03-06 RX ADMIN — METOPROLOL TARTRATE 25 MG: 25 TABLET, FILM COATED ORAL at 21:32

## 2024-03-06 RX ADMIN — FLUOXETINE 20 MG: 20 CAPSULE ORAL at 08:35

## 2024-03-06 RX ADMIN — NICOTINE POLACRILEX 2 MG: 2 GUM, CHEWING BUCCAL at 10:53

## 2024-03-06 RX ADMIN — AMOXICILLIN AND CLAVULANATE POTASSIUM 1 TABLET: 875; 125 TABLET, FILM COATED ORAL at 21:32

## 2024-03-06 RX ADMIN — ALBUTEROL SULFATE 2 PUFF: 90 AEROSOL, METERED RESPIRATORY (INHALATION) at 15:28

## 2024-03-06 RX ADMIN — DIAZEPAM 10 MG: 10 TABLET ORAL at 11:02

## 2024-03-06 RX ADMIN — DIAZEPAM 10 MG: 10 TABLET ORAL at 17:48

## 2024-03-06 ASSESSMENT — ACTIVITIES OF DAILY LIVING (ADL)
ADLS_ACUITY_SCORE: 21
ADLS_ACUITY_SCORE: 20
ADLS_ACUITY_SCORE: 21
ADLS_ACUITY_SCORE: 20
ADLS_ACUITY_SCORE: 21
ADLS_ACUITY_SCORE: 20
ADLS_ACUITY_SCORE: 21
ADLS_ACUITY_SCORE: 20
ADLS_ACUITY_SCORE: 21
ADLS_ACUITY_SCORE: 20
ADLS_ACUITY_SCORE: 21
ADLS_ACUITY_SCORE: 20
ADLS_ACUITY_SCORE: 21

## 2024-03-06 NOTE — PLAN OF CARE
Goal Outcome Evaluation:      Plan of Care Reviewed With: patient    Overall Patient Progress: improvingOverall Patient Progress: improving  To Do:  End of Shift Summary  For vital signs and complete assessments, please see documentation flowsheets.     Pertinent assessments: Assumed cares [id number]  Pt A&Ox4. Up SBA. Bed alarm on, seizure pads in place. Mild anxiety and nausea. PRN Zyprexa given x1 for anxiety. Voiding without issues. Chronic n/t in upper extremities. CIWA score 3. Denies pain, SOB. VSS on RA.    Major Shift Events: none    Treatment Plan: CIWA, seizure precautions. CD and psych consult.     Bedside Nurse: Maximino Munoz RN 03/05/2024 11:38 PM

## 2024-03-06 NOTE — PLAN OF CARE
End of Shift Summary  For vital signs and complete assessments, please see documentation flowsheets.     Pertinent assessments: VSS. Afebrile. LS clear. BS x4. Pt denies pain and nausea. Regular diet. SBA to ambulate. PIV - SL. CIWA score of 0 and 0. A&O, alarm on bed for safety. Slept well.     Major Shift Events: none    Treatment Plan: Monitor CIWA, Encourage activity, Chem Dep and Psych consults, Discharge TBD.
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
## 2024-03-06 NOTE — PROGRESS NOTES
"  [facility]    Hospitalist Progress Note      Assessment & Plan   Georges Moreno is a 48 year old man who was admitted on 3/5/2024. PMH significant for alcohol dependence, tobacco abuse, neuropathy of the hands, anxiety, PTSD, paroxysmal atrial fibrillation, and hypertension. Patient had been abstinent from alcohol for 2 months until 10-day binge. Patient with history of requiring detox. Given symptoms, presented for further evaluation. He had been going to AA meetings on Wednesday mornings with last meeting attended 2/21. He had not had a specific sponsor as he felt like he could \"do it himself.\"     Patient additionally with a laceration to left 2nd digit from an injury with a saw on 2/17/24 and saw possibly to bone. He had been treated with Augmentin on 2/18, though laceration not yet healed. ER provided tetanus vaccine. ER imaged with x-ray and spoke with orthopedics. Recommendation made for additional 7 days Augmentin and follow up with orthopedics hand clinic.     Patient also with uncontrolled anxiety. Reports was recently at [place] [facility] [place] where Paxil was weaned down and patient started on Prozac.     Given patient's symptoms, he was brought in for further evaluation and treatment of alcohol withdrawal.     Alcohol dependence with withdrawal  - Patient presented with a long history of alcohol dependence had been sober for 2 months and has attended AA meetings.    - Last AA meeting 2/21/24.   - As of 3/6 evening, patient with ongoing symptoms and CIWA scores today still increasing 7 -> 8 -> 9 with ongoing need for PRN diazepam. Continue to monitor and treat with PRN diazepam as needed  - Stopping clonidine at this time as BP controlled today.   - Continue PTA gabapentin.   - Seen by psychiatry, though patient has declined additional SAMMI treatment.   - Patient planning to follow with AA.  - Patient hopeful for discharge home 3/7 morning, though will depend on withdrawal at that " time.     Anxiety  - Patient seen by psychiatry 3/6 (note pending).  - Patient declined additional SAMMI treatment. Psychiatry increasing Prozac for anxiety and made referrals for outpatient psych management and therapy.   - Patient should discharge with Rx for increased Prozac.     Left index finger laceration  Patient cut his left index finger on 2/17/2024 using a reciprocating saw while cutting off a bolt. He went to clinic the following day and was treated with Augmentin. The area has not healed. X-rays done this admission showed debris in the wound. There is concern that the wound extends down to the bone.  - ER discussed with orthopedics on 3/5 (specifically someone who works with hand) and recommendation was made for an additional 7 days Augmentin and follow up in orthopedics hand clinic. Clinic number for [facility] is [phone] and patient should request appointment with hand clinic.   - Tetanus shot given in ER 3/5/24.   - Discharge with Augmentin.      Neuropathy of the hands  Patient has a history of having surgery for this but possibly instead secondary to his alcohol use disorder.   - Continue PTA gabapentin.     Reactive airway disease, postviral  Patient only uses albuterol after getting a cold.   Denies any history of asthma, though does have wheeze on exam. Encourage PRN albuterol use and recommend follow up with PCP.      Hypertension  Paroxysmal atrial fibrillation  Patient has had 2 episodes of A-fib in the past related to his alcohol abuse.  He is on Lopressor for rate control.  He is not on any anticoagulation.   Have stopped clonidine which was started on admission. Follow BP. Patient should follow closely with PCP for monitoring when outside of withdrawal.     Tobacco abuse  Patient admits to smoking 1/2 pack per day.   Nicotine patch and PRN gum ordered.      History of anal fissure, hemorrhoids  Noted on admission.     DVT Prophylaxis: Pneumatic Compression Devices  Code Status: Full  Code  Expected discharge: Pending further clinical improvement. Patient with ongoing symptoms and CIWA scores today still increasing 7 -> 8 -> 9 with ongoing need for PRN benzo into the evening. Patient hoping for discharge tomorrow early morning due to ride availability, but recommendations will depend on CIWA monitoring.    Yeimy Benitez MD FACP  Hospitalist Service  [facility]      Interval History   Patient continues to have significant withdrawal symptoms. Patient was anxious and diaphoretic at time of exam. He does note poorly controlled anxiety at baseline and psychiatry is increasing his Prozac for such, but diaphoresis and acute symptoms more consistent with withdrawal at time of exam. Continue CIWA monitoring and treatment.     -Data reviewed today: I reviewed all new labs and imaging results over the last 24 hours.       Physical Exam   Temp: 98.6  F (37  C) Temp src: Oral BP: 114/73 Pulse: 64   Resp: 20 SpO2: 98 % O2 Device: None (Room air)    Vitals:    03/05/24 1701   Weight: 86.7 kg (191 lb 2.2 oz)     Vital Signs with Ranges  Temp:  [98.3  F (36.8  C)-98.6  F (37  C)] 98.6  F (37  C)  Pulse:  [62-75] 64  Resp:  [20] 20  BP: (114-142)/(62-77) 114/73  SpO2:  [95 %-98 %] 98 %  No intake/output data recorded.    Constitutional: Patient seen resting in bed this morning. Patient alert, oriented x 3. Anxious, diaphoretic. Speaking in full sentences. No tremor noted.   HEENT: NCAT. EOMI. Moist oral mucosa.  Respiratory: Clear to auscultation bilaterally. Patient does have expiratory wheezes throughout.   Cardiovascular: Regular rate and rhythm at time of exam. No murmur.  GI: Soft, nontender, nondistended. Normoactive bowel sounds.   Musculoskeletal: Left 2nd digit with laceration as noted in image below. Minimal surrounding erythema. Patient with slight limitation of ROM at PIP from laceration, but otherwise functionally intact. No drainage. Otherwise no gross deformities.   Neurologic:  Alert and oriented x3. Anxious at time of exam. No tremor appreciated. No focal neurologic deficits. Did not assess gait.        Medications      amoxicillin-clavulanate  1 tablet Oral Q12H [facility] (08/20)    [START ON 3/7/2024] FLUoxetine  40 mg Oral Daily    folic acid  1 mg Oral Daily    gabapentin  400 mg Oral TID    metoprolol tartrate  25 mg Oral BID    multivitamin w/minerals  1 tablet Oral Daily    nicotine  1 patch Transdermal Daily    sodium chloride (PF)  3 mL Intracatheter Q8H    thiamine  100 mg Oral Daily       Data   Recent Labs   Lab 03/06/24  0846 03/05/24  0909   WBC 8.2 9.4   HGB 17.4 18.3*   MCV 94 92    220    139   POTASSIUM 4.1 4.1   CHLORIDE 98 96*   CO2 31* 23   BUN 11.6 17.9   CR 1.02 0.81   ANIONGAP 10 20*   LEILA 9.2 9.3   * 124*   ALBUMIN 4.8 5.0   PROTTOTAL 7.5 8.1   BILITOTAL 1.9* 0.7   ALKPHOS 59 59   ALT 32 32   AST 43 39       No results found for this or any previous visit (from the past 24 hour(s)).

## 2024-03-06 NOTE — DISCHARGE INSTRUCTIONS
MENTAL HEALTH RESOURCES & SERVICES:   Behavioral Healthcare Providers Scheduling  During your hospitalization, you were seen by a licensed mental health professional [facility] (P)  for a brief mental health assessment and/or psychotherapy at [facility] , [facility].  It is recommended that you follow up with your established providers (psychiatrist, mental health therapist, and/or primary care doctor - as relevant) as soon as possible. Coordinators from [facility] will be calling you in the next 24-48 hours to ensure that you have the resources you need.  You can also contact [facility] coordinators directly at [phone].    You have been scheduled for the following mental health appointments:   Date: Tuesday, 3/12/2024  Time: 9:00 am - 10:00 am  Provider: Aditya Gibson  MSN  CNP,PMHNP,RN  Location: [facility], [address] [place] [id number]  Phone: [phone]  Type: Telepsychiatry    Patient Instructions  All Intake appointments will be conducted via telehealth and must have access to video through smart phone or laptop/pc/tablet. You will be contacted by our office to set up the virtual meeting. If you have questions, please contact our office at [phone].    Date: Wednesday, 3/13/2024  Time: 11:00 am - 12:00 pm  Provider: Mari Hanna  MSW  LICSW  Location: [facility], [address] [place] [address], [address], [place] [address]  Phone: [phone]  Type: Teletherapy     [facility] maintains an extensive network of licensed behavioral health providers to connect patients with the services they need.  We do not charge providers a fee to participate in our referral network.  We match patients with providers based on a patient s specific needs, insurance coverage, and location.  Our first effort will be to refer you to a provider within your care system, and will  utilize providers outside your care system as needed.         ADDITIONAL SUPPORTS:  Call or text 476 - National Suicide & Crisis Lifeline - if you feel like you may be in crisis or having thoughts of suicide.    [employer] [place] ([facility]) provides support groups and educational programs. Visit www.namimn.org Helpline at 1-477.547.7383 or 137-153-1205 for further information.   [facility] ([employer] Mental Illness) improves the lives of children and adults with mental illnesses and their families by providing free classes on mental illnesses andsupport groups for adults with mental illnesses, parents and family members.   For more information:  Phone: [phone]  Toll free: 8-467-QDAZ-HELPS  Website: www.namihelps.org      The [place] Warm[facility] provides a [id number]-bf-xvbz approach to mental health recovery, support and wellness. Calls are answered by our team of professionally trained Certified Peer Specialists, who have first hand experience living with a mental health condition.   Open Monday-Saturday, 5pm to 10pm. Call [phone].       You may contact the [facility] for brief, short-term solution focused therapy support with your mental health goals. Call [phone] for more information or to schedule. (Virtual Appointments)          [facility]: Thank you for your interest in [facility]. Currently, patients are experiencing long waits for intake when referred within [facility]. Please know that you may contact your insurance carrier member services to learn more about scheduling in network therapy. Your insurance company will have lists of in network therapists that are not within [facility] and may have more immediate availability.       Get care started with an ongoing therapist by calling to schedule your intake at one of the following clinics:    [facility]: [phone]  [facility]  [phone]  [facility] [phone]  [facility] [phone]  [facility] [phone]  [facility] system  [phone]   [facility] in [facility] [phone]  [facility] [phone]    Call an [facility] Behavioral Coordinator at [phone] for assistance in scheduling mental health appointments (Psychiatry/medication management, therapy, support groups, neuropsych testing, intake for programmatic care such as IOP/PHP program, etc.)

## 2024-03-06 NOTE — CARE PLAN
Georges Moreno  March 5, 2024  Plan of Care Hand-off Note     Patient Care Path: medical    Plan for Care:   (P) Pt presenting in the ER today due to worsening of depression, anxiety and alcohol withdrawal issues.  Pt had relapsed about 10 days ago and has been drinking alcohol daily.  Pt identified worsening of anxiety and ongoing alcohol abuse as stressors leading to his current mental health crisis. Pt endorsed increased depression and anxiety. Pt reported having poor sleep with disrupted appetite. Pt denied having suicidal and homicidal ideations. Pt denied having access to firearms, history of SIB and previous suicide attempt. Pt reported he has been talking to his outpatient PCP about his anxiety issues, but his PCP told him he has depression as he precribed him with antidepressant medications which did not help him. Pt reported he was upset with his PCP for not giving him antianxiety medications but prescribing him with wrong medications for depression. Pt identified having anxiety issues and he drink alcohol to cope with his anxiety as his triggers. Pt became irritable with this writer for asking him too many questions as he refused to finish his mental health symptoms assessment.  Writer consulted with Dr. Durant who reported Pt was not suicidal nor homicidal as he was appropriate to go to detox service.  Dr. Durant noted [facility] detox service will have open bed for Pt today around 4pm.  Writer followed up with ER provider later today about final disposition.  The ER provider reported Pt refused to go to [facility] detox service as Dr. Durant decided to have medical detox admission at [facility] as final disposition.  Writer recommended Pt to engage in DEC re-assessment to determine appropriate level of care after his medical detox service in the ER.    Identified Goals and Safety Issues: Pt denied having suicidal and homicidal ideations.  Pt has history of aggression, assault charges and fights at the  bar.    Overview:  Alberta Wall [phone]            Legal Status:      Psychiatry Consult:       Updated   regarding plan of care.           Kevon Garrett, LICSW

## 2024-03-06 NOTE — PLAN OF CARE
Goal Outcome Evaluation:      Plan of Care Reviewed With: patient    Overall Patient Progress: improving       End of Shift Summary  For vital signs and complete assessments, please see documentation flowsheets.     Pertinent assessments: VSS. Afebrile. Inspiratory/Expiratory wheezes. PRN albuterol available. BS x4. Pt denies pain and nausea. Regular diet. SBA to ambulate. PIV - SL. CIWA score of 7,8,5. Scoring for anxiety, slight tremor. Valium given x1.     Major Shift Events: Moved to observation status.     Treatment Plan: Monitor CIWA, Encourage activity, Probable discharge tomorrow. Pt request [phone] discharge for transportation purposes.     Bedside Nurse: Mecca Monique RN

## 2024-03-06 NOTE — CONSULTS
3/6/2024  I spoke with pt who was not interested in participating in a SMAMI CA with a referral to either inpatient or outpatient SAMMI treatment at this time. If pt changes their mind, they can call [facility] at [phone] for an Outpatient SAMMI Assessment upon discharge from the hospital.    Martha Hill MA [facility] [place]  SAMMI Evaluation Counselor  [phone]  [email or web address]

## 2024-03-06 NOTE — PLAN OF CARE
Goal Outcome Evaluation:    End of Shift Summary  For vital signs and complete assessments, please see documentation flowsheets.     Pertinent assessments:   Pt A&Ox4. Up SBA. Bed alarm on, seizure pads in place. Mild anxiety and nausea. Chronic n/t in upper extremities. CIWA score 3. Denies pain, SOB. VSS on RA.    Major Shift Events: up to ms5 from ED.    Treatment Plan: CIWA, seizure precautions. CD and psych consult.

## 2024-03-06 NOTE — UTILIZATION REVIEW
"          Admission Status; Secondary Review Determination         Under the authority of the Utilization Management Committee, the utilization review process indicated a secondary review on the above patient.  The review outcome is based on review of the medical records, discussions with staff, and applying clinical experience noted on the date of the review.          (x) Observation Status Appropriate - This patient does not meet hospital inpatient criteria and is placed in observation status. If this patient's primary payer is Medicare and was admitted as an inpatient, Condition Code 44 should be used and patient status changed to \"observation\".     RATIONALE FOR DETERMINATION          The Patient is 49 y/o  man with PMHx significant for   alcohol dependence, tobacco abuse, neuropathy of the hands, anxiety, PTSD, paroxysmal atrial fibrillation, hypertension sober for the last 2 months, went on a 10-day binge drinking after receiving a meeting.  He states that his anxiety has worsened any depression.  He also sustained a laceration on the left index on February 17, he received Augmentin, tetanus shot and Ortho recommended outpatient follow-up.  In emergency room he was found with elevated blood pressure 140s to 160s, febrile, CMP CBC in acceptable range.  He was started on CIWA protocol.  The score has been between 3 and 8.  The patient was offered yesterday admission to [facility] detox services and the patient refused.  The patient is not interested in participating in a SAMMI CA with a referral to either inpatient or outpatient SAMMI treatment at this time.    The severity of illness, intensity of service provided, expected LOS and risk for adverse outcome make the care appropriate for further observation; however, doesn't meet criteria for hospital inpatient admission. Dr. Herrera   notified of this determination.    This document was produced using voice recognition software.      The information on this " document is developed by the utilization review team in order for the business office to ensure compliance.  This only denotes the appropriateness of proper admission status and does not reflect the quality of care rendered.         The definitions of Inpatient Status and Observation Status used in making the determination above are those provided in the CMS Coverage Manual, Chapter 1 and Chapter 6, section 70.4.      Sincerely,     RACHID WICK MD   Utilization Review  Physician Advisor  [facility]

## 2024-03-07 VITALS
DIASTOLIC BLOOD PRESSURE: 78 MMHG | RESPIRATION RATE: 18 BRPM | SYSTOLIC BLOOD PRESSURE: 135 MMHG | BODY MASS INDEX: 27.36 KG/M2 | TEMPERATURE: 98.4 F | HEART RATE: 67 BPM | OXYGEN SATURATION: 97 % | WEIGHT: 191.14 LBS | HEIGHT: 70 IN

## 2024-03-07 LAB
ALBUMIN SERPL BCG-MCNC: 4.4 G/DL (ref 3.5–5.2)
ALP SERPL-CCNC: 54 U/L (ref 40–150)
ALT SERPL W P-5'-P-CCNC: 26 U/L (ref 0–70)
ANION GAP SERPL CALCULATED.3IONS-SCNC: 12 MMOL/L (ref 7–15)
AST SERPL W P-5'-P-CCNC: 29 U/L (ref 0–45)
BASOPHILS # BLD AUTO: 0 10E3/UL (ref 0–0.2)
BASOPHILS NFR BLD AUTO: 0 %
BILIRUB DIRECT SERPL-MCNC: 0.2 MG/DL (ref 0–0.3)
BILIRUB SERPL-MCNC: 0.9 MG/DL
BUN SERPL-MCNC: 13.4 MG/DL (ref 6–20)
CALCIUM SERPL-MCNC: 9.4 MG/DL (ref 8.6–10)
CHLORIDE SERPL-SCNC: 101 MMOL/L (ref 98–107)
CREAT SERPL-MCNC: 0.91 MG/DL (ref 0.67–1.17)
DEPRECATED HCO3 PLAS-SCNC: 29 MMOL/L (ref 22–29)
EGFRCR SERPLBLD CKD-EPI 2021: >90 ML/MIN/1.73M2
EOSINOPHIL # BLD AUTO: 0 10E3/UL (ref 0–0.7)
EOSINOPHIL NFR BLD AUTO: 1 %
ERYTHROCYTE [DISTWIDTH] IN BLOOD BY AUTOMATED COUNT: 11.8 % (ref 10–15)
GLUCOSE SERPL-MCNC: 136 MG/DL (ref 70–99)
HCT VFR BLD AUTO: 48.4 % (ref 40–53)
HGB BLD-MCNC: 16.6 G/DL (ref 13.3–17.7)
IMM GRANULOCYTES # BLD: 0 10E3/UL
IMM GRANULOCYTES NFR BLD: 0 %
LYMPHOCYTES # BLD AUTO: 1.5 10E3/UL (ref 0.8–5.3)
LYMPHOCYTES NFR BLD AUTO: 22 %
MAGNESIUM SERPL-MCNC: 1.9 MG/DL (ref 1.7–2.3)
MCH RBC QN AUTO: 32.7 PG (ref 26.5–33)
MCHC RBC AUTO-ENTMCNC: 34.3 G/DL (ref 31.5–36.5)
MCV RBC AUTO: 95 FL (ref 78–100)
MONOCYTES # BLD AUTO: 0.5 10E3/UL (ref 0–1.3)
MONOCYTES NFR BLD AUTO: 7 %
NEUTROPHILS # BLD AUTO: 4.8 10E3/UL (ref 1.6–8.3)
NEUTROPHILS NFR BLD AUTO: 70 %
NRBC # BLD AUTO: 0 10E3/UL
NRBC BLD AUTO-RTO: 0 /100
PHOSPHATE SERPL-MCNC: 2.6 MG/DL (ref 2.5–4.5)
PLATELET # BLD AUTO: 162 10E3/UL (ref 150–450)
POTASSIUM SERPL-SCNC: 4 MMOL/L (ref 3.4–5.3)
PROT SERPL-MCNC: 7.2 G/DL (ref 6.4–8.3)
RBC # BLD AUTO: 5.08 10E6/UL (ref 4.4–5.9)
SODIUM SERPL-SCNC: 142 MMOL/L (ref 135–145)
WBC # BLD AUTO: 6.8 10E3/UL (ref 4–11)

## 2024-03-07 PROCEDURE — 36415 COLL VENOUS BLD VENIPUNCTURE: CPT | Performed by: INTERNAL MEDICINE

## 2024-03-07 PROCEDURE — 250N000013 HC RX MED GY IP 250 OP 250 PS 637: Performed by: EMERGENCY MEDICINE

## 2024-03-07 PROCEDURE — 84100 ASSAY OF PHOSPHORUS: CPT | Performed by: INTERNAL MEDICINE

## 2024-03-07 PROCEDURE — 250N000013 HC RX MED GY IP 250 OP 250 PS 637

## 2024-03-07 PROCEDURE — 83735 ASSAY OF MAGNESIUM: CPT | Performed by: INTERNAL MEDICINE

## 2024-03-07 PROCEDURE — 85004 AUTOMATED DIFF WBC COUNT: CPT | Performed by: INTERNAL MEDICINE

## 2024-03-07 PROCEDURE — 99239 HOSP IP/OBS DSCHRG MGMT >30: CPT | Performed by: INTERNAL MEDICINE

## 2024-03-07 PROCEDURE — 82310 ASSAY OF CALCIUM: CPT | Performed by: INTERNAL MEDICINE

## 2024-03-07 PROCEDURE — G0378 HOSPITAL OBSERVATION PER HR: HCPCS

## 2024-03-07 PROCEDURE — 250N000013 HC RX MED GY IP 250 OP 250 PS 637: Performed by: INTERNAL MEDICINE

## 2024-03-07 PROCEDURE — 80053 COMPREHEN METABOLIC PANEL: CPT | Performed by: INTERNAL MEDICINE

## 2024-03-07 RX ADMIN — FOLIC ACID 1 MG: 1 TABLET ORAL at 08:38

## 2024-03-07 RX ADMIN — GABAPENTIN 400 MG: 400 CAPSULE ORAL at 08:38

## 2024-03-07 RX ADMIN — METOPROLOL TARTRATE 25 MG: 25 TABLET, FILM COATED ORAL at 08:38

## 2024-03-07 RX ADMIN — Medication 1 TABLET: at 08:38

## 2024-03-07 RX ADMIN — THIAMINE HCL TAB 100 MG 100 MG: 100 TAB at 08:39

## 2024-03-07 RX ADMIN — Medication 5 MG: at 00:07

## 2024-03-07 RX ADMIN — AMOXICILLIN AND CLAVULANATE POTASSIUM 1 TABLET: 875; 125 TABLET, FILM COATED ORAL at 08:39

## 2024-03-07 RX ADMIN — FLUOXETINE 40 MG: 20 CAPSULE ORAL at 08:38

## 2024-03-07 ASSESSMENT — ACTIVITIES OF DAILY LIVING (ADL)
ADLS_ACUITY_SCORE: 20

## 2024-03-07 NOTE — PLAN OF CARE
Goal Outcome Evaluation:       End of Shift Summary  For vital signs and complete assessments, please see documentation flowsheets.     Pertinent assessments: A&OX4, VSS, expiratory wheezes, PRN albuterol given. Prn Melatonin given. Denies pain/nausea. PIV SL, CIWA score 4,4,2.     Treatment Plan: Monitor CIWA, Encourage activity, Discharge today.

## 2024-03-07 NOTE — PLAN OF CARE
Goal Outcome Evaluation:      Plan of Care Reviewed With: patient      To Do:  End of Shift Summary  For vital signs and complete assessments, please see documentation flowsheets.     Pertinent assessments: pt oriented x 4, Ls clear, VSS,  Denies pain/nausea. PIV removed, CIWA score 4, discharge to home, education on amoxicillin, pt show understanding.    Treatment Plan: Antibiotic home

## 2024-03-07 NOTE — DISCHARGE SUMMARY
"[facility]  Discharge Summary  Name: Georges Moreno    MRN: [id number]  YOB: 1975    Age: 48 year old  Date of Discharge:  3/7/2024  Date of Admission: 3/5/2024  Primary Care Provider: Clinic, [facility]  Discharge Physician:  Sidney Feldman MD  Discharging Service:  Hospitalist      Hospital Course/Discharge Diagnoses:  Georges Moreno is a 48 year old man who was admitted on 3/5/2024. PMH significant for alcohol dependence, tobacco abuse, neuropathy of the hands, anxiety, PTSD, paroxysmal atrial fibrillation, and hypertension. Patient had been abstinent from alcohol for 2 months until 10-day binge. Patient with history of requiring detox. Given symptoms, presented for further evaluation. He had been going to AA meetings on Wednesday mornings with last meeting attended 2/21. He had not had a specific sponsor as he felt like he could \"do it himself.\"      Patient additionally with a laceration to left 2nd digit from an injury with a saw on 2/17/24 and saw possibly to bone. He had been treated with Augmentin on 2/18, though laceration not yet healed. ER provided tetanus vaccine. ER imaged with x-ray and spoke with orthopedics. Recommendation made for additional 7 days Augmentin and follow up with orthopedics hand clinic.      Patient also with uncontrolled anxiety. Reports was recently at [facility] where Paxil was weaned down and patient started on Prozac.      Given patient's symptoms, he was brought in for further evaluation and treatment of alcohol withdrawal.     Thankfully symptoms remained relatively mild.  He did require some doses of Valium but as of this morning withdrawal appears to be resolved and he is eager to discharge home.  He does plan on reconnecting with outpatient AA meetings and following up with his primary care provider.  I have also placed a referral for outpatient psychiatry follow-up as I do think difficulties with anxiety are driving some " of his behaviors with regard to alcohol abuse.     Alcohol dependence with withdrawal  - Patient presented with a long history of alcohol dependence had been sober for 2 months and has attended AA meetings.    - Last AA meeting 2/21/24.   - Continue PTA gabapentin.  He reports he has a refill of this waiting at his pharmacy.  - Seen by psychiatry, though patient has declined additional SAMMI treatment.   - Patient planning to follow with AA.  -Outpatient mental health  referral     Anxiety  - Patient seen by psychiatry 3/6  - Patient declined additional SAMMI treatment.  Outpatient psychiatry referral.     Left index finger laceration  Patient cut his left index finger on 2/17/2024 using a reciprocating saw while cutting off a bolt. He went to clinic the following day and was treated with Augmentin. The area has not healed. X-rays done this admission showed debris in the wound. There is concern that the wound extends down to the bone.  - ER discussed with orthopedics on 3/5 (specifically someone who works with hand) and recommendation was made for an additional 7 days Augmentin and follow up in orthopedics hand clinic. Clinic number for [facility] is [phone] and patient should request appointment with hand clinic.   - Tetanus shot given in ER 3/5/24.   - Discharge with Augmentin to complete 7 days     Neuropathy of the hands  Patient has a history of having surgery for this but possibly instead secondary to his alcohol use disorder.   - Continue PTA gabapentin.     Reactive airway disease, postviral  Patient only uses albuterol after getting a cold.   Denies any history of asthma, though does have wheeze on exam. Encourage PRN albuterol use and recommend follow up with PCP.      Hypertension  Paroxysmal atrial fibrillation  Patient has had 2 episodes of A-fib in the past related to his alcohol abuse.  He is on Lopressor for rate control.  He is not on any anticoagulation.  Outpatient follow-up with PCP  recommended.     Tobacco abuse  Patient admits to smoking 1/2 pack per day.   Nicotine patch and PRN gum ordered.      History of anal fissure, hemorrhoids  Noted on admission.         Discharge Disposition:  Discharged to home     Allergies:  Allergies   Allergen Reactions    Sertraline Other (See Comments)     Excessive fatigue  Reaction(s): Excessive fatigue.    Duloxetine Hives and Rash     Rash, hives        Discharge Medications:        Review of your medicines        START taking        Dose / Directions   amoxicillin-clavulanate 875-125 MG tablet  Commonly known as: AUGMENTIN  Indication: finger laceration      Dose: 1 tablet  Take 1 tablet by mouth every 12 hours for 6 days  Quantity: 12 tablet  Refills: 0            CONTINUE these medicines which have NOT CHANGED        Dose / Directions   acetaminophen 325 MG tablet  Commonly known as: TYLENOL      Dose: 650 mg  Take 650 mg by mouth every 6 hours as needed for pain or headaches  Refills: 0     albuterol 108 (90 Base) MCG/ACT inhaler  Commonly known as: PROAIR HFA/PROVENTIL HFA/VENTOLIN HFA      Dose: 2 puff  Inhale 2 puffs into the lungs every 6 hours as needed for shortness of breath, wheezing or cough  Refills: 0     diclofenac 1 % topical gel  Commonly known as: VOLTAREN  Used for: Chronic pain syndrome      Dose: 4 g  Apply 4 g topically 4 times daily as needed for moderate pain  Quantity: 100 g  Refills: 0     FLUoxetine 20 MG capsule  Commonly known as: PROzac  Used for: PUSHPA (generalized anxiety disorder)      Dose: 20 mg  Take 1 capsule (20 mg) by mouth daily  Quantity: 30 capsule  Refills: 0     gabapentin 400 MG capsule  Commonly known as: NEURONTIN  Used for: PUSHPA (generalized anxiety disorder)      Dose: 400 mg  Take 1 capsule (400 mg) by mouth 3 times daily  Quantity: 90 capsule  Refills: 0     metoprolol tartrate 25 MG tablet  Commonly known as: LOPRESSOR  Used for: Palpitations      Dose: 25 mg  Take 1 tablet (25 mg) by mouth 2 times  "daily  Quantity: 60 tablet  Refills: 0     multivitamin w/minerals tablet  Used for: Alcohol withdrawal syndrome with complication (H)      Dose: 1 tablet  Take 1 tablet by mouth daily  Quantity: 30 tablet  Refills: 0     thiamine 100 MG tablet  Commonly known as: B-1  Used for: Alcohol withdrawal syndrome with complication (H)      Dose: 100 mg  Take 1 tablet (100 mg) by mouth daily  Quantity: 30 tablet  Refills: 0               Where to get your medicines        These medications were sent to [id number] - [place], [place] - [address] AT [facility]  [address], [place] [address]      Phone: [phone]   amoxicillin-clavulanate 875-125 MG tablet         Condition on Discharge:  Discharge condition: Stable       Code status on discharge: Full Code     History of Illness:  See detailed admission note for full details.    Physical Exam:  Vital signs:  Temp: 98.6  F (37  C) Temp src: Oral BP: 123/76 Pulse: 61   Resp: 18 SpO2: 96 % O2 Device: None (Room air)   Height: 177.8 cm (5' 10\") Weight: 86.7 kg (191 lb 2.2 oz)  Estimated body mass index is 27.43 kg/m  as calculated from the following:    Height as of this encounter: 1.778 m (5' 10\").    Weight as of this encounter: 86.7 kg (191 lb 2.2 oz).    Wt Readings from Last 1 Encounters:   03/05/24 86.7 kg (191 lb 2.2 oz)     General: Alert, awake, no acute distress.  HEENT: NC/AT, eyes anicteric, external occular movements intact, face symmetric.  Dentition WNL, MM moist.  Cardiac: RRR, S1, S2.  No murmurs appreciated.  Pulmonary: Normal chest rise, normal work of breathing.  Lungs CTA BL  Abdomen: soft, non-tender, non-distended.  Bowel Sounds Present.  No guarding.  Extremities: left hand with partially healed laceration of finger noted, no erythema.  Otherwise no deformities.  Warm, well perfused.  Skin: no rashes or lesions noted.  Warm and Dry.  Neuro: No focal deficits noted.  Speech clear.  Coordination and strength grossly " normal.  Psych: Appropriate affect.    Procedures other than Imaging:  none     Imaging:  Results for orders placed or performed during the hospital encounter of 03/05/24   XR Chest 2 Views    Narrative    CHEST TWO VIEWS  3/5/2024 10:45 AM     HISTORY: Cough.    COMPARISON: Chest radiograph 4/21/2023.      Impression    IMPRESSION: No focal consolidation, pleural effusion or pneumothorax.  Cardiomediastinal silhouette is unremarkable. Remote rib fractures.    SANDI GLOVER MD         SYSTEM ID:  [id number]   XR Hand Left G/E 3 Views    Narrative    HAND THREE VIEWS LEFT  3/5/2024 10:46 AM     HISTORY: L index finger recip saw injury, pain  COMPARISON: None.      Impression    IMPRESSION: Deep skin laceration over the second middle phalangeal  diaphysis. There is surrounding soft tissue swelling and tiny  radiodensities compatible with foreign bodies. Linear lucency over the  radial aspect of the second middle phalangeal diaphysis is favored to  represent reported saw injury extending into the bone, although cannot  exclude overlying lucency due to laceration. No displaced fracture or  malalignment.    ZIGGY REZA MD         SYSTEM ID:  [id number]        Consultations:  No consultations were requested during this admission.       Recent Lab Results:  Recent Labs   Lab 03/06/24  0846 03/05/24  0909   WBC 8.2 9.4   HGB 17.4 18.3*   HCT 51.2 52.3   MCV 94 92    220          Lab Results   Component Value Date     03/06/2024     03/05/2024     01/31/2024     05/26/2020     04/30/2020     04/29/2020    Lab Results   Component Value Date    CHLORIDE 98 03/06/2024    CHLORIDE 96 03/05/2024    CHLORIDE 99 01/31/2024    CHLORIDE 105 06/23/2022    CHLORIDE 103 10/17/2020    CHLORIDE 104 10/16/2020    CHLORIDE 102 05/26/2020    CHLORIDE 106 04/30/2020    CHLORIDE 105 04/29/2020    Lab Results   Component Value Date    BUN 11.6 03/06/2024    BUN 17.9 03/05/2024    BUN 8.8  01/31/2024    BUN 18 06/23/2022    BUN 11 10/17/2020    BUN 11 10/16/2020    BUN 10 05/26/2020    BUN 14 04/30/2020    BUN 11 04/29/2020      Lab Results   Component Value Date    POTASSIUM 4.1 03/06/2024    POTASSIUM 4.1 03/05/2024    POTASSIUM 3.8 01/31/2024    POTASSIUM 5.0 06/23/2022    POTASSIUM 3.9 10/17/2020    POTASSIUM 4.2 10/16/2020    POTASSIUM 3.7 05/26/2020    POTASSIUM 3.4 04/30/2020    POTASSIUM 3.7 04/29/2020    Lab Results   Component Value Date    CO2 31 03/06/2024    CO2 23 03/05/2024    CO2 20 01/31/2024    CO2 30 06/23/2022    CO2 22 10/17/2020    CO2 20 10/16/2020    CO2 26 05/26/2020    CO2 31 04/30/2020    CO2 21 04/29/2020    Lab Results   Component Value Date    CR 1.02 03/06/2024    CR 0.81 03/05/2024    CR 0.78 01/31/2024    CR 0.73 05/26/2020    CR 0.89 04/30/2020    CR 0.68 04/29/2020        Recent Labs   Lab 03/06/24  0846 03/05/24  0909   AST 43 39   ALT 32 32   ALKPHOS 59 59   BILITOTAL 1.9* 0.7          Pending Results:    Unresulted Labs Ordered in the Past 30 Days of this Admission       No orders found from 2/4/2024 to 3/6/2024.             Discharge Instructions and Follow-Up:   Discharge Procedure Orders   Reason for your hospital stay   Order Comments: You were admitted for acute alcohol withdrawal     Follow-up and recommended labs and tests    Order Comments: Please follow-up with your primary care doctor regarding ongoing treatment for alcohol abuse and anxiety management.    Follow-up with orthopedic hand surgery within 1 week for reevaluation of your laceration.  You are being treated with an additional course of oral antibiotics in the meantime.  If you notice worsening symptoms/fevers or expanding redness you should come in for reevaluation sooner. Clinic number for [facility] is [phone] and you should request appointment with hand clinic.     Activity   Order Comments: Your activity upon discharge: Activity as tolerated, would offload your affected hand is much as  possible to avoid worsening symptoms     Order Specific Question Answer Comments   Is discharge order? Yes      Diet   Order Comments: Follow this diet upon discharge: Orders Placed This Encounter      Regular Diet Adult     Order Specific Question Answer Comments   Is discharge order? Yes        Total time spent in face to face contact with the patient and coordinating discharge was:  60 Minutes.

## 2024-03-07 NOTE — CARE PLAN
Discharge Note    Patient discharged to home via private vehicle  accompanied by friend.  IV: Discontinued  Prescriptions e-prescribed to pharmacy.   Belongings reviewed and sent with patient.   Home medications returned to patient: NA  Equipment sent with: patient, N/A.   patient verbalizes understanding of discharge instructions. AVS given to patient.  Additional education completed?  Medication

## 2024-03-08 ENCOUNTER — PATIENT OUTREACH (OUTPATIENT)
Dept: CARE COORDINATION | Facility: CLINIC | Age: 49
End: 2024-03-08
Payer: COMMERCIAL

## 2024-03-08 NOTE — PROGRESS NOTES
[facility]: [facility]: Post-Discharge Note  SITUATION                                                      Admission:    Admission Date: 03/05/24   Reason for Admission: 1. Alcohol withdrawal, uncomplicated (H)  F10.930       2. Laceration of left index finger without damage to nail, foreign body presence unspecified, initial encounter  S61.211A  Discharge:   Discharge Date: 03/07/24  Discharge Diagnosis: Alcohol dependence with withdrawal (H)  PUSHPA (generalized anxiety disorder)  Recurrent major depression (H24)  Alcohol withdrawal, uncomplicated (H)   Laceration of left index finger without damage to nail, foreign body presence unspecified, initial encounter    BACKGROUND                                                      Per hospital discharge summary and inpatient provider notes:    Georges Moreno is a 48 year old male up to date on tetanus (10/2021) with history of alcohol use disorder, polysubstance dependence, atrial fibrillation, and hyperlipidemia presenting for evaluation of an alcohol problem. Georges explains that he was previously sober but began drinking again ten days ago, noting daily use of approximately one quart of alcohol with his last drink this morning. He called detox facilities but there was no availability and he was recommended to the ED. He denies suicidal ideation but states that his mental health has declined, noting difficulty concentrating, sleeping, and eating. He notes a history of anxiety managed with Prozac. He reports baseline smoker's cough but notes new post-tussive vomiting for the past week. He also notes an existing accidental left index finger laceration from a miter saw last week, which he states was not alcohol-related. He adds that he had an anal fissure at the time of injury and was already on antibiotics. He notes existing neuropathy but states that the injured area does feel atypically numb. He denies abdominal pain or chest pain. He denies  recent falls or injuries due to alcohol.      ASSESSMENT           Discharge Assessment  How are you doing now that you are home?: Patient states he feels 100% better than when he went in on Tuesday. Has AA tonight. Denies any thoughts of harming self or others.  How are your symptoms? (Red Flag symptoms escalate to triage hotline per guidelines): Improved  Do you feel your condition is stable enough to be safe at home until your provider visit?: Yes  Does the patient have their discharge instructions? : Yes  Does the patient have questions regarding their discharge instructions? : No  Were you started on any new medications or were there changes to any of your previous medications? : Yes  Does the patient have all of their medications?: Yes  Do you have questions regarding any of your medications? : No  Do you have all of your needed medical supplies or equipment (DME)?  (i.e. oxygen tank, CPAP, cane, etc.): Yes  Discharge follow-up appointment scheduled within 14 calendar days? : No (Has appointment with mental health on 3/12 and 3/13.)  Is patient agreeable to assistance with scheduling? : No         Post-op (Clinicians Only)  Did the patient have surgery or a procedure: No      PLAN                                                      Outpatient Plan:  Please follow-up with your primary care doctor regarding ongoing treatment for alcohol abuse and anxiety management.     Follow-up with orthopedic hand surgery within 1 week for reevaluation of your laceration.  You are being treated with an additional course of oral antibiotics in the meantime.  If you notice worsening symptoms/fevers or expanding redness you should come in for reevaluation sooner. Clinic number for [facility] is [phone] and you should request appointment with hand clinic.      No future appointments.      For any urgent concerns, please contact our 24 hour nurse triage line: [phone] ([phone])         Poppy Lagunas RN

## 2024-03-16 ENCOUNTER — HOSPITAL ENCOUNTER (INPATIENT)
Facility: CLINIC | Age: 49
LOS: 2 days | Discharge: HOME | End: 2024-03-18
Attending: FAMILY MEDICINE | Admitting: PSYCHIATRY & NEUROLOGY
Payer: COMMERCIAL

## 2024-03-16 ENCOUNTER — TELEPHONE (OUTPATIENT)
Dept: BEHAVIORAL HEALTH | Facility: CLINIC | Age: 49
End: 2024-03-16

## 2024-03-16 DIAGNOSIS — F10.239 ALCOHOL DEPENDENCE WITH WITHDRAWAL WITH COMPLICATION (H): Primary | ICD-10-CM

## 2024-03-16 DIAGNOSIS — F10.939 ALCOHOL WITHDRAWAL SYNDROME WITH COMPLICATION (H): ICD-10-CM

## 2024-03-16 DIAGNOSIS — F10.229 ALCOHOL DEPENDENCE WITH INTOXICATION WITH COMPLICATION (H): ICD-10-CM

## 2024-03-16 DIAGNOSIS — F41.1 GAD (GENERALIZED ANXIETY DISORDER): ICD-10-CM

## 2024-03-16 LAB
ALBUMIN SERPL BCG-MCNC: 4.6 G/DL (ref 3.5–5.2)
ALCOHOL BREATH TEST: 0.18 (ref 0–0.01)
ALP SERPL-CCNC: 64 U/L (ref 40–150)
ALT SERPL W P-5'-P-CCNC: 32 U/L (ref 0–70)
AMPHETAMINES UR QL SCN: ABNORMAL
ANION GAP SERPL CALCULATED.3IONS-SCNC: 16 MMOL/L (ref 7–15)
AST SERPL W P-5'-P-CCNC: 37 U/L (ref 0–45)
ATRIAL RATE - MUSE: 94 BPM
BARBITURATES UR QL SCN: ABNORMAL
BASOPHILS # BLD AUTO: 0 10E3/UL (ref 0–0.2)
BASOPHILS NFR BLD AUTO: 0 %
BENZODIAZ UR QL SCN: ABNORMAL
BILIRUB SERPL-MCNC: 0.5 MG/DL
BUN SERPL-MCNC: 12.8 MG/DL (ref 6–20)
BZE UR QL SCN: ABNORMAL
CALCIUM SERPL-MCNC: 8.9 MG/DL (ref 8.6–10)
CANNABINOIDS UR QL SCN: ABNORMAL
CHLORIDE SERPL-SCNC: 99 MMOL/L (ref 98–107)
CREAT SERPL-MCNC: 0.82 MG/DL (ref 0.67–1.17)
DEPRECATED HCO3 PLAS-SCNC: 24 MMOL/L (ref 22–29)
DIASTOLIC BLOOD PRESSURE - MUSE: NORMAL MMHG
EGFRCR SERPLBLD CKD-EPI 2021: >90 ML/MIN/1.73M2
EOSINOPHIL # BLD AUTO: 0 10E3/UL (ref 0–0.7)
EOSINOPHIL NFR BLD AUTO: 0 %
ERYTHROCYTE [DISTWIDTH] IN BLOOD BY AUTOMATED COUNT: 11.6 % (ref 10–15)
ETHANOL SERPL-MCNC: 0.22 G/DL
FENTANYL UR QL: ABNORMAL
GLUCOSE SERPL-MCNC: 96 MG/DL (ref 70–99)
HCT VFR BLD AUTO: 47.6 % (ref 40–53)
HGB BLD-MCNC: 16.9 G/DL (ref 13.3–17.7)
IMM GRANULOCYTES # BLD: 0 10E3/UL
IMM GRANULOCYTES NFR BLD: 0 %
INTERPRETATION ECG - MUSE: NORMAL
LIPASE SERPL-CCNC: 22 U/L (ref 13–60)
LYMPHOCYTES # BLD AUTO: 1.7 10E3/UL (ref 0.8–5.3)
LYMPHOCYTES NFR BLD AUTO: 21 %
MAGNESIUM SERPL-MCNC: 2 MG/DL (ref 1.7–2.3)
MCH RBC QN AUTO: 32.4 PG (ref 26.5–33)
MCHC RBC AUTO-ENTMCNC: 35.5 G/DL (ref 31.5–36.5)
MCV RBC AUTO: 91 FL (ref 78–100)
MONOCYTES # BLD AUTO: 0.3 10E3/UL (ref 0–1.3)
MONOCYTES NFR BLD AUTO: 4 %
NEUTROPHILS # BLD AUTO: 6.2 10E3/UL (ref 1.6–8.3)
NEUTROPHILS NFR BLD AUTO: 75 %
NRBC # BLD AUTO: 0 10E3/UL
NRBC BLD AUTO-RTO: 0 /100
OPIATES UR QL SCN: ABNORMAL
P AXIS - MUSE: 65 DEGREES
PCP QUAL URINE (ROCHE): ABNORMAL
PLATELET # BLD AUTO: 298 10E3/UL (ref 150–450)
POTASSIUM SERPL-SCNC: 3.9 MMOL/L (ref 3.4–5.3)
PR INTERVAL - MUSE: 160 MS
PROT SERPL-MCNC: 7.5 G/DL (ref 6.4–8.3)
QRS DURATION - MUSE: 86 MS
QT - MUSE: 358 MS
QTC - MUSE: 447 MS
R AXIS - MUSE: 77 DEGREES
RBC # BLD AUTO: 5.21 10E6/UL (ref 4.4–5.9)
SODIUM SERPL-SCNC: 139 MMOL/L (ref 135–145)
SYSTOLIC BLOOD PRESSURE - MUSE: NORMAL MMHG
T AXIS - MUSE: 57 DEGREES
VENTRICULAR RATE- MUSE: 94 BPM
WBC # BLD AUTO: 8.3 10E3/UL (ref 4–11)

## 2024-03-16 PROCEDURE — 82075 ASSAY OF BREATH ETHANOL: CPT | Performed by: FAMILY MEDICINE

## 2024-03-16 PROCEDURE — HZ2ZZZZ DETOXIFICATION SERVICES FOR SUBSTANCE ABUSE TREATMENT: ICD-10-PCS | Performed by: FAMILY MEDICINE

## 2024-03-16 PROCEDURE — 99285 EMERGENCY DEPT VISIT HI MDM: CPT | Mod: 25 | Performed by: FAMILY MEDICINE

## 2024-03-16 PROCEDURE — 93010 ELECTROCARDIOGRAM REPORT: CPT | Performed by: FAMILY MEDICINE

## 2024-03-16 PROCEDURE — 82077 ASSAY SPEC XCP UR&BREATH IA: CPT | Performed by: FAMILY MEDICINE

## 2024-03-16 PROCEDURE — 128N000004 HC R&B CD ADULT

## 2024-03-16 PROCEDURE — 36415 COLL VENOUS BLD VENIPUNCTURE: CPT | Performed by: FAMILY MEDICINE

## 2024-03-16 PROCEDURE — 80307 DRUG TEST PRSMV CHEM ANLYZR: CPT | Performed by: FAMILY MEDICINE

## 2024-03-16 PROCEDURE — 93005 ELECTROCARDIOGRAM TRACING: CPT | Performed by: FAMILY MEDICINE

## 2024-03-16 PROCEDURE — 250N000013 HC RX MED GY IP 250 OP 250 PS 637: Performed by: FAMILY MEDICINE

## 2024-03-16 PROCEDURE — 85025 COMPLETE CBC W/AUTO DIFF WBC: CPT | Performed by: FAMILY MEDICINE

## 2024-03-16 PROCEDURE — 83690 ASSAY OF LIPASE: CPT | Performed by: FAMILY MEDICINE

## 2024-03-16 PROCEDURE — 250N000013 HC RX MED GY IP 250 OP 250 PS 637: Performed by: NURSE PRACTITIONER

## 2024-03-16 PROCEDURE — 83735 ASSAY OF MAGNESIUM: CPT | Performed by: FAMILY MEDICINE

## 2024-03-16 PROCEDURE — 250N000011 HC RX IP 250 OP 636: Performed by: NURSE PRACTITIONER

## 2024-03-16 PROCEDURE — 84460 ALANINE AMINO (ALT) (SGPT): CPT | Performed by: FAMILY MEDICINE

## 2024-03-16 RX ORDER — HYDROXYZINE HYDROCHLORIDE 25 MG/1
25 TABLET, FILM COATED ORAL EVERY 4 HOURS PRN
Status: DISCONTINUED | OUTPATIENT
Start: 2024-03-16 | End: 2024-03-18 | Stop reason: HOSPADM

## 2024-03-16 RX ORDER — FOLIC ACID 1 MG/1
1 TABLET ORAL DAILY
Status: DISCONTINUED | OUTPATIENT
Start: 2024-03-16 | End: 2024-03-18 | Stop reason: HOSPADM

## 2024-03-16 RX ORDER — ACETAMINOPHEN 325 MG/1
650 TABLET ORAL EVERY 4 HOURS PRN
Status: DISCONTINUED | OUTPATIENT
Start: 2024-03-16 | End: 2024-03-18 | Stop reason: HOSPADM

## 2024-03-16 RX ORDER — IBUPROFEN 600 MG/1
600 TABLET, FILM COATED ORAL EVERY 6 HOURS PRN
Status: DISCONTINUED | OUTPATIENT
Start: 2024-03-16 | End: 2024-03-18 | Stop reason: HOSPADM

## 2024-03-16 RX ORDER — TRAZODONE HYDROCHLORIDE 50 MG/1
50 TABLET, FILM COATED ORAL
Status: DISCONTINUED | OUTPATIENT
Start: 2024-03-16 | End: 2024-03-18 | Stop reason: HOSPADM

## 2024-03-16 RX ORDER — LOPERAMIDE HCL 2 MG
2 CAPSULE ORAL 4 TIMES DAILY PRN
Status: DISCONTINUED | OUTPATIENT
Start: 2024-03-16 | End: 2024-03-18 | Stop reason: HOSPADM

## 2024-03-16 RX ORDER — DIAZEPAM 5 MG
5-20 TABLET ORAL EVERY 30 MIN PRN
Status: DISCONTINUED | OUTPATIENT
Start: 2024-03-16 | End: 2024-03-18 | Stop reason: HOSPADM

## 2024-03-16 RX ORDER — ONDANSETRON 4 MG/1
4 TABLET, ORALLY DISINTEGRATING ORAL EVERY 6 HOURS PRN
Status: DISCONTINUED | OUTPATIENT
Start: 2024-03-16 | End: 2024-03-18 | Stop reason: HOSPADM

## 2024-03-16 RX ORDER — AMOXICILLIN 250 MG
1 CAPSULE ORAL 2 TIMES DAILY PRN
Status: DISCONTINUED | OUTPATIENT
Start: 2024-03-16 | End: 2024-03-18 | Stop reason: HOSPADM

## 2024-03-16 RX ORDER — MULTIPLE VITAMINS W/ MINERALS TAB 9MG-400MCG
1 TAB ORAL DAILY
Status: DISCONTINUED | OUTPATIENT
Start: 2024-03-16 | End: 2024-03-18 | Stop reason: HOSPADM

## 2024-03-16 RX ORDER — MAGNESIUM HYDROXIDE/ALUMINUM HYDROXICE/SIMETHICONE 120; 1200; 1200 MG/30ML; MG/30ML; MG/30ML
30 SUSPENSION ORAL EVERY 4 HOURS PRN
Status: DISCONTINUED | OUTPATIENT
Start: 2024-03-16 | End: 2024-03-18 | Stop reason: HOSPADM

## 2024-03-16 RX ADMIN — FOLIC ACID 1 MG: 1 TABLET ORAL at 17:54

## 2024-03-16 RX ADMIN — HYDROXYZINE HYDROCHLORIDE 25 MG: 25 TABLET, FILM COATED ORAL at 21:27

## 2024-03-16 RX ADMIN — HYDROXYZINE HYDROCHLORIDE 25 MG: 25 TABLET, FILM COATED ORAL at 17:54

## 2024-03-16 RX ADMIN — Medication 50 MG: at 17:54

## 2024-03-16 RX ADMIN — DIAZEPAM 10 MG: 5 TABLET ORAL at 17:55

## 2024-03-16 RX ADMIN — DIAZEPAM 10 MG: 5 TABLET ORAL at 21:27

## 2024-03-16 RX ADMIN — ONDANSETRON 4 MG: 4 TABLET, ORALLY DISINTEGRATING ORAL at 17:54

## 2024-03-16 RX ADMIN — Medication 1 TABLET: at 17:54

## 2024-03-16 RX ADMIN — DIAZEPAM 10 MG: 5 TABLET ORAL at 14:51

## 2024-03-16 ASSESSMENT — ACTIVITIES OF DAILY LIVING (ADL)
DRESS: INDEPENDENT
LAUNDRY: WITH SUPERVISION
ADLS_ACUITY_SCORE: 35
ADLS_ACUITY_SCORE: 40
ADLS_ACUITY_SCORE: 37
ADLS_ACUITY_SCORE: 40
ORAL_HYGIENE: INDEPENDENT
ADLS_ACUITY_SCORE: 37
HYGIENE/GROOMING: INDEPENDENT
ADLS_ACUITY_SCORE: 40
ADLS_ACUITY_SCORE: 37

## 2024-03-16 ASSESSMENT — COLUMBIA-SUICIDE SEVERITY RATING SCALE - C-SSRS
6. HAVE YOU EVER DONE ANYTHING, STARTED TO DO ANYTHING, OR PREPARED TO DO ANYTHING TO END YOUR LIFE?: NO
2. HAVE YOU ACTUALLY HAD ANY THOUGHTS OF KILLING YOURSELF IN THE PAST MONTH?: NO
1. IN THE PAST MONTH, HAVE YOU WISHED YOU WERE DEAD OR WISHED YOU COULD GO TO SLEEP AND NOT WAKE UP?: NO

## 2024-03-16 NOTE — ED NOTES
Pt states he was feeling some racing pulse. Has a hx of atrial fibrillation. EKG ordered, NS.    Anesthesia Type: 1% lidocaine with epinephrine

## 2024-03-16 NOTE — TELEPHONE ENCOUNTER
S: [facility] , Provider   Dr.Christianson calling at  1:05 PM  with clinical on a 48 year old/Male presenting for alcohol detox.     B: Pt presents for ETOH detox.   Currently reports drinking   1 qt of vodka daily  for  several months.  Pt was just on medical.  Discharged  3/9  Patient reports last use was  right before coming into the ED.  Pt JIMMY:  .18  Pt  denies hx of DT  Pt  denies hx of seizures. Last seizure: N/A  Pt endorsing the following symptoms of withdrawal:   MSSA Score:     Pt denies acute mental health or medical concerns.   Pt endorses other drug use: (!) CANNABIS PRODUCTS Amount/frequency:  unclear    Does Pt have a detox care plan in [facility]?  no  Does pt present with specific needs, assistive devices, or exclusionary criteria? None  Is the patient ambulating, eating and drinking in the ED?  yes    A: Pt meets criteria to be presented for IP detox admission. Patient is voluntary    COVID Symptoms: No  If yes, COVID test required   Utox: Positive for cannabis and benzos  Magnesium:  WNL    CMP: Abnormalities: Anion gap  16  CBC: WNL  HCG: N/A     R: Patient cleared and ready for behavioral bed placement: Yes    Pt is meeting criteria for presentation to 3A/CD    Does Patient need a Transfer Center request created? No, Pt is located within [facility] ED, [facility] ED, or [facility]

## 2024-03-16 NOTE — ED PROVIDER NOTES
ED Provider Note  [facility]      History     Chief Complaint   Patient presents with    Alcohol Intoxication     Last drink right before he got dropped off,  States he drinks a quart vodka per day.  Uses THC for pain issues.  No history of seizures.     HPI  Georges Moreno is a 48 year old male with a history of alcohol dependence, neuropathy of the hands, anxiety, PTSD, paroxysmal atrial fibrillation, and HTN presents to the emergency department due to alcohol intoxication.  Patient has been drinking at least a quart of vodka daily for several months he denies any history of DTs or seizures.  He is interested in detox and possible treatment    Past Medical History  Past Medical History:   Diagnosis Date    ADHD     Alcohol use disorder, severe, dependence (H)     Atrial fibrillation (H)     Depression, major, single episode     moderate    PUSHPA (generalized anxiety disorder)     HA (headache)     Pancreatitis     Paroxysmal atrial fibrillation (H)     Perirectal abscess     Personal history of alcoholism (H)     Polysubstance (excluding opioids) dependence, daily use (H)     PTSD (post-traumatic stress disorder)     Substance abuse (H)     Tobacco dependence      Past Surgical History:   Procedure Laterality Date    INCISION AND DRAINAGE PERIRECTAL ABSCESS      SOFT TISSUE SURGERY      Excision of pilonidal cyst    SOFT TISSUE SURGERY      removal of rectal cyst     acetaminophen (TYLENOL) 325 MG tablet  diclofenac (VOLTAREN) 1 % topical gel  FLUoxetine (PROZAC) 20 MG capsule  gabapentin (NEURONTIN) 400 MG capsule  metoprolol tartrate (LOPRESSOR) 25 MG tablet  multivitamin w/minerals (THERA-VIT-M) tablet      Allergies   Allergen Reactions    Sertraline Other (See Comments)     Excessive fatigue  Reaction(s): Excessive fatigue.    Duloxetine Hives and Rash     Rash, hives     Family History  Family History   Problem Relation Age of Onset    Family History Negative Mother     Alcohol/Drug  "Father     Diabetes Father     Leukemia Father     Alcohol/Drug Paternal Uncle     Alcoholism Father     Alcoholism Brother      Social History   Social History     Tobacco Use    Smoking status: Every Day     Packs/day: 1.50     Years: 15.00     Additional pack years: 0.00     Total pack years: 22.50     Types: Cigarettes    Smokeless tobacco: Former    Tobacco comments:     a little over a pack a day   Vaping Use    Vaping Use: Never used   Substance Use Topics    Alcohol use: Yes     Alcohol/week: 14.0 standard drinks of alcohol    Drug use: Yes     Types: Marijuana     Comment: today         A medically appropriate review of systems was performed with pertinent positives and negatives noted in the HPI, and all other systems negative.    Physical Exam   BP: (!) 146/96  Pulse: 89  Temp: 97.9  F (36.6  C)  Resp: 16  Height: 180.3 cm (5' 11\")  Weight: 88.5 kg (195 lb)  SpO2: 95 %  Physical Exam  Constitutional:       General: He is not in acute distress.     Appearance: Normal appearance. He is not toxic-appearing.   HENT:      Head: Atraumatic.   Eyes:      General: No scleral icterus.     Conjunctiva/sclera: Conjunctivae normal.   Cardiovascular:      Rate and Rhythm: Normal rate.      Heart sounds: Normal heart sounds.   Pulmonary:      Effort: Pulmonary effort is normal. No respiratory distress.      Breath sounds: Normal breath sounds.   Abdominal:      Palpations: Abdomen is soft.      Tenderness: There is no abdominal tenderness.   Musculoskeletal:         General: No deformity.      Cervical back: Neck supple.   Skin:     General: Skin is warm.   Neurological:      General: No focal deficit present.      Mental Status: He is alert and oriented to person, place, and time.      Sensory: No sensory deficit.      Motor: No weakness.      Coordination: Coordination normal.   Psychiatric:         Mood and Affect: Mood is anxious.         Speech: Speech normal.         Behavior: Behavior is cooperative.         " Thought Content: Thought content does not include homicidal or suicidal ideation.           ED Course, Procedures, & Data      Procedures       Results for orders placed or performed during the hospital encounter of 03/16/24   Comprehensive metabolic panel     Status: Abnormal   Result Value Ref Range    Sodium 139 135 - 145 mmol/L    Potassium 3.9 3.4 - 5.3 mmol/L    Carbon Dioxide (CO2) 24 22 - 29 mmol/L    Anion Gap 16 (H) 7 - 15 mmol/L    Urea Nitrogen 12.8 6.0 - 20.0 mg/dL    Creatinine 0.82 0.67 - 1.17 mg/dL    GFR Estimate >90 >60 mL/min/1.73m2    Calcium 8.9 8.6 - 10.0 mg/dL    Chloride 99 98 - 107 mmol/L    Glucose 96 70 - 99 mg/dL    Alkaline Phosphatase 64 40 - 150 U/L    AST 37 0 - 45 U/L    ALT 32 0 - 70 U/L    Protein Total 7.5 6.4 - 8.3 g/dL    Albumin 4.6 3.5 - 5.2 g/dL    Bilirubin Total 0.5 <=1.2 mg/dL   Lipase     Status: Normal   Result Value Ref Range    Lipase 22 13 - 60 U/L   Magnesium     Status: Normal   Result Value Ref Range    Magnesium 2.0 1.7 - 2.3 mg/dL   Ethyl Alcohol Level     Status: Abnormal   Result Value Ref Range    Alcohol ethyl 0.22 (H) <=0.01 g/dL   CBC with platelets and differential     Status: None   Result Value Ref Range    WBC Count 8.3 4.0 - 11.0 10e3/uL    RBC Count 5.21 4.40 - 5.90 10e6/uL    Hemoglobin 16.9 13.3 - 17.7 g/dL    Hematocrit 47.6 40.0 - 53.0 %    MCV 91 78 - 100 fL    MCH 32.4 26.5 - 33.0 pg    MCHC 35.5 31.5 - 36.5 g/dL    RDW 11.6 10.0 - 15.0 %    Platelet Count 298 150 - 450 10e3/uL    % Neutrophils 75 %    % Lymphocytes 21 %    % Monocytes 4 %    % Eosinophils 0 %    % Basophils 0 %    % Immature Granulocytes 0 %    NRBCs per 100 WBC 0 <1 /100    Absolute Neutrophils 6.2 1.6 - 8.3 10e3/uL    Absolute Lymphocytes 1.7 0.8 - 5.3 10e3/uL    Absolute Monocytes 0.3 0.0 - 1.3 10e3/uL    Absolute Eosinophils 0.0 0.0 - 0.7 10e3/uL    Absolute Basophils 0.0 0.0 - 0.2 10e3/uL    Absolute Immature Granulocytes 0.0 <=0.4 10e3/uL    Absolute NRBCs 0.0 10e3/uL    Urine Drug Screen Panel     Status: Abnormal   Result Value Ref Range    Amphetamines Urine Screen Negative Screen Negative    Barbituates Urine Screen Negative Screen Negative    Benzodiazepine Urine Screen Positive (A) Screen Negative    Cannabinoids Urine Screen Positive (A) Screen Negative    Cocaine Urine Screen Negative Screen Negative    Fentanyl Qual Urine Screen Negative Screen Negative    Opiates Urine Screen Negative Screen Negative    PCP Urine Screen Negative Screen Negative   Alcohol breath test POCT     Status: Abnormal   Result Value Ref Range    Alcohol Breath Test 0.181 (A) 0.00 - 0.01   CBC with platelets differential     Status: None    Narrative    The following orders were created for panel order CBC with platelets differential.  Procedure                               Abnormality         Status                     ---------                               -----------         ------                     CBC with platelets and d...[[id number]]                      Final result                 Please view results for these tests on the individual orders.   Urine Drug Screen     Status: Abnormal    Narrative    The following orders were created for panel order Urine Drug Screen.  Procedure                               Abnormality         Status                     ---------                               -----------         ------                     Urine Drug Screen Panel[[id number]]      Abnormal            Final result                 Please view results for these tests on the individual orders.     Medications   diazepam (VALIUM) tablet 5-20 mg (10 mg Oral $Given 3/16/24 6653)     Labs Ordered and Resulted from Time of ED Arrival to Time of ED Departure   COMPREHENSIVE METABOLIC PANEL - Abnormal       Result Value    Sodium 139      Potassium 3.9      Carbon Dioxide (CO2) 24      Anion Gap 16 (*)     Urea Nitrogen 12.8      Creatinine 0.82      GFR Estimate >90      Calcium 8.9      Chloride 99       Glucose 96      Alkaline Phosphatase 64      AST 37      ALT 32      Protein Total 7.5      Albumin 4.6      Bilirubin Total 0.5     ETHYL ALCOHOL LEVEL - Abnormal    Alcohol ethyl 0.22 (*)    URINE DRUG SCREEN PANEL - Abnormal    Amphetamines Urine Screen Negative      Barbituates Urine Screen Negative      Benzodiazepine Urine Screen Positive (*)     Cannabinoids Urine Screen Positive (*)     Cocaine Urine Screen Negative      Fentanyl Qual Urine Screen Negative      Opiates Urine Screen Negative      PCP Urine Screen Negative     ALCOHOL BREATH TEST POCT - Abnormal    Alcohol Breath Test 0.181 (*)    LIPASE - Normal    Lipase 22     MAGNESIUM - Normal    Magnesium 2.0     CBC WITH PLATELETS AND DIFFERENTIAL    WBC Count 8.3      RBC Count 5.21      Hemoglobin 16.9      Hematocrit 47.6      MCV 91      MCH 32.4      MCHC 35.5      RDW 11.6      Platelet Count 298      % Neutrophils 75      % Lymphocytes 21      % Monocytes 4      % Eosinophils 0      % Basophils 0      % Immature Granulocytes 0      NRBCs per 100 WBC 0      Absolute Neutrophils 6.2      Absolute Lymphocytes 1.7      Absolute Monocytes 0.3      Absolute Eosinophils 0.0      Absolute Basophils 0.0      Absolute Immature Granulocytes 0.0      Absolute NRBCs 0.0       No orders to display          Critical care was not performed.     Medical Decision Making  The patient's presentation was of high complexity (a chronic illness severe exacerbation, progression, or side effect of treatment).    The patient's evaluation involved:  ordering and/or review of 3+ test(s) in this encounter (see separate area of note for details)    The patient's management necessitated high risk (a decision regarding hospitalization).    Assessment & Plan        I have reviewed the nursing notes. I have reviewed the findings, diagnosis, plan and need for follow up with the patient.    Patient being started on MSSA protocol utilizing Valium will be admitted to station 3A  for medically supervised detox.    Final diagnoses:   Alcohol dependence with intoxication with complication (H)       Migel Cordero  [facility] EMERGENCY DEPARTMENT  3/16/2024     Migel Cordero MD  03/16/24 7965

## 2024-03-16 NOTE — TELEPHONE ENCOUNTER
R:  Admit to 3A   Straight CD /detox     ( accepted by Eulalia ESTES RN )  vol    -  3:48 PM  3A  CRN, informed  -  3:52 PM  ED informed

## 2024-03-16 NOTE — PROGRESS NOTES
03/16/24 2225   Patient Belongings   Did you bring any home meds/supplements to the hospital?  No   Patient Belongings other (see comments)   Belongings Search Yes   Clothing Search Yes   Second Staff Pepe     Storage Bin : Jacket and plastic bag  Meed Room Box Bin : Cell phone, ,  Double Zipper with in 2 pack cigarettes and 2 lighters   A               Admission:  I am responsible for any personal items that are not sent to the safe or pharmacy.  [facility] is not responsible for loss, theft or damage of any property in my possession.    Signature:  _________________________________ Date: _______  Time: _____                                              Staff Signature:  ____________________________ Date: ________  Time: _____      2nd Staff person, if patient is unable/unwilling to sign:    Signature: ________________________________ Date: ________  Time: _____     Discharge:  [facility] has returned all of my personal belongings:    Signature: _________________________________ Date: ________  Time: _____                                          Staff Signature:  ____________________________ Date: ________  Time: _____

## 2024-03-16 NOTE — MEDICATION SCRIBE - ADMISSION MEDICATION HISTORY
Medication Scribe Admission Medication History    Admission medication history is complete. The information provided in this note is only as accurate as the sources available at the time of the update.    Information Source(s): Patient and CareEverywhere/SureScripts via in-person    Pertinent Information: Pt not using Robaxin.     Changes made to PTA medication list:  Added: None  Deleted:   Albuterol inhaler   Thiamine   Changed: None    Allergies reviewed with patient and updates made in EHR: yes    Medication History Completed By: Digna Tucker 3/16/2024 1:57 PM    PTA Med List   Medication Sig Last Dose    acetaminophen (TYLENOL) 325 MG tablet Take 650 mg by mouth every 6 hours as needed for pain or headaches Past Week    diclofenac (VOLTAREN) 1 % topical gel Apply 4 g topically 4 times daily as needed for moderate pain Past Month    FLUoxetine (PROZAC) 20 MG capsule Take 1 capsule (20 mg) by mouth daily 3/15/2024    gabapentin (NEURONTIN) 400 MG capsule Take 1 capsule (400 mg) by mouth 3 times daily 3/16/2024 at am    metoprolol tartrate (LOPRESSOR) 25 MG tablet Take 1 tablet (25 mg) by mouth 2 times daily Past Week    multivitamin w/minerals (THERA-VIT-M) tablet Take 1 tablet by mouth daily Past Week

## 2024-03-16 NOTE — PLAN OF CARE
"Goal Outcome Evaluation:    Plan of Care Reviewed With: patient          3AW Admission Note    S = Situation:   Georges Moreno is a 48 year old year old male with a chief complaint of Alcohol Intoxication (Last drink right before he got dropped off,  States he drinks a quart vodka per day.  Uses THC for pain issues.  No history of seizures.)  Voluntary admission to 3A for Alcohol withdrawal and detox.    B  = Background:   Substance use history: Alcohol, prescribed Marijuana, polysubstance excluding opioids.  Mental health history: ADHD, PTSD, Major  Anxiety and depression  Medical history: HTN, Paroxysmal Atrial fibrillation, Neuropathy, Effused vertebral neck  Legal history: Voluntary  Treatment history:  Detox x 3, LP x 2, [facility], [facility] in [place]  Living situation: with Mother Alberta  Recent life stressors: sickness     A  =  Assessment:   Per ED note and RN to RN report, Pt presents for ETOH detox.   Currently reports drinking   1 qt of vodka daily  for  several months.  Pt was just on medical.  Discharged  3/9  Patient reports last use was  right before coming into the ED.  Pt JIMMY:  .18  Utox positive for benzodiazepine and Cannabinoids  Pt  denies hx of DT  Pt  denies hx of seizures. Last seizure: N/A  Pt endorsing the following symptoms of withdrawal:  During admission interview, pt affect was flat affect, high anxiety rating at 10/10 denies depression. Denies suicidal /homicidal ideation .Pt uses prescribed Marijuana for his neuropathy by smoking it.Pt started drinking at age 9 and worsened at 18 yrs.  MSSA 8, Valium 10 mg given, PRN Hydroxyzine 25 mg given for anxiety.  BP (!) 168/68 (BP Location: Left arm, Patient Position: Sitting, Cuff Size: Adult Regular)   Pulse 73   Temp 97.7  F (36.5  C) (Oral)   Resp 18   Ht 1.803 m (5' 11\")   Wt 88.5 kg (195 lb)   SpO2 95%   BMI 27.20 kg/m       R =   Request or Recommendation:   Nursing will  monitor for withdrawal  using MSSA and treat with " valium protocol  Pt will meet with psychiatry, internal medicine, and case management tomorrow.  At the time of admission, pt did not have a  discharge plan.

## 2024-03-17 LAB
CHOLEST SERPL-MCNC: 151 MG/DL
GGT SERPL-CCNC: 42 U/L (ref 8–61)
HBA1C MFR BLD: 5 %
HDLC SERPL-MCNC: 44 MG/DL
LDLC SERPL CALC-MCNC: 69 MG/DL
NONHDLC SERPL-MCNC: 107 MG/DL
TRIGL SERPL-MCNC: 190 MG/DL
TSH SERPL DL<=0.005 MIU/L-ACNC: 1.51 UIU/ML (ref 0.3–4.2)

## 2024-03-17 PROCEDURE — 250N000013 HC RX MED GY IP 250 OP 250 PS 637: Performed by: FAMILY MEDICINE

## 2024-03-17 PROCEDURE — H2032 ACTIVITY THERAPY, PER 15 MIN: HCPCS

## 2024-03-17 PROCEDURE — 80061 LIPID PANEL: CPT | Performed by: NURSE PRACTITIONER

## 2024-03-17 PROCEDURE — 82977 ASSAY OF GGT: CPT | Performed by: PSYCHIATRY & NEUROLOGY

## 2024-03-17 PROCEDURE — 84443 ASSAY THYROID STIM HORMONE: CPT | Performed by: NURSE PRACTITIONER

## 2024-03-17 PROCEDURE — 250N000011 HC RX IP 250 OP 636: Performed by: NURSE PRACTITIONER

## 2024-03-17 PROCEDURE — 36415 COLL VENOUS BLD VENIPUNCTURE: CPT | Performed by: NURSE PRACTITIONER

## 2024-03-17 PROCEDURE — 99207 PR NO BILLABLE SERVICE THIS VISIT: CPT | Performed by: PHYSICIAN ASSISTANT

## 2024-03-17 PROCEDURE — 99223 1ST HOSP IP/OBS HIGH 75: CPT | Mod: AI | Performed by: PSYCHIATRY & NEUROLOGY

## 2024-03-17 PROCEDURE — 83036 HEMOGLOBIN GLYCOSYLATED A1C: CPT | Performed by: NURSE PRACTITIONER

## 2024-03-17 PROCEDURE — 250N000013 HC RX MED GY IP 250 OP 250 PS 637: Performed by: PSYCHIATRY & NEUROLOGY

## 2024-03-17 PROCEDURE — 250N000013 HC RX MED GY IP 250 OP 250 PS 637: Performed by: NURSE PRACTITIONER

## 2024-03-17 PROCEDURE — 128N000004 HC R&B CD ADULT

## 2024-03-17 RX ORDER — METOPROLOL TARTRATE 25 MG/1
25 TABLET, FILM COATED ORAL 2 TIMES DAILY
Status: DISCONTINUED | OUTPATIENT
Start: 2024-03-17 | End: 2024-03-18 | Stop reason: HOSPADM

## 2024-03-17 RX ORDER — GABAPENTIN 400 MG/1
400 CAPSULE ORAL 3 TIMES DAILY
Status: DISCONTINUED | OUTPATIENT
Start: 2024-03-17 | End: 2024-03-18 | Stop reason: HOSPADM

## 2024-03-17 RX ORDER — NICOTINE 21 MG/24HR
1 PATCH, TRANSDERMAL 24 HOURS TRANSDERMAL DAILY
Status: DISCONTINUED | OUTPATIENT
Start: 2024-03-17 | End: 2024-03-18 | Stop reason: HOSPADM

## 2024-03-17 RX ADMIN — NICOTINE 1 PATCH: 14 PATCH, EXTENDED RELEASE TRANSDERMAL at 08:38

## 2024-03-17 RX ADMIN — TRAZODONE HYDROCHLORIDE 50 MG: 50 TABLET ORAL at 20:27

## 2024-03-17 RX ADMIN — DIAZEPAM 10 MG: 5 TABLET ORAL at 04:26

## 2024-03-17 RX ADMIN — NICOTINE POLACRILEX 2 MG: 2 GUM, CHEWING BUCCAL at 12:08

## 2024-03-17 RX ADMIN — DIAZEPAM 10 MG: 5 TABLET ORAL at 08:11

## 2024-03-17 RX ADMIN — ONDANSETRON 4 MG: 4 TABLET, ORALLY DISINTEGRATING ORAL at 08:09

## 2024-03-17 RX ADMIN — GABAPENTIN 400 MG: 400 CAPSULE ORAL at 20:27

## 2024-03-17 RX ADMIN — GABAPENTIN 400 MG: 400 CAPSULE ORAL at 08:38

## 2024-03-17 RX ADMIN — DIAZEPAM 10 MG: 5 TABLET ORAL at 12:08

## 2024-03-17 RX ADMIN — GABAPENTIN 400 MG: 400 CAPSULE ORAL at 14:45

## 2024-03-17 RX ADMIN — LOPERAMIDE HYDROCHLORIDE 2 MG: 2 CAPSULE ORAL at 12:08

## 2024-03-17 RX ADMIN — FLUOXETINE HYDROCHLORIDE 40 MG: 20 CAPSULE ORAL at 08:38

## 2024-03-17 RX ADMIN — IBUPROFEN 600 MG: 600 TABLET, FILM COATED ORAL at 20:27

## 2024-03-17 RX ADMIN — HYDROXYZINE HYDROCHLORIDE 25 MG: 25 TABLET, FILM COATED ORAL at 20:27

## 2024-03-17 RX ADMIN — METOPROLOL TARTRATE 25 MG: 25 TABLET, FILM COATED ORAL at 08:38

## 2024-03-17 RX ADMIN — METOPROLOL TARTRATE 25 MG: 25 TABLET, FILM COATED ORAL at 20:27

## 2024-03-17 ASSESSMENT — ACTIVITIES OF DAILY LIVING (ADL)
ORAL_HYGIENE: INDEPENDENT
ADLS_ACUITY_SCORE: 40
DRESS: INDEPENDENT
ADLS_ACUITY_SCORE: 40
ADLS_ACUITY_SCORE: 40
HYGIENE/GROOMING: INDEPENDENT
ADLS_ACUITY_SCORE: 40
ORAL_HYGIENE: INDEPENDENT
HYGIENE/GROOMING: INDEPENDENT
ADLS_ACUITY_SCORE: 40
ADLS_ACUITY_SCORE: 40
DRESS: INDEPENDENT
ADLS_ACUITY_SCORE: 40

## 2024-03-17 NOTE — CONSULTS
[facility]  Medicine Re-admission Note      Circumstances of recent discharge and re-admittance noted.     In brief, this is a 48 year old male with a history of alcohol use disorder, tobacco use, neuropathy of the hands, PAF, HTN, anxiety, and PTSD who was admitted to unit 3A for alcohol detox.     Recently admitted to [facility] 3/5/24-3/7/24 for treatment of alcohol withdrawal. H&P by Dr. Jenkins from 3/5/24 was reviewed and up to date. Please refer to D/C summary by Dr. Feldman from 3/7/24 for details of hospitalization.     Chart reviewed, including home medications, allergies, vitals, and laboratory data.   Vitals stable. No acute lab findings.        Diagnoses:  Alcohol use disorder:    - Agree with [facility] protocol.   - Continue thiamine, folate, MVI while on detox  - Further management per primary team.   Hypertension:  Stable.   - Continue PTA metoprolol.   Paroxysmal a-fib:    - Not on anticoagulation.   - Continue BB.   Neuropathy of hands:    - Continue PTA gabapentin  Depression, Anxiety:    - Continue PTA fluoxetine      No indication for ongoing medicine follow up apparent at this time. Medicine will sign off. Please contact medicine on-call for any additional concerns.       Please call the on-call EMA for any follow up medical concerns if they arise.     Steven Méndez PA-C  Internal Medicine EMA Hospitalist  [facility]

## 2024-03-17 NOTE — PLAN OF CARE
"  Problem: Alcohol Withdrawal  Goal: Alcohol Withdrawal Symptom Control      Patient is up and visible in the milieu. Patient is ambulating independently. Patient is alert and oriented x 4. Patient is attending/participating in unit programming. Pt is social with peers. Affect is blunted with some range in affect, mood is calm. Patient denies SI/SIB/HI. Pt denies auditory/visual hallucinations. Patient verbally contracts for safety on the unit.     Pt would like to discharge to home tomorrow, early afternoon once OOD and cleared by provider. Pt stated he can get a ride from a friend.    This RN administered the PRN medications Trazodone, Ibuprofen, Hydroxyzine, (see MAR) at the request of the patient. Patient is tolerating medications well, denies any current side effects.     Pt's MSSA's = 4, 3 withdrawal assessment. Patient reports an improving appetite, and poor sleep due to roommate snoring last noc, pt was transferred to room 326-1 from [facility]. Patient discharge plans to home with AA meetings/support/sponsor, has a stable, safe home environment. Rest, fluids, and food encouraged. Status 15 checks remain. Patient is able to make needs known appropriately. Patient denies any unmet needs at this time.     Blood pressure (!) 147/106, pulse 78, temperature 98.8  F (37.1  C), temperature source Oral, resp. rate 16, height 1.803 m (5' 11\"), weight 88.5 kg (195 lb), SpO2 96%.          "

## 2024-03-17 NOTE — PLAN OF CARE
03/17/24 1800   General Information   Art Directive other (see comments)     Goal Outcome Evaluation:   Art Therapy directive was to create a personal coat of arms/personal shield expressing personal strengths and positive aspects of self.  Goals of directive: to create a personal visual narrative, identifying personal strengths and goals, exploring and expressing identity/aspects of self, emotional expression.  Pt was an engaged participant, focused on task for the full duration of group. Pt finished artwork and briefly verbally processed with author and group. Pt identified positive strengths and personal goals.  Pts mood was calm, pleasant participant.

## 2024-03-17 NOTE — DISCHARGE INSTRUCTIONS
Behavioral Discharge Planning and Instructions  [facility]  [phone]    Summary: You were admitted to Station 3A on 3/16/2024 for detoxification from Alcohol.  A medical exam was performed that included lab work. You have met with a  and opted to detox only.  Please take care and make your recovery a priority, Georges!    Recommendation:  Refrain from use of mood altering substances. If you are unable to maintain sobriety in the community then please obtain a chemical health assessment and follow all recommendations.  To schedule an assessment:  Call the [facility] assessment center at [phone] and ask to schedule a Chemical Health Assessment.     You can also call your [facility] Chemical Dependency unit (these are usually included under adult mental health services). Most [facility] have a number for you to call to schedule an assessment.  If you have private insurance or a PMAP you can call the customer service number on your insurance  card and they can give you a list of places to call for an assessment that are in network with your  insurance.       To find a treatment program:     SAMHSA.gov  Click on find treatment  Enter your zip code and select your city/state.  The Substance Abuse and Mental Health Services Administration (SAMHSA) is the agency within  the U.S. Department of Health and Human Services that leads public health efforts to advance the  behavioral health of the nation. SAMHSA's mission is to reduce the impact of substance abuse and  mental illness on [place] communities.     Nebel.TVn.org select substance use disorder programs then select find services.  Fast Tracker is a virtual community and health care connection resource. We connect  individuals, families, mental health and substance use disorder providers, physicians, care coordinators,  and others with a real-time, searchable directory of mental health and  substance use disorder resources  and their availability within [place].    If you have any questions at anytime after you are discharged please call [facility] detox unit 3AW at [phone].  [facility], Behavioral Intake [phone]  Medical Records call [phone]  Outpatient Behavioral Intake call [phone]  LP+ Wait List/Bed Availability call [phone]        Georges is recommended to see a provider at the Addiction Medicine clinic to provide him additional support regarding additional support through an addiction medicine doctor. If Georges is seeking or wanting to try medications to assist with his addiction. Down below is information regarding our Recovery Clinic at [facility]    The Recovery Clinic    It was developed to provide bridging services to patients with Opiate Use Disorders (OUD) seeking care in the Emergency Department and other programs within the [facility] system. This is a front door to on-going services through the Addiction Medicine Clinic. Processes are in place to ensure timely access to Medication Assisted Treatments (MAT), Peer Recovery and Nursing services and Substance Use Disorder (SAMMI) Assessments.     There are three ways for people to access the Recovery Clinic:     People being seen for Opiate Use Disorder (OUD) withdrawal in the [facility] Emergency Department can be referred by that care team  People receiving care in a [facility] system can be referred by their provider   People who use the walk-in hours      Important Information:    People ages 16 years and older are served    Open 5 days weekly:   Walk In hours   Mondays, Wednesday and Fridays are 9-11:00 AM & 1-3:00 PM  Tuesday & Thursday 1:30 to 4:00pm    Location:    [facility], [address] [place] [address], First Floor, [address], [place] [address]   (next to outpatient lab)    Phone: [phone]        Main Diagnosis: Per Dr. Gris Cassidy MD  Alcohol use disorder,  severe, dependence with withdrawal with complication   Nicotine dependence with withdrawal   Cannabis use  Unspecified depression   PUSHPA  Hx of panic disorder per chart   Hx of PTSD per pt report  Peripheral neuropathy   Reactive airway disease, post viral   HTN  Paroxysmal Afib  Hx of anal fisssure/hemorrhoids   High anion gap metabolic acidosis   Dyslipidemia       Major Treatments, Procedures and Findings:  You have withdrawn from Alcohol   using Valium.  You have met with a  to develop a treatment plan for discharge.  You have had labs drawn and those results have been reviewed with you. Please take a copy of your lab work with you to your next primary care physician appointment.    Major Treatments, Procedures and Findings:  You were detoxed from alcohol with the Modified Selective Severity Protocol using Valium. You have met with a  to develop a treatment plan for discharge.  You have had labs drawn and a copy of those labs will be sent home with you.  Please bring your lab results with to your follow up doctor appointment.    Symptoms to Report:  If you experience more anxiety, confusion, sleeplessness, deep sadness or thoughts of suicide, notify your treatment team or notify your primary care physician. IF ANY OF THE SYMPTOMS YOU ARE EXPERIENCING ARE A MEDICAL EMERGENCY CALL 911 IMMEDIATELY.     If you or someone you know is struggling or in crisis, help is available.  Call or text 988 or chat  at brotips.org    Lifestyle Adjustment: Adjust your lifestyle to get enough sleep, relaxation, exercise and  good nutrition. Continue to develop healthy coping skills to decrease stress and promote a sober living environment. Do not use alcohol, illegal drugs or addictive medications other than what is currently prescribed. AA, NA, and  Sponsor are excellent resources for support.     Primary Provider: Catrina SCHAEFER    Disposition: Home      Facts about COVID19 at www.cdc.gov/COVID19 and  "www.[place].gov/covid19    Keeping hands clean is one of the most important steps we can take to avoid getting sick and spreading germs to others.  Please wash your hands frequently and lather with soap for at least 20 seconds!    Follow-up Appointment:   [facility]   Catrina SCHAEFER  [address]  [phone]  Appointment Date/Time:   Pt encouraged to make a follow up appointment in 7-10 days after discharge.    Patient declined case management services and planned to follow up with [facility] program independently after discharge    Resources:     Resources for on line recovery meetings:  AA meetings can be found online; search for them at: http://aaRibbitintergroup.org/directory.php  AA meetings via [place] for [place] area can be found online at: https://aaHIT Community.org/find-a-meeting/holiday-closings/  NA meetings via [place] for [place] area can be found online at: https://sites.[place].com/view/[place]regionofnarcoticsanonymous/home?authuser=2    Www.Piethis.com  has online resources for meeting and recovery care including Podcast \"Let's Talk:Addiction & Recovery Podcasts    Www.mnrecovery.org     DISCHARGE RESOURCES:  -SMART Recovery - self management for addiction recovery:  www.smartrecBeamry.org    -Pathways ~ A Health Crisis Resource & Support Center: 649.810.1560.  -[facility] [phone]   -[facility] Behavioral Intake 834-780-8815 or 673-523-8363.  -Suicide Awareness Voices of Education (SAVE) (www.save.org): 721-681-KXUJ (8603)  -National Suicide Prevention Line (www.mentalhealthmn.org): 892-670-FNYU (2689)  -National [place] on Mental Illness (www.mn.melissa.org): 572.599.8712 or 003-265-2397.  -Unof5rayp: text the word LIFE to 37751 for immediate support and crisis intervention  -Mental Health Consumer/Survivor Network of [place] (www.mhcsn.net): 146.738.8687 or 298-140-1991  -Mental Health Association of [place] (www.mentalhealth.org): 185-186-7102 or 456-628-2692   "   -Substance Abuse and Mental Health Services (www.samhsa.gov)  -Harm Reduction Coalition (www. Harmreduction.org)  -www.prescribetoprevent.org or http://prescribetoprevent.org/video  -Poison control 1-413.735.8638   [place] Opioid Prevention Coalition: www.opioidcoalition.org    Sober Support Group Information:  AA/NA & Sponsor/Support  -Alcoholics Anonymous (www.alcoholics-anonymous.org): for local information 24 hours/day  -AA Intergroup service office in [place] (http://www.aastpaul.org/) 149.109.5429  -AA Intergroup service office in [facility]: 627.337.7557. (http://www.aamin[place].org/)  -Narcotics Anonymous (www.naminn[place].org) (124) 594-2941   Sober Fun Activities: www.soberRenaissance Factoryactivities.Vioozer/[facility]//[facility] Recovery Connection ([facility])  [facility] connects people seeking recovery to resources that help foster and sustain long-term recovery.  Whether you are seeking resources for treatment, transportation, housing, job training, education, health care or other pathways to recovery, [facility] is a great place to start.    Phone: (631) 509-4644.  www.[place] Arts.[employer] (Great listing of all types of recovery and non-recovery related resources)      General Medication Instructions:   See your medication sheet(s) for instructions.   Take all medicines as directed.  Make no changes unless your doctor suggests them.   Go to all your doctor visits.  Be sure to have all your required lab tests. This way, your medicines can be refilled on time.  Do not use any drugs not prescribed by your provider.  AA/NA and Sponsors are excellent resources for support  Avoid alcohol.    Any follow up concerns:  Nursing questions call the [facility] [phone]  Medical Record call [phone]  Outpatient Behavioral Intake call [phone]  LP+ Wait List/Bed Availability call [phone]    The entire treatment team has appreciated the opportunity to work with you Georges.  We wish you the best in  the future and with your lifelong recovery goals. Please bring this discharge folder with you to all follow up appointments.  It contains your lab results, diagnosis, medication list and discharge recommendations.    THANK YOU FOR CHOOSING THE [facility]

## 2024-03-17 NOTE — PLAN OF CARE
Problem: Alcohol Withdrawal  Goal: Alcohol Withdrawal Symptom Control  Outcome: Progressing     Problem: Sleep Disturbance  Goal: Adequate Sleep/Rest  Outcome: Progressing   Goal Outcome Evaluation:    The Patient's MSSA scores were 4 and 8. He received Valium 10 mg. He slept okay; the 15-minute safety checks are in progress with no related incidents.

## 2024-03-17 NOTE — PROGRESS NOTES
Case Management:     Writer checked-in and introduced role to pt's care and how to assist pt during their stay. Inquired with pt about what they are needing during their stay and what they are considering for their aftercare plans.     - Patient processed current admission with CM. Patient is not interested in residential and/or IOP treatment at this time.   - Reports he will use this time to plan his next steps. Patient reports he is currently on probation through [facility] and mandated to complete DBT programming through [facility].   - Patient was 4 classes away from completing the program but due to his behavior he was from the group. Patient wants to return and complete his DBT program. It is unclear if patient can return to the program and would like assistance with this. Patient signed HENRY for his DBT counselor at [facility], and [facility]. Signed ROIs placed in pt's paper chart.   - CM also discussed MAT for alcohol with patient. He expressed interest in discussing this further with 3A attending provider.     Living Situation:  Lives at his mother's house. Housing is stable and is able to return there.     Insurance:  [employer] [place]    Legal Status  Voluntary     Substance Use Assessment Status:  Not needed due to not wanting treatment at this time    Referral Status:  None     Established Services or Current Support  Wants to return to DBT program with [facility]    Next Steps and Discharge Plan or Goal:  Follow up with [facility] on whether or not patient can return to DBT programming.     Name/Credentials Divya Champagne, [facility], LADC

## 2024-03-17 NOTE — PLAN OF CARE
"  Problem: Alcohol Withdrawal  Goal: Alcohol Withdrawal Symptom Control  Outcome: Progressing    Patient is up and visible in the milieu. Patient is ambulating independently. Patient is alert and oriented x 4. Patient is attending/participating in unit programming. Pt is social with peers. Affect is blunted, mood is calm. Patient denies SI/SIB/HI. Pt denies auditory/visual hallucinations. Patient verbally contracts for safety on the unit.   Soft care mattress placed on bed per provider order.   Pt has spent most of the shift withdrawn to his room, resting/sleeping in bed this morning. Easily wakes to verbal cues.Pt out in the lounge this afternoon, social with peers and watching tv.    This RN administered the PRN medications Valium, Zofran, Imodium (see MAR) at the request of the patient. Patient is tolerating medications well, denies any current side effects.     Pt's MSSA's = 9, 9 upon first morning withdrawal assessment. Patient reports a decreased appetite with c/o nausea/given Zofran, and poor sleep.Pt has had c/o several loose stools/diarrhea and given prn Imodium. Patient discharge plans to go home and resume AA meetings. Pt states he has a safe environment at home, lives with his mom and his cat is there. Rest, fluids, and food encouraged. Status 15 checks remain. Patient is able to make needs known appropriately. Patient denies any unmet needs at this time.     Blood pressure (!) 150/93, pulse 84, temperature 98.3  F (36.8  C), temperature source Oral, resp. rate 16, height 1.803 m (5' 11\"), weight 88.5 kg (195 lb), SpO2 96%.                  "
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
## 2024-03-18 VITALS
HEART RATE: 89 BPM | SYSTOLIC BLOOD PRESSURE: 125 MMHG | TEMPERATURE: 97.6 F | WEIGHT: 195 LBS | RESPIRATION RATE: 16 BRPM | OXYGEN SATURATION: 96 % | HEIGHT: 71 IN | BODY MASS INDEX: 27.3 KG/M2 | DIASTOLIC BLOOD PRESSURE: 85 MMHG

## 2024-03-18 PROCEDURE — 250N000013 HC RX MED GY IP 250 OP 250 PS 637: Performed by: PSYCHIATRY & NEUROLOGY

## 2024-03-18 PROCEDURE — 250N000013 HC RX MED GY IP 250 OP 250 PS 637: Performed by: NURSE PRACTITIONER

## 2024-03-18 PROCEDURE — 99239 HOSP IP/OBS DSCHRG MGMT >30: CPT | Performed by: PSYCHIATRY & NEUROLOGY

## 2024-03-18 RX ORDER — MULTIPLE VITAMINS W/ MINERALS TAB 9MG-400MCG
1 TAB ORAL DAILY
Qty: 30 TABLET | Refills: 0 | Status: SHIPPED | OUTPATIENT
Start: 2024-03-18 | End: 2024-05-25

## 2024-03-18 RX ORDER — GABAPENTIN 400 MG/1
400 CAPSULE ORAL 3 TIMES DAILY
Qty: 90 CAPSULE | Refills: 0 | Status: SHIPPED | OUTPATIENT
Start: 2024-03-18

## 2024-03-18 RX ORDER — FLUOXETINE 40 MG/1
40 CAPSULE ORAL DAILY
Qty: 30 CAPSULE | Refills: 0 | Status: ON HOLD | OUTPATIENT
Start: 2024-03-19 | End: 2024-05-27

## 2024-03-18 RX ORDER — NALTREXONE HYDROCHLORIDE 50 MG/1
50 TABLET, FILM COATED ORAL DAILY
Qty: 30 TABLET | Refills: 1 | Status: SHIPPED | OUTPATIENT
Start: 2024-03-18 | End: 2024-05-25

## 2024-03-18 RX ADMIN — METOPROLOL TARTRATE 25 MG: 25 TABLET, FILM COATED ORAL at 08:42

## 2024-03-18 RX ADMIN — HYDROXYZINE HYDROCHLORIDE 25 MG: 25 TABLET, FILM COATED ORAL at 08:47

## 2024-03-18 RX ADMIN — FLUOXETINE HYDROCHLORIDE 40 MG: 20 CAPSULE ORAL at 08:42

## 2024-03-18 RX ADMIN — Medication 1 TABLET: at 08:42

## 2024-03-18 RX ADMIN — GABAPENTIN 400 MG: 400 CAPSULE ORAL at 08:42

## 2024-03-18 RX ADMIN — NICOTINE 1 PATCH: 14 PATCH, EXTENDED RELEASE TRANSDERMAL at 08:46

## 2024-03-18 RX ADMIN — FOLIC ACID 1 MG: 1 TABLET ORAL at 08:42

## 2024-03-18 RX ADMIN — Medication 50 MG: at 08:42

## 2024-03-18 ASSESSMENT — ACTIVITIES OF DAILY LIVING (ADL)
DRESS: INDEPENDENT
LAUNDRY: UNABLE TO COMPLETE
ORAL_HYGIENE: INDEPENDENT
ADLS_ACUITY_SCORE: 40
HYGIENE/GROOMING: INDEPENDENT
ADLS_ACUITY_SCORE: 40

## 2024-03-18 NOTE — PLAN OF CARE
Problem: Adult Inpatient Plan of Care  Goal: Readiness for Transition of Care  Outcome: Progressing   Goal Outcome Evaluation:    Plan of Care Reviewed With: patient    Patient discharging to home, states he is less anxious and  is out of detox monitoring status.  All patient belongings from room, locker, and security sent with patient. Patient     This RN went over discharge instructions, teachings, patient's labs, and unit recommendations with patient. This RN went over patient's prescribed medications which were sent to his home pharmacy [facility] in [place]. Patient verbalizes and demonstrates understanding of all teachings.instructions and indications. Patient denies thoughts of harm towards self or others. Patient denies suicidal ideation, plan or intent. Patient denies access to guns. Patient denies auditory/visual hallucinations. Pt denies any current medication side effects or medical issues at this time.     Patient encouraged to make a follow-up appointment with Primary Care Provider within one week of discharge.     Patient denies any further questions and is now discharged at this time.     Patient escorted off the unit by Erick BLACKMON

## 2024-03-18 NOTE — PLAN OF CARE
Problem: Alcohol Withdrawal  Goal: Alcohol Withdrawal Symptom Control  Outcome: Progressing     Problem: Sleep Disturbance  Goal: Adequate Sleep/Rest  Outcome: Progressing   Goal Outcome Evaluation:    The Patient had an MSSA score of 2, so he did not receive Valium. The fifteen-minute safety checks are ongoing, and no related incidents have occurred. He last took Valium at 1208 on 3/17/24.

## 2024-03-18 NOTE — PROGRESS NOTES
"Writer met with patient to discuss aftercare plans and follow up with [facility] case management/DBT program. Patient stated he talked to his counselor at the program, [facility] (HENRY on file that expires 4.1.24) and that he needs to complete some steps before he can return. Patient states \"It was court ordered and I ruined it, but she told me who to talk to and what my next steps are\". Patient declined case management assistance or for case management to call [facility] at [facility],.     Patient plans to have his mom pick him up for discharge    AVS complete, nursing updated  "

## 2024-03-18 NOTE — PLAN OF CARE
Behavioral Team Discussion: (3/17/2024)    Continued Stay Criteria/Rationale: Patient admitted for  Alcohol Use Disorder.  Plan: The following services will be provided to the patient; psychiatric assessment, medication management, therapeutic milieu, individual and group support, and skills groups.   Participants: 3A Provider: Dr. Gris Cassidy MD; 3A RN: Guy Robledo RN; 3A CM's: Emma Rose.  Summary/Recommendation: Providers will assess today for treatment recommendations, discharge planning, and aftercare plans. CM will meet with pt for discharge planning.   Medical/Physical: Patient reports a history of neuropathy of the hands, paroxysmal atrial fibrillation, and HTN, no other medical or physical concerns at this time.  Precautions:   Behavioral Orders   Procedures    Code 1 - Restrict to Unit    Routine Programming     As clinically indicated    Status 15     Every 15 minutes.    Withdrawal precautions     Rationale for change in precautions or plan: N/A  Progress: Initial.    ASAM Dimension Scale Ratings:  Dimension 1: 3 Client tolerates and parveen with withdrawal discomfort poorly. Client has severe intoxication, such that the client endangers self or others, or intoxication has not abated with less intensive levels of services. Client displays severe signs and symptoms; or risk of severe, but manageable withdrawal; or withdrawal worsening despite detox at less intensive level.  Dimension 2: 1 Client tolerates and parveen with physical discomfort and is able to get the services that the client needs.  Dimension 3: 2 Client has difficulty with impulse control and lacks coping skills. Client has thoughts of suicide or harm to others without means; however, the thoughts may interfere with participation in some treatment activities. Client has difficulty functioning in significant life areas. Client has moderate symptoms of emotional, behavioral, or cognitive problems. Client is able to participate in  most treatment activities.  Dimension 4: 3 Client displays inconsistent compliance, minimal awareness of either the client's addiction or mental disorder, and is minimally cooperative.  Dimension 5: 3 Client has poor recognition and understanding of relapse and recidivism issues and displays moderately high vulnerability for further substance use or mental health problems. Client has few coping skills and rarely applies coping skills.  Dimension 6: 3 Client is not engaged in structured, meaningful activity and the client's peers, family, significant other, and living environment are unsupportive, or there is significant criminal justice system involvement.

## 2024-05-04 DIAGNOSIS — F41.1 GAD (GENERALIZED ANXIETY DISORDER): ICD-10-CM

## 2024-05-07 RX ORDER — FLUOXETINE 40 MG/1
40 CAPSULE ORAL DAILY
Qty: 30 CAPSULE | Refills: 0 | OUTPATIENT
Start: 2024-05-07

## 2024-05-07 NOTE — TELEPHONE ENCOUNTER
"    Incoming refill from Pharmacy via interface  Medication requested:    FLUoxetine (PROZAC) 40 MG capsule 30 capsule 0 3/19/2024 -- No   Sig - Route: Take 1 capsule (40 mg) by mouth daily - Oral     ------------------------------  From last visit note:   3/16/2024 - 3/18/2024 (2 days)  [facility]    \"Follow-up Appointment:   [facility]   Catrina SCHAEFER  [address]  [phone]  Appointment Date/Time:   Pt encouraged to make a follow up appointment in 7-10 days after discharge.\"         Medication unable to be refilled by RN due to criteria not met as indicated.                 []Eligibility - not seen in the last year              []Supervision - no future appointment              []Compliance - no shows, cancellations or lapse in therapy              []Verification - order discrepancy              []Controlled medication              []Medication not included in policy              []90-day supply request              [x]Other: Pt needs appt with PCP per hospital discharge instructions                       "

## 2024-05-25 ENCOUNTER — HOSPITAL ENCOUNTER (INPATIENT)
Facility: CLINIC | Age: 49
LOS: 2 days | Discharge: HOME | End: 2024-05-27
Attending: FAMILY MEDICINE | Admitting: PSYCHIATRY & NEUROLOGY
Payer: COMMERCIAL

## 2024-05-25 ENCOUNTER — HOSPITAL ENCOUNTER (EMERGENCY)
Facility: CLINIC | Age: 49
Discharge: HOME | End: 2024-05-25
Attending: EMERGENCY MEDICINE | Admitting: EMERGENCY MEDICINE
Payer: COMMERCIAL

## 2024-05-25 ENCOUNTER — TELEPHONE (OUTPATIENT)
Dept: BEHAVIORAL HEALTH | Facility: CLINIC | Age: 49
End: 2024-05-25

## 2024-05-25 VITALS
DIASTOLIC BLOOD PRESSURE: 97 MMHG | HEART RATE: 132 BPM | SYSTOLIC BLOOD PRESSURE: 150 MMHG | RESPIRATION RATE: 14 BRPM | TEMPERATURE: 98.7 F | OXYGEN SATURATION: 96 %

## 2024-05-25 DIAGNOSIS — K08.89 PAIN, DENTAL: ICD-10-CM

## 2024-05-25 DIAGNOSIS — F10.229 ALCOHOL DEPENDENCE WITH INTOXICATION WITH COMPLICATION (H): ICD-10-CM

## 2024-05-25 DIAGNOSIS — F10.920 ALCOHOLIC INTOXICATION WITHOUT COMPLICATION (H): ICD-10-CM

## 2024-05-25 DIAGNOSIS — F10.930 ALCOHOL WITHDRAWAL, UNCOMPLICATED (H): ICD-10-CM

## 2024-05-25 DIAGNOSIS — F10.29 ALCOHOL DEPENDENCE WITH UNSPECIFIED ALCOHOL-INDUCED DISORDER (H): ICD-10-CM

## 2024-05-25 DIAGNOSIS — K22.70 BARRETT'S ESOPHAGUS WITHOUT DYSPLASIA: Primary | ICD-10-CM

## 2024-05-25 DIAGNOSIS — F41.1 GENERALIZED ANXIETY DISORDER: ICD-10-CM

## 2024-05-25 DIAGNOSIS — F41.1 GAD (GENERALIZED ANXIETY DISORDER): ICD-10-CM

## 2024-05-25 PROBLEM — F10.20 ACUTE ALCOHOLISM (H): Status: ACTIVE | Noted: 2024-05-25

## 2024-05-25 LAB
ALBUMIN SERPL BCG-MCNC: 4.5 G/DL (ref 3.5–5.2)
ALBUMIN SERPL BCG-MCNC: 4.6 G/DL (ref 3.5–5.2)
ALCOHOL BREATH TEST: 0.22 (ref 0–0.01)
ALP SERPL-CCNC: 67 U/L (ref 40–150)
ALP SERPL-CCNC: 70 U/L (ref 40–150)
ALT SERPL W P-5'-P-CCNC: 30 U/L (ref 0–70)
ALT SERPL W P-5'-P-CCNC: 32 U/L (ref 0–70)
AMPHETAMINES UR QL SCN: ABNORMAL
ANION GAP SERPL CALCULATED.3IONS-SCNC: 15 MMOL/L (ref 7–15)
ANION GAP SERPL CALCULATED.3IONS-SCNC: 18 MMOL/L (ref 7–15)
AST SERPL W P-5'-P-CCNC: 35 U/L (ref 0–45)
AST SERPL W P-5'-P-CCNC: 40 U/L (ref 0–45)
BARBITURATES UR QL SCN: ABNORMAL
BASOPHILS # BLD AUTO: 0 10E3/UL (ref 0–0.2)
BASOPHILS # BLD AUTO: 0 10E3/UL (ref 0–0.2)
BASOPHILS NFR BLD AUTO: 0 %
BASOPHILS NFR BLD AUTO: 1 %
BENZODIAZ UR QL SCN: ABNORMAL
BILIRUB SERPL-MCNC: 0.5 MG/DL
BILIRUB SERPL-MCNC: 0.6 MG/DL
BUN SERPL-MCNC: 10 MG/DL (ref 6–20)
BUN SERPL-MCNC: 9.5 MG/DL (ref 6–20)
BZE UR QL SCN: ABNORMAL
CALCIUM SERPL-MCNC: 9.3 MG/DL (ref 8.6–10)
CALCIUM SERPL-MCNC: 9.4 MG/DL (ref 8.6–10)
CANNABINOIDS UR QL SCN: ABNORMAL
CHLORIDE SERPL-SCNC: 101 MMOL/L (ref 98–107)
CHLORIDE SERPL-SCNC: 99 MMOL/L (ref 98–107)
CREAT SERPL-MCNC: 0.6 MG/DL (ref 0.67–1.17)
CREAT SERPL-MCNC: 0.74 MG/DL (ref 0.67–1.17)
DEPRECATED HCO3 PLAS-SCNC: 23 MMOL/L (ref 22–29)
DEPRECATED HCO3 PLAS-SCNC: 25 MMOL/L (ref 22–29)
EGFRCR SERPLBLD CKD-EPI 2021: >90 ML/MIN/1.73M2
EGFRCR SERPLBLD CKD-EPI 2021: >90 ML/MIN/1.73M2
EOSINOPHIL # BLD AUTO: 0 10E3/UL (ref 0–0.7)
EOSINOPHIL # BLD AUTO: 0 10E3/UL (ref 0–0.7)
EOSINOPHIL NFR BLD AUTO: 0 %
EOSINOPHIL NFR BLD AUTO: 0 %
ERYTHROCYTE [DISTWIDTH] IN BLOOD BY AUTOMATED COUNT: 12.3 % (ref 10–15)
ERYTHROCYTE [DISTWIDTH] IN BLOOD BY AUTOMATED COUNT: 12.5 % (ref 10–15)
ETHANOL SERPL-MCNC: 0.22 G/DL
ETHANOL SERPL-MCNC: 0.25 G/DL
FENTANYL UR QL: ABNORMAL
GLUCOSE SERPL-MCNC: 103 MG/DL (ref 70–99)
GLUCOSE SERPL-MCNC: 145 MG/DL (ref 70–99)
HCT VFR BLD AUTO: 48.7 % (ref 40–53)
HCT VFR BLD AUTO: 49.5 % (ref 40–53)
HGB BLD-MCNC: 17 G/DL (ref 13.3–17.7)
HGB BLD-MCNC: 17.5 G/DL (ref 13.3–17.7)
HOLD SPECIMEN: NORMAL
HOLD SPECIMEN: NORMAL
IMM GRANULOCYTES # BLD: 0 10E3/UL
IMM GRANULOCYTES # BLD: 0.1 10E3/UL
IMM GRANULOCYTES NFR BLD: 0 %
IMM GRANULOCYTES NFR BLD: 0 %
LIPASE SERPL-CCNC: 55 U/L (ref 13–60)
LYMPHOCYTES # BLD AUTO: 1.6 10E3/UL (ref 0.8–5.3)
LYMPHOCYTES # BLD AUTO: 2.7 10E3/UL (ref 0.8–5.3)
LYMPHOCYTES NFR BLD AUTO: 21 %
LYMPHOCYTES NFR BLD AUTO: 22 %
MAGNESIUM SERPL-MCNC: 1.8 MG/DL (ref 1.7–2.3)
MAGNESIUM SERPL-MCNC: 1.9 MG/DL (ref 1.7–2.3)
MCH RBC QN AUTO: 31.3 PG (ref 26.5–33)
MCH RBC QN AUTO: 31.6 PG (ref 26.5–33)
MCHC RBC AUTO-ENTMCNC: 34.9 G/DL (ref 31.5–36.5)
MCHC RBC AUTO-ENTMCNC: 35.4 G/DL (ref 31.5–36.5)
MCV RBC AUTO: 89 FL (ref 78–100)
MCV RBC AUTO: 90 FL (ref 78–100)
MONOCYTES # BLD AUTO: 0.7 10E3/UL (ref 0–1.3)
MONOCYTES # BLD AUTO: 0.9 10E3/UL (ref 0–1.3)
MONOCYTES NFR BLD AUTO: 7 %
MONOCYTES NFR BLD AUTO: 9 %
NEUTROPHILS # BLD AUTO: 5.3 10E3/UL (ref 1.6–8.3)
NEUTROPHILS # BLD AUTO: 9 10E3/UL (ref 1.6–8.3)
NEUTROPHILS NFR BLD AUTO: 69 %
NEUTROPHILS NFR BLD AUTO: 71 %
NRBC # BLD AUTO: 0 10E3/UL
NRBC # BLD AUTO: 0 10E3/UL
NRBC BLD AUTO-RTO: 0 /100
NRBC BLD AUTO-RTO: 0 /100
OPIATES UR QL SCN: ABNORMAL
PCP QUAL URINE (ROCHE): ABNORMAL
PHOSPHATE SERPL-MCNC: 2.3 MG/DL (ref 2.5–4.5)
PLATELET # BLD AUTO: 193 10E3/UL (ref 150–450)
PLATELET # BLD AUTO: 205 10E3/UL (ref 150–450)
POTASSIUM SERPL-SCNC: 3.8 MMOL/L (ref 3.4–5.3)
POTASSIUM SERPL-SCNC: 3.8 MMOL/L (ref 3.4–5.3)
PROT SERPL-MCNC: 7.3 G/DL (ref 6.4–8.3)
PROT SERPL-MCNC: 7.6 G/DL (ref 6.4–8.3)
RBC # BLD AUTO: 5.43 10E6/UL (ref 4.4–5.9)
RBC # BLD AUTO: 5.54 10E6/UL (ref 4.4–5.9)
SODIUM SERPL-SCNC: 139 MMOL/L (ref 135–145)
SODIUM SERPL-SCNC: 142 MMOL/L (ref 135–145)
WBC # BLD AUTO: 12.7 10E3/UL (ref 4–11)
WBC # BLD AUTO: 7.7 10E3/UL (ref 4–11)

## 2024-05-25 PROCEDURE — 83735 ASSAY OF MAGNESIUM: CPT | Performed by: FAMILY MEDICINE

## 2024-05-25 PROCEDURE — 258N000003 HC RX IP 258 OP 636: Performed by: FAMILY MEDICINE

## 2024-05-25 PROCEDURE — 84100 ASSAY OF PHOSPHORUS: CPT | Performed by: EMERGENCY MEDICINE

## 2024-05-25 PROCEDURE — 83690 ASSAY OF LIPASE: CPT | Performed by: FAMILY MEDICINE

## 2024-05-25 PROCEDURE — HZ2ZZZZ DETOXIFICATION SERVICES FOR SUBSTANCE ABUSE TREATMENT: ICD-10-PCS | Performed by: PSYCHIATRY & NEUROLOGY

## 2024-05-25 PROCEDURE — 250N000013 HC RX MED GY IP 250 OP 250 PS 637: Performed by: EMERGENCY MEDICINE

## 2024-05-25 PROCEDURE — 99285 EMERGENCY DEPT VISIT HI MDM: CPT | Performed by: FAMILY MEDICINE

## 2024-05-25 PROCEDURE — 96360 HYDRATION IV INFUSION INIT: CPT | Performed by: FAMILY MEDICINE

## 2024-05-25 PROCEDURE — 128N000004 HC R&B CD ADULT

## 2024-05-25 PROCEDURE — 93005 ELECTROCARDIOGRAM TRACING: CPT

## 2024-05-25 PROCEDURE — 83735 ASSAY OF MAGNESIUM: CPT | Performed by: EMERGENCY MEDICINE

## 2024-05-25 PROCEDURE — 84155 ASSAY OF PROTEIN SERUM: CPT | Performed by: FAMILY MEDICINE

## 2024-05-25 PROCEDURE — 36415 COLL VENOUS BLD VENIPUNCTURE: CPT | Performed by: FAMILY MEDICINE

## 2024-05-25 PROCEDURE — 36415 COLL VENOUS BLD VENIPUNCTURE: CPT | Performed by: EMERGENCY MEDICINE

## 2024-05-25 PROCEDURE — 82040 ASSAY OF SERUM ALBUMIN: CPT | Performed by: EMERGENCY MEDICINE

## 2024-05-25 PROCEDURE — 80307 DRUG TEST PRSMV CHEM ANLYZR: CPT | Performed by: FAMILY MEDICINE

## 2024-05-25 PROCEDURE — 250N000013 HC RX MED GY IP 250 OP 250 PS 637: Performed by: PSYCHIATRY & NEUROLOGY

## 2024-05-25 PROCEDURE — H2032 ACTIVITY THERAPY, PER 15 MIN: HCPCS

## 2024-05-25 PROCEDURE — 99285 EMERGENCY DEPT VISIT HI MDM: CPT | Mod: 25 | Performed by: FAMILY MEDICINE

## 2024-05-25 PROCEDURE — 99285 EMERGENCY DEPT VISIT HI MDM: CPT

## 2024-05-25 PROCEDURE — 250N000013 HC RX MED GY IP 250 OP 250 PS 637: Performed by: FAMILY MEDICINE

## 2024-05-25 PROCEDURE — 85025 COMPLETE CBC W/AUTO DIFF WBC: CPT | Performed by: FAMILY MEDICINE

## 2024-05-25 PROCEDURE — 82077 ASSAY SPEC XCP UR&BREATH IA: CPT | Performed by: FAMILY MEDICINE

## 2024-05-25 PROCEDURE — 82075 ASSAY OF BREATH ETHANOL: CPT | Performed by: FAMILY MEDICINE

## 2024-05-25 PROCEDURE — 84075 ASSAY ALKALINE PHOSPHATASE: CPT | Performed by: FAMILY MEDICINE

## 2024-05-25 PROCEDURE — 85025 COMPLETE CBC W/AUTO DIFF WBC: CPT | Performed by: EMERGENCY MEDICINE

## 2024-05-25 PROCEDURE — 82077 ASSAY SPEC XCP UR&BREATH IA: CPT | Performed by: EMERGENCY MEDICINE

## 2024-05-25 RX ORDER — ACETAMINOPHEN 325 MG/1
650 TABLET ORAL EVERY 4 HOURS PRN
Status: DISCONTINUED | OUTPATIENT
Start: 2024-05-25 | End: 2024-05-27 | Stop reason: HOSPADM

## 2024-05-25 RX ORDER — DIAZEPAM 5 MG
5-20 TABLET ORAL EVERY 30 MIN PRN
Status: DISCONTINUED | OUTPATIENT
Start: 2024-05-25 | End: 2024-05-25

## 2024-05-25 RX ORDER — OLANZAPINE 10 MG/2ML
INJECTION, POWDER, FOR SOLUTION INTRAMUSCULAR
Status: DISCONTINUED
Start: 2024-05-25 | End: 2024-05-25 | Stop reason: WASHOUT

## 2024-05-25 RX ORDER — METHOCARBAMOL 750 MG/1
750 TABLET, FILM COATED ORAL 3 TIMES DAILY PRN
COMMUNITY
Start: 2024-05-16 | End: 2024-06-15

## 2024-05-25 RX ORDER — ATENOLOL 50 MG/1
50 TABLET ORAL DAILY PRN
Status: DISCONTINUED | OUTPATIENT
Start: 2024-05-25 | End: 2024-05-25

## 2024-05-25 RX ORDER — ATENOLOL 50 MG/1
50 TABLET ORAL DAILY PRN
Status: DISCONTINUED | OUTPATIENT
Start: 2024-05-25 | End: 2024-05-27 | Stop reason: HOSPADM

## 2024-05-25 RX ORDER — POLYETHYLENE GLYCOL 3350 17 G/17G
17 POWDER, FOR SOLUTION ORAL DAILY PRN
Status: DISCONTINUED | OUTPATIENT
Start: 2024-05-25 | End: 2024-05-27 | Stop reason: HOSPADM

## 2024-05-25 RX ORDER — GABAPENTIN 400 MG/1
400 CAPSULE ORAL 3 TIMES DAILY
Status: DISCONTINUED | OUTPATIENT
Start: 2024-05-25 | End: 2024-05-27 | Stop reason: HOSPADM

## 2024-05-25 RX ORDER — MULTIPLE VITAMINS W/ MINERALS TAB 9MG-400MCG
1 TAB ORAL DAILY
Status: DISCONTINUED | OUTPATIENT
Start: 2024-05-25 | End: 2024-05-27 | Stop reason: HOSPADM

## 2024-05-25 RX ORDER — FOLIC ACID 1 MG/1
1 TABLET ORAL DAILY
Status: DISCONTINUED | OUTPATIENT
Start: 2024-05-25 | End: 2024-05-25

## 2024-05-25 RX ORDER — DIAZEPAM 5 MG
5-20 TABLET ORAL EVERY 30 MIN PRN
Status: DISCONTINUED | OUTPATIENT
Start: 2024-05-25 | End: 2024-05-27 | Stop reason: HOSPADM

## 2024-05-25 RX ORDER — LANOLIN ALCOHOL/MO/W.PET/CERES
3 CREAM (GRAM) TOPICAL
Status: DISCONTINUED | OUTPATIENT
Start: 2024-05-25 | End: 2024-05-27 | Stop reason: HOSPADM

## 2024-05-25 RX ORDER — MULTIPLE VITAMINS W/ MINERALS TAB 9MG-400MCG
1 TAB ORAL DAILY
Status: DISCONTINUED | OUTPATIENT
Start: 2024-05-25 | End: 2024-05-25

## 2024-05-25 RX ORDER — NICOTINE 21 MG/24HR
1 PATCH, TRANSDERMAL 24 HOURS TRANSDERMAL DAILY
Status: DISCONTINUED | OUTPATIENT
Start: 2024-05-26 | End: 2024-05-27 | Stop reason: HOSPADM

## 2024-05-25 RX ORDER — HYDROXYZINE HYDROCHLORIDE 25 MG/1
25 TABLET, FILM COATED ORAL EVERY 4 HOURS PRN
Status: DISCONTINUED | OUTPATIENT
Start: 2024-05-25 | End: 2024-05-27 | Stop reason: HOSPADM

## 2024-05-25 RX ORDER — OLANZAPINE 5 MG/1
10 TABLET, ORALLY DISINTEGRATING ORAL ONCE
Status: COMPLETED | OUTPATIENT
Start: 2024-05-25 | End: 2024-05-25

## 2024-05-25 RX ORDER — FOLIC ACID 1 MG/1
1 TABLET ORAL DAILY
Status: DISCONTINUED | OUTPATIENT
Start: 2024-05-25 | End: 2024-05-27 | Stop reason: HOSPADM

## 2024-05-25 RX ORDER — METOPROLOL TARTRATE 25 MG/1
25 TABLET, FILM COATED ORAL 2 TIMES DAILY
Status: DISCONTINUED | OUTPATIENT
Start: 2024-05-25 | End: 2024-05-27 | Stop reason: HOSPADM

## 2024-05-25 RX ORDER — MAGNESIUM HYDROXIDE/ALUMINUM HYDROXICE/SIMETHICONE 120; 1200; 1200 MG/30ML; MG/30ML; MG/30ML
30 SUSPENSION ORAL EVERY 4 HOURS PRN
Status: DISCONTINUED | OUTPATIENT
Start: 2024-05-25 | End: 2024-05-27 | Stop reason: HOSPADM

## 2024-05-25 RX ORDER — IBUPROFEN 600 MG/1
600 TABLET, FILM COATED ORAL ONCE
Status: COMPLETED | OUTPATIENT
Start: 2024-05-25 | End: 2024-05-25

## 2024-05-25 RX ADMIN — Medication 1 TABLET: at 13:34

## 2024-05-25 RX ADMIN — THIAMINE HCL TAB 100 MG 100 MG: 100 TAB at 13:34

## 2024-05-25 RX ADMIN — ATENOLOL 50 MG: 50 TABLET ORAL at 13:34

## 2024-05-25 RX ADMIN — FOLIC ACID 1 MG: 1 TABLET ORAL at 13:34

## 2024-05-25 RX ADMIN — GABAPENTIN 400 MG: 400 CAPSULE ORAL at 20:25

## 2024-05-25 RX ADMIN — DIAZEPAM 5 MG: 5 TABLET ORAL at 17:45

## 2024-05-25 RX ADMIN — METOPROLOL TARTRATE 25 MG: 25 TABLET, FILM COATED ORAL at 20:25

## 2024-05-25 RX ADMIN — HYDROXYZINE HYDROCHLORIDE 25 MG: 25 TABLET, FILM COATED ORAL at 18:21

## 2024-05-25 RX ADMIN — SODIUM CHLORIDE 1000 ML: 9 INJECTION, SOLUTION INTRAVENOUS at 13:45

## 2024-05-25 RX ADMIN — OLANZAPINE 10 MG: 5 TABLET, ORALLY DISINTEGRATING ORAL at 07:26

## 2024-05-25 RX ADMIN — ACETAMINOPHEN 650 MG: 325 TABLET, FILM COATED ORAL at 18:21

## 2024-05-25 RX ADMIN — DIAZEPAM 10 MG: 5 TABLET ORAL at 13:34

## 2024-05-25 ASSESSMENT — ACTIVITIES OF DAILY LIVING (ADL)
HYGIENE/GROOMING: INDEPENDENT
ADLS_ACUITY_SCORE: 37
ADLS_ACUITY_SCORE: 40
ADLS_ACUITY_SCORE: 40
ADLS_ACUITY_SCORE: 57
ADLS_ACUITY_SCORE: 37
ADLS_ACUITY_SCORE: 40
ADLS_ACUITY_SCORE: 37
ADLS_ACUITY_SCORE: 40
ADLS_ACUITY_SCORE: 37
DRESS: SCRUBS (BEHAVIORAL HEALTH);INDEPENDENT
ADLS_ACUITY_SCORE: 37
ADLS_ACUITY_SCORE: 40
ADLS_ACUITY_SCORE: 37
ADLS_ACUITY_SCORE: 37
ORAL_HYGIENE: INDEPENDENT
ADLS_ACUITY_SCORE: 37

## 2024-05-25 ASSESSMENT — COLUMBIA-SUICIDE SEVERITY RATING SCALE - C-SSRS

## 2024-05-25 NOTE — ED NOTES
"Writer went in to offer pt Motrin that provider had ordered for dental ache and pt stated \"that's not going to do anything\". Pt asked if we have a dentist to simply pull out teeth. Writer explained to pt that we do not have dentist on site and pt escalated and began yelling \"take all this shit off of me, it aint doing anything\" referring to monitoring system. Pt then yelling for a DrAlyssa, provider went in to talk to pt. Orders received.     Katya Trivedi RN        "

## 2024-05-25 NOTE — CONSULTS
"Diagnostic Evaluation Consultation  Crisis Assessment    Patient Name: Georges Moreno  Age:  48 year old  Legal Sex: male  Gender Identity: male  Pronouns:   Race: White  Ethnicity: Not  or   Language: English      Patient was assessed: Virtual: [employer] Crisis Assessment Start Time: 0820 Crisis Assessment Stop Time: 0858  Patient location: [facility] EMERGENCY DEPT                                 Referral Data and Chief Complaint  Georges Moreno presents to the ED with family/friends. Patient is presenting to the ED for the following concerns: Substance use.   Factors that make the mental health crisis life threatening or complex are:  Pt presents for alcohol intoxication and anxiety. Pt reports he was concerned about withdrawal tremors and wanted to get help in detox. Pt reports, \"now I am sober, so I no longer need that.\" Pt reports he has anxiety and understands this impacts his ability to be successful in sobriety. Pt reports he wants to be sober and reviewed his treatment experiences. Pt explored motivations for treatment and shared his desire to engage in this. Pt denies SI/SIB/SA/HI and denies plans, means, or intent to harm himself or others..      Informed Consent and Assessment Methods  Explained the crisis assessment process, including applicable information disclosures and limits to confidentiality, assessed understanding of the process, and obtained consent to proceed with the assessment.  Assessment methods included conducting a formal interview with patient, review of medical records, collaboration with medical staff, and obtaining relevant collateral information from family and community providers when available.  : done     Patient response to interventions: acceptance expressed, verbalizes understanding  Coping skills were attempted to reduce the crisis:  talked to mother     History of the Crisis   Pt reports history of anxiety, depression, and alcohol abuse. Pt reports " history of SI, and denies history of SIB/SA/HI and plans, means, or intent to ever kill himself. Pt reports history of several ED visits, inpatient psychaitric stays, [facility] residential treatment and detox.    Brief Psychosocial History  Family:  Single, Children no  Support System:  Parent(s)  Employment Status:  unemployed  Source of Income:  none  Financial Environmental Concerns:  unemployed, none  Current Hobbies:  outdoor activities, reading, social media/computer activities, television/movies/videos  Barriers in Personal Life:  mental health concerns    Significant Clinical History  Current Anxiety Symptoms:  excessive worry, anxious, racing thoughts  Current Depression/Trauma:     Current Somatic Symptoms:     Current Psychosis/Thought Disturbance:  impulsive  Current Eating Symptoms:     Chemical Use History:  Alcohol: Daily  Last Use:: 05/24/24  Benzodiazepines: None  Opiates: None  Cocaine: None  Marijuana: None  Other Use: None  Withdrawal Symptoms: Tremors, Nausea, Myalgias   Past diagnosis:  Anxiety Disorder, Depression, Substance Use Disorder  Family history:  Substance Use Disorder  Past treatment:  Individual therapy, Psychiatric Medication Management, Inpatient Hospitalization, AA/NA, Residential Treatment  Details of most recent treatment:  Pt reports current involvement in weekly therapy and attends a weekly AA meeting.  Other relevant history:  Pt reports he lives at home with his mother. Pt endorses history of trauma. pt reports he has a history of legal issues, including DUI. Pt denies current legal involvement and deneis  involvement.       Collateral Information  Is there collateral information: No (declined consent)     Collateral information name, relationship, phone number:       What happened today:       What is different about patient's functioning:       Concern about alcohol/drug use:      What do you think the patient needs:      Has patient made comments about wanting to kill  themselves/others:      If d/c is recommended, can they take part in safety/aftercare planning:       Additional collateral information:        Risk Assessment  [place] Suicide Severity Rating Scale Full Clinical Version:  Suicidal Ideation  Q1 Wish to be Dead (Lifetime): Yes  Q2 Non-Specific Active Suicidal Thoughts (Lifetime): No  3. Active Suicidal Ideation with any Methods (Not Plan) Without Intent to Act (Lifetime): No  Q4 Active Suicidal Ideation with Some Intent to Act, Without Specific Plan (Lifetime): No  Q5 Active Suicidal Ideation with Specific Plan and Intent (Lifetime): No  Q6 Suicide Behavior (Lifetime): no     Suicidal Behavior (Lifetime)  Actual Attempt (Lifetime): No  Has subject engaged in non-suicidal self-injurious behavior? (Lifetime): No  Interrupted Attempts (Lifetime): No  Aborted or Self-Interrupted Attempt (Lifetime): No  Preparatory Acts or Behavior (Lifetime): No    [place] Suicide Severity Rating Scale Recent:   Suicidal Ideation (Recent)  Q1 Wished to be Dead (Past Month): no  Q2 Suicidal Thoughts (Past Month): no  Level of Risk per Screen: no risks indicated     Suicidal Behavior (Recent)  Actual Attempt (Past 3 Months): No  Has subject engaged in non-suicidal self-injurious behavior? (Past 3 Months): No  Interrupted Attempts (Past 3 Months): No  Aborted or Self-Interrupted Attempt (Past 3 Months): No  Preparatory Acts or Behavior (Past 3 Months): No    Environmental or Psychosocial Events: legal issues such as DWI, DUI, lawsuit, CPS involvement, etc., bullied/abused, work or task failure, helplessness/hopelessness, unemployment/underemployment, impulsivity/recklessness, social isolation, ongoing abuse of substances, other life stressors, neither working nor attending school, recent life events (see comment)  Protective Factors: Protective Factors: lives in a responsibly safe and stable environment, able to access care without barriers, help seeking, constructive use of leisure time,  enjoyable activities, resilience, reality testing ability    Does the patient have thoughts of harming others? Feels Like Hurting Others: no  Previous Attempt to Hurt Others: yes  Violence Threats in Past 6 Months: Reports history of threats.  Current Violence Plan or Thoughts: None  Is the patient engaging in sexually inappropriate behavior?: no  Duty to warn initiated: no    Is the patient engaging in sexually inappropriate behavior?  no        Mental Status Exam   Affect: Appropriate  Appearance: Appropriate  Attention Span/Concentration: Attentive  Eye Contact: Engaged    Fund of Knowledge: Appropriate   Language /Speech Content: Fluent  Language /Speech Volume: Normal  Language /Speech Rate/Productions: Normal  Recent Memory: Intact  Remote Memory: Intact  Mood: Anxious  Orientation to Person: Yes   Orientation to Place: Yes  Orientation to Time of Day: Yes  Orientation to Date: Yes     Situation (Do they understand why they are here?): Yes  Psychomotor Behavior: Normal  Thought Content: Clear  Thought Form: Intact     Mini-Cog Assessment  Number of Words Recalled:    Clock-Drawing Test:     Three Item Recall:    Mini-Cog Total Score:       Medication  Psychotropic medications:   Medication Orders - Psychiatric (From admission, onward)      None             Current Care Team  Patient Care Team:  Clinic, [facility] as PCP - General  [facility] as Assigned PCP    Diagnosis  Patient Active Problem List   Diagnosis Code    Thoracic or lumbosacral neuritis or radiculitis, unspecified [id number]    Screening for cardiovascular condition Z13.6    Alcohol dependence with withdrawal (H) F10.239    Atrial fibrillation (H) I48.91    Paroxysmal atrial fibrillation (H) I48.0    Generalized anxiety disorder F41.1    Personal history of alcoholism (H) F10.21    Cigarette nicotine dependence with withdrawal F17.213    Alcohol withdrawal syndrome without complication (H) F10.930    Cannabis use  disorder, moderate, dependence (H) F12.20    Alcohol withdrawal with complication with inpatient treatment, with unspecified complication (H) F10.939    Palpitations R00.2    Palpitation R00.2    Alcohol abuse F10.10    Anxiety F41.9    Elevated troponin R79.89    Alcohol withdrawal syndrome with complication (H) F10.939    Elevated lactic acid level R79.89    Alcohol withdrawal (H) F10.939    Recurrent major depression (H24) F33.9    Alcohol withdrawal, uncomplicated (H) F10.930    Laceration of left index finger without damage to nail, foreign body presence unspecified, initial encounter S61.211A    Alcohol dependence with intoxication with complication (H) F10.229    Acute alcoholism (H) F10.20       Primary Problem This Admission  Active Hospital Problems    Acute alcoholism (H)      *Generalized anxiety disorder    F41.1  F10.20    Clinical Summary and Substantiation of Recommendations   After therapeutic assessment, intervention and aftercare planning by ED care team and LM and in consultation with attending provider, the patient's circumstances and mental state were appropriate for outpatient management. It is the recommendation of this clinician that pt discharge with OP MH support. A this time the pt is not presenting as an acute risk to self or others due to the following factors: Pt presents for intoxication and anxiety. Pt denies SI/SIB/SA/HI and denies plans, means, or intent to harm himself or others. Pt reports a desire to engage in treatment and reports he also attends therapy weekly and hopes to gain more sober contacts thru his AA weekly meeting. Pt reports he feelss afe to return home and requests resources, provided in AVS.                          Patient coping skills attempted to reduce the crisis:  talked to mother    Disposition  Recommended disposition: Individual Therapy, Medication Management, Substance Abuse Disorder Treatment, Rule 25/SAMMI Assessment        Reviewed case and  recommendations with attending provider. Attending Name: Dr. Marlow       Attending concurs with disposition: yes       Patient and/or validated legal guardian concurs with disposition:   yes       Final disposition:  discharge    Legal status on admission:      Assessment Details   Total duration spent with the patient: 38 min     CPT code(s) utilized: 78352 - Psychotherapy for Crisis - 60 (30-74*) min    LAURA Avendaño, Psychotherapist  [facility] - Triage & Transition Services  Callback: [phone]

## 2024-05-25 NOTE — TELEPHONE ENCOUNTER
S: [facility] , Provider Christianson calling at 1:12 PM with clinical on a 48 year old/Male presenting for alcohol detox.     B: Pt presents for ETOH detox.   Currently reports drinking quart hard liquor daily for the last week. 60 days sobriety prior to this last week.    Patient reports last use was 2 hours PTA.  Pt JIMMY: 0.25  Pt  denies hx of DT  Pt  denies hx of seizures. Last seizure: N/A  Pt endorsing the following symptoms of withdrawal: Tachycardic  MSSA Score: 9, receiving valium and fluids    Pt denies acute mental health or medical concerns.   Pt denies other drug use: None Amount/frequency: N/A    Does Pt have a detox care plan in [facility]? No  Does pt present with specific needs, assistive devices, or exclusionary criteria? None  Is the patient ambulating, eating and drinking in the ED? Yes    A: Pt meets criteria to be presented for IP detox admission. Patient is voluntary    COVID Symptoms: No  If yes, COVID test required   Utox: Positive for Cannabis  Magnesium: WNL  CMP: Abnormalities: ANION GAP 18, GLUCOSE 103  CBC: Abnormalities: WBC COUNT 12.7, ABSOLUTE NEUTROPHILS 9.0  HCG: N/A     R: Patient cleared and ready for behavioral bed placement: Yes    Pt is meeting criteria for presentation to 3A/CD    Does Patient need a Transfer Center request created? No, Pt is located within [facility] ED, [facility] ED, or [facility]

## 2024-05-25 NOTE — ED PROVIDER NOTES
Emergency Department Note      History of Present Illness     Chief Complaint:  Alcohol Problem       The history is provided by the patient.      Georges Moreno is a 48 year old male who presents for an alcohol problem. The patient reports having withdrawal symptoms. The patient reports he has been drinking a quart of alcohol on a daily basis as well as consistently for the past 48 hours and had his last drink an hour ago. He reports having difficulty sleeping last night as he noticed he was diaphoretic and had an accelerated heart rate. He also reports experiencing leg cramps. He made note he has had A-Fib in the past. The patient reports passive suicidal ideation but has no plans. The patient is on Prozac for anxiety and Gabapentin for neuropathy. He notes that he has previously been to alcohol treatment.     Independent Historian:    None    Review of External Notes  Mental Health DC Summary 3/18/24:     Past Medical History   Medical History, Surgical History, Problem List, and Medications  Reviewed in Epic    Physical Exam   Patient Vitals for the past 24 hrs:   BP Temp Temp src Pulse Resp SpO2   05/25/24 0922 -- -- -- -- -- 96 %   05/25/24 0920 -- -- -- -- -- 97 %   05/25/24 0919 -- -- -- -- -- 98 %   05/25/24 0918 (!) 150/97 -- -- (!) 132 -- --   05/25/24 0709 -- -- -- 87 14 97 %   05/25/24 0654 -- -- -- 103 18 92 %   05/25/24 0644 -- -- -- 72 16 94 %   05/25/24 0642 -- -- -- 71 17 93 %   05/25/24 0641 -- -- -- 69 15 93 %   05/25/24 0640 -- -- -- 73 19 94 %   05/25/24 0639 -- -- -- 71 16 92 %   05/25/24 0623 -- -- -- -- -- 95 %   05/25/24 0619 (!) 165/150 -- -- -- -- 96 %   05/25/24 0608 -- -- -- -- -- 97 %   05/25/24 0559 -- -- -- 79 -- 95 %   05/25/24 0553 (!) 156/96 -- -- -- -- --   05/25/24 0539 (!) 148/97 98.7  F (37.1  C) Temporal 78 18 96 %       Physical Exam  Constitutional: Vital signs reviewed as above.   Eyes: PEERL, EOMI B/L  Neck: No JVD noted. FROM   Oropharynx: Nonacute appearing fracture of  the right rear most molar.  There is some gingival bleeding posterior to this.  Cardiovascular: tachycardic rate, Regular rhythm and normal heart sounds.  No murmur heard. Equal B/L peripheral pulses.  Pulmonary/Chest: Effort normal and breath sounds normal. No respiratory distress. Patient has no wheezes. Patient has no rales.   Gastrointestinal: Soft. There is no tenderness.   Musculoskeletal/Extremities: No pitting edema noted. Normal tone. Some calf muscle tremors/fasciculations noted  Neurological: Alert  Skin: Skin is warm and dry. There is no diaphoresis noted.   Psychiatric: The patient appears tearful.     Diagnostics     Laboratory: Imaging:   Labs Ordered and Resulted from Time of ED Arrival to Time of ED Departure   COMPREHENSIVE METABOLIC PANEL - Abnormal       Result Value    Sodium 139      Potassium 3.8      Carbon Dioxide (CO2) 25      Anion Gap 15      Urea Nitrogen 9.5      Creatinine 0.60 (*)     GFR Estimate >90      Calcium 9.4      Chloride 99      Glucose 145 (*)     Alkaline Phosphatase 67      AST 35      ALT 30      Protein Total 7.6      Albumin 4.6      Bilirubin Total 0.5     ETHYL ALCOHOL LEVEL - Abnormal    Alcohol ethyl 0.22 (*)    PHOSPHORUS - Abnormal    Phosphorus 2.3 (*)    MAGNESIUM - Normal    Magnesium 1.9     CBC WITH PLATELETS AND DIFFERENTIAL    WBC Count 7.7      RBC Count 5.43      Hemoglobin 17.0      Hematocrit 48.7      MCV 90      MCH 31.3      MCHC 34.9      RDW 12.5      Platelet Count 205      % Neutrophils 69      % Lymphocytes 21      % Monocytes 9      % Eosinophils 0      % Basophils 1      % Immature Granulocytes 0      NRBCs per 100 WBC 0      Absolute Neutrophils 5.3      Absolute Lymphocytes 1.6      Absolute Monocytes 0.7      Absolute Eosinophils 0.0      Absolute Basophils 0.0      Absolute Immature Granulocytes 0.0      Absolute NRBCs 0.0       No orders to display         EKG   ECG results from 05/25/24   EKG 12-lead, tracing only     Value    Systolic  Blood Pressure     Diastolic Blood Pressure     Ventricular Rate 77    Atrial Rate 77    AZ Interval 160    QRS Duration 88        QTc 430    P Axis 68    R AXIS 77    T Axis 58    Interpretation ECG      Sinus rhythm with sinus arrhythmia  Normal ECG  When compared with ECG of 16-MAR-2024 15:44,  No significant change was found  Interpreted at 0626      Independent Interpretation  See ED course    ED Course    Medications Administered  Medications   ibuprofen (ADVIL/MOTRIN) tablet 600 mg (600 mg Oral Not Given 5/25/24 0837)   OLANZapine zydis (zyPREXA) ODT tab 10 mg (10 mg Oral $Given 5/25/24 0726)     Discussion of Management  See ED Course    Social Determinants of Health adding to complexity of care  None    ED Course  ED Course as of 05/25/24 1623   Sat May 25, 2024   0605 I obtained history and examined the patient as noted above.    0721 I rechecked the patient.  He is more agitated and anxious noting that he feels he has been sitting in the room for far too long.  He also notes that he has a broken right lower rear most tooth and feels that he injured his gum because of that.  Evaluation of his oropharynx notes a cracked tooth on the lower right rear most molar along with some gingival irritation and bleeding next to it.  No dental abscess noted.  The patient was also agitated when we told him we do not have a dentist in the hospital.    Additionally, due to the patient's agitation and his perception that his anxiety is quite elevated, I will order Zyprexa.  I also talked with the patient and he was eventually able to become more calm and reflect on his actions noting that he did not mean to shout or be boisterous.   0913 D/W Katherine from [facility], post evaluation. No SI. Wants alcohol treatment help. Safety plan done. Resources listed in AVS.        Medical Decision Making / Diagnosis   CMS Diagnoses: None    Code Status: Prior    MIPS     None    Medical Decision Making:  This 56-year-old male patient  presents to the ED due to concerns for alcohol withdrawal.  Please see the HPI and exam for specifics.  A broad differential was considered.  The patient did note some vague sense of not wanting to live/suicidal ideation in the setting of alcohol dependence and also expressed a desire to get off of alcohol.  Mental health evaluation was ordered and was completed.  They did not think the patient needed inpatient mental health treatment and did recommend outpatient alcohol treatment resources.    Separately, the patient also noted some bleeding inside of his mouth.  There seems to be irritation posterior to the right rear most molar.  There did not seem to be signs of surrounding cellulitis or abscess.    Laboratory studies were notable for elevated alcohol level as well as slightly low phosphorus level.  I did not think that IV nor oral phosphorus supplementation were needed at this time.  The patient should work on careful cessation of alcohol and ensure that he is eating a normal diet and this should improve his phosphorus level.    After mental health evaluation, the patient was interested in leaving.  He was discharged to follow-up as noted in the outpatient setting.    *Note: At the time of signing of this note, I see that the patient has gone to [place] for reevaluation and detox. He appears to be accepted to unit 3A.    Critical Care:  None.    Disposition:  See ED Course and MDM    ICD-10 Codes:    ICD-10-CM    1. Alcoholic intoxication without complication (H24)  F10.920       2. Alcohol dependence with unspecified alcohol-induced disorder (H)  F10.29       3. Pain, dental  K08.89          Scribe Disclosure:  IApurva, am serving as a scribe at 7:41 AM on 5/25/2024 to document services personally performed by Bandar Marlow DO based on my observations and the provider's statements to me.    IKassidy, am serving as a scribe at 9:17 AM on 5/25/2024 to document services personally  performed by Bandar Marlow DO based on my observations and the provider's statements to me.     5/25/2024   Bandar Marlow DO     [employer]              Bandar Marlow DO  05/25/24 [id number]

## 2024-05-25 NOTE — ED PROVIDER NOTES
[facility] EMERGENCY DEPARTMENT ([facility])    5/25/24      ED PROVIDER NOTE       History     Chief Complaint   Patient presents with    Alcohol Problem     Patient here to go to detox for alcohol      HPI  Georges Moreno is a 48 year old male with a history of alcohol use disorder, tobacco use, neuropathy of the hands, PAF, HTN, anxiety, and PTSD who presents to the ED for evaluation of an alcohol problem.     Patient states he wants to go to detox. He reports he has been to detox in the past. Patient states he was at [facility] this morning and was told they could not do much for him. Patient reports drinking a quart of hard liquor a day. Patient believes his last drink was a couple hours before coming in. Patient reports he was 60 days sober and then relapsed a week ago. Patient denies seizure history.    Past Medical History  Past Medical History:   Diagnosis Date    ADHD     Alcohol use disorder, severe, dependence (H)     Atrial fibrillation (H)     Depression, major, single episode     moderate    PUSHPA (generalized anxiety disorder)     HA (headache)     Pancreatitis     Paroxysmal atrial fibrillation (H)     Perirectal abscess     Personal history of alcoholism (H)     Polysubstance (excluding opioids) dependence, daily use (H)     PTSD (post-traumatic stress disorder)     Substance abuse (H)     Tobacco dependence      Past Surgical History:   Procedure Laterality Date    INCISION AND DRAINAGE PERIRECTAL ABSCESS      SOFT TISSUE SURGERY      Excision of pilonidal cyst    SOFT TISSUE SURGERY      removal of rectal cyst     omeprazole (PRILOSEC) 20 MG DR capsule      Allergies   Allergen Reactions    Sertraline Other (See Comments)     Excessive fatigue  Reaction(s): Excessive fatigue.    Duloxetine Hives and Rash     Rash, hives     Family History  Family History   Problem Relation Age of Onset    Family History Negative Mother     Alcohol/Drug Father     Diabetes Father     Leukemia Father      "Alcohol/Drug Paternal Uncle     Alcoholism Father     Alcoholism Brother      Social History   Social History     Tobacco Use    Smoking status: Every Day     Current packs/day: 1.50     Average packs/day: 1.5 packs/day for 15.0 years (22.5 ttl pk-yrs)     Types: Cigarettes    Smokeless tobacco: Former    Tobacco comments:     a little over a pack a day   Vaping Use    Vaping status: Never Used   Substance Use Topics    Alcohol use: Yes     Alcohol/week: 14.0 standard drinks of alcohol    Drug use: Yes     Types: Marijuana     Comment: today      Past medical history, past surgical history, medications, allergies, family history, and social history were reviewed with the patient. No additional pertinent items.   A complete review of systems was performed with pertinent positives and negatives noted in the HPI, and all other systems negative.    Physical Exam   BP: 133/89  Pulse: (!) 138  Temp: 98.9  F (37.2  C)  Resp: 18  Height: 180.3 cm (5' 11\")  Weight: 84.4 kg (186 lb)  SpO2: 95 %  Physical Exam  Vitals and nursing note reviewed.   Constitutional:       General: He is not in acute distress.     Appearance: Normal appearance. He is not toxic-appearing.   HENT:      Head: Atraumatic.   Eyes:      General: No scleral icterus.     Conjunctiva/sclera: Conjunctivae normal.   Cardiovascular:      Rate and Rhythm: Normal rate.      Heart sounds: Normal heart sounds.   Pulmonary:      Effort: Pulmonary effort is normal. No respiratory distress.      Breath sounds: Normal breath sounds.   Abdominal:      Palpations: Abdomen is soft.      Tenderness: There is no abdominal tenderness.   Musculoskeletal:         General: No deformity.      Cervical back: Neck supple.   Skin:     General: Skin is warm.   Neurological:      General: No focal deficit present.      Mental Status: He is alert and oriented to person, place, and time.      Sensory: No sensory deficit.      Motor: No weakness.      Coordination: Coordination normal. "   Psychiatric:         Mood and Affect: Mood is anxious.         Speech: Speech normal.         Behavior: Behavior is cooperative.         Thought Content: Thought content does not include homicidal or suicidal ideation.           ED Course, Procedures, & Data      Procedures                Results for orders placed or performed during the hospital encounter of 05/25/24   Comprehensive metabolic panel     Status: Abnormal   Result Value Ref Range    Sodium 142 135 - 145 mmol/L    Potassium 3.8 3.4 - 5.3 mmol/L    Carbon Dioxide (CO2) 23 22 - 29 mmol/L    Anion Gap 18 (H) 7 - 15 mmol/L    Urea Nitrogen 10.0 6.0 - 20.0 mg/dL    Creatinine 0.74 0.67 - 1.17 mg/dL    GFR Estimate >90 >60 mL/min/1.73m2    Calcium 9.3 8.6 - 10.0 mg/dL    Chloride 101 98 - 107 mmol/L    Glucose 103 (H) 70 - 99 mg/dL    Alkaline Phosphatase 70 40 - 150 U/L    AST 40 0 - 45 U/L    ALT 32 0 - 70 U/L    Protein Total 7.3 6.4 - 8.3 g/dL    Albumin 4.5 3.5 - 5.2 g/dL    Bilirubin Total 0.6 <=1.2 mg/dL   Magnesium     Status: Normal   Result Value Ref Range    Magnesium 1.8 1.7 - 2.3 mg/dL   Lipase     Status: Normal   Result Value Ref Range    Lipase 55 13 - 60 U/L   Ethyl Alcohol Level     Status: Abnormal   Result Value Ref Range    Alcohol ethyl 0.25 (H) <=0.01 g/dL   Urine Drug Screen Panel     Status: Abnormal   Result Value Ref Range    Amphetamines Urine Screen Negative Screen Negative    Barbituates Urine Screen Negative Screen Negative    Benzodiazepine Urine Screen Negative Screen Negative    Cannabinoids Urine Screen Positive (A) Screen Negative    Cocaine Urine Screen Negative Screen Negative    Fentanyl Qual Urine Screen Negative Screen Negative    Opiates Urine Screen Negative Screen Negative    PCP Urine Screen Negative Screen Negative   CBC with platelets and differential     Status: Abnormal   Result Value Ref Range    WBC Count 12.7 (H) 4.0 - 11.0 10e3/uL    RBC Count 5.54 4.40 - 5.90 10e6/uL    Hemoglobin 17.5 13.3 - 17.7 g/dL     Hematocrit 49.5 40.0 - 53.0 %    MCV 89 78 - 100 fL    MCH 31.6 26.5 - 33.0 pg    MCHC 35.4 31.5 - 36.5 g/dL    RDW 12.3 10.0 - 15.0 %    Platelet Count 193 150 - 450 10e3/uL    % Neutrophils 71 %    % Lymphocytes 22 %    % Monocytes 7 %    % Eosinophils 0 %    % Basophils 0 %    % Immature Granulocytes 0 %    NRBCs per 100 WBC 0 <1 /100    Absolute Neutrophils 9.0 (H) 1.6 - 8.3 10e3/uL    Absolute Lymphocytes 2.7 0.8 - 5.3 10e3/uL    Absolute Monocytes 0.9 0.0 - 1.3 10e3/uL    Absolute Eosinophils 0.0 0.0 - 0.7 10e3/uL    Absolute Basophils 0.0 0.0 - 0.2 10e3/uL    Absolute Immature Granulocytes 0.1 <=0.4 10e3/uL    Absolute NRBCs 0.0 10e3/uL   Alcohol breath test POCT     Status: Abnormal   Result Value Ref Range    Alcohol Breath Test 0.219 (A) 0.00 - 0.01   CBC with platelets differential     Status: Abnormal    Narrative    The following orders were created for panel order CBC with platelets differential.  Procedure                               Abnormality         Status                     ---------                               -----------         ------                     CBC with platelets and d...[[id number]]  Abnormal            Final result                 Please view results for these tests on the individual orders.   Urine Drug Screen     Status: Abnormal    Narrative    The following orders were created for panel order Urine Drug Screen.  Procedure                               Abnormality         Status                     ---------                               -----------         ------                     Urine Drug Screen Panel[[id number]]      Abnormal            Final result                 Please view results for these tests on the individual orders.     Medications   diazepam (VALIUM) tablet 5-20 mg (10 mg Oral $Given 5/26/24 6638)   atenolol (TENORMIN) tablet 50 mg (50 mg Oral $Given 5/25/24 1427)   thiamine (B-1) tablet 100 mg (100 mg Oral $Given 5/26/24 7500)   folic acid (FOLVITE)  tablet 1 mg (1 mg Oral $Given 5/26/24 0841)   multivitamin w/minerals (THERA-VIT-M) tablet 1 tablet (1 tablet Oral $Given 5/26/24 0842)   FLUoxetine (PROzac) capsule 40 mg (40 mg Oral $Given 5/26/24 0841)   gabapentin (NEURONTIN) capsule 400 mg (400 mg Oral $Given 5/26/24 1333)   metoprolol tartrate (LOPRESSOR) tablet 25 mg (25 mg Oral $Given 5/26/24 0841)   acetaminophen (TYLENOL) tablet 650 mg (650 mg Oral $Given 5/26/24 1334)   alum & mag hydroxide-simethicone (MAALOX) suspension 30 mL (has no administration in time range)   polyethylene glycol (MIRALAX) Packet 17 g (has no administration in time range)   melatonin tablet 3 mg (has no administration in time range)   hydrOXYzine HCl (ATARAX) tablet 25 mg (25 mg Oral $Given 5/25/24 1821)   nicotine (NICORETTE) gum 2 mg (has no administration in time range)   nicotine (NICODERM CQ) 21 MG/24HR 24 hr patch 1 patch (1 patch Transdermal $Patch/Med Applied 5/26/24 0842)   methocarbamol (ROBAXIN) tablet 750 mg (has no administration in time range)   cloNIDine (CATAPRES) tablet 0.1 mg (has no administration in time range)   famotidine (PEPCID) tablet 10 mg (10 mg Oral $Given 5/26/24 0922)   pantoprazole (PROTONIX) EC tablet 40 mg (40 mg Oral $Given 5/26/24 0922)   QUEtiapine (SEROquel) tablet 50 mg (has no administration in time range)   QUEtiapine (SEROquel) tablet 25 mg (has no administration in time range)   ondansetron (ZOFRAN ODT) ODT tab 4 mg (has no administration in time range)   loperamide (IMODIUM) capsule 2 mg (has no administration in time range)   traZODone (DESYREL) tablet 50 mg (has no administration in time range)   sodium chloride 0.9% BOLUS 1,000 mL (0 mLs Intravenous Stopped 5/25/24 1500)     Labs Ordered and Resulted from Time of ED Arrival to Time of ED Departure   COMPREHENSIVE METABOLIC PANEL - Abnormal       Result Value    Sodium 142      Potassium 3.8      Carbon Dioxide (CO2) 23      Anion Gap 18 (*)     Urea Nitrogen 10.0      Creatinine 0.74       GFR Estimate >90      Calcium 9.3      Chloride 101      Glucose 103 (*)     Alkaline Phosphatase 70      AST 40      ALT 32      Protein Total 7.3      Albumin 4.5      Bilirubin Total 0.6     ETHYL ALCOHOL LEVEL - Abnormal    Alcohol ethyl 0.25 (*)    URINE DRUG SCREEN PANEL - Abnormal    Amphetamines Urine Screen Negative      Barbituates Urine Screen Negative      Benzodiazepine Urine Screen Negative      Cannabinoids Urine Screen Positive (*)     Cocaine Urine Screen Negative      Fentanyl Qual Urine Screen Negative      Opiates Urine Screen Negative      PCP Urine Screen Negative     CBC WITH PLATELETS AND DIFFERENTIAL - Abnormal    WBC Count 12.7 (*)     RBC Count 5.54      Hemoglobin 17.5      Hematocrit 49.5      MCV 89      MCH 31.6      MCHC 35.4      RDW 12.3      Platelet Count 193      % Neutrophils 71      % Lymphocytes 22      % Monocytes 7      % Eosinophils 0      % Basophils 0      % Immature Granulocytes 0      NRBCs per 100 WBC 0      Absolute Neutrophils 9.0 (*)     Absolute Lymphocytes 2.7      Absolute Monocytes 0.9      Absolute Eosinophils 0.0      Absolute Basophils 0.0      Absolute Immature Granulocytes 0.1      Absolute NRBCs 0.0     ALCOHOL BREATH TEST POCT - Abnormal    Alcohol Breath Test 0.219 (*)    MAGNESIUM - Normal    Magnesium 1.8     LIPASE - Normal    Lipase 55       No orders to display          Critical care was not performed.     Medical Decision Making  The patient's presentation was of high complexity (a chronic illness severe exacerbation, progression, or side effect of treatment).    The patient's evaluation involved:  ordering and/or review of 3+ test(s) in this encounter (see separate area of note for details)    The patient's management necessitated high risk (a decision regarding hospitalization).    Assessment & Plan        I have reviewed the nursing notes. I have reviewed the findings, diagnosis, plan and need for follow up with the patient.    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

## 2024-05-25 NOTE — ED TRIAGE NOTES
Patient reports he is here for detox for alcohol. Patient reports he is having detox issues and needs help figuring things out. Patient is unsure what he needs. Patient reports he was at [facility] this morning and was told they could not do much for him. Patient reports drinking a quart of hard liquor a day. Patient believes his last drink was a couple hours before coming in. Patient denies seizure history. Patient reports he was at 60 days sober and then relapsed a week ago.       Patient reports he has been to detox here before and is familiar with the rules.

## 2024-05-25 NOTE — TELEPHONE ENCOUNTER
R:    1:26p Paged Psychiatry Provider Corie, awaiting response.  1:53p Called Psychiatry Provider Corie for review of pt. Provider accepts pt for admission, 3A/CD/ABI Intake to update work lists.   1:58p Indicia Completed.   1:59p Called Unit 3A CRN, CRN unavailable. Intake to CB.   2:04p Received call from 3A ETHEL [facility]. Intake informed pt is in queue for the unit. CRN informed pt will admit next shift as there is a pt ahead of this one and they have staffed up to ensure pt admits evening.   2:10p Called [facility] ED HUC to inform pt is in queue for 3A. 3A CRN informed pt will admit next shift as there is a pt ahead of this one and they have staffed up to ensure pt admits evening.   2:11p Intake notes all work lists updated with pt's acceptance.

## 2024-05-25 NOTE — PROGRESS NOTES
05/25/24 1807   Patient Belongings   Did you bring any home meds/supplements to the hospital?  No   Patient Belongings other (see comments)   Patient Belongings Put in Hospital Secure Location (Security or Locker, etc.) other (see comments)   Belongings Search Yes   Clothing Search Yes   Second Staff Mitchell     Storage Bin : Shoe laces  and short with string  Med Room Box Bin : Cell phone   A               Admission:  I am responsible for any personal items that are not sent to the safe or pharmacy.  [facility] is not responsible for loss, theft or damage of any property in my possession.    Signature:  _________________________________ Date: _______  Time: _____                                              Staff Signature:  ____________________________ Date: ________  Time: _____      2nd Staff person, if patient is unable/unwilling to sign:    Signature: ________________________________ Date: ________  Time: _____     Discharge:  [facility] has returned all of my personal belongings:    Signature: _________________________________ Date: ________  Time: _____                                          Staff Signature:  ____________________________ Date: ________  Time: _____

## 2024-05-25 NOTE — DISCHARGE INSTRUCTIONS
Aftercare Plan    Substance Abuse Resources    To access substance abuse treatment you must have an assessment complete or have an updated assessment within 30 days of starting any program.  Information for this to be completed and to secure funding if you have medical assistance or no insurance can be found through your County's chemical health intake line.    If you have private insurance, call the customer service number on the back of your insurance card to find an in-network provider for substance abuse assessment. The ideal provider will be a treatment facility, licensed in the [facility].    For those with Medicaid with the [facility], you will need a Rule 25 assessment: The following are phone numbers for each [facility]. Rule 25 assessments must be completed by the county you reside in currently. Once approved for funding you can connect with a facility that does Rule 25 assessment.  [facility]  [phone]  [place] - [phone]  [facility] [phone]  [facility] [phone]  [facility] [phone]  [facility] [phone]  [place] - [phone]  [facility] [phone]    The following facilities offer Rule 25/chemical health assessments:    [facility]  [phone]  Mon-Friday: [address] [facility], [address]  Saturday:  [address] [facility] [place] [facility], [address]  M-F assessments (7a-1:45p); Saturday assessments (7:45-10:45a)    [facility]  [phone]  [address] [place], [place], [address]  *by appointment only M-W; walk ins available Fridays from 10-2.    [facility]  [phone] (phone consultation available 24/7)  In-person Assessments:  [address], [address], [place] [address]  [address] [address] [place] [address]  [address] [place] [address]  [address] [place] [address]     [facility]  [phone]  [address] [facility]  [place], [place], [address]  *walk in and appointments available by calling    [facility]  [place]  [phone] [address] [place], [place], [address]  *by appointment only M-Th    [facility]  [phone]  [address] [place], [place], [address]  *walk ins available M-F    [facility]  [phone]  [address] [place], [place], [address]  *available by appointments only    [facility]  [phone]  [address] [place], [place], [address]  *available by appointment only    [facility]  [phone]  [facility]  [address] [place], [place], [address]    [facility] ([facility])  [phone]  [address] [place] [address], [address]  *Walk in assessments also available M-F starting at 8 am.    [facility]  [phone]  [address] [place], [place], [address]  *available by appointments    [facility]  [phone]  [address] [place], [place], [address]  *walk in assessments available M-F starting at 7 am.    [facility]  [phone]  [facility]  [address] [place], [place], [address]  *Walk in assessments availble M-F starting at 8 am OR [phone]    [facility]  [address] [place] [address]  *Walk in assessments available M-F starting at 8 am    [facility]  [phone]  [address] [place] [address]    [facility]  [phone]  [address] [place], [place], [address]  *available by appointment only    [facility]  [phone]  [address] [facility] [address]  [place], [place], [address]    [facility]  [phone]  [address], [address]  *available by appointment    [facility]  [phone]  [address] [place], [place], [address]  *walk ins available M-TH from 9-3    [facility]  [phone]  [address] [place], [place], [address]    [place]  [phone] [address] [facility],  [place] [address]  [address] [address] [address]    [facility] (Does Rule 25 Assessments)  [email or web address]  [phone]  [address], [place] [address]    [facility]  [email or web address]  [phone], [address], [place] [address]  [phone], [address], [place], [place] [address] [phone], [address] [facility], [address], [place], [place] [address] [phone], [address], ([facility]), [place], [place] [id number] [phone], [address] [address] [place] [address]      If you are intoxicated, you may be required to detox at a detox facility before starting treatment. The following are detox facilities that you can self present to. All detox facilities are able to help you complete an assessment/rule 25 prior to discharge if you choose:    [facility]: [address] [place], [place], [address].         [phone]    [facility]: [address] [place], [place], [address]  [phone]    [facility]: [address] [place], [place], [address] [phone]    [facility]: [address] [place], [place], [address] [phone]    Recommendations:  It is recommended that you abstain from all mood altering chemicals. Please contact the sober support hotline (570-166-5693) as needed; phones are answered 24 hours a day, 7 days a week. Other support hotlines include:    [facility] Mental Health & Addiction: 1-241-475-3438    [facility] Support Line: 1-575.176.2689    Ways to help cope with sobriety:    -- Take prescribed medicines as scheduled  -- Keep follow-up appointments  -- Talk to others about your concerns  -- Get regular exercise  -- Practice deep breathing skills  -- Eat a healthy diet  -- Use community resources, including hotline numbers, [facility] and support  meetings  -- Stay sober and avoid places/people/things associated with substance use  --Maintain a daily schedule/routine  --Get at least 7-8 hours of sleep per night  --Create a list 10--20 healthy activities that you can do that are enjoyable and do not involve substance use  --Create daily goals (approx. 1-4 goals) per day and work to achieve them throughout the day.     Free Resources:    [facility] ([facility])  [facility] connects people seeking recovery to resources that help foster and sustain long-term recovery. Whether you are seeking resources for treatment, transportation, housing, job training, education, health care or other pathways to recovery, [facility] is a great place to start.  Phone: 172.682.8102. www.[place]C[employer]dSource.[employer] (Great listing of all types of recovery and non-recovery related resources)    Alcoholics Anonymous  Phone: 0-479-ALCOHOL  Website: HTTP://WWW.AA.ORG/    AA [place] (300-003-3231 or http://aaIgY [employer].org)  AA [place] (895-585-1609 or www.aasaul.org)  Narcotics Anonymous  Phone: 688.380.8734  Website: www.Temptster.Redbiotec.    [employer] [address], [place], [place],  Phone: [phone]  Drop-in Hours: Monday-Friday 9-11:30 am. By appointment at other times.  Provides: [facility] is a drop-in center on the east side [facility] that provides a safe space for individuals who are homeless and have a history of chemical use. Sobriety is not a requirement but drugs and alcohol are not allowed on the property.  Services: Non-clients can access drop-in services such as Recovery and Harm Reduction Groups, referrals to case management, community activities, shower facilities, and a pool table. Individuals who are homeless and have chemical health needs may be eligible for enrollment into [facility]'s case management program. Clients and  work together to access benefits, treatment, health care, shelter, and external  housing resources.    [facility] Chemical Assessment & Referral Unit  [address] [place] [address]  Phone: [phone]  Hours: Monday-Friday 8 am-5 pm  Provides: Rule 25 assessment and referral for individuals seeking treatment or counseling for chemical dependency. Must be a resident of [facility]. There is no fee for assessment. There is some funding available for treatment programs.        If I am feeling unsafe or I am in a crisis, I will:   Contact my established care providers   Call the National Suicide Prevention Lifeline: 183.983.3558   Go to the nearest emergency room   Call 91     Warning signs that I or other people might notice when a crisis is developing for me:     I am having increasing suicidal thoughts that turn to plans with intent or means  I am having additional urges to self-harm    My emotions are of hopelessness; feeling like there's no way out.  Rage or anger.  Engaging in risky activities without thinking  Withdrawing from family/friends  Dramatic mood swings  Drastic personality changes   Use of alcohol or drugs  Postings on social media  Neglect of personal hygiene or cares     Things I am able to do on my own to cope or help me feel better:    Spending quality time with loved ones  Staying hydrated  Eating balanced meals  Going for a walk every day  Take care of daily responsibilities/needs  Focus on positive self-talk vs negative self-talk    Things that I am able to do with others to cope or help me better:   Music  Deep breathing  Meditations  Journal  Self-regulate  Self check-in  Ask for help  Exercise    Things I can use or do for distraction:   Reach out to/spend time with family, friends  Shower  Exercise  Chores or do a project  Listen to music  Watch movie/TV  Listening to music  Journaling  Reading a book  Meditating  Call a friend    Changes I can make to support my mental health and wellness:    -I will abstain from all mood altering chemicals not  currently prescribed to me   -I will attend scheduled mental health therapy and psychiatric appointments and follow all   recommendations  -I will commit to 30 minutes of self care daily - this can be as simple as taking a shower, going for a walk, cooking a meal, read, writing, etc  -I will practice square breathing when I begin to feel anxious - in breath through the nose for the count   of 4 and the first line on the square. Out breath through the mouth for the count of 4 for the second line   of the square. Repeat to complete the square. Repeat the square as many times as needed.  - I will use distraction skills of: going for walks, watching TV, spending time outside, calling a friend or family member  -Use community resources, including hotline numbers, [facility] crisis and support meetings  -Maintain a daily schedule/routine  -Practice deep breathing skills  -Download a meditation loyd and spend 15-20 minutes per day mediating/relaxing. Some apps to   download include: Calm, Headspace and Insight Timer. All 3 of these apps have free version    Reduce Extreme Emotion  QUICKLY:  Changing Your Body Chemistry      T:  Change your body Temperature to change your autonomic nervous system   Use Ice Water to calm yourself down FAST   Put your face in a bowl of ice water (this is the best way; have the person keep his/her face in ice water for 30-45 seconds - initial research is showing that the longer s/he can hold her/his face in the water, the better the response), or   Splash ice water on your face, or hold an ice pack on your face      I:  Intensely exercise to calm down a body revved up by emotion   Examples: running, walking fast, jumping, playing basketball, weight lifting, swimming, calisthenics, etc.   Engage in exercises that DO NOT include violent behaviors. Exercises that utilize violent behaviors tend to function as  behavioral rehearsal,  and rather than calming the person down, may actually  rev  the person  up more, increasing the likelihood of violence, and lessening the likelihood that they will  burn off  energy     P:  Progressively relax your muscles   Starting with your hands, moving to your forearms, upper arms, shoulders, neck, forehead, eyes, cheeks and lips, tongue and teeth, chest, upper back, stomach, buttocks, thighs, calves, ankles, feet   Tense (10 seconds,   of the way), then relax each muscle (all the way)   Notice the tension   Notice the difference when relaxed (by tensing first, and then relaxing, you are able to get a more thorough relaxation than by simply relaxing)      P: Paced breathing to relax   The standard technique is to begin with counting the number of steps one takes for a typical inhale, then counting the steps one takes for a typical exhale, and then lengthening the amount of steps for the exhalation by one or two steps.  OR  Repeat this pattern for 1-2 minutes  Inhale for four (4) seconds   Exhale for six (6) to eight (8) seconds   Research demonstrated that one can change one's overall level of anxiety by doing this exercise for even a few minutes per day      People in my life that I can ask for help:   Family  Friends  Providers    Your [facility] has a mental health crisis team you can call 24/7:   [facility] Crisis Line Number: [phone]  [facility] Mental Health Crisis: [phone] - Call the crisis line for immediate mental health support, 24 hours a day.   [facility] Crisis Line Number: [phone]  [facility] Crisis Line Number: [phone]  [facility] Crisis Line Number: [phone]   [facility] Crisis Line Number: [phone]  [place]: [phone]  [place]: [phone]  [facility] Crisis Number: 4-969-217-7463  [facility] Crisis: 118-070-7177  [facility] Crisis: 1-394.281.3918  Isaiah Ceballos [facility] Crisis: [phone]  [facility] Crisis: 1-331.464.5076    Other things that are  "important when I'm in crisis:   Ask for help    Additional resources and information:     Mental Health Apps  My3  https://EVERFANS.org/    VirtualHopeBox  https://[employer]/apps/virtual-hope-box/       Professionals or Agencies I Can Contact During A Crisis:       Crisis Lines  Call or Text 288 - National Suicide and Crisis Lifeline    Crisis Text Line  Text 658405  You will be connected with a trained live crisis counselor to provide support.    The [place] Project (LGBTQ Youth Crisis Line)  1.509.215.8691  text START to 210-830    National Hudson on Mental Illness (KAROL)  631.051.5211 or 7.490.KAROL.HELPS    National Suicide Prevention Lifeline at 4-819-400-AHSR (6011)     Throughout  [place]: call **CRISIS (**737768)     Crisis Text Line: is available for free, 24/7 by texting [place] to [address]64405    Community Resources  Fast Tracker  Linking people to mental health and substance use disorder resources  Mobui.Itaconix     [place] Mental Health Warm Line  Peer to peer support  Monday thru Saturday, 12 pm to 10 pm  039.988.5633 or 1.663.431.1834  Text \"Support\" to 13783     National Hudson on Mental Illness (www.[place].karol.org): 969.157.9665 or 920-887-2683     [facility] Phone (free remote counseling): 468.416.3509 Web address:   https://[place] Pier.org/     www.[employer] (filter for insurance, gender preference, etc.)    [facility]   [phone]  Intake appointment will be virtual, following appointments can be in person or virtual.   **IMMEDIATE OPENINGS**    [facility]  [phone]  *offers individual therapy, medication management and Mental Health Case Workers; can self refer    [facility]  [phone]  *Immediate Openings    [facility]  [phone]  *Immediate Openings    [facility]  [phone]  *Immediate Openings    Please follow up with scheduled providers to ensure all necessary " paperwork is filled out prior to your   scheduled telehealth appointments.     Coordinators from [facility] will be calling within two business days to ensure   that you have the resources you may need or provide assistance with scheduling (Phone number: [phone].).    Remember: give the referrals 3 sessions prior to calling it quits. Do you trust them? Do you feel   understood? Do you think they can help? Check in with yourself after each session    Please reach out to the [facility]([phone]) regarding further mental health appointment needs for this emergency department visit.    [facility] SCHEDULING:  Today you were seen by a licensed mental health professional through [facility] Transition sevices, [facility] ([facility])  for a crisis assessment in the Emergency Department at [facility].  It is recommended that you follow up with your estabished providers (psychiatrist, menta health therapist, and/or primary care doctor - as relevant) as soon as possible. Coordinators from [facility] will be calling you in the next 24-48 hours to ensure that you have the resources you need.  You can so contact [facility] coordinators directly at [phone].     [facility] maintains an extensive network of licensed behavioral health providers to connect patients with the services they need.  We do not charge providers a fee to participate in our referral network.  We match patients with providers based on a patient s specific needs, insurance coverage, and location.  Our first effort will be to refer you to a provider within your care system, and will utilize providers outside your care system as needed.

## 2024-05-25 NOTE — MEDICATION SCRIBE - ADMISSION MEDICATION HISTORY
Medication Scribe Admission Medication History    Admission medication history is complete. The information provided in this note is only as accurate as the sources available at the time of the update.    Information Source(s): Patient, Hospital records, and CareEverywhere/SureScripts via in-person    Pertinent Information: None.    Changes made to PTA medication list:  Added: Methocarbamol 750 mg TID PRN.  Deleted: Diclofenac 1% topical gel 4 g QID PRN, metoprolol tartrate 25 mg BID, MVI 1 tab daily, Naltrexone 50 mg daily.  Changed: None    Allergies reviewed with patient and updates made in EHR: yes    Medication History Completed By: Minh Ojeda MD 5/25/2024 3:07 PM    PTA Med List   Medication Sig Last Dose    acetaminophen (TYLENOL) 325 MG tablet Take 650 mg by mouth every 6 hours as needed for pain or headaches 5/24/2024 at pm    FLUoxetine (PROZAC) 40 MG capsule Take 1 capsule (40 mg) by mouth daily 5/25/2024 at am    gabapentin (NEURONTIN) 400 MG capsule Take 1 capsule (400 mg) by mouth 3 times daily 5/25/2024 at am    methocarbamol (ROBAXIN) 750 MG tablet Take 750 mg by mouth 3 times daily as needed for muscle spasms Past Week at unknown

## 2024-05-26 PROCEDURE — 99253 IP/OBS CNSLTJ NEW/EST LOW 45: CPT

## 2024-05-26 PROCEDURE — 128N000004 HC R&B CD ADULT

## 2024-05-26 PROCEDURE — 250N000013 HC RX MED GY IP 250 OP 250 PS 637: Performed by: PSYCHIATRY & NEUROLOGY

## 2024-05-26 PROCEDURE — 99222 1ST HOSP IP/OBS MODERATE 55: CPT | Mod: AI | Performed by: NURSE PRACTITIONER

## 2024-05-26 PROCEDURE — 250N000013 HC RX MED GY IP 250 OP 250 PS 637: Performed by: NURSE PRACTITIONER

## 2024-05-26 PROCEDURE — 250N000013 HC RX MED GY IP 250 OP 250 PS 637: Performed by: FAMILY MEDICINE

## 2024-05-26 PROCEDURE — 250N000013 HC RX MED GY IP 250 OP 250 PS 637

## 2024-05-26 RX ORDER — LOPERAMIDE HCL 2 MG
2 CAPSULE ORAL 4 TIMES DAILY PRN
Status: DISCONTINUED | OUTPATIENT
Start: 2024-05-26 | End: 2024-05-27 | Stop reason: HOSPADM

## 2024-05-26 RX ORDER — ONDANSETRON 4 MG/1
4 TABLET, ORALLY DISINTEGRATING ORAL EVERY 6 HOURS PRN
Status: DISCONTINUED | OUTPATIENT
Start: 2024-05-26 | End: 2024-05-27 | Stop reason: HOSPADM

## 2024-05-26 RX ORDER — QUETIAPINE FUMARATE 50 MG/1
50 TABLET, FILM COATED ORAL AT BEDTIME
Status: DISCONTINUED | OUTPATIENT
Start: 2024-05-26 | End: 2024-05-27

## 2024-05-26 RX ORDER — CLONIDINE HYDROCHLORIDE 0.1 MG/1
0.1 TABLET ORAL DAILY PRN
Status: DISCONTINUED | OUTPATIENT
Start: 2024-05-26 | End: 2024-05-27 | Stop reason: HOSPADM

## 2024-05-26 RX ORDER — METHOCARBAMOL 750 MG/1
750 TABLET, FILM COATED ORAL 3 TIMES DAILY PRN
Status: DISCONTINUED | OUTPATIENT
Start: 2024-05-26 | End: 2024-05-27 | Stop reason: HOSPADM

## 2024-05-26 RX ORDER — PANTOPRAZOLE SODIUM 40 MG/1
40 TABLET, DELAYED RELEASE ORAL
Status: DISCONTINUED | OUTPATIENT
Start: 2024-05-26 | End: 2024-05-27 | Stop reason: HOSPADM

## 2024-05-26 RX ORDER — QUETIAPINE FUMARATE 25 MG/1
25 TABLET, FILM COATED ORAL 3 TIMES DAILY PRN
Status: DISCONTINUED | OUTPATIENT
Start: 2024-05-26 | End: 2024-05-27 | Stop reason: HOSPADM

## 2024-05-26 RX ORDER — FAMOTIDINE 10 MG
10 TABLET ORAL 2 TIMES DAILY
Status: DISCONTINUED | OUTPATIENT
Start: 2024-05-26 | End: 2024-05-27 | Stop reason: HOSPADM

## 2024-05-26 RX ORDER — TRAZODONE HYDROCHLORIDE 50 MG/1
50 TABLET, FILM COATED ORAL
Status: DISCONTINUED | OUTPATIENT
Start: 2024-05-26 | End: 2024-05-27 | Stop reason: HOSPADM

## 2024-05-26 RX ADMIN — FLUOXETINE 40 MG: 20 CAPSULE ORAL at 08:41

## 2024-05-26 RX ADMIN — FAMOTIDINE 10 MG: 10 TABLET ORAL at 20:28

## 2024-05-26 RX ADMIN — GABAPENTIN 400 MG: 400 CAPSULE ORAL at 13:33

## 2024-05-26 RX ADMIN — NICOTINE 1 PATCH: 21 PATCH, EXTENDED RELEASE TRANSDERMAL at 08:42

## 2024-05-26 RX ADMIN — GABAPENTIN 400 MG: 400 CAPSULE ORAL at 20:28

## 2024-05-26 RX ADMIN — FOLIC ACID 1 MG: 1 TABLET ORAL at 08:41

## 2024-05-26 RX ADMIN — METOPROLOL TARTRATE 25 MG: 25 TABLET, FILM COATED ORAL at 08:41

## 2024-05-26 RX ADMIN — GABAPENTIN 400 MG: 400 CAPSULE ORAL at 08:41

## 2024-05-26 RX ADMIN — DIAZEPAM 10 MG: 5 TABLET ORAL at 08:53

## 2024-05-26 RX ADMIN — ACETAMINOPHEN 650 MG: 325 TABLET, FILM COATED ORAL at 13:34

## 2024-05-26 RX ADMIN — QUETIAPINE FUMARATE 50 MG: 50 TABLET ORAL at 21:45

## 2024-05-26 RX ADMIN — METOPROLOL TARTRATE 25 MG: 25 TABLET, FILM COATED ORAL at 20:27

## 2024-05-26 RX ADMIN — PANTOPRAZOLE SODIUM 40 MG: 40 TABLET, DELAYED RELEASE ORAL at 09:22

## 2024-05-26 RX ADMIN — Medication 1 TABLET: at 08:42

## 2024-05-26 RX ADMIN — THIAMINE HCL TAB 100 MG 100 MG: 100 TAB at 08:42

## 2024-05-26 RX ADMIN — FAMOTIDINE 10 MG: 10 TABLET ORAL at 09:22

## 2024-05-26 ASSESSMENT — ACTIVITIES OF DAILY LIVING (ADL)
ADLS_ACUITY_SCORE: 40

## 2024-05-26 NOTE — CONSULTS
[facility]  Consult Note - Hospitalist Service  Date of Admission:  5/25/2024  Consult Requested by: Dr. Anaya   Reason for Consult: detox    Assessment & Plan   Georges Moreno is a 48 year old male admitted on 5/25/2024. He has a PMHx significant for severe alcohol use disorder, tobacco use disorder, neuropathy of the hands, anxiety, PTSD, paroxysmal atrial fibrillation, and hypertension. He was admitted to  for alcohol detox; internal medicine was consulted in the setting of detox for medical comanagement.     ______________________________    # Alcohol withdrawal,   # Severe alcohol use disorder   # Cannabis use disorder, moderate   Patient reports he was 60 days sober and then relapsed a week ago.  MSSA 5 this shift.  Denies hx of withdrawal seizures or DTs.  Pt reports drinking a quart of alcohol daily for the last week. Blood EtOH on arrival was 0.25. Urine tox positive for cannabinoids but otherwise negative.  - Continue MSSA   - Folvite, multi-vites, thiamine supplementation   - Further management per Psychiatry     # Anion gap metabolic acidosis  Anion gap 18. In the setting of chronic alcohol use, dehydration, alcoholic ketoacidosis.  - Encourage alcohol cessation  - Follow-up with primary care provider in 1 to 2 weeks following alcohol cessation for repeat labs.    # Leukocytosis  White blood cells 12.7.  No symptoms of infection as elaborated in ROS.  Neutrophilic predominance.     # Recent I&D of perirectal abscess   5/1/24 had surgery to I&D perirectal abscess. Was seen by primary care who assessed wound progress on 5/07. Noted clean wound but still recommended follow up with colorectal surgery to assess wound which he has not yet completed.  He notes that he is now pain free     # Paroxysmal atrial fibrillation  # Hypertension  - Not on anticoagulation, continue PTA beta-blocker  - Hypertension stable; some elevated blood pressures expected in the  setting of acute withdrawal.  Notified medicine if systolic blood pressure greater than 175 and diastolic blood pressure greater than 105.  - PRN Clonidine ordered with parameters     # Anxiety  # Depression   # PTSD  - As managed per psychiatry  - Continue PTA fluoxetine    # Tobacco use disorder  - Nicotine patch ordered per psychiatry    # Peripheral neuropathy   - Continue PTA gabapentin    # Hypertriglyceridemia  Triglycerides 190 on 3/17/24. Follow up with PCP to discuss plan to lower.     # Adenomatous polyps of colon   # Removed tubular adenoma s/p colonoscopy 5/9/24  # Vickers's esophagus s/p EGD 5/9/24  - Noted   - Started patient on H2RA blocker and PPI as he has not yet had follow up; follow up with PCP discussed with patient and ordered      The patient's care was discussed with the Bedside Nurse and Patient.    Clinically Significant Risk Factors Present on Admission                       # Overweight: Estimated body mass index is 25.77 kg/m  as calculated from the following:    Height as of this encounter: 1.829 m (6').    Weight as of this encounter: 86.2 kg (190 lb).       # Financial/Environmental Concerns:           Sam Jerez PA-C  Hospitalist Service  Securely message with [employer] (more info)  Text page via [facility] Paging/Directory   ______________________________________________________________________    Chief Complaint   detox    History is obtained from the patient    History of Present Illness   Georges presents with withdrawal symptoms. The patient reports he has been drinking a quart of alcohol on a daily basis as well as consistently for 48 hours prior to arrival.  He noted he was sweaty and had an accelerated heart rate which is why he went in.  He was first seen at [facility] and they noted that they would not be able to offer him anything.  He had somebody drive him to the [facility].  The patient reports passive suicidal ideation but has no plans.  He states that he had 60 days of  sobriety but on 1 day recently he felt as though he dissociated and went to the liquor store.  He denies nausea, vomiting, diarrhea, fevers, chills, cold symptoms, cough, shortness of breath, belly pain, or dysuria.    Past Medical History    Past Medical History:   Diagnosis Date    ADHD     Alcohol use disorder, severe, dependence (H)     Atrial fibrillation (H)     Depression, major, single episode     moderate    PUSHPA (generalized anxiety disorder)     HA (headache)     Pancreatitis     Paroxysmal atrial fibrillation (H)     Perirectal abscess     Personal history of alcoholism (H)     Polysubstance (excluding opioids) dependence, daily use (H)     PTSD (post-traumatic stress disorder)     Substance abuse (H)     Tobacco dependence        Past Surgical History   Past Surgical History:   Procedure Laterality Date    INCISION AND DRAINAGE PERIRECTAL ABSCESS      SOFT TISSUE SURGERY      Excision of pilonidal cyst    SOFT TISSUE SURGERY      removal of rectal cyst       Medications   Current Facility-Administered Medications   Medication Dose Route Frequency Provider Last Rate Last Admin    acetaminophen (TYLENOL) tablet 650 mg  650 mg Oral Q4H PRN Silas Fontenot MD   650 mg at 05/25/24 1821    alum & mag hydroxide-simethicone (MAALOX) suspension 30 mL  30 mL Oral Q4H PRN Silas Fontenot MD        atenolol (TENORMIN) tablet 50 mg  50 mg Oral Daily PRN Migel Cordero MD   50 mg at 05/25/24 1334    cloNIDine (CATAPRES) tablet 0.1 mg  0.1 mg Oral Daily PRN Sam Jerez PA-C        diazepam (VALIUM) tablet 5-20 mg  5-20 mg Oral Q30 Min PRN Migel Cordero MD   10 mg at 05/26/24 0853    famotidine (PEPCID) tablet 10 mg  10 mg Oral BID Sam Jerez PA-C   10 mg at 05/26/24 0922    FLUoxetine (PROzac) capsule 40 mg  40 mg Oral Daily Silas Fontenot MD   40 mg at 05/26/24 0841    folic acid (FOLVITE) tablet 1 mg  1 mg Oral Daily Migel Cordero MD   1 mg at 05/26/24 0841     gabapentin (NEURONTIN) capsule 400 mg  400 mg Oral TID Silas Fontenot MD   400 mg at 05/26/24 0841    hydrOXYzine HCl (ATARAX) tablet 25 mg  25 mg Oral Q4H PRN Silas Fontenot MD   25 mg at 05/25/24 1821    melatonin tablet 3 mg  3 mg Oral At Bedtime PRN Silas Fontenot MD        methocarbamol (ROBAXIN) tablet 750 mg  750 mg Oral TID PRN Isrrael Benjamin APRN CNP        metoprolol tartrate (LOPRESSOR) tablet 25 mg  25 mg Oral BID Silas Fontenot MD   25 mg at 05/26/24 0841    multivitamin w/minerals (THERA-VIT-M) tablet 1 tablet  1 tablet Oral Daily Migel Cordero MD   1 tablet at 05/26/24 0842    nicotine (NICODERM CQ) 21 MG/24HR 24 hr patch 1 patch  1 patch Transdermal Daily Silas Fontenot MD   1 patch at 05/26/24 0842    nicotine (NICORETTE) gum 2 mg  2 mg Buccal Q1H PRN Silas Fontenot MD        pantoprazole (PROTONIX) EC tablet 40 mg  40 mg Oral QAM AC Sam Jerez PA-C   40 mg at 05/26/24 0922    polyethylene glycol (MIRALAX) Packet 17 g  17 g Oral Daily PRN Silas Fontenot MD        QUEtiapine (SEROquel) tablet 25 mg  25 mg Oral TID PRN Isrrael Benjamin APRN CNP        QUEtiapine (SEROquel) tablet 50 mg  50 mg Oral At Bedtime Isrrael Benjamin APRN CNP        thiamine (B-1) tablet 100 mg  100 mg Oral Daily Migel Cordero MD   100 mg at 05/26/24 0842          Physical Exam   Vital Signs: Temp: 97.8  F (36.6  C) Temp src: Oral BP: (!) 166/90 Pulse: 70   Resp: 18 SpO2: 96 % O2 Device: None (Room air)    Weight: 190 lbs 0 oz    General: Alert and oriented x3. Well appearing  HEENT: Anicteric sclera, MMM  Cardiovascular: RRR, S1S2. No murmur noted  Lungs: CTAB without wheezing or crackles   GI: Abdomen soft, non-tender without rebound or guarding. + Bowel sounds.  Vascular: No peripheral edema, distal pulses palpable  Neurologic: No focal deficits, CN II-XII grossly intact  Skin: No jaundice, rashes, or  lesions    Medical Decision Making       50 MINUTES SPENT BY ME on the date of service doing chart review, history, exam, documentation & further activities per the note.      Data   Recent Labs   Lab 05/25/24  1240 05/25/24  0555   WBC 12.7* 7.7   HGB 17.5 17.0   MCV 89 90    205    139   POTASSIUM 3.8 3.8   CHLORIDE 101 99   CO2 23 25   BUN 10.0 9.5   CR 0.74 0.60*   ANIONGAP 18* 15   LEILA 9.3 9.4   * 145*   ALBUMIN 4.5 4.6   PROTTOTAL 7.3 7.6   BILITOTAL 0.6 0.5   ALKPHOS 70 67   ALT 32 30   AST 40 35   LIPASE 55  --

## 2024-05-26 NOTE — PLAN OF CARE
Problem: Adult Inpatient Plan of Care  Goal: Absence of Hospital-Acquired Illness or Injury  Outcome: Progressing     Problem: Adult Inpatient Plan of Care  Goal: Optimal Comfort and Wellbeing  Outcome: Progressing     Problem: Adult Behavioral Health Plan of Care  Goal: Optimized Coping Skills in Response to Life Stressors  Outcome: Progressing           Pt presents as polite, pleasant, and cooperative, mood is sad per his report and affect is congruent. He does endorse mild anxiety, denies SI/SIB/HI/AVH. He reports a poor appetite, MSSA score of 10 this morning and he received 10mg of valium. He has some new medication orders. He does endorse some minor tooth pain and received Tylenol with his 1400 scheduled gabapentin. He has been up in the milieu watching movies and socializing with peers. He has been appropriate and in behavioral control. His latest MSSA score was a three and he had no complaints.

## 2024-05-26 NOTE — PLAN OF CARE
Problem: Alcohol Withdrawal  Goal: Alcohol Withdrawal Symptom Control  Outcome: Progressing   Goal Outcome Evaluation:  Patient is alert and oriented x 4.Affect is flat, mood is calm. Patient is up and visible in the milieu. Patient is ambulating independently.  Patient is attending/participating in unit programming. Pt is social with peers. Patient denies SI/SIB/HI. Pt denies auditory/visual hallucinations. Patient verbally contracts for safety on the unit.   Denies anxiety or depression.   Patient is tolerating medications well, denies any current side effects.   1700 assessment:  Pt's MSSA's = 1 upon start of shift withdrawal assessment, did not require withdrawal med's. Patient reports a good appetite, and adequate sleep. Rest, fluids, and food encouraged. Status 15 checks remain. Patient is able to make needs known appropriately. Patient denies any unmet needs at this time.     BP (!) 166/95 (BP Location: Left arm)   Pulse 62   Temp 97.8  F (36.6  C) (Temporal)   Resp 16   Ht 1.829 m (6')   Wt 86.2 kg (190 lb)   SpO2 98%   BMI 25.77 kg/m

## 2024-05-26 NOTE — DISCHARGE INSTRUCTIONS
Behavioral Discharge Planning and Instructions  THANK YOU FOR CHOOSING THE [facility], [facility]  3AW  [phone]    Summary: You were admitted to [facility] on 5/25/2024 for detoxification from Alcohol.  A medical exam was performed that included lab work. You have met with a  and opted to go home and continue with individual therapy, AA.  Please take care and make your recovery a priority, Georges!    Recommendation:  Georges is recommended to continue to engage and participate in individual therapy with his established therapist SASKIA Sauceda at [facility]. Georges is recommended to continue to engage and participate in AA and community lead suppourt groups.     Georges is encouraged to follow up with [facility] to meet with an Addiction   [facility]    It was developed to provide bridging services to patients with Opiate Use Disorders (OUD) seeking care in the Emergency Department and other programs within the [facility] system. This is a front door to on-going services through the Addiction Medicine Clinic. The addiction medicine providers also support anyone seeking support for alcohol addiction as well. Processes are in place to ensure timely access to Medication Assisted Treatments (MAT), Peer Recovery and Nursing services and Substance Use Disorder (SAMMI) Assessments. People using Recovery Clinic services will benefit from the support of Peer Recovery Support Specialists and an experienced psychotherapist specialized in addiction. Recovery Clinic staff can also make referrals for chemical health assessments for those interested in additional psycho-social treatment for opioid addiction.    Anyone seeking buprenorphine as medication to treat opioid addiction is welcome in the Recovery Clinic. We also offer Vivitrol (IM naltrexone) to some patients, but we are not licensed for methadone treatment. All our medical providers are able to  treat adolescents as well as adults. People access our care through referrals from [facility], other [facility] system providers, and from contacts outside of [facility]. Appointments can be scheduled or people can be seen without an appointment during walk in hours listed below. If you do not have insurance, we will still provide care while we support your efforts toward obtaining insurance.      Hours:  Appointments scheduled Monday through Friday, 9:00am - 4:00pm  Walk in availability Monday through Friday, 9:00am-11:30am and 12:30pm-3:00pm    Location:    [facility], first floor, use Main Entrance next to Adult Emergency Department  [address], [address]  [place] [address]  (next to Outpatient Laboratory)    Phone:  [phone]    Main Diagnosis: Chai Garcia MD  Alcohol use disorder, severe, dependence  Alcohol withdrawals  Cannabis use disorder, severe, dependence  Tobacco use disorder  Polysubstance abuse in sustained remission including benzodiazepines, LSD, mushrooms, heroin, cocaine  Generalized anxiety disorder  Major depressive disorder, minimal  PTSD  Neuropathy  A-fib, managed by internal medicine      Major Treatments, Procedures and Findings:  You have withdrawn from Alcohol  using Valium protocol.  You have met with a  to develop a treatment plan for discharge.  You have had labs drawn and those results have been reviewed with you. Please take a copy of your lab work with you to your next primary care physician appointment.    Major Treatments, Procedures and Findings:  You were detoxed from alcohol with the Modified Selective Severity Protocol using Valium. You have met with a  to develop a treatment plan for discharge.  You have had labs drawn and a copy of those labs will be sent home with you.  Please bring your lab results with to your follow up doctor appointment.    Symptoms to Report:  If you experience  more anxiety, confusion, sleeplessness, deep sadness or thoughts of suicide, notify your treatment team or notify your primary care physician. IF ANY OF THE SYMPTOMS YOU ARE EXPERIENCING ARE A MEDICAL EMERGENCY CALL 911 IMMEDIATELY.     If you or someone you know is struggling or in crisis, help is available.  Call or text 988 or chat  at Droplet.Frontstart    Lifestyle Adjustment:   Health Action Plan:  1.Create a daily schedule  2. Eat Healthy  3. Plan Enjoyable Sober Activities  4. Use Problem Solving Skills and Deal with Issues as they Arise.   5. Be Physically Active  6. Take your medications as prescribed  7. Get enough restful sleep  8. Practice Relaxation  9. Spend time with Supportive People  10. No use of alcohol, illegal drugs or addictive medications other than what is currently prescribed.   11.AA, NA Sponsor are excellent resources for support  12. Explore how  you can utilize spirituality in your recovery    Disposition: Home      Facts about COVID19 at www.cdc.gov/COVID19 and www.[place].gov/covid19    Keeping hands clean is one of the most important steps we can take to avoid getting sick and spreading germs to others.  Please wash your hands frequently and lather with soap for at least 20 seconds!  Due to the fact that today is a holiday we encourage you to set up an appointment and follow up with your Provider.  Follow-up Appointment:   Appointment Date/Time: ***    Psychiatrist/Primary Care Giver:    Yeimy Olivares MD    Address:    [facility]   [address] [place] [address]   Phone Number:[phone]        Resources:     Resources for on line recovery meetings:  AA meetings can be found online; search for them at: http://aa-intergroup.org/directory.php  AA meetings via ZOOM for [place] area can be found online at: https://a[place].org/find-a-meeting/holiday-closings/  NA meetings via ZOOM for [place] area can be found online at:  "https://[email or web address].com/view/[email or web address]?[email or web address]    Www.YogaTrail  has online resources for meeting and recovery care including Podcast \"Let's Talk:Addiction & Recovery Podcasts    Www.[place]recovery.org     DISCHARGE RESOURCES:  -SMART Recovery - self management for addiction recovery:  www.smartrecovery.org    -Pathways ~ A Health Crisis Resource & Support Center: 981.372.1650.  -[facility] [phone]   -[facility] Behavioral Intake 119-789-2643 or 260-859-3235.  -Suicide Awareness Voices of Education (SAVE) (www.save.org): 022-124-TCRO (6103)  -National Suicide Prevention Line (www.mentalhealthmn.org): 272-788-FDHJ (4786)  -National [place] on Mental Illness (www.[place].melissa.org): 602.686.3069 or 395-102-7019.  -Xnew6tmiy: text the word LIFE to 22997 for immediate support and crisis intervention  -Mental Health Consumer/Survivor Network of [place] (www.mhcsn.net): 684.250.6117 or 729-046-1404  -Mental Health Association of [place] (www.mentalhealth.org): 487.144.1975 or 581-084-8108     -Substance Abuse and Mental Health Services (www.samhsa.gov)  -Harm Reduction Coalition (www. Harmreduction.org)  -www.prescribetoprevent.org or http://prescribetoprevent.org/video  -Poison control 9-023-217-0157   **[place] Opioid Prevention Coalition: www.opioidcoalition.org    Sober Support Group Information:  AA/NA & Sponsor/Support  -Alcoholics Anonymous (www.alcoholics-anonymous.org): for local information 24 hours/day  -AA Intergroup service office in [place] (http://www.aastpaul.org/) 340.812.9132  -AA Intergroup service office in [facility]: 130.773.6715. (http://www.a[place].org/)  -Narcotics Anonymous (www.nam[place].org) (507) 588-5347   **Sober Fun Activities: www.soberBlack Card Mediaactivities.Groupspeak.TradeBeam/[facility]//[place]    [place] Recovery Connection (MRC)  [facility] connects people seeking recovery to resources that help foster and sustain long-term recovery.  Whether " you are seeking resources for treatment, transportation, housing, job training, education, health care or other pathways to recovery, [facility] is a great place to start.    Phone: (414) 422-2606.  www.min[place]Dinetouch.StartX (Great listing of all types of recovery and non-recovery related resources)      General Medication Instructions:   See your medication sheet(s) for instructions.   Take all medicines as directed.  Make no changes unless your doctor suggests them.   Go to all your doctor visits.  Be sure to have all your required lab tests. This way, your medicines can be refilled on time.  Do not use any drugs not prescribed by your provider.  AA/NA and Sponsors are excellent resources for support  Avoid alcohol.    Any follow up concerns:  Nursing questions call the Unit -[facility] [phone]  Medical Record call [phone]  Outpatient Behavioral Intake call [phone]  LP+ Wait List/Bed Availability call [phone]    The entire treatment team has appreciated the opportunity to work with you Georges.  We wish you the best in the future and with your lifelong recovery goals. Please bring this discharge folder with you to all follow up appointments.  It contains your lab results, diagnosis, medication list and discharge recommendations.    THANK YOU FOR CHOOSING THE [facility]
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
## 2024-05-26 NOTE — PROGRESS NOTES
"Case Management:     Writer checked-in and introduced role to pt's care and how to assist pt during their stay. Inquired with pt about what they are needing during their stay and what they are considering for their aftercare plans. Pt processed that he has been sober from 3/17-5/17 and has recently relapsed. Pt discussed feeling the best he has felt during the 2 months period of sobriety and wasn't planning on relapsing but had a thought that maybe he could drink again. Pt shared that he was coming home and was on \"auto \" mode and pulled up the the liquor store on his way home. Pt shared that he isn't wanting to go to treatment. Pt shared that he is currently in individual therapy and goes to . Provided pt with information about  Addiction Medicine/Recovery Clinic.     Living Situation:  Lives with his mom in [facility]    Insurance:  [employer]    Legal Status  Voluntary    Substance Use Assessment Status:  None needed at this time.     Referral Status:  Will provide pt with information regarding [facility] Addiction Medicine/Recovery [facility]    Established Services or Current Support  Individual Therapy at [facility]  AA  Mom    Next Steps and Discharge Plan or Goal:  Once pt is out of detox pt is able to discharge from a CM perspective. AVS updated    Name/Credentials Lakeshia Charles, NEVAC, LPCC      "

## 2024-05-26 NOTE — PROGRESS NOTES
3A Admission Note    S = Situation:   Georges Moreno is a 48 year old year old male with a chief complaint of Alcohol Problem (Patient here to go to detox for alcohol )    Voluntary admission to [facility] for alcohol withdrawal and detox.    B  = Background:   Substance use history: alcohol, nicotine, medicinal THC     First start- 9 years old, problematic at age 13 years old       Amount- 1 quart of Tequila     How often- every day (for the last 2 weeks)     Last use  Mental health history: Depression, anxiety  Medical history: hx of a fib,   Legal history: Denies  Treatment history: Inpatient, outpatient tx  Living situation: in a home with his mom  Recent life stressors: denies     A  =  Assessment:   During admission interview, pt affect was blunted.    MSSA 5, 4, no Valium received this shift.     BP (!) 171/90 (BP Location: Left arm, Patient Position: Sitting, Cuff Size: Adult Regular)   Pulse 78   Temp 98.2  F (36.8  C) (Oral)   Resp 16   Ht 1.829 m (6')   Wt 86.2 kg (190 lb)   SpO2 98%   BMI 25.77 kg/m       Pt does smell heavily of alcohol during the interview. Pt is not diaphoretic and mildly tremulous at this time. Denies SI/SIB, HI or AVH and contracts for safety. Endorses high anxiety and mild depressed mood. Pt stated he quit drinking for 2 months and relapsed and has a repeating cycle of doing this. Pt stated he has a chipped tooth, which is causing him some pain/discomfort. Appetite has been decreased and sleep is poor.   R =   Request or Recommendation:   Alcohol withdrawal will be monitored and treated using MSSA with valium per protocol.  Pt will meet with psychiatry, internal medicine, and case management tomorrow.  At the time of admission, pt reports discharge plan is TBD.

## 2024-05-26 NOTE — PLAN OF CARE
Problem: Alcohol Withdrawal  Goal: Alcohol Withdrawal Symptom Control  Outcome: Progressing   Goal Outcome Evaluation:         MSSA score of 5,patient did not require medication for alcohol withdrawal and is observed to sleep throughout the noc

## 2024-05-27 VITALS
OXYGEN SATURATION: 96 % | HEIGHT: 72 IN | BODY MASS INDEX: 25.73 KG/M2 | DIASTOLIC BLOOD PRESSURE: 88 MMHG | WEIGHT: 190 LBS | RESPIRATION RATE: 16 BRPM | TEMPERATURE: 97.4 F | HEART RATE: 68 BPM | SYSTOLIC BLOOD PRESSURE: 127 MMHG

## 2024-05-27 PROCEDURE — 99238 HOSP IP/OBS DSCHRG MGMT 30/<: CPT | Performed by: NURSE PRACTITIONER

## 2024-05-27 PROCEDURE — 250N000013 HC RX MED GY IP 250 OP 250 PS 637: Performed by: PSYCHIATRY & NEUROLOGY

## 2024-05-27 PROCEDURE — 250N000013 HC RX MED GY IP 250 OP 250 PS 637

## 2024-05-27 PROCEDURE — 250N000013 HC RX MED GY IP 250 OP 250 PS 637: Performed by: NURSE PRACTITIONER

## 2024-05-27 PROCEDURE — 250N000013 HC RX MED GY IP 250 OP 250 PS 637: Performed by: FAMILY MEDICINE

## 2024-05-27 RX ORDER — NALTREXONE HYDROCHLORIDE 50 MG/1
50 TABLET, FILM COATED ORAL DAILY
Qty: 30 TABLET | Refills: 0 | Status: SHIPPED | OUTPATIENT
Start: 2024-05-27 | End: 2024-06-15

## 2024-05-27 RX ORDER — NALTREXONE HYDROCHLORIDE 50 MG/1
50 TABLET, FILM COATED ORAL DAILY
Status: DISCONTINUED | OUTPATIENT
Start: 2024-05-27 | End: 2024-05-27 | Stop reason: HOSPADM

## 2024-05-27 RX ORDER — QUETIAPINE FUMARATE 25 MG/1
25 TABLET, FILM COATED ORAL 3 TIMES DAILY PRN
Qty: 30 TABLET | Refills: 0 | Status: SHIPPED | OUTPATIENT
Start: 2024-05-27 | End: 2024-06-15

## 2024-05-27 RX ORDER — QUETIAPINE FUMARATE 100 MG/1
100 TABLET, FILM COATED ORAL AT BEDTIME
Qty: 30 TABLET | Refills: 0 | Status: ON HOLD | OUTPATIENT
Start: 2024-05-27 | End: 2024-06-17

## 2024-05-27 RX ORDER — FOLIC ACID 1 MG/1
1 TABLET ORAL DAILY
Qty: 30 TABLET | Refills: 0 | Status: SHIPPED | OUTPATIENT
Start: 2024-05-27 | End: 2024-06-15

## 2024-05-27 RX ORDER — QUETIAPINE FUMARATE 100 MG/1
100 TABLET, FILM COATED ORAL AT BEDTIME
Status: DISCONTINUED | OUTPATIENT
Start: 2024-05-27 | End: 2024-05-27 | Stop reason: HOSPADM

## 2024-05-27 RX ORDER — FLUOXETINE 40 MG/1
40 CAPSULE ORAL DAILY
Qty: 30 CAPSULE | Refills: 0 | Status: SHIPPED | OUTPATIENT
Start: 2024-05-27

## 2024-05-27 RX ADMIN — Medication 1 TABLET: at 09:16

## 2024-05-27 RX ADMIN — FLUOXETINE 40 MG: 20 CAPSULE ORAL at 09:16

## 2024-05-27 RX ADMIN — FAMOTIDINE 10 MG: 10 TABLET ORAL at 09:15

## 2024-05-27 RX ADMIN — THIAMINE HCL TAB 100 MG 100 MG: 100 TAB at 09:17

## 2024-05-27 RX ADMIN — NALTREXONE HYDROCHLORIDE 50 MG: 50 TABLET, FILM COATED ORAL at 09:17

## 2024-05-27 RX ADMIN — NICOTINE 1 PATCH: 21 PATCH, EXTENDED RELEASE TRANSDERMAL at 09:17

## 2024-05-27 RX ADMIN — FOLIC ACID 1 MG: 1 TABLET ORAL at 09:16

## 2024-05-27 RX ADMIN — PANTOPRAZOLE SODIUM 40 MG: 40 TABLET, DELAYED RELEASE ORAL at 09:17

## 2024-05-27 RX ADMIN — GABAPENTIN 400 MG: 400 CAPSULE ORAL at 09:16

## 2024-05-27 RX ADMIN — METOPROLOL TARTRATE 25 MG: 25 TABLET, FILM COATED ORAL at 09:16

## 2024-05-27 ASSESSMENT — ACTIVITIES OF DAILY LIVING (ADL)
ORAL_HYGIENE: INDEPENDENT
ADLS_ACUITY_SCORE: 40
ADLS_ACUITY_SCORE: 40
HYGIENE/GROOMING: INDEPENDENT
ADLS_ACUITY_SCORE: 40
LAUNDRY: WITH SUPERVISION
DRESS: SCRUBS (BEHAVIORAL HEALTH)
ADLS_ACUITY_SCORE: 40

## 2024-05-27 NOTE — PLAN OF CARE
Goal Outcome Evaluation:    Rehab Group    Start time: 1645  End time: 1730  Patient time total: 45 minutes    attended full group    #7 attended   Group Type: art   Group Topic Covered: relaxation skills        Group Session Detail:  Art Therapy directive was to create a peaceful place drawing in response to a relaxation script read by author.  Goals of directive: trauma containment  Emotional expression, emotional regulation,  mindfulness     Patient Response/Contribution:  Cooperative with task       Patient Detail:    Pt was an engaged participant, focused on task for the full duration of group. Pt finished his artwork and briefly verbally processed with author and group.  Pts mood was calm, pleasant participant.        Group Therapy ([id number])    Patient Active Problem List   Diagnosis    Thoracic or lumbosacral neuritis or radiculitis, unspecified    Screening for cardiovascular condition    Alcohol dependence with withdrawal (H)    Atrial fibrillation (H)    Paroxysmal atrial fibrillation (H)    Generalized anxiety disorder    Personal history of alcoholism (H)    Cigarette nicotine dependence with withdrawal    Alcohol withdrawal syndrome without complication (H)    Cannabis use disorder, moderate, dependence (H)    Alcohol withdrawal with complication with inpatient treatment, with unspecified complication (H)    Palpitations    Palpitation    Alcohol abuse    Anxiety    Elevated troponin    Alcohol withdrawal syndrome with complication (H)    Elevated lactic acid level    Alcohol withdrawal (H)    Recurrent major depression (H24)    Alcohol withdrawal, uncomplicated (H)    Laceration of left index finger without damage to nail, foreign body presence unspecified, initial encounter    Alcohol dependence with intoxication with complication (H)    Acute alcoholism (H)

## 2024-05-27 NOTE — PLAN OF CARE
Pt scored <8 on MSSA for >24 hours.  He has not required medication for withdrawal for >24 hours.  Pt has been removed from detox status per unit protocol.  Patient  is being discharged to home. Reports being picked up by his mom. Patient escorted off the unit by psych associateErick..     All patient belongings from room, locked bin and security were sent with patient.  This RN reviewed discharge instructions, teachings, patient's labs, and discharge recommendations with patient.  This RN reviewed patient's prescribed medications being sent to his pharmacy.  Patient verbalizes and demonstrates understanding of all teachings.  Patient denied thoughts of harm towards self or others.  Pt denies any current medical issues at this time.  Patient denies any further questions and is now discharged at 12:22 pm.

## 2024-05-27 NOTE — PROGRESS NOTES
Patient has not required any valium for alcohol detox since 5/26/24 @ 0853. All MSSA scores since that time have been less than 8. Pt is now removed from alcohol detox monitoring status. Await disposition likely to home.

## 2024-05-27 NOTE — PLAN OF CARE
Goal Outcome Evaluation:  Problem: Alcohol Withdrawal  Goal: Alcohol Withdrawal Symptom Control  Outcome: Progressing     MSSA score of 6, patient did not require medication for alcohol withdrawal and is awake first half of noc and declines sleep aids then is observed to sleep the remainder of the noc.

## 2024-05-27 NOTE — PROGRESS NOTES
[facility], [place]   Psychiatric Progress Note        Interim History:   Team meeting report: The patient's care was discussed with the treatment team during the daily team meeting and/or staff's chart notes were reviewed.  Staff report patient has been calm, pleasant, cooperative.  No behavioral issues.  The patient has been within the milieu, watching TV with others.  He attended groups.  Denied mental health issues.  Denied suicidal ideation.  Blood pressure was elevated yesterday.  MSSA scores have been low.  He has not taken Valium since yesterday morning.  Slept through the night.    Met with patient.  He is doing well.  Feels that he is out of detox.  He slept better with the Seroquel but still woke up multiple times.  He would like to increase the dose.  Depression and anxiety have much improved.  The patient took hydroxyzine 1 time yesterday.  He did not take the as needed Seroquel.  Encouraged him to take it today if he feels anxious will be prescribed at the discharge if it is helpful.  The patient denies suicidal and homicidal ideation.  Denies auditory visual hallucinations, paranoia, and delusions.  He is very pleasant.  Discussed anticraving medications.  Patient decided to try naltrexone and eventually transition to Vivitrol injection.  The patient is not sure if he wants to stay another night or go home.  He will think about it and let the nursing staff know.  Medications were ordered and sent to his pharmacy.         Medications:     Current Facility-Administered Medications   Medication Dose Route Frequency Provider Last Rate Last Admin    famotidine (PEPCID) tablet 10 mg  10 mg Oral BID Sam Jerez PA-C   10 mg at 05/27/24 0915    FLUoxetine (PROzac) capsule 40 mg  40 mg Oral Daily Silas Fontenot MD   40 mg at 05/27/24 0916    folic acid (FOLVITE) tablet 1 mg  1 mg Oral Daily Mgiel Cordero MD   1 mg at 05/27/24 0916    gabapentin (NEURONTIN)  capsule 400 mg  400 mg Oral TID Silas Fontenot MD   400 mg at 05/27/24 0916    metoprolol tartrate (LOPRESSOR) tablet 25 mg  25 mg Oral BID Silas Fontenot MD   25 mg at 05/27/24 0916    multivitamin w/minerals (THERA-VIT-M) tablet 1 tablet  1 tablet Oral Daily Migel Cordero MD   1 tablet at 05/27/24 0916    naltrexone (DEPADE/REVIA) tablet 50 mg  50 mg Oral Daily Isrrael Benjamin APRN CNP   50 mg at 05/27/24 0917    nicotine (NICODERM CQ) 21 MG/24HR 24 hr patch 1 patch  1 patch Transdermal Daily Silas Fontenot MD   1 patch at 05/27/24 0917    pantoprazole (PROTONIX) EC tablet 40 mg  40 mg Oral QAM AC Sam Jerez PA-C   40 mg at 05/27/24 0917    QUEtiapine (SEROquel) tablet 100 mg  100 mg Oral At Bedtime Isrrael Benjamin APRN CNP        thiamine (B-1) tablet 100 mg  100 mg Oral Daily Migel Cordero MD   100 mg at 05/27/24 0917            Allergies:     Allergies   Allergen Reactions    Sertraline Other (See Comments)     Excessive fatigue  Reaction(s): Excessive fatigue.    Duloxetine Hives and Rash     Rash, hives            Labs:     Recent Results (from the past 672 hour(s))   Comprehensive metabolic panel    Collection Time: 05/25/24  5:55 AM   Result Value Ref Range    Sodium 139 135 - 145 mmol/L    Potassium 3.8 3.4 - 5.3 mmol/L    Carbon Dioxide (CO2) 25 22 - 29 mmol/L    Anion Gap 15 7 - 15 mmol/L    Urea Nitrogen 9.5 6.0 - 20.0 mg/dL    Creatinine 0.60 (L) 0.67 - 1.17 mg/dL    GFR Estimate >90 >60 mL/min/1.73m2    Calcium 9.4 8.6 - 10.0 mg/dL    Chloride 99 98 - 107 mmol/L    Glucose 145 (H) 70 - 99 mg/dL    Alkaline Phosphatase 67 40 - 150 U/L    AST 35 0 - 45 U/L    ALT 30 0 - 70 U/L    Protein Total 7.6 6.4 - 8.3 g/dL    Albumin 4.6 3.5 - 5.2 g/dL    Bilirubin Total 0.5 <=1.2 mg/dL   Alcohol level blood    Collection Time: 05/25/24  5:55 AM   Result Value Ref Range    Alcohol ethyl 0.22 (H) <=0.01 g/dL   CBC with platelets and  differential    Collection Time: 05/25/24  5:55 AM   Result Value Ref Range    WBC Count 7.7 4.0 - 11.0 10e3/uL    RBC Count 5.43 4.40 - 5.90 10e6/uL    Hemoglobin 17.0 13.3 - 17.7 g/dL    Hematocrit 48.7 40.0 - 53.0 %    MCV 90 78 - 100 fL    MCH 31.3 26.5 - 33.0 pg    MCHC 34.9 31.5 - 36.5 g/dL    RDW 12.5 10.0 - 15.0 %    Platelet Count 205 150 - 450 10e3/uL    % Neutrophils 69 %    % Lymphocytes 21 %    % Monocytes 9 %    % Eosinophils 0 %    % Basophils 1 %    % Immature Granulocytes 0 %    NRBCs per 100 WBC 0 <1 /100    Absolute Neutrophils 5.3 1.6 - 8.3 10e3/uL    Absolute Lymphocytes 1.6 0.8 - 5.3 10e3/uL    Absolute Monocytes 0.7 0.0 - 1.3 10e3/uL    Absolute Eosinophils 0.0 0.0 - 0.7 10e3/uL    Absolute Basophils 0.0 0.0 - 0.2 10e3/uL    Absolute Immature Granulocytes 0.0 <=0.4 10e3/uL    Absolute NRBCs 0.0 10e3/uL   Extra Blue Top Tube    Collection Time: 05/25/24  5:55 AM   Result Value Ref Range    Hold Specimen JIC    Extra Red Top Tube    Collection Time: 05/25/24  5:55 AM   Result Value Ref Range    Hold Specimen JIC    Magnesium    Collection Time: 05/25/24  5:55 AM   Result Value Ref Range    Magnesium 1.9 1.7 - 2.3 mg/dL   Phosphorus    Collection Time: 05/25/24  5:55 AM   Result Value Ref Range    Phosphorus 2.3 (L) 2.5 - 4.5 mg/dL   EKG 12-lead, tracing only    Collection Time: 05/25/24  6:20 AM   Result Value Ref Range    Systolic Blood Pressure  mmHg    Diastolic Blood Pressure  mmHg    Ventricular Rate 77 BPM    Atrial Rate 77 BPM    NV Interval 160 ms    QRS Duration 88 ms     ms    QTc 430 ms    P Axis 68 degrees    R AXIS 77 degrees    T Axis 58 degrees    Interpretation ECG       Sinus rhythm with sinus arrhythmia  Normal ECG  When compared with ECG of 16-MAR-2024 15:44,  No significant change was found     Alcohol breath test POCT    Collection Time: 05/25/24 12:11 PM   Result Value Ref Range    Alcohol Breath Test 0.219 (A) 0.00 - 0.01   Urine Drug Screen Panel    Collection Time:  05/25/24 12:15 PM   Result Value Ref Range    Amphetamines Urine Screen Negative Screen Negative    Barbituates Urine Screen Negative Screen Negative    Benzodiazepine Urine Screen Negative Screen Negative    Cannabinoids Urine Screen Positive (A) Screen Negative    Cocaine Urine Screen Negative Screen Negative    Fentanyl Qual Urine Screen Negative Screen Negative    Opiates Urine Screen Negative Screen Negative    PCP Urine Screen Negative Screen Negative   Comprehensive metabolic panel    Collection Time: 05/25/24 12:40 PM   Result Value Ref Range    Sodium 142 135 - 145 mmol/L    Potassium 3.8 3.4 - 5.3 mmol/L    Carbon Dioxide (CO2) 23 22 - 29 mmol/L    Anion Gap 18 (H) 7 - 15 mmol/L    Urea Nitrogen 10.0 6.0 - 20.0 mg/dL    Creatinine 0.74 0.67 - 1.17 mg/dL    GFR Estimate >90 >60 mL/min/1.73m2    Calcium 9.3 8.6 - 10.0 mg/dL    Chloride 101 98 - 107 mmol/L    Glucose 103 (H) 70 - 99 mg/dL    Alkaline Phosphatase 70 40 - 150 U/L    AST 40 0 - 45 U/L    ALT 32 0 - 70 U/L    Protein Total 7.3 6.4 - 8.3 g/dL    Albumin 4.5 3.5 - 5.2 g/dL    Bilirubin Total 0.6 <=1.2 mg/dL   Magnesium    Collection Time: 05/25/24 12:40 PM   Result Value Ref Range    Magnesium 1.8 1.7 - 2.3 mg/dL   Lipase    Collection Time: 05/25/24 12:40 PM   Result Value Ref Range    Lipase 55 13 - 60 U/L   Ethyl Alcohol Level    Collection Time: 05/25/24 12:40 PM   Result Value Ref Range    Alcohol ethyl 0.25 (H) <=0.01 g/dL   CBC with platelets and differential    Collection Time: 05/25/24 12:40 PM   Result Value Ref Range    WBC Count 12.7 (H) 4.0 - 11.0 10e3/uL    RBC Count 5.54 4.40 - 5.90 10e6/uL    Hemoglobin 17.5 13.3 - 17.7 g/dL    Hematocrit 49.5 40.0 - 53.0 %    MCV 89 78 - 100 fL    MCH 31.6 26.5 - 33.0 pg    MCHC 35.4 31.5 - 36.5 g/dL    RDW 12.3 10.0 - 15.0 %    Platelet Count 193 150 - 450 10e3/uL    % Neutrophils 71 %    % Lymphocytes 22 %    % Monocytes 7 %    % Eosinophils 0 %    % Basophils 0 %    % Immature Granulocytes 0 %     NRBCs per 100 WBC 0 <1 /100    Absolute Neutrophils 9.0 (H) 1.6 - 8.3 10e3/uL    Absolute Lymphocytes 2.7 0.8 - 5.3 10e3/uL    Absolute Monocytes 0.9 0.0 - 1.3 10e3/uL    Absolute Eosinophils 0.0 0.0 - 0.7 10e3/uL    Absolute Basophils 0.0 0.0 - 0.2 10e3/uL    Absolute Immature Granulocytes 0.1 <=0.4 10e3/uL    Absolute NRBCs 0.0 10e3/uL            Precautions:     Behavioral Orders   Procedures    Code 1 - Restrict to Unit    Routine Programming     As clinically indicated    Status 15     Every 15 minutes.            Psychiatric Examination:   Temp: 97.4  F (36.3  C) Temp src: Temporal BP: 127/88 Pulse: 68   Resp: 16 SpO2: 96 % O2 Device: None (Room air)    Weight is 190 lbs 0 oz  Body mass index is 25.77 kg/m .    Appearance: awake, alert and well groomed  Attitude:  cooperative  Eye Contact:  good  Mood:  anxious, depressed, and much improved  Affect:  appropriate and in normal range  Speech:  clear, coherent  Psychomotor Behavior:  no evidence of tardive dyskinesia, dystonia, or tics  Throught Process:  logical and goal oriented  Associations:  no loose associations  Thought Content:  no evidence of suicidal ideation or homicidal ideation  Insight:  good  Judgement:  intact  Oriented to:  time, person, and place  Attention Span and Concentration:  intact  Recent and Remote Memory:  intact         Precautions:     Behavioral Orders   Procedures    Code 1 - Restrict to Unit    Routine Programming     As clinically indicated    Status 15     Every 15 minutes.          DIagnoses:   Alcohol use disorder, severe, dependence  Alcohol withdrawals, resolved  Cannabis use disorder, severe, dependence  Tobacco use disorder  Polysubstance abuse in sustained remission including benzodiazepines, LSD, mushrooms, heroin, cocaine  Generalized anxiety disorder  Major depressive disorder, minimal  PTSD  Neuropathy  A-fib, managed by internal medicine         Plan:   --MSSA with Valium, multivitamins, thiamine, folic acid for  alcohol withdrawals  --Prozac, 40 mg every morning for depression and anxiety  --Gabapentin 400 mg 3 times a day for neuropathy, anxiety, alcohol cravings  --Start Seroquel, 50 mg at bedtime for sleep and anxiety  --Start Seroquel, 25 mg 3 times a day as needed for anxiety  - Start naltrexone, 50 mg every morning.  Will transition to Vivitrol.  --Additional medications will include atenolol, hydroxyzine, Robaxin, Seroquel, zofran, imodium, trazodone    Lab work:  Lab work was reviewed and is mostly unremarkable.  Abnormal results include anion gap of 18, glucose 103, WBC 12.7, neutrophils 9.0.  Alcohol breathalyzer was 0.219.  BAL was 0.25.  U tox was positive for cannabis.  --EKG is WNL    Consults:   Internal medicine to follow up for medical problems.     # Anion gap metabolic acidosis  Anion gap 18. In the setting of chronic alcohol use, dehydration, alcoholic ketoacidosis.  - Encourage alcohol cessation  - Follow-up with primary care provider in 1 to 2 weeks following alcohol cessation for repeat labs.     # Leukocytosis  White blood cells 12.7.  No symptoms of infection as elaborated in ROS.  Neutrophilic predominance.      # Recent I&D of perirectal abscess   5/1/24 had surgery to I&D perirectal abscess. Was seen by primary care who assessed wound progress on 5/07. Noted clean wound but still recommended follow up with colorectal surgery to assess wound which he has not yet completed.  He notes that he is now pain free      # Paroxysmal atrial fibrillation  # Hypertension  - Not on anticoagulation, continue PTA beta-blocker  - Hypertension stable; some elevated blood pressures expected in the setting of acute withdrawal.  Notified medicine if systolic blood pressure greater than 175 and diastolic blood pressure greater than 105.  - PRN Clonidine ordered with parameters      ## Tobacco use disorder  - Nicotine patch ordered per psychiatry     # Peripheral neuropathy   - Continue PTA gabapentin     #  Hypertriglyceridemia  Triglycerides 190 on 3/17/24. Follow up with PCP to discuss plan to lower.      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   # Overweight: Estimated body mass index is 25.77 kg/m  as calculated from the following:    Height as of this encounter: 1.829 m (6').    Weight as of this encounter: 86.2 kg (190 lb).         # Financial/Environmental Concerns:        Sam Jerez PA-C  Hospitalist Service    Care was coordinated with the treatment team.  The patient was consulted on nature of illness and treatment options.      Disposition Plan   Reason for ongoing admission: requires detoxification from substance that poses a risk of bodily harm during withdrawal period  Discharge location: home with family  Discharge Medications: ordered.  Follow-up Appointments: scheduled  Legal Status: voluntary   Discharge will be granted once symptoms improved.    Isrrael DIAZ, CNP    This note was created with the help of Dragon dictation system. All grammatical/typing errors or context distortion are unintentional and inherent to software.
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
## 2024-05-27 NOTE — PLAN OF CARE
Problem: Adult Behavioral Health Plan of Care  Goal: Adheres to Safety Considerations for Self and Others  Outcome: Progressing     Problem: Alcohol Withdrawal  Goal: Alcohol Withdrawal Symptom Control  Outcome: Progressing  Intervention: Minimize or Manage Alcohol Withdrawal Symptoms  Recent Flowsheet Documentation  Taken 5/26/2024 2050 by Chauncey Dhillon RN  Sensory Stimulation Regulation:   care clustered   lighting decreased   quiet environment promoted   Goal Outcome Evaluation:  Patient  is alert, and oriented, and able to express needs. Presents was present in the milieu most of the time watching TV with peers. Presents with flat and blunted affect but calm,and cooperative with peers, staff, and assessments. Patient denied pain, depression, anxiety, SI/SIB. Vitals remained stable except for the BP that was elevated at 147/91. Patient had scheduled metoprolol for BP management.  MSSA score was 3 at 2000, no varium was required. No other concerns at this time, team will continue to monitor for needs.    Blood pressure (!) 147/91, pulse 71, temperature 98.8  F (37.1  C), temperature source Oral, resp. rate 16, height 1.829 m (6'), weight 86.2 kg (190 lb), SpO2 96%.

## 2024-05-28 ENCOUNTER — PATIENT OUTREACH (OUTPATIENT)
Dept: CARE COORDINATION | Facility: CLINIC | Age: 49
End: 2024-05-28
Payer: COMMERCIAL

## 2024-05-28 LAB
ATRIAL RATE - MUSE: 77 BPM
DIASTOLIC BLOOD PRESSURE - MUSE: NORMAL MMHG
INTERPRETATION ECG - MUSE: NORMAL
P AXIS - MUSE: 68 DEGREES
PR INTERVAL - MUSE: 160 MS
QRS DURATION - MUSE: 88 MS
QT - MUSE: 380 MS
QTC - MUSE: 430 MS
R AXIS - MUSE: 77 DEGREES
SYSTOLIC BLOOD PRESSURE - MUSE: NORMAL MMHG
T AXIS - MUSE: 58 DEGREES
VENTRICULAR RATE- MUSE: 77 BPM

## 2024-05-28 NOTE — PROGRESS NOTES
Clinic Care Coordination Contact  Transitions of Care Outreach  No chief complaint on file.      Most Recent Admission Date: 5/25/2024   Most Recent Admission Diagnosis: Alcohol dependence with intoxication with complication (H) - F10.229     Most Recent Discharge Date: 5/27/2024   Most Recent Discharge Diagnosis: Alcohol dependence with intoxication with complication (H) - F10.229  Vickers's esophagus without dysplasia - K22.70  PUSHPA (generalized anxiety disorder) - F41.1  Alcohol withdrawal, uncomplicated (H) - F10.930  Generalized anxiety disorder - F41.1     Transitions of Care Assessment    Discharge Assessment  How are you doing now that you are home?: Patient shares that he is doing well. Patient spent a great deal of time sharing what happened and what he thinks he needs to do in the future to stay sober. Jed thinks he has many good things in place including AA and meeting with therpaist this week. No other concerns/needs at this time.  How are your symptoms? (Red Flag symptoms escalate to triage hotline per guidelines): Improved  Do you know how to contact your clinic care team if you have future questions or changes to your health status? : Yes  Does the patient have their discharge instructions? : Yes  Does the patient have questions regarding their discharge instructions? : No  Were you started on any new medications or were there changes to any of your previous medications? : Yes  Does the patient have all of their medications?: Yes  Do you have questions regarding any of your medications? : No  Do you have all of your needed medical supplies or equipment (DME)?  (i.e. oxygen tank, CPAP, cane, etc.): Yes    Post-op ([facility] Only)  If the patient had a surgery or procedure, do they have any questions for a nurse?: No    Post-op (Clinicians Only)  Did the patient have surgery or a procedure: No        Follow up Plan     Discharge Follow-Up  Discharge follow up appointment scheduled in alignment with  recommended follow up timeframe or Transitions of Risk Category? (Low = within 30 days; Moderate= within 14 days; High= within 7 days): Yes  Discharge Follow Up Appointment Date: 05/30/24  Discharge Follow Up Appointment Scheduled with?: Specialty Care Provider (Therapist)    No future appointments.    Outpatient Plan as outlined on AVS reviewed with patient.    For any urgent concerns, please contact our 24 hour nurse triage line: [phone] ([phone])       PATRIZIA Ramos

## 2024-06-14 ENCOUNTER — TELEPHONE (OUTPATIENT)
Dept: BEHAVIORAL HEALTH | Facility: CLINIC | Age: 49
End: 2024-06-14

## 2024-06-14 ENCOUNTER — HOSPITAL ENCOUNTER (INPATIENT)
Facility: CLINIC | Age: 49
LOS: 2 days | Discharge: HOME | End: 2024-06-17
Attending: EMERGENCY MEDICINE | Admitting: PSYCHIATRY & NEUROLOGY
Payer: COMMERCIAL

## 2024-06-14 DIAGNOSIS — F10.930 ALCOHOL WITHDRAWAL, UNCOMPLICATED (H): ICD-10-CM

## 2024-06-14 DIAGNOSIS — F10.229 ALCOHOL DEPENDENCE WITH INTOXICATION WITH COMPLICATION (H): ICD-10-CM

## 2024-06-14 DIAGNOSIS — F10.220 ALCOHOL DEPENDENCE WITH UNCOMPLICATED INTOXICATION (H): Primary | ICD-10-CM

## 2024-06-14 DIAGNOSIS — F41.1 GENERALIZED ANXIETY DISORDER: ICD-10-CM

## 2024-06-14 LAB
ALBUMIN SERPL BCG-MCNC: 4.9 G/DL (ref 3.5–5.2)
ALCOHOL BREATH TEST: 0.2 (ref 0–0.01)
ALP SERPL-CCNC: 85 U/L (ref 40–150)
ALT SERPL W P-5'-P-CCNC: 21 U/L (ref 0–70)
ANION GAP SERPL CALCULATED.3IONS-SCNC: 23 MMOL/L (ref 7–15)
AST SERPL W P-5'-P-CCNC: 27 U/L (ref 0–45)
BASOPHILS # BLD AUTO: 0.1 10E3/UL (ref 0–0.2)
BASOPHILS NFR BLD AUTO: 0 %
BILIRUB SERPL-MCNC: 0.8 MG/DL
BUN SERPL-MCNC: 14.7 MG/DL (ref 6–20)
CALCIUM SERPL-MCNC: 9.6 MG/DL (ref 8.6–10)
CHLORIDE SERPL-SCNC: 97 MMOL/L (ref 98–107)
CREAT SERPL-MCNC: 0.78 MG/DL (ref 0.67–1.17)
DEPRECATED HCO3 PLAS-SCNC: 22 MMOL/L (ref 22–29)
EGFRCR SERPLBLD CKD-EPI 2021: >90 ML/MIN/1.73M2
EOSINOPHIL # BLD AUTO: 0 10E3/UL (ref 0–0.7)
EOSINOPHIL NFR BLD AUTO: 0 %
ERYTHROCYTE [DISTWIDTH] IN BLOOD BY AUTOMATED COUNT: 13.2 % (ref 10–15)
GLUCOSE SERPL-MCNC: 87 MG/DL (ref 70–99)
HCT VFR BLD AUTO: 49.9 % (ref 40–53)
HGB BLD-MCNC: 17.6 G/DL (ref 13.3–17.7)
IMM GRANULOCYTES # BLD: 0.1 10E3/UL
IMM GRANULOCYTES NFR BLD: 1 %
LYMPHOCYTES # BLD AUTO: 2.3 10E3/UL (ref 0.8–5.3)
LYMPHOCYTES NFR BLD AUTO: 21 %
MAGNESIUM SERPL-MCNC: 1.9 MG/DL (ref 1.7–2.3)
MCH RBC QN AUTO: 31.6 PG (ref 26.5–33)
MCHC RBC AUTO-ENTMCNC: 35.3 G/DL (ref 31.5–36.5)
MCV RBC AUTO: 90 FL (ref 78–100)
MONOCYTES # BLD AUTO: 0.7 10E3/UL (ref 0–1.3)
MONOCYTES NFR BLD AUTO: 7 %
NEUTROPHILS # BLD AUTO: 8.1 10E3/UL (ref 1.6–8.3)
NEUTROPHILS NFR BLD AUTO: 71 %
NRBC # BLD AUTO: 0 10E3/UL
NRBC BLD AUTO-RTO: 0 /100
PLATELET # BLD AUTO: 326 10E3/UL (ref 150–450)
POTASSIUM SERPL-SCNC: 4.1 MMOL/L (ref 3.4–5.3)
PROT SERPL-MCNC: 7.9 G/DL (ref 6.4–8.3)
RBC # BLD AUTO: 5.57 10E6/UL (ref 4.4–5.9)
SODIUM SERPL-SCNC: 142 MMOL/L (ref 135–145)
WBC # BLD AUTO: 11.3 10E3/UL (ref 4–11)

## 2024-06-14 PROCEDURE — 80053 COMPREHEN METABOLIC PANEL: CPT | Performed by: EMERGENCY MEDICINE

## 2024-06-14 PROCEDURE — 83735 ASSAY OF MAGNESIUM: CPT | Performed by: EMERGENCY MEDICINE

## 2024-06-14 PROCEDURE — 36415 COLL VENOUS BLD VENIPUNCTURE: CPT | Performed by: EMERGENCY MEDICINE

## 2024-06-14 PROCEDURE — 99285 EMERGENCY DEPT VISIT HI MDM: CPT | Mod: 25 | Performed by: EMERGENCY MEDICINE

## 2024-06-14 PROCEDURE — 82075 ASSAY OF BREATH ETHANOL: CPT | Performed by: EMERGENCY MEDICINE

## 2024-06-14 PROCEDURE — 250N000013 HC RX MED GY IP 250 OP 250 PS 637: Performed by: EMERGENCY MEDICINE

## 2024-06-14 PROCEDURE — 85049 AUTOMATED PLATELET COUNT: CPT | Performed by: EMERGENCY MEDICINE

## 2024-06-14 PROCEDURE — 99285 EMERGENCY DEPT VISIT HI MDM: CPT | Performed by: EMERGENCY MEDICINE

## 2024-06-14 PROCEDURE — HZ2ZZZZ DETOXIFICATION SERVICES FOR SUBSTANCE ABUSE TREATMENT: ICD-10-PCS | Performed by: PSYCHIATRY & NEUROLOGY

## 2024-06-14 RX ORDER — DIAZEPAM 5 MG
5-20 TABLET ORAL EVERY 30 MIN PRN
Status: DISCONTINUED | OUTPATIENT
Start: 2024-06-14 | End: 2024-06-15

## 2024-06-14 RX ORDER — FOLIC ACID 1 MG/1
1 TABLET ORAL DAILY
Status: DISCONTINUED | OUTPATIENT
Start: 2024-06-15 | End: 2024-06-15

## 2024-06-14 RX ORDER — DIAZEPAM 5 MG
5 TABLET ORAL ONCE
Status: DISCONTINUED | OUTPATIENT
Start: 2024-06-14 | End: 2024-06-14

## 2024-06-14 RX ORDER — HYDROXYZINE HYDROCHLORIDE 25 MG/1
25 TABLET, FILM COATED ORAL ONCE
Status: COMPLETED | OUTPATIENT
Start: 2024-06-14 | End: 2024-06-14

## 2024-06-14 RX ADMIN — DIAZEPAM 10 MG: 5 TABLET ORAL at 22:38

## 2024-06-14 ASSESSMENT — COLUMBIA-SUICIDE SEVERITY RATING SCALE - C-SSRS
6. HAVE YOU EVER DONE ANYTHING, STARTED TO DO ANYTHING, OR PREPARED TO DO ANYTHING TO END YOUR LIFE?: NO
1. IN THE PAST MONTH, HAVE YOU WISHED YOU WERE DEAD OR WISHED YOU COULD GO TO SLEEP AND NOT WAKE UP?: NO
2. HAVE YOU ACTUALLY HAD ANY THOUGHTS OF KILLING YOURSELF IN THE PAST MONTH?: NO

## 2024-06-14 ASSESSMENT — ACTIVITIES OF DAILY LIVING (ADL)
ADLS_ACUITY_SCORE: 37
ADLS_ACUITY_SCORE: 35

## 2024-06-15 LAB
AMPHETAMINES UR QL SCN: ABNORMAL
BARBITURATES UR QL SCN: ABNORMAL
BENZODIAZ UR QL SCN: ABNORMAL
BZE UR QL SCN: ABNORMAL
CANNABINOIDS UR QL SCN: ABNORMAL
FENTANYL UR QL: ABNORMAL
OPIATES UR QL SCN: ABNORMAL
PCP QUAL URINE (ROCHE): ABNORMAL

## 2024-06-15 PROCEDURE — 250N000013 HC RX MED GY IP 250 OP 250 PS 637: Performed by: REGISTERED NURSE

## 2024-06-15 PROCEDURE — 250N000013 HC RX MED GY IP 250 OP 250 PS 637

## 2024-06-15 PROCEDURE — 250N000013 HC RX MED GY IP 250 OP 250 PS 637: Performed by: PSYCHIATRY & NEUROLOGY

## 2024-06-15 PROCEDURE — 80307 DRUG TEST PRSMV CHEM ANLYZR: CPT | Performed by: EMERGENCY MEDICINE

## 2024-06-15 PROCEDURE — 250N000013 HC RX MED GY IP 250 OP 250 PS 637: Performed by: EMERGENCY MEDICINE

## 2024-06-15 PROCEDURE — 250N000013 HC RX MED GY IP 250 OP 250 PS 637: Performed by: STUDENT IN AN ORGANIZED HEALTH CARE EDUCATION/TRAINING PROGRAM

## 2024-06-15 PROCEDURE — 99254 IP/OBS CNSLTJ NEW/EST MOD 60: CPT

## 2024-06-15 PROCEDURE — 128N000004 HC R&B CD ADULT

## 2024-06-15 RX ORDER — TRAZODONE HYDROCHLORIDE 100 MG/1
100 TABLET ORAL AT BEDTIME
Status: DISCONTINUED | OUTPATIENT
Start: 2024-06-15 | End: 2024-06-17 | Stop reason: HOSPADM

## 2024-06-15 RX ORDER — ACETAMINOPHEN 325 MG/1
650 TABLET ORAL EVERY 6 HOURS PRN
Status: DISCONTINUED | OUTPATIENT
Start: 2024-06-15 | End: 2024-06-15

## 2024-06-15 RX ORDER — AMOXICILLIN 250 MG
1 CAPSULE ORAL 2 TIMES DAILY PRN
Status: DISCONTINUED | OUTPATIENT
Start: 2024-06-15 | End: 2024-06-17 | Stop reason: HOSPADM

## 2024-06-15 RX ORDER — GABAPENTIN 400 MG/1
400 CAPSULE ORAL 3 TIMES DAILY
Status: DISCONTINUED | OUTPATIENT
Start: 2024-06-15 | End: 2024-06-17 | Stop reason: HOSPADM

## 2024-06-15 RX ORDER — MAGNESIUM HYDROXIDE/ALUMINUM HYDROXICE/SIMETHICONE 120; 1200; 1200 MG/30ML; MG/30ML; MG/30ML
30 SUSPENSION ORAL EVERY 4 HOURS PRN
Status: DISCONTINUED | OUTPATIENT
Start: 2024-06-15 | End: 2024-06-17 | Stop reason: HOSPADM

## 2024-06-15 RX ORDER — PANTOPRAZOLE SODIUM 40 MG/1
40 TABLET, DELAYED RELEASE ORAL
Status: DISCONTINUED | OUTPATIENT
Start: 2024-06-15 | End: 2024-06-17 | Stop reason: HOSPADM

## 2024-06-15 RX ORDER — LOPERAMIDE HCL 2 MG
2 CAPSULE ORAL 4 TIMES DAILY PRN
Status: DISCONTINUED | OUTPATIENT
Start: 2024-06-15 | End: 2024-06-17 | Stop reason: HOSPADM

## 2024-06-15 RX ORDER — ATENOLOL 50 MG/1
50 TABLET ORAL DAILY PRN
Status: DISCONTINUED | OUTPATIENT
Start: 2024-06-15 | End: 2024-06-15

## 2024-06-15 RX ORDER — NICOTINE 21 MG/24HR
1 PATCH, TRANSDERMAL 24 HOURS TRANSDERMAL DAILY
Status: DISCONTINUED | OUTPATIENT
Start: 2024-06-15 | End: 2024-06-17 | Stop reason: HOSPADM

## 2024-06-15 RX ORDER — TRAZODONE HYDROCHLORIDE 50 MG/1
50 TABLET, FILM COATED ORAL
Status: DISCONTINUED | OUTPATIENT
Start: 2024-06-15 | End: 2024-06-15

## 2024-06-15 RX ORDER — METOPROLOL TARTRATE 25 MG/1
25 TABLET, FILM COATED ORAL 2 TIMES DAILY
Status: DISCONTINUED | OUTPATIENT
Start: 2024-06-15 | End: 2024-06-17 | Stop reason: HOSPADM

## 2024-06-15 RX ORDER — FOLIC ACID 1 MG/1
1 TABLET ORAL DAILY
Status: DISCONTINUED | OUTPATIENT
Start: 2024-06-16 | End: 2024-06-17 | Stop reason: HOSPADM

## 2024-06-15 RX ORDER — ONDANSETRON 4 MG/1
4 TABLET, ORALLY DISINTEGRATING ORAL EVERY 6 HOURS PRN
Status: DISCONTINUED | OUTPATIENT
Start: 2024-06-15 | End: 2024-06-17 | Stop reason: HOSPADM

## 2024-06-15 RX ORDER — QUETIAPINE FUMARATE 25 MG/1
25 TABLET, FILM COATED ORAL 2 TIMES DAILY PRN
Status: DISCONTINUED | OUTPATIENT
Start: 2024-06-15 | End: 2024-06-17 | Stop reason: HOSPADM

## 2024-06-15 RX ORDER — GABAPENTIN 400 MG/1
400 CAPSULE ORAL 3 TIMES DAILY
Status: DISCONTINUED | OUTPATIENT
Start: 2024-06-15 | End: 2024-06-15

## 2024-06-15 RX ORDER — MULTIPLE VITAMINS W/ MINERALS TAB 9MG-400MCG
1 TAB ORAL DAILY
Status: DISCONTINUED | OUTPATIENT
Start: 2024-06-15 | End: 2024-06-17 | Stop reason: HOSPADM

## 2024-06-15 RX ORDER — DIAZEPAM 5 MG
5-20 TABLET ORAL EVERY 30 MIN PRN
Status: DISCONTINUED | OUTPATIENT
Start: 2024-06-15 | End: 2024-06-17 | Stop reason: HOSPADM

## 2024-06-15 RX ORDER — QUETIAPINE FUMARATE 100 MG/1
100 TABLET, FILM COATED ORAL AT BEDTIME
Status: DISCONTINUED | OUTPATIENT
Start: 2024-06-15 | End: 2024-06-15

## 2024-06-15 RX ORDER — ACETAMINOPHEN 325 MG/1
650 TABLET ORAL EVERY 4 HOURS PRN
Status: DISCONTINUED | OUTPATIENT
Start: 2024-06-15 | End: 2024-06-17 | Stop reason: HOSPADM

## 2024-06-15 RX ORDER — DIAZEPAM 10 MG
10 TABLET ORAL ONCE
Status: COMPLETED | OUTPATIENT
Start: 2024-06-15 | End: 2024-06-15

## 2024-06-15 RX ORDER — HYDROXYZINE HYDROCHLORIDE 25 MG/1
25 TABLET, FILM COATED ORAL EVERY 4 HOURS PRN
Status: DISCONTINUED | OUTPATIENT
Start: 2024-06-15 | End: 2024-06-17 | Stop reason: HOSPADM

## 2024-06-15 RX ADMIN — GABAPENTIN 400 MG: 400 CAPSULE ORAL at 20:59

## 2024-06-15 RX ADMIN — NICOTINE POLACRILEX 2 MG: 2 GUM, CHEWING BUCCAL at 17:51

## 2024-06-15 RX ADMIN — NICOTINE 1 PATCH: 21 PATCH, EXTENDED RELEASE TRANSDERMAL at 16:35

## 2024-06-15 RX ADMIN — METOPROLOL TARTRATE 25 MG: 25 TABLET, FILM COATED ORAL at 20:59

## 2024-06-15 RX ADMIN — TRAZODONE HYDROCHLORIDE 100 MG: 100 TABLET ORAL at 21:01

## 2024-06-15 RX ADMIN — DIAZEPAM 10 MG: 5 TABLET ORAL at 17:51

## 2024-06-15 RX ADMIN — DIAZEPAM 10 MG: 5 TABLET ORAL at 14:51

## 2024-06-15 RX ADMIN — ACETAMINOPHEN 650 MG: 325 TABLET, FILM COATED ORAL at 16:38

## 2024-06-15 RX ADMIN — GABAPENTIN 400 MG: 400 CAPSULE ORAL at 12:06

## 2024-06-15 RX ADMIN — DIAZEPAM 10 MG: 10 TABLET ORAL at 09:46

## 2024-06-15 RX ADMIN — DIAZEPAM 10 MG: 5 TABLET ORAL at 11:58

## 2024-06-15 RX ADMIN — FOLIC ACID 1 MG: 1 TABLET ORAL at 08:03

## 2024-06-15 RX ADMIN — DIAZEPAM 10 MG: 5 TABLET ORAL at 08:03

## 2024-06-15 RX ADMIN — THIAMINE HCL TAB 100 MG 100 MG: 100 TAB at 08:03

## 2024-06-15 RX ADMIN — GABAPENTIN 400 MG: 400 CAPSULE ORAL at 14:52

## 2024-06-15 RX ADMIN — DIAZEPAM 10 MG: 5 TABLET ORAL at 04:41

## 2024-06-15 RX ADMIN — DIAZEPAM 10 MG: 5 TABLET ORAL at 13:05

## 2024-06-15 RX ADMIN — Medication 1 TABLET: at 14:52

## 2024-06-15 RX ADMIN — FLUOXETINE HYDROCHLORIDE 40 MG: 20 CAPSULE ORAL at 12:06

## 2024-06-15 RX ADMIN — DIAZEPAM 10 MG: 5 TABLET ORAL at 20:59

## 2024-06-15 ASSESSMENT — ACTIVITIES OF DAILY LIVING (ADL)
ADLS_ACUITY_SCORE: 40
ADLS_ACUITY_SCORE: 40
ADLS_ACUITY_SCORE: 37
ADLS_ACUITY_SCORE: 40
ADLS_ACUITY_SCORE: 37
ADLS_ACUITY_SCORE: 40
ADLS_ACUITY_SCORE: 40
ADLS_ACUITY_SCORE: 37
ADLS_ACUITY_SCORE: 40
ADLS_ACUITY_SCORE: 40
ADLS_ACUITY_SCORE: 37
ADLS_ACUITY_SCORE: 40
ADLS_ACUITY_SCORE: 37
ADLS_ACUITY_SCORE: 57
ADLS_ACUITY_SCORE: 37
ADLS_ACUITY_SCORE: 40
ADLS_ACUITY_SCORE: 37

## 2024-06-15 NOTE — TELEPHONE ENCOUNTER
11:34PM - Dr. Carney accepts pt for detox. Placed pt in queue    11:50PM - Notified unit [facility]. Unit is short staffed tonight - pt will transfer on day shift    11:52PM - Notified ED RN Yenni. Also requested UDS needs to be collected prior to transfer to unit    11:54PM - Text paged ANS    R: 3A / Jaclyn / CD      Indicia completed

## 2024-06-15 NOTE — MEDICATION SCRIBE - ADMISSION MEDICATION HISTORY
Medication Scribe Admission Medication History    Admission medication history is complete. The information provided in this note is only as accurate as the sources available at the time of the update.    Information Source(s): Patient, Hospital records, and CareEverywhere/SureScripts via in-person    Pertinent Information: Patient reports he usually takes just Gabapentin and Prozac. He last used Seroquel 1 month ago for sleep.     Changes made to PTA medication list:  Added: None  Deleted: Folic acid 1 mg daily, Methocarbamol 750 mg TID PRN, naltrexone 50 mg daily, omeprazole 20 mg daily, Quetiapine 100 mg at bedtime, Quetiapine 25 mg TID, PRN.  Changed: None    Allergies reviewed with patient and updates made in EHR: yes    Medication History Completed By: Minh Ojeda MD 6/15/2024 7:44 AM    PTA Med List   Medication Sig Last Dose    acetaminophen (TYLENOL) 325 MG tablet Take 650 mg by mouth every 6 hours as needed for pain or headaches 6/14/2024 at pm    FLUoxetine (PROZAC) 40 MG capsule Take 1 capsule (40 mg) by mouth daily Past Week at unknown    gabapentin (NEURONTIN) 400 MG capsule Take 1 capsule (400 mg) by mouth 3 times daily 6/14/2024 at pm    QUEtiapine (SEROQUEL) 100 MG tablet Take 1 tablet (100 mg) by mouth at bedtime Past Month at unknown       No

## 2024-06-15 NOTE — PROGRESS NOTES
06/15/24 1356   Patient Belongings   Did you bring any home meds/supplements to the hospital?  No   Patient Belongings other (see comments)   Belongings Search Yes   Clothing Search Yes   Second Staff Jorge     Storage bin: shorts    Box in med room: vape, wallet, no phone    Security ev #  [id number]: [employer] card, [employer] card    A             ADMISSION:    I am responsible for any personal items that are not sent to the safe or pharmacy. [facility] is not responsible for loss, theft or damage of any property in my possession.    Patient Signature _________________________________________ Date/Time _____________________    Staff Signature ___________________________________________ Date/Time _____________________    2nd Staff person, if patient is unable/unwilling to sign    ________________________________________________________ Date/Time _____________________    DISCHARGE:    All personal items have been returned to me.    Patient Signature _________________________________________ Date/Time _____________________    Staff Signature ___________________________________________ Date/Time _____________________

## 2024-06-15 NOTE — TELEPHONE ENCOUNTER
S: [facility] Felipe , Provider Bang calling at 11:20 PM with clinical on a 48 year old/Male presenting for alcohol detox.     B: Pt presents for ETOH detox.   Currently reports drinking a quart of tequila a day for months.    Patient reports last use was prior to arrival.  Pt JIMMY: 0.2  Pt  denies hx of DT  Pt  denies hx of seizures. Last seizure: N/A  Pt endorsing the following symptoms of withdrawal: Tachycardic  MSSA Score: 8    Pt denies acute mental health or medical concerns.   Pt denies other drug use: None Amount/frequency: N/A    Does Pt have a detox care plan in [facility]? No  Does pt present with specific needs, assistive devices, or exclusionary criteria? None  Is the patient ambulating, eating and drinking in the ED? Yes    A: Pt meets criteria to be presented for IP detox admission. Patient is voluntary    COVID Symptoms: No  If yes, COVID test required   Utox: Ordered, not yet collected  Magnesium: WNL  CMP: Abnormalities: Chloride 97  CBC: Abnormalities: WBC 11.3  HCG: N/A     R: Patient cleared and ready for behavioral bed placement: Yes    Pt is meeting criteria for presentation to 3A/CD    Does Patient need a Transfer Center request created? No, Pt is located within [facility] ED, [facility] ED, or [place] [facility]

## 2024-06-15 NOTE — PLAN OF CARE
Problem: Adult Inpatient Plan of Care  Goal: Plan of Care Review  Outcome: Progressing   Problem: Alcohol Withdrawal  Goal: Alcohol Withdrawal Symptom Control  Outcome: Progressing   Goal Outcome Evaluation:    Plan of Care Reviewed With: patient        KRYSTAL Moreno is a 48 year old year old male with a chief complaint of Alcohol Problem (Detox from alcohol, last drink prior to arrival, drinking tequila, no seizure history.)    S = Situation:   Admit  B  = Background:   Pt admitted for alcohol withdrawal.  Pt reports drinking a quart of tequilla a day for the last few months.  He is a smoker and he is hypertensive.  Pt has been staying with his mother.  Pt says that since he was 9 years old his longest period of sobriety has been 2 months.  Pt has the friends, groups and resources to maintain sobriety but has struggled in spite of it all.  A  =  Assessment:   Vital Signs: BP (!) 155/89 (BP Location: Left arm, Patient Position: Chair, Cuff Size: Adult Large)   Pulse 106   Temp 98.1  F (36.7  C) (Oral)   Resp 16   Ht 1.829 m (6')   Wt 86.2 kg (190 lb)   SpO2 98%   BMI 25.77 kg/m    Alert and oriented X 4, no SI no SIB.  Pt endorses anxiety.  He has been hypertensive.  Pt's affect is flat, his mood anxious.  His gait is steady and his thoughts follow.  Pt has a tremor, marginal appetite and is marginally diaphoretic.  He continues to be tachycardic.  He has been taking adequate oral fluids--no IV fluids were given in the ER.  Pt is not interested in going to treatment--he reports that he has been to treatment 6 times and understands how to be sober he just hasn't been able to practice a sober life.    R =   Request or Recommendation:   Alcohol withdrawal monitoring, Psychiatry to evaluate, case management and medicine to see.

## 2024-06-15 NOTE — ED PROVIDER NOTES
[facility] EMERGENCY DEPARTMENT ([facility])    6/14/24       ED PROVIDER NOTE    History     Chief Complaint   Patient presents with    Alcohol Problem     Detox from alcohol, last drink prior to arrival, drinking tequila, no seizure history.     HPI  Georges Moreno is a 48 year old male with a past medical history of alcohol problem, who presents to the ED for evaluation of detox.  The patient reports drinking a quart of hard alcohol per day for months.  No seizures or delirium tremens.  Last drink was just prior to arrival.  Uses marijuana daily, no other drugs.  No hallucinations.  No mental health concerns.  Denies any medical concerns tonight.    Past Medical History  Past Medical History:   Diagnosis Date    ADHD     Alcohol use disorder, severe, dependence (H)     Atrial fibrillation (H)     Depression, major, single episode     moderate    PUSHPA (generalized anxiety disorder)     HA (headache)     Pancreatitis     Paroxysmal atrial fibrillation (H)     Perirectal abscess     Personal history of alcoholism (H)     Polysubstance (excluding opioids) dependence, daily use (H)     PTSD (post-traumatic stress disorder)     Substance abuse (H)     Tobacco dependence      Past Surgical History:   Procedure Laterality Date    INCISION AND DRAINAGE PERIRECTAL ABSCESS      SOFT TISSUE SURGERY      Excision of pilonidal cyst    SOFT TISSUE SURGERY      removal of rectal cyst     acetaminophen (TYLENOL) 325 MG tablet  FLUoxetine (PROZAC) 40 MG capsule  folic acid (FOLVITE) 1 MG tablet  gabapentin (NEURONTIN) 400 MG capsule  methocarbamol (ROBAXIN) 750 MG tablet  naltrexone (DEPADE/REVIA) 50 MG tablet  omeprazole (PRILOSEC) 20 MG DR capsule  QUEtiapine (SEROQUEL) 100 MG tablet  QUEtiapine (SEROQUEL) 25 MG tablet      Allergies   Allergen Reactions    Sertraline Other (See Comments)     Excessive fatigue  Reaction(s): Excessive fatigue.    Duloxetine Hives and Rash     Rash, hives     Family History  Family History    Problem Relation Age of Onset    Family History Negative Mother     Alcohol/Drug Father     Diabetes Father     Leukemia Father     Alcohol/Drug Paternal Uncle     Alcoholism Father     Alcoholism Brother      Social History   Social History     Tobacco Use    Smoking status: Every Day     Current packs/day: 1.50     Average packs/day: 1.5 packs/day for 15.0 years (22.5 ttl pk-yrs)     Types: Cigarettes    Smokeless tobacco: Former    Tobacco comments:     a little over a pack a day   Vaping Use    Vaping status: Never Used   Substance Use Topics    Alcohol use: Yes     Alcohol/week: 14.0 standard drinks of alcohol    Drug use: Yes     Types: Marijuana     Comment: today      A medically appropriate review of systems was performed with pertinent positives and negatives noted in the HPI, and all other systems negative.    Physical Exam      Physical Exam  Physical Exam   Constitutional: oriented to person, place, and time. appears well-developed and well-nourished.   HENT:   Head: Normocephalic and atraumatic.   Neck: Normal range of motion.   Pulmonary/Chest: Effort normal. No respiratory distress.   Cardiac: No murmurs, rubs, gallops. RRR.  Abdominal: Abdomen soft, nontender, nondistended. No rebound tenderness.  MSK: Long bones without deformity or evidence of trauma  Neurological: alert and oriented to person, place, and time.  Stable gait.  No tremor.  No tongue fasciculations.  No nystagmus.  Pupils 3 mm and reactive bilaterally.  Skin: Skin is warm and dry.   Psychiatric:  normal mood and affect.  behavior is normal. Thought content normal.       ED Course, Procedures, & Data      Procedures            Results for orders placed or performed during the hospital encounter of 06/14/24   Comprehensive metabolic panel     Status: Abnormal (Preliminary result)   Result Value Ref Range    Sodium      Potassium 4.1 3.4 - 5.3 mmol/L    Carbon Dioxide (CO2) 22 22 - 29 mmol/L    Anion Gap      Urea Nitrogen 14.7 6.0 -  20.0 mg/dL    Creatinine 0.78 0.67 - 1.17 mg/dL    GFR Estimate >90 >60 mL/min/1.73m2    Calcium 9.6 8.6 - 10.0 mg/dL    Chloride 97 (L) 98 - 107 mmol/L    Glucose 87 70 - 99 mg/dL    Alkaline Phosphatase 85 40 - 150 U/L    AST 27 0 - 45 U/L    ALT 21 0 - 70 U/L    Protein Total 7.9 6.4 - 8.3 g/dL    Albumin 4.9 3.5 - 5.2 g/dL    Bilirubin Total 0.8 <=1.2 mg/dL   Magnesium     Status: Normal   Result Value Ref Range    Magnesium 1.9 1.7 - 2.3 mg/dL   CBC with platelets and differential     Status: Abnormal   Result Value Ref Range    WBC Count 11.3 (H) 4.0 - 11.0 10e3/uL    RBC Count 5.57 4.40 - 5.90 10e6/uL    Hemoglobin 17.6 13.3 - 17.7 g/dL    Hematocrit 49.9 40.0 - 53.0 %    MCV 90 78 - 100 fL    MCH 31.6 26.5 - 33.0 pg    MCHC 35.3 31.5 - 36.5 g/dL    RDW 13.2 10.0 - 15.0 %    Platelet Count 326 150 - 450 10e3/uL    % Neutrophils 71 %    % Lymphocytes 21 %    % Monocytes 7 %    % Eosinophils 0 %    % Basophils 0 %    % Immature Granulocytes 1 %    NRBCs per 100 WBC 0 <1 /100    Absolute Neutrophils 8.1 1.6 - 8.3 10e3/uL    Absolute Lymphocytes 2.3 0.8 - 5.3 10e3/uL    Absolute Monocytes 0.7 0.0 - 1.3 10e3/uL    Absolute Eosinophils 0.0 0.0 - 0.7 10e3/uL    Absolute Basophils 0.1 0.0 - 0.2 10e3/uL    Absolute Immature Granulocytes 0.1 <=0.4 10e3/uL    Absolute NRBCs 0.0 10e3/uL   Alcohol breath test POCT     Status: Abnormal   Result Value Ref Range    Alcohol Breath Test 0.2 (A) 0.00 - 0.01   CBC with platelets differential     Status: Abnormal    Narrative    The following orders were created for panel order CBC with platelets differential.  Procedure                               Abnormality         Status                     ---------                               -----------         ------                     CBC with platelets and d...[[id number]]  Abnormal            Final result                 Please view results for these tests on the individual orders.     Medications - No data to display  Labs Ordered  and Resulted from Time of ED Arrival to Time of ED Departure - No data to display  No orders to display          Critical care was not performed.     Medical Decision Making  The patient's presentation was of high complexity (an acute health issue posing potential threat to life or bodily function).    The patient's evaluation involved:  ordering and/or review of 3+ test(s) in this encounter (see separate area of note for details)  discussion of management or test interpretation with another health professional (chemical dependency intake provider)    The patient's management necessitated high risk (a decision regarding hospitalization).    Assessment & Plan    MDM  Patient resenting with request for detox.  He has mild withdrawal and was treated with Valium.  Labs here are reassuring otherwise.  He has no medical complaints.  He will be admitted to a detox bed.    I have reviewed the nursing notes. I have reviewed the findings, diagnosis, plan and need for follow up with the patient.    New Prescriptions    No medications on file       Final diagnoses:   Alcohol dependence with intoxication with complication (H)       Rodri Cuenca MD  [facility] EMERGENCY DEPARTMENT  6/14/2024     Rodri Cuenca MD  06/14/24 [id number]

## 2024-06-15 NOTE — CONSULTS
"[facility]  Consult Note - Hospitalist Service  Date of Admission:  6/14/2024  Consult Requested by: Sandra Carney MD   Reason for Consult: \"Detox\"    Assessment & Plan   Georges Moreno is a 48 year old male admitted on 6/14/2024. He has a PMHx notable for alcohol use disorder, peripheral neuropathy, anxiety, depression, PTSD, paroxysmal atrial fibrillation, hx of caraballo's esophagus, and HTN. He presented to the ED seeking detox. He is currently admitted to Station [facility].     Alcohol use disorder  Acute alcohol withdrawal  Patient reports drinking a quart of hard liquor daily for months. Denies history of withdrawal seizures and DT's. UDS positive for benzos and cannabinoids.   - psychiatry primary   - agree with vitamin supplementation including of thiamine and folate  - continue MSSA with valium  - PRN Tylenol, Maalox, atenolol, hydroxyzine, and trazodone  - counseling, group therapies, community resources    Paroxysmal atrial fibrillation  HTN  Not on anticoagulation. Was previously on metoprolol 25 mg BID but was recently stopped during prior admission ~ 1 month ago without a documented reason by psychiatry. Required   - resume PTA metoprolol tartrate 25 mg daily  - notify medicine if SBP > 170 despite adequate treatment of withdrawal   - if BP fails to normalize despite completion of withdrawal, patient should follow-up with PCP within 1 week of discharge     Mild Leukocytosis   WBC 11.3. Previous elevated noted with prior admission for withdrawal. No symptoms of acute infection.     Anion gap metabolic acidosis  AG 23 upon admission, remainder of electrolytes within normal limits. Suspect abnormality is d/t excessive alcohol consumption in the setting of poor solute intake.   - encourage alcohol cessation and oral/fluid intake   - trend CMP with PCP at discharge     Anxiety  Depression  PTSD   - PTA regimen: fluoxetine 40 mg daily, Seroquel 100 mg at bedtime "     Peripheral neuropathy   - PTA gabapentin 400 mg TID     Adenomatous polyps of colon  Removed tubular adenoma s/p colonoscopy 5/9/24  Caraballo's esophagus s/p EGD 5/9/24  - noted; previously on omeprazole 20 mg daily but has not been taking  - start protonix 40 mg daily   - follow up as scheduled with GI vs. PCP     Patient is stable at this time, Internal Medicine will sign off. Please reach out with any future questions or concerns.     Yeny Bejarano DNP, [facility]  Internal Medicine EMA Hospitalist  [facility]    Clinically Significant Risk Factors Present on Admission             # Anion Gap Metabolic Acidosis: Highest Anion Gap = 23 mmol/L in last 2 days, will monitor and treat as appropriate                  # Overweight: Estimated body mass index is 25.77 kg/m  as calculated from the following:    Height as of 5/25/24: 1.829 m (6').    Weight as of 5/25/24: 86.2 kg (190 lb).       # Financial/Environmental Concerns:           Yeny Bejarano NP  Hospitalist Service  Securely message with [employer] (more info)  Text page via [employer] Paging/Directory   ______________________________________________________________________    Chief Complaint   Detox    History is obtained from the patient and electronic health record    History of Present Illness   Georges Moreno is a 48 year old male admitted on 6/14/2024. He has a PMHx notable for alcohol use disorder, peripheral neuropathy, anxiety, depression, PTSD, paroxysmal atrial fibrillation, hx of caraballo's esophagus, and HTN. He presented to the ED seeking detox. He is currently admitted to Station 3[facility].     Georges was seen out in the lounge. He says that he feels okay, anxious and tired as he didn't get much sleep while in the ER last night. He is looking forward to getting sleep in a quiet room this evening. We discuss his metoprolol and he isn't sure why his metoprolol got discontinued either. We discuss the importance of this medication given his cardiac  history and he would like to resume this. He has no further questions or concerns at this time.     Past Medical History    Past Medical History:   Diagnosis Date    ADHD     Alcohol use disorder, severe, dependence (H)     Atrial fibrillation (H)     Depression, major, single episode     moderate    PUSHPA (generalized anxiety disorder)     HA (headache)     Pancreatitis     Paroxysmal atrial fibrillation (H)     Perirectal abscess     Personal history of alcoholism (H)     Polysubstance (excluding opioids) dependence, daily use (H)     PTSD (post-traumatic stress disorder)     Substance abuse (H)     Tobacco dependence        Past Surgical History   Past Surgical History:   Procedure Laterality Date    INCISION AND DRAINAGE PERIRECTAL ABSCESS      SOFT TISSUE SURGERY      Excision of pilonidal cyst    SOFT TISSUE SURGERY      removal of rectal cyst       Medications   Medications Prior to Admission   Medication Sig Dispense Refill Last Dose    acetaminophen (TYLENOL) 325 MG tablet Take 650 mg by mouth every 6 hours as needed for pain or headaches   6/14/2024 at pm    FLUoxetine (PROZAC) 40 MG capsule Take 1 capsule (40 mg) by mouth daily 30 capsule 0 Past Week at unknown    gabapentin (NEURONTIN) 400 MG capsule Take 1 capsule (400 mg) by mouth 3 times daily 90 capsule 0 6/14/2024 at pm    QUEtiapine (SEROQUEL) 100 MG tablet Take 1 tablet (100 mg) by mouth at bedtime 30 tablet 0 Past Month at unknown           Physical Exam   Vital Signs: Temp: 98.1  F (36.7  C) Temp src: Oral BP: (!) 148/105 Pulse: 100   Resp: 16 SpO2: 98 % O2 Device: None (Room air)    Weight: 0 lbs 0 oz    GENERAL: Alert and awake. Answering questions appropriately. Oriented x 3. NAD. Pleasant and conversational   HEENT: Anicteric sclera. EOMI. Mucous membranes moist   CARDIOVASCULAR: RRR. S1, S2. No murmurs, rubs, or gallops.   RESPIRATORY: Effort normal on RA.   GI: Abdomen soft, non-tender abdomen   MUSCULOSKELETAL: Moves all extremities.    EXTREMITIES: No peripheral edema. No calf asymmetry, erythema, or tenderness.   NEUROLOGICAL: No focal deficits. Moving all extremities symmetrically.   SKIN: Intact. Warm and dry. No jaundice. No rashes on exposed skin     Medical Decision Making       45 MINUTES SPENT BY ME on the date of service doing chart review, history, exam, documentation & further activities per the note.      Data   Imaging results reviewed over the past 24 hrs:   No results found for this or any previous visit (from the past 24 hour(s)).  Recent Labs   Lab 06/14/24  2211   WBC 11.3*   HGB 17.6   MCV 90         POTASSIUM 4.1   CHLORIDE 97*   CO2 22   BUN 14.7   CR 0.78   ANIONGAP 23*   LEILA 9.6   GLC 87   ALBUMIN 4.9   PROTTOTAL 7.9   BILITOTAL 0.8   ALKPHOS 85   ALT 21   AST 27

## 2024-06-15 NOTE — PLAN OF CARE
Goal Outcome Evaluation:    Problem: Alcohol Withdrawal  Goal: Alcohol Withdrawal Symptom Control  Outcome: Progressing     Patient was sleeping in the beginning of the shift. He came out and socialized with peers. Ate dinner--good appetite. After dinner, he opted to go rest. He was woken up for AA meeting as requested, but he declined to attend. Patient endorsed anxiety 6/10. Denied depression, SI,SIB, Hallucinations.    MSSA at 5:50pm was 9; 10mg of valium was administered.  BP (!) 129/91   Pulse 97   Temp 98.1  F (36.7  C) (Oral)   Resp 16   Ht 1.829 m (6')   Wt 86.2 kg (190 lb)   SpO2 96%   BMI 25.77 kg/m      MSSA at 9pm was 8; 10mg of valium was administered.  BP (!) 147/92   Pulse 91   Temp 98.1  F (36.7  C) (Oral)   Resp 16   Ht 1.829 m (6')   Wt 86.2 kg (190 lb)   SpO2 97%   BMI 25.77 kg/m      Discharge plan: patient is unsure about what he wants to do. He was encouraged to talk to our .    We'll continue to monitor.

## 2024-06-16 LAB
CHOLEST SERPL-MCNC: 221 MG/DL
GGT SERPL-CCNC: 33 U/L (ref 8–61)
HDLC SERPL-MCNC: 57 MG/DL
LDLC SERPL CALC-MCNC: 129 MG/DL
NONHDLC SERPL-MCNC: 164 MG/DL
TRIGL SERPL-MCNC: 175 MG/DL
TSH SERPL DL<=0.005 MIU/L-ACNC: 1.3 UIU/ML (ref 0.3–4.2)

## 2024-06-16 PROCEDURE — G0177 OPPS/PHP; TRAIN & EDUC SERV: HCPCS

## 2024-06-16 PROCEDURE — 99223 1ST HOSP IP/OBS HIGH 75: CPT | Mod: AI | Performed by: REGISTERED NURSE

## 2024-06-16 PROCEDURE — 250N000013 HC RX MED GY IP 250 OP 250 PS 637: Performed by: REGISTERED NURSE

## 2024-06-16 PROCEDURE — 36415 COLL VENOUS BLD VENIPUNCTURE: CPT | Performed by: PSYCHIATRY & NEUROLOGY

## 2024-06-16 PROCEDURE — 250N000013 HC RX MED GY IP 250 OP 250 PS 637: Performed by: PSYCHIATRY & NEUROLOGY

## 2024-06-16 PROCEDURE — 84443 ASSAY THYROID STIM HORMONE: CPT | Performed by: PSYCHIATRY & NEUROLOGY

## 2024-06-16 PROCEDURE — 128N000004 HC R&B CD ADULT

## 2024-06-16 PROCEDURE — 250N000013 HC RX MED GY IP 250 OP 250 PS 637

## 2024-06-16 PROCEDURE — 80061 LIPID PANEL: CPT | Performed by: PSYCHIATRY & NEUROLOGY

## 2024-06-16 PROCEDURE — H2032 ACTIVITY THERAPY, PER 15 MIN: HCPCS

## 2024-06-16 PROCEDURE — 250N000013 HC RX MED GY IP 250 OP 250 PS 637: Performed by: STUDENT IN AN ORGANIZED HEALTH CARE EDUCATION/TRAINING PROGRAM

## 2024-06-16 PROCEDURE — 82977 ASSAY OF GGT: CPT | Performed by: PSYCHIATRY & NEUROLOGY

## 2024-06-16 RX ADMIN — FOLIC ACID 1 MG: 1 TABLET ORAL at 08:07

## 2024-06-16 RX ADMIN — GABAPENTIN 400 MG: 400 CAPSULE ORAL at 20:27

## 2024-06-16 RX ADMIN — GABAPENTIN 400 MG: 400 CAPSULE ORAL at 14:41

## 2024-06-16 RX ADMIN — PANTOPRAZOLE SODIUM 40 MG: 40 TABLET, DELAYED RELEASE ORAL at 08:07

## 2024-06-16 RX ADMIN — DIAZEPAM 10 MG: 5 TABLET ORAL at 08:07

## 2024-06-16 RX ADMIN — ACETAMINOPHEN 650 MG: 325 TABLET, FILM COATED ORAL at 07:40

## 2024-06-16 RX ADMIN — Medication 1 TABLET: at 08:07

## 2024-06-16 RX ADMIN — THIAMINE HCL TAB 100 MG 100 MG: 100 TAB at 08:07

## 2024-06-16 RX ADMIN — METOPROLOL TARTRATE 25 MG: 25 TABLET, FILM COATED ORAL at 08:07

## 2024-06-16 RX ADMIN — QUETIAPINE 25 MG: 25 TABLET ORAL at 12:28

## 2024-06-16 RX ADMIN — METOPROLOL TARTRATE 25 MG: 25 TABLET, FILM COATED ORAL at 20:27

## 2024-06-16 RX ADMIN — FLUOXETINE HYDROCHLORIDE 40 MG: 20 CAPSULE ORAL at 08:07

## 2024-06-16 RX ADMIN — NICOTINE 1 PATCH: 21 PATCH, EXTENDED RELEASE TRANSDERMAL at 08:07

## 2024-06-16 RX ADMIN — GABAPENTIN 400 MG: 400 CAPSULE ORAL at 08:07

## 2024-06-16 ASSESSMENT — ACTIVITIES OF DAILY LIVING (ADL)
ADLS_ACUITY_SCORE: 40
HYGIENE/GROOMING: INDEPENDENT
ADLS_ACUITY_SCORE: 40
DRESS: INDEPENDENT
ADLS_ACUITY_SCORE: 40
ORAL_HYGIENE: INDEPENDENT
ADLS_ACUITY_SCORE: 40

## 2024-06-16 NOTE — PLAN OF CARE
"Goal Outcome Evaluation:    Problem: Alcohol Withdrawal  Goal: Alcohol Withdrawal Symptom Control  Outcome: Progressing     Patient has been visible in the milieu. He continues to be monitored for alcohol withdrawal symptoms. He endorsed anxiety 3/10. He reported that his depression was \"situational because of the dumb things I do.\"    MSSA at 4pm was 4.  BP (!) 127/92 (BP Location: Left arm)   Pulse 79   Temp 97.6  F (36.4  C) (Temporal)   Resp 16   Ht 1.829 m (6')   Wt 86.2 kg (190 lb)   SpO2 96%   BMI 25.77 kg/m      MSSA at 8pm was 5.  /78, P 87, T 97.5, R 16, SpO2 96%. Last valium was given at 08:07am today.    Discharge plan: Patient plans to discharge home and attend AA.    Patient would like to be woken up at midnight for his withdrawal assessment and to be given his trazodone at that time.    We'll continue to monitor.  "

## 2024-06-16 NOTE — PROGRESS NOTES
Case Management:     Writer checked-in and introduced role to pt's care and how to assist pt during their stay. Inquired with pt about what they are needing during their stay and what they are considering for their aftercare plans.     Patient reports no interest in attending CD residential/outpatient treatment at this time. Reports he has a strong support network and attends AA weekly, and has a therapist. Reports he will detox only and return to his mother's home.     Living Situation:  Lives at home with his mother.     Insurance:  [employer] [place]     Legal Status  Voluntary     Substance Use Assessment Status:  None needed. Patient declined     Referral Status:  None     Established Services or Current Support  Will continue to attend individual therapy services and will attend AA     Next Steps and Discharge Plan or Goal:  Complete detox. Discharge to home.     Name/Credentials Divya Champagne, [facility], LADC
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
## 2024-06-16 NOTE — PLAN OF CARE
Problem: Alcohol Withdrawal  Goal: Alcohol Withdrawal Symptom Control  Outcome: Progressing   Goal Outcome Evaluation:    Plan of Care Reviewed With: patient       Patient has been awake and active in morning milieu.  He is cooperative and medication compliant  Pt's affect is blunted, he is calm.  He continues to have withdrawal symptoms but they are improved.  Pt complains of feeling hot/cold and sweaty, anxious and tremulous.  Pt has deferred case management and offerings of treatment. Pt is alert and oriented X 4 and denies SI, SIB.  Will continue to monitor and assist with discharge planning.

## 2024-06-16 NOTE — PROGRESS NOTES
Rehab Group    Start time: 1645  End time: 1730  Patient time total: 45 minutes    attended full group    #4 attended   Group Type: recreation   Group Topic Covered: healthy leisure time       Group Session Detail:  TR leisure group     Patient Response/Contribution:  cooperative with task and actively engaged       Patient Detail:    Pt attended the structured Therapeutic Recreation group, participating in a group activity. Pt participated in group discussion and leisure activity as a healthy outlet to gain self-esteem, manage behaviors, improve social skills, and decrease isolation.  Pt remained focused and engaged throughout group activity.  Pt participated in the group discussion about healthy outlets and participated in the group activity, contributing to the clues and descriptions for the game.         Activity Therapy Per 15 minutes () Other Rehab therapies    Patient Active Problem List   Diagnosis    Thoracic or lumbosacral neuritis or radiculitis, unspecified    Screening for cardiovascular condition    Alcohol dependence with withdrawal (H)    Atrial fibrillation (H)    Paroxysmal atrial fibrillation (H)    Generalized anxiety disorder    Personal history of alcoholism (H)    Cigarette nicotine dependence with withdrawal    Alcohol withdrawal syndrome without complication (H)    Cannabis use disorder, moderate, dependence (H)    Alcohol withdrawal with complication with inpatient treatment, with unspecified complication (H)    Palpitations    Palpitation    Alcohol abuse    Anxiety    Elevated troponin    Alcohol withdrawal syndrome with complication (H)    Elevated lactic acid level    Alcohol withdrawal (H)    Recurrent major depression (H24)    Alcohol withdrawal, uncomplicated (H)    Laceration of left index finger without damage to nail, foreign body presence unspecified, initial encounter    Alcohol dependence with intoxication with complication (H)    Acute alcoholism (H)

## 2024-06-16 NOTE — PLAN OF CARE
Problem: Alcohol Withdrawal  Goal: Alcohol Withdrawal Symptom Control  Outcome: Progressing   Goal Outcome Evaluation:          This patient is observed through the night for acute alcohol withdrawal symptoms. He scored 4 and 4 on MSSA; RN gave no prn valium. He appeared comfortable while sleeping. Breathing was quiet and spontaneous. No safety or behavioral issues were reported or noted.The team will continue to monitor.

## 2024-06-16 NOTE — PLAN OF CARE
Rehab Group    Start time: 13:15  End time: 14:05  Patient time total: 50 minutes    attended full group    #2 attended   Group Type: occupational therapy and relaxation   Group Topic Covered: balanced lifestyle, coping skills, emotional regulation, mindfulness, and relaxation      Group Session Detail:  Patient actively participated in a relaxation skills group.  facilitated a sampler of four various relaxation techniques: deep breathing, imagery, progressive muscle relaxation, and gentle stretching. Group was facilitated in order to promote: positive relaxation and coping skills, encourage insight and self-reflection, promote a positive change, manage behaviors, and improve focus.      Patient Response/Contribution:  cooperative with task, organized, supportive of peers, socially appropriately, requested more information on topic, listened actively, attentive, and actively engaged     Patient Detail:  Patient was a fully engaged and active participant throughout full duration of group. Patient appeared receptive and understanding of educational content offered throughout group and asked appropriate clarifying questions. Patient remained socially appropriate throughout group. No safety or behavioral concerns identified in group setting. Patient endorsed increased feelings of relaxation/calm at conclusion of group. Pleasant and cooperative group participant.        Patient Active Problem List   Diagnosis    Thoracic or lumbosacral neuritis or radiculitis, unspecified    Screening for cardiovascular condition    Alcohol dependence with withdrawal (H)    Atrial fibrillation (H)    Paroxysmal atrial fibrillation (H)    Generalized anxiety disorder    Personal history of alcoholism (H)    Cigarette nicotine dependence with withdrawal    Alcohol withdrawal syndrome without complication (H)    Cannabis use disorder, moderate, dependence (H)    Alcohol withdrawal with complication with inpatient treatment,  with unspecified complication (H)    Palpitations    Palpitation    Alcohol abuse    Anxiety    Elevated troponin    Alcohol withdrawal syndrome with complication (H)    Elevated lactic acid level    Alcohol withdrawal (H)    Recurrent major depression (H24)    Alcohol withdrawal, uncomplicated (H)    Laceration of left index finger without damage to nail, foreign body presence unspecified, initial encounter    Alcohol dependence with intoxication with complication (H)    Acute alcoholism (H)

## 2024-06-16 NOTE — DISCHARGE INSTRUCTIONS
Behavioral Discharge Planning and Instructions  THANK YOU FOR CHOOSING [facility]  3A  [phone]    Summary: You were admitted to Station 3A on 6/15/2024 for detoxification from alcohol.  A medical exam was performed that included lab work. You have met with a  and opted to return home.  Please take care and make your recovery a daily priority, Georges!  It was a pleasure working with you and the entire treatment team here wishes you the very best in your recovery!     Recommendation:  AA, connect with recovery people and a sponsor    Main Diagnoses:  JOE Melendrez CNP;  303.90 (F10.20) Alcohol Use Disorder Severe    Major Treatments, Procedures and Findings:  You were treated for alcohol detoxification using valium.  You did not have a chemical dependency assessment. You had labs drawn and those results were reviewed with you. Please take a copy of your lab work with you to your next primary care provider appointment.    Symptoms to Report:  If you experience more anxiety, confusion, sleeplessness, deep sadness or thoughts of suicide, notify your treatment team or notify your primary care provider. IF ANY OF THE SYMPTOMS YOU ARE EXPERIENCING ARE A MEDICAL EMERGENCY CALL 911 IMMEDIATELY.     Lifestyle Adjustment: Adjust your lifestyle to get enough sleep, relaxation, exercise and good nutrition. Continue to develop healthy coping skills to decrease stress and promote a sober living environment. Do not use mood altering substances including alcohol, illegal drugs or addictive medications other than what is currently prescribed.     Disposition: home    Facts about COVID19 at www.cdc.gov/COVID19 and www.[place].gov/covid19    Keeping hands clean is one of the most important steps we can take to avoid getting sick and spreading germs to others.  Please wash your hands frequently and lather with soap for at least 20 seconds!    Follow-up Appointment:   Appointment Date/Time: June 25th at noon     Psychiatrist: Dr. Bhupendra Sauceda    Address: [facility]   Phone Number: [phone]    Recovery apps for your phone for educational purposes and to locate in person and zoom recovery meetings  Everything AA - educational purpose and loyd is a great resource  12 Step Toolkit - educational purpose learning about the 12 steps to recovery  [employer] - meeting loyd  AA  - meeting loyd  Meeting guide - meeting loyd  Quick NA meeting - meeting loyd  Nicolas- has various apps    Resources:  Some AA/NA meetings are being held online however most have returned to in-person or a hybrid combination please check online to verify*  Need Support Now? If you or someone you know is struggling or in crisis, help is available. Call or text 338 or chat [employer].org  AA meetings search for them at: https://aa-intergroup.org (worldwide meeting listings)  AA meetings for [place] area can be found online at: https://a[place].org (click local online meetings listings)  NA meetings for [place] area can be found online at: https://www.nam[place].org  (click find a meeting)  AA and NA Sponsors are excellent resources for support and you can find one at any support group meeting.   Alcoholics Anonymous (https://aa.org/): for information 24 hours/day  AA Intergroup service office in [place] (http://www.aastpaul.org/) 535.956.6536  AA Intergroup service office in [facility]: 536.631.3475. (http://www.aaminneabluebottlebizis.org/)  Narcotics Anonymous (www.nam[place].org) (877) 258-8130  https://aafairviewriverside.org/meetings  SMART Recovery - self management for addiction recovery:  www.smartrecovery.org  Pathways ~ A Health Crisis Resource & Support Center:  339.194.9000.  https://prescribetoprevent.org/patient-education/videos/  http://www.harmreduction.org  Crisis Text Line  Text 922066  You will be connected with a trained live crisis counselor to provide support. Por daisy, ashoko  JACK a 403245 o ashoko a  "442-AYUDAME en WhatsApp  National Hope Line  1.800.SUICIDE [7564000]  [facility] 037-398-1637  Support Group:  AA/NA and Sponsor/support.  Fast Tracker  Linking people to mental health and substance use disorder resources  [employer]FarFaria   [place] Mental Health Warm Line  Peer to peer support  Monday thru Saturday, 12 pm to 10 pm  478.467.7990 or 4.038.908.0124  Text \"Support\" to 70888  National Rogers on Mental Illness (KAROL)  943.243.1438 or 1.888.KAROL.HELPS  Alcoholics Anonymous (www.alcoholics-anonymous.org): Check your phone book for your local chapter.  Suicide Awareness Voices of Education (SAVE) (www.save.org): 955-886-RAPD (7283)  National Suicide Prevention Line (www.mentalhealthmn.org): 788-557-VAQG (7418)  Mental Health Consumer/Survivor Network of [place] (www.mhcsn.net): 780.305.7983 or 257-792-5272  Mental Health Association of [place] (www.mentalhealth.org): 441.842.9059 or 865-389-1140   Substance Abuse and Mental Health Services (www.samhsa.gov)  [place] Opioid Prevention Coalition: www.opioidcoalition.org    [place] Recovery Connection (MRC)  [facility] connects people seeking recovery to resources that help foster and sustain long-term recovery.  Whether you are seeking resources for treatment, transportation, housing, job training, education, health care or other pathways to recovery, [facility] is a great place to start.  497.401.9921.  www.[place]covery.org    Great Pod casts for nutrition and wellness  Listen on Apple Podcasts  Dishing Up Nutrition   [employer]   Nutrition       Understand the connection between what you eat and how you feel. Hosted by licensed nutritionists and dietitians from [employer] we share practical, real-life solutions for healthier living through nutrition.     General Medication Instructions:   See your medication sheet(s) for instructions.   Take all medications as prescribed.  Make no changes unless your " primary care provider suggests them.   Go to all your primary care provider visits.  Be sure to have all your required lab tests. This way, your medicines can be refilled on time.  Do not use any forms of alcohol.    Please Note:  If you have any questions at anytime after you are discharged please call [facility] detox unit 3AW at [phone].  [facility], Behavioral Intake [phone]  Medical Records call [phone]  Outpatient Behavioral Intake call [phone]  LP+ Wait List/Bed Availability call [phone]    Please remember to take all of your behavioral discharge planning and lab paperwork to any follow up appointments, it contains your lab results, diagnosis, medication list and discharge recommendations.      THANK YOU FOR CHOOSING [facility]

## 2024-06-17 VITALS
RESPIRATION RATE: 16 BRPM | HEIGHT: 72 IN | SYSTOLIC BLOOD PRESSURE: 130 MMHG | OXYGEN SATURATION: 94 % | DIASTOLIC BLOOD PRESSURE: 68 MMHG | WEIGHT: 190 LBS | BODY MASS INDEX: 25.73 KG/M2 | HEART RATE: 110 BPM | TEMPERATURE: 97.4 F

## 2024-06-17 PROCEDURE — 250N000013 HC RX MED GY IP 250 OP 250 PS 637: Performed by: REGISTERED NURSE

## 2024-06-17 PROCEDURE — 250N000013 HC RX MED GY IP 250 OP 250 PS 637: Performed by: PSYCHIATRY & NEUROLOGY

## 2024-06-17 PROCEDURE — 250N000013 HC RX MED GY IP 250 OP 250 PS 637

## 2024-06-17 PROCEDURE — 250N000013 HC RX MED GY IP 250 OP 250 PS 637: Performed by: STUDENT IN AN ORGANIZED HEALTH CARE EDUCATION/TRAINING PROGRAM

## 2024-06-17 PROCEDURE — 99238 HOSP IP/OBS DSCHRG MGMT 30/<: CPT | Performed by: NURSE PRACTITIONER

## 2024-06-17 RX ORDER — PANTOPRAZOLE SODIUM 40 MG/1
40 TABLET, DELAYED RELEASE ORAL
Qty: 30 TABLET | Refills: 0 | Status: SHIPPED | OUTPATIENT
Start: 2024-06-18

## 2024-06-17 RX ORDER — QUETIAPINE FUMARATE 100 MG/1
100 TABLET, FILM COATED ORAL AT BEDTIME
Qty: 30 TABLET | Refills: 0 | Status: SHIPPED | OUTPATIENT
Start: 2024-06-17

## 2024-06-17 RX ORDER — METOPROLOL TARTRATE 25 MG/1
25 TABLET, FILM COATED ORAL 2 TIMES DAILY
Status: SHIPPED
Start: 2024-06-17

## 2024-06-17 RX ORDER — FOLIC ACID 1 MG/1
1 TABLET ORAL DAILY
Status: SHIPPED
Start: 2024-06-18

## 2024-06-17 RX ADMIN — GABAPENTIN 400 MG: 400 CAPSULE ORAL at 08:08

## 2024-06-17 RX ADMIN — NICOTINE 1 PATCH: 21 PATCH, EXTENDED RELEASE TRANSDERMAL at 08:08

## 2024-06-17 RX ADMIN — THIAMINE HCL TAB 100 MG 100 MG: 100 TAB at 08:08

## 2024-06-17 RX ADMIN — FOLIC ACID 1 MG: 1 TABLET ORAL at 08:08

## 2024-06-17 RX ADMIN — PANTOPRAZOLE SODIUM 40 MG: 40 TABLET, DELAYED RELEASE ORAL at 07:34

## 2024-06-17 RX ADMIN — Medication 1 TABLET: at 08:08

## 2024-06-17 RX ADMIN — TRAZODONE HYDROCHLORIDE 100 MG: 100 TABLET ORAL at 00:53

## 2024-06-17 RX ADMIN — FLUOXETINE HYDROCHLORIDE 40 MG: 20 CAPSULE ORAL at 08:08

## 2024-06-17 RX ADMIN — METOPROLOL TARTRATE 25 MG: 25 TABLET, FILM COATED ORAL at 08:08

## 2024-06-17 ASSESSMENT — ACTIVITIES OF DAILY LIVING (ADL)
ADLS_ACUITY_SCORE: 40
HYGIENE/GROOMING: INDEPENDENT
ADLS_ACUITY_SCORE: 40
DRESS: INDEPENDENT
ORAL_HYGIENE: INDEPENDENT
ADLS_ACUITY_SCORE: 40

## 2024-06-17 NOTE — PLAN OF CARE
Problem: Alcohol Withdrawal  Goal: Alcohol Withdrawal Symptom Control  Outcome: Progressing   Goal Outcome Evaluation:    Plan of Care Reviewed With: patient          Patient is alert and oriented, no SI, no SIB.  Pt is eating well and drinking fluids.  He is having minimal withdrawal symptoms and has not required medications for alcohol withdrawal for > 24 hours.  MSSA protocol will be discontinued.  Pt has opted to return home.  He has deferred case management.  Pt is going to go to , meet with recovering people and get a permanent sponsor.  Patient discharging to home

## 2024-06-17 NOTE — DISCHARGE SUMMARY
Psychiatric Discharge Summary    Georges Moreno MRN# [id number]   Age: 48 year old YOB: 1975     Date of Admission:  6/14/2024  Date of Discharge:  6/17/2024  Admitting Physician:  Chai Anaya MD  Discharge Physician:  JOE Carrero CNP          Event Leading to Hospitalization:   This patient is a 48 year old male who presents to detox from alcohol. He was admitted to unit 3A. He reports he had a relapse and was drinking hard liquor everyday for 3 months. He reports he is planning to attend AA when he discharges but might be willing to try out patient treatment again.   Georges Moreno is a 48 year old male with a past medical history of alcohol problem, who presents to the ED for evaluation of detox.  The patient reports drinking a quart of hard alcohol per day for months.  No seizures or delirium tremens.  Last drink was just prior to arrival.  Uses marijuana daily, no other drugs.  No hallucinations.  No mental health concerns.  Denies any medical concerns tonight.     Today 6/16 Patient reports he feels somewhat hopeless this time. He has had so many relapses and he is uncertain he can stay sober. He reports he is willing to try AA more often for recovery. He reports he is depressed situational currently. He thinks current dose of Prozac has been working well in past before the relapse. Provider discussed increasing for depression and he is unsure at this time. He reports he did feel better when he was sober. He reports he is tried and mood is irritable. He denies current thoughts to harm himself or others. He denies current nausea headache, tremors, hallucinations, sleep disturbances. His appetite is decreased. He reports he is tolerating alcohol withdrawal.       See Admission note by Ernst Acosta APRN CNP  for additional details.          Diagnoses:     Alcohol use disorder, severe, dependence  Alcohol withdrawals with unspecified complications, resolved  Major  depression recurrent moderate without psychotic features  ADHD (h)  PTSD    Clinically Significant Risk Factors                               # Overweight: Estimated body mass index is 25.77 kg/m  as calculated from the following:    Height as of this encounter: 1.829 m (6').    Weight as of this encounter: 86.2 kg (190 lb)., PRESENT ON ADMISSION     # Financial/Environmental Concerns:                  Labs:        Lab Results   Component Value Date     06/14/2024     05/26/2020    Lab Results   Component Value Date    CHLORIDE 97 06/14/2024    CHLORIDE 105 06/23/2022    CHLORIDE 102 05/26/2020    Lab Results   Component Value Date    BUN 14.7 06/14/2024    BUN 18 06/23/2022    BUN 10 05/26/2020      Lab Results   Component Value Date    POTASSIUM 4.1 06/14/2024    POTASSIUM 5.0 06/23/2022    POTASSIUM 3.7 05/26/2020    Lab Results   Component Value Date    CO2 22 06/14/2024    CO2 30 06/23/2022    CO2 26 05/26/2020    Lab Results   Component Value Date    CR 0.78 06/14/2024    CR 0.73 05/26/2020          Lab Results   Component Value Date    WBC 11.3 (H) 06/14/2024    HGB 17.6 06/14/2024    HCT 49.9 06/14/2024    MCV 90 06/14/2024     06/14/2024     Lab Results   Component Value Date    AST 27 06/14/2024    ALT 21 06/14/2024    GGT 33 06/16/2024    ALKPHOS 85 06/14/2024    BILITOTAL 0.8 06/14/2024     Lab Results   Component Value Date    TSH 1.30 06/16/2024            Consults:   Consultation during this admission received from internal medicine         Hospital Course:   Georges Moreno was admitted to [facility] with attending Isrrael DIAZ CNP, as a voluntary patient. The patient was placed under status 15 (15 minute checks) to ensure patient safety.     The patient completed detox without complications.     Because this patient meets criteria for an Alcohol Use Disorder, I performed the following brief intervention on the date of this note:   1) Expressed concern that the patient  is drinking at unhealthy levels known to increase their risk of alcohol related problems   2) Gave feedback linking alcohol use and health, including personalized feedback explaining how alcohol use can interact with their medical and/or psychiatric problems, and with prescribed medications.   3) Advised patient to abstain from using any amount of alcohol   The patient declined rehab or IOP program.  He will restart naltrexone and will set up Vivitrol injection.    The patient tolerated medications well. Reported mood symptoms improved. The patient was active on the unit. The patient was social, engaged and attended groups. No overt jak, confusion or psychosis noted. The patient maintained denial of SI, HI and ALEJANDRO. The patient reported moderate depression and anxiety.Future oriented, feeling hopeful for the future. The patient slept well. Appetite was intact. The patient was compliant with medications and care.     Georges Moreno was released to home. At the time of this encounter, Georges Moreno was determined to not be a danger to himself or others and symptoms did not meet criteria for involuntary hospitalization.      Safety plan, post discharge recommendations and relapse prevention were discussed with the patient. The patient agreed to call 911 or present to ED if symptoms worsen or developed thoughts of suicide, self harm or homicide.  The patient agreed to continue medications and outpatient care.         Discharge Medications:     Discharge Medication List as of 6/17/2024  8:20 AM        CONTINUE these medications which have NOT CHANGED    Details   acetaminophen (TYLENOL) 325 MG tablet Take 650 mg by mouth every 6 hours as needed for pain or headaches, Historical      FLUoxetine (PROZAC) 40 MG capsule Take 1 capsule (40 mg) by mouth daily, Disp-30 capsule, R-0, E-Prescribe      gabapentin (NEURONTIN) 400 MG capsule Take 1 capsule (400 mg) by mouth 3 times daily, Disp-90 capsule, R-0, E-Prescribe       QUEtiapine (SEROQUEL) 100 MG tablet Take 1 tablet (100 mg) by mouth at bedtime, Disp-30 tablet, R-0, E-Prescribe                  Psychiatric Examination:   Appearance:  awake, alert and adequately groomed  Attitude:  cooperative  Eye Contact:  good  Mood:  anxious, depressed, and better  Affect:  appropriate and in normal range  Speech:  clear, coherent  Psychomotor Behavior:  no evidence of tardive dyskinesia, dystonia, or tics  Thought Process:  logical  Associations:  no loose associations  Thought Content:  no evidence of suicidal ideation or homicidal ideation  Insight:  fair  Judgment:  fair  Oriented to:  time, person, and place  Attention Span and Concentration:  intact  Recent and Remote Memory:  intact  Language: Able to name objects  Fund of Knowledge: appropriate  Muscle Strength and Tone: normal  Gait and Station: Normal         Discharge Plan:   The following medication changes took place:   Continue Prozac for depression 40 mg daily (pt may want to increase this before discharge)  Continue trazodone 100 mg for sleep  Gabapentin 400 mg TID for anxiety  Multivitamin, folic acid, B1 for alcohol use    Follow up with your outpatient provider/team.  Alcohol use disorder  Acute alcohol withdrawal  Patient reports drinking a quart of hard liquor daily for months. Denies history of withdrawal seizures and DT's. UDS positive for benzos and cannabinoids.   - psychiatry primary   - agree with vitamin supplementation including of thiamine and folate  - continue MSSA with valium  - PRN Tylenol, Maalox, atenolol, hydroxyzine, and trazodone  - counseling, group therapies, community resources     Paroxysmal atrial fibrillation  HTN  Not on anticoagulation. Was previously on metoprolol 25 mg BID but was recently stopped during prior admission ~ 1 month ago without a documented reason by psychiatry. Required   - resume PTA metoprolol tartrate 25 mg daily  - notify medicine if SBP > 170 despite adequate treatment of  withdrawal   - if BP fails to normalize despite completion of withdrawal, patient should follow-up with PCP within 1 week of discharge      Mild Leukocytosis   WBC 11.3. Previous elevated noted with prior admission for withdrawal. No symptoms of acute infection.      Anion gap metabolic acidosis  AG 23 upon admission, remainder of electrolytes within normal limits. Suspect abnormality is d/t excessive alcohol consumption in the setting of poor solute intake.   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 PCP     Attestation:  The patient has been seen and evaluated by me,  Isrrael DIAZ, CNP

## 2024-06-17 NOTE — PLAN OF CARE
Problem: Alcohol Withdrawal  Goal: Alcohol Withdrawal Symptom Control  Outcome: Progressing   Goal Outcome Evaluation:     This patient is observed through the night for acute alcohol withdrawal symptoms. He scored 5  on MSSA; RN gave no prn valium. Had Trazodone per request. He appeared comfortable while sleeping. Breathing was quiet and spontaneous. No safety or behavioral issues were reported or noted.  The team will continue to monitor.

## 2024-06-19 ENCOUNTER — PATIENT OUTREACH (OUTPATIENT)
Dept: CARE COORDINATION | Facility: CLINIC | Age: 49
End: 2024-06-19
Payer: COMMERCIAL

## 2024-06-19 NOTE — PROGRESS NOTES
[facility]:   [facility] Contact  [facility]/[employer]     Clinical Data: Post-Discharge Outreach     Outreach attempted x 2.  Left message on patient's voicemail, providing [facility]'s central phone number of [phone] ([phone]) for questions/concerns and/or to schedule an appt with an [facility] provider, if they do not have a PCP.      Plan:  [facility] will do no further outreaches at this time.       PATRIZIA Hickman (she/her/hers)  Social Work Clinic Care Coordinator   [facility]  [email or web address]  [phone]      *[facility] Team does NOT follow patient ongoing. Referrals are identified based on internal discharge reports and the outreach is to ensure patient has an understanding of their discharge instructions.

## 2024-07-02 ENCOUNTER — HOSPITAL ENCOUNTER (EMERGENCY)
Facility: CLINIC | Age: 49
Discharge: HOME | End: 2024-07-02
Attending: STUDENT IN AN ORGANIZED HEALTH CARE EDUCATION/TRAINING PROGRAM | Admitting: STUDENT IN AN ORGANIZED HEALTH CARE EDUCATION/TRAINING PROGRAM
Payer: COMMERCIAL

## 2024-07-02 VITALS
DIASTOLIC BLOOD PRESSURE: 78 MMHG | RESPIRATION RATE: 18 BRPM | OXYGEN SATURATION: 99 % | SYSTOLIC BLOOD PRESSURE: 115 MMHG | HEART RATE: 53 BPM | TEMPERATURE: 98.1 F

## 2024-07-02 DIAGNOSIS — K61.0 PERIANAL ABSCESS: ICD-10-CM

## 2024-07-02 PROCEDURE — 99283 EMERGENCY DEPT VISIT LOW MDM: CPT

## 2024-07-02 PROCEDURE — 46050 I&D PERIANAL ABSCESS SUPFC: CPT

## 2024-07-02 RX ORDER — CEPHALEXIN 500 MG/1
500 CAPSULE ORAL 4 TIMES DAILY
Qty: 28 CAPSULE | Refills: 0 | Status: SHIPPED | OUTPATIENT
Start: 2024-07-02 | End: 2024-07-09

## 2024-07-02 RX ORDER — LIDOCAINE HYDROCHLORIDE AND EPINEPHRINE 10; 10 MG/ML; UG/ML
INJECTION, SOLUTION INFILTRATION; PERINEURAL
Status: DISCONTINUED
Start: 2024-07-02 | End: 2024-07-02 | Stop reason: HOSPADM

## 2024-07-02 ASSESSMENT — COLUMBIA-SUICIDE SEVERITY RATING SCALE - C-SSRS
2. HAVE YOU ACTUALLY HAD ANY THOUGHTS OF KILLING YOURSELF IN THE PAST MONTH?: NO
6. HAVE YOU EVER DONE ANYTHING, STARTED TO DO ANYTHING, OR PREPARED TO DO ANYTHING TO END YOUR LIFE?: NO
1. IN THE PAST MONTH, HAVE YOU WISHED YOU WERE DEAD OR WISHED YOU COULD GO TO SLEEP AND NOT WAKE UP?: NO

## 2024-07-02 ASSESSMENT — ACTIVITIES OF DAILY LIVING (ADL): ADLS_ACUITY_SCORE: 37

## 2024-07-02 NOTE — DISCHARGE INSTRUCTIONS
I have sent antibiotics to your pharmacy, please take as prescribed next week.  Plan to follow-up with colorectal surgery as planned later this month.  You may continue to experience some drainage from the wound, I recommend continue to apply gauze in this area and to clean daily with soap and water.    If you develop worsening symptoms, return to ER.

## 2024-07-02 NOTE — ED PROVIDER NOTES
Emergency Department Note      History of Present Illness     Chief Complaint   Cyst      HPI   Georges Moreno is a 48 year old male who presents with concern for perianal abscess.  Patient reports history of these in the past requiring I&D.  He has plans to follow-up with colorectal surgery later this month.  About 4 days ago, he describes sensation of pressure to the area similar to when he had abscess develop.  This morning, patient was planning to come in to be seen for I&D however had spontaneous rupture and drainage of the abscess with significant amount of purulent bloody drainage.  He endorses significant relief in symptoms upon arrival to ER.    Independent Historian   None    Review of External Notes   Reviewed office visit from 5/7/2024 - Perianal Abscess follow up  Georges Moreno is a 48 y.o. male here for a wound check after I&D of a jennifer-rectal abscess by surgery on 5/1. They told him to f/u with colorectal surgery for a wound check in 7 to 10 days; he doesn't recall getting this info. Per the phone note from 5/1, they contacted him to schedule but he said he wanted to see his PCP. She doesn't have availability for a while so he scheduled with me in a same-day slot. He isn't having as much pain and hasn't noticed drainage. No fevers or chills.   Reviewed ED visit from 5/1/24 - Perianal Abscess  Imaging: Reviewed  CT  IMPRESSION: Right posterolateral perianal abscess as described above. There are subtle findings suggesting an associated perianal fistula. This could be further evaluated with MRI if clinically indicated.   Past Medical History     Medical History and Problem List   Past Medical History:   Diagnosis Date    ADHD     Alcohol use disorder, severe, dependence (H)     Atrial fibrillation (H)     Depression, major, single episode     PUSHPA (generalized anxiety disorder)     HA (headache)     Pancreatitis     Paroxysmal atrial fibrillation (H)     Perirectal abscess     Personal history of  alcoholism (H)     Polysubstance (excluding opioids) dependence, daily use (H)     PTSD (post-traumatic stress disorder)     Substance abuse (H)     Tobacco dependence        Medications   cephALEXin (KEFLEX) 500 MG capsule  acetaminophen (TYLENOL) 325 MG tablet  FLUoxetine (PROZAC) 40 MG capsule  folic acid (FOLVITE) 1 MG tablet  gabapentin (NEURONTIN) 400 MG capsule  metoprolol tartrate (LOPRESSOR) 25 MG tablet  pantoprazole (PROTONIX) 40 MG EC tablet  QUEtiapine (SEROQUEL) 100 MG tablet        Surgical History   Past Surgical History:   Procedure Laterality Date    INCISION AND DRAINAGE PERIRECTAL ABSCESS      SOFT TISSUE SURGERY      Excision of pilonidal cyst    SOFT TISSUE SURGERY      removal of rectal cyst       Physical Exam     Patient Vitals for the past 24 hrs:   BP Temp Temp src Pulse Resp SpO2   07/02/24 1157 -- -- -- -- -- 99 %   07/02/24 1143 -- -- -- -- -- 97 %   07/02/24 1142 115/78 -- -- 53 -- 98 %   07/02/24 1141 115/78 98.1  F (36.7  C) Oral 53 18 97 %   07/02/24 1128 108/79 98  F (36.7  C) Temporal 61 18 99 %     Physical Exam  General: Alert and cooperative with exam. Patient in no apparent distress. Normal mentation.  Head:  Scalp is NC/AT  Eyes:  EOM intact  ENT:  The external nose and ears are normal.   Neck:  Normal range of motion without rigidity.  CV:  Warm and well perfused  Resp:  Breathing comfortably on room air  :   Perianal abscess present with no ongoing drainage.  Mildly fluctuant.  Area also has surrounding erythema and warmth consistent with cellulitis.  Skin:  Warm and dry, No rash or lesions noted.  Neuro:  Oriented x 3. No gross motor deficits.      Diagnostics     None    ED Course      Medications Administered   Medications   lidocaine 1% with EPINEPHrine 1:100,000 1 %-1:261337 injection (has no administration in time range)       Procedures     Incision and Drainage     Procedure: Incision and Drainage     Consent: Verbal    Indication: Abscess    Location:  Anogenital    Size: 1 cm    Ultrasound Guidance: No    Preparation: Alcohol and Povidone-iodine     Anesthesia/Sedation: Lidocaine with Epinephrine - 1%     Procedure Detail:    Aspiration: No  Incision Type: Single straight  Scalpel: 11  Lesion Management: Extensive irrigation, deloculation, or debridement   Wound Management: Left open   Packing: None     Patient Status: The patient tolerated the procedure well: Yes. There were no complications.      Discussion of Management   None    ED Course        Optional/Additional Documentation  None    Medical Decision Making / Diagnosis     CMS Diagnoses: None    MIPS       None    [facility]   Georges Moreno is a 48 year old male who presents with perianal abscess.  History of these in the past requiring I&D and antibiotics.  Has follow-up with colorectal surgery in the next 2 weeks.  Patient reports spontaneous rupture of abscess prior to arrival.  On exam, small ongoing abscess present to 3 o'clock position with mild amount of fluctuance present.  No ongoing drainage at time of exam.  Patient reports significant improvement in size since spontaneous rupture.  Given that there is mild fluctuance remaining, I&D was performed at the bedside with small amount of purulent drainage present.  Surrounding area is erythematous and warm concerning for cellulitis, will start patient on course of Keflex.  Incision site was left open for ongoing drainage and patient was provided materials for wound cares.  Recommended follow-up with colorectal as planned and take antibiotics as prescribed.  If he develops worsening symptoms, worsening swelling or pain, or signs or symptoms of worsening infection, he will return to ER.    Disposition   The patient was discharged.     Diagnosis     ICD-10-CM    1. Perianal abscess  K61.0            Discharge Medications   Discharge Medication List as of 7/2/2024 12:13 PM        START taking these medications    Details   cephALEXin (KEFLEX) 500 MG capsule  Take 1 capsule (500 mg) by mouth 4 times daily for 7 days, Disp-28 capsule, R-0, E-Prescribe               MOISES Smith Lauren R, PA-C  07/02/24 [id number]

## 2024-07-02 NOTE — ED TRIAGE NOTES
Cyst on right side of buttock, near rectum. Reports blood and pus discharge from cyst last night. Pt reports this is an ongoing problem. Sees colorectal on 07/17. Sent from .

## 2024-09-02 ENCOUNTER — HOSPITAL ENCOUNTER (EMERGENCY)
Facility: CLINIC | Age: 49
Discharge: HOME | End: 2024-09-02
Payer: COMMERCIAL

## 2024-09-02 VITALS — TEMPERATURE: 97.9 F

## 2024-09-02 NOTE — ED NOTES
"Attempted to triage pt.  When writer asked pt if he was allergic to sertraline and duloxetine and pt stated it didn't matter.   Pt started to get verbally hostile.   As writer was attempting to put on gloves to examine his finger that he wanted evaluated pt asked \"can you handle that\" as writer explained gloves were being applied pt stated \"I just want my fucking finger taken care of and I don't want to wait around all fucking night\" Pt continued to be hostile security was called to come in the room where pt continued to be verbally abusive.  Pt then stated when the two female security guards entered that writer \"oh here you can talk with your girly hands\" pt was then asked if he wanted to calm down or he could leave pt continued to be hosile and yell security escorted him to the door where he stated \"I will find her and hurt her\" Pt was escorted to the door and left  "

## 2024-09-02 NOTE — ED PROVIDER NOTES
Patient was agitated and threatening staff in triage. Security called. Patient left the department.     [place], Bandar Vivar,   09/02/24 0901

## 2024-09-09 ENCOUNTER — HOSPITAL ENCOUNTER (EMERGENCY)
Facility: CLINIC | Age: 49
Discharge: HOME | End: 2024-09-10
Attending: EMERGENCY MEDICINE | Admitting: EMERGENCY MEDICINE
Payer: COMMERCIAL

## 2024-09-09 DIAGNOSIS — F10.229 ACUTE ALCOHOLIC INTOXICATION IN ALCOHOLISM WITH COMPLICATION (H): Primary | ICD-10-CM

## 2024-09-09 DIAGNOSIS — F43.21 GRIEF REACTION: ICD-10-CM

## 2024-09-09 PROCEDURE — 99285 EMERGENCY DEPT VISIT HI MDM: CPT | Mod: 25

## 2024-09-10 ENCOUNTER — HOSPITAL ENCOUNTER (EMERGENCY)
Facility: CLINIC | Age: 49
Discharge: LEFT BEFORE DISCHARGE | End: 2024-09-10
Payer: COMMERCIAL

## 2024-09-10 VITALS
OXYGEN SATURATION: 97 % | DIASTOLIC BLOOD PRESSURE: 89 MMHG | RESPIRATION RATE: 20 BRPM | TEMPERATURE: 99.8 F | HEART RATE: 108 BPM | SYSTOLIC BLOOD PRESSURE: 126 MMHG

## 2024-09-10 VITALS
HEART RATE: 80 BPM | BODY MASS INDEX: 25.77 KG/M2 | WEIGHT: 190 LBS | RESPIRATION RATE: 26 BRPM | SYSTOLIC BLOOD PRESSURE: 133 MMHG | TEMPERATURE: 97.9 F | OXYGEN SATURATION: 99 % | DIASTOLIC BLOOD PRESSURE: 92 MMHG

## 2024-09-10 DIAGNOSIS — F32.A DEPRESSION, UNSPECIFIED DEPRESSION TYPE: ICD-10-CM

## 2024-09-10 DIAGNOSIS — F41.9 ANXIETY: ICD-10-CM

## 2024-09-10 DIAGNOSIS — F10.90 ALCOHOL USE DISORDER: ICD-10-CM

## 2024-09-10 PROBLEM — F10.20 ALCOHOL USE DISORDER, SEVERE, DEPENDENCE (H): Status: ACTIVE | Noted: 2024-03-16

## 2024-09-10 LAB
ALBUMIN SERPL BCG-MCNC: 4.7 G/DL (ref 3.5–5.2)
ALCOHOL BREATH TEST: 0.07 (ref 0–0.01)
ALP SERPL-CCNC: 65 U/L (ref 40–150)
ALT SERPL W P-5'-P-CCNC: 34 U/L (ref 0–70)
AMPHETAMINES UR QL SCN: ABNORMAL
ANION GAP SERPL CALCULATED.3IONS-SCNC: 19 MMOL/L (ref 7–15)
AST SERPL W P-5'-P-CCNC: 46 U/L (ref 0–45)
ATRIAL RATE - MUSE: 83 BPM
BARBITURATES UR QL SCN: ABNORMAL
BASOPHILS # BLD AUTO: 0 10E3/UL (ref 0–0.2)
BASOPHILS NFR BLD AUTO: 0 %
BENZODIAZ UR QL SCN: ABNORMAL
BILIRUB SERPL-MCNC: 0.4 MG/DL
BUN SERPL-MCNC: 12.6 MG/DL (ref 6–20)
BZE UR QL SCN: ABNORMAL
CALCIUM SERPL-MCNC: 9.1 MG/DL (ref 8.8–10.4)
CANNABINOIDS UR QL SCN: ABNORMAL
CHLORIDE SERPL-SCNC: 100 MMOL/L (ref 98–107)
CREAT SERPL-MCNC: 0.83 MG/DL (ref 0.67–1.17)
DIASTOLIC BLOOD PRESSURE - MUSE: NORMAL MMHG
EGFRCR SERPLBLD CKD-EPI 2021: >90 ML/MIN/1.73M2
EOSINOPHIL # BLD AUTO: 0.1 10E3/UL (ref 0–0.7)
EOSINOPHIL NFR BLD AUTO: 1 %
ERYTHROCYTE [DISTWIDTH] IN BLOOD BY AUTOMATED COUNT: 12.2 % (ref 10–15)
ETHANOL SERPL-MCNC: 0.27 G/DL
FENTANYL UR QL: ABNORMAL
GLUCOSE SERPL-MCNC: 90 MG/DL (ref 70–99)
HCO3 SERPL-SCNC: 22 MMOL/L (ref 22–29)
HCT VFR BLD AUTO: 49.9 % (ref 40–53)
HGB BLD-MCNC: 17.2 G/DL (ref 13.3–17.7)
IMM GRANULOCYTES # BLD: 0 10E3/UL
IMM GRANULOCYTES NFR BLD: 0 %
INTERPRETATION ECG - MUSE: NORMAL
LYMPHOCYTES # BLD AUTO: 3.3 10E3/UL (ref 0.8–5.3)
LYMPHOCYTES NFR BLD AUTO: 35 %
MCH RBC QN AUTO: 31.7 PG (ref 26.5–33)
MCHC RBC AUTO-ENTMCNC: 34.5 G/DL (ref 31.5–36.5)
MCV RBC AUTO: 92 FL (ref 78–100)
MONOCYTES # BLD AUTO: 0.6 10E3/UL (ref 0–1.3)
MONOCYTES NFR BLD AUTO: 6 %
NEUTROPHILS # BLD AUTO: 5.6 10E3/UL (ref 1.6–8.3)
NEUTROPHILS NFR BLD AUTO: 58 %
NRBC # BLD AUTO: 0 10E3/UL
NRBC BLD AUTO-RTO: 0 /100
OPIATES UR QL SCN: ABNORMAL
P AXIS - MUSE: 67 DEGREES
PCP QUAL URINE (ROCHE): ABNORMAL
PLATELET # BLD AUTO: 218 10E3/UL (ref 150–450)
POTASSIUM SERPL-SCNC: 3.8 MMOL/L (ref 3.4–5.3)
PR INTERVAL - MUSE: 158 MS
PROT SERPL-MCNC: 7.9 G/DL (ref 6.4–8.3)
QRS DURATION - MUSE: 98 MS
QT - MUSE: 382 MS
QTC - MUSE: 448 MS
R AXIS - MUSE: 74 DEGREES
RBC # BLD AUTO: 5.42 10E6/UL (ref 4.4–5.9)
SODIUM SERPL-SCNC: 141 MMOL/L (ref 135–145)
SYSTOLIC BLOOD PRESSURE - MUSE: NORMAL MMHG
T AXIS - MUSE: 62 DEGREES
VENTRICULAR RATE- MUSE: 83 BPM
WBC # BLD AUTO: 9.6 10E3/UL (ref 4–11)

## 2024-09-10 PROCEDURE — 80307 DRUG TEST PRSMV CHEM ANLYZR: CPT | Performed by: EMERGENCY MEDICINE

## 2024-09-10 PROCEDURE — 96361 HYDRATE IV INFUSION ADD-ON: CPT

## 2024-09-10 PROCEDURE — 250N000013 HC RX MED GY IP 250 OP 250 PS 637: Performed by: EMERGENCY MEDICINE

## 2024-09-10 PROCEDURE — 99283 EMERGENCY DEPT VISIT LOW MDM: CPT | Performed by: EMERGENCY MEDICINE

## 2024-09-10 PROCEDURE — 85025 COMPLETE CBC W/AUTO DIFF WBC: CPT | Performed by: EMERGENCY MEDICINE

## 2024-09-10 PROCEDURE — 82077 ASSAY SPEC XCP UR&BREATH IA: CPT | Performed by: EMERGENCY MEDICINE

## 2024-09-10 PROCEDURE — 93005 ELECTROCARDIOGRAM TRACING: CPT

## 2024-09-10 PROCEDURE — 80053 COMPREHEN METABOLIC PANEL: CPT | Performed by: EMERGENCY MEDICINE

## 2024-09-10 PROCEDURE — 96360 HYDRATION IV INFUSION INIT: CPT

## 2024-09-10 PROCEDURE — 82075 ASSAY OF BREATH ETHANOL: CPT | Performed by: EMERGENCY MEDICINE

## 2024-09-10 PROCEDURE — 36415 COLL VENOUS BLD VENIPUNCTURE: CPT | Performed by: EMERGENCY MEDICINE

## 2024-09-10 PROCEDURE — 258N000003 HC RX IP 258 OP 636: Performed by: EMERGENCY MEDICINE

## 2024-09-10 RX ORDER — HYDROXYZINE HYDROCHLORIDE 25 MG/1
25 TABLET, FILM COATED ORAL ONCE
Status: COMPLETED | OUTPATIENT
Start: 2024-09-10 | End: 2024-09-10

## 2024-09-10 RX ADMIN — SODIUM CHLORIDE 1000 ML: 9 INJECTION, SOLUTION INTRAVENOUS at 00:11

## 2024-09-10 RX ADMIN — HYDROXYZINE HYDROCHLORIDE 25 MG: 25 TABLET ORAL at 04:04

## 2024-09-10 ASSESSMENT — ACTIVITIES OF DAILY LIVING (ADL)
ADLS_ACUITY_SCORE: 37

## 2024-09-10 ASSESSMENT — LIFESTYLE VARIABLES
AUDITORY DISTURBANCES: NOT PRESENT
AGITATION: SOMEWHAT MORE THAN NORMAL ACTIVITY
PAROXYSMAL SWEATS: NO SWEAT VISIBLE
HEADACHE, FULLNESS IN HEAD: MILD
ANXIETY: 5
VISUAL DISTURBANCES: NOT PRESENT
TREMOR: NOT VISIBLE, BUT CAN BE FELT FINGERTIP TO FINGERTIP
TOTAL SCORE: 14
ORIENTATION AND CLOUDING OF SENSORIUM: ORIENTED AND CAN DO SERIAL ADDITIONS
NAUSEA AND VOMITING: 5

## 2024-09-10 NOTE — ED TRIAGE NOTES
T seeking detox from alcohol and MH assessment     Triage Assessment (Adult)       Row Name 09/10/24 1614          Triage Assessment    Airway WDL WDL        Respiratory WDL    Respiratory WDL WDL        Skin Circulation/Temperature WDL    Skin Circulation/Temperature WDL WDL        Cardiac WDL    Cardiac WDL X;rhythm     Pulse Rate & Regularity tachycardic     Cardiac Rhythm --  hx afib        Peripheral/Neurovascular WDL    Peripheral Neurovascular WDL X;neurovascular assessment upper        Cognitive/Neuro/Behavioral WDL    Cognitive/Neuro/Behavioral WDL mood/behavior  pt is intoxicated     Mood/Behavior anxious  tearful./ crying        LUE Neurovascular Assessment    Temperature LUE warm     Color LUE no discoloration     Sensation LUE numbness present;tingling present  neuropathy at baseline        RUE Neurovascular Assessment    Temperature RUE warm     Color RUE no discoloration     Sensation RUE tingling present;numbness present  neuropathy at baseline

## 2024-09-10 NOTE — ED PROVIDER NOTES
ED Provider Note  [facility]      History     Chief Complaint   Patient presents with    Alcohol Problem     Seeking detox from alcohol. Pt was released from [facility] detox yesterday and started drinking again. Pt states he had recent friend pass away and tearful. Denies hx seizures with withdrawals.      HPI  Georges Moreno is a 48 year old male who presents to the emergency department seeking detox from alcohol.  He states he has been drinking alcohol daily for the past 6 weeks.  He states he drinks a quart of tequila a day.  He states that he becomes diaphoretic if he does not drink.  He denies any history of seizures or DTs.  Patient does endorse anxiety and depression.  He denies suicidal ideation.  He denies any recent illness or medical concerns.  He was at [facility] emergency department last night and earlier this morning.  He underwent a DEC assessment at that time and was deemed safe and appropriate for outpatient management and follow-up with his already established psychologist/therapist.  He presents now hoping for detox admission.  He states that he did drink alcohol after he was discharged from [facility] earlier today.    Past Medical History  Past Medical History:   Diagnosis Date    ADHD     Alcohol abuse     Alcohol use disorder, severe, dependence     Atrial fibrillation     Vickers's esophagus     Depression     moderate    PUSHPA (generalized anxiety disorder)     Pancreatitis     Perirectal abscess     PTSD (post-traumatic stress disorder)      Past Surgical History:   Procedure Laterality Date    Excision of pilonidal cyst      INCISION AND DRAINAGE PERIRECTAL ABSCESS       FLUoxetine (PROZAC) 40 MG capsule  folic acid (FOLVITE) 1 MG tablet  gabapentin (NEURONTIN) 400 MG capsule  metoprolol tartrate (LOPRESSOR) 25 MG tablet  pantoprazole (PROTONIX) 40 MG EC tablet  QUEtiapine (SEROQUEL) 100 MG tablet      Allergies   Allergen Reactions    Sertraline Other (See  Comments)     Excessive fatigue  Reaction(s): Excessive fatigue.    Duloxetine Hives and Rash     Rash, hives     Family History  Family History   Problem Relation Age of Onset    Family History Negative Mother     Alcohol/Drug Father     Diabetes Father     Leukemia Father     Alcohol/Drug Paternal Uncle     Alcoholism Father     Alcoholism Brother      Social History   Social History     Tobacco Use    Smoking status: Every Day     Current packs/day: 1.50     Average packs/day: 1.5 packs/day for 15.0 years (22.5 ttl pk-yrs)     Types: Cigarettes    Smokeless tobacco: Former    Tobacco comments:     a little over a pack a day   Vaping Use    Vaping status: Never Used   Substance Use Topics    Alcohol use: Yes     Alcohol/week: 14.0 standard drinks of alcohol    Drug use: Yes     Types: Marijuana     Comment: today      A medically appropriate review of systems was performed with pertinent positives and negatives noted in the HPI, and all other systems negative.    Physical Exam   BP: (!) 142/104  Pulse: 118  Temp: 99.8  F (37.7  C)  Resp: 20  SpO2: 97 %  Physical Exam  Vitals and nursing note reviewed.   Constitutional:       General: He is not in acute distress.     Appearance: He is not diaphoretic.   HENT:      Head: Atraumatic.      Mouth/Throat:      Mouth: Mucous membranes are moist.      Comments: No tongue fasciculations  Eyes:      General: No scleral icterus.     Pupils: Pupils are equal, round, and reactive to light.   Cardiovascular:      Rate and Rhythm: Normal rate and regular rhythm.      Heart sounds: Normal heart sounds.   Pulmonary:      Effort: No respiratory distress.      Breath sounds: Normal breath sounds.   Abdominal:      General: Bowel sounds are normal.      Palpations: Abdomen is soft.      Tenderness: There is no abdominal tenderness.   Musculoskeletal:         General: No tenderness.   Skin:     General: Skin is warm.      Findings: No rash.   Neurological:      General: No focal  deficit present.      Cranial Nerves: No cranial nerve deficit.      Motor: No weakness.      Coordination: Coordination normal.      Gait: Gait normal.   Psychiatric:         Mood and Affect: Mood normal.         Behavior: Behavior normal.           ED Course, Procedures, & Data      Reviewed [facility] emergency department encounter and DEC assessment from earlier today.  Reviewed labs including CBC, comprehensive metabolic panel, urine toxicology testing, and alcohol level.  Procedures                Results for orders placed or performed during the hospital encounter of 09/10/24   Urine Drug Screen Panel     Status: Abnormal   Result Value Ref Range    Amphetamines Urine Screen Negative Screen Negative    Barbituates Urine Screen Negative Screen Negative    Benzodiazepine Urine Screen Negative Screen Negative    Cannabinoids Urine Screen Positive (A) Screen Negative    Cocaine Urine Screen Negative Screen Negative    Fentanyl Qual Urine Screen Negative Screen Negative    Opiates Urine Screen Negative Screen Negative    PCP Urine Screen Negative Screen Negative   Alcohol breath test POCT     Status: Abnormal   Result Value Ref Range    Alcohol Breath Test 0.071 (A) 0.00 - 0.01   Urine Drug Screen     Status: Abnormal    Narrative    The following orders were created for panel order Urine Drug Screen.  Procedure                               Abnormality         Status                     ---------                               -----------         ------                     Urine Drug Screen Panel[[id number]]      Abnormal            Final result                 Please view results for these tests on the individual orders.   Results for orders placed or performed during the hospital encounter of 09/09/24   Comprehensive metabolic panel     Status: Abnormal   Result Value Ref Range    Sodium 141 135 - 145 mmol/L    Potassium 3.8 3.4 - 5.3 mmol/L    Carbon Dioxide (CO2) 22 22 - 29 mmol/L    Anion Gap 19 (H) 7 -  15 mmol/L    Urea Nitrogen 12.6 6.0 - 20.0 mg/dL    Creatinine 0.83 0.67 - 1.17 mg/dL    GFR Estimate >90 >60 mL/min/1.73m2    Calcium 9.1 8.8 - 10.4 mg/dL    Chloride 100 98 - 107 mmol/L    Glucose 90 70 - 99 mg/dL    Alkaline Phosphatase 65 40 - 150 U/L    AST 46 (H) 0 - 45 U/L    ALT 34 0 - 70 U/L    Protein Total 7.9 6.4 - 8.3 g/dL    Albumin 4.7 3.5 - 5.2 g/dL    Bilirubin Total 0.4 <=1.2 mg/dL   Alcohol level blood     Status: Abnormal   Result Value Ref Range    Alcohol ethyl 0.27 (H) <=0.01 g/dL   CBC with platelets and differential     Status: None   Result Value Ref Range    WBC Count 9.6 4.0 - 11.0 10e3/uL    RBC Count 5.42 4.40 - 5.90 10e6/uL    Hemoglobin 17.2 13.3 - 17.7 g/dL    Hematocrit 49.9 40.0 - 53.0 %    MCV 92 78 - 100 fL    MCH 31.7 26.5 - 33.0 pg    MCHC 34.5 31.5 - 36.5 g/dL    RDW 12.2 10.0 - 15.0 %    Platelet Count 218 150 - 450 10e3/uL    % Neutrophils 58 %    % Lymphocytes 35 %    % Monocytes 6 %    % Eosinophils 1 %    % Basophils 0 %    % Immature Granulocytes 0 %    NRBCs per 100 WBC 0 <1 /100    Absolute Neutrophils 5.6 1.6 - 8.3 10e3/uL    Absolute Lymphocytes 3.3 0.8 - 5.3 10e3/uL    Absolute Monocytes 0.6 0.0 - 1.3 10e3/uL    Absolute Eosinophils 0.1 0.0 - 0.7 10e3/uL    Absolute Basophils 0.0 0.0 - 0.2 10e3/uL    Absolute Immature Granulocytes 0.0 <=0.4 10e3/uL    Absolute NRBCs 0.0 10e3/uL   EKG 12 lead     Status: None   Result Value Ref Range    Systolic Blood Pressure  mmHg    Diastolic Blood Pressure  mmHg    Ventricular Rate 83 BPM    Atrial Rate 83 BPM    MT Interval 158 ms    QRS Duration 98 ms     ms    QTc 448 ms    P Axis 67 degrees    R AXIS 74 degrees    T Axis 62 degrees    Interpretation ECG       Sinus rhythm  Normal ECG  When compared with ECG of 25-May-2024 06:20,  No significant change was found  Confirmed by - EMERGENCY ROOM, PHYSICIAN ([id number]),  NORMA MONREAL ([id number]) on 9/10/2024 6:39:04 AM     CBC + differential     Status: None    Narrative     The following orders were created for panel order CBC + differential.  Procedure                               Abnormality         Status                     ---------                               -----------         ------                     CBC with platelets and d...[[id number]]                      Final result                 Please view results for these tests on the individual orders.     Medications - No data to display  Labs Ordered and Resulted from Time of ED Arrival to Time of ED Departure   URINE DRUG SCREEN PANEL - Abnormal       Result Value    Amphetamines Urine Screen Negative      Barbituates Urine Screen Negative      Benzodiazepine Urine Screen Negative      Cannabinoids Urine Screen Positive (*)     Cocaine Urine Screen Negative      Fentanyl Qual Urine Screen Negative      Opiates Urine Screen Negative      PCP Urine Screen Negative     ALCOHOL BREATH TEST POCT - Abnormal    Alcohol Breath Test 0.071 (*)      No orders to display          Critical care was not performed.     Medical Decision Making  The patient's presentation was of moderate complexity (a chronic illness mild to moderate exacerbation, progression, or side effect of treatment).    The patient's evaluation involved:  review of external note(s) from 2 sources (see separate area of note for details)  review of 3+ test result(s) ordered prior to this encounter (see separate area of note for details)  ordering and/or review of 2 test(s) in this encounter (see separate area of note for details)    The patient's management necessitated only low risk treatment.    Assessment & Plan    48 year old male with history of alcohol use disorder, anxiety, and depression to the emergency department seeking detox.  His alcohol level is low and he does not have any objective evidence for alcohol withdrawal here in the emergency department.  There are no detox beds available.  He does endorse anxiety and depression but denies suicidal ideation.   Upon being informed that there are no detox beds available, the patient immediately quested discharge.  He was given resources for outpatient chemical dependency assessment.  He is already established with outpatient mental health providers.    I have reviewed the nursing notes. I have reviewed the findings, diagnosis, plan and need for follow up with the patient.    Discharge Medication List as of 9/10/2024  5:29 PM          Final diagnoses:   Alcohol use disorder   Anxiety   Depression, unspecified depression type     Chart documentation was completed with Dragon voice-recognition software. Even though reviewed, this chart may still contain some grammatical, spelling, and word errors.     [facility] EMERGENCY DEPARTMENT  9/10/2024     Radhames Garcia MD  09/10/24 1140

## 2024-09-10 NOTE — ED PROVIDER NOTES
I assumed care of the patient from Dr. Ponce pending trying to find a detox bed for the patient.  We were able to find a bed at [address] for the patient.  Patient was offered this place but declined due to the location.  On my assessment, patient is ambulatory, conversant, does not appear significantly intoxicated and vital signs mostly is alcohol withdrawal.  He does not appear clinically to be going through alcohol withdrawal at this time.  Will plan for discharge.     Siddhartha León MD  09/10/24 1273

## 2024-09-10 NOTE — CONSULTS
Diagnostic Evaluation Consultation  Crisis Assessment    Patient Name: Georges Moreno  Age:  48 year old  Legal Sex: male  Gender Identity: male  Pronouns:   Race: White  Ethnicity: Not  or   Language: English      Patient was assessed: Virtual: Showbie   Crisis Assessment Start Date: 09/10/24  Crisis Assessment Start Time: 0414  Crisis Assessment Stop Time: 0447  Patient location: [facility] EMERGENCY DEPT                             ED01    Referral Data and Chief Complaint  Georges Moreno presents to the ED by  self. Patient is presenting to the ED for the following concerns: Substance use, Anxiety, Recent loss.   Factors that make the mental health crisis life threatening or complex are:  Patient continuing to abuse alcohol on a basis for the last 35 years.  Patient currently sees no benefit of him seeking out any form of treatment at this time for alcohol use.  Patient also experiencing grief and loss issues with the loss of 2 friends over the last year..      Informed Consent and Assessment Methods  Explained the crisis assessment process, including applicable information disclosures and limits to confidentiality, assessed understanding of the process, and obtained consent to proceed with the assessment.  Assessment methods included conducting a formal interview with patient, review of medical records, collaboration with medical staff, and obtaining relevant collateral information from family and community providers when available.  : done     Patient response to interventions: acceptance expressed  Coping skills were attempted to reduce the crisis:  help seeking by coming into ED     History of the Crisis   Patient is a 48-year-old male with a history of alcohol use disorder, anxiety and depression who comes in the hospital brought in by himself due to concerns of increasing anxiety and ongoing alcohol abuse.  Patient reports that he lost a friend back in January 2024 and the friend  who passed away in January has a birthday in August.  An additional friend of the patient passed away 1 week after the previous friends death.  Patient feels that has been experiencing elevated anxiety levels and feels that it has been difficult to control and manage his symptoms.  He notes that he has been continuing to drink a quart of tequila per day.  He reports that has been drinking daily for around the last 35 years.  He has attended multiple treatments in the past but he does not feel that these are effective because he does not believe that they target the main issues at hand which he believes are his mental health symptoms.      He reports ongoing racing thoughts and difficulty with sleep.  Patient's main goal of coming to the hospital is to seek out antianxiety medication, in particular Xanax.  Patient was prescribed Atarax but he does not feel that this medication is powerful enough for him.  Patient is currently connected to therapy and psychiatry through [facility] in [place].    Brief Psychosocial History  Family:  Single, Children no  Support System:  Parent(s)  Employment Status:  unemployed  Source of Income:  none  Financial Environmental Concerns:  unemployed  Current Hobbies:  outdoor activities, social media/computer activities, television/movies/videos  Barriers in Personal Life:  mental health concerns    Significant Clinical History  Current Anxiety Symptoms:  excessive worry, racing thoughts, anxious  Current Depression/Trauma:  sense of doom, irritable, sadness, negativistic, hopelessness, helplessness  Current Somatic Symptoms:  anxious, somatic symptoms (abdominal pain, headache, tension), racing thoughts  Current Psychosis/Thought Disturbance:  agitation  Current Eating Symptoms:     Chemical Use History:  Alcohol: Daily  Last Use:: 09/09/24  Benzodiazepines: None  Opiates: None  Cocaine: None  Marijuana: Daily  Last Use:: 09/09/24   Past diagnosis:  Anxiety Disorder,  "Depression, Substance Use Disorder  Family history:  Substance Use Disorder  Past treatment:  Individual therapy, Psychiatric Medication Management, Residential Treatment, AA/NA  Details of most recent treatment:  Pt reports current involvement in weekly therapy and attends a weekly AA meeting. Pt has a hx of around 8 previous treatments, last around 8 yrs ago. Pt has upcoming appointment with his psychologist on Wednesday. Pt has been working with him on and off for four years. Pt denies any previous partial or day treatment programs.  Other relevant history:  Pt currently lives with his mother. His father  in . Pt has hx of legal problems with previous DUI.       Collateral Information  Is there collateral information:   No    Collateral information name, relationship, phone number:       What happened today:       What is different about patient's functioning:       Concern about alcohol/drug use:      What do you think the patient needs:      Has patient made comments about wanting to kill themselves/others:      If d/c is recommended, can they take part in safety/aftercare planning:       Additional collateral information:        Risk Assessment  [place] Suicide Severity Rating Scale Full Clinical Version:  Suicidal Ideation  Q1 Wish to be Dead (Lifetime): No  Q2 Non-Specific Active Suicidal Thoughts (Lifetime): No     Suicidal Behavior (Lifetime)  Actual Attempt (Lifetime): No  Has subject engaged in non-suicidal self-injurious behavior? (Lifetime): Yes (cut arm \"a long time ago\")  Interrupted Attempts (Lifetime): No  Aborted or Self-Interrupted Attempt (Lifetime): No  Preparatory Acts or Behavior (Lifetime): No    [place] Suicide Severity Rating Scale Recent:              Environmental or Psychosocial Events: loss of a loved one, unemployment/underemployment, helplessness/hopelessness, ongoing abuse of substances  Protective Factors: Protective Factors: strong bond to family unit, community " support, or employment, help seeking, lives in a responsibly safe and stable environment    Does the patient have thoughts of harming others? Feels Like Hurting Others: no  Previous Attempt to Hurt Others: no  Current presentation: Irritable  Is the patient engaging in sexually inappropriate behavior?: no  Duty to warn initiated: no    Is the patient engaging in sexually inappropriate behavior?  no        Mental Status Exam   Affect: Appropriate  Appearance: Appropriate  Attention Span/Concentration: Attentive  Eye Contact: Engaged    Fund of Knowledge: Appropriate   Language /Speech Content: Fluent  Language /Speech Volume: Normal  Language /Speech Rate/Productions: Normal  Recent Memory: Intact  Remote Memory: Intact  Mood: Sad, Irritable, Anxious, Angry  Orientation to Person: Yes   Orientation to Place: Yes  Orientation to Time of Day: Yes  Orientation to Date: Yes     Situation (Do they understand why they are here?): Yes  Psychomotor Behavior: Normal  Thought Content: Clear  Thought Form: Intact     Mini-Cog Assessment  Number of Words Recalled:    Clock-Drawing Test:     Three Item Recall:    Mini-Cog Total Score:       Medication  Psychotropic medications:   Medication Orders - Psychiatric (From admission, onward)      None             Current Care Team  Patient Care Team:  Clinic, [facility] as PCP - General  MISA Moyer, BA, [facility] as Therapist (Therapist)  [facility] as Assigned PCP    Diagnosis  Patient Active Problem List   Diagnosis Code    Thoracic or lumbosacral neuritis or radiculitis, unspecified [id number]    Screening for cardiovascular condition Z13.6    Alcohol dependence with withdrawal (H) F10.239    Atrial fibrillation (H) I48.91    Paroxysmal atrial fibrillation (H) I48.0    Generalized anxiety disorder F41.1    Personal history of alcoholism (H) F10.21    Cigarette nicotine dependence with withdrawal F17.213    Alcohol withdrawal syndrome without complication  (H) F10.930    Cannabis use disorder, moderate, dependence (H) F12.20    Alcohol withdrawal with complication with inpatient treatment, with unspecified complication (H) F10.939    Palpitations R00.2    Palpitation R00.2    Alcohol abuse F10.10    Anxiety F41.9    Elevated troponin R79.89    Alcohol withdrawal syndrome with complication (H) F10.939    Elevated lactic acid level R79.89    Alcohol withdrawal (H) F10.939    Recurrent major depression (H24) F33.9    Alcohol withdrawal, uncomplicated (H) F10.930    Laceration of left index finger without damage to nail, foreign body presence unspecified, initial encounter S61.211A    Alcohol use disorder, severe, dependence (H) F10.20    Acute alcoholism (H) F10.20       Primary Problem This Admission  Active Hospital Problems    Alcohol use disorder, severe, dependence (H)      Anxiety        Clinical Summary and Substantiation of Recommendations   Patient comes into the ED due to experiencing elevated anxiety and ongoing substance use.  Patient's main goal in coming to the hospital is to seek a medication in order to help calm his mind down and decrease overall anxiety symptoms.  Patient was prescribed Atarax in the ED, however he feels that this is not effective enough and is specifically seeking out a more powerful agent such as Xanax.  Patient has been struggling with the loss of 2 friends over the past year and feels that has been difficult to cope and manage.  He feels that his main coping mechanism is alcohol use and wants to find alternative ways to help deal with current concerns in his life.  He does have a follow-up appointment with his psychologist this coming Wednesday and does share that he has access to psychiatry at the same clinic.      Patient denies wanting any other other mental health resources or referrals for chemical dependency treatments.  Patient became irritable and frustrated throughout portions of the session feeling that questions were  irrelevant to his current condition.  Eventually the patient softened and is able to have a more calm conversation about his current concerns.  Patient denies any type of suicidal thoughts at this time and denies any plans for self-injurious behavior.  Discussed case with attending doctor who is in agreement with plan for patient to discharge from the hospital.            Patient coping skills attempted to reduce the crisis:  help seeking by coming into ED    Disposition  Recommended disposition:          Reviewed case and recommendations with attending provider. Attending Name: Dr. Ponce       Attending concurs with disposition:         Patient and/or validated legal guardian concurs with disposition:           Final disposition:  discharge    Legal status on admission:      Assessment Details   Total duration spent with the patient: 33 min     CPT code(s) utilized: 72271 - Psychotherapy for Crisis - 60 (30-74*) min    Georges Mayer Psychotherapist  DEC - Triage & Transition Services  Callback: [phone]

## 2024-09-10 NOTE — ED PROVIDER NOTES
Emergency Department Note      History of Present Illness     Chief Complaint   Alcohol Intoxication  Grief    HPI   Georges Moreno is a 48 year old male with history as noted below who presents for alcohol intoxication and a mental health concern. Patient reports that a close friend of his recently passed away and he cannot stop thinking about it. He says that he is an alcoholic and he uses alcohol to cope with his mental illnesses. He was sitting at home tonight and says that he came to the ED because he cannot eat, sleep, or stop drinking alcohol. Patient denies suicide plan but does endorse some suicidal thoughts. He reports use of prescribes marijuana but no other substances. Patient does have an appointment with a psychologist on Wednesday that he would like to attend.     Independent Historian   None    Review of External Notes   Care everywhere reviewed and epic updated    Past Medical History     Medical History and Problem List   Past Medical History:   Diagnosis Date    ADHD     Alcohol abuse     Alcohol use disorder, severe, dependence     Atrial fibrillation     Vickers's esophagus     Depression     PUSHPA (generalized anxiety disorder)     Pancreatitis     Perirectal abscess     PTSD (post-traumatic stress disorder)      Medications   FLUoxetine (PROZAC) 40 MG capsule  folic acid (FOLVITE) 1 MG tablet  gabapentin (NEURONTIN) 400 MG capsule  metoprolol tartrate (LOPRESSOR) 25 MG tablet  pantoprazole (PROTONIX) 40 MG EC tablet  QUEtiapine (SEROQUEL) 100 MG tablet      Surgical History   Past Surgical History:   Procedure Laterality Date    Excision of pilonidal cyst      INCISION AND DRAINAGE PERIRECTAL ABSCESS       Physical Exam     Patient Vitals for the past 24 hrs:   BP Temp Temp src Pulse Resp SpO2   09/10/24 0456 -- -- -- -- -- 99 %   09/10/24 0455 -- -- -- -- -- 99 %   09/10/24 0454 151/106 -- -- 85 -- --   09/10/24 0336 130/77 -- -- 105 -- 96 %   09/09/24 2341 156/132 97.9  F (36.6  C) Temporal  115 26 95 %     HR at 0600: 80    Physical Exam  Nursing note and vitals reviewed.  Constitutional: Cooperative. Appears intoxicated.   HENT:   Mouth/Throat: Mucous membranes are dry  Cardiovascular: Tachycardic rate, regular rhythm and normal heart sounds.  No murmur.  Pulmonary/Chest: Effort normal and breath sounds normal. No respiratory distress. No wheezes. No rales.   Abdominal: Soft. Normal appearance and bowel sounds are normal. No distension. There is no tenderness. There is no rigidity and no guarding.   Neurological: Alert.  Oriented x 3.  No tremor  Skin: Skin is warm and dry.  Psychiatric: Tearful, denies suicidal plan but endorses some suicidal thoughts.  No hallucinations or apparent response to internal stimuli    Diagnostics     Lab Results   Labs Ordered and Resulted from Time of ED Arrival to Time of ED Departure   COMPREHENSIVE METABOLIC PANEL - Abnormal       Result Value    Sodium 141      Potassium 3.8      Carbon Dioxide (CO2) 22      Anion Gap 19 (*)     Urea Nitrogen 12.6      Creatinine 0.83      GFR Estimate >90      Calcium 9.1      Chloride 100      Glucose 90      Alkaline Phosphatase 65      AST 46 (*)     ALT 34      Protein Total 7.9      Albumin 4.7      Bilirubin Total 0.4     ETHYL ALCOHOL LEVEL - Abnormal    Alcohol ethyl 0.27 (*)    CBC WITH PLATELETS AND DIFFERENTIAL    WBC Count 9.6      RBC Count 5.42      Hemoglobin 17.2      Hematocrit 49.9      MCV 92      MCH 31.7      MCHC 34.5      RDW 12.2      Platelet Count 218      % Neutrophils 58      % Lymphocytes 35      % Monocytes 6      % Eosinophils 1      % Basophils 0      % Immature Granulocytes 0      NRBCs per 100 WBC 0      Absolute Neutrophils 5.6      Absolute Lymphocytes 3.3      Absolute Monocytes 0.6      Absolute Eosinophils 0.1      Absolute Basophils 0.0      Absolute Immature Granulocytes 0.0      Absolute NRBCs 0.0       EKG   ECG results from 09/09/24   EKG 12 lead     Value    Systolic Blood Pressure      "Diastolic Blood Pressure     Ventricular Rate 83    Atrial Rate 83    MI Interval 158    QRS Duration 98        QTc 448    P Axis 67    R AXIS 74    T Axis 62    Interpretation ECG      Sinus rhythm      Independent Interpretation   None    ED Course      Medications Administered   Medications   sodium chloride 0.9% BOLUS 1,000 mL (0 mLs Intravenous Stopped 9/10/24 4807)   hydrOXYzine HCl (ATARAX) tablet 25 mg (25 mg Oral $Given 9/10/24 0404)     Discussion of Management   DEC    ED Course   ED Course as of 09/10/24 0549   Mon Sep 09, 2024   6993 I evaluated the patient, obtained history, and performed a physical exam as detailed above.    Tue Sep 10, 2024   0450 I consulted with DEC .    0519 I rechecked on the patient and updated on plan of care. He requested that we check detox availability.    0549 I rechecked on the patient and updated on plan of care.      Additional Documentation  None    Medical Decision Making / Diagnosis       MDM   Georges Moreno is a 48 year old male who presents with an acute grief response to the loss of a close friend.  He has been managing this with increasing his alcohol intake which she struggles with baseline.  Sounds like he does have a therapy appointment in 2 days but is feeling unsafe with himself at home.  He denies specific suicidal thoughts or plans but states \"you never know\".  Basic labs are reassuring.  He was concerned he may be in atrial fibrillation which she has not.  IV fluids were administered.  He would like to talk to our DEC representative which I think is very appropriate.  We will continue to monitor for safety with disposition planning pending his consultation with DEC    0600 I had a long talk with the patient.  He is frustrated that he is not being hospitalized today.  Discussed with him that there is no medical reason to indicate admission after evaluation above.  Certainly would be happy to check in to detox centers as I do feel he would " benefit from this if he wishes to stop drinking.  After some back-and-forth in the discussion he would like to wait until 8 AM at which point the detox centers will know if they have bed availability.  Current heart rate is in the 80s.  No signs of tremor or withdrawal that would necessitate more aggressive treatment at this time.  He is somewhat frustrated that he has not gotten medication other than Atarax but I do not feel that that is necessary at this time.    Disposition   Patient signed out to oncoming ED physician pending detox center availability     Diagnosis     ICD-10-CM    1. Grief reaction  F43.21       2. Acute alcoholic intoxication in alcoholism with complication (H)  F10.229            Scribe Disclosure:  I, Hailey Silva, am serving as a scribe at 12:45 AM on 9/10/2024 to document services personally performed by Garth Ponce MD based on my observations and the provider's statements to me.        Garth Ponce MD  09/10/24 0672

## 2024-09-10 NOTE — ED TRIAGE NOTES
Pt presents for mh evaluation and alcohol intoxication. Agitated in triage. Recently had a friend pass away and has been drinking about 1 quart of tequila daily and smoking marijuana. Denies SI or HI

## 2024-09-10 NOTE — ED NOTES
"Patient elected to not be seen in the ER at he \"just wants to go home.\" Patient discharged from the ER approximately 1.5 hours ago. Showed patient the list of phone numbers for different [employer] companies and where the phone is located in the ED. LWBS before triage.  "
[id number]

## 2024-09-10 NOTE — ED NOTES
Per , patient had DEC assessment this am at 0530. Refused to cooperate or follow recommendations at that time. DEC will not be seeing patient again.

## 2024-09-11 ENCOUNTER — TELEPHONE (OUTPATIENT)
Dept: EMERGENCY MEDICINE | Facility: CLINIC | Age: 49
End: 2024-09-11
Payer: COMMERCIAL

## 2024-09-11 NOTE — TELEPHONE ENCOUNTER
Detail Level: Detailed Triage and Transition Services- Patient Follow Up Call  Service Line Making Phone Call: Extended Care    Who did Writer Talk to: Patient    Details of Call: Patient stated they are still looking to get into detox and expressed they have called [facility] to get a bed update but they are still full. Writer gave phone numbers to other detox facilities in the [facility]. Patient stated they would call these numbers and did not request any other assistance.     Malcom Marques 9/11/2024 10:19 AM    Detail Level: Zone Dapsone Counseling: I discussed with the patient the risks of dapsone including but not limited to hemolytic anemia, agranulocytosis, rashes, methemoglobinemia, kidney failure, peripheral neuropathy, headaches, GI upset, and liver toxicity.  Patients who start dapsone require monitoring including baseline LFTs and weekly CBCs for the first month, then every month thereafter.  The patient verbalized understanding of the proper use and possible adverse effects of dapsone.  All of the patient's questions and concerns were addressed. Erythromycin Pregnancy And Lactation Text: This medication is Pregnancy Category B and is considered safe during pregnancy. It is also excreted in breast milk. Bactrim Counseling:  I discussed with the patient the risks of sulfa antibiotics including but not limited to GI upset, allergic reaction, drug rash, diarrhea, dizziness, photosensitivity, and yeast infections.  Rarely, more serious reactions can occur including but not limited to aplastic anemia, agranulocytosis, methemoglobinemia, blood dyscrasias, liver or kidney failure, lung infiltrates or desquamative/blistering drug rashes. Minocycline Counseling: Patient advised regarding possible photosensitivity and discoloration of the teeth, skin, lips, tongue and gums.  Patient instructed to avoid sunlight, if possible.  When exposed to sunlight, patients should wear protective clothing, sunglasses, and sunscreen.  The patient was instructed to call the office immediately if the following severe adverse effects occur:  hearing changes, easy bruising/bleeding, severe headache, or vision changes.  The patient verbalized understanding of the proper use and possible adverse effects of minocycline.  All of the patient's questions and concerns were addressed. Topical Sulfur Applications Counseling: Topical Sulfur Counseling: Patient counseled that this medication may cause skin irritation or allergic reactions.  In the event of skin irritation, the patient was advised to reduce the amount of the drug applied or use it less frequently.   The patient verbalized understanding of the proper use and possible adverse effects of topical sulfur application.  All of the patient's questions and concerns were addressed. Tetracycline Pregnancy And Lactation Text: This medication is Pregnancy Category D and not consider safe during pregnancy. It is also excreted in breast milk. Azithromycin Pregnancy And Lactation Text: This medication is considered safe during pregnancy and is also secreted in breast milk. High Dose Vitamin A Pregnancy And Lactation Text: High dose vitamin A therapy is contraindicated during pregnancy and breast feeding. Topical Clindamycin Pregnancy And Lactation Text: This medication is Pregnancy Category B and is considered safe during pregnancy. It is unknown if it is excreted in breast milk. Tetracycline Counseling: Patient counseled regarding possible photosensitivity and increased risk for sunburn.  Patient instructed to avoid sunlight, if possible.  When exposed to sunlight, patients should wear protective clothing, sunglasses, and sunscreen.  The patient was instructed to call the office immediately if the following severe adverse effects occur:  hearing changes, easy bruising/bleeding, severe headache, or vision changes.  The patient verbalized understanding of the proper use and possible adverse effects of tetracycline.  All of the patient's questions and concerns were addressed. Patient understands to avoid pregnancy while on therapy due to potential birth defects. Topical Retinoid Pregnancy And Lactation Text: This medication is Pregnancy Category C. It is unknown if this medication is excreted in breast milk. Isotretinoin Counseling: Patient should get monthly blood tests, not donate blood, not drive at night if vision affected, not share medication, and not undergo elective surgery for 6 months after tx completed. Side effects reviewed, pt to contact office should one occur. Tazorac Counseling:  Patient advised that medication is irritating and drying.  Patient may need to apply sparingly and wash off after an hour before eventually leaving it on overnight.  The patient verbalized understanding of the proper use and possible adverse effects of tazorac.  All of the patient's questions and concerns were addressed. Bactrim Pregnancy And Lactation Text: This medication is Pregnancy Category D and is known to cause fetal risk.  It is also excreted in breast milk. Topical Sulfur Applications Pregnancy And Lactation Text: This medication is Pregnancy Category C and has an unknown safety profile during pregnancy. It is unknown if this topical medication is excreted in breast milk. Benzoyl Peroxide Counseling: Patient counseled that medicine may cause skin irritation and bleach clothing.  In the event of skin irritation, the patient was advised to reduce the amount of the drug applied or use it less frequently.   The patient verbalized understanding of the proper use and possible adverse effects of benzoyl peroxide.  All of the patient's questions and concerns were addressed. Dapsone Pregnancy And Lactation Text: This medication is Pregnancy Category C and is not considered safe during pregnancy or breast feeding. Birth Control Pills Counseling: Birth Control Pill Counseling: I discussed with the patient the potential side effects of OCPs including but not limited to increased risk of stroke, heart attack, thrombophlebitis, deep venous thrombosis, hepatic adenomas, breast changes, GI upset, headaches, and depression.  The patient verbalized understanding of the proper use and possible adverse effects of OCPs. All of the patient's questions and concerns were addressed. Spironolactone Counseling: Patient advised regarding risks of diarrhea, abdominal pain, hyperkalemia, birth defects (for female patients), liver toxicity and renal toxicity. The patient may need blood work to monitor liver and kidney function and potassium levels while on therapy. The patient verbalized understanding of the proper use and possible adverse effects of spironolactone.  All of the patient's questions and concerns were addressed. Benzoyl Peroxide Pregnancy And Lactation Text: This medication is Pregnancy Category C. It is unknown if benzoyl peroxide is excreted in breast milk. Include Pregnancy/Lactation Warning?: No Doxycycline Counseling:  Patient counseled regarding possible photosensitivity and increased risk for sunburn.  Patient instructed to avoid sunlight, if possible.  When exposed to sunlight, patients should wear protective clothing, sunglasses, and sunscreen.  The patient was instructed to call the office immediately if the following severe adverse effects occur:  hearing changes, easy bruising/bleeding, severe headache, or vision changes.  The patient verbalized understanding of the proper use and possible adverse effects of doxycycline.  All of the patient's questions and concerns were addressed. Isotretinoin Pregnancy And Lactation Text: This medication is Pregnancy Category X and is considered extremely dangerous during pregnancy. It is unknown if it is excreted in breast milk. Tazorac Pregnancy And Lactation Text: This medication is not safe during pregnancy. It is unknown if this medication is excreted in breast milk. Erythromycin Counseling:  I discussed with the patient the risks of erythromycin including but not limited to GI upset, allergic reaction, drug rash, diarrhea, increase in liver enzymes, and yeast infections. Doxycycline Pregnancy And Lactation Text: This medication is Pregnancy Category D and not consider safe during pregnancy. It is also excreted in breast milk but is considered safe for shorter treatment courses. Azithromycin Counseling:  I discussed with the patient the risks of azithromycin including but not limited to GI upset, allergic reaction, drug rash, diarrhea, and yeast infections. High Dose Vitamin A Counseling: Side effects reviewed, pt to contact office should one occur. Topical Clindamycin Counseling: Patient counseled that this medication may cause skin irritation or allergic reactions.  In the event of skin irritation, the patient was advised to reduce the amount of the drug applied or use it less frequently.   The patient verbalized understanding of the proper use and possible adverse effects of clindamycin.  All of the patient's questions and concerns were addressed. Birth Control Pills Pregnancy And Lactation Text: This medication should be avoided if pregnant and for the first 30 days post-partum. Spironolactone Pregnancy And Lactation Text: This medication can cause feminization of the male fetus and should be avoided during pregnancy. The active metabolite is also found in breast milk. Topical Retinoid counseling:  Patient advised to apply a pea-sized amount only at bedtime and wait 30 minutes after washing their face before applying.  If too drying, patient may add a non-comedogenic moisturizer. The patient verbalized understanding of the proper use and possible adverse effects of retinoids.  All of the patient's questions and concerns were addressed.

## 2024-11-17 ENCOUNTER — TELEPHONE (OUTPATIENT)
Dept: BEHAVIORAL HEALTH | Facility: CLINIC | Age: 49
End: 2024-11-17
Payer: COMMERCIAL

## 2024-11-17 NOTE — TELEPHONE ENCOUNTER
S:  [facility] -[phone] LISA calling at 12:35 PM with clinical on a 48 year old/Male presenting for alcohol detox. Sober for 44 days up until a week ago.    B: Pt presents for ETOH detox.   Currently reports drinking a quart or more of tequila and some beers daily for 1 week.    Patient reports last use was prior to arrival.  Pt JIMMY: 0.11  Pt  denies hx of DT  Pt  denies hx of seizures. Last seizure: N/A  Pt endorsing the following symptoms of withdrawal: Anxious and Perspiration   MSSA Score: N/A    Pt denies acute mental health or medical concerns. Hx of depression and anxiety, but under control.   Pt endorses other drug use: (!) CANNABIS PRODUCTS Amount/frequency: N/A    Does Pt have a detox care plan in Epic? N/A  Does pt present with specific needs, assistive devices, or exclusionary criteria? None  Is the patient ambulating, eating and drinking in the ED? Yes    A: Pt meets criteria to be presented for IP detox admission. Patient is voluntary    COVID Symptoms: No  If yes, COVID test required   Utox:  All labs to be faxed.  Magnesium: N/A  CMP: N/A  CBC: N/A  HCG: N/A     R: Patient cleared and ready for behavioral bed placement: Yes    Pt is meeting criteria for presentation to 3[facility]/CD    Does Patient need a Transfer Center request created? Yes, writer completed Transfer Center request at:  12:45 PM      Requested all labs, nursing notes, and MD note stating medically cleared for detox admission to be faxed to intake.   Clinical received via [employer] on 11/17/2024 @ 12:47:01 PM      12:56 PM Paged [facility] to review pt for admission to 3[facility].   1:07 PM Detox clinical sent to [facility] via secure email.   1:11 PM [facility] accept pt for admission to 3[facility]/CD/Jaclyn.  1:14 PM Informed [facility] CRN of pt in queue.   1:16 PM Clinical forwarded to  via .   1:19 PM Provided [facility] FRANCOIS- Jody with placement info. [facility] to call for report once clinical has been reviewed.   2:12 PM Per call from  LOUIS Patel, after  speaking with RN @ [facility], it was discovered that pt has received 290 MG of IV Phenobarbital today. IV phenobarbital meets exclusionary criteria for admission to 3[facility].   2:16 PM Spoke with Jody in the [facility] ED to inform her of exclusionary criteria being met. Informed her that admission would be canceled and pt would be removed from our list at this time.       Final Disposition: admission canceled, pt meets exclusionary criteria

## 2024-12-11 ENCOUNTER — HOSPITAL ENCOUNTER (INPATIENT)
Facility: CLINIC | Age: 49
End: 2024-12-11
Attending: EMERGENCY MEDICINE | Admitting: PSYCHIATRY & NEUROLOGY
Payer: COMMERCIAL

## 2024-12-11 ENCOUNTER — TELEPHONE (OUTPATIENT)
Dept: BEHAVIORAL HEALTH | Facility: CLINIC | Age: 49
End: 2024-12-11

## 2024-12-11 DIAGNOSIS — F10.220 ALCOHOL DEPENDENCE WITH UNCOMPLICATED INTOXICATION (H): Primary | ICD-10-CM

## 2024-12-11 DIAGNOSIS — F10.20 ALCOHOL USE DISORDER, SEVERE, DEPENDENCE (H): ICD-10-CM

## 2024-12-11 DIAGNOSIS — F10.90 ALCOHOL USE DISORDER: ICD-10-CM

## 2024-12-11 DIAGNOSIS — F41.1 GENERALIZED ANXIETY DISORDER: ICD-10-CM

## 2024-12-11 DIAGNOSIS — F41.1 GAD (GENERALIZED ANXIETY DISORDER): ICD-10-CM

## 2024-12-11 DIAGNOSIS — F10.930 ALCOHOL WITHDRAWAL, UNCOMPLICATED (H): ICD-10-CM

## 2024-12-11 LAB
ALBUMIN SERPL BCG-MCNC: 4.8 G/DL (ref 3.5–5.2)
ALCOHOL BREATH TEST: 0.12 (ref 0–0.01)
ALP SERPL-CCNC: 70 U/L (ref 40–150)
ALT SERPL W P-5'-P-CCNC: 24 U/L (ref 0–70)
AMPHETAMINES UR QL SCN: ABNORMAL
ANION GAP SERPL CALCULATED.3IONS-SCNC: 19 MMOL/L (ref 7–15)
AST SERPL W P-5'-P-CCNC: 30 U/L (ref 0–45)
BARBITURATES UR QL SCN: ABNORMAL
BASOPHILS # BLD AUTO: 0 10E3/UL (ref 0–0.2)
BASOPHILS NFR BLD AUTO: 0 %
BENZODIAZ UR QL SCN: ABNORMAL
BILIRUB SERPL-MCNC: 0.7 MG/DL
BUN SERPL-MCNC: 16.4 MG/DL (ref 6–20)
BZE UR QL SCN: ABNORMAL
CALCIUM SERPL-MCNC: 9.6 MG/DL (ref 8.8–10.4)
CANNABINOIDS UR QL SCN: ABNORMAL
CHLORIDE SERPL-SCNC: 94 MMOL/L (ref 98–107)
CREAT SERPL-MCNC: 0.83 MG/DL (ref 0.67–1.17)
EGFRCR SERPLBLD CKD-EPI 2021: >90 ML/MIN/1.73M2
EOSINOPHIL # BLD AUTO: 0.1 10E3/UL (ref 0–0.7)
EOSINOPHIL NFR BLD AUTO: 1 %
ERYTHROCYTE [DISTWIDTH] IN BLOOD BY AUTOMATED COUNT: 12.4 % (ref 10–15)
FENTANYL UR QL: ABNORMAL
GLUCOSE SERPL-MCNC: 110 MG/DL (ref 70–99)
HCO3 SERPL-SCNC: 24 MMOL/L (ref 22–29)
HCT VFR BLD AUTO: 45.6 % (ref 40–53)
HGB BLD-MCNC: 16.4 G/DL (ref 13.3–17.7)
IMM GRANULOCYTES # BLD: 0 10E3/UL
IMM GRANULOCYTES NFR BLD: 0 %
LYMPHOCYTES # BLD AUTO: 2.6 10E3/UL (ref 0.8–5.3)
LYMPHOCYTES NFR BLD AUTO: 31 %
MAGNESIUM SERPL-MCNC: 2 MG/DL (ref 1.7–2.3)
MCH RBC QN AUTO: 32.2 PG (ref 26.5–33)
MCHC RBC AUTO-ENTMCNC: 36 G/DL (ref 31.5–36.5)
MCV RBC AUTO: 90 FL (ref 78–100)
MONOCYTES # BLD AUTO: 0.7 10E3/UL (ref 0–1.3)
MONOCYTES NFR BLD AUTO: 8 %
NEUTROPHILS # BLD AUTO: 5 10E3/UL (ref 1.6–8.3)
NEUTROPHILS NFR BLD AUTO: 59 %
NRBC # BLD AUTO: 0 10E3/UL
NRBC BLD AUTO-RTO: 0 /100
OPIATES UR QL SCN: ABNORMAL
PCP QUAL URINE (ROCHE): ABNORMAL
PLATELET # BLD AUTO: 256 10E3/UL (ref 150–450)
POTASSIUM SERPL-SCNC: 4 MMOL/L (ref 3.4–5.3)
PROT SERPL-MCNC: 7.8 G/DL (ref 6.4–8.3)
RBC # BLD AUTO: 5.09 10E6/UL (ref 4.4–5.9)
SODIUM SERPL-SCNC: 137 MMOL/L (ref 135–145)
WBC # BLD AUTO: 8.5 10E3/UL (ref 4–11)

## 2024-12-11 PROCEDURE — 99285 EMERGENCY DEPT VISIT HI MDM: CPT | Mod: 25 | Performed by: EMERGENCY MEDICINE

## 2024-12-11 PROCEDURE — 85004 AUTOMATED DIFF WBC COUNT: CPT | Performed by: EMERGENCY MEDICINE

## 2024-12-11 PROCEDURE — 36415 COLL VENOUS BLD VENIPUNCTURE: CPT | Performed by: EMERGENCY MEDICINE

## 2024-12-11 PROCEDURE — 82374 ASSAY BLOOD CARBON DIOXIDE: CPT | Performed by: EMERGENCY MEDICINE

## 2024-12-11 PROCEDURE — 96360 HYDRATION IV INFUSION INIT: CPT | Performed by: EMERGENCY MEDICINE

## 2024-12-11 PROCEDURE — 84155 ASSAY OF PROTEIN SERUM: CPT | Performed by: EMERGENCY MEDICINE

## 2024-12-11 PROCEDURE — 84443 ASSAY THYROID STIM HORMONE: CPT | Performed by: PSYCHIATRY & NEUROLOGY

## 2024-12-11 PROCEDURE — 82977 ASSAY OF GGT: CPT | Performed by: PSYCHIATRY & NEUROLOGY

## 2024-12-11 PROCEDURE — 85041 AUTOMATED RBC COUNT: CPT | Performed by: EMERGENCY MEDICINE

## 2024-12-11 PROCEDURE — 250N000013 HC RX MED GY IP 250 OP 250 PS 637: Performed by: EMERGENCY MEDICINE

## 2024-12-11 PROCEDURE — 83036 HEMOGLOBIN GLYCOSYLATED A1C: CPT | Performed by: PSYCHIATRY & NEUROLOGY

## 2024-12-11 PROCEDURE — 80307 DRUG TEST PRSMV CHEM ANLYZR: CPT | Performed by: EMERGENCY MEDICINE

## 2024-12-11 PROCEDURE — 258N000003 HC RX IP 258 OP 636: Performed by: EMERGENCY MEDICINE

## 2024-12-11 PROCEDURE — 99285 EMERGENCY DEPT VISIT HI MDM: CPT | Performed by: EMERGENCY MEDICINE

## 2024-12-11 PROCEDURE — 84450 TRANSFERASE (AST) (SGOT): CPT | Performed by: EMERGENCY MEDICINE

## 2024-12-11 PROCEDURE — 83735 ASSAY OF MAGNESIUM: CPT | Performed by: EMERGENCY MEDICINE

## 2024-12-11 RX ORDER — DIAZEPAM 5 MG/1
5-20 TABLET ORAL EVERY 30 MIN PRN
Status: DISCONTINUED | OUTPATIENT
Start: 2024-12-11 | End: 2024-12-12

## 2024-12-11 RX ADMIN — DIAZEPAM 5 MG: 5 TABLET ORAL at 21:58

## 2024-12-11 RX ADMIN — SODIUM CHLORIDE 1000 ML: 9 INJECTION, SOLUTION INTRAVENOUS at 21:41

## 2024-12-11 ASSESSMENT — ACTIVITIES OF DAILY LIVING (ADL)
ADLS_ACUITY_SCORE: 56

## 2024-12-12 VITALS
OXYGEN SATURATION: 95 % | TEMPERATURE: 98.1 F | HEART RATE: 69 BPM | BODY MASS INDEX: 27.09 KG/M2 | WEIGHT: 200 LBS | DIASTOLIC BLOOD PRESSURE: 83 MMHG | RESPIRATION RATE: 14 BRPM | HEIGHT: 72 IN | SYSTOLIC BLOOD PRESSURE: 149 MMHG

## 2024-12-12 PROBLEM — F10.90 ALCOHOL USE DISORDER: Status: ACTIVE | Noted: 2024-12-12

## 2024-12-12 LAB
EST. AVERAGE GLUCOSE BLD GHB EST-MCNC: 103 MG/DL
GGT SERPL-CCNC: 26 U/L (ref 8–61)
HBA1C MFR BLD: 5.2 %
TSH SERPL DL<=0.005 MIU/L-ACNC: 1.9 UIU/ML (ref 0.3–4.2)

## 2024-12-12 PROCEDURE — 99223 1ST HOSP IP/OBS HIGH 75: CPT | Performed by: PSYCHIATRY & NEUROLOGY

## 2024-12-12 PROCEDURE — HZ2ZZZZ DETOXIFICATION SERVICES FOR SUBSTANCE ABUSE TREATMENT: ICD-10-PCS | Performed by: PSYCHIATRY & NEUROLOGY

## 2024-12-12 PROCEDURE — 128N000004 HC R&B CD ADULT

## 2024-12-12 PROCEDURE — 250N000013 HC RX MED GY IP 250 OP 250 PS 637: Performed by: PSYCHIATRY & NEUROLOGY

## 2024-12-12 PROCEDURE — 250N000013 HC RX MED GY IP 250 OP 250 PS 637

## 2024-12-12 PROCEDURE — 99253 IP/OBS CNSLTJ NEW/EST LOW 45: CPT

## 2024-12-12 RX ORDER — HYDRALAZINE HYDROCHLORIDE 25 MG/1
25 TABLET, FILM COATED ORAL 3 TIMES DAILY PRN
Status: DISCONTINUED | OUTPATIENT
Start: 2024-12-12 | End: 2024-12-12

## 2024-12-12 RX ORDER — TRAZODONE HYDROCHLORIDE 50 MG/1
50 TABLET, FILM COATED ORAL
Status: DISCONTINUED | OUTPATIENT
Start: 2024-12-12 | End: 2024-12-13

## 2024-12-12 RX ORDER — DIAZEPAM 5 MG/1
5-20 TABLET ORAL EVERY 30 MIN PRN
Status: DISCONTINUED | OUTPATIENT
Start: 2024-12-12 | End: 2024-12-14 | Stop reason: HOSPADM

## 2024-12-12 RX ORDER — OLANZAPINE 10 MG/2ML
10 INJECTION, POWDER, FOR SOLUTION INTRAMUSCULAR 3 TIMES DAILY PRN
Status: DISCONTINUED | OUTPATIENT
Start: 2024-12-12 | End: 2024-12-12

## 2024-12-12 RX ORDER — OLANZAPINE 10 MG/1
10 TABLET ORAL 3 TIMES DAILY PRN
Status: DISCONTINUED | OUTPATIENT
Start: 2024-12-12 | End: 2024-12-12

## 2024-12-12 RX ORDER — FOLIC ACID 1 MG/1
1 TABLET ORAL DAILY
Status: DISCONTINUED | OUTPATIENT
Start: 2024-12-12 | End: 2024-12-12

## 2024-12-12 RX ORDER — HYDRALAZINE HYDROCHLORIDE 10 MG/1
10 TABLET, FILM COATED ORAL 3 TIMES DAILY PRN
Status: DISCONTINUED | OUTPATIENT
Start: 2024-12-12 | End: 2024-12-12

## 2024-12-12 RX ORDER — NICOTINE 21 MG/24HR
1 PATCH, TRANSDERMAL 24 HOURS TRANSDERMAL DAILY
Status: DISCONTINUED | OUTPATIENT
Start: 2024-12-12 | End: 2024-12-14 | Stop reason: HOSPADM

## 2024-12-12 RX ORDER — QUETIAPINE FUMARATE 100 MG/1
100 TABLET, FILM COATED ORAL AT BEDTIME
Status: DISCONTINUED | OUTPATIENT
Start: 2024-12-12 | End: 2024-12-13

## 2024-12-12 RX ORDER — MAGNESIUM HYDROXIDE/ALUMINUM HYDROXICE/SIMETHICONE 120; 1200; 1200 MG/30ML; MG/30ML; MG/30ML
30 SUSPENSION ORAL EVERY 4 HOURS PRN
Status: DISCONTINUED | OUTPATIENT
Start: 2024-12-12 | End: 2024-12-14 | Stop reason: HOSPADM

## 2024-12-12 RX ORDER — OLANZAPINE 10 MG/2ML
10 INJECTION, POWDER, FOR SOLUTION INTRAMUSCULAR 3 TIMES DAILY PRN
Status: DISCONTINUED | OUTPATIENT
Start: 2024-12-12 | End: 2024-12-14 | Stop reason: HOSPADM

## 2024-12-12 RX ORDER — FOLIC ACID 1 MG/1
1 TABLET ORAL DAILY
Status: DISCONTINUED | OUTPATIENT
Start: 2024-12-12 | End: 2024-12-14 | Stop reason: HOSPADM

## 2024-12-12 RX ORDER — ONDANSETRON 4 MG/1
4 TABLET, FILM COATED ORAL EVERY 6 HOURS PRN
Status: DISCONTINUED | OUTPATIENT
Start: 2024-12-12 | End: 2024-12-14 | Stop reason: HOSPADM

## 2024-12-12 RX ORDER — LOPERAMIDE HYDROCHLORIDE 2 MG/1
2 CAPSULE ORAL 4 TIMES DAILY PRN
Status: DISCONTINUED | OUTPATIENT
Start: 2024-12-12 | End: 2024-12-14 | Stop reason: HOSPADM

## 2024-12-12 RX ORDER — AMOXICILLIN 250 MG
1 CAPSULE ORAL 2 TIMES DAILY PRN
Status: DISCONTINUED | OUTPATIENT
Start: 2024-12-12 | End: 2024-12-14 | Stop reason: HOSPADM

## 2024-12-12 RX ORDER — HYDROXYZINE HYDROCHLORIDE 25 MG/1
25 TABLET, FILM COATED ORAL EVERY 4 HOURS PRN
Status: DISCONTINUED | OUTPATIENT
Start: 2024-12-12 | End: 2024-12-12

## 2024-12-12 RX ORDER — ACETAMINOPHEN 325 MG/1
650 TABLET ORAL EVERY 4 HOURS PRN
Status: DISCONTINUED | OUTPATIENT
Start: 2024-12-12 | End: 2024-12-12

## 2024-12-12 RX ORDER — HYDROXYZINE HYDROCHLORIDE 25 MG/1
25 TABLET, FILM COATED ORAL EVERY 4 HOURS PRN
Status: DISCONTINUED | OUTPATIENT
Start: 2024-12-12 | End: 2024-12-14 | Stop reason: HOSPADM

## 2024-12-12 RX ORDER — AMOXICILLIN 250 MG
1 CAPSULE ORAL 2 TIMES DAILY PRN
Status: DISCONTINUED | OUTPATIENT
Start: 2024-12-12 | End: 2024-12-12

## 2024-12-12 RX ORDER — MULTIPLE VITAMINS W/ MINERALS TAB 9MG-400MCG
1 TAB ORAL DAILY
Status: DISCONTINUED | OUTPATIENT
Start: 2024-12-12 | End: 2024-12-14 | Stop reason: HOSPADM

## 2024-12-12 RX ORDER — MULTIPLE VITAMINS W/ MINERALS TAB 9MG-400MCG
1 TAB ORAL DAILY
Status: DISCONTINUED | OUTPATIENT
Start: 2024-12-12 | End: 2024-12-12

## 2024-12-12 RX ORDER — METOPROLOL TARTRATE 25 MG/1
25 TABLET, FILM COATED ORAL 2 TIMES DAILY
Status: DISCONTINUED | OUTPATIENT
Start: 2024-12-12 | End: 2024-12-14 | Stop reason: HOSPADM

## 2024-12-12 RX ORDER — TRAZODONE HYDROCHLORIDE 50 MG/1
50 TABLET, FILM COATED ORAL
Status: DISCONTINUED | OUTPATIENT
Start: 2024-12-12 | End: 2024-12-12

## 2024-12-12 RX ORDER — ATENOLOL 50 MG/1
50 TABLET ORAL DAILY PRN
Status: DISCONTINUED | OUTPATIENT
Start: 2024-12-12 | End: 2024-12-12

## 2024-12-12 RX ORDER — POLYETHYLENE GLYCOL 3350 17 G
2 POWDER IN PACKET (EA) ORAL
Status: DISCONTINUED | OUTPATIENT
Start: 2024-12-12 | End: 2024-12-14 | Stop reason: HOSPADM

## 2024-12-12 RX ORDER — ACETAMINOPHEN 325 MG/1
650 TABLET ORAL EVERY 4 HOURS PRN
Status: DISCONTINUED | OUTPATIENT
Start: 2024-12-12 | End: 2024-12-14 | Stop reason: HOSPADM

## 2024-12-12 RX ORDER — HYDRALAZINE HYDROCHLORIDE 25 MG/1
25 TABLET, FILM COATED ORAL 3 TIMES DAILY PRN
Status: DISCONTINUED | OUTPATIENT
Start: 2024-12-12 | End: 2024-12-14 | Stop reason: HOSPADM

## 2024-12-12 RX ORDER — DIAZEPAM 5 MG/1
5-20 TABLET ORAL EVERY 30 MIN PRN
Status: DISCONTINUED | OUTPATIENT
Start: 2024-12-12 | End: 2024-12-12

## 2024-12-12 RX ORDER — MAGNESIUM HYDROXIDE/ALUMINUM HYDROXICE/SIMETHICONE 120; 1200; 1200 MG/30ML; MG/30ML; MG/30ML
30 SUSPENSION ORAL EVERY 4 HOURS PRN
Status: DISCONTINUED | OUTPATIENT
Start: 2024-12-12 | End: 2024-12-12

## 2024-12-12 RX ORDER — OLANZAPINE 10 MG/1
10 TABLET ORAL 3 TIMES DAILY PRN
Status: DISCONTINUED | OUTPATIENT
Start: 2024-12-12 | End: 2024-12-14 | Stop reason: HOSPADM

## 2024-12-12 RX ORDER — GABAPENTIN 400 MG/1
400 CAPSULE ORAL 3 TIMES DAILY
Status: DISCONTINUED | OUTPATIENT
Start: 2024-12-12 | End: 2024-12-14 | Stop reason: HOSPADM

## 2024-12-12 RX ORDER — PANTOPRAZOLE SODIUM 40 MG/1
40 TABLET, DELAYED RELEASE ORAL
Status: DISCONTINUED | OUTPATIENT
Start: 2024-12-12 | End: 2024-12-14 | Stop reason: HOSPADM

## 2024-12-12 RX ADMIN — GABAPENTIN 400 MG: 400 CAPSULE ORAL at 14:06

## 2024-12-12 RX ADMIN — Medication 1 TABLET: at 08:41

## 2024-12-12 RX ADMIN — FOLIC ACID 1 MG: 1 TABLET ORAL at 08:41

## 2024-12-12 RX ADMIN — NICOTINE 1 PATCH: 21 PATCH, EXTENDED RELEASE TRANSDERMAL at 08:41

## 2024-12-12 RX ADMIN — DIAZEPAM 10 MG: 5 TABLET ORAL at 08:42

## 2024-12-12 RX ADMIN — ACETAMINOPHEN 650 MG: 325 TABLET, FILM COATED ORAL at 18:56

## 2024-12-12 RX ADMIN — DIAZEPAM 10 MG: 5 TABLET ORAL at 16:10

## 2024-12-12 RX ADMIN — QUETIAPINE FUMARATE 100 MG: 100 TABLET ORAL at 21:12

## 2024-12-12 RX ADMIN — PANTOPRAZOLE SODIUM 40 MG: 40 TABLET, DELAYED RELEASE ORAL at 08:42

## 2024-12-12 RX ADMIN — METOPROLOL TARTRATE 25 MG: 25 TABLET, FILM COATED ORAL at 20:42

## 2024-12-12 RX ADMIN — DIAZEPAM 10 MG: 5 TABLET ORAL at 04:19

## 2024-12-12 RX ADMIN — METOPROLOL TARTRATE 25 MG: 25 TABLET, FILM COATED ORAL at 08:41

## 2024-12-12 RX ADMIN — FLUOXETINE HYDROCHLORIDE 40 MG: 20 CAPSULE ORAL at 08:40

## 2024-12-12 RX ADMIN — HYDROXYZINE HYDROCHLORIDE 25 MG: 25 TABLET, FILM COATED ORAL at 04:24

## 2024-12-12 RX ADMIN — GABAPENTIN 400 MG: 400 CAPSULE ORAL at 20:41

## 2024-12-12 RX ADMIN — NICOTINE POLACRILEX 2 MG: 2 GUM, CHEWING BUCCAL at 14:51

## 2024-12-12 RX ADMIN — DIAZEPAM 10 MG: 5 TABLET ORAL at 11:46

## 2024-12-12 RX ADMIN — HYDRALAZINE HYDROCHLORIDE 25 MG: 25 TABLET ORAL at 13:03

## 2024-12-12 RX ADMIN — GABAPENTIN 400 MG: 400 CAPSULE ORAL at 08:41

## 2024-12-12 RX ADMIN — THIAMINE HCL TAB 100 MG 100 MG: 100 TAB at 08:42

## 2024-12-12 ASSESSMENT — ACTIVITIES OF DAILY LIVING (ADL)
ADLS_ACUITY_SCORE: 30
ADLS_ACUITY_SCORE: 30
ADLS_ACUITY_SCORE: 56
ADLS_ACUITY_SCORE: 30
HYGIENE/GROOMING: INDEPENDENT
ADLS_ACUITY_SCORE: 30
LAUNDRY: WITH SUPERVISION
HYGIENE/GROOMING: INDEPENDENT
HYGIENE/GROOMING: INDEPENDENT
ADLS_ACUITY_SCORE: 30
ADLS_ACUITY_SCORE: 30
ORAL_HYGIENE: INDEPENDENT
LAUNDRY: WITH SUPERVISION
ADLS_ACUITY_SCORE: 30
DRESS: INDEPENDENT
ORAL_HYGIENE: INDEPENDENT
ADLS_ACUITY_SCORE: 30
LAUNDRY: WITH SUPERVISION
ADLS_ACUITY_SCORE: 30
ADLS_ACUITY_SCORE: 30
ORAL_HYGIENE: INDEPENDENT
ADLS_ACUITY_SCORE: 30
DRESS: SCRUBS (BEHAVIORAL HEALTH)
ADLS_ACUITY_SCORE: 30
ADLS_ACUITY_SCORE: 30
DRESS: SCRUBS (BEHAVIORAL HEALTH)
ADLS_ACUITY_SCORE: 30

## 2024-12-12 ASSESSMENT — LIFESTYLE VARIABLES: SKIP TO QUESTIONS 9-10: 0
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
## 2024-12-12 NOTE — ED NOTES
Pt informed that he is going to a locked unit, search will be done and personal phones are not allowed in the unit. Pt verbalized understanding

## 2024-12-12 NOTE — PROGRESS NOTES
12/12/24 0111   Patient Belongings   Did you bring any home meds/supplements to the hospital?  No   Patient Belongings other (see comments)   Patient Belongings Remaining with Patient other (see comments)   Patient Belongings Put in Hospital Secure Location (Security or Locker, etc.) other (see comments)   Belongings Search Yes   Clothing Search Yes   Second Staff Yonas [employer] Sita performed safety search     Storage Bin: shirt, shorts, shoes, jacket, sweatpants,   Medi Rm Box: 2 lighters, cigs, smartphone, wallet (blue cross blue shield card, photo)  Security Envelope [id number]: [place] ID, department of [employer] resources, [employer] document, powerball ticket, cards: [employer] card, [employer] cash+, [employer] platinum   A             ADMISSION:    I am responsible for any personal items that are not sent to the safe or pharmacy. [facility] is not responsible for loss, theft or damage of any property in my possession.    Patient Signature _________________________________________ Date/Time _____________________    Staff Signature ___________________________________________ Date/Time _____________________    2nd Staff person, if patient is unable/unwilling to sign    ________________________________________________________ Date/Time _____________________    DISCHARGE:    All personal items have been returned to me.    Patient Signature _________________________________________ Date/Time _____________________    Staff Signature ___________________________________________ Date/Time _____________________

## 2024-12-12 NOTE — DISCHARGE INSTRUCTIONS
Behavioral Discharge Planning and Instructions  THANK YOU FOR CHOOSING [facility]  3A  [phone]    Summary: You were admitted to Station 3A on *** for detoxification from ***.  A medical exam was performed that included lab work. You have met with a  and opted to ***.  Please take care and make your recovery a daily priority, Georges!  It was a pleasure working with you and the entire treatment team here wishes you the very best in your recovery!     Recommendation:  ***    Main Diagnoses:  Per {3aproviders:180123};  {Substance Related Use Disorders:042350}    Major Treatments, Procedures and Findings:  You were treated for *** detoxification using ***. As an outpatient you will be prescribed ***, please take this medication as prescribed until it is gone. You have met with a  to develop a treatment plan for discharge. You had labs drawn and those results were reviewed with you. Please take a copy of your lab work with you to your next primary care provider appointment.    Symptoms to Report:  If you experience more anxiety, confusion, sleeplessness, deep sadness or thoughts of suicide, notify your treatment team or notify your primary care provider. IF ANY OF THE SYMPTOMS YOU ARE EXPERIENCING ARE A MEDICAL EMERGENCY CALL 911 IMMEDIATELY.     Lifestyle Adjustment: Adjust your lifestyle to get enough sleep, relaxation, exercise and good nutrition. Continue to develop healthy coping skills to decrease stress and promote a sober living environment. Do not use mood altering substances including alcohol, illegal drugs or addictive medications other than what is currently prescribed.     Disposition: ***    Facts about COVID19 at www.cdc.gov/COVID19 and www.[place].gov/covid19    Keeping hands clean is one of the most important steps we can take to avoid getting sick and spreading germs to others.  Please wash your hands frequently and lather with soap for at least 20 seconds!    Follow-up  Appointment:   Appointment Date/Time: ***    Psychiatrist/Primary Care Giver: ***    Address: ***    Phone Number: ***      Recovery apps for your phone for educational purposes and to locate in person and zoom recovery meetings  Everything AA -  loyd is a great resource  12 Step Toolkit - educational purpose learning about the 12 steps to recovery  [place] Cloud - meeting loyd  AA  - meeting loyd  Meeting guide - meeting loyd  Quick NA meeting - meeting loyd  Nicolas- has various apps    Resources:  AA/NA meetings have returned to in-person or a hybrid combination of zoom/in-person therefore please check online to verify*  Need Support Now? If you or someone you know is struggling or in crisis, help is available. Call or text 296 or chat Seesearch  AA meetings search for them at: https://aa-intergroup.org (worldwide meeting listings)  AA meetings for [place] area can be found online at: https://a[place].org (click local online meetings listings)  NA meetings for [place] area can be found online at: https://www.nam[place].org  (click find a meeting)  AA and NA Sponsors are excellent resources for support and you can find one at any support group meeting.   Alcoholics Anonymous (https://aa.org/): for information 24 hours/day  AA Intergroup service office in [place] (http://www.aastpaul.org/) 931.493.7522  AA Intergroup service office in [facility]: [phone]. (http://www.aaminneaAmigos y Amigosis.org/)  Narcotics Anonymous (www.nam[place].org) (828) 400-7061  https://aafairviewriverside.org/meetings  SMART Recovery - self management for addiction recovery:  www.smartrecovery.org  Pathways ~ A Health Crisis Resource & Support Center:  182.182.5259.  https://prescribetoprevent.org/patient-education/videos/  http://www.harmreduction.org  Crisis Text Line  Text 451970  You will be connected with a trained live crisis counselor to provide support. Por espanol, texto  JACK a 826027 o texto a 442-AYUDAME en  "WhatsAshreyas  National Hope Line  1.800.SUICIDE [3331574]  [facility] 608-434-4854  Support Group:  AA/NA and Sponsor/support.  Fast Tracker  Linking people to mental health and substance use disorder resources  Open [employer].Snatch that Jerky   [place] Mental Health Warm Line  Peer to peer support  Monday thru Saturday, 12 pm to 10 pm  315.951.3206 or 3.465.181.2223  Text \"Support\" to 31241  National [place] on Mental Illness (KAROL)  415.948.1107 or 1.888.KAROL.HELPS  Alcoholics Anonymous (www.alcoholics-anonymous.org): Check your phone book for your local chapter.  Suicide Awareness Voices of Education (SAVE) (www.save.org): 737-224-QAYI (7283)  National Suicide Prevention Line (www.mentalhealthmn.org): 960-883-TGGK (8487)  Mental Health Consumer/Survivor Network of [place] (www.mhcsn.net): 522.327.1611 or 645-672-7503  Mental Health Association of [place] (www.mentalhealth.org): 271.624.6477 or 357-484-8985   Substance Abuse and Mental Health Services (www.samhsa.gov)  [place] Opioid Prevention Coalition: www.opioidcoalition.org    [place] Recovery Connection (MRC)  [facility] connects people seeking recovery to resources that help foster and sustain long-term recovery.  Whether you are seeking resources for treatment, transportation, housing, job training, education, health care or other pathways to recovery, [facility] is a great place to start.  407.607.9320.  www.[place]arecovery.org    Great Pod casts for nutrition and wellness  Listen on Apple Podcasts  Dishing Up Nutrition   [employer]   Nutrition       Understand the connection between what you eat and how you feel. Hosted by licensed nutritionists and dietitians from [employer] we share practical, real-life solutions for healthier living through nutrition.     General Medication Instructions:   See your medication sheet(s) for instructions.   Take all medications as prescribed.  Make no changes unless your primary care " provider suggests them.   Go to all your primary care provider visits.  Be sure to have all your required lab tests. This way, your medicines can be refilled on time.  Do not use any forms of alcohol.    Please Note: If you have any questions at anytime after you discharged please call [facility] detox unit 3A at [phone].    Here are a list of additional numbers you can call if you are wanting to resume services through [facility]:  [facility] Assessment Intake: [phone]  [facility] Adult [facility]MI Intensive Outpatient  call: [phone]  [facility] Admissions [phone]    Recovery Clinic call: [phone]  [facility]: [phone]  Medical Records call: [phone]  Billing Department call: [phone]    Please remember to take all of your behavioral discharge planning and lab paperwork to any follow up appointments, it contains your lab results, diagnosis, medication list and discharge recommendations.      THANK YOU FOR CHOOSING [facility]    [place] [facility] at [phone].    Here are a list of additional numbers you can call if you are wanting to resume services through [facility]:  [facility] Assessment Intake: [phone]  [facility] Adult SAMMI Intensive Outpatient  call: [phone]  [facility] Admissions [phone]    Recovery Clinic call: [phone]  [facility]: [phone]  Medical Records call: [phone]  Billing Department call: [phone]    Please remember to take all of your behavioral discharge planning and lab paperwork to any follow up appointments, it contains your lab results, diagnosis, medication list and discharge recommendations.      THANK YOU FOR CHOOSING [facility]

## 2024-12-12 NOTE — ED TRIAGE NOTES
Triage Assessment (Adult)       Row Name 12/11/24 1338          Triage Assessment    Airway WDL WDL        Respiratory WDL    Respiratory WDL WDL        Skin Circulation/Temperature WDL    Skin Circulation/Temperature WDL WDL        Cardiac WDL    Cardiac WDL WDL        Peripheral/Neurovascular WDL    Peripheral Neurovascular WDL WDL        Cognitive/Neuro/Behavioral WDL    Cognitive/Neuro/Behavioral WDL WDL

## 2024-12-12 NOTE — PROGRESS NOTES
"[address]     Case Management Encounter: Met with team, discussed patient progress, discharge plan and any impediments to discharge.    Writer met with pt to discuss discharge and aftercare planning. Pt reports his plan after hospitalization is to attend 2-3 AA meetings weekly and see his therapist weekly. He shared he does not feel he needs SAMMI treatment at this time. Pt was vague about his use however reported after his last admission to detox he maintained sobriety for 44 days. He shared starting Prozac and AA helped him remain sober. Pt shared \"feeling good\" led him to relapse. Pt reported he has therapy scheduled on 12/23/24 and will increase the amount of AA meetings he attends.    Insurance: Blue Plus     Legal Status:  Voluntary   County:   File Number: NA  Start and expiration date of commitment: NA    SUDs Assessment Status: Not needed.     ROIs on file: None at this time.     Living Situation: Lives with his mother     Current Providers, Supports & Collateral:  Mother, therapist, sober AA friends    Current Plan/Referral Status: Return home and attend therapy and AA       DOM Banda  [address] - Adult Inpatient Addiction Psychiatry Unit           "

## 2024-12-12 NOTE — PROGRESS NOTES
S; Patient admitted via ED for alcohol withdrawal.    B: Patient has been drinking 1 quart of tequila  daily,last use 12/11/24@69231. Patient denies a history of  seizures or DT;s but has had and difficult withdrawal. Patient has a history of barett's esophagus, pancreatitis, perirectal abscess and atrial fibrillation.. Patient has a history of PTSD and ADHD but denies any current or recent suicidal ideation or past attempts.    A: Patient is in mild alcohol withdrawal on admission.    R; Monitor and treat per MSSA with valium protocol. Withdrawal precautions,as well as 15 min checks for safety.

## 2024-12-12 NOTE — TELEPHONE ENCOUNTER
S: [facility] , Provider Susu calling at 9:20 PM with clinical on a 48 year old/Male presenting for alcohol detox.     B: Pt presents for ETOH detox.   Currently reports drinking a quart of tequillia for the past couple weeks.    Patient reports last use was prior to arrival.  Pt JIMMY: 0.121  Pt  endorses hx of DT, possible AH associated with withdrawal.  Pt  denies hx of seizures. Last seizure: N/A  Pt endorsing the following symptoms of withdrawal: Shaky  MSSA Score: 6    Pt denies acute mental health or medical concerns.   Pt endorses other drug use: (!) CANNABIS PRODUCTS Amount/frequency:  Unknown    Does Pt have a detox care plan in [facility]? No  Does pt present with specific needs, assistive devices, or exclusionary criteria? None  Is the patient ambulating, eating and drinking in the ED? Yes    A: Pt meets criteria to be presented for IP detox admission. Patient is voluntary    COVID Symptoms: No  If yes, COVID test required   Utox: Positive for Cannabinoids  Magnesium: WNL  CMP: Abnormalities: Anion Gap 19, Chloride 94, Glucose 110  CBC: WNL  HCG: N/A     R: Patient cleared and ready for behavioral bed placement: Yes    Pt is meeting criteria for presentation to [facility]    Does Patient need a Transfer Center request created? No, Pt is located within [facility] ED, [facility] ED, or [place] [facility] ED     9:23 PM Dr Burgos accepts pt for 3A/[place]    Pt added to unit queue, unit called with disposition

## 2024-12-12 NOTE — PLAN OF CARE
Behavioral Team Discussion: (12/12/2024)    Continued Stay Criteria/Rationale: Patient admitted for Chemical Use Issues.  Plan: The following services will be provided to the patient; psychiatric assessment, medication management, therapeutic milieu, individual and group support, and skills groups.   Participants: 3A Provider: Dr. Gris Cassidy MD; 3A RN: Anne Marie Harris RN; 3A CM's:  DOM Banda .  Summary/Recommendation: Providers will assess today for treatment recommendations, discharge planning, and aftercare plans. CM will meet with pt for discharge planning.   Medical/Physical: No acute medical concerns reported.  Precautions:   Behavioral Orders   Procedures    Code 1 - Restrict to Unit    Routine Programming     As clinically indicated    Status 15     Every 15 minutes.    Withdrawal precautions     Rationale for change in precautions or plan: N/A  Progress: Initial.    ASAM Dimension Scale Ratings:  Dimension 1: 3 Client tolerates and parveen with withdrawal discomfort poorly. Client has severe intoxication, such that the client endangers self or others, or intoxication has not abated with less intensive levels of services. Client displays severe signs and symptoms; or risk of severe, but manageable withdrawal; or withdrawal worsening despite detox at less intensive level.  Dimension 2: 1 Client tolerates and parveen with physical discomfort and is able to get the services that the client needs.  Dimension 3: 2 Client has difficulty with impulse control and lacks coping skills. Client has thoughts of suicide or harm to others without means; however, the thoughts may interfere with participation in some treatment activities. Client has difficulty functioning in significant life areas. Client has moderate symptoms of emotional, behavioral, or cognitive problems. Client is able to participate in most treatment activities.  Dimension 4: 2 Client displays verbal compliance, but lacks consistent behaviors;  has low motivation for change; and is passively involved in treatment.  Dimension 5: 4 No awareness of the negative impact of mental health problems or substance abuse. No coping skills to arrest mental health or addiction illnesses, or prevent relapse.  Dimension 6: 3 Client is not engaged in structured, meaningful activity and the client's peers, family, significant other, and living environment are unsupportive, or there is significant criminal justice system involvement.

## 2024-12-12 NOTE — ED PROVIDER NOTES
[facility] EMERGENCY DEPARTMENT ([facility])    12/11/24       ED PROVIDER NOTE    History     Chief Complaint   Patient presents with    Alcohol Problem     Seeking alcohol detox.  Drinking a quart daily of tequila for the past eight days.  Denies history of seizures.  Questionable history of auditory hallucinations.     HPI  Georges Moreno is a 48 year old male with a past medical history of alcohol use disorder, barett's esophagus, pancreatitis, perirectal abscess, atrial fibrillation, and PTSD, who presents for alcohol detox.  Drinks approximately 1 quart of tequila per day.  They have a history of withdrawal symptoms and have not had a seizure from alcohol in the past.  Last drink was just prior to arrival.  States that he also used cannabis.  No suicidal or homicidal ideation.  No recent illness.  No other symptoms noted.       Past Medical History  Past Medical History:   Diagnosis Date    ADHD     Alcohol abuse     Alcohol use disorder, severe, dependence     Atrial fibrillation     Vickers's esophagus     Depression     moderate    PUSHPA (generalized anxiety disorder)     Pancreatitis     Perirectal abscess     PTSD (post-traumatic stress disorder)      Past Surgical History:   Procedure Laterality Date    Excision of pilonidal cyst      INCISION AND DRAINAGE PERIRECTAL ABSCESS       FLUoxetine (PROZAC) 40 MG capsule  folic acid (FOLVITE) 1 MG tablet  gabapentin (NEURONTIN) 400 MG capsule  metoprolol tartrate (LOPRESSOR) 25 MG tablet  pantoprazole (PROTONIX) 40 MG EC tablet  QUEtiapine (SEROQUEL) 100 MG tablet      Allergies   Allergen Reactions    Sertraline Other (See Comments)     Excessive fatigue  Reaction(s): Excessive fatigue.    Duloxetine Hives and Rash     Rash, hives     Family History  Family History   Problem Relation Age of Onset    Family History Negative Mother     Alcohol/Drug Father     Diabetes Father     Leukemia Father     Alcohol/Drug Paternal Uncle     Alcoholism Father      Alcoholism Brother      Social History   Social History     Tobacco Use    Smoking status: Every Day     Current packs/day: 1.50     Average packs/day: 1.5 packs/day for 15.0 years (22.5 ttl pk-yrs)     Types: Cigarettes    Smokeless tobacco: Former    Tobacco comments:     a little over a pack a day   Vaping Use    Vaping status: Never Used   Substance Use Topics    Alcohol use: Yes     Alcohol/week: 14.0 standard drinks of alcohol    Drug use: Yes     Types: Marijuana     Comment: today      A complete review of systems was performed with pertinent positives and negatives noted in the HPI, and all other systems negative.    Physical Exam   Resp: 16  Physical Exam  Vitals and nursing note reviewed.   Constitutional:       General: He is not in acute distress.     Appearance: Normal appearance. He is not toxic-appearing.      Comments: Appears intoxicated   HENT:      Head: Atraumatic.   Eyes:      General: No scleral icterus.     Conjunctiva/sclera: Conjunctivae normal.   Cardiovascular:      Rate and Rhythm: Normal rate.      Heart sounds: Normal heart sounds.   Pulmonary:      Effort: Pulmonary effort is normal. No respiratory distress.      Breath sounds: Normal breath sounds.   Abdominal:      Palpations: Abdomen is soft.      Tenderness: There is no abdominal tenderness.   Musculoskeletal:         General: No deformity.      Cervical back: Neck supple.   Skin:     General: Skin is warm.   Neurological:      Mental Status: He is alert.   Psychiatric:         Thought Content: Thought content does not include homicidal or suicidal ideation.           ED Course, Procedures, & Data      Procedures                No results found for any visits on 12/11/24.  Medications - No data to display  Labs Ordered and Resulted from Time of ED Arrival to Time of ED Departure - No data to display  No orders to display              Assessment & Plan    This is a 48 year oldmale who presents for alcohol detox.  Last alcohol  intake was just prior to arrival.  Alcohol level is 0.121.  Exam demonstrates signs of intoxication.  We will admit for detox.     I have reviewed the nursing notes. I have reviewed the findings, diagnosis, plan and need for follow up with the patient.    New Prescriptions    No medications on file       Final diagnoses:   None       Aaron Cristobal DO  [facility] EMERGENCY DEPARTMENT  12/11/2024     Aaron Cristobal,   12/11/24 5886

## 2024-12-12 NOTE — PLAN OF CARE
Goal Outcome Evaluation:    Plan of Care Reviewed With: patient      Patient is up and visible in the milieu. Patient is ambulating independently with no problem. Patient is alert and oriented x 4. Patient is attending/participating in unit programming. Pt is social with peers. Affect is flat, mood is anxious. Patient denies SI/SIB/HI. Pt denies auditory/visual hallucinations. Patient verbally contracts for safety on the unit.     This RN administered the PRN medications hydralazine, nicotine gum, (see MAR) at the request of the patient. Patient is tolerating medications well, denies any current side effects.     Pt's MSSA's = 9 and 8, received 10 mg valium x2 per protocol. Patient reports a good appetite, and good sleep. Patient discharge plans to go home or go to treatment. Rest, fluids, and food encouraged. Status 15 checks remain. Patient is able to make needs known appropriately. Patient denies any unmet needs at this time.     Blood pressure (!) 152/89, pulse 75, temperature 97.9  F (36.6  C), temperature source Oral, resp. rate 16, height 1.829 m (6'), weight 90.7 kg (200 lb), SpO2 96%.

## 2024-12-12 NOTE — CONSULTS
"[facility]  Consult Note - Hospitalist Service  Date of Admission:  12/11/2024  Consult Requested by: psych  Reason for Consult: detox    Assessment & Plan   Georges Moreno is a 48 year old male admitted on 12/11/2024. He has a PMHx notable for alcohol use disorder, peripheral neuropathy, anxiety, depression, PTSD, paroxysmal atrial fibrillation, hx of caraballo's esophagus, and HTN. He presented to the ED seeking detox. He is currently admitted to [facility].      Alcohol use disorder  Acute alcohol withdrawal  Patient reports  Drinks approximately 1 quart of tequila per day for the last 8+ days. Denies history of withdrawal seizures and DT's. Last drink just PTA. UDS positive for cannabinoids.   - psychiatry primary   - agree with vitamin supplementation including of thiamine and folate  [facility]  - counseling, group therapies, community resources     Paroxysmal atrial fibrillation  HTN  Not on anticoagulation. Has been on Metoprolol 25mg BID. HR currently well controlled. Feels some \"pounding\" of his heart especially when lying down trying to sleep, but no irregular rhythm, chest pain, dyspnea, lightheadedness. RRR on exam. BP elevated in the setting of active withdrawal which may be contributing to symptoms.  - Continue PTA metoprolol tartrate 25 mg daily  - Hydralazine 25mg TID PRN for SBP >170 or DBP >110  - if BP fails to normalize despite completion of withdrawal, patient should follow-up with PCP within 1 week of discharge    - Notify medicine if concern for irregular rhythm, palpitations, chest pain, dyspnea. Would obtain stat EKG     Anion gap metabolic acidosis  AG 19 upon admission, remainder of electrolytes within normal limits. Suspect abnormality is d/t excessive alcohol consumption in the setting of poor solute intake.   - encourage alcohol cessation and oral/fluid intake   - trend CMP with PCP at discharge      Anxiety  Depression  PTSD   - " "Management per psychiatry     Peripheral neuropathy   - PTA gabapentin 400 mg TID      Adenomatous polyps of colon  Removed tubular adenoma s/p colonoscopy 5/9/24  Caraballo's esophagus s/p EGD 5/9/24   - Continue PTA PPI       The patient's care was discussed with the Primary Team via this note    Clinically Significant Risk Factors Present on Admission          # Hypochloremia: Lowest Cl = 94 mmol/L in last 2 days, will monitor as appropriate     # Anion Gap Metabolic Acidosis: Highest Anion Gap = 19 mmol/L in last 2 days, will monitor and treat as appropriate                # Overweight: Estimated body mass index is 27.12 kg/m  as calculated from the following:    Height as of this encounter: 1.829 m (6').    Weight as of this encounter: 90.7 kg (200 lb).       # Financial/Environmental Concerns:           Currently patient is medically stable and medicine will sign off. Thank you for allowing us to be a part of this patients care. Please notify on call [facility] if any intercurrent medical issues arise.       Lyly Garcia PA-C  Hospitalist Service  Securely message with [employer] (more info)  Text page via [employer] Paging/Directory   ______________________________________________________________________    Chief Complaint   detox    History is obtained from the patient    History of Present Illness   Georges Moreno is a 48 year old male admitted on 12/11/2024. He has a PMHx notable for alcohol use disorder, peripheral neuropathy, anxiety, depression, PTSD, paroxysmal atrial fibrillation, hx of caraballo's esophagus, and HTN. He presented to the ED seeking detox. He is currently admitted to [facility].     Patient reports feeling very tired, hasn't been able to get good restful sleep. Has not yet been able to eat/drink much this morning, though denies N/V, abdominal pain. Feels his heart \"pounding\" especially when lying down trying to sleep, denies chest pain/pressure, dyspnea, lightheadedness, though he does feel \"foggy\" with " fatigue.     Past Medical History    Past Medical History:   Diagnosis Date    ADHD     Alcohol abuse     Alcohol use disorder, severe, dependence     Atrial fibrillation     Vickers's esophagus     Depression     moderate    PUSHPA (generalized anxiety disorder)     Pancreatitis     Perirectal abscess     PTSD (post-traumatic stress disorder)        Past Surgical History   Past Surgical History:   Procedure Laterality Date    Excision of pilonidal cyst      INCISION AND DRAINAGE PERIRECTAL ABSCESS         Medications   I have reviewed this patient's current medications       Review of Systems    The 5 point Review of Systems is negative other than noted in the HPI or here.      Physical Exam   Vital Signs: Temp: 97.6  F (36.4  C) Temp src: Temporal BP: (!) 160/99 Pulse: 91   Resp: 18 SpO2: 97 % O2 Device: None (Room air)    Weight: 200 lbs 0 oz    Physical Exam  Vitals reviewed.   Constitutional:       General: He is not in acute distress.     Appearance: He is not diaphoretic.      Comments: Noticeably tired with multiple yawns during our conversation   Cardiovascular:      Rate and Rhythm: Normal rate and regular rhythm.      Heart sounds: No murmur heard.     Comments: Regular rhythm on auscultation  Pulmonary:      Effort: Pulmonary effort is normal.      Breath sounds: No wheezing, rhonchi or rales.   Abdominal:      Palpations: Abdomen is soft.   Skin:     General: Skin is warm and dry.   Neurological:      Mental Status: He is alert and oriented to person, place, and time.           Medical Decision Making       45 MINUTES SPENT BY ME on the date of service doing chart review, history, exam, documentation & further activities per the note.      Data     I have personally reviewed the following data over the past 24 hrs:    8.5  \   16.4   / 256     137 94 (L) 16.4 /  110 (H)   4.0 24 0.83 \     ALT: 24 AST: 30 AP: 70 TBILI: 0.7   ALB: 4.8 TOT PROTEIN: 7.8 LIPASE: N/A     TSH: 1.90 T4: N/A A1C: 5.2       Imaging  results reviewed over the past 24 hrs:   No results found for this or any previous visit (from the past 24 hours).

## 2024-12-13 PROCEDURE — 250N000013 HC RX MED GY IP 250 OP 250 PS 637: Performed by: PSYCHIATRY & NEUROLOGY

## 2024-12-13 PROCEDURE — 250N000013 HC RX MED GY IP 250 OP 250 PS 637

## 2024-12-13 PROCEDURE — 99233 SBSQ HOSP IP/OBS HIGH 50: CPT | Performed by: PSYCHIATRY & NEUROLOGY

## 2024-12-13 PROCEDURE — 128N000004 HC R&B CD ADULT

## 2024-12-13 RX ORDER — QUETIAPINE FUMARATE 50 MG/1
50-100 TABLET, FILM COATED ORAL
Status: DISCONTINUED | OUTPATIENT
Start: 2024-12-13 | End: 2024-12-14 | Stop reason: HOSPADM

## 2024-12-13 RX ORDER — FOLIC ACID 1 MG/1
1 TABLET ORAL DAILY
Qty: 30 TABLET | Refills: 0 | Status: SHIPPED | OUTPATIENT
Start: 2024-12-14 | End: 2024-12-14

## 2024-12-13 RX ORDER — PANTOPRAZOLE SODIUM 40 MG/1
40 TABLET, DELAYED RELEASE ORAL
Qty: 30 TABLET | Refills: 0 | Status: SHIPPED | OUTPATIENT
Start: 2024-12-13 | End: 2024-12-14

## 2024-12-13 RX ORDER — MULTIPLE VITAMINS W/ MINERALS TAB 9MG-400MCG
1 TAB ORAL DAILY
Qty: 30 TABLET | Refills: 0 | Status: SHIPPED | OUTPATIENT
Start: 2024-12-14 | End: 2024-12-14

## 2024-12-13 RX ORDER — LANOLIN ALCOHOL/MO/W.PET/CERES
100 CREAM (GRAM) TOPICAL DAILY
Qty: 30 TABLET | Refills: 0 | Status: SHIPPED | OUTPATIENT
Start: 2024-12-14 | End: 2024-12-14

## 2024-12-13 RX ORDER — QUETIAPINE FUMARATE 100 MG/1
100 TABLET, FILM COATED ORAL
Status: DISCONTINUED | OUTPATIENT
Start: 2024-12-13 | End: 2024-12-13

## 2024-12-13 RX ORDER — GABAPENTIN 400 MG/1
400 CAPSULE ORAL 3 TIMES DAILY
Qty: 90 CAPSULE | Refills: 0 | Status: SHIPPED | OUTPATIENT
Start: 2024-12-13 | End: 2024-12-14

## 2024-12-13 RX ORDER — TRAZODONE HYDROCHLORIDE 50 MG/1
50 TABLET, FILM COATED ORAL
Status: DISCONTINUED | OUTPATIENT
Start: 2024-12-13 | End: 2024-12-14 | Stop reason: HOSPADM

## 2024-12-13 RX ORDER — QUETIAPINE FUMARATE 100 MG/1
50-100 TABLET, FILM COATED ORAL
Qty: 30 TABLET | Refills: 0 | Status: SHIPPED | OUTPATIENT
Start: 2024-12-13 | End: 2024-12-14

## 2024-12-13 RX ORDER — FLUOXETINE 40 MG/1
40 CAPSULE ORAL DAILY
Qty: 30 CAPSULE | Refills: 0 | Status: SHIPPED | OUTPATIENT
Start: 2024-12-13 | End: 2024-12-14

## 2024-12-13 RX ORDER — METOPROLOL TARTRATE 25 MG/1
25 TABLET, FILM COATED ORAL 2 TIMES DAILY
Qty: 60 TABLET | Refills: 0 | Status: SHIPPED | OUTPATIENT
Start: 2024-12-13 | End: 2024-12-14

## 2024-12-13 RX ADMIN — NICOTINE 1 PATCH: 21 PATCH, EXTENDED RELEASE TRANSDERMAL at 08:19

## 2024-12-13 RX ADMIN — ACETAMINOPHEN 650 MG: 325 TABLET, FILM COATED ORAL at 14:13

## 2024-12-13 RX ADMIN — NICOTINE POLACRILEX 2 MG: 2 GUM, CHEWING BUCCAL at 11:59

## 2024-12-13 RX ADMIN — GABAPENTIN 400 MG: 400 CAPSULE ORAL at 20:31

## 2024-12-13 RX ADMIN — PANTOPRAZOLE SODIUM 40 MG: 40 TABLET, DELAYED RELEASE ORAL at 08:20

## 2024-12-13 RX ADMIN — METOPROLOL TARTRATE 25 MG: 25 TABLET, FILM COATED ORAL at 20:31

## 2024-12-13 RX ADMIN — GABAPENTIN 400 MG: 400 CAPSULE ORAL at 08:18

## 2024-12-13 RX ADMIN — METOPROLOL TARTRATE 25 MG: 25 TABLET, FILM COATED ORAL at 08:18

## 2024-12-13 RX ADMIN — HYDRALAZINE HYDROCHLORIDE 25 MG: 25 TABLET ORAL at 08:21

## 2024-12-13 RX ADMIN — Medication 1 TABLET: at 08:18

## 2024-12-13 RX ADMIN — THIAMINE HCL TAB 100 MG 100 MG: 100 TAB at 08:20

## 2024-12-13 RX ADMIN — GABAPENTIN 400 MG: 400 CAPSULE ORAL at 14:12

## 2024-12-13 RX ADMIN — DIAZEPAM 10 MG: 5 TABLET ORAL at 08:21

## 2024-12-13 RX ADMIN — FOLIC ACID 1 MG: 1 TABLET ORAL at 08:18

## 2024-12-13 RX ADMIN — FLUOXETINE HYDROCHLORIDE 40 MG: 20 CAPSULE ORAL at 08:18

## 2024-12-13 RX ADMIN — TRAZODONE HYDROCHLORIDE 50 MG: 50 TABLET ORAL at 22:03

## 2024-12-13 ASSESSMENT — ACTIVITIES OF DAILY LIVING (ADL)
ADLS_ACUITY_SCORE: 30
ADLS_ACUITY_SCORE: 30
LAUNDRY: UNABLE TO COMPLETE
ADLS_ACUITY_SCORE: 30
ADLS_ACUITY_SCORE: 30
DRESS: STREET CLOTHES
ORAL_HYGIENE: INDEPENDENT
ADLS_ACUITY_SCORE: 30
DRESS: INDEPENDENT
ADLS_ACUITY_SCORE: 30
ORAL_HYGIENE: INDEPENDENT
ADLS_ACUITY_SCORE: 30
HYGIENE/GROOMING: INDEPENDENT
ADLS_ACUITY_SCORE: 30
ADLS_ACUITY_SCORE: 30
LAUNDRY: UNABLE TO COMPLETE
ADLS_ACUITY_SCORE: 30
HYGIENE/GROOMING: INDEPENDENT
ADLS_ACUITY_SCORE: 30

## 2024-12-13 NOTE — PROGRESS NOTES
"[address]     Case Management Encounter: Met with team, discussed patient progress, discharge plan and any impediments to discharge.    Writer met with pt to discuss discharge and aftercare planning. Pt reports his plan after hospitalization is to attend 2-3 AA meetings weekly and see his therapist weekly. He shared he does not feel he needs [facility] treatment at this time. Pt was vague about his use however reported after his last admission to detox he maintained sobriety for 44 days. He shared starting Prozac and AA helped him remain sober. Pt shared \"feeling good\" led him to relapse. Pt reported he has therapy scheduled on 12/23/24 and will increase the amount of AA meetings he attends.    Insurance: [employer]     Legal Status:  Voluntary   County:   File Number: NA  Start and expiration date of commitment: NA    SUDs Assessment Status: Not needed.     ROIs on file: None at this time.     Living Situation: Lives with his mother     Current Providers, Supports & Collateral:  Mother, therapist, sober AA friends    Current Plan/Referral Status: Return home and attend therapy and AA       DOM Banda  [address] - Adult Inpatient Addiction Psychiatry Unit           "

## 2024-12-13 NOTE — PLAN OF CARE
Problem: Alcohol Withdrawal  Goal: Alcohol Withdrawal Symptom Control  Outcome: Progressing   Goal Outcome Evaluation:       Pt.appeared asleep and comfortable through the night. Breathing was even and regular. He scored 2 on MSSA. No prn medication administered. No safety or behavioral concerns observed or reported. Will continue to monitor.

## 2024-12-13 NOTE — PHARMACY-ADMISSION MEDICATION HISTORY
Pharmacist Admission Medication History    Admission medication history is complete. The information provided in this note is only as accurate as the sources available at the time of the update.    Information Source(s): Patient and CareEverywhere/SureScripts via phone    Pertinent Information: Georges confirmed his medications.    Changes made to PTA medication list:  Added: None  Deleted: folic acid  Changed:   Pantoprazole -> 40 mg daily prn  Seroquel ->  mg at bedtime prn    Allergies reviewed with patient and updates made in EHR: yes    Medication History Completed By: Mari Stockton RP 12/13/2024 11:33 AM    PTA Med List   Medication Sig Last Dose/Taking    FLUoxetine (PROZAC) 40 MG capsule Take 1 capsule (40 mg) by mouth daily Past Week    gabapentin (NEURONTIN) 400 MG capsule Take 1 capsule (400 mg) by mouth 3 times daily Past Week    metoprolol tartrate (LOPRESSOR) 25 MG tablet Take 1 tablet (25 mg) by mouth 2 times daily Past Week    pantoprazole (PROTONIX) 40 MG EC tablet Take 40 mg by mouth daily as needed. Taking Differently    QUEtiapine (SEROQUEL) 100 MG tablet Take  mg by mouth nightly as needed sleep More than a month

## 2024-12-13 NOTE — PLAN OF CARE
Problem: Alcohol Withdrawal  Goal: Alcohol Withdrawal Symptom Control  Outcome: Progressing   Goal Outcome Evaluation:    Plan of Care Reviewed With: patient      Patient is up and visible in the milieu. Patient is ambulating independently with no problem. Patient is alert and oriented x 4. Patient is attending/participating in unit programming. Pt is social with peers. Affect is flat, mood is anxious. Patient denies SI/SIB/HI. Pt denies auditory/visual hallucinations. Patient reported anxiety of 6 and depression of 2 and refers it as situation depression but he is hopeful. PRN hydroxyzine given for anxiety with effective outcome.  His MSSA scores during this shift are 8 and 4. PRN 10 mg valium given x1. He had all his scheduled medication and tolerated all well. BP (!) 149/83 (BP Location: Left arm, Patient Position: Sitting, Cuff Size: Adult Regular)   Pulse 69   Temp 98.1  F (36.7  C) (Oral)   Resp 14   Ht 1.829 m (6')   Wt 90.7 kg (200 lb)   SpO2 95%   BMI 27.12 kg/m

## 2024-12-13 NOTE — PLAN OF CARE
Goal Outcome Evaluation:    Plan of Care Reviewed With: patient      Patient is up and visible in the milieu. Patient is ambulating independently with no problem. Patient is alert and oriented x 4. Patient is attending/participating in unit programming. Pt is social with peers. Affect is flat, mood is anxious. Patient denies SI/SIB/HI. Pt denies auditory/visual hallucinations. Patient verbally contracts for safety on the unit.     This RN administered the PRN medications tylenol for pain, hydralazine this morning for high b/p, nicotine gum, (see MAR) at the request of the patient. Patient is tolerating medications well, denies any current side effects.     Pt's MSSA's = 8 and 3 received 10 mg valium x1 per protocol.  Patient reports a good appetite, and good sleep. Patient discharge plans to go home tomorrow, Saturday (12/14). Rest, fluids, and food encouraged. Status 15 checks remain. Patient is able to make needs known appropriately. Patient denies any unmet needs at this time.     Blood pressure 132/82, pulse 77, temperature 98.7  F (37.1  C), temperature source Oral, resp. rate 16, height 1.829 m (6'), weight 90.7 kg (200 lb), SpO2 98%.

## 2024-12-13 NOTE — PROGRESS NOTES
[facility], [place]   Psychiatric Progress Note  Hospital Day: 1        Interim History:   The patient's care was discussed with the treatment team during the daily team meeting and/or staff's chart notes were reviewed.  Staff report patient has been visible in the milieu, continues to have withdrawal symptoms, MSSA scores 8 or greater in past 24 hours, receiving diazepam as indicated, taking medications as prescribed, denying SI, appeared to sleep throughout the night.     Upon interview, the patient reports he is having a difficult morning, states that he woke up and he was hungry but did not eat his snack yet and went out to staff and was asking for them to get one of his sweaters out of his clothing items but was having difficulty having someone agreed to help him because it was shift change, reports that he was having a lot of irritability and that he yelled out in frustration at the staff, he states that he is just feeling stressed about having to figure out issues around medical appointments that he has coming up including the fact that he was scheduled for preop eval today in order to receive sedation for the MRIs that are needed next week and an appointment and that he was worried about having to reschedule this along with being hungry and just frustrated and still waking up in the morning.  He expressed some remorse for his behaviors.  He denies have any SI HI or AVH.  He is tolerating medications with the exception of reporting some grogginess and sedation in the morning with the quetiapine, reports that he is wanting to try some as needed trazodone and then utilize the quetiapine if he is not able to sleep, writer will adjust his orders.  He reports that his withdrawal symptoms are improving a little bit though he is still having some shaking and sweating and nausea, was able to eat.  No vomiting, no bowel movement yet since being on the unit likely due to poor intake prior  to admission.  Continues to express interest in detox only and hopeful to be able to discharge tomorrow.          Medications:     Current Facility-Administered Medications   Medication Dose Route Frequency Provider Last Rate Last Admin    FLUoxetine (PROzac) capsule 40 mg  40 mg Oral Daily Davi Burgos MD   40 mg at 12/13/24 0818    folic acid (FOLVITE) tablet 1 mg  1 mg Oral Daily Davi Burgos MD   1 mg at 12/13/24 0818    gabapentin (NEURONTIN) capsule 400 mg  400 mg Oral TID Davi Burgos MD   400 mg at 12/13/24 0818    metoprolol tartrate (LOPRESSOR) tablet 25 mg  25 mg Oral BID Gris Cassidy MD   25 mg at 12/13/24 0818    multivitamin w/minerals (THERA-VIT-M) tablet 1 tablet  1 tablet Oral Daily Davi Burgos MD   1 tablet at 12/13/24 0818    nicotine (NICODERM CQ) 21 MG/24HR 24 hr patch 1 patch  1 patch Transdermal Daily Gris Cassidy MD   1 patch at 12/13/24 0819    pantoprazole (PROTONIX) EC tablet 40 mg  40 mg Oral QAM AC Davi Burgos MD   40 mg at 12/13/24 0820    QUEtiapine (SEROquel) tablet 100 mg  100 mg Oral At Bedtime Davi Burgos MD   100 mg at 12/12/24 2112    thiamine (B-1) tablet 100 mg  100 mg Oral Daily Davi Burgos MD   100 mg at 12/13/24 0820          Allergies:     Allergies   Allergen Reactions    Sertraline Other (See Comments)     Excessive fatigue  Reaction(s): Excessive fatigue.    Duloxetine Hives and Rash     Rash, hives          Labs:     Recent Results (from the past 48 hours)   Comprehensive metabolic panel    Collection Time: 12/11/24  8:24 PM   Result Value Ref Range    Sodium 137 135 - 145 mmol/L    Potassium 4.0 3.4 - 5.3 mmol/L    Carbon Dioxide (CO2) 24 22 - 29 mmol/L    Anion Gap 19 (H) 7 - 15 mmol/L    Urea Nitrogen 16.4 6.0 - 20.0 mg/dL    Creatinine 0.83 0.67 - 1.17 mg/dL    GFR Estimate >90 >60 mL/min/1.73m2    Calcium 9.6 8.8 - 10.4 mg/dL    Chloride 94 (L) 98 - 107 mmol/L    Glucose 110 (H)  70 - 99 mg/dL    Alkaline Phosphatase 70 40 - 150 U/L    AST 30 0 - 45 U/L    ALT 24 0 - 70 U/L    Protein Total 7.8 6.4 - 8.3 g/dL    Albumin 4.8 3.5 - 5.2 g/dL    Bilirubin Total 0.7 <=1.2 mg/dL   Magnesium    Collection Time: 12/11/24  8:24 PM   Result Value Ref Range    Magnesium 2.0 1.7 - 2.3 mg/dL   CBC with platelets and differential    Collection Time: 12/11/24  8:24 PM   Result Value Ref Range    WBC Count 8.5 4.0 - 11.0 10e3/uL    RBC Count 5.09 4.40 - 5.90 10e6/uL    Hemoglobin 16.4 13.3 - 17.7 g/dL    Hematocrit 45.6 40.0 - 53.0 %    MCV 90 78 - 100 fL    MCH 32.2 26.5 - 33.0 pg    MCHC 36.0 31.5 - 36.5 g/dL    RDW 12.4 10.0 - 15.0 %    Platelet Count 256 150 - 450 10e3/uL    % Neutrophils 59 %    % Lymphocytes 31 %    % Monocytes 8 %    % Eosinophils 1 %    % Basophils 0 %    % Immature Granulocytes 0 %    NRBCs per 100 WBC 0 <1 /100    Absolute Neutrophils 5.0 1.6 - 8.3 10e3/uL    Absolute Lymphocytes 2.6 0.8 - 5.3 10e3/uL    Absolute Monocytes 0.7 0.0 - 1.3 10e3/uL    Absolute Eosinophils 0.1 0.0 - 0.7 10e3/uL    Absolute Basophils 0.0 0.0 - 0.2 10e3/uL    Absolute Immature Granulocytes 0.0 <=0.4 10e3/uL    Absolute NRBCs 0.0 10e3/uL   GGT    Collection Time: 12/11/24  8:24 PM   Result Value Ref Range    GGT 26 8 - 61 U/L   TSH with free T4 reflex    Collection Time: 12/11/24  8:24 PM   Result Value Ref Range    TSH 1.90 0.30 - 4.20 uIU/mL   Hemoglobin A1c    Collection Time: 12/11/24  8:24 PM   Result Value Ref Range    Estimated Average Glucose 103 <117 mg/dL    Hemoglobin A1C 5.2 <5.7 %   Alcohol breath test POCT    Collection Time: 12/11/24  8:25 PM   Result Value Ref Range    Alcohol Breath Test 0.121 (A) 0.00 - 0.01   Urine Drug Screen Panel    Collection Time: 12/11/24  8:29 PM   Result Value Ref Range    Amphetamines Urine Screen Negative Screen Negative    Barbituates Urine Screen Negative Screen Negative    Benzodiazepine Urine Screen Negative Screen Negative    Cannabinoids Urine Screen  Positive (A) Screen Negative    Cocaine Urine Screen Negative Screen Negative    Fentanyl Qual Urine Screen Negative Screen Negative    Opiates Urine Screen Negative Screen Negative    PCP Urine Screen Negative Screen Negative          Psychiatric Examination:     BP (!) 177/93   Pulse 91   Temp 98  F (36.7  C) (Oral)   Resp 16   Ht 1.829 m (6')   Wt 90.7 kg (200 lb)   SpO2 94%   BMI 27.12 kg/m    Weight is 200 lbs 0 oz  Body mass index is 27.12 kg/m .    Orthostatic Vitals       None          Appearance: awake, alert and adequately groomed  Attitude:  cooperative  Eye Contact:  good  Mood:   irritable  Affect:  appropriate and in normal range and mood congruent  Speech:  clear, coherent and normal prosody  Language: fluent and intact in English  Psychomotor, Gait, Musculoskeletal:  no evidence of tardive dyskinesia, dystonia, or tics  Thought Process:  goal oriented  Associations:  no loose associations  Thought Content:  no evidence of suicidal ideation or homicidal ideation and no evidence of psychotic thought  Insight:  partial  Judgement:  fair, adequate for safety   Oriented to:  time, person, and place  Attention Span and Concentration:  intact  Recent and Remote Memory:  intact  Fund of Knowledge:  appropriate           Precautions:     Behavioral Orders   Procedures    Code 1 - Restrict to Unit    Routine Programming     As clinically indicated    Status 15     Every 15 minutes.    Withdrawal precautions          Diagnoses:     Alcohol use disorder, severe, dependence with withdrawal with complication   Cannabis use disorder, dependence   Nicotine dependence with withdrawal   PUSHPA  Unspecified depression (MDD vs substance induced)  Hx of panic disorder  Hx of PTSD  Hx of ADHD per pts report  Peripheral neuropathy  HTN  Paroxysmal Afib  Mallet finger of R hand   High anion gap metabolic acidosis  Adenomatous polyps of colon  Removed tubular adenoma s/p colonoscopy 5/24  Barrets esophagus s/p EGD  5/9/24    Clinically Significant Risk Factors          # Hypochloremia: Lowest Cl = 94 mmol/L in last 2 days, will monitor as appropriate     # Anion Gap Metabolic Acidosis: Highest Anion Gap = 19 mmol/L in last 2 days, will monitor and treat as appropriate                   # Overweight: Estimated body mass index is 27.12 kg/m  as calculated from the following:    Height as of this encounter: 1.829 m (6').    Weight as of this encounter: 90.7 kg (200 lb)., PRESENT ON ADMISSION       # Financial/Environmental Concerns:                       Assessment & Plan:   Assessment and hospital summary:  This patient is a 48 year old male with history of substance use and anxiety who presented to ED seeking detox from alcohol. Medically cleared in ED, admitted to  as voluntary patient. Drinking a quart of tequila daily for past 10 days after period of sobriety, also using cannabis and nicotine, EtOH breath test 0.121(H), UDS positive for cannabinoids. MSSA with diazepam started for alcohol withdrawal. Withdrawal precautions in place, denies history of seizures. Admission labs ordered and reviewed those resulted. IM consult placed. Reporting anxiety, denying safety concerns including SI and HI. PTA medications continued as appropriate. Pt reports goals for hospitalization are to complete detox and then engage in community supports such as AA.      Inpatient psychiatric hospitalization is warranted at this time for safety, stabilization, and possible adjustment in medications.    Psychiatric treatment/interventions:  Medications:   -continue MSSA with diazepam, thiamine, folic acid, multivitamin for alcohol withdrawal, PRN atenolol discontinued as pt on scheduled metoprolol   -continue PTA gabapentin 400mg TID for mood/AUD/neuropathy   -continue PTA metoprolol 25mg BID for BP  -continue PTA quetiapine 100mg at bedtime for sleep/mood  -continue PTA fluoxetine 40mg daily for mood  -PRN hydroxyzine 25mg every 4 hours for anxiety  "  -PRN trazodone 50mg at bedtime for sleep   -Nicotine replacement therapy ordered on unit and will offer on discharge and educate patient on benefits of cessation   -consider MAT for AUD prior to discharge, pt has recently been on naltrexone including hx of WATKINS   -patient interested in completing detox and then engaging in AA         The risks, benefits, alternatives and side effects have been discussed and are understood by the patient.     Laboratory/Imaging: reviewed additional labs- GGT WNL; TSH WNL; HgbA1c WNL     Patient will be treated in therapeutic milieu with appropriate individual and group therapies as described.     Medical treatment/interventions:  Medical concerns:   1) Alcohol withdrawal, ongoing   -MSSA as above  -PRN zofran and imodium for GI distress related to withdrawal   -withdrawal precautions     2) IM consult placed for management of other medical concerns, per consult note on 12/12/24:   Georges Moreno is a 48 year old male admitted on 12/11/2024. He has a PMHx notable for alcohol use disorder, peripheral neuropathy, anxiety, depression, PTSD, paroxysmal atrial fibrillation, hx of caraballo's esophagus, and HTN. He presented to the ED seeking detox. He is currently admitted to [facility].      Alcohol use disorder  Acute alcohol withdrawal  Patient reports  Drinks approximately 1 quart of tequila per day for the last 8+ days. Denies history of withdrawal seizures and DT's. Last drink just PTA. UDS positive for cannabinoids.   - psychiatry primary   - agree with vitamin supplementation including of thiamine and folate  - continue MSSA  - counseling, group therapies, community resources     Paroxysmal atrial fibrillation  HTN  Not on anticoagulation. Has been on Metoprolol 25mg BID. HR currently well controlled. Feels some \"pounding\" of his heart especially when lying down trying to sleep, but no irregular rhythm, chest pain, dyspnea, lightheadedness. RRR on exam. BP elevated in the setting of " active withdrawal which may be contributing to symptoms.  - Continue PTA metoprolol tartrate 25 mg daily  - notify medicine if SBP > 180 despite adequate treatment of withdrawal   - if BP fails to normalize despite completion of withdrawal, patient should follow-up with PCP within 1 week of discharge    - Notify medicine if concern for irregular rhythm, palpitations, chest pain, dyspnea. Would obtain stat EKG     Anion gap metabolic acidosis  AG 19 upon admission, remainder of electrolytes within normal limits. Suspect abnormality is d/t excessive alcohol consumption in the setting of poor solute intake.   - encourage alcohol cessation and oral/fluid intake   - trend CMP with PCP at discharge      Anxiety  Depression  PTSD   - Management per psychiatry     Peripheral neuropathy   - PTA gabapentin 400 mg TID      Adenomatous polyps of colon  Removed tubular adenoma s/p colonoscopy 5/9/24  Vickers's esophagus s/p EGD 5/9/24   - Continue PTA PPI           The patient's care was discussed with the Primary Team via this note        Clinically Significant Risk Factors Present on Admission          # Hypochloremia: Lowest Cl = 94 mmol/L in last 2 days, will monitor as appropriate     # Anion Gap Metabolic Acidosis: Highest Anion Gap = 19 mmol/L in last 2 days, will monitor and treat as appropriate                 # Overweight: Estimated body mass index is 27.12 kg/m  as calculated from the following:    Height as of this encounter: 1.829 m (6').    Weight as of this encounter: 90.7 kg (200 lb).       # Financial/Environmental Concerns:               Currently patient is medically stable and medicine will sign off. Thank you for allowing us to be a part of this patients care. Please notify on call [facility] if any intercurrent medical issues arise.         Lyly Garcia PA-C  Hospitalist Service         Disposition Plan   Reason for ongoing admission: requires detoxification from substance that poses a risk of bodily harm during  withdrawal period  Discharge location:  home with AA  Discharge Medications: ordered.  Follow-up Appointments: not scheduled  Legal Status: voluntary    >50 min total time that was spent in counseling and coordination of care with staff, reviewing medical record, educating patient about treatment options, side effects and benefits and alternative treatments for medications, providing supportive therapy and redirection regarding above symptoms.     This document is created with the help of Dragon dictation system.  All grammatical/typing errors or context distortion are unintentional and inherent to software.    Patient has been seen and evaluated by Gris allison DO.

## 2024-12-14 VITALS
OXYGEN SATURATION: 96 % | HEART RATE: 74 BPM | TEMPERATURE: 98 F | SYSTOLIC BLOOD PRESSURE: 156 MMHG | HEIGHT: 72 IN | DIASTOLIC BLOOD PRESSURE: 87 MMHG | BODY MASS INDEX: 27.09 KG/M2 | RESPIRATION RATE: 16 BRPM | WEIGHT: 200 LBS

## 2024-12-14 PROCEDURE — 99239 HOSP IP/OBS DSCHRG MGMT >30: CPT | Performed by: PSYCHIATRY & NEUROLOGY

## 2024-12-14 PROCEDURE — 250N000013 HC RX MED GY IP 250 OP 250 PS 637: Performed by: PSYCHIATRY & NEUROLOGY

## 2024-12-14 RX ORDER — GABAPENTIN 400 MG/1
400 CAPSULE ORAL 3 TIMES DAILY
COMMUNITY
Start: 2024-12-14

## 2024-12-14 RX ORDER — FLUOXETINE 40 MG/1
40 CAPSULE ORAL DAILY
COMMUNITY
Start: 2024-12-14

## 2024-12-14 RX ORDER — METOPROLOL TARTRATE 25 MG/1
25 TABLET, FILM COATED ORAL 2 TIMES DAILY
COMMUNITY
Start: 2024-12-14

## 2024-12-14 RX ORDER — QUETIAPINE FUMARATE 100 MG/1
50-100 TABLET, FILM COATED ORAL
COMMUNITY
Start: 2024-12-14

## 2024-12-14 RX ORDER — PANTOPRAZOLE SODIUM 40 MG/1
40 TABLET, DELAYED RELEASE ORAL
COMMUNITY
Start: 2024-12-14

## 2024-12-14 RX ORDER — MULTIPLE VITAMINS W/ MINERALS TAB 9MG-400MCG
1 TAB ORAL DAILY
COMMUNITY
Start: 2024-12-14

## 2024-12-14 RX ADMIN — Medication 1 TABLET: at 08:31

## 2024-12-14 RX ADMIN — FLUOXETINE HYDROCHLORIDE 40 MG: 20 CAPSULE ORAL at 08:31

## 2024-12-14 RX ADMIN — METOPROLOL TARTRATE 25 MG: 25 TABLET, FILM COATED ORAL at 08:31

## 2024-12-14 RX ADMIN — FOLIC ACID 1 MG: 1 TABLET ORAL at 08:31

## 2024-12-14 RX ADMIN — GABAPENTIN 400 MG: 400 CAPSULE ORAL at 08:31

## 2024-12-14 RX ADMIN — NICOTINE 1 PATCH: 21 PATCH, EXTENDED RELEASE TRANSDERMAL at 08:32

## 2024-12-14 RX ADMIN — THIAMINE HCL TAB 100 MG 100 MG: 100 TAB at 08:31

## 2024-12-14 RX ADMIN — PANTOPRAZOLE SODIUM 40 MG: 40 TABLET, DELAYED RELEASE ORAL at 08:31

## 2024-12-14 ASSESSMENT — ACTIVITIES OF DAILY LIVING (ADL)
ORAL_HYGIENE: INDEPENDENT
HYGIENE/GROOMING: HANDWASHING;INDEPENDENT
ADLS_ACUITY_SCORE: 30
DRESS: STREET CLOTHES
ADLS_ACUITY_SCORE: 30
ADLS_ACUITY_SCORE: 30
LAUNDRY: UNABLE TO COMPLETE

## 2024-12-14 NOTE — PLAN OF CARE
Pt slept through the night, denies any withdrawal symptoms. MSSA 1/1, no prn given. VS: Blood pressure 122/82, pulse 55, temperature 97.8  F (36.6  C), temperature source Oral, resp. rate 16, height 1.829 m (6'), weight 90.7 kg (200 lb), SpO2 96%.        Problem: Alcohol Withdrawal  Goal: Alcohol Withdrawal Symptom Control  Outcome: Progressing   Goal Outcome Evaluation:

## 2024-12-14 NOTE — PLAN OF CARE
Problem: Alcohol Withdrawal  Goal: Alcohol Withdrawal Symptom Control  Outcome: Progressing   Goal Outcome Evaluation:    Plan of Care Reviewed With: patient      Patient alert and oriented x4 and visible in the milieu. Patient is ambulating independently with no problem. Patient is attending/participating in unit programming. Pt is social with peers. Affect is flat, mood is anxious. Patient denies SI/SIB/HI. Pt denies auditory/visual hallucinations. Patient reported eating well and drinking adequate fluid intake. No c/o pain. Had MSSA scores of 4 and 2. No prn valium given during this shift. /81   Pulse 69   Temp 98.3  F (36.8  C) (Oral)   Resp 16   Ht 1.829 m (6')   Wt 90.7 kg (200 lb)   SpO2 96%   BMI 27.12 kg/m

## 2024-12-14 NOTE — PROGRESS NOTES
Patient has not required any valium for alcohol detox since 12/13/24 @ 0821. All MSSA scores since that time have been less than 8. Pt is now removed from alcohol detox monitoring status. Await disposition likely to home.

## 2024-12-14 NOTE — PLAN OF CARE
Goal Outcome Evaluation:    Plan of Care Reviewed With: patient      Patient discharged to home, discharge instructions and medications explained to patient with no question asked.  Patient verbalized understanding of the discharge instructions.  Medications and belongings sent with patient upon discharge.

## 2024-12-14 NOTE — DISCHARGE SUMMARY
Psychiatric Discharge Summary    Georges Moreno MRN# [id number]   Age: 48 year old YOB: 1975     Date of Admission:  12/11/2024  Date of Discharge:  12/14/2024 10:15 AM  Admitting Physician:  Gris Cassidy DO  Discharge Physician:  Gris Cassidy DO         Event Leading to Hospitalization:   Georges is a 48 year old male with history of substance use and anxiety who presented to ED seeking detox from alcohol.     Chart review completed including ED notes from this encounter, recent PT visits for mallet finger, orthopedics visit last month for mallet finger, ED visit on 11/17 for alcohol intoxication/withdrawal; PCP note on 10/3 for chest pain; previous admissions to this unit in past year including most recently on 6/14/24-6/17/24 where he discharged home with plan to attend AA and restart naltrexone and then transition to Vivitrol.      Per ED physician note: Georges Moreno is a 48 year old male with a past medical history of alcohol use disorder, barett's esophagus, pancreatitis, perirectal abscess, atrial fibrillation, and PTSD, who presents for alcohol detox.  Drinks approximately 1 quart of tequila per day.  They have a history of withdrawal symptoms and have not had a seizure from alcohol in the past.  Last drink was just prior to arrival.  States that he also used cannabis.  No suicidal or homicidal ideation.  No recent illness.  No other symptoms noted.        Upon interview: Patient confirms information above, since discharging from this unit in June he maintained sobriety until the end of September, he relapsed and went to [facility], he reports he maintained sobriety until about 9 or 10 days ago when he relapsed in context of the holidays and additional stressors.  He has been drinking about a quart of tequila a day, also has been smoking cannabis and cigarettes daily.  He has tolerance to alcohol, drinks more than intended, difficulty cutting down and withdrawal symptoms  "when he stops drinking including currently having some shaking, sweating, nausea, hot and cold flashes, increased anxiety.  He denies history of seizures or DTs.  Denies current hallucinations.  Reports that he has anxiety outside of withdrawal, has not been taking his fluoxetine since he started drinking about 10 days ago, has been taking his other medications including gabapentin and his blood pressure medication.  He mostly notices the increase in anxiety, feels like his mood has been more \"blah\", somewhat depressed, denies having any SI or HI.  He has been having additional stress due to medical issues including being diagnosed with a mallet finger going through physical therapy and having issues with casts that they have placed on his finger causing discomfort.  He plans to follow up again in the near future on discharge to address further.  He reports his goals for hospitalization are to complete detox and return home and engage with supports in the community such as AA, he is not interested in returning to  treatment at this time.       See Admission note by Gris Cassidy DO on 12/12/24 for additional details.          Diagnoses:     Alcohol use disorder, severe, dependence with withdrawal with complication   Cannabis use disorder, dependence   Nicotine dependence with withdrawal   PUSHPA  Unspecified depression (MDD vs substance induced)  Hx of panic disorder  Hx of PTSD  Hx of ADHD per pts report  Peripheral neuropathy  HTN  Paroxysmal Afib  Mallet finger of R hand   High anion gap metabolic acidosis  Adenomatous polyps of colon  Removed tubular adenoma s/p colonoscopy 5/24  Barrets esophagus s/p EGD 5/9/24    Clinically Significant Risk Factors                              # Overweight: Estimated body mass index is 27.12 kg/m  as calculated from the following:    Height as of this encounter: 1.829 m (6').    Weight as of this encounter: 90.7 kg (200 lb)., PRESENT ON ADMISSION     # " Financial/Environmental Concerns:                Labs:     Recent Results (from the past week)   Comprehensive metabolic panel    Collection Time: 12/11/24  8:24 PM   Result Value Ref Range    Sodium 137 135 - 145 mmol/L    Potassium 4.0 3.4 - 5.3 mmol/L    Carbon Dioxide (CO2) 24 22 - 29 mmol/L    Anion Gap 19 (H) 7 - 15 mmol/L    Urea Nitrogen 16.4 6.0 - 20.0 mg/dL    Creatinine 0.83 0.67 - 1.17 mg/dL    GFR Estimate >90 >60 mL/min/1.73m2    Calcium 9.6 8.8 - 10.4 mg/dL    Chloride 94 (L) 98 - 107 mmol/L    Glucose 110 (H) 70 - 99 mg/dL    Alkaline Phosphatase 70 40 - 150 U/L    AST 30 0 - 45 U/L    ALT 24 0 - 70 U/L    Protein Total 7.8 6.4 - 8.3 g/dL    Albumin 4.8 3.5 - 5.2 g/dL    Bilirubin Total 0.7 <=1.2 mg/dL   Magnesium    Collection Time: 12/11/24  8:24 PM   Result Value Ref Range    Magnesium 2.0 1.7 - 2.3 mg/dL   CBC with platelets and differential    Collection Time: 12/11/24  8:24 PM   Result Value Ref Range    WBC Count 8.5 4.0 - 11.0 10e3/uL    RBC Count 5.09 4.40 - 5.90 10e6/uL    Hemoglobin 16.4 13.3 - 17.7 g/dL    Hematocrit 45.6 40.0 - 53.0 %    MCV 90 78 - 100 fL    MCH 32.2 26.5 - 33.0 pg    MCHC 36.0 31.5 - 36.5 g/dL    RDW 12.4 10.0 - 15.0 %    Platelet Count 256 150 - 450 10e3/uL    % Neutrophils 59 %    % Lymphocytes 31 %    % Monocytes 8 %    % Eosinophils 1 %    % Basophils 0 %    % Immature Granulocytes 0 %    NRBCs per 100 WBC 0 <1 /100    Absolute Neutrophils 5.0 1.6 - 8.3 10e3/uL    Absolute Lymphocytes 2.6 0.8 - 5.3 10e3/uL    Absolute Monocytes 0.7 0.0 - 1.3 10e3/uL    Absolute Eosinophils 0.1 0.0 - 0.7 10e3/uL    Absolute Basophils 0.0 0.0 - 0.2 10e3/uL    Absolute Immature Granulocytes 0.0 <=0.4 10e3/uL    Absolute NRBCs 0.0 10e3/uL   GGT    Collection Time: 12/11/24  8:24 PM   Result Value Ref Range    GGT 26 8 - 61 U/L   TSH with free T4 reflex    Collection Time: 12/11/24  8:24 PM   Result Value Ref Range    TSH 1.90 0.30 - 4.20 uIU/mL   Hemoglobin A1c    Collection Time:  "12/11/24  8:24 PM   Result Value Ref Range    Estimated Average Glucose 103 <117 mg/dL    Hemoglobin A1C 5.2 <5.7 %   Alcohol breath test POCT    Collection Time: 12/11/24  8:25 PM   Result Value Ref Range    Alcohol Breath Test 0.121 (A) 0.00 - 0.01   Urine Drug Screen Panel    Collection Time: 12/11/24  8:29 PM   Result Value Ref Range    Amphetamines Urine Screen Negative Screen Negative    Barbituates Urine Screen Negative Screen Negative    Benzodiazepine Urine Screen Negative Screen Negative    Cannabinoids Urine Screen Positive (A) Screen Negative    Cocaine Urine Screen Negative Screen Negative    Fentanyl Qual Urine Screen Negative Screen Negative    Opiates Urine Screen Negative Screen Negative    PCP Urine Screen Negative Screen Negative            Consults:    IM consult placed for management of other medical concerns, per consult note on 12/12/24:   Georges Moreno is a 48 year old male admitted on 12/11/2024. He has a PMHx notable for alcohol use disorder, peripheral neuropathy, anxiety, depression, PTSD, paroxysmal atrial fibrillation, hx of caraballo's esophagus, and HTN. He presented to the ED seeking detox. He is currently admitted to [facility].      Alcohol use disorder  Acute alcohol withdrawal  Patient reports  Drinks approximately 1 quart of tequila per day for the last 8+ days. Denies history of withdrawal seizures and DT's. Last drink just PTA. UDS positive for cannabinoids.   - psychiatry primary   - agree with vitamin supplementation including of thiamine and folate  - continue MSSA  - counseling, group therapies, community resources     Paroxysmal atrial fibrillation  HTN  Not on anticoagulation. Has been on Metoprolol 25mg BID. HR currently well controlled. Feels some \"pounding\" of his heart especially when lying down trying to sleep, but no irregular rhythm, chest pain, dyspnea, lightheadedness. RRR on exam. BP elevated in the setting of active withdrawal which may be contributing to " symptoms.  - Continue PTA metoprolol tartrate 25 mg daily  - notify medicine if SBP > 180 despite adequate treatment of withdrawal   - if BP fails to normalize despite completion of withdrawal, patient should follow-up with PCP within 1 week of discharge    - Notify medicine if concern for irregular rhythm, palpitations, chest pain, dyspnea. Would obtain stat EKG     Anion gap metabolic acidosis  AG 19 upon admission, remainder of electrolytes within normal limits. Suspect abnormality is d/t excessive alcohol consumption in the setting of poor solute intake.   - encourage alcohol cessation and oral/fluid intake   - trend CMP with PCP at discharge      Anxiety  Depression  PTSD   - Management per psychiatry     Peripheral neuropathy   - PTA gabapentin 400 mg TID      Adenomatous polyps of colon  Removed tubular adenoma s/p colonoscopy 5/9/24  Vickers's esophagus s/p EGD 5/9/24   - Continue PTA PPI           The patient's care was discussed with the Primary Team via this note        Clinically Significant Risk Factors Present on Admission          # Hypochloremia: Lowest Cl = 94 mmol/L in last 2 days, will monitor as appropriate     # Anion Gap Metabolic Acidosis: Highest Anion Gap = 19 mmol/L in last 2 days, will monitor and treat as appropriate                 # Overweight: Estimated body mass index is 27.12 kg/m  as calculated from the following:    Height as of this encounter: 1.829 m (6').    Weight as of this encounter: 90.7 kg (200 lb).       # Financial/Environmental Concerns:               Currently patient is medically stable and medicine will sign off. Thank you for allowing us to be a part of this patients care. Please notify on call EMA if any intercurrent medical issues arise.         Lyly Garcia PA-C  Hospitalist Service                  Hospital Course:   Georges Moreno is a 48 year old male with history of substance use and anxiety who presented to ED seeking detox from alcohol. Medically cleared in  ED, admitted to 3[facility] as voluntary patient. Drinking a quart of tequila daily for past 10 days after period of sobriety, also using cannabis and nicotine, EtOH breath test 0.121(H), UDS positive for cannabinoids. MSSA with diazepam started for alcohol withdrawal. Withdrawal precautions in place, denies history of seizures. Admission labs ordered and reviewed those resulted. IM consult placed. Reporting anxiety, denying safety concerns including SI and HI. PTA medications continued as appropriate. Pt reports goals for hospitalization are to complete detox and then engage in community supports such as AA.     The patient's symptoms of alcohol withdrawal improved and he was out of detox on 12/14 and requested to discharge home. He declined MAT for AUD, reports mood has improved. Today Georges Moreno reports having no thoughts of harming self or others. In addition, he has notable risk factors for self-harm, including single status, anxiety, and substance abuse. However, risk is mitigated by commitment to family, absence of past attempts, ability to volunteer a safety plan, history of seeking help when needed, and pt is future oriented and denying SI. Therefore, based on all available evidence including the factors cited above, he does not appear to be at imminent risk for self-harm, does not meet criteria for a 72-hr hold, and therefore remains appropriate for ongoing outpatient level of care.      Georges Moreno was  discharged  to home. At the time of discharge Georges Moreno was determined to not be a danger to himself or others.          Discharge Medications:     Current Discharge Medication List        START taking these medications    Details   multivitamin w/minerals (THERA-VIT-M) tablet Take 1 tablet by mouth daily.    Associated Diagnoses: Alcohol use disorder, severe, dependence (H)           CONTINUE these medications which have CHANGED    Details   FLUoxetine (PROZAC) 40 MG capsule Take 1 capsule (40 mg) by  "mouth daily.    Associated Diagnoses: PUSHPA (generalized anxiety disorder)      gabapentin (NEURONTIN) 400 MG capsule Take 1 capsule (400 mg) by mouth 3 times daily.    Associated Diagnoses: PUSHPA (generalized anxiety disorder)      metoprolol tartrate (LOPRESSOR) 25 MG tablet Take 1 tablet (25 mg) by mouth 2 times daily.    Associated Diagnoses: Alcohol withdrawal, uncomplicated (H)      pantoprazole (PROTONIX) 40 MG EC tablet Take 1 tablet (40 mg) by mouth every morning (before breakfast).    Associated Diagnoses: Alcohol withdrawal, uncomplicated (H)      QUEtiapine (SEROQUEL) 100 MG tablet Take 0.5-1 tablets ( mg) by mouth nightly as needed (sleep).    Associated Diagnoses: Generalized anxiety disorder                  Psychiatric Examination:   Appearance:  awake, alert and adequately groomed  Attitude:  cooperative  Eye Contact:  good  Mood:   \"good\" appears euthymic  Affect:  appropriate and in normal range and mood congruent  Speech:  clear, coherent and normal prosody  Psychomotor Behavior:  no evidence of tardive dyskinesia, dystonia, or tics  Thought Process:  linear and goal oriented  Associations:  no loose associations  Thought Content:  no evidence of suicidal ideation or homicidal ideation and no evidence of psychotic thought  Insight:  partial  Judgment:  fair, adequate for safety   Oriented to:  time, person, and place  Attention Span and Concentration:  intact  Recent and Remote Memory:  intact  Language: English, fluent  Fund of Knowledge: appropriate  Muscle Strength and Tone: normal  Gait and Station: Normal         Discharge Plan:   Recommendation:  Please abstain from the use of all mood altering chemicals unless prescribed by a medical professional.     If you are  struggling with alcohol or substance abuse and would like to get help getting into chemical dependency treatment, please contact [facility] to schedule an outpatient SAMMI assessment.      [facility] - Assessments by appointment.  [address] [place] [address]  [phone]    Disposition: Return home     Follow-up Appointment:      Catrina Whitley, [place] [address]   Phone: [phone]   Fax: [phone]  Appointment Date/Time: TBD         You are aware you should make a follow up appointment with your primary care doctor for medical and medication management     Attestation:  Patient has been seen and evaluated by me, Gris Cassidy DO on day of discharge. 37 minutes were spent in coordination of discharge planning.

## 2024-12-14 NOTE — PROGRESS NOTES
"[address]     Case Management Encounter: Met with team, discussed patient progress, discharge plan and any impediments to discharge.    Writer met with pt to discuss discharge and aftercare planning. Pt reports his plan after hospitalization is to attend 2-3 AA meetings weekly and see his therapist weekly. He shared he does not feel he needs SAMMI treatment at this time. Pt was vague about his use however reported after his last admission to detox he maintained sobriety for 44 days. He shared starting Prozac and AA helped him remain sober. Pt shared \"feeling good\" led him to relapse. Pt reported he has therapy scheduled on 12/23/24 and will increase the amount of AA meetings he attends.    Insurance: Blue Plus     Legal Status:  Voluntary   County:   File Number: NA  Start and expiration date of commitment: NA    SUDs Assessment Status: Not needed.     ROIs on file: None at this time.     Living Situation: Lives with his mother     Current Providers, Supports & Collateral:  Mother, therapist, sober AA friends    Current Plan/Referral Status: Return home and attend therapy and AA       DOM Banda  [address] - Adult Inpatient Addiction Psychiatry Unit           "

## 2024-12-16 ENCOUNTER — PATIENT OUTREACH (OUTPATIENT)
Dept: CARE COORDINATION | Facility: CLINIC | Age: 49
End: 2024-12-16
Payer: COMMERCIAL

## 2024-12-16 NOTE — PROGRESS NOTES
"  [facility]: Transitions of Care Outreach  Chief Complaint   Patient presents with    Clinic Care Coordination - Post Hospital       Most Recent Admission Date: 12/11/2024   Most Recent Admission Diagnosis: Alcohol use disorder - F10.90     Most Recent Discharge Date: 12/14/2024   Most Recent Discharge Diagnosis: Alcohol use disorder - F10.90  Alcohol dependence with uncomplicated intoxication (H) - F10.220  PUSHPA (generalized anxiety disorder) - F41.1  Alcohol withdrawal, uncomplicated (H) - F10.930  Generalized anxiety disorder - F41.1  Alcohol use disorder, severe, dependence (H) - F10.20     Transitions of Care Assessment    Discharge Assessment  How are you doing now that you are home?: \" I am doing really well \"  How are your symptoms? (Red Flag symptoms escalate to triage hotline per guidelines): Improved  Do you know how to contact your clinic care team if you have future questions or changes to your health status? : Yes  Does the patient have their discharge instructions? : Yes  Does the patient have questions regarding their discharge instructions? : No  Were you started on any new medications or were there changes to any of your previous medications? : Yes  Does the patient have all of their medications?: Yes  Do you have questions regarding any of your medications? : No  Do you have all of your needed medical supplies or equipment (DME)?  (i.e. oxygen tank, CPAP, cane, etc.): Yes                Follow up Plan     Discharge Follow-Up  Discharge follow up appointment scheduled in alignment with recommended follow up timeframe or Transitions of Risk Category? (Low = within 30 days; Moderate= within 14 days; High= within 7 days): Yes  Discharge Follow Up Appointment Date: 12/17/24  Discharge Follow Up Appointment Scheduled with?: Primary Care Provider    No future appointments.    Outpatient Plan as outlined on AVS reviewed with patient.    For any urgent concerns, please contact our 24 " hour nurse triage line: [phone] ([phone])       AZEEM Tucker

## 2025-01-01 ENCOUNTER — HOSPITAL ENCOUNTER (INPATIENT)
Facility: CLINIC | Age: 50
End: 2025-01-01
Attending: EMERGENCY MEDICINE | Admitting: PSYCHIATRY & NEUROLOGY
Payer: COMMERCIAL

## 2025-01-01 ENCOUNTER — TELEPHONE (OUTPATIENT)
Dept: BEHAVIORAL HEALTH | Facility: CLINIC | Age: 50
End: 2025-01-01

## 2025-01-01 DIAGNOSIS — F10.220 ALCOHOL DEPENDENCE WITH UNCOMPLICATED INTOXICATION (H): ICD-10-CM

## 2025-01-01 LAB
ALBUMIN SERPL BCG-MCNC: 4.5 G/DL (ref 3.5–5.2)
ALCOHOL BREATH TEST: 0.19 (ref 0–0.01)
ALP SERPL-CCNC: 65 U/L (ref 40–150)
ALT SERPL W P-5'-P-CCNC: 24 U/L (ref 0–70)
AMPHETAMINES UR QL SCN: ABNORMAL
ANION GAP SERPL CALCULATED.3IONS-SCNC: 18 MMOL/L (ref 7–15)
AST SERPL W P-5'-P-CCNC: 36 U/L (ref 0–45)
BARBITURATES UR QL SCN: ABNORMAL
BASOPHILS # BLD AUTO: 0 10E3/UL (ref 0–0.2)
BASOPHILS NFR BLD AUTO: 1 %
BENZODIAZ UR QL SCN: ABNORMAL
BILIRUB SERPL-MCNC: 0.6 MG/DL
BUN SERPL-MCNC: 16.5 MG/DL (ref 6–20)
BZE UR QL SCN: ABNORMAL
CALCIUM SERPL-MCNC: 9 MG/DL (ref 8.8–10.4)
CANNABINOIDS UR QL SCN: ABNORMAL
CHLORIDE SERPL-SCNC: 96 MMOL/L (ref 98–107)
CREAT SERPL-MCNC: 0.82 MG/DL (ref 0.67–1.17)
EGFRCR SERPLBLD CKD-EPI 2021: >90 ML/MIN/1.73M2
EOSINOPHIL # BLD AUTO: 0 10E3/UL (ref 0–0.7)
EOSINOPHIL NFR BLD AUTO: 0 %
ERYTHROCYTE [DISTWIDTH] IN BLOOD BY AUTOMATED COUNT: 12.4 % (ref 10–15)
EST. AVERAGE GLUCOSE BLD GHB EST-MCNC: 105 MG/DL
FENTANYL UR QL: ABNORMAL
GGT SERPL-CCNC: 33 U/L (ref 8–61)
GLUCOSE SERPL-MCNC: 118 MG/DL (ref 70–99)
HBA1C MFR BLD: 5.3 %
HCO3 SERPL-SCNC: 23 MMOL/L (ref 22–29)
HCT VFR BLD AUTO: 49.2 % (ref 40–53)
HGB BLD-MCNC: 17.7 G/DL (ref 13.3–17.7)
IMM GRANULOCYTES # BLD: 0 10E3/UL
IMM GRANULOCYTES NFR BLD: 0 %
LYMPHOCYTES # BLD AUTO: 2.7 10E3/UL (ref 0.8–5.3)
LYMPHOCYTES NFR BLD AUTO: 34 %
MAGNESIUM SERPL-MCNC: 2.2 MG/DL (ref 1.7–2.3)
MCH RBC QN AUTO: 31.7 PG (ref 26.5–33)
MCHC RBC AUTO-ENTMCNC: 36 G/DL (ref 31.5–36.5)
MCV RBC AUTO: 88 FL (ref 78–100)
MONOCYTES # BLD AUTO: 0.7 10E3/UL (ref 0–1.3)
MONOCYTES NFR BLD AUTO: 9 %
NEUTROPHILS # BLD AUTO: 4.4 10E3/UL (ref 1.6–8.3)
NEUTROPHILS NFR BLD AUTO: 56 %
NRBC # BLD AUTO: 0 10E3/UL
NRBC BLD AUTO-RTO: 0 /100
OPIATES UR QL SCN: ABNORMAL
PCP QUAL URINE (ROCHE): ABNORMAL
PLATELET # BLD AUTO: 267 10E3/UL (ref 150–450)
POTASSIUM SERPL-SCNC: 3.8 MMOL/L (ref 3.4–5.3)
PROT SERPL-MCNC: 7.1 G/DL (ref 6.4–8.3)
RBC # BLD AUTO: 5.58 10E6/UL (ref 4.4–5.9)
SODIUM SERPL-SCNC: 137 MMOL/L (ref 135–145)
TSH SERPL DL<=0.005 MIU/L-ACNC: 3.16 UIU/ML (ref 0.3–4.2)
WBC # BLD AUTO: 8 10E3/UL (ref 4–11)

## 2025-01-01 PROCEDURE — 36415 COLL VENOUS BLD VENIPUNCTURE: CPT | Performed by: EMERGENCY MEDICINE

## 2025-01-01 PROCEDURE — 80048 BASIC METABOLIC PNL TOTAL CA: CPT | Performed by: EMERGENCY MEDICINE

## 2025-01-01 PROCEDURE — 80307 DRUG TEST PRSMV CHEM ANLYZR: CPT | Performed by: EMERGENCY MEDICINE

## 2025-01-01 PROCEDURE — 83036 HEMOGLOBIN GLYCOSYLATED A1C: CPT | Performed by: PSYCHIATRY & NEUROLOGY

## 2025-01-01 PROCEDURE — 82435 ASSAY OF BLOOD CHLORIDE: CPT | Performed by: EMERGENCY MEDICINE

## 2025-01-01 PROCEDURE — 84443 ASSAY THYROID STIM HORMONE: CPT | Performed by: PSYCHIATRY & NEUROLOGY

## 2025-01-01 PROCEDURE — 250N000011 HC RX IP 250 OP 636: Performed by: EMERGENCY MEDICINE

## 2025-01-01 PROCEDURE — 99285 EMERGENCY DEPT VISIT HI MDM: CPT | Performed by: EMERGENCY MEDICINE

## 2025-01-01 PROCEDURE — 82977 ASSAY OF GGT: CPT | Performed by: PSYCHIATRY & NEUROLOGY

## 2025-01-01 PROCEDURE — 250N000013 HC RX MED GY IP 250 OP 250 PS 637: Performed by: PSYCHIATRY & NEUROLOGY

## 2025-01-01 PROCEDURE — 128N000004 HC R&B CD ADULT

## 2025-01-01 PROCEDURE — 250N000013 HC RX MED GY IP 250 OP 250 PS 637: Performed by: EMERGENCY MEDICINE

## 2025-01-01 PROCEDURE — HZ2ZZZZ DETOXIFICATION SERVICES FOR SUBSTANCE ABUSE TREATMENT: ICD-10-PCS | Performed by: EMERGENCY MEDICINE

## 2025-01-01 PROCEDURE — 83735 ASSAY OF MAGNESIUM: CPT | Performed by: EMERGENCY MEDICINE

## 2025-01-01 PROCEDURE — 82040 ASSAY OF SERUM ALBUMIN: CPT | Performed by: EMERGENCY MEDICINE

## 2025-01-01 PROCEDURE — 85025 COMPLETE CBC W/AUTO DIFF WBC: CPT | Performed by: EMERGENCY MEDICINE

## 2025-01-01 RX ORDER — FOLIC ACID 1 MG/1
1 TABLET ORAL DAILY
Status: DISCONTINUED | OUTPATIENT
Start: 2025-01-02 | End: 2025-01-01

## 2025-01-01 RX ORDER — AMOXICILLIN 250 MG
1 CAPSULE ORAL 2 TIMES DAILY PRN
Status: DISCONTINUED | OUTPATIENT
Start: 2025-01-01 | End: 2025-01-03 | Stop reason: HOSPADM

## 2025-01-01 RX ORDER — OLANZAPINE 10 MG/1
10 TABLET ORAL 3 TIMES DAILY PRN
Status: DISCONTINUED | OUTPATIENT
Start: 2025-01-01 | End: 2025-01-03 | Stop reason: HOSPADM

## 2025-01-01 RX ORDER — MULTIPLE VITAMINS W/ MINERALS TAB 9MG-400MCG
1 TAB ORAL DAILY
Status: DISCONTINUED | OUTPATIENT
Start: 2025-01-02 | End: 2025-01-03 | Stop reason: HOSPADM

## 2025-01-01 RX ORDER — ACETAMINOPHEN 325 MG/1
650 TABLET ORAL EVERY 4 HOURS PRN
Status: DISCONTINUED | OUTPATIENT
Start: 2025-01-01 | End: 2025-01-03 | Stop reason: HOSPADM

## 2025-01-01 RX ORDER — ONDANSETRON 4 MG/1
4 TABLET, ORALLY DISINTEGRATING ORAL ONCE
Status: COMPLETED | OUTPATIENT
Start: 2025-01-01 | End: 2025-01-01

## 2025-01-01 RX ORDER — HYDROXYZINE HYDROCHLORIDE 50 MG/1
50 TABLET, FILM COATED ORAL ONCE
Status: COMPLETED | OUTPATIENT
Start: 2025-01-01 | End: 2025-01-01

## 2025-01-01 RX ORDER — OLANZAPINE 10 MG/2ML
10 INJECTION, POWDER, FOR SOLUTION INTRAMUSCULAR 3 TIMES DAILY PRN
Status: DISCONTINUED | OUTPATIENT
Start: 2025-01-01 | End: 2025-01-03 | Stop reason: HOSPADM

## 2025-01-01 RX ORDER — HYDROXYZINE HYDROCHLORIDE 25 MG/1
25 TABLET, FILM COATED ORAL EVERY 4 HOURS PRN
Status: DISCONTINUED | OUTPATIENT
Start: 2025-01-01 | End: 2025-01-03 | Stop reason: HOSPADM

## 2025-01-01 RX ORDER — LOPERAMIDE HYDROCHLORIDE 2 MG/1
2 CAPSULE ORAL 4 TIMES DAILY PRN
Status: DISCONTINUED | OUTPATIENT
Start: 2025-01-01 | End: 2025-01-03 | Stop reason: HOSPADM

## 2025-01-01 RX ORDER — ATENOLOL 50 MG/1
50 TABLET ORAL DAILY PRN
Status: DISCONTINUED | OUTPATIENT
Start: 2025-01-01 | End: 2025-01-02

## 2025-01-01 RX ORDER — ONDANSETRON 4 MG/1
4 TABLET, ORALLY DISINTEGRATING ORAL EVERY 6 HOURS PRN
Status: DISCONTINUED | OUTPATIENT
Start: 2025-01-01 | End: 2025-01-03 | Stop reason: HOSPADM

## 2025-01-01 RX ORDER — FOLIC ACID 1 MG/1
1 TABLET ORAL DAILY
Status: DISCONTINUED | OUTPATIENT
Start: 2025-01-02 | End: 2025-01-03 | Stop reason: HOSPADM

## 2025-01-01 RX ORDER — MAGNESIUM HYDROXIDE/ALUMINUM HYDROXICE/SIMETHICONE 120; 1200; 1200 MG/30ML; MG/30ML; MG/30ML
30 SUSPENSION ORAL EVERY 4 HOURS PRN
Status: DISCONTINUED | OUTPATIENT
Start: 2025-01-01 | End: 2025-01-03 | Stop reason: HOSPADM

## 2025-01-01 RX ORDER — TRAZODONE HYDROCHLORIDE 50 MG/1
50 TABLET, FILM COATED ORAL
Status: DISCONTINUED | OUTPATIENT
Start: 2025-01-01 | End: 2025-01-03 | Stop reason: HOSPADM

## 2025-01-01 RX ORDER — LORAZEPAM 1 MG/1
1-4 TABLET ORAL EVERY 30 MIN PRN
Status: DISCONTINUED | OUTPATIENT
Start: 2025-01-01 | End: 2025-01-02

## 2025-01-01 RX ADMIN — HYDROXYZINE HYDROCHLORIDE 50 MG: 50 TABLET ORAL at 19:13

## 2025-01-01 RX ADMIN — TRAZODONE HYDROCHLORIDE 50 MG: 50 TABLET ORAL at 22:36

## 2025-01-01 RX ADMIN — ONDANSETRON 4 MG: 4 TABLET, ORALLY DISINTEGRATING ORAL at 19:13

## 2025-01-01 ASSESSMENT — ACTIVITIES OF DAILY LIVING (ADL)
ADLS_ACUITY_SCORE: 56

## 2025-01-02 VITALS
WEIGHT: 205 LBS | DIASTOLIC BLOOD PRESSURE: 95 MMHG | OXYGEN SATURATION: 98 % | HEIGHT: 72 IN | BODY MASS INDEX: 27.77 KG/M2 | RESPIRATION RATE: 16 BRPM | TEMPERATURE: 97 F | HEART RATE: 72 BPM | SYSTOLIC BLOOD PRESSURE: 135 MMHG

## 2025-01-02 PROBLEM — F10.239 ALCOHOL DEPENDENCE WITH WITHDRAWAL WITH COMPLICATION (H): Status: ACTIVE | Noted: 2018-05-29

## 2025-01-02 PROCEDURE — 128N000004 HC R&B CD ADULT

## 2025-01-02 PROCEDURE — 99223 1ST HOSP IP/OBS HIGH 75: CPT | Performed by: PSYCHIATRY & NEUROLOGY

## 2025-01-02 PROCEDURE — 250N000013 HC RX MED GY IP 250 OP 250 PS 637: Performed by: PSYCHIATRY & NEUROLOGY

## 2025-01-02 PROCEDURE — 250N000013 HC RX MED GY IP 250 OP 250 PS 637: Performed by: EMERGENCY MEDICINE

## 2025-01-02 RX ORDER — QUETIAPINE FUMARATE 50 MG/1
50-100 TABLET, FILM COATED ORAL
Status: DISCONTINUED | OUTPATIENT
Start: 2025-01-02 | End: 2025-01-02

## 2025-01-02 RX ORDER — QUETIAPINE FUMARATE 50 MG/1
50 TABLET, FILM COATED ORAL
Status: DISCONTINUED | OUTPATIENT
Start: 2025-01-02 | End: 2025-01-03 | Stop reason: HOSPADM

## 2025-01-02 RX ORDER — DIAZEPAM 5 MG/1
5-20 TABLET ORAL EVERY 30 MIN PRN
Status: DISCONTINUED | OUTPATIENT
Start: 2025-01-02 | End: 2025-01-03 | Stop reason: HOSPADM

## 2025-01-02 RX ORDER — QUETIAPINE FUMARATE 100 MG/1
100 TABLET, FILM COATED ORAL
Status: DISCONTINUED | OUTPATIENT
Start: 2025-01-02 | End: 2025-01-03 | Stop reason: HOSPADM

## 2025-01-02 RX ORDER — METOPROLOL TARTRATE 25 MG/1
25 TABLET, FILM COATED ORAL 2 TIMES DAILY
Status: DISCONTINUED | OUTPATIENT
Start: 2025-01-02 | End: 2025-01-03 | Stop reason: HOSPADM

## 2025-01-02 RX ORDER — GABAPENTIN 400 MG/1
400 CAPSULE ORAL 3 TIMES DAILY
Status: DISCONTINUED | OUTPATIENT
Start: 2025-01-02 | End: 2025-01-03 | Stop reason: HOSPADM

## 2025-01-02 RX ORDER — PANTOPRAZOLE SODIUM 40 MG/1
40 TABLET, DELAYED RELEASE ORAL
Status: DISCONTINUED | OUTPATIENT
Start: 2025-01-02 | End: 2025-01-03 | Stop reason: HOSPADM

## 2025-01-02 RX ADMIN — PANTOPRAZOLE SODIUM 40 MG: 40 TABLET, DELAYED RELEASE ORAL at 08:36

## 2025-01-02 RX ADMIN — METOPROLOL TARTRATE 25 MG: 25 TABLET, FILM COATED ORAL at 20:29

## 2025-01-02 RX ADMIN — GABAPENTIN 400 MG: 400 CAPSULE ORAL at 08:37

## 2025-01-02 RX ADMIN — TRAZODONE HYDROCHLORIDE 50 MG: 50 TABLET ORAL at 20:29

## 2025-01-02 RX ADMIN — LORAZEPAM 2 MG: 1 TABLET ORAL at 07:11

## 2025-01-02 RX ADMIN — GABAPENTIN 400 MG: 400 CAPSULE ORAL at 13:53

## 2025-01-02 RX ADMIN — METOPROLOL TARTRATE 25 MG: 25 TABLET, FILM COATED ORAL at 08:36

## 2025-01-02 RX ADMIN — THIAMINE HCL TAB 100 MG 100 MG: 100 TAB at 08:36

## 2025-01-02 RX ADMIN — GABAPENTIN 400 MG: 400 CAPSULE ORAL at 20:29

## 2025-01-02 RX ADMIN — Medication 1 TABLET: at 08:37

## 2025-01-02 RX ADMIN — FOLIC ACID 1 MG: 1 TABLET ORAL at 08:37

## 2025-01-02 RX ADMIN — FLUOXETINE HYDROCHLORIDE 40 MG: 20 CAPSULE ORAL at 08:37

## 2025-01-02 RX ADMIN — DIAZEPAM 10 MG: 5 TABLET ORAL at 12:04

## 2025-01-02 ASSESSMENT — ACTIVITIES OF DAILY LIVING (ADL)
ADLS_ACUITY_SCORE: 30
ADLS_ACUITY_SCORE: 30
DRESS: INDEPENDENT
ADLS_ACUITY_SCORE: 30
ORAL_HYGIENE: INDEPENDENT
ADLS_ACUITY_SCORE: 30
HYGIENE/GROOMING: INDEPENDENT
ADLS_ACUITY_SCORE: 30

## 2025-01-02 NOTE — PROGRESS NOTES
01/01/25 2227   Patient Belongings   Did you bring any home meds/supplements to the hospital?  No   Patient Belongings other (see comments)   Belongings Search Yes   Clothing Search Yes   Second Staff Brady. Abdo     MED ROOM BIN:   Pants, cigarettes, lighter, cell phone  A             ADMISSION:    I am responsible for any personal items that are not sent to the safe or pharmacy. [facility] is not responsible for loss, theft or damage of any property in my possession.    Patient Signature _________________________________________ Date/Time _____________________    Staff Signature ___________________________________________ Date/Time _____________________    2nd Staff person, if patient is unable/unwilling to sign    ________________________________________________________ Date/Time _____________________    DISCHARGE:    All personal items have been returned to me.    Patient Signature _________________________________________ Date/Time _____________________    Staff Signature ___________________________________________ Date/Time _____________________

## 2025-01-02 NOTE — PROGRESS NOTES
"[facility]     Case Management Encounter: Met with team, discussed patient progress, discharge plan and any impediments to discharge.    Writer met with patient to discuss aftercare plans. Patient states he had a \"horrible holiday season\" and reports his sister was mad at him for drinking so she did not invite him to Thanksgiving or [place] celebrations with his family, and this caused patient to continue drinking. Patient states \"on new years nely I decided to come here so I can have a fresh start for 2025.   Patient states he is withdrawing more severely than he anticipated, but feels better.   Patient states he sees a therapist who he finds helpful. States he has decreased the frequency of visits with his therapist though, and only sees him once every month or two. Patient states he will maintain a month or two sobriety and then returns to drinking. Patient states he has been reluctant to find a sponsor through AA, despite being encouraged to and that he intends to find one when he is discharged. Patient also states he intends to see his therapist more regularly and \"be honest about what's been going on\". He states he has guilt around some of the things he said to his family when he was angry \"I can see why they don't want me around\" and writer encouraged him to bring that to his therapist as well.     Patient is hoping to discharge tomorrow around 1 or 2pm, as his mom is going to pick him up and dislikes driving in the dark. Patient knows he needs to stay for 24 hours after his last valium dose.     Insurance: [employer] [place]     Legal Status:  VOL   County: n/a  File Number: n/a  Start and expiration date of commitment: n/a    SUDs Assessment Status: Does not need     ROIs on file: None     Living Situation: lives with mom    Current Providers, Supports & Collateral:  Mental Health therapist through [facility], sponsor, AA.    Current Plan/Referral Status: Patient plans to return home and re-engage in AA " & find a sponsor.  Agreed to consider IOP treatment using resources on AVS      Emeli Villalobos  [facility] - Adult Inpatient Addiction Psychiatry Unit

## 2025-01-02 NOTE — CONSULTS
Circumstances of recent discharge and re-admittance noted. Please refer to recent medicine H&P in charting dated 12/11/2024, which was reviewed by this writer and is up to date. Please contact the on-call job code for any f/u medical concerns if they arise.   - Encourage alcohol cessation and oral/fluid intake   - Continue pta medications   - trend CMP with PCP at discharge     Sam Jerez PA-C  [facility]  Contact information available via [facility] Paging/Directory

## 2025-01-02 NOTE — TELEPHONE ENCOUNTER
S: [facility] Felipe , Provider Oren calling at 9:00 PM with clinical on a 49 year old/Male presenting for alcohol detox.     B: Pt presents for ETOH detox.   Currently reports drinking a quart of tequila a day for the past week.    Patient reports last use was a couple hours prior to arrival.  Pt JIMMY: 0.187  Pt  endorses hx of DT  Pt  denies hx of seizures. Last seizure: N/A  Pt endorsing the following symptoms of withdrawal: Nausea  MSSA Score: None    Pt denies acute mental health or medical concerns.   Pt endorses other drug use: (!) CANNABIS PRODUCTS Amount/frequency:  Unknown    Does Pt have a detox care plan in [facility]? No  Does pt present with specific needs, assistive devices, or exclusionary criteria? None  Is the patient ambulating, eating and drinking in the ED? Yes    A: Pt meets criteria to be presented for IP detox admission. Patient is voluntary    COVID Symptoms: No  If yes, COVID test required   Utox: Ordered, not yet collected  Magnesium: WNL  CMP: Abnormalities: Anion Gap 18, Chloride 96, Glucose 118  CBC: WNL  HCG: N/A     R: Patient cleared and ready for behavioral bed placement: Yes    Pt is meeting criteria for presentation to [facility]    Does Patient need a Transfer Center request created? No, Pt is located within [facility] ED, [facility] ED, or [facility]     9:10 PM provider Mitchel accepts pt for [facility]    Pt added to unit queue, unit called with disposition    9:15 PM ED updated with pt placement

## 2025-01-02 NOTE — ED TRIAGE NOTES
Triage Assessment (Adult)       Row Name 01/01/25 1846          Triage Assessment    Airway WDL WDL        Respiratory WDL    Respiratory WDL WDL        Skin Circulation/Temperature WDL    Skin Circulation/Temperature WDL WDL        Cardiac WDL    Cardiac WDL WDL        Peripheral/Neurovascular WDL    Peripheral Neurovascular WDL WDL        Cognitive/Neuro/Behavioral WDL    Cognitive/Neuro/Behavioral WDL WDL

## 2025-01-02 NOTE — DISCHARGE INSTRUCTIONS
Behavioral Discharge Planning and Instructions  THANK YOU FOR CHOOSING [facility]  3A  [phone]    Summary: You were admitted to Station 3A on *** for detoxification from ***.  A medical exam was performed that included lab work. You have met with a  and opted to ***.  Please take care and make your recovery a daily priority, Georges!  It was a pleasure working with you and the entire treatment team here wishes you the very best in your recovery!     Recommendation:  ***    Main Diagnoses:  Per {3aproviders:007297};  {Substance Related Use Disorders:120511}    Major Treatments, Procedures and Findings:  You were treated for *** detoxification using ***. As an outpatient you will be prescribed ***, please take this medication as prescribed until it is gone. You have met with a  to develop a treatment plan for discharge. You had labs drawn and those results were reviewed with you. Please take a copy of your lab work with you to your next primary care provider appointment.    Symptoms to Report:  If you experience more anxiety, confusion, sleeplessness, deep sadness or thoughts of suicide, notify your treatment team or notify your primary care provider. IF ANY OF THE SYMPTOMS YOU ARE EXPERIENCING ARE A MEDICAL EMERGENCY CALL 911 IMMEDIATELY.     Lifestyle Adjustment: Adjust your lifestyle to get enough sleep, relaxation, exercise and good nutrition. Continue to develop healthy coping skills to decrease stress and promote a sober living environment. Do not use mood altering substances including alcohol, illegal drugs or addictive medications other than what is currently prescribed.     Disposition: ***    Facts about COVID19 at www.cdc.gov/COVID19 and www.[place].gov/covid19    Keeping hands clean is one of the most important steps we can take to avoid getting sick and spreading germs to others.  Please wash your hands frequently and lather with soap for at least 20 seconds!    Follow-up  Appointment:   Appointment Date/Time: ***    Psychiatrist/Primary Care Giver: ***    Address: ***    Phone Number: ***      Recovery apps for your phone for educational purposes and to locate in person and zoom recovery meetings  Everything AA -  loyd is a great resource  12 Step Toolkit - educational purpose learning about the 12 steps to recovery  Blain Cloud - meeting loyd  AA  - meeting loyd  Meeting guide - meeting loyd  Quick NA meeting - meeting loyd  Nicolas- has various apps    Resources:  AA/NA meetings have returned to in-person or a hybrid combination of zoom/in-person therefore please check online to verify*  Need Support Now? If you or someone you know is struggling or in crisis, help is available. Call or text 501 or chat [employer]ast[employer]s  AA meetings search for them at: https://aa-intergroup.org (worldwide meeting listings)  AA meetings for [place] area can be found online at: https://a[place].org (click local online meetings listings)  NA meetings for [place] area can be found online at: https://www.nam[place].org  (click find a meeting)  AA and NA Sponsors are excellent resources for support and you can find one at any support group meeting.   Alcoholics Anonymous (https://aa.org/): for information 24 hours/day  AA Intergroup service office in [place] (http://www.aastpaul.org/) [phone]  AA Intergroup service office in [facility]: [phone]. (http://www.aaminneaAetherPalis.org/)  Narcotics Anonymous (www.nam[place].org) (694) 291-2268  https://aafairviewriverside.org/meetings  SMART Recovery - self management for addiction recovery:  www.smartrecovery.org  Pathways ~ A Health Crisis Resource & Support Center:  780.656.5648.  https://prescribetoprevent.org/patient-education/videos/  http://www.harmreduction.org  Crisis Text Line  Text 814018  You will be connected with a trained live crisis counselor to provide support. Por espanol, texto  JACK a 330939 o texto a 442-AYUDAME en  "WhatsAshreyas  National Hope Line  1.800.SUICIDE [2111911]  [facility] 287-372-5020  Support Group:  AA/NA and Sponsor/support.  Fast Tracker  Linking people to mental health and substance use disorder resources  [employer]   [place] Mental Health Warm Line  Peer to peer support  Monday thru Saturday, 12 pm to 10 pm  928.100.5075 or 7.743.002.0883  Text \"Support\" to 04693  National [place] on Mental Illness (KAROL)  123.986.1379 or 1.888.KAROL.HELPS  Alcoholics Anonymous (www.alcoholics-anonymous.org): Check your phone book for your local chapter.  Suicide Awareness Voices of Education (SAVE) (www.save.org): 104-339-ZPUH (7283)  National Suicide Prevention Line (www.mentalhealthmn.org): 188-410-RLVI (7402)  Mental Health Consumer/Survivor Network of [place] (www.mhcsn.net): 478.481.3034 or 034-498-4491  Mental Health Association of [place] (www.mentalhealth.org): 462.648.6316 or 208-577-7664   Substance Abuse and Mental Health Services (www.samhsa.gov)  [place] Opioid Prevention Coalition: www.opioidcoalition.org    [place] Recovery Connection (MRC)  [facility] connects people seeking recovery to resources that help foster and sustain long-term recovery.  Whether you are seeking resources for treatment, transportation, housing, job training, education, health care or other pathways to recovery, [facility] is a great place to start.  404.754.7884.  www.[place].org    Great Pod casts for nutrition and wellness  Listen on Apple Podcasts  Dishing Up Nutrition   [employer]   Nutrition       Understand the connection between what you eat and how you feel. Hosted by licensed nutritionists and dietitians from [employer] we share practical, real-life solutions for healthier living through nutrition.     General Medication Instructions:   See your medication sheet(s) for instructions.   Take all medications as prescribed.  Make no changes unless your primary care " provider suggests them.   Go to all your primary care provider visits.  Be sure to have all your required lab tests. This way, your medicines can be refilled on time.  Do not use any forms of alcohol.    Please Note: If you have any questions at anytime after you discharged please call [facility] detox unit 3A at [phone].    Here are a list of additional numbers you can call if you are wanting to resume services through [facility]:  [facility] Assessment Intake: [phone]  [facility] Adult SAMMI Intensive Outpatient  call: [phone]  [facility] Admissions [phone]    Recovery Clinic call: [phone]  [facility]: [phone]  Medical Records call: [phone]  Billing Department call: [phone]    Please remember to take all of your behavioral discharge planning and lab paperwork to any follow up appointments, it contains your lab results, diagnosis, medication list and discharge recommendations.      THANK YOU FOR CHOOSING [facility]    Nutritional Weight & Wellness we share practical, real-life solutions for healthier living through nutrition.     General Medication Instructions:   See your medication sheet(s) for instructions.   Take all medications as prescribed.  Make no changes unless your primary care provider suggests them.   Go to all your primary care provider visits.  Be sure to have all your required lab tests. This way, your medicines can be refilled on time.  Do not use any forms of alcohol.    Please Note: If you have any questions at anytime after you discharged please call [facility] detox unit 3A at [phone].    Here are a list of additional numbers you can call if you are wanting to resume services through [facility]:  [facility] Assessment Intake: [phone]  [facility] Adult SAMMI Intensive Outpatient  call: [phone]  [facility] Admissions [phone]    Recovery Clinic call: [phone]  [facility]: [phone]  Medical Records call: [phone]  Billing Department call: [phone]    Please remember to take all of your behavioral discharge planning and lab paperwork to any follow up appointments, it contains your lab results, diagnosis, medication list and discharge recommendations.      THANK YOU FOR CHOOSING [facility]

## 2025-01-02 NOTE — PLAN OF CARE
Problem: Alcohol Withdrawal  Goal: Alcohol Withdrawal Symptom Control  Outcome: Not Progressing     Pt just arrived from the ED. Appeared slightly drunk fumbling with rambling speech at times. Got trazodone and went to bed. No withdrawal symptoms. Just feeling sleepy and tired.

## 2025-01-02 NOTE — PLAN OF CARE
Problem: Alcohol Withdrawal  Goal: Alcohol Withdrawal Symptom Control  Outcome: Progressing   Goal Outcome Evaluation:       Patient slept comfortably throughout the night with few interruptions. No withdrawal symptoms observed. VSS. Denied pain. MSSA scores were a 3 and 4.
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
## 2025-01-02 NOTE — PHARMACY-ADMISSION MEDICATION HISTORY
Pharmacist Admission Medication History    Admission medication history is complete. The information provided in this note is only as accurate as the sources available at the time of the update.    Information Source(s): Patient via phone    Pertinent Information: Pt stated that he hasn't take his medications in maybe a week. He also stated that he does not take any of his vitamins or supplements.     Changes made to PTA medication list:  Added: None  Deleted: multivitamin  Changed: None    Allergies reviewed with patient and updates made in EHR: yes    Medication History Completed By: Geri [facility] 1/2/2025 8:31 AM    PTA Med List   Medication Sig Last Dose/Taking    FLUoxetine (PROZAC) 40 MG capsule Take 1 capsule (40 mg) by mouth daily. Past Week    gabapentin (NEURONTIN) 400 MG capsule Take 1 capsule (400 mg) by mouth 3 times daily. Past Week    metoprolol tartrate (LOPRESSOR) 25 MG tablet Take 1 tablet (25 mg) by mouth 2 times daily. Past Week    pantoprazole (PROTONIX) 40 MG EC tablet Take 1 tablet (40 mg) by mouth every morning (before breakfast). Past Week    QUEtiapine (SEROQUEL) 100 MG tablet Take 0.5-1 tablets ( mg) by mouth nightly as needed (sleep). Past Week

## 2025-01-02 NOTE — ED PROVIDER NOTES
ED Provider Note  [facility]      History     Chief Complaint   Patient presents with    Alcohol Problem     Seeking detox for alcohol     HPI  Georges Moreno is a 49 year old male with a history of alcohol use disorder, HLD, atrial fibrillation, anxiety, and depression who presents to the ED intoxicated requesting detox.  He states he was in detox a couple of weeks ago, maintained sobriety for several days but has been drinking for over a week.  States he is drinking about a quart a day of tequila, last drink a couple hours ago.  States he also uses marijuana.  No history of alcohol withdrawal seizures but he thinks he may have had DTs in the past-reports history of auditory hallucinations with withdrawal.  No fevers, cough, shortness of breath.  He has had some nausea and some vomiting today.  No abdominal pain.  He states that he needs to stop drinking, is motivated to do that.  He is not suicidal but he does have some anxiety that has been dealing with.    This part of the medical record was transcribed by Efra Joshi, Medical Scribe, from a dictation done by Rena Lott MD.     Past Medical History  Past Medical History:   Diagnosis Date    ADHD     Alcohol abuse     Alcohol use disorder, severe, dependence     Atrial fibrillation     Vickers's esophagus     Depression     moderate    PUSHPA (generalized anxiety disorder)     Pancreatitis     Perirectal abscess     PTSD (post-traumatic stress disorder)      Past Surgical History:   Procedure Laterality Date    Excision of pilonidal cyst      INCISION AND DRAINAGE PERIRECTAL ABSCESS       FLUoxetine (PROZAC) 40 MG capsule  gabapentin (NEURONTIN) 400 MG capsule  metoprolol tartrate (LOPRESSOR) 25 MG tablet  multivitamin w/minerals (THERA-VIT-M) tablet  pantoprazole (PROTONIX) 40 MG EC tablet  QUEtiapine (SEROQUEL) 100 MG tablet      Allergies   Allergen Reactions    Sertraline Other (See Comments)     Excessive  fatigue  Reaction(s): Excessive fatigue.    Duloxetine Hives and Rash     Rash, hives     Family History  Family History   Problem Relation Age of Onset    Family History Negative Mother     Alcohol/Drug Father     Diabetes Father     Leukemia Father     Alcohol/Drug Paternal Uncle     Alcoholism Father     Alcoholism Brother      Social History   Social History     Tobacco Use    Smoking status: Every Day     Current packs/day: 1.50     Average packs/day: 1.5 packs/day for 15.0 years (22.5 ttl pk-yrs)     Types: Cigarettes    Smokeless tobacco: Former    Tobacco comments:     a little over a pack a day   Vaping Use    Vaping status: Never Used   Substance Use Topics    Alcohol use: Yes     Alcohol/week: 14.0 standard drinks of alcohol    Drug use: Yes     Types: Marijuana     Comment: today      A medically appropriate review of systems was performed with pertinent positives and negatives noted in the HPI, and all other systems negative.    Physical Exam   BP: (!) 135/92  Pulse: 90  Temp: 98.2  F (36.8  C)  Resp: 16  SpO2: 97 %  Physical Exam  Constitutional:       General: He is not in acute distress.     Appearance: Normal appearance. He is not toxic-appearing.      Comments: Grossly intoxicated   HENT:      Head: Atraumatic.   Eyes:      General: No scleral icterus.     Conjunctiva/sclera: Conjunctivae normal.   Cardiovascular:      Rate and Rhythm: Normal rate.      Heart sounds: Normal heart sounds.   Pulmonary:      Effort: Pulmonary effort is normal. No respiratory distress.      Breath sounds: Normal breath sounds.   Abdominal:      Palpations: Abdomen is soft.      Tenderness: There is no abdominal tenderness.   Musculoskeletal:         General: No deformity.      Cervical back: Neck supple.   Skin:     General: Skin is warm.   Neurological:      Mental Status: He is alert.           ED Course, Procedures, & Data      Procedures              Results for orders placed or performed during the hospital  encounter of 01/01/25   Comprehensive metabolic panel     Status: Abnormal   Result Value Ref Range    Sodium 137 135 - 145 mmol/L    Potassium 3.8 3.4 - 5.3 mmol/L    Carbon Dioxide (CO2) 23 22 - 29 mmol/L    Anion Gap 18 (H) 7 - 15 mmol/L    Urea Nitrogen 16.5 6.0 - 20.0 mg/dL    Creatinine 0.82 0.67 - 1.17 mg/dL    GFR Estimate >90 >60 mL/min/1.73m2    Calcium 9.0 8.8 - 10.4 mg/dL    Chloride 96 (L) 98 - 107 mmol/L    Glucose 118 (H) 70 - 99 mg/dL    Alkaline Phosphatase 65 40 - 150 U/L    AST 36 0 - 45 U/L    ALT 24 0 - 70 U/L    Protein Total 7.1 6.4 - 8.3 g/dL    Albumin 4.5 3.5 - 5.2 g/dL    Bilirubin Total 0.6 <=1.2 mg/dL   Magnesium     Status: Normal   Result Value Ref Range    Magnesium 2.2 1.7 - 2.3 mg/dL   CBC with platelets and differential     Status: None   Result Value Ref Range    WBC Count 8.0 4.0 - 11.0 10e3/uL    RBC Count 5.58 4.40 - 5.90 10e6/uL    Hemoglobin 17.7 13.3 - 17.7 g/dL    Hematocrit 49.2 40.0 - 53.0 %    MCV 88 78 - 100 fL    MCH 31.7 26.5 - 33.0 pg    MCHC 36.0 31.5 - 36.5 g/dL    RDW 12.4 10.0 - 15.0 %    Platelet Count 267 150 - 450 10e3/uL    % Neutrophils 56 %    % Lymphocytes 34 %    % Monocytes 9 %    % Eosinophils 0 %    % Basophils 1 %    % Immature Granulocytes 0 %    NRBCs per 100 WBC 0 <1 /100    Absolute Neutrophils 4.4 1.6 - 8.3 10e3/uL    Absolute Lymphocytes 2.7 0.8 - 5.3 10e3/uL    Absolute Monocytes 0.7 0.0 - 1.3 10e3/uL    Absolute Eosinophils 0.0 0.0 - 0.7 10e3/uL    Absolute Basophils 0.0 0.0 - 0.2 10e3/uL    Absolute Immature Granulocytes 0.0 <=0.4 10e3/uL    Absolute NRBCs 0.0 10e3/uL   Alcohol breath test POCT     Status: Abnormal   Result Value Ref Range    Alcohol Breath Test 0.187 (A) 0.00 - 0.01   CBC with platelets differential     Status: None    Narrative    The following orders were created for panel order CBC with platelets differential.  Procedure                               Abnormality         Status                     ---------                                -----------         ------                     CBC with platelets and d...[[id number]]                      Final result                 Please view results for these tests on the individual orders.     Medications   thiamine (B-1) tablet 100 mg (has no administration in time range)   folic acid (FOLVITE) tablet 1 mg (has no administration in time range)   multivitamin w/minerals (THERA-VIT-M) tablet 1 tablet (has no administration in time range)   LORazepam (ATIVAN) tablet 1-4 mg (has no administration in time range)   hydrOXYzine HCl (ATARAX) tablet 50 mg (50 mg Oral $Given 1/1/25 1913)   ondansetron (ZOFRAN ODT) ODT tab 4 mg (4 mg Oral $Given 1/1/25 1913)     Labs Ordered and Resulted from Time of ED Arrival to Time of ED Departure   COMPREHENSIVE METABOLIC PANEL - Abnormal       Result Value    Sodium 137      Potassium 3.8      Carbon Dioxide (CO2) 23      Anion Gap 18 (*)     Urea Nitrogen 16.5      Creatinine 0.82      GFR Estimate >90      Calcium 9.0      Chloride 96 (*)     Glucose 118 (*)     Alkaline Phosphatase 65      AST 36      ALT 24      Protein Total 7.1      Albumin 4.5      Bilirubin Total 0.6     ALCOHOL BREATH TEST POCT - Abnormal    Alcohol Breath Test 0.187 (*)    MAGNESIUM - Normal    Magnesium 2.2     CBC WITH PLATELETS AND DIFFERENTIAL    WBC Count 8.0      RBC Count 5.58      Hemoglobin 17.7      Hematocrit 49.2      MCV 88      MCH 31.7      MCHC 36.0      RDW 12.4      Platelet Count 267      % Neutrophils 56      % Lymphocytes 34      % Monocytes 9      % Eosinophils 0      % Basophils 1      % Immature Granulocytes 0      NRBCs per 100 WBC 0      Absolute Neutrophils 4.4      Absolute Lymphocytes 2.7      Absolute Monocytes 0.7      Absolute Eosinophils 0.0      Absolute Basophils 0.0      Absolute Immature Granulocytes 0.0      Absolute NRBCs 0.0       No orders to display          Critical care was not performed.     Medical Decision Making  The patient's presentation  was of moderate complexity (a chronic illness mild to moderate exacerbation, progression, or side effect of treatment).    The patient's evaluation involved:  review of external note(s) from 1 sources (previous note)  review of 3+ test result(s) ordered prior to this encounter (previous results)  ordering and/or review of 3+ test(s) in this encounter (see separate area of note for details)    The patient's management necessitated moderate risk (prescription drug management including medications given in the ED).    Assessment & Plan    The patient did ask for something for anxiety, was given hydroxyzine.  I did write for [facility] protocol as well.  He does have some nausea and vomiting which she relates to the alcohol use.  He was given Zofran for this.  Abdominal exam is benign.  Basic labs are not concerning.  There is a detox bed, the patient is interested in medically managed detoxification, will be admitted for further treatment.    Dictation Disclaimer: Some of this Note has been completed with voice-recognition dictation software. Although errors are generally corrected real-time, there is the potential for a rare error to be present in the completed chart.      I have reviewed the nursing notes. I have reviewed the findings, diagnosis, plan and need for follow up with the patient.    New Prescriptions    No medications on file       Final diagnoses:   Alcohol dependence with uncomplicated intoxication (H)       Rena Lott MD  [facility] EMERGENCY DEPARTMENT  1/1/2025     Rena Lott MD  01/01/25 [id number]

## 2025-01-02 NOTE — PLAN OF CARE
Behavioral Team Discussion: (1/2/2025)    Continued Stay Criteria/Rationale: Patient admitted for Chemical Use Issues.  Plan: The following services will be provided to the patient; psychiatric assessment, medication management, therapeutic milieu, individual and group support, and skills groups.   Participants: 3A Provider: Dr. Gris Cassidy MD; 3A RN: Guy Robledo RN; 3A CM's:  DOM Banda .  Summary/Recommendation: Providers will assess today for treatment recommendations, discharge planning, and aftercare plans. CM will meet with pt for discharge planning.   Medical/Physical: No history of alcohol withdrawal seizures but he thinks he may have had DTs in the past-reports history of auditory hallucinations with withdrawal. No acute medical concerns reported.  Precautions:   Behavioral Orders   Procedures    Code 1 - Restrict to Unit    Routine Programming     As clinically indicated    Status 15     Every 15 minutes.    Withdrawal precautions     Rationale for change in precautions or plan: N/A  Progress: Initial.    ASAM Dimension Scale Ratings:  Dimension 1: 3 Client tolerates and parveen with withdrawal discomfort poorly. Client has severe intoxication, such that the client endangers self or others, or intoxication has not abated with less intensive levels of services. Client displays severe signs and symptoms; or risk of severe, but manageable withdrawal; or withdrawal worsening despite detox at less intensive level.  Dimension 2: 1 Client tolerates and parveen with physical discomfort and is able to get the services that the client needs.  Dimension 3: 2 Client has difficulty with impulse control and lacks coping skills. Client has thoughts of suicide or harm to others without means; however, the thoughts may interfere with participation in some treatment activities. Client has difficulty functioning in significant life areas. Client has moderate symptoms of emotional, behavioral, or cognitive  problems. Client is able to participate in most treatment activities.  Dimension 4: 2 Client displays verbal compliance, but lacks consistent behaviors; has low motivation for change; and is passively involved in treatment.  Dimension 5: 4 No awareness of the negative impact of mental health problems or substance abuse. No coping skills to arrest mental health or addiction illnesses, or prevent relapse.  Dimension 6: 4 Client has (A) Chronically antagonistic significant other, living environment, family, peer group or long-term criminal justice involvement that is harmful to recovery or treatment progress, or (B) Client has an actively antagonistic significant other, family, work, or living environment with immediate threat to the client's safety and well-being.

## 2025-01-02 NOTE — PLAN OF CARE
Problem: Alcohol Withdrawal  Goal: Alcohol Withdrawal Symptom Control  Outcome: Progressing   Goal Outcome Evaluation:    Plan of Care Reviewed With: patient      Georges began to feel somewhat better around lunchtime, between 11 a.m. and 12 p.m. After having breakfast, he took a nap but later joined his peers in the lounge for lunch. While there, he briefly watched TV and interacted with others before returning to his room. His MSSA scores were 10 and 8, and he received Ativan (2 mg) and Valium (10 mg). Georges inquired about when he would be able to leave detox, indicating that he would like to leave tomorrow. He endorsed feelings of anxiety but denied having suicidal ideations, auditory or visual hallucinations, and other mental health-related symptoms. He consumed 50% of his meals for both breakfast and lunch.

## 2025-01-02 NOTE — PROGRESS NOTES
LUCITASOO Moreno is a 49 year old year old male with a chief complaint of Alcohol Problem (Seeking detox for alcohol)        S = Situation:   Pt is a 49yo male brought to the [place] ED by mom for help to detox from alcohol.    B  = Background:     Pt was recently admitted to  for alcohol detox 12/11/24 to 12/14/24. He was discharged home to mom's where he lives, did not follow through with discharge plans. Relapsed after about 10-12 days drinking a quart f tequila daily since.   Last drank a hour before arrival to the Ed today.    A  =  Assessment:   Pt has a hx of afib and depression, alcoholism. Reports maintaining a few days of sobriety after discharge. Relapsed mainly hi sister did not want him at the family holiday celebration, he did not get to see any of the family, it was also hi birthday, around this time, he struggles, so he relapsed.   Pt states he was taking his PTA meds the whole time. Has not made up his mind about discharge goal. Will think about it while here. He denied feeling depressed or Suicidal.   Utox positive for benzos and cannabis.  Right middle finger noted with tape around it. Hx of mallet finger.   R =   Request or Recommendation:   Pt arrived on the unit at 2215 from the ED. Pt appeared slightly intoxicated but compliant and coherent enough to answer admission questions. Pt denied withdrawal symptoms, requested and got some trazodone for sleep.   Pt will be seen in AM bt the rest of the care team.

## 2025-01-03 VITALS
DIASTOLIC BLOOD PRESSURE: 94 MMHG | RESPIRATION RATE: 16 BRPM | WEIGHT: 205 LBS | HEART RATE: 77 BPM | OXYGEN SATURATION: 97 % | HEIGHT: 72 IN | BODY MASS INDEX: 27.77 KG/M2 | SYSTOLIC BLOOD PRESSURE: 141 MMHG | TEMPERATURE: 98.5 F

## 2025-01-03 PROCEDURE — 250N000013 HC RX MED GY IP 250 OP 250 PS 637: Performed by: PSYCHIATRY & NEUROLOGY

## 2025-01-03 PROCEDURE — 99239 HOSP IP/OBS DSCHRG MGMT >30: CPT | Performed by: PSYCHIATRY & NEUROLOGY

## 2025-01-03 RX ADMIN — NICOTINE POLACRILEX 2 MG: 2 GUM, CHEWING BUCCAL at 11:06

## 2025-01-03 RX ADMIN — FOLIC ACID 1 MG: 1 TABLET ORAL at 08:42

## 2025-01-03 RX ADMIN — GABAPENTIN 400 MG: 400 CAPSULE ORAL at 08:42

## 2025-01-03 RX ADMIN — METOPROLOL TARTRATE 25 MG: 25 TABLET, FILM COATED ORAL at 08:42

## 2025-01-03 RX ADMIN — FLUOXETINE HYDROCHLORIDE 40 MG: 20 CAPSULE ORAL at 08:42

## 2025-01-03 RX ADMIN — HYDROXYZINE HYDROCHLORIDE 25 MG: 25 TABLET, FILM COATED ORAL at 08:42

## 2025-01-03 RX ADMIN — THIAMINE HCL TAB 100 MG 100 MG: 100 TAB at 08:42

## 2025-01-03 RX ADMIN — Medication 1 TABLET: at 08:42

## 2025-01-03 ASSESSMENT — ACTIVITIES OF DAILY LIVING (ADL)
ADLS_ACUITY_SCORE: 30
LAUNDRY: WITH SUPERVISION
ADLS_ACUITY_SCORE: 30
ORAL_HYGIENE: INDEPENDENT
ADLS_ACUITY_SCORE: 30
HYGIENE/GROOMING: INDEPENDENT
ADLS_ACUITY_SCORE: 43
DRESS: INDEPENDENT
ADLS_ACUITY_SCORE: 30

## 2025-01-03 NOTE — PLAN OF CARE
Problem: Alcohol Withdrawal  Goal: Alcohol Withdrawal Symptom Control  Outcome: Progressing   Goal Outcome Evaluation:    Plan of Care Reviewed With: patient      Pt continues to be monitored for alcohol withdrawal symptoms.   He denied symptoms, did not want any more valium stating he does not want any medications  that will hold him here. RN educated pt on the need to report symptoms and be treated so he does not sabotage his treatment plan. He is agreeable to this, will seek staf for help as needed.     Last rec valium at 1204 on the Day shift.   MSSA:7, 4.   B/P: 135/95, T: 97, P: 72, R: 16

## 2025-01-03 NOTE — PROGRESS NOTES
S: Patient scored less than 8 for greater than 24 hours. Pt has required no medication for withdrawal for > 24 hours,.  B: Patient admitted for alcohol withdrawal and detoxification.  A: Patient stable in the alcohol withdrawal process AEB MSSA scores < 8 for > 24 hours.  R: Patient removed from detox status per unit Protocol.    See Discharge order per Dr. Cassidy when patient is out of detox. Patient ready for discharge at this time and states he has all of his medications at home already.

## 2025-01-03 NOTE — PROGRESS NOTES
Patient is up and visible in the milieu. Patient is ambulating independently, balanced and steady. Patient is alert and oriented x 4. Patient is attending/participating in unit programming. Pt is social with peers. Affect is blunted/flat, mood is anxious. Patient rates anxiety a 3/10 and depression a 0/10 on the 1-10 severity scale. Patient denies SI/SIB/HI. Pt denies auditory/visual hallucinations. Patient verbally contracts for safety on the unit. Patient is pleasant and cooperative.    This RN administered the PRN medications Hydroxyzine 25mg x 1 @ 0842 for anxiety, (see MAR) at the request of the patient. Patient is tolerating medications well, denies any current side effects. Patient states this PRN was effective. Patient declined scheduled Protonix this am.     Pt's MSSA's = 5 and 3 this shift.  Patient reports a good appetite, and did not identify any issues with sleep. Patient discharge plans to obtain a sponsor, attend AA and get a good schedule/routine going for himself. Patient requesting discharge today. Rest, fluids, and food encouraged. Status 15 checks remain. Patient is able to make needs known appropriately. Patient denies any unmet needs at this time.     Blood pressure (!) 141/94, pulse 77, temperature 98.5  F (36.9  C), resp. rate 16, height 1.829 m (6'), weight 93 kg (205 lb), SpO2 97%.

## 2025-01-03 NOTE — DISCHARGE SUMMARY
Psychiatric Discharge Summary    Georges Moreno MRN# [id number]   Age: 49 year old YOB: 1975     Date of Admission:  1/1/2025  Date of Discharge:  1/3/2025 12:25 AM  Admitting Physician:  Gris Cassidy DO  Discharge Physician:  Gris Cassidy DO         Event Leading to Hospitalization:   Georges is a 49 year old male with history of polysubstance use, anxiety and depression who presented to ED seeking detox from alcohol.     Chart review completed including ED notes from this encounter, IM notes including pre-op evaluation on 12/17, recent Gen Surg notes including visit on 12/18 for anal fistula, past admissions to this unit including most recent 12/11-12/14/24 where he discharged home with plan to engage in AA, declined MAT for AUD.     Per ED physician note: Georges Moreno is a 49 year old male with a history of alcohol use disorder, HLD, atrial fibrillation, anxiety, and depression who presents to the ED intoxicated requesting detox.  He states he was in detox a couple of weeks ago, maintained sobriety for several days but has been drinking for over a week.  States he is drinking about a quart a day of tequila, last drink a couple hours ago.  States he also uses marijuana.  No history of alcohol withdrawal seizures but he thinks he may have had DTs in the past-reports history of auditory hallucinations with withdrawal.  No fevers, cough, shortness of breath.  He has had some nausea and some vomiting today.  No abdominal pain.  He states that he needs to stop drinking, is motivated to do that.  He is not suicidal but he does have some anxiety that has been dealing with.      Upon interview: Pt confirms information above, since discharging from this unit 12/14/24 he maintained sobriety about 10 days and then relapsed right around Kd time due to the stress with the holidays and his birthday.  He reports he has been drinking up to 1 quart of tequila daily for over the last week, also has  "been smoking cannabis regularly along with cigarettes.  Denies other substance use.  He has tolerance to alcohol, drinking more than intended, difficulty cutting down and withdrawal symptoms when he stops drinking including some nausea, tremors, irritability and anxiety.  He denies history of seizures or DTs.  Denies current hallucinations.  He reports that he is \"disappointed in myself\" due to his relapse but denies feeling significantly depressed, denies any SI or HI.  He has been taking his medications \"on and off, including his gabapentin and fluoxetine.  He reports that he sometimes does not take his fluoxetine when he is drinking alcohol but is wanting to resume this medication.  He was not engaging with much sober supports when he discharged from this unit the last time, he reports his goals for hospitalization are to complete detox and then likely discharge home, he will talk to the case management team about additional resources and supports for discharge.          See Admission note by Gris Cassidy DO on 1/2/25 for additional details.          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    Weight as of this encounter: 93 kg (205 lb)., PRESENT ON ADMISSION     # Financial/Environmental Concerns:                  Labs:     Recent Results (from the past " week)   Comprehensive metabolic panel    Collection Time: 01/01/25  7:21 PM   Result Value Ref Range    Sodium 137 135 - 145 mmol/L    Potassium 3.8 3.4 - 5.3 mmol/L    Carbon Dioxide (CO2) 23 22 - 29 mmol/L    Anion Gap 18 (H) 7 - 15 mmol/L    Urea Nitrogen 16.5 6.0 - 20.0 mg/dL    Creatinine 0.82 0.67 - 1.17 mg/dL    GFR Estimate >90 >60 mL/min/1.73m2    Calcium 9.0 8.8 - 10.4 mg/dL    Chloride 96 (L) 98 - 107 mmol/L    Glucose 118 (H) 70 - 99 mg/dL    Alkaline Phosphatase 65 40 - 150 U/L    AST 36 0 - 45 U/L    ALT 24 0 - 70 U/L    Protein Total 7.1 6.4 - 8.3 g/dL    Albumin 4.5 3.5 - 5.2 g/dL    Bilirubin Total 0.6 <=1.2 mg/dL   Magnesium    Collection Time: 01/01/25  7:21 PM   Result Value Ref Range    Magnesium 2.2 1.7 - 2.3 mg/dL   CBC with platelets and differential    Collection Time: 01/01/25  7:21 PM   Result Value Ref Range    WBC Count 8.0 4.0 - 11.0 10e3/uL    RBC Count 5.58 4.40 - 5.90 10e6/uL    Hemoglobin 17.7 13.3 - 17.7 g/dL    Hematocrit 49.2 40.0 - 53.0 %    MCV 88 78 - 100 fL    MCH 31.7 26.5 - 33.0 pg    MCHC 36.0 31.5 - 36.5 g/dL    RDW 12.4 10.0 - 15.0 %    Platelet Count 267 150 - 450 10e3/uL    % Neutrophils 56 %    % Lymphocytes 34 %    % Monocytes 9 %    % Eosinophils 0 %    % Basophils 1 %    % Immature Granulocytes 0 %    NRBCs per 100 WBC 0 <1 /100    Absolute Neutrophils 4.4 1.6 - 8.3 10e3/uL    Absolute Lymphocytes 2.7 0.8 - 5.3 10e3/uL    Absolute Monocytes 0.7 0.0 - 1.3 10e3/uL    Absolute Eosinophils 0.0 0.0 - 0.7 10e3/uL    Absolute Basophils 0.0 0.0 - 0.2 10e3/uL    Absolute Immature Granulocytes 0.0 <=0.4 10e3/uL    Absolute NRBCs 0.0 10e3/uL   GGT    Collection Time: 01/01/25  7:21 PM   Result Value Ref Range    GGT 33 8 - 61 U/L   TSH with free T4 reflex    Collection Time: 01/01/25  7:21 PM   Result Value Ref Range    TSH 3.16 0.30 - 4.20 uIU/mL   Hemoglobin A1c    Collection Time: 01/01/25  7:21 PM   Result Value Ref Range    Estimated Average Glucose 105 <117 mg/dL     Hemoglobin A1C 5.3 <5.7 %   Alcohol breath test POCT    Collection Time: 01/01/25  7:38 PM   Result Value Ref Range    Alcohol Breath Test 0.187 (A) 0.00 - 0.01   Urine Drug Screen Panel    Collection Time: 01/01/25  9:30 PM   Result Value Ref Range    Amphetamines Urine Screen Negative Screen Negative    Barbituates Urine Screen Negative Screen Negative    Benzodiazepine Urine Screen Positive (A) Screen Negative    Cannabinoids Urine Screen Positive (A) Screen Negative    Cocaine Urine Screen Negative Screen Negative    Fentanyl Qual Urine Screen Negative Screen Negative    Opiates Urine Screen Negative Screen Negative    PCP Urine Screen Negative Screen Negative              Consults:    IM consult placed for management of other medical concerns, per consult note on 1/2/24:   Circumstances of recent discharge and re-admittance noted. Please refer to recent medicine H&P in charting dated 12/11/2024, which was reviewed by this writer and is up to date. Please contact the on-call job code for any f/u medical concerns if they arise.   - Encourage alcohol cessation and oral/fluid intake   - Continue pta medications   - trend CMP with PCP at discharge      Sam Jerez PA-C  [facility]            Hospital Course:   Georges Moreno is a 49 year old male with history of polysubstance use, anxiety and depression who presented to ED seeking detox from alcohol. Medically cleared in ED, admitted to  as voluntary patient. Drinking up to 1 quart tequila daily for past week, EtOH breath test 0.187(H), UDS positive for benzodiazepines and cannabinoids, reports cannabis and nicotine use. MSSA with lorazepam started for alcohol withdrawal, changed to diazepam for better coverage of withdrawal symptoms. Withdrawal precautions in place, denies history of seizures. Admission labs ordered and reviewed those resulted. IM consult placed. Reporting some anxiety, denying safety concerns  including SI and HI. PTA medications continued as appropriate including resuming fluoxetine and gabapentin for mood. Pt reports goals for hospitalization are to complete detox and then likely discharge home but will discuss treatment options with CM staff.     Georges Moreno did not participate in groups and was not visible in the milieu. He tended to isolate to his room. The patient's symptoms of alcohol withdrawal improved. He was out of detox on 1/3/25 and requested to discharge home. He declined MAT for AUD.    Today Georges Moreno reports having no thoughts of harming self or others. In addition, he has notable risk factors for self-harm, including single status, anxiety, and substance abuse. However, risk is mitigated by commitment to family, absence of past attempts, ability to volunteer a safety plan, history of seeking help when needed, and pt is future oriented and denying SI. Therefore, based on all available evidence including the factors cited above, he does not appear to be at imminent risk for self-harm, does not meet criteria for a 72-hr hold, and therefore remains appropriate for ongoing outpatient level of care.     Georges Moreno was  discharged  to home. At the time of discharge Georges Moreno was determined to not be a danger to himself or others.          Discharge Medications:     Current Discharge Medication List        CONTINUE these medications which have NOT CHANGED    Details   FLUoxetine (PROZAC) 40 MG capsule Take 1 capsule (40 mg) by mouth daily.    Associated Diagnoses: PUSHPA (generalized anxiety disorder)      gabapentin (NEURONTIN) 400 MG capsule Take 1 capsule (400 mg) by mouth 3 times daily.    Associated Diagnoses: PUSHPA (generalized anxiety disorder)      metoprolol tartrate (LOPRESSOR) 25 MG tablet Take 1 tablet (25 mg) by mouth 2 times daily.    Associated Diagnoses: Alcohol withdrawal, uncomplicated (H)      pantoprazole (PROTONIX) 40 MG EC tablet Take 1 tablet (40 mg) by mouth every  "morning (before breakfast).    Associated Diagnoses: Alcohol withdrawal, uncomplicated (H)      QUEtiapine (SEROQUEL) 100 MG tablet Take 0.5-1 tablets ( mg) by mouth nightly as needed (sleep).    Associated Diagnoses: Generalized anxiety disorder                  Psychiatric Examination:   Appearance:  awake, alert and adequately groomed  Attitude:  cooperative  Eye Contact:  good  Mood:   \"doing fine\" appears euthymic  Affect:  appropriate and in normal range and mood congruent  Speech:  clear, coherent and normal prosody  Psychomotor Behavior:  no evidence of tardive dyskinesia, dystonia, or tics  Thought Process:  logical, linear, and goal oriented  Associations:  no loose associations  Thought Content:  no evidence of suicidal ideation or homicidal ideation and no evidence of psychotic thought  Insight:  fair  Judgment:  intact  Oriented to:  time, person, and place  Attention Span and Concentration:  intact  Recent and Remote Memory:  intact  Language: English, fluent  Fund of Knowledge: appropriate  Muscle Strength and Tone: normal  Gait and Station: Normal         Discharge Plan:   Recommendation:  Re-engage in AA and locate a sponsor. Abstain from using mood altering substances. If you are unable to maintain sobriety in the community then please obtain a chemical health assessment and follow all recommendations.  To schedule an assessment:  Call the [facility] assessment center at [phone] and ask to schedule a Chemical Health Assessment.     You can also call your [facility] Chemical Dependency unit (these are usually included under adult mental health services). Most [facility] have a number for you to call to schedule an assessment.  If you have private insurance or a PMAP you can call the customer service number on your insurance  card and they can give you a list of places to call for an assessment that are in network with your  insurance.       To find a treatment program:     SAMHSA.gov  Click on " find treatment  Enter your zip code and select your city/state.  The Substance Abuse and Mental Health Services Administration (SAMHSA) is the agency within  the U.S. Department of Health and Human Services that leads public health efforts to advance the  behavioral health of the nation. SAMHSA's mission is to reduce the impact of substance abuse and  mental illness on [place]'s communities.     [employer] select substance use disorder programs then select find services.  [employer] is a virtual community and health care connection resource. We connect  individuals, families, mental health and substance use disorder providers, physicians, care coordinators,  and others with a real-time, searchable directory of mental health and substance use disorder resources  and their availability within [place].    Disposition: Home     Follow-up Appointment:   PCP and Center    Primary Care Provider  Phone Center     Clinic, [facility] [phone] [address] [place] [id number]      Please make a follow-up appointment with Primary Care Provider within one week of discharge.     Attestation:  Patient has been seen and evaluated by me, Gris Cassidy DO on day of discharge. 35 minutes were spent in coordination of discharge planning.

## 2025-01-03 NOTE — PLAN OF CARE
Problem: Alcohol Withdrawal  Goal: Alcohol Withdrawal Symptom Control  Outcome: Progressing   Goal Outcome Evaluation:       Patient is sleeping well in the beginning of the shift. His MSSA score is a 3. No withdrawal symptoms observed. He is bradycardic. P is 61 beats/min. /91. He slept the whole night for 7 hours without interruptions.

## 2025-01-03 NOTE — PROGRESS NOTES
Patient discharged at 1235 to home. Patient picked up by his Mother. Patient is out of detox monitoring status. Patient plans to find AA meetings, obtain a sponsor. Patient states he is not interested in treatment at this time. All patient belongings from room, locker, and security sent with patient. Patient escorted off the unit by Psych Associate, Jim.     This RN went over discharge instructions, teachings, patient's labs, and unit recommendations with patient. Patient states he has all of his discharge medication at home already and he will not be needing any upon discharge. Patient verbalizes and demonstrates understanding of all teachings. Patient denies thoughts of harm towards self or others. Patient denies suicidal ideation, plan or intent. Patient denies access to guns. Patient denies auditory/visual hallucinations. Pt denies any current medication side effects or medical issues at this time.     Patient was encouraged to make a follow-up appointment with Primary Care Provider within one week of discharge. Patient declined to make the appointment on the unit but stated he wanted to use his own phone when discharged to make the appointment online.     Patient denies any further questions and is now discharged at this time.

## 2025-01-05 ENCOUNTER — PATIENT OUTREACH (OUTPATIENT)
Dept: CARE COORDINATION | Facility: CLINIC | Age: 50
End: 2025-01-05
Payer: COMMERCIAL

## 2025-01-05 NOTE — PROGRESS NOTES
Connected Care Resource Center:   [facility] Resource Center Contact  [facility]/Voicemail     Clinical Data: Post-Discharge Outreach     Outreach attempted x 2.  Left message on patient's voicemail, providing [facility]'s central phone number of [phone] ([phone]) for questions/concerns and/or to schedule an appt with an [facility] provider, if they do not have a PCP.      Plan:  [facility] will do no further outreaches at this time.       Poppy Lagunas RN  [facility] Care Resource [place], [facility]    *Connected Care Resource Team does NOT follow patient ongoing. Referrals are identified based on internal discharge reports and the outreach is to ensure patient has an understanding of their discharge instructions.

## 2025-01-22 ENCOUNTER — HOSPITAL ENCOUNTER (INPATIENT)
Facility: CLINIC | Age: 50
End: 2025-01-22
Attending: EMERGENCY MEDICINE | Admitting: PSYCHIATRY & NEUROLOGY
Payer: COMMERCIAL

## 2025-01-22 ENCOUNTER — TELEPHONE (OUTPATIENT)
Dept: BEHAVIORAL HEALTH | Facility: CLINIC | Age: 50
End: 2025-01-22

## 2025-01-22 DIAGNOSIS — F10.220 ALCOHOL DEPENDENCE WITH UNCOMPLICATED INTOXICATION (H): ICD-10-CM

## 2025-01-22 DIAGNOSIS — F10.90 ALCOHOL USE DISORDER: ICD-10-CM

## 2025-01-22 DIAGNOSIS — R00.2 PALPITATIONS: ICD-10-CM

## 2025-01-22 DIAGNOSIS — G62.9 PERIPHERAL POLYNEUROPATHY: Primary | ICD-10-CM

## 2025-01-22 LAB
ALBUMIN SERPL BCG-MCNC: 4.5 G/DL (ref 3.5–5.2)
ALBUMIN UR-MCNC: NEGATIVE MG/DL
ALP SERPL-CCNC: 65 U/L (ref 40–150)
ALT SERPL W P-5'-P-CCNC: 32 U/L (ref 0–70)
AMPHETAMINES UR QL SCN: ABNORMAL
ANION GAP SERPL CALCULATED.3IONS-SCNC: 19 MMOL/L (ref 7–15)
APPEARANCE UR: CLEAR
AST SERPL W P-5'-P-CCNC: 34 U/L (ref 0–45)
BARBITURATES UR QL SCN: ABNORMAL
BASOPHILS # BLD AUTO: 0 10E3/UL (ref 0–0.2)
BASOPHILS NFR BLD AUTO: 1 %
BENZODIAZ UR QL SCN: ABNORMAL
BILIRUB SERPL-MCNC: 0.6 MG/DL
BILIRUB UR QL STRIP: NEGATIVE
BUN SERPL-MCNC: 16.2 MG/DL (ref 6–20)
BZE UR QL SCN: ABNORMAL
CALCIUM SERPL-MCNC: 8.9 MG/DL (ref 8.8–10.4)
CANNABINOIDS UR QL SCN: ABNORMAL
CHLORIDE SERPL-SCNC: 99 MMOL/L (ref 98–107)
COLOR UR AUTO: ABNORMAL
CREAT SERPL-MCNC: 1.06 MG/DL (ref 0.67–1.17)
EGFRCR SERPLBLD CKD-EPI 2021: 85 ML/MIN/1.73M2
EOSINOPHIL # BLD AUTO: 0.1 10E3/UL (ref 0–0.7)
EOSINOPHIL NFR BLD AUTO: 1 %
ERYTHROCYTE [DISTWIDTH] IN BLOOD BY AUTOMATED COUNT: 12.2 % (ref 10–15)
ETHANOL SERPL-MCNC: 0.2 G/DL
FENTANYL UR QL: ABNORMAL
GLUCOSE SERPL-MCNC: 107 MG/DL (ref 70–99)
GLUCOSE UR STRIP-MCNC: NEGATIVE MG/DL
HCO3 SERPL-SCNC: 21 MMOL/L (ref 22–29)
HCT VFR BLD AUTO: 47.8 % (ref 40–53)
HGB BLD-MCNC: 17.3 G/DL (ref 13.3–17.7)
HGB UR QL STRIP: NEGATIVE
IMM GRANULOCYTES # BLD: 0 10E3/UL
IMM GRANULOCYTES NFR BLD: 0 %
KETONES UR STRIP-MCNC: ABNORMAL MG/DL
LEUKOCYTE ESTERASE UR QL STRIP: NEGATIVE
LIPASE SERPL-CCNC: 29 U/L (ref 13–60)
LYMPHOCYTES # BLD AUTO: 2.5 10E3/UL (ref 0.8–5.3)
LYMPHOCYTES NFR BLD AUTO: 29 %
MAGNESIUM SERPL-MCNC: 1.8 MG/DL (ref 1.7–2.3)
MCH RBC QN AUTO: 32.2 PG (ref 26.5–33)
MCHC RBC AUTO-ENTMCNC: 36.2 G/DL (ref 31.5–36.5)
MCV RBC AUTO: 89 FL (ref 78–100)
MONOCYTES # BLD AUTO: 0.6 10E3/UL (ref 0–1.3)
MONOCYTES NFR BLD AUTO: 7 %
NEUTROPHILS # BLD AUTO: 5.3 10E3/UL (ref 1.6–8.3)
NEUTROPHILS NFR BLD AUTO: 62 %
NITRATE UR QL: NEGATIVE
NRBC # BLD AUTO: 0 10E3/UL
NRBC BLD AUTO-RTO: 0 /100
OPIATES UR QL SCN: ABNORMAL
PCP QUAL URINE (ROCHE): ABNORMAL
PH UR STRIP: 6 [PH] (ref 5–7)
PLATELET # BLD AUTO: 249 10E3/UL (ref 150–450)
POTASSIUM SERPL-SCNC: 3.6 MMOL/L (ref 3.4–5.3)
PROT SERPL-MCNC: 7 G/DL (ref 6.4–8.3)
RBC # BLD AUTO: 5.38 10E6/UL (ref 4.4–5.9)
RBC URINE: <1 /HPF
SODIUM SERPL-SCNC: 139 MMOL/L (ref 135–145)
SP GR UR STRIP: 1.01 (ref 1–1.03)
UROBILINOGEN UR STRIP-MCNC: NORMAL MG/DL
WBC # BLD AUTO: 8.5 10E3/UL (ref 4–11)
WBC URINE: <1 /HPF

## 2025-01-22 PROCEDURE — 85025 COMPLETE CBC W/AUTO DIFF WBC: CPT | Performed by: EMERGENCY MEDICINE

## 2025-01-22 PROCEDURE — 82040 ASSAY OF SERUM ALBUMIN: CPT | Performed by: EMERGENCY MEDICINE

## 2025-01-22 PROCEDURE — 36415 COLL VENOUS BLD VENIPUNCTURE: CPT | Performed by: EMERGENCY MEDICINE

## 2025-01-22 PROCEDURE — 83735 ASSAY OF MAGNESIUM: CPT | Performed by: EMERGENCY MEDICINE

## 2025-01-22 PROCEDURE — 81001 URINALYSIS AUTO W/SCOPE: CPT | Performed by: EMERGENCY MEDICINE

## 2025-01-22 PROCEDURE — 93005 ELECTROCARDIOGRAM TRACING: CPT | Performed by: EMERGENCY MEDICINE

## 2025-01-22 PROCEDURE — 250N000013 HC RX MED GY IP 250 OP 250 PS 637: Performed by: EMERGENCY MEDICINE

## 2025-01-22 PROCEDURE — 82077 ASSAY SPEC XCP UR&BREATH IA: CPT | Performed by: EMERGENCY MEDICINE

## 2025-01-22 PROCEDURE — 83690 ASSAY OF LIPASE: CPT | Performed by: EMERGENCY MEDICINE

## 2025-01-22 PROCEDURE — 99285 EMERGENCY DEPT VISIT HI MDM: CPT | Performed by: EMERGENCY MEDICINE

## 2025-01-22 PROCEDURE — 99285 EMERGENCY DEPT VISIT HI MDM: CPT | Mod: 25 | Performed by: EMERGENCY MEDICINE

## 2025-01-22 PROCEDURE — 93010 ELECTROCARDIOGRAM REPORT: CPT | Performed by: EMERGENCY MEDICINE

## 2025-01-22 PROCEDURE — 82977 ASSAY OF GGT: CPT | Performed by: PSYCHIATRY & NEUROLOGY

## 2025-01-22 PROCEDURE — HZ2ZZZZ DETOXIFICATION SERVICES FOR SUBSTANCE ABUSE TREATMENT: ICD-10-PCS | Performed by: EMERGENCY MEDICINE

## 2025-01-22 PROCEDURE — 84155 ASSAY OF PROTEIN SERUM: CPT | Performed by: EMERGENCY MEDICINE

## 2025-01-22 PROCEDURE — 84443 ASSAY THYROID STIM HORMONE: CPT | Performed by: PSYCHIATRY & NEUROLOGY

## 2025-01-22 PROCEDURE — 87086 URINE CULTURE/COLONY COUNT: CPT | Performed by: EMERGENCY MEDICINE

## 2025-01-22 PROCEDURE — 80307 DRUG TEST PRSMV CHEM ANLYZR: CPT | Performed by: EMERGENCY MEDICINE

## 2025-01-22 RX ORDER — OLANZAPINE 5 MG/1
5 TABLET, ORALLY DISINTEGRATING ORAL ONCE
Status: COMPLETED | OUTPATIENT
Start: 2025-01-22 | End: 2025-01-22

## 2025-01-22 RX ORDER — DIAZEPAM 5 MG/1
5-20 TABLET ORAL EVERY 30 MIN PRN
Status: DISCONTINUED | OUTPATIENT
Start: 2025-01-22 | End: 2025-01-24 | Stop reason: HOSPADM

## 2025-01-22 RX ORDER — FOLIC ACID 1 MG/1
1 TABLET ORAL DAILY
Status: DISCONTINUED | OUTPATIENT
Start: 2025-01-22 | End: 2025-01-23

## 2025-01-22 RX ORDER — QUETIAPINE FUMARATE 25 MG/1
25 TABLET, FILM COATED ORAL EVERY 8 HOURS PRN
COMMUNITY

## 2025-01-22 RX ORDER — MULTIPLE VITAMINS W/ MINERALS TAB 9MG-400MCG
1 TAB ORAL DAILY
Status: DISCONTINUED | OUTPATIENT
Start: 2025-01-22 | End: 2025-01-24 | Stop reason: HOSPADM

## 2025-01-22 RX ADMIN — OLANZAPINE 5 MG: 5 TABLET, ORALLY DISINTEGRATING ORAL at 18:12

## 2025-01-22 RX ADMIN — DIAZEPAM 10 MG: 5 TABLET ORAL at 20:19

## 2025-01-22 RX ADMIN — THIAMINE HCL TAB 100 MG 100 MG: 100 TAB at 18:12

## 2025-01-22 RX ADMIN — FOLIC ACID 1 MG: 1 TABLET ORAL at 18:11

## 2025-01-22 RX ADMIN — Medication 1 TABLET: at 18:11

## 2025-01-22 ASSESSMENT — ACTIVITIES OF DAILY LIVING (ADL)
ADLS_ACUITY_SCORE: 59

## 2025-01-22 NOTE — ED PROVIDER NOTES
[facility] EMERGENCY DEPARTMENT ([facility])    1/22/25          History     Chief Complaint   Patient presents with    Drug / Alcohol Assessment     Seeking detox from alcohol.    Palpitations     History of afib     HPI  Georges Moreno is a 49 year old male with a history of alcohol use disorder, atrial fibrillation, HLD, anxiety, and depression who presents to the Emergency Department for alcohol detox and palpitations.     Patient presents seeking detox.  Reports he was here earlier this month and started drinking upon discharge drinking somewhere between a pint  to quart a day, last drink prior to arrival.  Denies history of seizures or DTs reports symptoms of withdrawal include shakes nausea vomiting excetra.  He denies any acute medical concerns at this time including no chest pain or infectious symptoms cough URI excetra.  Denies thoughts to hurting or harming himself.     Past Medical History  Past Medical History:   Diagnosis Date    ADHD     Alcohol abuse     Alcohol use disorder, severe, dependence     Atrial fibrillation     Vickers's esophagus     Depression     moderate    PUSHPA (generalized anxiety disorder)     Pancreatitis     Perirectal abscess     PTSD (post-traumatic stress disorder)      Past Surgical History:   Procedure Laterality Date    Excision of pilonidal cyst      INCISION AND DRAINAGE PERIRECTAL ABSCESS       FLUoxetine (PROZAC) 40 MG capsule  gabapentin (NEURONTIN) 400 MG capsule  metoprolol tartrate (LOPRESSOR) 25 MG tablet  pantoprazole (PROTONIX) 40 MG EC tablet  QUEtiapine (SEROQUEL) 100 MG tablet  QUEtiapine (SEROQUEL) 25 MG tablet      Allergies   Allergen Reactions    Sertraline Other (See Comments)     Excessive fatigue  Reaction(s): Excessive fatigue.    Duloxetine Hives and Rash     Rash, hives     Family History  Family History   Problem Relation Age of Onset    Family History Negative Mother     Alcohol/Drug Father     Diabetes Father     Leukemia Father      Alcohol/Drug Paternal Uncle     Alcoholism Father     Alcoholism Brother      Social History   Social History     Tobacco Use    Smoking status: Every Day     Current packs/day: 1.50     Average packs/day: 1.5 packs/day for 15.0 years (22.5 ttl pk-yrs)     Types: Cigarettes    Smokeless tobacco: Former    Tobacco comments:     a little over a pack a day   Vaping Use    Vaping status: Never Used   Substance Use Topics    Alcohol use: Yes     Alcohol/week: 14.0 standard drinks of alcohol    Drug use: Yes     Types: Marijuana     Comment: today      A medically appropriate review of systems was performed with pertinent positives and negatives noted in the HPI, and all other systems negative.    Physical Exam   BP: 113/78  Pulse: 98  Temp: 98  F (36.7  C)  Resp: 16  Weight: 88.7 kg (195 lb 9.6 oz)  SpO2: 95 %  Physical Exam  General: awake, alert, NAD  Head: normal cephalic  HEENT: pupils equal, conjugate gaze intact  Neck: Supple  CV: regular rate and rhythm without murmur  Lungs: clear to auscultation  Abd: soft, non-tender, no guarding, no peritoneal signs  EXT: lower extremities without swelling or edema  Neuro: awake, answers questions appropriately. No focal deficits noted; no tremors or fasciculations      ED Course, Procedures, & Data           EKG Interpretation:      Interpreted by Ollie Palomares MD  Time reviewed:17:02   Rhythm: Normal sinus   Rate: 91 bpm  Axis: Normal  Ectopy: None  Conduction: Normal  ST Segments/ T Waves: No ST-T wave changes and No acute ischemic changes  Q Waves: None  Comparison to prior: Unchanged from previous  Clinical Impression: normal EKG               Results for orders placed or performed during the hospital encounter of 01/22/25   Comprehensive metabolic panel     Status: Abnormal   Result Value Ref Range    Sodium 139 135 - 145 mmol/L    Potassium 3.6 3.4 - 5.3 mmol/L    Carbon Dioxide (CO2) 21 (L) 22 - 29 mmol/L    Anion Gap 19 (H) 7 - 15 mmol/L    Urea Nitrogen 16.2 6.0 -  20.0 mg/dL    Creatinine 1.06 0.67 - 1.17 mg/dL    GFR Estimate 85 >60 mL/min/1.73m2    Calcium 8.9 8.8 - 10.4 mg/dL    Chloride 99 98 - 107 mmol/L    Glucose 107 (H) 70 - 99 mg/dL    Alkaline Phosphatase 65 40 - 150 U/L    AST 34 0 - 45 U/L    ALT 32 0 - 70 U/L    Protein Total 7.0 6.4 - 8.3 g/dL    Albumin 4.5 3.5 - 5.2 g/dL    Bilirubin Total 0.6 <=1.2 mg/dL   Lipase     Status: Normal   Result Value Ref Range    Lipase 29 13 - 60 U/L   Magnesium     Status: Normal   Result Value Ref Range    Magnesium 1.8 1.7 - 2.3 mg/dL   Ethyl Alcohol Level     Status: Abnormal   Result Value Ref Range    Alcohol ethyl 0.20 (H) <=0.01 g/dL   UA with Microscopic reflex to Culture     Status: Abnormal    Specimen: Urine, Clean Catch   Result Value Ref Range    Color Urine Light Yellow Colorless, Straw, Light Yellow, Yellow    Appearance Urine Clear Clear    Glucose Urine Negative Negative mg/dL    Bilirubin Urine Negative Negative    Ketones Urine Trace (A) Negative mg/dL    Specific Gravity Urine 1.010 1.003 - 1.035    Blood Urine Negative Negative    pH Urine 6.0 5.0 - 7.0    Protein Albumin Urine Negative Negative mg/dL    Urobilinogen Urine Normal Normal, 2.0 mg/dL    Nitrite Urine Negative Negative    Leukocyte Esterase Urine Negative Negative    RBC Urine <1 <=2 /HPF    WBC Urine <1 <=5 /HPF    Narrative    Urine Culture not indicated   CBC with platelets and differential     Status: None   Result Value Ref Range    WBC Count 8.5 4.0 - 11.0 10e3/uL    RBC Count 5.38 4.40 - 5.90 10e6/uL    Hemoglobin 17.3 13.3 - 17.7 g/dL    Hematocrit 47.8 40.0 - 53.0 %    MCV 89 78 - 100 fL    MCH 32.2 26.5 - 33.0 pg    MCHC 36.2 31.5 - 36.5 g/dL    RDW 12.2 10.0 - 15.0 %    Platelet Count 249 150 - 450 10e3/uL    % Neutrophils 62 %    % Lymphocytes 29 %    % Monocytes 7 %    % Eosinophils 1 %    % Basophils 1 %    % Immature Granulocytes 0 %    NRBCs per 100 WBC 0 <1 /100    Absolute Neutrophils 5.3 1.6 - 8.3 10e3/uL    Absolute  Lymphocytes 2.5 0.8 - 5.3 10e3/uL    Absolute Monocytes 0.6 0.0 - 1.3 10e3/uL    Absolute Eosinophils 0.1 0.0 - 0.7 10e3/uL    Absolute Basophils 0.0 0.0 - 0.2 10e3/uL    Absolute Immature Granulocytes 0.0 <=0.4 10e3/uL    Absolute NRBCs 0.0 10e3/uL   Urine Drug Screen Panel     Status: Abnormal   Result Value Ref Range    Amphetamines Urine Screen Negative Screen Negative    Barbituates Urine Screen Negative Screen Negative    Benzodiazepine Urine Screen Negative Screen Negative    Cannabinoids Urine Screen Positive (A) Screen Negative    Cocaine Urine Screen Negative Screen Negative    Fentanyl Qual Urine Screen Negative Screen Negative    Opiates Urine Screen Negative Screen Negative    PCP Urine Screen Negative Screen Negative   EKG 12 lead     Status: None (Preliminary result)   Result Value Ref Range    Systolic Blood Pressure  mmHg    Diastolic Blood Pressure  mmHg    Ventricular Rate 91 BPM    Atrial Rate 91 BPM    NJ Interval 150 ms    QRS Duration 86 ms     ms    QTc 462 ms    P Axis 65 degrees    R AXIS 74 degrees    T Axis 43 degrees    Interpretation ECG Sinus rhythm  Normal ECG      CBC with platelets differential     Status: None    Narrative    The following orders were created for panel order CBC with platelets differential.  Procedure                               Abnormality         Status                     ---------                               -----------         ------                     CBC with platelets and d...[[id number]]                      Final result                 Please view results for these tests on the individual orders.   Urine Drug Screen     Status: Abnormal    Narrative    The following orders were created for panel order Urine Drug Screen.  Procedure                               Abnormality         Status                     ---------                               -----------         ------                     Urine Drug Screen Panel[[id number]]      Abnormal             Final result                 Please view results for these tests on the individual orders.     Medications   diazepam (VALIUM) tablet 5-20 mg (10 mg Oral $Given 1/22/25 2019)   multivitamin w/minerals (THERA-VIT-M) tablet 1 tablet (1 tablet Oral $Given 1/22/25 1811)   folic acid (FOLVITE) tablet 1 mg (1 mg Oral $Given 1/22/25 1811)   thiamine (B-1) tablet 100 mg (100 mg Oral $Given 1/22/25 1812)   OLANZapine zydis (zyPREXA) ODT tab 5 mg (5 mg Oral $Given 1/22/25 1812)     Labs Ordered and Resulted from Time of ED Arrival to Time of ED Departure   COMPREHENSIVE METABOLIC PANEL - Abnormal       Result Value    Sodium 139      Potassium 3.6      Carbon Dioxide (CO2) 21 (*)     Anion Gap 19 (*)     Urea Nitrogen 16.2      Creatinine 1.06      GFR Estimate 85      Calcium 8.9      Chloride 99      Glucose 107 (*)     Alkaline Phosphatase 65      AST 34      ALT 32      Protein Total 7.0      Albumin 4.5      Bilirubin Total 0.6     ETHYL ALCOHOL LEVEL - Abnormal    Alcohol ethyl 0.20 (*)    ROUTINE UA WITH MICROSCOPIC REFLEX TO CULTURE - Abnormal    Color Urine Light Yellow      Appearance Urine Clear      Glucose Urine Negative      Bilirubin Urine Negative      Ketones Urine Trace (*)     Specific Gravity Urine 1.010      Blood Urine Negative      pH Urine 6.0      Protein Albumin Urine Negative      Urobilinogen Urine Normal      Nitrite Urine Negative      Leukocyte Esterase Urine Negative      RBC Urine <1      WBC Urine <1     URINE DRUG SCREEN PANEL - Abnormal    Amphetamines Urine Screen Negative      Barbituates Urine Screen Negative      Benzodiazepine Urine Screen Negative      Cannabinoids Urine Screen Positive (*)     Cocaine Urine Screen Negative      Fentanyl Qual Urine Screen Negative      Opiates Urine Screen Negative      PCP Urine Screen Negative     LIPASE - Normal    Lipase 29     MAGNESIUM - Normal    Magnesium 1.8     CBC WITH PLATELETS AND DIFFERENTIAL    WBC Count 8.5      RBC Count 5.38       Hemoglobin 17.3      Hematocrit 47.8      MCV 89      MCH 32.2      MCHC 36.2      RDW 12.2      Platelet Count 249      % Neutrophils 62      % Lymphocytes 29      % Monocytes 7      % Eosinophils 1      % Basophils 1      % Immature Granulocytes 0      NRBCs per 100 WBC 0      Absolute Neutrophils 5.3      Absolute Lymphocytes 2.5      Absolute Monocytes 0.6      Absolute Eosinophils 0.1      Absolute Basophils 0.0      Absolute Immature Granulocytes 0.0      Absolute NRBCs 0.0     URINE CULTURE     No orders to display          Medical Decision Making  The patient's presentation was of high complexity (an chronic health issue posing potential threat to life or bodily function).    The patient's evaluation involved:    review of external note(s) from 3+ sources (see separate area of note for details)  review of 3+ test result(s) ordered prior to this encounter (see separate area of note for details)  ordering and/or review of 3+ test(s) in this encounter (see separate area of note for details)  discussion of management or test interpretation with another health professional (intake)    The patient's management necessitated high risk (a decision regarding hospitalization).        Assessment & Plan    Georges Moreno is a 49 year old male who presents to the emergency department seeking detox from [facility].     Here patient denies any new or acute complaints such as URI symptoms, chest pain, or shortness of breath.  Patient also denies any acute alcohol withdrawal symptoms but last used earlier today.    Plan is to monitor for alcohol withdrawal symptoms, will place on the [facility] protocol.      Medical screening labs were obtained to facilitate admission to the detox unit.  This includes CBC, BMP, and drug screen. EKG shows NSR.     Patient has normal lipase, mag, CBC.  No significant concerning electrolyte derangements, LFTs unremarkable.    Patient was given Zyprexa for anxiety.  [facility].  He was admitted to the detox unit for ongoing treatment for his alcohol withdrawals.    I have reviewed the nursing notes. I have reviewed the findings, diagnosis, plan and need for follow up with the patient.    New Prescriptions    No medications on file       Final diagnoses:   Alcohol use disorder       Ollie Alicea MD.  [facility] EMERGENCY DEPARTMENT  1/22/2025     Ollie Alicea MD  01/22/25 [id number]

## 2025-01-23 VITALS
HEART RATE: 72 BPM | RESPIRATION RATE: 16 BRPM | DIASTOLIC BLOOD PRESSURE: 87 MMHG | TEMPERATURE: 98.4 F | HEIGHT: 72 IN | WEIGHT: 195.55 LBS | BODY MASS INDEX: 26.49 KG/M2 | OXYGEN SATURATION: 94 % | SYSTOLIC BLOOD PRESSURE: 144 MMHG

## 2025-01-23 PROBLEM — F10.90 ALCOHOL USE DISORDER: Status: ACTIVE | Noted: 2024-12-12

## 2025-01-23 LAB
ATRIAL RATE - MUSE: 91 BPM
BACTERIA UR CULT: NO GROWTH
DIASTOLIC BLOOD PRESSURE - MUSE: NORMAL MMHG
FLUAV RNA SPEC QL NAA+PROBE: NEGATIVE
FLUBV RNA RESP QL NAA+PROBE: NEGATIVE
GGT SERPL-CCNC: 31 U/L (ref 8–61)
INTERPRETATION ECG - MUSE: NORMAL
P AXIS - MUSE: 65 DEGREES
PR INTERVAL - MUSE: 150 MS
QRS DURATION - MUSE: 86 MS
QT - MUSE: 376 MS
QTC - MUSE: 462 MS
R AXIS - MUSE: 74 DEGREES
RSV RNA SPEC NAA+PROBE: NEGATIVE
SARS-COV-2 RNA RESP QL NAA+PROBE: NEGATIVE
SYSTOLIC BLOOD PRESSURE - MUSE: NORMAL MMHG
T AXIS - MUSE: 43 DEGREES
TSH SERPL DL<=0.005 MIU/L-ACNC: 1.24 UIU/ML (ref 0.3–4.2)
VENTRICULAR RATE- MUSE: 91 BPM

## 2025-01-23 PROCEDURE — 250N000013 HC RX MED GY IP 250 OP 250 PS 637

## 2025-01-23 PROCEDURE — 250N000013 HC RX MED GY IP 250 OP 250 PS 637: Performed by: PSYCHIATRY & NEUROLOGY

## 2025-01-23 PROCEDURE — 99253 IP/OBS CNSLTJ NEW/EST LOW 45: CPT

## 2025-01-23 PROCEDURE — 250N000013 HC RX MED GY IP 250 OP 250 PS 637: Performed by: EMERGENCY MEDICINE

## 2025-01-23 PROCEDURE — 87637 SARSCOV2&INF A&B&RSV AMP PRB: CPT

## 2025-01-23 PROCEDURE — 99223 1ST HOSP IP/OBS HIGH 75: CPT | Performed by: PSYCHIATRY & NEUROLOGY

## 2025-01-23 PROCEDURE — 250N000011 HC RX IP 250 OP 636: Performed by: EMERGENCY MEDICINE

## 2025-01-23 PROCEDURE — 128N000004 HC R&B CD ADULT

## 2025-01-23 RX ORDER — QUETIAPINE FUMARATE 100 MG/1
100 TABLET, FILM COATED ORAL
Status: DISCONTINUED | OUTPATIENT
Start: 2025-01-23 | End: 2025-01-24 | Stop reason: HOSPADM

## 2025-01-23 RX ORDER — QUETIAPINE FUMARATE 50 MG/1
50 TABLET, FILM COATED ORAL
Status: DISCONTINUED | OUTPATIENT
Start: 2025-01-23 | End: 2025-01-24 | Stop reason: HOSPADM

## 2025-01-23 RX ORDER — FOLIC ACID 1 MG/1
1 TABLET ORAL DAILY
Status: DISCONTINUED | OUTPATIENT
Start: 2025-01-23 | End: 2025-01-24 | Stop reason: HOSPADM

## 2025-01-23 RX ORDER — MULTIPLE VITAMINS W/ MINERALS TAB 9MG-400MCG
1 TAB ORAL DAILY
Status: DISCONTINUED | OUTPATIENT
Start: 2025-01-23 | End: 2025-01-23

## 2025-01-23 RX ORDER — ONDANSETRON 4 MG/1
4 TABLET, ORALLY DISINTEGRATING ORAL ONCE
Status: COMPLETED | OUTPATIENT
Start: 2025-01-23 | End: 2025-01-23

## 2025-01-23 RX ORDER — GABAPENTIN 100 MG/1
100 CAPSULE ORAL EVERY 6 HOURS PRN
Status: DISCONTINUED | OUTPATIENT
Start: 2025-01-23 | End: 2025-01-23

## 2025-01-23 RX ORDER — OLANZAPINE 10 MG/2ML
10 INJECTION, POWDER, FOR SOLUTION INTRAMUSCULAR 3 TIMES DAILY PRN
Status: DISCONTINUED | OUTPATIENT
Start: 2025-01-23 | End: 2025-01-23

## 2025-01-23 RX ORDER — NICOTINE 21 MG/24HR
1 PATCH, TRANSDERMAL 24 HOURS TRANSDERMAL DAILY
Status: DISCONTINUED | OUTPATIENT
Start: 2025-01-23 | End: 2025-01-24 | Stop reason: HOSPADM

## 2025-01-23 RX ORDER — PANTOPRAZOLE SODIUM 40 MG/1
40 TABLET, DELAYED RELEASE ORAL
Status: DISCONTINUED | OUTPATIENT
Start: 2025-01-23 | End: 2025-01-24 | Stop reason: HOSPADM

## 2025-01-23 RX ORDER — METOPROLOL TARTRATE 25 MG/1
25 TABLET, FILM COATED ORAL 2 TIMES DAILY
Status: DISCONTINUED | OUTPATIENT
Start: 2025-01-23 | End: 2025-01-24 | Stop reason: HOSPADM

## 2025-01-23 RX ORDER — ATENOLOL 50 MG/1
50 TABLET ORAL DAILY PRN
Status: DISCONTINUED | OUTPATIENT
Start: 2025-01-23 | End: 2025-01-23

## 2025-01-23 RX ORDER — AMOXICILLIN 250 MG
1 CAPSULE ORAL 2 TIMES DAILY PRN
Status: DISCONTINUED | OUTPATIENT
Start: 2025-01-23 | End: 2025-01-24 | Stop reason: HOSPADM

## 2025-01-23 RX ORDER — HYDROXYZINE HYDROCHLORIDE 25 MG/1
25 TABLET, FILM COATED ORAL EVERY 4 HOURS PRN
Status: DISCONTINUED | OUTPATIENT
Start: 2025-01-23 | End: 2025-01-24 | Stop reason: HOSPADM

## 2025-01-23 RX ORDER — QUETIAPINE FUMARATE 25 MG/1
25 TABLET, FILM COATED ORAL EVERY 8 HOURS PRN
Status: DISCONTINUED | OUTPATIENT
Start: 2025-01-23 | End: 2025-01-24 | Stop reason: HOSPADM

## 2025-01-23 RX ORDER — ACETAMINOPHEN 325 MG/1
650 TABLET ORAL EVERY 4 HOURS PRN
Status: DISCONTINUED | OUTPATIENT
Start: 2025-01-23 | End: 2025-01-24 | Stop reason: HOSPADM

## 2025-01-23 RX ORDER — LOPERAMIDE HYDROCHLORIDE 2 MG/1
2 CAPSULE ORAL 4 TIMES DAILY PRN
Status: DISCONTINUED | OUTPATIENT
Start: 2025-01-23 | End: 2025-01-24 | Stop reason: HOSPADM

## 2025-01-23 RX ORDER — MAGNESIUM HYDROXIDE/ALUMINUM HYDROXICE/SIMETHICONE 120; 1200; 1200 MG/30ML; MG/30ML; MG/30ML
30 SUSPENSION ORAL EVERY 4 HOURS PRN
Status: DISCONTINUED | OUTPATIENT
Start: 2025-01-23 | End: 2025-01-24 | Stop reason: HOSPADM

## 2025-01-23 RX ORDER — TRAZODONE HYDROCHLORIDE 50 MG/1
50 TABLET, FILM COATED ORAL
Status: DISCONTINUED | OUTPATIENT
Start: 2025-01-23 | End: 2025-01-23

## 2025-01-23 RX ORDER — DIAZEPAM 5 MG/1
5-20 TABLET ORAL EVERY 30 MIN PRN
Status: DISCONTINUED | OUTPATIENT
Start: 2025-01-23 | End: 2025-01-23

## 2025-01-23 RX ORDER — ONDANSETRON 4 MG/1
4 TABLET, FILM COATED ORAL EVERY 6 HOURS PRN
Status: DISCONTINUED | OUTPATIENT
Start: 2025-01-23 | End: 2025-01-24 | Stop reason: HOSPADM

## 2025-01-23 RX ORDER — GABAPENTIN 400 MG/1
400 CAPSULE ORAL 3 TIMES DAILY
Status: DISCONTINUED | OUTPATIENT
Start: 2025-01-23 | End: 2025-01-24 | Stop reason: HOSPADM

## 2025-01-23 RX ORDER — OLANZAPINE 10 MG/1
10 TABLET ORAL 3 TIMES DAILY PRN
Status: DISCONTINUED | OUTPATIENT
Start: 2025-01-23 | End: 2025-01-23

## 2025-01-23 RX ORDER — TRAZODONE HYDROCHLORIDE 50 MG/1
50 TABLET, FILM COATED ORAL
Status: DISCONTINUED | OUTPATIENT
Start: 2025-01-23 | End: 2025-01-24 | Stop reason: HOSPADM

## 2025-01-23 RX ADMIN — THIAMINE HCL TAB 100 MG 100 MG: 100 TAB at 08:27

## 2025-01-23 RX ADMIN — DIAZEPAM 10 MG: 5 TABLET ORAL at 00:36

## 2025-01-23 RX ADMIN — GABAPENTIN 400 MG: 400 CAPSULE ORAL at 08:26

## 2025-01-23 RX ADMIN — DIAZEPAM 10 MG: 5 TABLET ORAL at 12:06

## 2025-01-23 RX ADMIN — FOLIC ACID 1 MG: 1 TABLET ORAL at 08:25

## 2025-01-23 RX ADMIN — GABAPENTIN 400 MG: 400 CAPSULE ORAL at 20:19

## 2025-01-23 RX ADMIN — FLUOXETINE HYDROCHLORIDE 40 MG: 20 CAPSULE ORAL at 08:25

## 2025-01-23 RX ADMIN — PANTOPRAZOLE SODIUM 40 MG: 40 TABLET, DELAYED RELEASE ORAL at 08:25

## 2025-01-23 RX ADMIN — HYDROXYZINE HYDROCHLORIDE 25 MG: 25 TABLET, FILM COATED ORAL at 18:36

## 2025-01-23 RX ADMIN — DIAZEPAM 10 MG: 5 TABLET ORAL at 08:25

## 2025-01-23 RX ADMIN — Medication 1 TABLET: at 08:26

## 2025-01-23 RX ADMIN — QUETIAPINE FUMARATE 100 MG: 100 TABLET ORAL at 20:19

## 2025-01-23 RX ADMIN — Medication 1 LOZENGE: at 13:01

## 2025-01-23 RX ADMIN — NICOTINE 1 PATCH: 21 PATCH, EXTENDED RELEASE TRANSDERMAL at 12:06

## 2025-01-23 RX ADMIN — DIAZEPAM 10 MG: 5 TABLET ORAL at 03:41

## 2025-01-23 RX ADMIN — METOPROLOL TARTRATE 25 MG: 25 TABLET, FILM COATED ORAL at 08:25

## 2025-01-23 RX ADMIN — METOPROLOL TARTRATE 25 MG: 25 TABLET, FILM COATED ORAL at 20:19

## 2025-01-23 RX ADMIN — GABAPENTIN 400 MG: 400 CAPSULE ORAL at 13:00

## 2025-01-23 RX ADMIN — ONDANSETRON 4 MG: 4 TABLET, ORALLY DISINTEGRATING ORAL at 00:38

## 2025-01-23 ASSESSMENT — ACTIVITIES OF DAILY LIVING (ADL)
ADLS_ACUITY_SCORE: 43
LAUNDRY: WITH SUPERVISION
HYGIENE/GROOMING: INDEPENDENT
ADLS_ACUITY_SCORE: 43
ADLS_ACUITY_SCORE: 59
ADLS_ACUITY_SCORE: 43
DRESS: INDEPENDENT
ADLS_ACUITY_SCORE: 43
HYGIENE/GROOMING: INDEPENDENT
ADLS_ACUITY_SCORE: 43
HYGIENE/GROOMING: INDEPENDENT
ADLS_ACUITY_SCORE: 59
ADLS_ACUITY_SCORE: 43
ORAL_HYGIENE: INDEPENDENT
DRESS: INDEPENDENT
DRESS: INDEPENDENT
ORAL_HYGIENE: INDEPENDENT
ADLS_ACUITY_SCORE: 43
ADLS_ACUITY_SCORE: 43
LAUNDRY: WITH SUPERVISION
ADLS_ACUITY_SCORE: 43
ORAL_HYGIENE: INDEPENDENT
ADLS_ACUITY_SCORE: 43

## 2025-01-23 NOTE — DISCHARGE INSTRUCTIONS
Behavioral Discharge Planning and Instructions  THANK YOU FOR CHOOSING [address]  [phone]    Summary: You were admitted to [facility] on *** for detoxification from ***.  A medical exam was performed that included lab work. You have met with a [facility] Counselor and opted to ***.  Please take care and make your recovery a daily priority, Georges!  It was a pleasure working with you and the entire treatment team here wishes you the very best in your recovery!     Recommendation:  ***    Main Diagnoses:    {Substance Related Use Disorders:049613}    Major Treatments, Procedures and Findings:  You were treated for *** detoxification using ***. As an outpatient you will be prescribed ***, please take this medication as prescribed until it is gone. You have met with a [facility] counselor to develop a treatment plan for discharge. You had labs drawn and those results were reviewed with you. Please take a copy of your lab work with you to your next primary care provider appointment.    Symptoms to Report:  If you experience more anxiety, confusion, sleeplessness, deep sadness or thoughts of suicide, notify your treatment team or notify your primary care provider. IF ANY OF THE SYMPTOMS YOU ARE EXPERIENCING ARE A MEDICAL EMERGENCY CALL 911 IMMEDIATELY.     Lifestyle Adjustment: Adjust your lifestyle to get enough sleep, relaxation, exercise and good nutrition. Continue to develop healthy coping skills to decrease stress and promote a sober living environment. Do not use mood altering substances including alcohol, illegal drugs or addictive medications other than what is currently prescribed.     Disposition: ***    Facts about COVID19 at www.cdc.gov/COVID19 and www.[place].gov/covid19    Keeping hands clean is one of the most important steps we can take to avoid getting sick and spreading germs to others.  Please wash your hands frequently and lather with soap for at least 20 seconds!    Follow-up Appointment:   Appointment  Date/Time: ***    Psychiatrist/Primary Care Giver: ***    Address: ***    Phone Number: ***      Recovery apps for your phone for educational purposes and to locate in person and zoom recovery meetings  Everything AA -  loyd is a great resource  12 Step Toolkit - educational purpose learning about the 12 steps to recovery  [employer] - meeting loyd  AA  - meeting loyd  Meeting guide - meeting loyd  Quick NA meeting - meeting loyd  Nicolas- has various apps    Resources:  AA/NA meetings have returned to in-person or a hybrid combination of zoom/in-person therefore please check online to verify*  Need Support Now? If you or someone you know is struggling or in crisis, help is available. Call or text 969 or chat Medical Joyworks.org  AA meetings search for them at: https://aa-intergroup.org (worldwide meeting listings)  AA meetings for [place] area can be found online at: https://a[place].org (click local online meetings listings)  NA meetings for [place] area can be found online at: https://www.nam[place].org  (click find a meeting)  AA and NA Sponsors are excellent resources for support and you can find one at any support group meeting.   Alcoholics Anonymous (https://aa.org/): for information 24 hours/day  AA Intergroup service office in [place] (http://www.aastpaul.org/) 675.756.2764  AA Intergroup service office in [facility]: [phone]. (http://www.aaminneafluIT Biosystemsis.org/)  Narcotics Anonymous (www.nam[place].org) (939) 296-4843  https://aafairviewriverside.org/meetings  SMART Recovery - self management for addiction recovery:  www.smartrecovery.org  Pathways ~ A Health Crisis Resource & Support Center:  522.521.3919.  https://prescribetoprevent.org/patient-education/videos/  http://www.harmreduction.org  Crisis Text Line  Text 488736  You will be connected with a trained live crisis counselor to provide support. Por espanol, texto  JACK a 224296 o texto a 442-AYUDAME en [facility] Line   "1.800.SUICIDE [1784142]  [facility] 243-009-8846  Support Group:  AA/NA and Sponsor/support.  Fast Tracker  Linking people to mental health and substance use disorder resources  "[employer].LearnUpon   [place] Mental Health Warm Line  Peer to peer support  Monday thru Saturday, 12 pm to 10 pm  971.390.0724 or 9.316.390.9008  Text \"Support\" to 60253  National [place] on Mental Illness (KAROL)  950.148.2112 or 1.888.KAROL.HELPS  Alcoholics Anonymous (www.alcoholics-anonymous.org): Check your phone book for your local chapter.  Suicide Awareness Voices of Education (SAVE) (www.save.org): 361-475-CLAS (7283)  National Suicide Prevention Line (www.mentalhealthmn.org): 188-262-YIRM (8102)  Mental Health Consumer/Survivor Network of [place] (www.mhcsn.net): 934.986.4845 or 122-198-6160  Mental Health Association of [place] (www.mentalhealth.org): 805.845.3068 or 556-926-1888   Substance Abuse and Mental Health Services (www.samhsa.gov)  [place] Opioid Prevention Coalition: www.opioidcoalition.org    [place] Recovery Connection (MRC)  [facility] connects people seeking recovery to resources that help foster and sustain long-term recovery.  Whether you are seeking resources for treatment, transportation, housing, job training, education, health care or other pathways to recovery, [facility] is a great place to start.  504.373.2755.  www.minnesotarecflo.doy.org    Great Pod casts for nutrition and wellness  Listen on Apple Podcasts  Dishing Up Nutrition   [facility]   Nutrition       Understand the connection between what you eat and how you feel. Hosted by licensed nutritionists and dietitians from [facility] we share practical, real-life solutions for healthier living through nutrition.     General Medication Instructions:   See your medication sheet(s) for instructions.   Take all medications as prescribed.  Make no changes unless your primary care provider suggests them.   Go to " all your primary care provider visits.  Be sure to have all your required lab tests. This way, your medicines can be refilled on time.  Do not use any forms of alcohol.    Please Note: If you have any questions at anytime after you discharged please call [facility] detox unit 3A at [phone].    Here are a list of additional numbers you can call if you are wanting to resume services through [facility]:  [facility] Assessment Intake: [phone]  [facility] Adult [facility] Intensive Outpatient  call: [phone]  [facility] Admissions [phone]    Recovery Clinic call: [phone]  [facility]: [phone]  Medical Records call: [phone]  Billing Department call: [phone]    Please remember to take all of your behavioral discharge planning and lab paperwork to any follow up appointments, it contains your lab results, diagnosis, medication list and discharge recommendations.      THANK YOU FOR CHOOSING [facility]

## 2025-01-23 NOTE — PROGRESS NOTES
[address]     Case Management Encounter: Met with team, discussed patient progress, discharge plan and any impediments to discharge.    Pt reported that he is not sure of his plan for after detox. He reported he needs to talk to his mother and some friends to get their thoughts before he makes a decision. Pt reported a sore throat.    RN updated    Insurance: [facility]     Legal Status:  Voluntary   County:   File Number: NA  Start and expiration date of commitment: NA    SUDs Assessment Status: Will need for placement     ROIs on file: None     Living Situation: Lives with his mother in [place].     Current Providers, Supports & Collateral:  AA, sober peers    Current Plan/Referral Status: Unknown at this time.       DOM Banda  [address] - Adult Inpatient Addiction Psychiatry Unit
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
## 2025-01-23 NOTE — CONSULTS
Formerly [facility]  Internal Medicine Consult     Georges Moreno MRN# [id number]   Age: 49 year old YOB: 1975     Date of Admission: 1/22/2025  Date of Consult: 1/23/2025    Primary Care Provider: Clinic, [facility]    Requesting Service: Behavioral Health - Gris Cassidy MD  Reason for Consult: General Medical Evaluation      SUBJECTIVE     Assessment and Plan/Recommendations:     Georges Moreno is a 49 year old man with a history of alcohol use disorder, peripheral neuropathy, anxiety, depression, PTSD, paroxysmal A-fib, history of Vickesr's esophagus, and hypertension. Pt was admitted on 1/22/25 to  for medical management of withdrawal from alcohol.    # Alcohol withdrawal, hx of alcohol use disorder   Recent alcohol abuse seeking detox admission 1/1-1/3/2025.  He immediately started drinking upon discharge and is drinking somewhere between a pint to a quart of alcohol a day withdrawal symptoms include nausea, vomiting, tremors. Denies hx of alcohol withdrawal seizures or delirium tremens. BAL 0.20.  MSSA 14 this shift.   - Continue MSSA   - Folvite, multi-vites, thiamine supplementation   - Further management per Psychiatry     #Hx of bilateral cervical radiculopathy L > R  #Hx Bilateral lumbar radiculopathy   #Left lateral elbow pain   #Left medial wrist pain  #Left hand pain   #Peripheral neuropathy  Extensive history of neck and lower back problems.  Followed with orthopedics in 2022 and they recommended steroid injections, patient states never really fully helped with the pain.  He has not followed up for any surgical recommendations since.  Has follow-up with pain clinic in the past.  He states his pain is significant and interferes with activities of daily living causing him  to treat with alcohol consumption.  Currently complaining of left top of the hand pain, wrist pain, elbow pain.  Examination of wrist consistent with medial nerve pain.  Left elbow exam  consistent with lateral elbow nerve compression and swelling.  Peripheral neuropathy present in both arms, left greater than right.  - Recommend neurology consult as outpatient for EMG testing (ordered in [facility])  - Recommend tennis elbow brace at discharge to see if it helps with L arm/wrist/hand pain  - Continue PTA gabapentin 400 mg TID  - Follow up with Orthopedic surgery as outpatient     #Hypertension  #Sinus tachycardia  #Hx of paroxysmal A-fib  Not on anticoagulation. EKG NSR. Denies palpitations, chest pain, dyspnea, headaches.   - Continue PTA metoprolol tartrate 25 mg daily  - if BP fails to normalize despite completion of withdrawal, patient should follow-up with PCP within 1 week of discharge    - Notify medicine if concern for irregular rhythm, palpitations, chest pain, dyspnea. Would obtain stat EKG    # Anion gap metabolic acidosis  Anion gap 19. Associated with significant alcohol ingestion and dehydration.   - Alcohol cessation  - Encourage fluid intake    #History of Vickers's esophagus (s/p EGD 5/9/24)  #Adenomatous polyps of colon  #Removed tubular adenoma s/p colonoscopy 5/9/24  - Continue PTA PPI     #Anxiety  #Depression  #PTSD  - Continue PTA fluoxetine  - Further management per psychiatry    Currently, medically stable and internal medicine will sign off. Please contact if future questions or concerns arise. Thank you for the opportunity to be a part of this patient's care.      JOE Woodward CNP  Internal Medicine [facility] Hospitalist  Securely message with the Vocera Web Console (learn more here)  Text paging via [email or web address] is appreciated  January 23, 2025         HPI:   Georges Moreno is a 49 year old man with a history of alcohol use disorder, peripheral neuropathy, anxiety, depression, PTSD, paroxysmal A-fib, history of Vickers's esophagus, and hypertension. Pt was admitted on 1/22/25 to  for medical management of withdrawal from alcohol.  Recent alcohol abuse seeking detox admission  1/1-1/3/2025.  He immediately started drinking upon discharge and is drinking somewhere between a pint to a quart of alcohol a day withdrawal symptoms include nausea, vomiting, tremors. Denies hx of alcohol withdrawal seizures or delirium tremens.  Patient feels that his withdrawal symptoms are being managed well.  He says he usually tries to get out as soon as possible but feels like he will need to stay for another day to manage anxiety and withdrawal symptoms.  Denies abdominal pain, nausea, emesis, diarrhea.  Long discussion about neuropathy in bilateral upper extremities.  Currently denies neck pain but has ongoing left radiculopathy, tendinopathy and left elbow, hand, wrist.  Encouraged him to see neurology for EMG testing and use of elbow brace to see if that alleviates symptoms.  No other concerns or questions for medicine at this time.       Past Medical History:     Past Medical History:   Diagnosis Date    ADHD     Alcohol abuse     Alcohol use disorder, severe, dependence     Atrial fibrillation     Vickers's esophagus     Depression     moderate    PUSHPA (generalized anxiety disorder)     Pancreatitis     Perirectal abscess     PTSD (post-traumatic stress disorder)         Reviewed and updated in [facility].     Past Surgical History:      Past Surgical History:   Procedure Laterality Date    Excision of pilonidal cyst      INCISION AND DRAINAGE PERIRECTAL ABSCESS           Social History:     Social History     Tobacco Use    Smoking status: Every Day     Current packs/day: 1.50     Average packs/day: 1.5 packs/day for 15.0 years (22.5 ttl pk-yrs)     Types: Cigarettes    Smokeless tobacco: Former    Tobacco comments:     a little over a pack a day   Vaping Use    Vaping status: Never Used   Substance Use Topics    Alcohol use: Yes     Alcohol/week: 14.0 standard drinks of alcohol    Drug use: Yes     Types: Marijuana     Comment: today        Family History:     Family History   Problem Relation Age of Onset  "   Family History Negative Mother     Alcohol/Drug Father     Diabetes Father     Leukemia Father     Alcohol/Drug Paternal Uncle     Alcoholism Father     Alcoholism Brother          Allergies:     Allergies   Allergen Reactions    Sertraline Other (See Comments)     Excessive fatigue  Reaction(s): Excessive fatigue.    Duloxetine Hives and Rash     Rash, hives         Medications:   Reviewed. Please see MAR     Review of Systems:   10 point ROS of systems including Constitutional, Eyes, Respiratory, Cardiovascular, Gastroenterology, Genitourinary, Integumentary, Muscularskeletal, Psychiatric were all negative except for pertinent positives noted in my HPI.    OBJECTIVE   Physical Exam:   Vitals were reviewed  Blood pressure (!) 149/94, pulse (!) 115, temperature 98.5  F (36.9  C), temperature source Oral, resp. rate 16, height 1.829 m (6' 0.01\"), weight 88.7 kg (195 lb 8.8 oz), SpO2 96%.  General: Alert and oriented x3, moderate distress  HEENT: Anicteric sclera, MMM  Cardiovascular: RRR, S1S2. No murmur noted  Lungs: CTAB without wheezing or crackles   GI: Abdomen soft, non-tender with bowel sounds present. No guarding or rebound   Vascular: No peripheral edema, distal pulses palpable  Neurologic: No focal deficits, CN II-XII grossly intact  Skin: No jaundice, rashes, or lesions        Data:        Lab Results   Component Value Date     01/22/2025     05/26/2020    Lab Results   Component Value Date    CHLORIDE 99 01/22/2025    CHLORIDE 105 06/23/2022    CHLORIDE 102 05/26/2020    Lab Results   Component Value Date    BUN 16.2 01/22/2025    BUN 18 06/23/2022    BUN 10 05/26/2020      Lab Results   Component Value Date    POTASSIUM 3.6 01/22/2025    POTASSIUM 5.0 06/23/2022    POTASSIUM 3.7 05/26/2020    Lab Results   Component Value Date    CO2 21 01/22/2025    CO2 30 06/23/2022    CO2 26 05/26/2020    Lab Results   Component Value Date    CR 1.06 01/22/2025    CR 0.73 05/26/2020        Lab Results "   Component Value Date    WBC 8.5 2025    HGB 17.3 2025    HCT 47.8 2025    MCV 89 2025     2025     Lab Results   Component Value Date    WBC 8.5 2025           Medical Decision Makin MINUTES SPENT BY ME on the date of service doing chart review, history, exam, documentation & further activities per the note.

## 2025-01-23 NOTE — PLAN OF CARE
Problem: Alcohol Withdrawal  Goal: Alcohol Withdrawal Symptom Control  Outcome: Progressing   Goal Outcome Evaluation:    Plan of Care Reviewed With: patient        Pt has spent equal time in him room and in the lounge.  Pt continues with detox symptoms--vital signs have improved.  Appetite has improved.  Pt maintains a tremor, diaphoresis and anxiety.  Pt is alert and oriented X 4 with no SI, SIB.  Pt's gait is steady and his speech is clear.  He has complained of a sore throat this afternoon and that was reported to medicine--throat culture and Covid tests were completed--lozenges ordered for comfort and tylenol for the pain.  Pt reports that he was sober for about a week after his last detox.  He hasn't expressed an interest in pursuing treatment at this time.  Will continue to monitor withdrawal and assist with discharge planning.

## 2025-01-23 NOTE — TELEPHONE ENCOUNTER
S: [facility] , Provider Deanne  calling at 8:10 PM   with clinical on a 49 year old/Male presenting for alcohol detox.     B: Pt presents for ETOH detox.   Currently reports drinking pint or quart of alcohol daily  for 2 weeks .    Patient reports last use was Prior to arrival .  Pt JIMMY: .2  Pt  denies hx of DT  Pt  denies hx of seizures. Last seizure: N/A  Pt endorsing the following symptoms of withdrawal:  n/A  MSSA Score: N/A    Pt denies acute mental health or medical concerns.   Pt denies other drug use: (!) CANNABIS PRODUCTS Amount/frequency: N/A    Does Pt have a detox care plan in Epic? N/A  Does pt present with specific needs, assistive devices, or exclusionary criteria? None  Is the patient ambulating, eating and drinking in the ED? Yes     A: Pt meets criteria to be presented for IP detox admission. Patient is voluntary    COVID Symptoms: No  If yes, COVID test required   Utox: Positive for cannabinoids   Magnesium: WNL  CMP: WNL  CBC: WNL  HCG: N/A     R: Patient cleared and ready for behavioral bed placement: Yes    Pt is meeting criteria for presentation to 3A/CD    Does Patient need a Transfer Center request created? No, Pt is located within [facility] ED, [facility] ED, or [facility]     8:10 PM Paged Celsa to review for unit 3A/CD/[facility]     8:37 PM Pt accepted to unit 3A/CD/[facility]     8:38 PM Intake called Unit 3A - spoke with ETHEL - PT will admit on night shift d/t staffing     8:43 PM Intake called ED spoke with Suma - provided bed placement information and admit ETA

## 2025-01-23 NOTE — PLAN OF CARE
Problem: Alcohol Withdrawal  Goal: Alcohol Withdrawal Symptom Control  Outcome: Progressing   3AW Admission Note    S = Situation:   Georges Moreno is a 49 year old year old male with a chief complaint of Drug / Alcohol Assessment (Seeking detox from alcohol.) and Palpitations (History of afib)    Voluntary admission to 3A for alcohol withdrawal and detox.    B  = Background:   Substance use history: Alcohol and Nicotine  Mental health history: Anxiety and depression, Bipolar  Medical history: Atrial fibrillation, HLD,   Legal history: Voluntary  Treatment history: 4 to 5 in and out patient treatments  Living situation: At home  Recent life stressors: Anxiety     A  =  Assessment:   During admission interview, pt affect was flat and blunted while mood  was tired and exhausted. This patient reports that he was here earlier this month and started drinking again upon discharge, drinking between a pint to a quart of alcohol each day. His last drink was prior to arrival to the ED. He denies having a history of seizures or  DTs but indicates that his withdrawal symptoms include shakes which were not visible at the time of admission. He has a history, atrial fibrillation, hyperlipidemia , anxiety, and depression. The patient's JIMMY is .2  MSSA  6  BP (!) 151/86   Pulse 65   Temp 97.6  F (36.4  C) (Oral)   Resp 18   Wt 88.7 kg (195 lb 9.6 oz)   SpO2 95%   BMI 26.53 kg/m       R =   Request or Recommendation:   Patient's alcohol withdrawal will be monitored and treated using MSSA with valium  Pt will meet with psychiatry, internal medicine, and case management tomorrow.  At the time of admission, pt reports discharge plan is to detox for now .  Patient slept for 3.5 hrs.

## 2025-01-23 NOTE — PROGRESS NOTES
01/23/25 0252   Patient Belongings   Did you bring any home meds/supplements to the hospital?  No   Patient Belongings remains with patient;locker;sent to security per site process   Patient Belongings Remaining with Patient clothing   Patient Belongings Put in Hospital Secure Location (Security or Locker, etc.) cash/credit card;cell phone/electronics;clothing;shoes;wallet;other (see comments)   Belongings Search Yes   Clothing Search Yes   Second Staff Katharina COLVIN     Patient belongings remaining with patient:    Underwear    Patient belongings kept on unit:    Cell phone  Grey shirt  Black gym pants w/ drawstring  Pack of cigarettes  Lighter  Pair of shoes  Jacket/sweater thing    Patient items sent to security in container labeled [id number]:    THC vape    A               Admission:  I am responsible for any personal items that are not sent to the safe or pharmacy.  [facility] is not responsible for loss, theft or damage of any property in my possession.    Signature:  _________________________________ Date: _______  Time: _____                                              Staff Signature:  ____________________________ Date: ________  Time: _____      2nd Staff person, if patient is unable/unwilling to sign:    Signature: ________________________________ Date: ________  Time: _____     Discharge:  [facility] has returned all of my personal belongings:    Signature: _________________________________ Date: ________  Time: _____                                          Staff Signature:  ____________________________ Date: ________  Time: _____

## 2025-01-23 NOTE — MEDICATION SCRIBE - ADMISSION MEDICATION HISTORY
Medication Scribe Admission Medication History    Admission medication history is complete. The information provided in this note is only as accurate as the sources available at the time of the update.    Information Source(s): Patient and CareEverywhere/SureScripts via in-person    Pertinent Information: N/A    Changes made to PTA medication list:  Added: seroquel 25mg  Deleted: None  Changed: None    Allergies reviewed with patient and updates made in EHR: yes    Medication History Completed By: Nanci Duran 1/22/2025 8:20 PM    PTA Med List   Medication Sig Last Dose/Taking    FLUoxetine (PROZAC) 40 MG capsule Take 1 capsule (40 mg) by mouth daily. Past Week    gabapentin (NEURONTIN) 400 MG capsule Take 1 capsule (400 mg) by mouth 3 times daily. 1/22/2025 Noon    metoprolol tartrate (LOPRESSOR) 25 MG tablet Take 1 tablet (25 mg) by mouth 2 times daily. 1/21/2025    pantoprazole (PROTONIX) 40 MG EC tablet Take 1 tablet (40 mg) by mouth every morning (before breakfast). Past Month    QUEtiapine (SEROQUEL) 100 MG tablet Take 0.5-1 tablets ( mg) by mouth nightly as needed (sleep). More than a month    QUEtiapine (SEROQUEL) 25 MG tablet Take 25 mg by mouth every 8 hours as needed. 1/21/2025 Bedtime

## 2025-01-23 NOTE — PLAN OF CARE
Behavioral Team Discussion: (1/23/2025)    Continued Stay Criteria/Rationale: Patient admitted for Chemical Use Issues.  Plan: The following services will be provided to the patient; psychiatric assessment, medication management, therapeutic milieu, individual and group support, and skills groups.   Participants: 3A Provider: Dr. Gris Cassidy MD; 3A RN: Jorge Balbuena RN; 3A CM's:  DOM Banda .  Summary/Recommendation: Providers will assess today for treatment recommendations, discharge planning, and aftercare plans. CM will meet with pt for discharge planning.   Medical/Physical: Pt denies history of seizures or DTs reports symptoms of withdrawal include shakes nausea vomiting excetra. He denies any acute medical concerns   Precautions:   Behavioral Orders   Procedures    Code 1 - Restrict to Unit    Routine Programming     As clinically indicated    Status 15     Every 15 minutes.    Withdrawal precautions     Rationale for change in precautions or plan: N/A  Progress: Initial.    ASAM Dimension Scale Ratings:  Dimension 1: 3 Client tolerates and parveen with withdrawal discomfort poorly. Client has severe intoxication, such that the client endangers self or others, or intoxication has not abated with less intensive levels of services. Client displays severe signs and symptoms; or risk of severe, but manageable withdrawal; or withdrawal worsening despite detox at less intensive level.  Dimension 2: 1 Client tolerates and parveen with physical discomfort and is able to get the services that the client needs.  Dimension 3: 2 Client has difficulty with impulse control and lacks coping skills. Client has thoughts of suicide or harm to others without means; however, the thoughts may interfere with participation in some treatment activities. Client has difficulty functioning in significant life areas. Client has moderate symptoms of emotional, behavioral, or cognitive problems. Client is able to participate in  most treatment activities.  Dimension 4: 3 Client displays inconsistent compliance, minimal awareness of either the client's addiction or mental disorder, and is minimally cooperative.  Dimension 5: 4 No awareness of the negative impact of mental health problems or substance abuse. No coping skills to arrest mental health or addiction illnesses, or prevent relapse.  Dimension 6: 4 Client has (A) Chronically antagonistic significant other, living environment, family, peer group or long-term criminal justice involvement that is harmful to recovery or treatment progress, or (B) Client has an actively antagonistic significant other, family, work, or living environment with immediate threat to the client's safety and well-being.

## 2025-01-24 VITALS
HEART RATE: 62 BPM | TEMPERATURE: 98.1 F | SYSTOLIC BLOOD PRESSURE: 135 MMHG | DIASTOLIC BLOOD PRESSURE: 83 MMHG | OXYGEN SATURATION: 97 % | RESPIRATION RATE: 16 BRPM | BODY MASS INDEX: 26.49 KG/M2 | WEIGHT: 195.55 LBS | HEIGHT: 72 IN

## 2025-01-24 PROCEDURE — 250N000013 HC RX MED GY IP 250 OP 250 PS 637: Performed by: PSYCHIATRY & NEUROLOGY

## 2025-01-24 PROCEDURE — 250N000013 HC RX MED GY IP 250 OP 250 PS 637

## 2025-01-24 PROCEDURE — 99239 HOSP IP/OBS DSCHRG MGMT >30: CPT | Performed by: PSYCHIATRY & NEUROLOGY

## 2025-01-24 RX ADMIN — FOLIC ACID 1 MG: 1 TABLET ORAL at 08:37

## 2025-01-24 RX ADMIN — NICOTINE 1 PATCH: 21 PATCH, EXTENDED RELEASE TRANSDERMAL at 08:37

## 2025-01-24 RX ADMIN — FLUOXETINE HYDROCHLORIDE 40 MG: 20 CAPSULE ORAL at 08:37

## 2025-01-24 RX ADMIN — Medication 1 TABLET: at 08:38

## 2025-01-24 RX ADMIN — PANTOPRAZOLE SODIUM 40 MG: 40 TABLET, DELAYED RELEASE ORAL at 07:32

## 2025-01-24 RX ADMIN — Medication 1 LOZENGE: at 08:40

## 2025-01-24 RX ADMIN — THIAMINE HCL TAB 100 MG 100 MG: 100 TAB at 08:38

## 2025-01-24 RX ADMIN — GABAPENTIN 400 MG: 400 CAPSULE ORAL at 08:37

## 2025-01-24 RX ADMIN — METOPROLOL TARTRATE 25 MG: 25 TABLET, FILM COATED ORAL at 08:37

## 2025-01-24 ASSESSMENT — ACTIVITIES OF DAILY LIVING (ADL)
ADLS_ACUITY_SCORE: 43
ORAL_HYGIENE: INDEPENDENT
ADLS_ACUITY_SCORE: 43
HYGIENE/GROOMING: INDEPENDENT
ADLS_ACUITY_SCORE: 43
DRESS: INDEPENDENT
ADLS_ACUITY_SCORE: 43

## 2025-01-24 NOTE — DISCHARGE SUMMARY
Psychiatric Discharge Summary    Georges Moreno MRN# [id number]   Age: 49 year old YOB: 1975     Date of Admission:  1/22/2025  Date of Discharge:  1/24/2025  Admitting Physician:  Gris Cassidy DO  Discharge Physician:  Gris Cassidy DO         Event Leading to Hospitalization:   Georges is a 49 year old male with history of substance use, anxiety who presented to ED seeking detox from alcohol.      Chart review completed including ED notes from this encounter; several past admissions to this unit including 12/11-12/14/24 and 1/1-1/3/25 where pt discharged home and declined MAT for AUD; most recent IM note for pre-op eval 12/17/24; December PT visit for mallet finger; no psychiatric admission noted, no new encounters for ED or clinic visits since discharging from this unit 1/3/25.      Per ED physician note: Georges Moreno is a 49 year old male with a history of alcohol use disorder, atrial fibrillation, HLD, anxiety, and depression who presents to the Emergency Department for alcohol detox and palpitations.      Patient presents seeking detox.  Reports he was here earlier this month and started drinking upon discharge drinking somewhere between a pint  to quart a day, last drink prior to arrival.  Denies history of seizures or DTs reports symptoms of withdrawal include shakes nausea vomiting excetra.  He denies any acute medical concerns at this time including no chest pain or infectious symptoms cough URI excetra.  Denies thoughts to hurting or harming himself.     Upon interview: Pt confirms information above, since discharging from this unit on 1/3/25 patient reports he maintained sobriety for about a week or so, he is not sure what prompted his relapse, reports a lot of stressors particularly medical and financial stressors, has been drinking up to a quart of vodka per day, also continues to use cannabis regularly that he has a medical card for along with nicotine use.  Denies other  substance use.  He has tolerance to alcohol, drinks more than intended, difficulty cutting down the withdrawal symptoms when he stops drinking including currently having hot and cold flashes, nausea, irritability and unsteadiness on his feet and poor sleep.  Denies history of seizures or DTs.  Denies current hallucinations.  He reports that his mood has been anxious and irritable, he is not sure if he has been feeling depressed.  Denies having any SI or HI.  He notes he has been taking his medications including fluoxetine for mood.  He also is continuing to meet with his therapist and has another appointment on Monday.  He expressed a lot of frustration around his alcohol use and not sure what has led to his relapses recently, he discussed his past experiences with CD treatment and does not feel like this would be a good fit for him to return to the program, he expressed a lot of displeasure with past experiences often using expletives and the conversation regarding possibly returning to treatment.  He is not sure what his goals for hospitalization are other than to complete detox, he is open to discussing other supports that could possibly be put in place to help support his sobriety as his stabilization progresses.       See Admission note by Gris Cassidy DO on 1/23/25 for additional details.          Diagnoses:     Alcohol use disorder, severe, dependence with withdrawal with complication   Nicotine dependence with withdrawal   Cannabis use disorder, dependence  PUSHPA  Hx of panic disorder  Unspecified depression   Hx of PTSD  Hx of ADHD per pt report  High anion gap metabolic acidosis  HTN  Paroxysmal Afib  Hx of mallet finger R hand  GERD, hx of barrets esophagus s/p EGD 5/2024  Peripheral neuropathy  Hx of bilateral cervical radiulopathy, lumbar radiculopathy  L elbow pain  L wrist pain  L hand pain    Clinically Significant Risk Factors              # Anion Gap Metabolic Acidosis: Highest Anion Gap = 19  "mmol/L in last 2 days, will monitor and treat as appropriate                 # Overweight: Estimated body mass index is 26.52 kg/m  as calculated from the following:    Height as of this encounter: 1.829 m (6' 0.01\").    Weight as of this encounter: 88.7 kg (195 lb 8.8 oz)., PRESENT ON ADMISSION     # Financial/Environmental Concerns:                  Labs:     Recent Results (from the past week)   EKG 12 lead    Collection Time: 01/22/25  5:02 PM   Result Value Ref Range    Systolic Blood Pressure  mmHg    Diastolic Blood Pressure  mmHg    Ventricular Rate 91 BPM    Atrial Rate 91 BPM    NH Interval 150 ms    QRS Duration 86 ms     ms    QTc 462 ms    P Axis 65 degrees    R AXIS 74 degrees    T Axis 43 degrees    Interpretation ECG       Sinus rhythm  Normal ECG    Unconfirmed report - interpretation of this ECG is computer generated - see medical record for final interpretation     Comprehensive metabolic panel    Collection Time: 01/22/25  5:33 PM   Result Value Ref Range    Sodium 139 135 - 145 mmol/L    Potassium 3.6 3.4 - 5.3 mmol/L    Carbon Dioxide (CO2) 21 (L) 22 - 29 mmol/L    Anion Gap 19 (H) 7 - 15 mmol/L    Urea Nitrogen 16.2 6.0 - 20.0 mg/dL    Creatinine 1.06 0.67 - 1.17 mg/dL    GFR Estimate 85 >60 mL/min/1.73m2    Calcium 8.9 8.8 - 10.4 mg/dL    Chloride 99 98 - 107 mmol/L    Glucose 107 (H) 70 - 99 mg/dL    Alkaline Phosphatase 65 40 - 150 U/L    AST 34 0 - 45 U/L    ALT 32 0 - 70 U/L    Protein Total 7.0 6.4 - 8.3 g/dL    Albumin 4.5 3.5 - 5.2 g/dL    Bilirubin Total 0.6 <=1.2 mg/dL   Lipase    Collection Time: 01/22/25  5:33 PM   Result Value Ref Range    Lipase 29 13 - 60 U/L   Magnesium    Collection Time: 01/22/25  5:33 PM   Result Value Ref Range    Magnesium 1.8 1.7 - 2.3 mg/dL   Ethyl Alcohol Level    Collection Time: 01/22/25  5:33 PM   Result Value Ref Range    Alcohol ethyl 0.20 (H) <=0.01 g/dL   CBC with platelets and differential    Collection Time: 01/22/25  5:33 PM   Result " Value Ref Range    WBC Count 8.5 4.0 - 11.0 10e3/uL    RBC Count 5.38 4.40 - 5.90 10e6/uL    Hemoglobin 17.3 13.3 - 17.7 g/dL    Hematocrit 47.8 40.0 - 53.0 %    MCV 89 78 - 100 fL    MCH 32.2 26.5 - 33.0 pg    MCHC 36.2 31.5 - 36.5 g/dL    RDW 12.2 10.0 - 15.0 %    Platelet Count 249 150 - 450 10e3/uL    % Neutrophils 62 %    % Lymphocytes 29 %    % Monocytes 7 %    % Eosinophils 1 %    % Basophils 1 %    % Immature Granulocytes 0 %    NRBCs per 100 WBC 0 <1 /100    Absolute Neutrophils 5.3 1.6 - 8.3 10e3/uL    Absolute Lymphocytes 2.5 0.8 - 5.3 10e3/uL    Absolute Monocytes 0.6 0.0 - 1.3 10e3/uL    Absolute Eosinophils 0.1 0.0 - 0.7 10e3/uL    Absolute Basophils 0.0 0.0 - 0.2 10e3/uL    Absolute Immature Granulocytes 0.0 <=0.4 10e3/uL    Absolute NRBCs 0.0 10e3/uL   GGT    Collection Time: 01/22/25  5:33 PM   Result Value Ref Range    GGT 31 8 - 61 U/L   TSH with free T4 reflex    Collection Time: 01/22/25  5:33 PM   Result Value Ref Range    TSH 1.24 0.30 - 4.20 uIU/mL   UA with Microscopic reflex to Culture    Collection Time: 01/22/25  5:51 PM    Specimen: Urine, Clean Catch   Result Value Ref Range    Color Urine Light Yellow Colorless, Straw, Light Yellow, Yellow    Appearance Urine Clear Clear    Glucose Urine Negative Negative mg/dL    Bilirubin Urine Negative Negative    Ketones Urine Trace (A) Negative mg/dL    Specific Gravity Urine 1.010 1.003 - 1.035    Blood Urine Negative Negative    pH Urine 6.0 5.0 - 7.0    Protein Albumin Urine Negative Negative mg/dL    Urobilinogen Urine Normal Normal, 2.0 mg/dL    Nitrite Urine Negative Negative    Leukocyte Esterase Urine Negative Negative    RBC Urine <1 <=2 /HPF    WBC Urine <1 <=5 /HPF   Urine Culture    Collection Time: 01/22/25  5:51 PM    Specimen: Urine, Clean Catch   Result Value Ref Range    Culture No Growth    Urine Drug Screen Panel    Collection Time: 01/22/25  5:51 PM   Result Value Ref Range    Amphetamines Urine Screen Negative Screen Negative     Barbituates Urine Screen Negative Screen Negative    Benzodiazepine Urine Screen Negative Screen Negative    Cannabinoids Urine Screen Positive (A) Screen Negative    Cocaine Urine Screen Negative Screen Negative    Fentanyl Qual Urine Screen Negative Screen Negative    Opiates Urine Screen Negative Screen Negative    PCP Urine Screen Negative Screen Negative   Influenza A/B, RSV and SARS-CoV2 PCR (COVID-19) Nose    Collection Time: 01/23/25 12:54 PM    Specimen: Nose; Swab   Result Value Ref Range    Influenza A PCR Negative Negative    Influenza B PCR Negative Negative    RSV PCR Negative Negative    SARS CoV2 PCR Negative Negative            Consults:   IM consult placed for management of other medical concerns, per consult note on 1/23/25:   Georges Moreno is a 49 year old man with a history of alcohol use disorder, peripheral neuropathy, anxiety, depression, PTSD, paroxysmal A-fib, history of Vickers's esophagus, and hypertension. Pt was admitted on 1/22/25 to  for medical management of withdrawal from alcohol.     # Alcohol withdrawal, hx of alcohol use disorder   Recent alcohol abuse seeking detox admission 1/1-1/3/2025.  He immediately started drinking upon discharge and is drinking somewhere between a pint to a quart of alcohol a day withdrawal symptoms include nausea, vomiting, tremors. Denies hx of alcohol withdrawal seizures or delirium tremens. BAL 0.20.  MSSA 14 this shift.   - Continue MSSA   - Folvite, multi-vites, thiamine supplementation   - Further management per Psychiatry      #Hx of bilateral cervical radiculopathy L > R  #Hx Bilateral lumbar radiculopathy   #Left lateral elbow pain   #Left medial wrist pain  #Left hand pain   #Peripheral neuropathy  Extensive history of neck and lower back problems.  Followed with orthopedics in 2022 and they recommended steroid injections, patient states never really fully helped with the pain.  He has not followed up for any surgical recommendations  since.  Has follow-up with pain clinic in the past.  He states his pain is significant and interferes with activities of daily living causing him  to treat with alcohol consumption.  Currently complaining of left top of the hand pain, wrist pain, elbow pain.  Examination of wrist consistent with medial nerve pain.  Left elbow exam consistent with lateral elbow nerve compression and swelling.  Peripheral neuropathy present in both arms, left greater than right.  - Recommend neurology consult as outpatient for EMG testing (ordered in [facility])  - Recommend tennis elbow brace at discharge to see if it helps with L arm/wrist/hand pain  - Continue PTA gabapentin 400 mg TID  - Follow up with Orthopedic surgery as outpatient      #Hypertension  #Sinus tachycardia  #Hx of paroxysmal A-fib  Not on anticoagulation. EKG NSR. Denies palpitations, chest pain, dyspnea, headaches.   - Continue PTA metoprolol tartrate 25 mg daily  - if BP fails to normalize despite completion of withdrawal, patient should follow-up with PCP within 1 week of discharge    - Notify medicine if concern for irregular rhythm, palpitations, chest pain, dyspnea. Would obtain stat EKG     # Anion gap metabolic acidosis  Anion gap 19. Associated with significant alcohol ingestion and dehydration.   - Alcohol cessation  - Encourage fluid intake     #History of Vickers's esophagus (s/p EGD 5/9/24)  #Adenomatous polyps of colon  #Removed tubular adenoma s/p colonoscopy 5/9/24  - Continue PTA PPI      #Anxiety  #Depression  #PTSD  - Continue PTA fluoxetine  - Further management per psychiatry     Currently, medically stable and internal medicine will sign off. Please contact if future questions or concerns arise. Thank you for the opportunity to be a part of this patient's care.        JOE Woodward CNP  Internal Medicine EMA [facility] Course:   Georges Moreno is a 49 year old male with history of substance use, anxiety who presented to  ED seeking detox from alcohol. Medically cleared in ED, admitted to 3A as voluntary patient. Drinking up to 1 quart of vodka for over the past week; EtOH JIMMY 0.20(H), UDS positive for cannabinoids, reports daily use. MSSA with diazepam started for alcohol withdrawal. Withdrawal precautions in place, denies history of seizures. Admission labs ordered and reviewed those resulted. IM consult placed. Reporting anxiety, irritability, denying safety concerns including SI and HI. PTA medications continued as appropriate. Pt reports goals for hospitalization are to complete detox and unsure of other goals at this time, does not appear interested in CD treatment but open to discussing additional supports for sobriety on discharge.     The patient's symptoms of alcohol withdrawal improved, he was out of detox on 1/24/25 and requested to discharge home. He plans to follow up with his therapist on Monday, discussed R/B/A of MAT for AUD including recommendation of trial of acamprosate, pt declining at this time but will discuss starting with his PCP should he not be able to maintain sobriety upon discharge.     Today Georges Moreno reports having no thoughts of harming self or others. In addition, he has notable risk factors for self-harm, including single status, anxiety, and substance abuse. However, risk is mitigated by commitment to family, absence of past attempts, ability to volunteer a safety plan, history of seeking help when needed, and pt denying SI and is future oriented. Therefore, based on all available evidence including the factors cited above, he does not appear to be at imminent risk for self-harm, does not meet criteria for a 72-hr hold, and therefore remains appropriate for ongoing outpatient level of care.    Georges Moreno was  discharged  to home. At the time of discharge Georges Moreno was determined to not be a danger to himself or others.          Discharge Medications:     Current Discharge Medication List  "       CONTINUE these medications which have NOT CHANGED    Details   FLUoxetine (PROZAC) 40 MG capsule Take 1 capsule (40 mg) by mouth daily.    Associated Diagnoses: PUSHPA (generalized anxiety disorder)      gabapentin (NEURONTIN) 400 MG capsule Take 1 capsule (400 mg) by mouth 3 times daily.    Associated Diagnoses: PUSHPA (generalized anxiety disorder)      metoprolol tartrate (LOPRESSOR) 25 MG tablet Take 1 tablet (25 mg) by mouth 2 times daily.    Associated Diagnoses: Alcohol withdrawal, uncomplicated (H)      pantoprazole (PROTONIX) 40 MG EC tablet Take 1 tablet (40 mg) by mouth every morning (before breakfast).    Associated Diagnoses: Alcohol withdrawal, uncomplicated (H)      !! QUEtiapine (SEROQUEL) 100 MG tablet Take 0.5-1 tablets ( mg) by mouth nightly as needed (sleep).    Associated Diagnoses: Generalized anxiety disorder      !! QUEtiapine (SEROQUEL) 25 MG tablet Take 25 mg by mouth every 8 hours as needed.       !! - Potential duplicate medications found. Please discuss with provider.               Psychiatric Examination:   Appearance:  awake, alert and adequately groomed  Attitude:  cooperative  Eye Contact:  good  Mood:   \"doing a bit better\" irritability improved  Affect:  appropriate and in normal range and mood congruent  Speech:  clear, coherent and normal prosody  Psychomotor Behavior:  no evidence of tardive dyskinesia, dystonia, or tics  Thought Process:  logical, linear, and goal oriented  Associations:  no loose associations  Thought Content:  no evidence of suicidal ideation or homicidal ideation and no evidence of psychotic thought  Insight:  partial  Judgment:  fair, adequate for safety   Oriented to:  time, person, and place  Attention Span and Concentration:  intact  Recent and Remote Memory:  intact  Language: English, fluent  Fund of Knowledge: appropriate  Muscle Strength and Tone: normal  Gait and Station: Normal         Discharge Plan:   Recommendation:  Residential or " outpatient treatment     Disposition: home     Follow-up Appointment:   Recommend seeing your primary care physician within a month of discharge for follow up      Attestation:  Patient has been seen and evaluated by me, Gris Cassidy DO on day of discharge. 36 minutes were spent in coordination of discharge planning.

## 2025-01-24 NOTE — PLAN OF CARE
"  Problem: Alcohol Withdrawal  Goal: Alcohol Withdrawal Symptom Control  Outcome:  Progressing     Patient reports a mild improvement in her alcohol withdrawal symptoms. His MSSA scores were 3 and 4 no medications were given for withdrawal.  After receiving one-on-one listening support, he appeared more outgoing and was able to express his reasons for relapsing. At one point reported experiencing very high anxiety, rating it as 8 out of 10, and was given hydroxyzine  25 mg which had a noticeable effect on his symptoms.    Patient expressed disappointment about his multiple relapse a with a short period.  Additionally, he had a good dinner, maintained adequate fluid intake, and was compliant with all his scheduled medications. Staff will continue to monitor and assist her as needed.    BP (!) 144/87 (BP Location: Left arm, Patient Position: Sitting, Cuff Size: Adult Large)   Pulse 72   Temp 98.4  F (36.9  C) (Oral)   Resp 16   Ht 1.829 m (6' 0.01\")   Wt 88.7 kg (195 lb 8.8 oz)   SpO2 94%   BMI 26.52 kg/m     "

## 2025-01-24 NOTE — PLAN OF CARE
Problem: Alcohol Withdrawal  Goal: Alcohol Withdrawal Symptom Control  Outcome: Progressing     Problem: Sleep Disturbance  Goal: Adequate Sleep/Rest  Outcome: Progressing   Goal Outcome Evaluation:    The Patient's MSSA score was 4. He received no meds during the night. The Team conducted safety checks every 15 minutes, and there were no issues. He appeared to have slept well. His last Valium was on 1/23/25 at 1206 pm.

## 2025-01-24 NOTE — PLAN OF CARE
Problem: Alcohol Withdrawal  Goal: Alcohol Withdrawal Symptom Control  Outcome: Met   Goal Outcome Evaluation:    Plan of Care Reviewed With: patient        Patient has not required medications for alcohol withdrawal for > 24 hours and will not require alcohol withdrawal monitoring any further per [facility] protocol.  Patient is eating well and drinking adequate fluid.  His vital signs are within normal limits for patient.  He has participated in station programming and has been social with peers and staff.  Patient's gait is steady, his speech is clear and his thoughts follow.  Pt intends to return home and attend AA.  Patient lives with his mother and has stable housing.  Patient's mother is giving him a ride home.  All medications, discharge instructions and labs reviewed with patient.  Discharged to home

## 2025-01-24 NOTE — PROGRESS NOTES
[address]     Case Management Encounter: Met with team, discussed patient progress, discharge plan and any impediments to discharge.    Pt shared he has decided to return home and declined needing [facility] services. Pt reported he is realizing that has drinking has gotten worse of the past year and he knows he needs support from AA and therapy. He shared he has not followed through with AA as he has reported at past discharges but is now seeing that he has to if he wants to get sober. Pt shared feelings of being hurt and lonely and was encouraged to work through these feelings and situations with his therapist.     Pt reported that he is not sure of his plan for after detox. He reported he needs to talk to his mother and some friends to get their thoughts before he makes a decision. Pt reported a sore throat.    RN updated    Insurance: [employer]     Legal Status:  Voluntary   County:   File Number: NA  Start and expiration date of commitment: NA    SUDs Assessment Status: Will need for placement     ROIs on file: None     Living Situation: Lives with his mother in [place].     Current Providers, Supports & Collateral:  AA, sober peers    Current Plan/Referral Status: Return home and attend Therapy and AA    DOM Banda  [address] - Adult Inpatient Addiction Psychiatry Unit

## 2025-01-26 ENCOUNTER — PATIENT OUTREACH (OUTPATIENT)
Dept: CARE COORDINATION | Facility: CLINIC | Age: 50
End: 2025-01-26
Payer: COMMERCIAL

## 2025-01-26 NOTE — PROGRESS NOTES
[facility] Resource [place]: Transitions of Care Outreach  Chief Complaint   Patient presents with    Clinic Care Coordination - Post Hospital       Most Recent Admission Date: 1/22/2025   Most Recent Admission Diagnosis: Alcohol use disorder - F10.90     Most Recent Discharge Date: 1/24/2025   Most Recent Discharge Diagnosis: Alcohol use disorder - F10.90  Peripheral polyneuropathy - G62.9  Alcohol dependence with uncomplicated intoxication (H) - F10.220  Palpitations - R00.2     Transitions of Care Assessment    Discharge Assessment  How are you doing now that you are home?: States he is doing good, just finished breakfast and took his medication.  How are your symptoms? (Red Flag symptoms escalate to triage hotline per guidelines): Improved  Do you know how to contact your clinic care team if you have future questions or changes to your health status? : Yes  Does the patient have their discharge instructions? : Yes  Does the patient have questions regarding their discharge instructions? : No  Were you started on any new medications or were there changes to any of your previous medications? : No  Does the patient have all of their medications?: Yes  Do you have questions regarding any of your medications? : No  Do you have all of your needed medical supplies or equipment (DME)?  (i.e. oxygen tank, CPAP, cane, etc.): Yes         Post-op (Clinicians Only)  Did the patient have surgery or a procedure: No  Fever: No  Chills: No      Follow up Plan     Discharge Follow-Up  Discharge follow up appointment scheduled in alignment with recommended follow up timeframe or Transitions of Risk Category? (Low = within 30 days; Moderate= within 14 days; High= within 7 days): No  Patient's follow up appointment not scheduled: Patient declined scheduling support. Education on the importance of transitions of care follow up. Provided scheduling phone number.    No future appointments.    Outpatient Plan as outlined on AVS  reviewed with patient.    For any urgent concerns, please contact our 24 hour nurse triage line: [phone] ([phone])       Poppy Lagunas RN

## 2025-02-07 NOTE — ED NOTES
"ED to Behavioral Floor Handoff    SITUATION  Georges Moreno is a 44 year old male who speaks English and lives in a home with family members The patient arrived in the ED by private car from home with a complaint of Mental Health Problem (Pt states \"I just don't feel good\".  Pt states \"It feels like a panic attack\" has not slept or ate in 2 days.  Pt feels he has needed psychiatric help x2 years)  .The patient's current symptoms started/worsened 3 year(s) ago and during this time the symptoms have remained the same.   In the ED, pt was diagnosed with   Final diagnoses:   Alcohol dependence with uncomplicated intoxication (H)   Major depressive disorder, recurrent episode, moderate (H)        Initial vitals were: BP: (!) 140/101  Heart Rate: 99  Temp: 98.2  F (36.8  C)  Resp: 20  SpO2: 97 %   --------  Is the patient diabetic? No   If yes, last blood glucose? --     If yes, was this treated in the ED? --  --------  Is the patient inebriated (ETOH) Yes or Impaired on other substances? No  MSSA done? No  Last MSSA score: --    Were withdrawal symptoms treated? No  Does the patient have a seizure history? No. If yes, date of most recent seizure--  --------  Is the patient patient experiencing suicidal ideation? reports suicidal ideation with out intention or a suicidal plan    Homicidal ideation? denies current or recent homicidal ideation or behaviors.    Self-injurious behavior/urges? denies current or recent self injurious behavior or ideation.  ------  Was pt aggressive in the ED No  Was a code called No  Is the pt now cooperative? Yes  -------  Meds given in ED: Medications - No data to display   Family present during ED course? No  Family currently present? No    BACKGROUND  Does the patient have a cognitive impairment or developmental disability? No  Allergies:   Allergies   Allergen Reactions     Duloxetine      Rash, hives     Sertraline      Excessive fatigue   .   Social demographics are   Social History "     Socioeconomic History     Marital status: Single     Spouse name: None     Number of children: None     Years of education: None     Highest education level: None   Occupational History     Occupation: construction   Social Needs     Financial resource strain: None     Food insecurity:     Worry: None     Inability: None     Transportation needs:     Medical: None     Non-medical: None   Tobacco Use     Smoking status: Current Every Day Smoker     Years: 15.00     Smokeless tobacco: Never Used     Tobacco comment: a little over a pack a day   Substance and Sexual Activity     Alcohol use: Yes     Drug use: Yes     Types: Marijuana     Comment: today     Sexual activity: None   Lifestyle     Physical activity:     Days per week: None     Minutes per session: None     Stress: None   Relationships     Social connections:     Talks on phone: None     Gets together: None     Attends Anabaptist service: None     Active member of club or organization: None     Attends meetings of clubs or organizations: None     Relationship status: None     Intimate partner violence:     Fear of current or ex partner: None     Emotionally abused: None     Physically abused: None     Forced sexual activity: None   Other Topics Concern      Service Not Asked     Blood Transfusions Not Asked     Caffeine Concern Not Asked     Occupational Exposure Not Asked     Hobby Hazards Not Asked     Sleep Concern Not Asked     Stress Concern Not Asked     Weight Concern Not Asked     Special Diet Not Asked     Back Care Not Asked     Exercise Not Asked     Bike Helmet Not Asked     Seat Belt Not Asked     Self-Exams Not Asked     Parent/sibling w/ CABG, MI or angioplasty before 65F 55M? Not Asked   Social History Narrative     None        ASSESSMENT  Labs results   Labs Ordered and Resulted from Time of ED Arrival Up to the Time of Departure from the ED   DRUG ABUSE SCREEN 6 CHEM DEP URINE ([facility]) - Abnormal; Notable for the following  components:       Result Value    Cannabinoids Qual Urine Positive (*)     Ethanol Qual Urine Positive (*)     All other components within normal limits   ALCOHOL BREATH TEST POCT - Abnormal; Notable for the following components:    Alcohol Breath Test 0.047 (*)     All other components within normal limits   CBC WITH PLATELETS DIFFERENTIAL   COMPREHENSIVE METABOLIC PANEL      Imaging Studies: No results found for this or any previous visit (from the past 24 hour(s)).   Most recent vital signs BP (!) 140/101   Temp 98.2  F (36.8  C) (Oral)   Resp 20   SpO2 97%    Abnormal labs/tests/findings requiring intervention:---   Pain control: pt had none  Nausea control: fair    RECOMMENDATION  Are any infection precautions needed (MRSA, VRE, etc.)? No If yes, what infection? --  ---  Does the patient have mobility issues? independently. If yes, what device does the pt use? ---  ---  Is patient on 72 hour hold or commitment? No If on 72 hour hold, have hold and rights been given to patient? No  Are admitting orders written if after 10 p.m. ?No  Tasks needing to be completed:---     Ella Zhang, RN   asc--    [phone] [place] [facility]   [phone] [facility] ED   [phone]

## 2025-03-05 LAB
ATRIAL RATE - MUSE: 91 BPM
DIASTOLIC BLOOD PRESSURE - MUSE: NORMAL MMHG
INTERPRETATION ECG - MUSE: NORMAL
P AXIS - MUSE: 65 DEGREES
PR INTERVAL - MUSE: 150 MS
QRS DURATION - MUSE: 86 MS
QT - MUSE: 376 MS
QTC - MUSE: 462 MS
R AXIS - MUSE: 74 DEGREES
SYSTOLIC BLOOD PRESSURE - MUSE: NORMAL MMHG
T AXIS - MUSE: 43 DEGREES
VENTRICULAR RATE- MUSE: 91 BPM

## 2025-03-12 ENCOUNTER — OFFICE VISIT (OUTPATIENT)
Dept: URGENT CARE | Facility: URGENT CARE | Age: 50
End: 2025-03-12
Payer: COMMERCIAL

## 2025-03-12 VITALS
OXYGEN SATURATION: 96 % | WEIGHT: 207 LBS | HEART RATE: 49 BPM | TEMPERATURE: 98.7 F | RESPIRATION RATE: 18 BRPM | BODY MASS INDEX: 28.07 KG/M2 | SYSTOLIC BLOOD PRESSURE: 121 MMHG | DIASTOLIC BLOOD PRESSURE: 78 MMHG

## 2025-03-12 DIAGNOSIS — K61.2 ABSCESS OF ANAL OR RECTAL REGION: Primary | ICD-10-CM

## 2025-03-12 PROCEDURE — 99214 OFFICE O/P EST MOD 30 MIN: CPT | Performed by: FAMILY MEDICINE

## 2025-03-12 NOTE — PROGRESS NOTES
SUBJECTIVE:  Chief Complaint   Patient presents with    Urgent Care     Rectal fistula concerns- has an appt with rectal surgeon next month- noticed some drainage and pressure starting Saturday, broke open on Tuesday- pt recently discharged from detox treatment for alcohol- aroud 8 days ago     Georges Moreno is a 49 year old male who presents with a chief complaint of rectal drainage, has prior perirectal abscess and fistula.    Developed more swelling on Saturday, felt like a lump.  On Tuesday, area opened up and was draining a lot.  No fever.  Has appointment with rectal surgeon in 1 month    H/o alcohol abuse, just discharged from detox over a week ago.    On metoprolol due to a.fib    Past Medical History:   Diagnosis Date    ADHD     Alcohol abuse     Alcohol use disorder, severe, dependence     Atrial fibrillation     Vickers's esophagus     Depression     moderate    PUSHPA (generalized anxiety disorder)     Pancreatitis     Perirectal abscess     PTSD (post-traumatic stress disorder)      Current Outpatient Medications   Medication Sig Dispense Refill    FLUoxetine (PROZAC) 40 MG capsule Take 1 capsule (40 mg) by mouth daily.      gabapentin (NEURONTIN) 400 MG capsule Take 1 capsule (400 mg) by mouth 3 times daily.      metoprolol tartrate (LOPRESSOR) 25 MG tablet Take 1 tablet (25 mg) by mouth 2 times daily.      QUEtiapine (SEROQUEL) 100 MG tablet Take 0.5-1 tablets ( mg) by mouth nightly as needed (sleep).      QUEtiapine (SEROQUEL) 25 MG tablet Take 25 mg by mouth every 8 hours as needed.      pantoprazole (PROTONIX) 40 MG EC tablet Take 1 tablet (40 mg) by mouth every morning (before breakfast). (Patient not taking: Reported on 3/12/2025)       Social History     Tobacco Use    Smoking status: Every Day     Current packs/day: 1.50     Average packs/day: 1.5 packs/day for 15.0 years (22.5 ttl pk-yrs)     Types: Cigarettes    Smokeless tobacco: Former    Tobacco comments:     a little over a pack a day    Substance Use Topics    Alcohol use: Yes     Alcohol/week: 14.0 standard drinks of alcohol       ROS:  Review of systems negative except as stated above.    EXAM:   /78   Pulse (!) 49   Temp 98.7  F (37.1  C) (Tympanic)   Resp 18   Wt 93.9 kg (207 lb)   SpO2 96%   BMI 28.07 kg/m    GENERAL APPEARANCE: healthy, alert and no distress  RECTAL: prior healed incision perineal area and sacrum.  8 o'clock with mild swelling rectal, no active drainage.  4 o'clock with similar mild swelling  PSYCH:alert, affect flat      ASSESSMENT/PLAN:  (K61.2) Abscess of anal or rectal region  (primary encounter diagnosis)  Plan: amoxicillin-clavulanate (AUGMENTIN) 875-125 MG         tablet            Reassurance given, may be hemorrhoids but given history of rectal abscess and fistula, will treat with RX Augmentin.      Encourage to follow up with primary provider regarding slow heart rate, may need to adjust metoprolol    Follow up with Colorectal surgeon in 1 month (has appointment already)    Ned Dolan MD  March 12, 2025 10:41 AM

## 2025-03-24 ENCOUNTER — OFFICE VISIT (OUTPATIENT)
Dept: FAMILY MEDICINE | Facility: CLINIC | Age: 50
End: 2025-03-24
Payer: COMMERCIAL

## 2025-03-24 ENCOUNTER — NURSE TRIAGE (OUTPATIENT)
Dept: PEDIATRICS | Facility: CLINIC | Age: 50
End: 2025-03-24

## 2025-03-24 VITALS
TEMPERATURE: 98.3 F | WEIGHT: 197 LBS | DIASTOLIC BLOOD PRESSURE: 93 MMHG | SYSTOLIC BLOOD PRESSURE: 150 MMHG | OXYGEN SATURATION: 96 % | RESPIRATION RATE: 20 BRPM | HEART RATE: 83 BPM | BODY MASS INDEX: 26.71 KG/M2

## 2025-03-24 DIAGNOSIS — K61.2 ABSCESS OF ANAL OR RECTAL REGION: Primary | ICD-10-CM

## 2025-03-24 DIAGNOSIS — F10.930 ALCOHOL WITHDRAWAL SYNDROME WITHOUT COMPLICATION (H): ICD-10-CM

## 2025-03-24 PROCEDURE — 99213 OFFICE O/P EST LOW 20 MIN: CPT

## 2025-03-24 RX ORDER — DOXYCYCLINE 100 MG/1
100 CAPSULE ORAL 2 TIMES DAILY
Qty: 20 CAPSULE | Refills: 0 | Status: SHIPPED | OUTPATIENT
Start: 2025-03-24 | End: 2025-04-03

## 2025-03-24 RX ORDER — OMEPRAZOLE 20 MG/1
TABLET, DELAYED RELEASE ORAL
COMMUNITY

## 2025-03-24 RX ORDER — METHOCARBAMOL 750 MG/1
TABLET, FILM COATED ORAL
COMMUNITY
Start: 2025-03-18 | End: 2025-04-16

## 2025-03-24 ASSESSMENT — PATIENT HEALTH QUESTIONNAIRE - PHQ9
SUM OF ALL RESPONSES TO PHQ QUESTIONS 1-9: 0
SUM OF ALL RESPONSES TO PHQ QUESTIONS 1-9: 0
10. IF YOU CHECKED OFF ANY PROBLEMS, HOW DIFFICULT HAVE THESE PROBLEMS MADE IT FOR YOU TO DO YOUR WORK, TAKE CARE OF THINGS AT HOME, OR GET ALONG WITH OTHER PEOPLE: NOT DIFFICULT AT ALL

## 2025-03-24 NOTE — TELEPHONE ENCOUNTER
"Nurse Triage SBAR    Is this a 2nd Level Triage? NO    Situation:   Patient calls requesting to schedule appt for rectal abscess    Background:  Seen in  on 3/12 for rectal abscess and given amoxicillin-clavulanate (AUGMENTIN). Hx of rectal fistula and is scheduled to see a colorectal surgeon in about 1 month. Patient states he felt pain and pressure building in the anal/rectal area last night.     Assessment:   Painful lump on anus/rectum- rates 5-6/10, describes as approx size of a golf ball with surrounding swelling in the area  Unable to assess redness   No drainage at this time, but states \"It feels like it's going to burst\"  Denies: fever, itching, bleeding, nausea/vomiting    Protocol Recommended Disposition:   See in Office Today, See More Appropriate Protocol    Recommendation:   RN scheduled OV with JOE Johnson CNP today 3/24 at 4:10pm. Instructed patient to call back with further questions/concerns.     Patient was given an opportunity to ask questions, verbalized understanding of plan, and is agreeable.      Reason for Disposition   Painful lump or swelling at opening to anus (rectum)   MODERATE-SEVERE rectal pain (i.e., interferes with school, work, or sleep)    Additional Information   Negative: Widespread rash and bright red, sunburn-like and too weak to stand   Negative: Sounds like a life-threatening emergency to the triager   Negative: Sounds like a life-threatening emergency to the triager   Negative: Diarrhea is main symptom   Negative: Constipation is main symptom (e.g., pain or discomfort caused by passage of hard BMs)   Negative: Blood in or on bowel movement is main symptom   Negative: MPOX SUSPECTED (e.g., direct skin contact such as sex, recent travel to [place] or [place]) and any SYMPTOMS OF MPOX (e.g., rash, fever, muscle aches, or swollen lymph nodes)   Negative: AT RISK FOR MPOX (men-who-have-sex-with-men) and POSSIBLE EXPOSURE (e.g., multiple sex partners in past 21 days) " and ANY SYMPTOMS OF MPOX (e.g., rash, fever, muscle aches, or swollen lymph nodes)   Negative: Sexual assault or rape (sexual intercourse or activity occurs without freely given consent), known or suspected   Negative: Injury to rectum   Negative: Patient sounds very sick or weak to the triager   Negative: Severe rectal pain   Negative: Rectal pain or redness and fever > 100.4 F (38.0 C)   Negative: Acute onset rectal pain and constipation (straining with rectal pressure or fullness), which is not relieved by Sitz bath or suppository    Protocols used: Boil (Skin Abscess)-A-OH, Rectal Symptoms-A-OH      Susana BECKER RN  [facility]

## 2025-03-26 ENCOUNTER — HOSPITAL ENCOUNTER (EMERGENCY)
Facility: CLINIC | Age: 50
Discharge: HOME | End: 2025-03-26
Attending: INTERNAL MEDICINE | Admitting: INTERNAL MEDICINE
Payer: COMMERCIAL

## 2025-03-26 VITALS
SYSTOLIC BLOOD PRESSURE: 169 MMHG | WEIGHT: 196 LBS | HEART RATE: 93 BPM | OXYGEN SATURATION: 97 % | RESPIRATION RATE: 18 BRPM | TEMPERATURE: 98 F | DIASTOLIC BLOOD PRESSURE: 100 MMHG | BODY MASS INDEX: 26.58 KG/M2

## 2025-03-26 DIAGNOSIS — F10.129 ALCOHOL ABUSE WITH INTOXICATION: ICD-10-CM

## 2025-03-26 DIAGNOSIS — F10.220 ALCOHOL DEPENDENCE WITH UNCOMPLICATED INTOXICATION (H): ICD-10-CM

## 2025-03-26 LAB — ALCOHOL BREATH TEST: 0.31 (ref 0–0.01)

## 2025-03-26 PROCEDURE — 82075 ASSAY OF BREATH ETHANOL: CPT | Performed by: INTERNAL MEDICINE

## 2025-03-26 PROCEDURE — 99282 EMERGENCY DEPT VISIT SF MDM: CPT | Performed by: INTERNAL MEDICINE

## 2025-03-26 PROCEDURE — 99284 EMERGENCY DEPT VISIT MOD MDM: CPT | Performed by: INTERNAL MEDICINE

## 2025-03-26 RX ORDER — MULTIPLE VITAMINS W/ MINERALS TAB 9MG-400MCG
1 TAB ORAL DAILY
Status: DISCONTINUED | OUTPATIENT
Start: 2025-03-26 | End: 2025-03-26 | Stop reason: HOSPADM

## 2025-03-26 RX ORDER — DIAZEPAM 5 MG/1
5-20 TABLET ORAL EVERY 30 MIN PRN
Status: DISCONTINUED | OUTPATIENT
Start: 2025-03-26 | End: 2025-03-26 | Stop reason: HOSPADM

## 2025-03-26 RX ORDER — FOLIC ACID 1 MG/1
1 TABLET ORAL DAILY
Status: DISCONTINUED | OUTPATIENT
Start: 2025-03-26 | End: 2025-03-26 | Stop reason: HOSPADM

## 2025-03-26 ASSESSMENT — ACTIVITIES OF DAILY LIVING (ADL): ADLS_ACUITY_SCORE: 59

## 2025-03-26 NOTE — ED PROVIDER NOTES
ED Provider Note  [facility]      History     Chief Complaint   Patient presents with    Drug / Alcohol Assessment     Pt here for detox. Pt states his last drink was a couple hours ago. Pt drinks a quart of tequila daily.    Wound Infection     Pt states he has an abscess on his anus and that he would like to get it looked at.      HPI  Georges Moreno is a 49 year old male with history of prior perirectal abscess and fistula who presents to the ED for 2 different reasons, he is seeking alcohol detox as well as evaluation of possible perirectal abscess. Patient reports that he has been drinking alcohol most of his life. He has been in and out of detox, and was recently at [facility] about 2 months ago. He states he has been drinking about a quart of liquor a day. No history of DT or seizures. Denies suicidal and homicidal ideations. Patient states that his heart is beating fast. Otherwise, no other concerns at this time.       Past Medical History  Past Medical History:   Diagnosis Date    ADHD     Alcohol abuse     Alcohol use disorder, severe, dependence     Atrial fibrillation     Vickers's esophagus     Depression     moderate    PUSHPA (generalized anxiety disorder)     Pancreatitis     Perirectal abscess     PTSD (post-traumatic stress disorder)      Past Surgical History:   Procedure Laterality Date    Excision of pilonidal cyst      INCISION AND DRAINAGE PERIRECTAL ABSCESS       amoxicillin-clavulanate (AUGMENTIN) 875-125 MG tablet  doxycycline hyclate (VIBRAMYCIN) 100 MG capsule  FLUoxetine (PROZAC) 40 MG capsule  gabapentin (NEURONTIN) 400 MG capsule  methocarbamol (ROBAXIN) 750 MG tablet  metoprolol tartrate (LOPRESSOR) 25 MG tablet  omeprazole (PRILOSEC OTC) 20 MG EC tablet  pantoprazole (PROTONIX) 40 MG EC tablet  QUEtiapine (SEROQUEL) 100 MG tablet  QUEtiapine (SEROQUEL) 25 MG tablet      Allergies   Allergen Reactions    Sertraline Other (See Comments)     Excessive  fatigue  Reaction(s): Excessive fatigue.    Duloxetine Hives and Rash     Rash, hives     Family History  Family History   Problem Relation Age of Onset    Family History Negative Mother     Alcohol/Drug Father     Diabetes Father     Leukemia Father     Alcohol/Drug Paternal Uncle     Alcoholism Father     Alcoholism Brother      Social History   Social History     Tobacco Use    Smoking status: Every Day     Current packs/day: 1.50     Average packs/day: 1.5 packs/day for 15.0 years (22.5 ttl pk-yrs)     Types: Cigarettes    Smokeless tobacco: Former    Tobacco comments:     a little over a pack a day   Vaping Use    Vaping status: Never Used   Substance Use Topics    Alcohol use: Yes     Alcohol/week: 14.0 standard drinks of alcohol    Drug use: Yes     Types: Marijuana     Comment: today      A medically appropriate review of systems was performed with pertinent positives and negatives noted in the HPI, and all other systems negative.    Physical Exam   BP: (!) 169/100  Pulse: 93  Temp: 98  F (36.7  C)  Resp: 18  Weight: 88.9 kg (196 lb)  SpO2: 97 %  Physical Exam  Constitutional:       General: He is not in acute distress.     Appearance: He is not diaphoretic.   HENT:      Head: Atraumatic.   Eyes:      General: No scleral icterus.     Pupils: Pupils are equal, round, and reactive to light.   Cardiovascular:      Rate and Rhythm: Normal rate and regular rhythm.      Heart sounds: Normal heart sounds. No murmur heard.     No friction rub. No gallop.   Pulmonary:      Effort: Pulmonary effort is normal. No respiratory distress.      Breath sounds: Normal breath sounds. No stridor. No wheezing, rhonchi or rales.   Chest:      Chest wall: No tenderness.   Abdominal:      General: Abdomen is flat. Bowel sounds are normal. There is no distension.      Palpations: Abdomen is soft. There is no mass.      Tenderness: There is no abdominal tenderness. There is no right CVA tenderness, left CVA tenderness, guarding or  rebound.      Hernia: No hernia is present.   Musculoskeletal:         General: No tenderness.   Skin:     General: Skin is warm and dry.      Findings: No rash.   Neurological:      General: No focal deficit present.      Motor: No weakness.      Coordination: Coordination normal.   Psychiatric:         Mood and Affect: Mood normal.           ED Course, Procedures, & Data      Procedures            Results for orders placed or performed during the hospital encounter of 03/26/25   Alcohol breath test POCT     Status: Abnormal   Result Value Ref Range    Alcohol Breath Test 0.306 (A) 0.00 - 0.01     Medications - No data to display  Labs Ordered and Resulted from Time of ED Arrival to Time of ED Departure   ALCOHOL BREATH TEST POCT - Abnormal       Result Value    Alcohol Breath Test 0.306 (*)      No orders to display          Critical care was not performed.     Medical Decision Making  The patient's presentation was of moderate complexity (a chronic illness mild to moderate exacerbation, progression, or side effect of treatment).    The patient's evaluation involved:  ordering and/or review of 3+ test(s) in this encounter (see separate area of note for details)    The patient's management necessitated moderate risk (prescription drug management including medications given in the ED).    Assessment & Plan    Alcohol abuse and dependence with no h/o Sz or DT, initially wish to be admitted to Detox here and a bed was saved then his mother apparently saved another Detox bed somewhere else and here to pick him up. Discharged to his mother to be admitted to other Detox.    I have reviewed the nursing notes. I have reviewed the findings, diagnosis, plan and need for follow up with the patient.    Discharge Medication List as of 3/26/2025  6:47 PM          Final diagnoses:   Alcohol abuse with intoxication   Alcohol dependence with uncomplicated intoxication (H)   Bhavik TROTTER, am serving as a trained medical scribe  to document services personally performed by Maria Del Carmen De La O MD, based on the provider's statements to me.     I, Maria Del Carmen De La O MD, was physically present and have reviewed and verified the accuracy of this note documented by Bhavik Mejia.     Maria Del Carmen De La O MD  [facility] EMERGENCY DEPARTMENT  3/26/2025     Maria Del Carmen De La O MD  03/27/25 0129

## 2025-03-26 NOTE — ED TRIAGE NOTES
Pt here for detox. Pt states his last drink was a couple hours ago. Pt drinks a quart of tequila daily. Pt states he has an abscess on his anus and that he would like to get it looked at.      Triage Assessment (Adult)       Row Name 03/26/25 1713          Triage Assessment    Airway WDL WDL        Respiratory WDL    Respiratory WDL WDL        Skin Circulation/Temperature WDL    Skin Circulation/Temperature WDL WDL        Cardiac WDL    Cardiac WDL WDL        Peripheral/Neurovascular WDL    Peripheral Neurovascular WDL neurovascular assessment upper        Cognitive/Neuro/Behavioral WDL    Cognitive/Neuro/Behavioral WDL WDL;mood/behavior     Mood/Behavior anxious;sad;cooperative        [place] Coma Scale    Best Eye Response 4-->(E4) spontaneous     Best Motor Response 6-->(M6) obeys commands     Best Verbal Response 5-->(V5) oriented     [place] Coma Scale Score 15        LUE Neurovascular Assessment    Temperature LUE warm     Sensation LUE tingling present        RUE Neurovascular Assessment    Temperature RUE warm     Color RUE no discoloration     Sensation RUE no numbness;no tingling;no tenderness

## 2025-03-26 NOTE — ED NOTES
Pt wanted to discharge to [facility] for detox. Pt stated he has a bed at 8pm. Pt's mother came to  pt and transport him to [facility]. Pt req that writer called admission line #[phone] to let them know he is on his way, message left.

## 2025-05-18 ENCOUNTER — HOSPITAL ENCOUNTER (EMERGENCY)
Facility: CLINIC | Age: 50
Discharge: HOME | End: 2025-05-18
Attending: EMERGENCY MEDICINE | Admitting: EMERGENCY MEDICINE
Payer: COMMERCIAL

## 2025-05-18 ENCOUNTER — APPOINTMENT (OUTPATIENT)
Dept: CT IMAGING | Facility: CLINIC | Age: 50
End: 2025-05-18
Attending: EMERGENCY MEDICINE
Payer: COMMERCIAL

## 2025-05-18 ENCOUNTER — APPOINTMENT (OUTPATIENT)
Dept: GENERAL RADIOLOGY | Facility: CLINIC | Age: 50
End: 2025-05-18
Attending: EMERGENCY MEDICINE
Payer: COMMERCIAL

## 2025-05-18 VITALS
WEIGHT: 186.73 LBS | SYSTOLIC BLOOD PRESSURE: 142 MMHG | TEMPERATURE: 98.6 F | BODY MASS INDEX: 25.29 KG/M2 | HEIGHT: 72 IN | OXYGEN SATURATION: 98 % | RESPIRATION RATE: 20 BRPM | DIASTOLIC BLOOD PRESSURE: 89 MMHG | HEART RATE: 82 BPM

## 2025-05-18 DIAGNOSIS — R29.6 FALLS FREQUENTLY: ICD-10-CM

## 2025-05-18 DIAGNOSIS — T73.0XXA STARVATION KETOACIDOSIS: ICD-10-CM

## 2025-05-18 DIAGNOSIS — S09.90XA INJURY OF HEAD, INITIAL ENCOUNTER: ICD-10-CM

## 2025-05-18 DIAGNOSIS — E87.29 STARVATION KETOACIDOSIS: ICD-10-CM

## 2025-05-18 LAB
ANION GAP SERPL CALCULATED.3IONS-SCNC: 14 MMOL/L (ref 7–15)
ANION GAP SERPL CALCULATED.3IONS-SCNC: 20 MMOL/L (ref 7–15)
B-OH-BUTYR SERPL-SCNC: 1.81 MMOL/L
BASOPHILS # BLD MANUAL: 0 10E3/UL (ref 0–0.2)
BASOPHILS NFR BLD MANUAL: 0 %
BUN SERPL-MCNC: 21.3 MG/DL (ref 6–20)
BUN SERPL-MCNC: 28 MG/DL (ref 6–20)
CALCIUM SERPL-MCNC: 10 MG/DL (ref 8.8–10.4)
CALCIUM SERPL-MCNC: 9.7 MG/DL (ref 8.8–10.4)
CHLORIDE SERPL-SCNC: 100 MMOL/L (ref 98–107)
CHLORIDE SERPL-SCNC: 94 MMOL/L (ref 98–107)
CREAT SERPL-MCNC: 0.93 MG/DL (ref 0.67–1.17)
CREAT SERPL-MCNC: 0.95 MG/DL (ref 0.67–1.17)
EGFRCR SERPLBLD CKD-EPI 2021: >90 ML/MIN/1.73M2
EGFRCR SERPLBLD CKD-EPI 2021: >90 ML/MIN/1.73M2
EOSINOPHIL # BLD MANUAL: 0 10E3/UL (ref 0–0.7)
EOSINOPHIL NFR BLD MANUAL: 0 %
ERYTHROCYTE [DISTWIDTH] IN BLOOD BY AUTOMATED COUNT: 12.4 % (ref 10–15)
ETHANOL SERPL-MCNC: <0.01 G/DL
GLUCOSE SERPL-MCNC: 103 MG/DL (ref 70–99)
GLUCOSE SERPL-MCNC: 114 MG/DL (ref 70–99)
HCO3 SERPL-SCNC: 22 MMOL/L (ref 22–29)
HCO3 SERPL-SCNC: 26 MMOL/L (ref 22–29)
HCT VFR BLD AUTO: 47.1 % (ref 40–53)
HGB BLD-MCNC: 17.3 G/DL (ref 13.3–17.7)
LYMPHOCYTES # BLD MANUAL: 1.5 10E3/UL (ref 0.8–5.3)
LYMPHOCYTES NFR BLD MANUAL: 9 %
MAGNESIUM SERPL-MCNC: 1.5 MG/DL (ref 1.7–2.3)
MCH RBC QN AUTO: 31.5 PG (ref 26.5–33)
MCHC RBC AUTO-ENTMCNC: 36.7 G/DL (ref 31.5–36.5)
MCV RBC AUTO: 86 FL (ref 78–100)
MONOCYTES # BLD MANUAL: 0.8 10E3/UL (ref 0–1.3)
MONOCYTES NFR BLD MANUAL: 5 %
NEUTROPHILS # BLD MANUAL: 14.4 10E3/UL (ref 1.6–8.3)
NEUTROPHILS NFR BLD MANUAL: 86 %
PLAT MORPH BLD: ABNORMAL
PLATELET # BLD AUTO: 257 10E3/UL (ref 150–450)
POTASSIUM SERPL-SCNC: 3.7 MMOL/L (ref 3.4–5.3)
POTASSIUM SERPL-SCNC: 3.9 MMOL/L (ref 3.4–5.3)
RBC # BLD AUTO: 5.5 10E6/UL (ref 4.4–5.9)
RBC MORPH BLD: ABNORMAL
SODIUM SERPL-SCNC: 136 MMOL/L (ref 135–145)
SODIUM SERPL-SCNC: 140 MMOL/L (ref 135–145)
VARIANT LYMPHS BLD QL SMEAR: PRESENT
WBC # BLD AUTO: 16.7 10E3/UL (ref 4–11)

## 2025-05-18 PROCEDURE — 250N000013 HC RX MED GY IP 250 OP 250 PS 637: Performed by: EMERGENCY MEDICINE

## 2025-05-18 PROCEDURE — 96375 TX/PRO/DX INJ NEW DRUG ADDON: CPT

## 2025-05-18 PROCEDURE — 96361 HYDRATE IV INFUSION ADD-ON: CPT

## 2025-05-18 PROCEDURE — 85014 HEMATOCRIT: CPT | Performed by: EMERGENCY MEDICINE

## 2025-05-18 PROCEDURE — 99285 EMERGENCY DEPT VISIT HI MDM: CPT | Mod: 25

## 2025-05-18 PROCEDURE — 96365 THER/PROPH/DIAG IV INF INIT: CPT

## 2025-05-18 PROCEDURE — 250N000011 HC RX IP 250 OP 636: Performed by: EMERGENCY MEDICINE

## 2025-05-18 PROCEDURE — 258N000003 HC RX IP 258 OP 636: Performed by: EMERGENCY MEDICINE

## 2025-05-18 PROCEDURE — 70450 CT HEAD/BRAIN W/O DYE: CPT

## 2025-05-18 PROCEDURE — 93005 ELECTROCARDIOGRAM TRACING: CPT

## 2025-05-18 PROCEDURE — 73080 X-RAY EXAM OF ELBOW: CPT | Mod: RT

## 2025-05-18 PROCEDURE — 85007 BL SMEAR W/DIFF WBC COUNT: CPT | Performed by: EMERGENCY MEDICINE

## 2025-05-18 PROCEDURE — 82010 KETONE BODYS QUAN: CPT | Performed by: EMERGENCY MEDICINE

## 2025-05-18 PROCEDURE — 83735 ASSAY OF MAGNESIUM: CPT | Performed by: EMERGENCY MEDICINE

## 2025-05-18 PROCEDURE — 36415 COLL VENOUS BLD VENIPUNCTURE: CPT | Performed by: EMERGENCY MEDICINE

## 2025-05-18 PROCEDURE — 96366 THER/PROPH/DIAG IV INF ADDON: CPT

## 2025-05-18 PROCEDURE — 80048 BASIC METABOLIC PNL TOTAL CA: CPT | Performed by: EMERGENCY MEDICINE

## 2025-05-18 PROCEDURE — 82077 ASSAY SPEC XCP UR&BREATH IA: CPT | Performed by: EMERGENCY MEDICINE

## 2025-05-18 RX ORDER — MAGNESIUM OXIDE 400 MG/1
800 TABLET ORAL ONCE
Status: COMPLETED | OUTPATIENT
Start: 2025-05-18 | End: 2025-05-18

## 2025-05-18 RX ORDER — THERA TABS 400 MCG
1 TAB ORAL ONCE
Status: COMPLETED | OUTPATIENT
Start: 2025-05-18 | End: 2025-05-18

## 2025-05-18 RX ORDER — ONDANSETRON 2 MG/ML
4 INJECTION INTRAMUSCULAR; INTRAVENOUS EVERY 30 MIN PRN
Status: DISCONTINUED | OUTPATIENT
Start: 2025-05-18 | End: 2025-05-18 | Stop reason: HOSPADM

## 2025-05-18 RX ORDER — ACETAMINOPHEN 325 MG/1
975 TABLET ORAL ONCE
Status: COMPLETED | OUTPATIENT
Start: 2025-05-18 | End: 2025-05-18

## 2025-05-18 RX ORDER — MAGNESIUM SULFATE HEPTAHYDRATE 40 MG/ML
2 INJECTION, SOLUTION INTRAVENOUS ONCE
Status: COMPLETED | OUTPATIENT
Start: 2025-05-18 | End: 2025-05-18

## 2025-05-18 RX ORDER — ONDANSETRON 4 MG/1
4 TABLET, ORALLY DISINTEGRATING ORAL EVERY 8 HOURS PRN
Qty: 10 TABLET | Refills: 0 | Status: SHIPPED | OUTPATIENT
Start: 2025-05-18

## 2025-05-18 RX ORDER — DIPHENHYDRAMINE HYDROCHLORIDE 50 MG/ML
25 INJECTION, SOLUTION INTRAMUSCULAR; INTRAVENOUS ONCE
Status: COMPLETED | OUTPATIENT
Start: 2025-05-18 | End: 2025-05-18

## 2025-05-18 RX ORDER — DEXTROSE MONOHYDRATE AND SODIUM CHLORIDE 5; .9 G/100ML; G/100ML
INJECTION, SOLUTION INTRAVENOUS CONTINUOUS
Status: DISCONTINUED | OUTPATIENT
Start: 2025-05-18 | End: 2025-05-18 | Stop reason: HOSPADM

## 2025-05-18 RX ORDER — THIAMINE HYDROCHLORIDE 100 MG/ML
100 INJECTION, SOLUTION INTRAMUSCULAR; INTRAVENOUS ONCE
Status: COMPLETED | OUTPATIENT
Start: 2025-05-18 | End: 2025-05-18

## 2025-05-18 RX ORDER — FOLIC ACID 1 MG/1
1 TABLET ORAL ONCE
Status: COMPLETED | OUTPATIENT
Start: 2025-05-18 | End: 2025-05-18

## 2025-05-18 RX ADMIN — THERA TABS 1 TABLET: TAB at 10:23

## 2025-05-18 RX ADMIN — THIAMINE HYDROCHLORIDE 100 MG: 100 INJECTION, SOLUTION INTRAMUSCULAR; INTRAVENOUS at 10:23

## 2025-05-18 RX ADMIN — SODIUM CHLORIDE 1000 ML: 0.9 INJECTION, SOLUTION INTRAVENOUS at 08:53

## 2025-05-18 RX ADMIN — PROCHLORPERAZINE EDISYLATE 10 MG: 5 INJECTION INTRAMUSCULAR; INTRAVENOUS at 08:52

## 2025-05-18 RX ADMIN — MAGNESIUM SULFATE HEPTAHYDRATE 2 G: 40 INJECTION, SOLUTION INTRAVENOUS at 10:30

## 2025-05-18 RX ADMIN — Medication 800 MG: at 10:23

## 2025-05-18 RX ADMIN — FOLIC ACID 1 MG: 1 TABLET ORAL at 10:23

## 2025-05-18 RX ADMIN — DEXTROSE AND SODIUM CHLORIDE: 5; .9 INJECTION, SOLUTION INTRAVENOUS at 10:23

## 2025-05-18 RX ADMIN — DIPHENHYDRAMINE HYDROCHLORIDE 25 MG: 50 INJECTION, SOLUTION INTRAMUSCULAR; INTRAVENOUS at 08:52

## 2025-05-18 RX ADMIN — ONDANSETRON 4 MG: 2 INJECTION, SOLUTION INTRAMUSCULAR; INTRAVENOUS at 10:08

## 2025-05-18 RX ADMIN — ACETAMINOPHEN 975 MG: 325 TABLET, FILM COATED ORAL at 10:23

## 2025-05-18 ASSESSMENT — ACTIVITIES OF DAILY LIVING (ADL)
ADLS_ACUITY_SCORE: 59

## 2025-05-18 NOTE — ED PROVIDER NOTES
Emergency Department Note      History of Present Illness     Chief Complaint   Fall      HPI   Georges Moreno is a 49 year old male with a history of alcohol dependence and atrial fibrillation presenting for evaluation after a fall. The patient reports that he had been 7 weeks sober from alcohol until Thursday night. He endorses drinking on Friday as well. On Friday night, the patient slipped when doing laundry and hit the right side of his head on cement [place]. His head was bleeding. He has new pain and bruising on his right elbow. He laid on the floor for a little bit, but he is not sure if he lost consciousness. On Friday night, that patient was able to get a couple hours of sleep. He was nauseous and vomiting yesterday and was not able to sleep last night. The patient denies vision changes, using street drugs, and new weakness, numbness, or tingling.    Independent Historian   None    Review of External Notes   I reviewed an emergency department note from 3/26/25 where the patient was seen for alcohol intoxication. He was looking to go to detox at that time. His tetanus is 2021.    Past Medical History     Medical History and Problem List   Past Medical History:   Diagnosis Date    ADHD     Alcohol abuse     Alcohol use disorder, severe, dependence     Atrial fibrillation     Vickers's esophagus     Depression     PUSHPA (generalized anxiety disorder)     Pancreatitis     Perirectal abscess     PTSD (post-traumatic stress disorder)        Medications   FLUoxetine (PROZAC) 40 MG capsule  gabapentin (NEURONTIN) 400 MG capsule  medical cannabis (Patient's own supply)  metoprolol tartrate (LOPRESSOR) 25 MG tablet  omeprazole (PRILOSEC OTC) 20 MG EC tablet  QUEtiapine (SEROQUEL) 100 MG tablet        Surgical History   Past Surgical History:   Procedure Laterality Date    Excision of pilonidal cyst      INCISION AND DRAINAGE PERIRECTAL ABSCESS         Physical Exam     Patient Vitals for the past 24 hrs:   BP Temp  Temp src Pulse Resp SpO2 Height Weight   05/18/25 1156 -- -- -- 82 -- -- -- --   05/18/25 1141 (!) 142/89 -- -- 89 -- -- -- --   05/18/25 1126 138/78 -- -- 88 -- -- -- --   05/18/25 1111 (!) 144/78 -- -- 91 -- -- -- --   05/18/25 1056 -- -- -- 86 -- -- -- --   05/18/25 1041 (!) 154/94 -- -- 96 -- -- -- --   05/18/25 1035 (!) 132/121 -- -- 105 -- -- -- --   05/18/25 1026 -- -- -- 79 -- -- -- --   05/18/25 1025 -- -- -- 79 -- -- -- --   05/18/25 1015 -- -- -- 114 -- -- -- --   05/18/25 1005 (!) 163/92 -- -- -- -- 98 % -- --   05/18/25 0955 -- -- -- -- -- 96 % -- --   05/18/25 0945 (!) 163/93 -- -- 89 -- 97 % -- --   05/18/25 0935 (!) 162/93 -- -- -- -- 97 % -- --   05/18/25 0900 (!) 153/85 -- -- 80 -- 99 % -- --   05/18/25 0810 (!) 181/106 98.6  F (37  C) Temporal 103 20 98 % 1.829 m (6') 84.7 kg (186 lb 11.7 oz)     Physical Exam  General: Resting on the bed.  Head: No obvious trauma to head.  Right parietal scalp abrasion   Ears, Nose, Throat:  External ears normal.  Nose normal.  No pharyngeal erythema, swelling or exudate.  Midline uvula.  Clear TMs.   Eyes:  Conjunctivae clear.  Pupils are equal, round, and reactive. EOMI.    Neck: Normal range of motion.  Neck supple. Non tender c spine.    CV: Regular rate and rhythm.  No murmurs.      Respiratory: Effort normal and breath sounds normal.  No wheezing or crackles.   Gastrointestinal: Soft.  No distension. There is no tenderness.   Musculoskeletal: Normal range of motion.  Non tender extremities to palpations.  Non tender c/t/l spine.  Stable pelvis.    Neuro: Alert. Moving all extremities appropriately.  Normal speech. CN II-XII grossly intact.  Gross muscle strength intact of the proximal and distal bilateral upper and lower extremities.  Sensation intact to light touch in all 4 extremities.    Skin: Skin is warm and dry.  No rash noted. Ecchymosis to right posterior elbow.       Diagnostics     Lab Results   Labs Ordered and Resulted from Time of ED Arrival to  Time of ED Departure   BASIC METABOLIC PANEL - Abnormal       Result Value    Sodium 136      Potassium 3.9      Chloride 94 (*)     Carbon Dioxide (CO2) 22      Anion Gap 20 (*)     Urea Nitrogen 28.0 (*)     Creatinine 0.93      GFR Estimate >90      Calcium 10.0      Glucose 114 (*)    MAGNESIUM - Abnormal    Magnesium 1.5 (*)    CBC WITH PLATELETS AND DIFFERENTIAL - Abnormal    WBC Count 16.7 (*)     RBC Count 5.50      Hemoglobin 17.3      Hematocrit 47.1      MCV 86      MCH 31.5      MCHC 36.7 (*)     RDW 12.4      Platelet Count 257     RBC AND PLATELET MORPHOLOGY - Abnormal    RBC Morphology Confirmed RBC Indices      Platelet Assessment        Value: Automated Count Confirmed. Platelet morphology is normal.    Reactive Lymphocytes Present (*)    KETONE BETA-HYDROXYBUTYRATE QUANTITATIVE, RAPID - Abnormal    Ketone (Beta-Hydroxybutyrate) Quantitative 1.81 (*)    MANUAL DIFFERENTIAL - Abnormal    % Neutrophils 86      % Lymphocytes 9      % Monocytes 5      % Eosinophils 0      % Basophils 0      Absolute Neutrophils 14.4 (*)     Absolute Lymphocytes 1.5      Absolute Monocytes 0.8      Absolute Eosinophils 0.0      Absolute Basophils 0.0     BASIC METABOLIC PANEL - Abnormal    Sodium 140      Potassium 3.7      Chloride 100      Carbon Dioxide (CO2) 26      Anion Gap 14      Urea Nitrogen 21.3 (*)     Creatinine 0.95      GFR Estimate >90      Calcium 9.7      Glucose 103 (*)    ETHANOL LEVEL BLOOD - Normal    Ethanol Level Blood <0.01     LACTIC ACID WHOLE BLOOD       Imaging   XR Elbow Right G/E 3 Views   Final Result   IMPRESSION: Anatomic alignment right elbow. No acute displaced right elbow fracture. No significant elbow joint space narrowing. No elbow joint effusion. Four small rectangle-shaped densities are seen within the medial and posterior elbow soft tissues at    the level of the distal humeral metaphysis. Correlate for foreign bodies.         CT Head w/o Contrast   Final Result   IMPRESSION:    1.  Normal head CT.                EKG  ECG taken at 1101, ECG read at 1102  Normal sinus rhythm   No significant change as compared to prior, dated 1/22/25.  Rate 81 bpm. DE interval 152 ms. QRS duration 84 ms. QT/QTc 372/432 ms. P-R-T axes 65 70 41.       Independent Interpretation   CT Head: No intracranial hemorrhage.  X-ray Elbow Right shows no fracture    ED Course      Medications Administered   Medications   ondansetron (ZOFRAN) injection 4 mg (4 mg Intravenous $Given 5/18/25 1008)   dextrose 5% and 0.9% NaCl infusion ( Intravenous $New Bag 5/18/25 1023)   sodium chloride 0.9% BOLUS 1,000 mL (0 mLs Intravenous Stopped 5/18/25 1230)   prochlorperazine (COMPAZINE) injection 10 mg (10 mg Intravenous $Given 5/18/25 0852)   diphenhydrAMINE (BENADRYL) injection 25 mg (25 mg Intravenous $Given 5/18/25 0852)   acetaminophen (TYLENOL) tablet 975 mg (975 mg Oral $Given 5/18/25 1023)   multivitamin, therapeutic (THERA-VIT) tablet 1 tablet (1 tablet Oral $Given 5/18/25 1023)   folic acid (FOLVITE) tablet 1 mg (1 mg Oral $Given 5/18/25 1023)   magnesium oxide (MAG-OX) tablet 800 mg (800 mg Oral $Given 5/18/25 1023)   magnesium sulfate 2 g in 50 mL sterile water intermittent infusion (0 g Intravenous Stopped 5/18/25 1230)   thiamine (B-1) injection 100 mg (100 mg Intravenous $Given 5/18/25 1023)       Procedures   Procedures     Discussion of Management   None    ED Course   ED Course as of 05/18/25 1312   Sun May 18, 2025   0822 I obtained history and performed physical exam.   1151 I rechecked on the patient.   1249 I rechecked on the patient.   1300 I rechecked on the patient and prepared them for discharge.       Additional Documentation  None    Medical Decision Making / Diagnosis     CMS Diagnoses: None    MIPS   None               [facility]   Georges Moreno is a 49 year old male who presents to the emergency department for falls, vomiting.  Vital signs are stable.  Broad differential was considered including not limited  to electrolyte, metabolic, renal dysfunction, infection, starvation ketoacidosis, alcoholic ketoacidosis, trauma, electrolyte, metabolic, renal dysfunction, etc.  ECG shows sinus rhythm.  Normal intervals.  No evidence of ischemia or arrhythmia.  This was checked in the setting of numerous antiemetics given.  CBC shows mild leukocytosis but no significant anemia.  BMP without acute electrolyte abnormalities but patient does have elevation in anion gap and slightly low bicarb suspicious for starvation ketoacidosis in the setting of elevated ketones.  This resolved with fluids and p.o. intake.  Repeat BMP is reassuring.  Magnesium is low, replacement given.  Thiamine and multivitamin given as well.  Alcohol levels negative.  Head CT negative for acute hemorrhage.  Patient has a nonfocal neurologic exam.  No signs of stroke.  X-ray of the elbow is negative for fracture.  Patient tolerating p.o.  He is looking improved.  Will discharge home with antiemetics.  I do not think that he is actively withdrawing as he reports he is only been drinking for the last 2 days.  This seems unlikely to precipitate alcohol withdrawal.  Will continue to monitor closely at home.  Return to having new or worsening symptoms.  Antinausea meds for home.  Close follow-up with primary doctor.      Disposition   The patient was discharged.     Diagnosis     ICD-10-CM    1. Falls frequently  R29.6       2. Starvation ketoacidosis  T73.0XXA     E87.29       3. Injury of head, initial encounter  S09.90XA            Discharge Medications   New Prescriptions    No medications on file         Scribe Disclosure:  I, Hailey Carreon, am serving as a scribe at 8:13 AM on 5/18/2025 to document services personally performed by Mari Arguelles MD based on my observations and the provider's statements to me.        Mari Arguelles MD  05/18/25 0475

## 2025-05-18 NOTE — ED TRIAGE NOTES
Arrives from home with complaints of concussion symptoms. States slipped in the laundry room and hit right side of head on the cement floor. Is unsure if LOC. Denies vision changes. Denies being on any blood thinners. Has an abrasion on right side of head. Admits to drinking some. Has been nauseated and vomiting since Saturday. Also complaints of headache.  States has been struggling with alcohol use, previously had been sober for 7 weeks.     Triage Assessment (Adult)       Row Name 05/18/25 0811          Triage Assessment    Airway WDL WDL        Respiratory WDL    Respiratory WDL WDL        Skin Circulation/Temperature WDL    Skin Circulation/Temperature WDL WDL        Cardiac WDL    Cardiac WDL WDL        Peripheral/Neurovascular WDL    Peripheral Neurovascular WDL WDL        Cognitive/Neuro/Behavioral WDL    Cognitive/Neuro/Behavioral WDL WDL

## 2025-05-18 NOTE — PHARMACY-ADMISSION MEDICATION HISTORY
Pharmacy Intern Admission Medication History    Admission medication history is complete. The information provided in this note is only as accurate as the sources available at the time of the update.    Information Source(s): Patient and CareEverywhere/SureScripts via in-person    Pertinent Information: Patient noted that he has accumulated a supply a metoprolol from various hospitalizations (dispense hx 12/13/24 #60)     Changes made to PTA medication list:  Added: Medical marijuana   Deleted: Augmentin, Pantoprazole   Changed: Omeprazole-> added sig     Allergies reviewed with patient and updates made in EHR: yes    Medication History Completed By: Naima Riojas RPH 5/18/2025 11:40 AM    PTA Med List   Medication Sig Note Last Dose/Taking    FLUoxetine (PROZAC) 40 MG capsule Take 1 capsule (40 mg) by mouth daily.  5/18/2025 Morning    gabapentin (NEURONTIN) 400 MG capsule Take 1 capsule (400 mg) by mouth 3 times daily.  5/18/2025 Morning    medical cannabis (Patient's own supply) See Admin Instructions. (The purpose of this order is to document that the patient reports taking medical cannabis.  This is not a prescription, and is not used to certify that the patient has a qualifying medical condition.)  Taking    metoprolol tartrate (LOPRESSOR) 25 MG tablet Take 1 tablet (25 mg) by mouth 2 times daily.  5/17/2025 Evening    omeprazole (PRILOSEC OTC) 20 MG EC tablet Take 20 mg by mouth daily as needed.  Unknown    QUEtiapine (SEROQUEL) 100 MG tablet Take 0.5-1 tablets ( mg) by mouth nightly as needed (sleep). 5/18/2025: Rarely uses  Taking As Needed

## 2025-05-18 NOTE — DISCHARGE INSTRUCTIONS
Please follow with your PCP in 2-3 days.  Return to the ED if notice increasing weakness, headache, confusion, not acting like your self, vomiting more than 2 times, or other acute changes.    Discharge Instructions  Head Injury    You have been seen today for a head injury. Your evaluation included a history and physical examination. You may have had a CT (CAT) scan performed, though most head injuries do not require a scan. Based on this evaluation, your provider today does not feel that your head injury is serious.    Generally, every Emergency Department visit should have a follow-up clinic visit with either a primary or a specialty clinic/provider. Please follow-up as instructed by your emergency provider today.  Return to the Emergency Department if:  You are confused or you are not acting right.  Your headache gets worse or you start to have a really bad headache even with your recommended treatment plan.  You vomit (throw up) more than once.  You have a seizure.  You have trouble walking.  You have weakness or paralysis (cannot move) in an arm or a leg.  You have blood or fluid coming from your ears or nose.  You have new symptoms or anything that worries you.    Sleeping:  It is okay for you to sleep, but someone should wake you up if instructed by your provider, and someone should check on you at your usual time to wake up.     Activity:  Do not drive for at least 24 hours.  Do not drive if you have dizzy spells or trouble concentrating, or remembering things.  Do not return to any contact sports until cleared by your regular provider.     MORE INFORMATION:    Concussion:  A concussion is a minor head injury that may cause temporary problems with the way the brain works. Although concussions are important, they are generally not an emergency or a reason that a person needs to be hospitalized. Some concussion symptoms include confusion, amnesia (forgetful), nausea (sick to your stomach) and vomiting  (throwing up), dizziness, fatigue, memory or concentration problems, irritability and sleep problems. For most people, concussions are mild and temporary but some will have more severe and persistent symptoms that require on-going care and treatment.  CT Scans: Your evaluation today may have included a CT scan (CAT scan) to look for things like bleeding or a skull fracture (broken bone).  CT scans involve radiation and too many CT scans can cause serious health problems like cancer, especially in children.  Because of this, your provider may not have ordered a CT scan today if they think you are at low risk for a serious or life threatening problem.    Blood Thinners. If you take blood thinners, there is a very small risk of delayed bleeding after your head injury. In the days/weeks to come, watch for the symptoms described above particularly headaches, confusion, problems walking, and weakness in an arm or leg.    If you were given a prescription for medicine here today, be sure to read all of the information (including the package insert) that comes with your prescription.  This will include important information about the medicine, its side effects, and any warnings that you need to know about.  The pharmacist who fills the prescription can provide more information and answer questions you may have about the medicine.  If you have questions or concerns that the pharmacist cannot address, please call or return to the Emergency Department.     Remember that you can always come back to the Emergency Department if you are not able to see your regular provider in the amount of time listed above, if you get any new symptoms, or if there is anything that worries you.    Discharge Instructions  Vomiting    You have been seen today for vomiting (throwing up). This is usually caused by a virus, but some bacteria, parasites, medicines or other medical conditions can cause similar symptoms. At this time your provider does  not find that your vomiting is a sign of anything dangerous or life-threatening. However, sometimes the signs of serious illness do not show up right away. If you have new or worse symptoms, you may need to be seen again in the Emergency Department or by your primary provider. Remember that serious problems like appendicitis can start as vomiting.    Generally, every Emergency Department visit should have a follow-up clinic visit with either a primary or a specialty clinic/provider. Please follow-up as instructed by your emergency provider today.    Return to the Emergency Department if:  You keep vomiting and you are not able to keep liquids down.   You feel you are getting dehydrated, such as being very thirsty, not urinating (peeing) at least every 8-12 hours, or feeling faint or lightheaded.   You develop a new fever, or your fever continues for more than 2 days.   You have abdominal (belly pain) that seems worse than cramps, is in one spot, or is getting worse over time. Appendicitis usually causes pain in the right lower abdomen (to the right and below your belly button) so watch for pain in this location.  You have blood in your vomit or stools.   You feel very weak.  You are not starting to improve within 24 hours of your visit here.     What can I do to help myself?  The most important thing to do is to drink clear liquids. If you have been vomiting a lot, it is best to have only small, frequent sips of liquids. Drinking too much at once may cause more vomiting. If you are vomiting often, you must replace minerals, sodium and potassium lost with your illness. Pedialyte  is the best available rehydration liquid but some find that it doesn t taste good so sports drinks are an alterative. You can also drink clear liquids such as water, weak tea, apple juice, and 7-Up . Avoid acid liquids (orange), caffeine (coffee) or alcohol. Do not drink milk until you no longer have diarrhea (loose stools).   After liquids  are staying down, you may start eating mild foods. Soda crackers, toast, plain noodles, gelatin, applesauce and bananas are good first choices. Avoid foods that have acid, are spicy, fatty or have a lot of fiber (such as meats, coarse grains, vegetables). You may start eating these foods again in about 3 days when you are better.   Sometimes treatment includes prescription medicine to prevent nausea (sick to your stomach) and vomiting. If your provider prescribes these for you, take them as directed.   Do not take ibuprofen, naproxen, or other nonsteroidal anti-inflammatory (NSAID) medicines without checking with your healthcare provider.     If you were given a prescription for medicine here today, be sure to read all of the information (including the package insert) that comes with your prescription.  This will include important information about the medicine, its side effects, and any warnings that you need to know about.  The pharmacist who fills the prescription can provide more information and answer questions you may have about the medicine.  If you have questions or concerns that the pharmacist cannot address, please call or return to the Emergency Department.     Remember that you can always come back to the Emergency Department if you are not able to see your regular provider in the amount of time listed above, if you get any new symptoms, or if there is anything that worries you.

## 2025-05-19 ENCOUNTER — OFFICE VISIT (OUTPATIENT)
Dept: URGENT CARE | Facility: URGENT CARE | Age: 50
End: 2025-05-19
Payer: COMMERCIAL

## 2025-05-19 VITALS
SYSTOLIC BLOOD PRESSURE: 152 MMHG | HEART RATE: 61 BPM | TEMPERATURE: 97.2 F | RESPIRATION RATE: 18 BRPM | WEIGHT: 193 LBS | DIASTOLIC BLOOD PRESSURE: 89 MMHG | HEIGHT: 72 IN | OXYGEN SATURATION: 97 % | BODY MASS INDEX: 26.14 KG/M2

## 2025-05-19 DIAGNOSIS — J02.9 PHARYNGITIS, UNSPECIFIED ETIOLOGY: Primary | ICD-10-CM

## 2025-05-19 LAB
ATRIAL RATE - MUSE: 81 BPM
DEPRECATED S PYO AG THROAT QL EIA: NEGATIVE
DIASTOLIC BLOOD PRESSURE - MUSE: NORMAL MMHG
INTERPRETATION ECG - MUSE: NORMAL
P AXIS - MUSE: 65 DEGREES
PR INTERVAL - MUSE: 152 MS
QRS DURATION - MUSE: 84 MS
QT - MUSE: 372 MS
QTC - MUSE: 432 MS
R AXIS - MUSE: 70 DEGREES
S PYO DNA THROAT QL NAA+PROBE: NOT DETECTED
SYSTOLIC BLOOD PRESSURE - MUSE: NORMAL MMHG
T AXIS - MUSE: 41 DEGREES
VENTRICULAR RATE- MUSE: 81 BPM

## 2025-05-19 PROCEDURE — 87651 STREP A DNA AMP PROBE: CPT | Performed by: PHYSICIAN ASSISTANT

## 2025-05-19 PROCEDURE — 99213 OFFICE O/P EST LOW 20 MIN: CPT | Performed by: PHYSICIAN ASSISTANT

## 2025-05-19 RX ORDER — LIDOCAINE HYDROCHLORIDE 20 MG/ML
SOLUTION OROPHARYNGEAL
Qty: 100 ML | Refills: 0 | Status: SHIPPED | OUTPATIENT
Start: 2025-05-19

## 2025-05-19 NOTE — PROGRESS NOTES
Urgent Care Clinic Visit    Chief Complaint   Patient presents with    Urgent Care     Has been throwing up a lot due to recent fall (seen at the ER for this) and now there is pain in throat, not sure if he is having this from throwing up or has strep              5/19/2025    10:31 AM   Additional Questions   Roomed by Ena     No  Does the patient have a sore throat and either history of fever >100.4 in the previous 24 hours without a cough or recent exposure to a known case of strep throat? No

## 2025-05-19 NOTE — PROGRESS NOTES
SUBJECTIVE:  Georges Moreno is a 49 year old male who comes in with concerns for sore throat that began yesterday.  Patient concerned that he may possibly be strep.  Has not had any fevers.  He is unsure if it is related to his recent vomiting.  Patient does have a long history of alcohol abuse.  He had been sober for 7 weeks but did have a 2-day episode of drinking which resulted in a fall in the laundry and hitting the side of his head.  He started have headache and was vomiting.  He was seen in the ER yesterday and had clear head CT.  He wants to make sure there is nothing else of his throat going on.  He is otherwise at baseline health.    Past Medical History:   Diagnosis Date    ADHD     Alcohol abuse     Alcohol use disorder, severe, dependence     Atrial fibrillation     Vickers's esophagus     Depression     moderate    PUSHPA (generalized anxiety disorder)     Pancreatitis     Perirectal abscess     PTSD (post-traumatic stress disorder)      Patient Active Problem List   Diagnosis    Thoracic or lumbosacral neuritis or radiculitis, unspecified    Screening for cardiovascular condition    Alcohol dependence with withdrawal with complication (H)    Atrial fibrillation (H)    Paroxysmal atrial fibrillation (H)    Generalized anxiety disorder    Personal history of alcoholism (H)    Cigarette nicotine dependence with withdrawal    Alcohol withdrawal syndrome without complication (H)    Cannabis use disorder, moderate, dependence (H)    Alcohol withdrawal with complication with inpatient treatment, with unspecified complication (H)    Palpitations    Palpitation    Alcohol abuse    Anxiety    Elevated troponin    Alcohol withdrawal syndrome with complication (H)    Elevated lactic acid level    Alcohol withdrawal (H)    Recurrent major depression    Alcohol withdrawal, uncomplicated (H)    Laceration of left index finger without damage to nail, foreign body presence unspecified, initial encounter    Alcohol use  disorder, severe, dependence (H)    Acute alcoholism (H)    Alcohol use disorder    Alcohol dependence with uncomplicated intoxication (H)     Current Outpatient Medications   Medication Sig Dispense Refill    FLUoxetine (PROZAC) 40 MG capsule Take 1 capsule (40 mg) by mouth daily.      gabapentin (NEURONTIN) 400 MG capsule Take 1 capsule (400 mg) by mouth 3 times daily.      medical cannabis (Patient's own supply) See Admin Instructions. (The purpose of this order is to document that the patient reports taking medical cannabis.  This is not a prescription, and is not used to certify that the patient has a qualifying medical condition.)      metoprolol tartrate (LOPRESSOR) 25 MG tablet Take 1 tablet (25 mg) by mouth 2 times daily.      omeprazole (PRILOSEC OTC) 20 MG EC tablet Take 20 mg by mouth daily as needed.      ondansetron (ZOFRAN ODT) 4 MG ODT tab Take 1 tablet (4 mg) by mouth every 8 hours as needed. 10 tablet 0    QUEtiapine (SEROQUEL) 100 MG tablet Take 0.5-1 tablets ( mg) by mouth nightly as needed (sleep).       No current facility-administered medications for this visit.     Social History     Socioeconomic History    Marital status: Single     Spouse name: Not on file    Number of children: Not on file    Years of education: Not on file    Highest education level: Not on file   Occupational History    Occupation: construction   Tobacco Use    Smoking status: Every Day     Current packs/day: 1.50     Average packs/day: 1.5 packs/day for 15.0 years (22.5 ttl pk-yrs)     Types: Cigarettes    Smokeless tobacco: Former    Tobacco comments:     a little over a pack a day   Vaping Use    Vaping status: Never Used   Substance and Sexual Activity    Alcohol use: Yes     Alcohol/week: 14.0 standard drinks of alcohol    Drug use: Yes     Types: Marijuana     Comment: today    Sexual activity: Not Currently     Partners: Female   Other Topics Concern     Service Not Asked    Blood Transfusions Not  Asked    Caffeine Concern Not Asked    Occupational Exposure Not Asked    Hobby Hazards Not Asked    Sleep Concern Not Asked    Stress Concern Not Asked    Weight Concern Not Asked    Special Diet Not Asked    Back Care Not Asked    Exercise Not Asked    Bike Helmet Not Asked    Seat Belt Not Asked    Self-Exams Not Asked    Parent/sibling w/ CABG, MI or angioplasty before 65F 55M? Not Asked   Social History Narrative    Not on file     Social Drivers of Health     Financial Resource Strain: Low Risk  (1/23/2025)    Financial Resource Strain     Within the past 12 months, have you or your family members you live with been unable to get utilities (heat, electricity) when it was really needed?: No   Recent Concern: Financial Resource Strain - High Risk (12/12/2024)    Financial Resource Strain     Within the past 12 months, have you or your family members you live with been unable to get utilities (heat, electricity) when it was really needed?: Yes   Food Insecurity: Low Risk  (1/23/2025)    Food Insecurity     Within the past 12 months, did you worry that your food would run out before you got money to buy more?: No     Within the past 12 months, did the food you bought just not last and you didn t have money to get more?: No   Recent Concern: Food Insecurity - High Risk (12/12/2024)    Food Insecurity     Within the past 12 months, did you worry that your food would run out before you got money to buy more?: Yes     Within the past 12 months, did the food you bought just not last and you didn t have money to get more?: Yes   Transportation Needs: Low Risk  (1/23/2025)    Transportation Needs     Within the past 12 months, has lack of transportation kept you from medical appointments, getting your medicines, non-medical meetings or appointments, work, or from getting things that you need?: No   Recent Concern: Transportation Needs - High Risk (12/12/2024)    Transportation Needs     Within the past 12 months, has  lack of transportation kept you from medical appointments, getting your medicines, non-medical meetings or appointments, work, or from getting things that you need?: Yes   Physical Activity: Not on file   Stress: Not on file   Social Connections: Not on file   Interpersonal Safety: Not At Risk (2/19/2025)    Received from [facility]    Humiliation, Afraid, Rape, and Kick questionnaire     Fear of Current or Ex-Partner: No     Emotionally Abused: No     Physically Abused: No     Sexually Abused: No   Recent Concern: Interpersonal Safety - High Risk (1/23/2025)    Interpersonal Safety     Do you feel physically and emotionally safe where you currently live?: No     Within the past 12 months, have you been hit, slapped, kicked or otherwise physically hurt by someone?: No     Within the past 12 months, have you been humiliated or emotionally abused in other ways by your partner or ex-partner?: No   Housing Stability: High Risk (1/23/2025)    Housing Stability     Do you have housing? : No     Are you worried about losing your housing?: No     ROS  negative other than stated above    Exam:  GENERAL APPEARANCE: healthy, alert and no distress  EYES: EOMI,  PERRL  HENT: TMs and canals clear bilaterally.  No hemotympanum noted.  Oral mucosa moist with mild erythema noted.  There is no exudate uvula midline no evidence for abscess.  No postnasal drainage noted  NECK: no adenopathy, no asymmetry, masses, or scars and thyroid normal to palpation  RESP: lungs clear to auscultation - no rales, rhonchi or wheezes  CV: regular rates and rhythm, normal S1 S2, no S3 or S4 and no murmur, click or rub -  SKIN: no suspicious lesions or rashes    Results for orders placed or performed in visit on 05/19/25   Streptococcus A Rapid Screen w/Reflex to PCR - Clinic Collect     Status: Normal    Specimen: Throat; Swab   Result Value Ref Range    Group A Strep antigen Negative Negative     assessment/plan:  (J02.9) Pharyngitis, unspecified  etiology  (primary encounter diagnosis)  Comment:   Plan: Streptococcus A Rapid Screen w/Reflex to PCR -         Clinic Collect, Group A Streptococcus PCR         Throat Swab, lidocaine, viscous, (XYLOCAINE) 2         % solution        Patient with 1 day history of sore throat.  Strep was negative there is no evidence for tonsillitis or abscess.  Most likely due to vomiting of the bile from recent alcohol intoxication.  He has no new neurological symptoms and the vomiting has stopped.  Advised over-the-counter meds for symptomatic relief.  Soft cold foods for relief.  Will give lidocaine viscus for pain management.  May also try Chloraseptic spray.  Red flag signs were discussed return to clinic if symptoms worsen or new symptoms develop.

## 2025-05-21 NOTE — ED NOTES
Chronic, not at goal (unstable), changes made today: Refilled today; will discuss weaning off or other options at another appointment   RN ED Mental Health Handoff Note    MARLY    Does patient require 1:1? No    Hold and rights been given and documented for patient: Yes    Is the patient in BH scrubs? No -Searched by security    Has the patient been searched? Yes    Is the 15 minute observation tool up to date? Yes    Was patient issued a welcome folder? Yes    Room check completed this shift: Yes    PSS3 and [place] Assessment/Reassessment this shift:    PSS-3      Date and Time Over the past 2 weeks have you felt down, depressed, or hopeless? Over the past 2 weeks have you had thoughts of killing yourself? Have you ever attempted to kill yourself? When did this last happen? User   10/25/23 5462 yes no no -- TE            Behavioral status of patient: Green    Code 21 called this shift? No    Use of restraints/seclusion this shift? No    Most recent vital signs:  Temp: 98.9  F (37.2  C) Temp src: Temporal BP: (!) 139/106 Pulse: 99   Resp: 18 SpO2: 98 % O2 Device: None (Room air)      Medications:  Scheduled medication compliance? NA, not yet given    PRN Meds administered this shift? No    Medications   melatonin tablet 5 mg (has no administration in time range)   diazepam (VALIUM) tablet 10 mg (has no administration in time range)     Or   diazepam (VALIUM) injection 5-10 mg (has no administration in time range)   thiamine (B-1) tablet 100 mg (has no administration in time range)   folic acid (FOLVITE) tablet 1 mg (has no administration in time range)   multivitamin w/minerals (THERA-VIT-M) tablet 1 tablet (has no administration in time range)         ADLs    Meal Provided this shift? No    Hygiene items provided? No, will provide by next shift, new pt.    ADLs completed? Yes    Date of last shower: Unknown    Any significant events this shift? No    Any information that would be helpful in caring for this patient?  No hx of seizures.    Family present/updated? Yes, pt has phone, making phone calls.    Location of patient's belongings: [facility]  office    Critical Care Minutes:  Does the patient need critical care minutes documented? No

## 2025-09-01 ENCOUNTER — HOSPITAL ENCOUNTER (OUTPATIENT)
Facility: CLINIC | Age: 50
Setting detail: OBSERVATION
Discharge: HOME | End: 2025-09-03
Attending: EMERGENCY MEDICINE | Admitting: INTERNAL MEDICINE
Payer: COMMERCIAL

## 2025-09-01 ENCOUNTER — APPOINTMENT (OUTPATIENT)
Dept: GENERAL RADIOLOGY | Facility: CLINIC | Age: 50
End: 2025-09-01
Attending: EMERGENCY MEDICINE
Payer: COMMERCIAL

## 2025-09-01 DIAGNOSIS — F10.139 ALCOHOL ABUSE WITH WITHDRAWAL (H): Primary | ICD-10-CM

## 2025-09-01 DIAGNOSIS — F10.220 ALCOHOL DEPENDENCE WITH UNCOMPLICATED INTOXICATION (H): ICD-10-CM

## 2025-09-01 DIAGNOSIS — I48.91 ATRIAL FIBRILLATION WITH RVR (H): ICD-10-CM

## 2025-09-01 DIAGNOSIS — F10.931 ALCOHOL WITHDRAWAL DELIRIUM (H): ICD-10-CM

## 2025-09-01 DIAGNOSIS — F10.90 ALCOHOL USE DISORDER: ICD-10-CM

## 2025-09-01 LAB
ALBUMIN SERPL BCG-MCNC: 4.6 G/DL (ref 3.5–5.2)
ALP SERPL-CCNC: 51 U/L (ref 40–150)
ALT SERPL W P-5'-P-CCNC: 22 U/L (ref 0–70)
ANION GAP SERPL CALCULATED.3IONS-SCNC: 16 MMOL/L (ref 7–15)
APTT PPP: 31 SECONDS (ref 22–38)
AST SERPL W P-5'-P-CCNC: 26 U/L (ref 0–45)
BASOPHILS # BLD AUTO: <0.03 10E3/UL (ref 0–0.2)
BASOPHILS NFR BLD AUTO: 0.2 %
BILIRUB DIRECT SERPL-MCNC: 0.14 MG/DL (ref 0–0.3)
BILIRUB SERPL-MCNC: 0.5 MG/DL
BUN SERPL-MCNC: 12 MG/DL (ref 6–20)
CALCIUM SERPL-MCNC: 9.4 MG/DL (ref 8.8–10.4)
CHLORIDE SERPL-SCNC: 100 MMOL/L (ref 98–107)
CREAT SERPL-MCNC: 0.79 MG/DL (ref 0.67–1.17)
EGFRCR SERPLBLD CKD-EPI 2021: >90 ML/MIN/1.73M2
EOSINOPHIL # BLD AUTO: 0.04 10E3/UL (ref 0–0.7)
EOSINOPHIL NFR BLD AUTO: 0.5 %
ERYTHROCYTE [DISTWIDTH] IN BLOOD BY AUTOMATED COUNT: 11.9 % (ref 10–15)
ETHANOL SERPL-MCNC: 0.07 G/DL
FLUAV RNA SPEC QL NAA+PROBE: NEGATIVE
FLUBV RNA RESP QL NAA+PROBE: NEGATIVE
GLUCOSE SERPL-MCNC: 127 MG/DL (ref 70–99)
HCO3 SERPL-SCNC: 21 MMOL/L (ref 22–29)
HCT VFR BLD AUTO: 43.3 % (ref 40–53)
HGB BLD-MCNC: 15.7 G/DL (ref 13.3–17.7)
HOLD SPECIMEN: NORMAL
IMM GRANULOCYTES # BLD: 0.04 10E3/UL
IMM GRANULOCYTES NFR BLD: 0.5 %
INR PPP: 0.9 (ref 0.85–1.15)
LACTATE SERPL-SCNC: 1.8 MMOL/L (ref 0.7–2)
LACTATE SERPL-SCNC: 3.2 MMOL/L (ref 0.7–2)
LIPASE SERPL-CCNC: 56 U/L (ref 13–60)
LYMPHOCYTES # BLD AUTO: 1.6 10E3/UL (ref 0.8–5.3)
LYMPHOCYTES NFR BLD AUTO: 18.2 %
MAGNESIUM SERPL-MCNC: 1.8 MG/DL (ref 1.7–2.3)
MCH RBC QN AUTO: 33.1 PG (ref 26.5–33)
MCHC RBC AUTO-ENTMCNC: 36.3 G/DL (ref 31.5–36.5)
MCV RBC AUTO: 91.2 FL (ref 78–100)
MONOCYTES # BLD AUTO: 0.67 10E3/UL (ref 0–1.3)
MONOCYTES NFR BLD AUTO: 7.6 %
NEUTROPHILS # BLD AUTO: 6.42 10E3/UL (ref 1.6–8.3)
NEUTROPHILS NFR BLD AUTO: 73 %
NRBC # BLD AUTO: <0.03 10E3/UL
NRBC BLD AUTO-RTO: 0 /100
PLATELET # BLD AUTO: 163 10E3/UL (ref 150–450)
POTASSIUM SERPL-SCNC: 3.1 MMOL/L (ref 3.4–5.3)
PROT SERPL-MCNC: 7.2 G/DL (ref 6.4–8.3)
PROTHROMBIN TIME: 12.3 SECONDS (ref 11.8–14.8)
RBC # BLD AUTO: 4.75 10E6/UL (ref 4.4–5.9)
RSV RNA SPEC NAA+PROBE: NEGATIVE
SARS-COV-2 RNA RESP QL NAA+PROBE: NEGATIVE
SODIUM SERPL-SCNC: 137 MMOL/L (ref 135–145)
TROPONIN T SERPL HS-MCNC: 26 NG/L
TROPONIN T SERPL HS-MCNC: 27 NG/L
WBC # BLD AUTO: 8.79 10E3/UL (ref 4–11)

## 2025-09-01 PROCEDURE — 85730 THROMBOPLASTIN TIME PARTIAL: CPT | Performed by: EMERGENCY MEDICINE

## 2025-09-01 PROCEDURE — 96360 HYDRATION IV INFUSION INIT: CPT

## 2025-09-01 PROCEDURE — 85610 PROTHROMBIN TIME: CPT | Performed by: EMERGENCY MEDICINE

## 2025-09-01 PROCEDURE — 82077 ASSAY SPEC XCP UR&BREATH IA: CPT | Performed by: EMERGENCY MEDICINE

## 2025-09-01 PROCEDURE — 93005 ELECTROCARDIOGRAM TRACING: CPT | Mod: 76

## 2025-09-01 PROCEDURE — 71046 X-RAY EXAM CHEST 2 VIEWS: CPT

## 2025-09-01 PROCEDURE — 83605 ASSAY OF LACTIC ACID: CPT | Performed by: EMERGENCY MEDICINE

## 2025-09-01 PROCEDURE — 83690 ASSAY OF LIPASE: CPT | Performed by: EMERGENCY MEDICINE

## 2025-09-01 PROCEDURE — 99291 CRITICAL CARE FIRST HOUR: CPT | Mod: 25

## 2025-09-01 PROCEDURE — 36415 COLL VENOUS BLD VENIPUNCTURE: CPT | Performed by: EMERGENCY MEDICINE

## 2025-09-01 PROCEDURE — 96361 HYDRATE IV INFUSION ADD-ON: CPT

## 2025-09-01 PROCEDURE — 85004 AUTOMATED DIFF WBC COUNT: CPT | Performed by: EMERGENCY MEDICINE

## 2025-09-01 PROCEDURE — 80053 COMPREHEN METABOLIC PANEL: CPT | Performed by: EMERGENCY MEDICINE

## 2025-09-01 PROCEDURE — 83735 ASSAY OF MAGNESIUM: CPT | Performed by: EMERGENCY MEDICINE

## 2025-09-01 PROCEDURE — 87637 SARSCOV2&INF A&B&RSV AMP PRB: CPT | Performed by: EMERGENCY MEDICINE

## 2025-09-01 PROCEDURE — 93005 ELECTROCARDIOGRAM TRACING: CPT

## 2025-09-01 PROCEDURE — 84484 ASSAY OF TROPONIN QUANT: CPT | Performed by: EMERGENCY MEDICINE

## 2025-09-01 PROCEDURE — 250N000013 HC RX MED GY IP 250 OP 250 PS 637: Performed by: EMERGENCY MEDICINE

## 2025-09-01 PROCEDURE — 258N000003 HC RX IP 258 OP 636: Performed by: EMERGENCY MEDICINE

## 2025-09-01 RX ORDER — ONDANSETRON 2 MG/ML
4 INJECTION INTRAMUSCULAR; INTRAVENOUS ONCE
Status: COMPLETED | OUTPATIENT
Start: 2025-09-01 | End: 2025-09-02

## 2025-09-01 RX ORDER — DIAZEPAM 5 MG/1
10 TABLET ORAL ONCE
Status: COMPLETED | OUTPATIENT
Start: 2025-09-01 | End: 2025-09-01

## 2025-09-01 RX ORDER — POTASSIUM CHLORIDE 1500 MG/1
40 TABLET, EXTENDED RELEASE ORAL ONCE
Status: COMPLETED | OUTPATIENT
Start: 2025-09-01 | End: 2025-09-02

## 2025-09-01 RX ADMIN — SODIUM CHLORIDE 1000 ML: 0.9 INJECTION, SOLUTION INTRAVENOUS at 23:03

## 2025-09-01 RX ADMIN — DIAZEPAM 10 MG: 5 TABLET ORAL at 21:14

## 2025-09-01 RX ADMIN — SODIUM CHLORIDE 1000 ML: 0.9 INJECTION, SOLUTION INTRAVENOUS at 21:07

## 2025-09-01 ASSESSMENT — ACTIVITIES OF DAILY LIVING (ADL)
ADLS_ACUITY_SCORE: 59

## 2025-09-02 PROBLEM — F10.139 ALCOHOL ABUSE WITH WITHDRAWAL (H): Status: ACTIVE | Noted: 2025-09-02

## 2025-09-02 PROBLEM — F10.931 ALCOHOL WITHDRAWAL DELIRIUM (H): Status: ACTIVE | Noted: 2025-09-02

## 2025-09-02 LAB
ANION GAP SERPL CALCULATED.3IONS-SCNC: 11 MMOL/L (ref 7–15)
ATRIAL RATE - MUSE: 79 BPM
ATRIAL RATE - MUSE: NORMAL BPM
BUN SERPL-MCNC: 10.3 MG/DL (ref 6–20)
CALCIUM SERPL-MCNC: 8.8 MG/DL (ref 8.8–10.4)
CHLORIDE SERPL-SCNC: 106 MMOL/L (ref 98–107)
CREAT SERPL-MCNC: 0.75 MG/DL (ref 0.67–1.17)
DIASTOLIC BLOOD PRESSURE - MUSE: NORMAL MMHG
DIASTOLIC BLOOD PRESSURE - MUSE: NORMAL MMHG
EGFRCR SERPLBLD CKD-EPI 2021: >90 ML/MIN/1.73M2
GLUCOSE SERPL-MCNC: 100 MG/DL (ref 70–99)
HCO3 SERPL-SCNC: 23 MMOL/L (ref 22–29)
INTERPRETATION ECG - MUSE: NORMAL
INTERPRETATION ECG - MUSE: NORMAL
MAGNESIUM SERPL-MCNC: 1.9 MG/DL (ref 1.7–2.3)
P AXIS - MUSE: 60 DEGREES
P AXIS - MUSE: NORMAL DEGREES
POTASSIUM SERPL-SCNC: 4.2 MMOL/L (ref 3.4–5.3)
POTASSIUM SERPL-SCNC: 4.2 MMOL/L (ref 3.4–5.3)
POTASSIUM SERPL-SCNC: 4.3 MMOL/L (ref 3.4–5.3)
PR INTERVAL - MUSE: 170 MS
PR INTERVAL - MUSE: NORMAL MS
QRS DURATION - MUSE: 88 MS
QRS DURATION - MUSE: 92 MS
QT - MUSE: 354 MS
QT - MUSE: 406 MS
QTC - MUSE: 449 MS
QTC - MUSE: 465 MS
R AXIS - MUSE: 67 DEGREES
R AXIS - MUSE: 67 DEGREES
SODIUM SERPL-SCNC: 140 MMOL/L (ref 135–145)
SYSTOLIC BLOOD PRESSURE - MUSE: NORMAL MMHG
SYSTOLIC BLOOD PRESSURE - MUSE: NORMAL MMHG
T AXIS - MUSE: 48 DEGREES
T AXIS - MUSE: 60 DEGREES
VENTRICULAR RATE- MUSE: 79 BPM
VENTRICULAR RATE- MUSE: 97 BPM

## 2025-09-02 PROCEDURE — 36415 COLL VENOUS BLD VENIPUNCTURE: CPT | Performed by: INTERNAL MEDICINE

## 2025-09-02 PROCEDURE — 250N000013 HC RX MED GY IP 250 OP 250 PS 637: Performed by: INTERNAL MEDICINE

## 2025-09-02 PROCEDURE — G0378 HOSPITAL OBSERVATION PER HR: HCPCS

## 2025-09-02 PROCEDURE — 250N000013 HC RX MED GY IP 250 OP 250 PS 637: Performed by: EMERGENCY MEDICINE

## 2025-09-02 PROCEDURE — 80048 BASIC METABOLIC PNL TOTAL CA: CPT | Performed by: INTERNAL MEDICINE

## 2025-09-02 PROCEDURE — 83735 ASSAY OF MAGNESIUM: CPT | Performed by: STUDENT IN AN ORGANIZED HEALTH CARE EDUCATION/TRAINING PROGRAM

## 2025-09-02 PROCEDURE — 250N000011 HC RX IP 250 OP 636: Performed by: EMERGENCY MEDICINE

## 2025-09-02 PROCEDURE — 84132 ASSAY OF SERUM POTASSIUM: CPT | Performed by: INTERNAL MEDICINE

## 2025-09-02 PROCEDURE — 258N000003 HC RX IP 258 OP 636: Performed by: INTERNAL MEDICINE

## 2025-09-02 RX ORDER — CLONIDINE HYDROCHLORIDE 0.1 MG/1
0.1 TABLET ORAL EVERY 8 HOURS
Status: DISCONTINUED | OUTPATIENT
Start: 2025-09-02 | End: 2025-09-03 | Stop reason: HOSPADM

## 2025-09-02 RX ORDER — GABAPENTIN 300 MG/1
600 CAPSULE ORAL EVERY 8 HOURS
Status: DISCONTINUED | OUTPATIENT
Start: 2025-09-05 | End: 2025-09-03 | Stop reason: HOSPADM

## 2025-09-02 RX ORDER — NICOTINE 21 MG/24HR
1 PATCH, TRANSDERMAL 24 HOURS TRANSDERMAL DAILY
Status: DISCONTINUED | OUTPATIENT
Start: 2025-09-02 | End: 2025-09-03 | Stop reason: HOSPADM

## 2025-09-02 RX ORDER — DIAZEPAM 10 MG/1
10 TABLET ORAL EVERY 30 MIN PRN
Status: DISCONTINUED | OUTPATIENT
Start: 2025-09-02 | End: 2025-09-03 | Stop reason: HOSPADM

## 2025-09-02 RX ORDER — CALCIUM CARBONATE 500 MG/1
1000 TABLET, CHEWABLE ORAL 4 TIMES DAILY PRN
Status: DISCONTINUED | OUTPATIENT
Start: 2025-09-02 | End: 2025-09-03 | Stop reason: HOSPADM

## 2025-09-02 RX ORDER — SODIUM CHLORIDE, SODIUM LACTATE, POTASSIUM CHLORIDE, CALCIUM CHLORIDE 600; 310; 30; 20 MG/100ML; MG/100ML; MG/100ML; MG/100ML
INJECTION, SOLUTION INTRAVENOUS CONTINUOUS
Status: ACTIVE | OUTPATIENT
Start: 2025-09-02 | End: 2025-09-02

## 2025-09-02 RX ORDER — POTASSIUM CHLORIDE 1500 MG/1
40 TABLET, EXTENDED RELEASE ORAL ONCE
Status: DISCONTINUED | OUTPATIENT
Start: 2025-09-02 | End: 2025-09-02

## 2025-09-02 RX ORDER — GABAPENTIN 100 MG/1
100 CAPSULE ORAL EVERY 8 HOURS
Status: DISCONTINUED | OUTPATIENT
Start: 2025-09-09 | End: 2025-09-03 | Stop reason: HOSPADM

## 2025-09-02 RX ORDER — GABAPENTIN 600 MG/1
1200 TABLET ORAL ONCE
Status: COMPLETED | OUTPATIENT
Start: 2025-09-02 | End: 2025-09-02

## 2025-09-02 RX ORDER — DIAZEPAM 10 MG/2ML
5 INJECTION, SOLUTION INTRAMUSCULAR; INTRAVENOUS EVERY 30 MIN PRN
Status: DISCONTINUED | OUTPATIENT
Start: 2025-09-02 | End: 2025-09-03 | Stop reason: HOSPADM

## 2025-09-02 RX ORDER — GABAPENTIN 300 MG/1
900 CAPSULE ORAL EVERY 8 HOURS
Status: DISCONTINUED | OUTPATIENT
Start: 2025-09-02 | End: 2025-09-03 | Stop reason: HOSPADM

## 2025-09-02 RX ORDER — ONDANSETRON 4 MG/1
4 TABLET, ORALLY DISINTEGRATING ORAL EVERY 6 HOURS PRN
Status: DISCONTINUED | OUTPATIENT
Start: 2025-09-02 | End: 2025-09-03 | Stop reason: HOSPADM

## 2025-09-02 RX ORDER — FOLIC ACID 1 MG/1
1 TABLET ORAL DAILY
Status: DISCONTINUED | OUTPATIENT
Start: 2025-09-02 | End: 2025-09-03 | Stop reason: HOSPADM

## 2025-09-02 RX ORDER — GABAPENTIN 300 MG/1
300 CAPSULE ORAL EVERY 8 HOURS
Status: DISCONTINUED | OUTPATIENT
Start: 2025-09-07 | End: 2025-09-03 | Stop reason: HOSPADM

## 2025-09-02 RX ORDER — OLANZAPINE 5 MG/1
5-10 TABLET, ORALLY DISINTEGRATING ORAL EVERY 6 HOURS PRN
Status: DISCONTINUED | OUTPATIENT
Start: 2025-09-02 | End: 2025-09-03 | Stop reason: HOSPADM

## 2025-09-02 RX ORDER — ACETAMINOPHEN 325 MG/1
650 TABLET ORAL EVERY 4 HOURS PRN
Status: DISCONTINUED | OUTPATIENT
Start: 2025-09-02 | End: 2025-09-03 | Stop reason: HOSPADM

## 2025-09-02 RX ORDER — MULTIPLE VITAMINS W/ MINERALS TAB 9MG-400MCG
1 TAB ORAL DAILY
Status: DISCONTINUED | OUTPATIENT
Start: 2025-09-02 | End: 2025-09-03 | Stop reason: HOSPADM

## 2025-09-02 RX ORDER — AMOXICILLIN 250 MG
2 CAPSULE ORAL 2 TIMES DAILY PRN
Status: DISCONTINUED | OUTPATIENT
Start: 2025-09-02 | End: 2025-09-03 | Stop reason: HOSPADM

## 2025-09-02 RX ORDER — POLYETHYLENE GLYCOL 3350 17 G
2 POWDER IN PACKET (EA) ORAL
Status: DISCONTINUED | OUTPATIENT
Start: 2025-09-02 | End: 2025-09-03 | Stop reason: HOSPADM

## 2025-09-02 RX ORDER — ONDANSETRON 2 MG/ML
4 INJECTION INTRAMUSCULAR; INTRAVENOUS EVERY 6 HOURS PRN
Status: DISCONTINUED | OUTPATIENT
Start: 2025-09-02 | End: 2025-09-03 | Stop reason: HOSPADM

## 2025-09-02 RX ORDER — AMOXICILLIN 250 MG
1 CAPSULE ORAL 2 TIMES DAILY PRN
Status: DISCONTINUED | OUTPATIENT
Start: 2025-09-02 | End: 2025-09-03 | Stop reason: HOSPADM

## 2025-09-02 RX ORDER — DIAZEPAM 10 MG/2ML
10 INJECTION, SOLUTION INTRAMUSCULAR; INTRAVENOUS EVERY 30 MIN PRN
Status: DISCONTINUED | OUTPATIENT
Start: 2025-09-02 | End: 2025-09-03 | Stop reason: HOSPADM

## 2025-09-02 RX ORDER — GUAIFENESIN/DEXTROMETHORPHAN 100-10MG/5
10 SYRUP ORAL EVERY 4 HOURS PRN
Status: DISCONTINUED | OUTPATIENT
Start: 2025-09-02 | End: 2025-09-03 | Stop reason: HOSPADM

## 2025-09-02 RX ORDER — ACETAMINOPHEN 650 MG/1
650 SUPPOSITORY RECTAL EVERY 4 HOURS PRN
Status: DISCONTINUED | OUTPATIENT
Start: 2025-09-02 | End: 2025-09-03 | Stop reason: HOSPADM

## 2025-09-02 RX ORDER — HALOPERIDOL 5 MG/ML
2.5-5 INJECTION INTRAMUSCULAR EVERY 6 HOURS PRN
Status: DISCONTINUED | OUTPATIENT
Start: 2025-09-02 | End: 2025-09-03 | Stop reason: HOSPADM

## 2025-09-02 RX ORDER — FLUMAZENIL 0.1 MG/ML
0.2 INJECTION, SOLUTION INTRAVENOUS
Status: DISCONTINUED | OUTPATIENT
Start: 2025-09-02 | End: 2025-09-03 | Stop reason: HOSPADM

## 2025-09-02 RX ADMIN — GABAPENTIN 1200 MG: 600 TABLET, FILM COATED ORAL at 00:22

## 2025-09-02 RX ADMIN — CLONIDINE HYDROCHLORIDE 0.1 MG: 0.1 TABLET ORAL at 16:56

## 2025-09-02 RX ADMIN — GABAPENTIN 900 MG: 300 CAPSULE ORAL at 08:56

## 2025-09-02 RX ADMIN — GABAPENTIN 900 MG: 300 CAPSULE ORAL at 16:56

## 2025-09-02 RX ADMIN — POTASSIUM CHLORIDE 40 MEQ: 1500 TABLET, EXTENDED RELEASE ORAL at 00:21

## 2025-09-02 RX ADMIN — THIAMINE HCL TAB 100 MG 100 MG: 100 TAB at 00:23

## 2025-09-02 RX ADMIN — DIAZEPAM 10 MG: 10 TABLET ORAL at 14:43

## 2025-09-02 RX ADMIN — ACETAMINOPHEN 650 MG: 325 TABLET ORAL at 08:56

## 2025-09-02 RX ADMIN — Medication 1 TABLET: at 08:56

## 2025-09-02 RX ADMIN — DIAZEPAM 10 MG: 10 TABLET ORAL at 16:56

## 2025-09-02 RX ADMIN — CLONIDINE HYDROCHLORIDE 0.1 MG: 0.1 TABLET ORAL at 00:23

## 2025-09-02 RX ADMIN — DIAZEPAM 10 MG: 10 TABLET ORAL at 22:28

## 2025-09-02 RX ADMIN — ONDANSETRON 4 MG: 2 INJECTION, SOLUTION INTRAMUSCULAR; INTRAVENOUS at 00:19

## 2025-09-02 RX ADMIN — NICOTINE 1 PATCH: 21 PATCH, EXTENDED RELEASE TRANSDERMAL at 00:54

## 2025-09-02 RX ADMIN — FOLIC ACID 1 MG: 1 TABLET ORAL at 08:57

## 2025-09-02 RX ADMIN — SODIUM CHLORIDE, SODIUM LACTATE, POTASSIUM CHLORIDE, AND CALCIUM CHLORIDE: .6; .31; .03; .02 INJECTION, SOLUTION INTRAVENOUS at 00:54

## 2025-09-02 RX ADMIN — FOLIC ACID 1 MG: 1 TABLET ORAL at 00:23

## 2025-09-02 RX ADMIN — THIAMINE HCL TAB 100 MG 100 MG: 100 TAB at 08:56

## 2025-09-02 RX ADMIN — DIAZEPAM 10 MG: 10 TABLET ORAL at 00:29

## 2025-09-02 RX ADMIN — NALTREXONE HYDROCHLORIDE 25 MG: 50 TABLET, FILM COATED ORAL at 08:56

## 2025-09-02 RX ADMIN — Medication 1 TABLET: at 00:23

## 2025-09-02 RX ADMIN — CLONIDINE HYDROCHLORIDE 0.1 MG: 0.1 TABLET ORAL at 08:57

## 2025-09-02 RX ADMIN — NICOTINE 1 PATCH: 21 PATCH, EXTENDED RELEASE TRANSDERMAL at 08:56

## 2025-09-02 ASSESSMENT — ACTIVITIES OF DAILY LIVING (ADL)
ADLS_ACUITY_SCORE: 60
ADLS_ACUITY_SCORE: 62
ADLS_ACUITY_SCORE: 36
ADLS_ACUITY_SCORE: 62
ADLS_ACUITY_SCORE: 60
ADLS_ACUITY_SCORE: 62
ADLS_ACUITY_SCORE: 62
ADLS_ACUITY_SCORE: 60
ADLS_ACUITY_SCORE: 62
ADLS_ACUITY_SCORE: 60
ADLS_ACUITY_SCORE: 60
ADLS_ACUITY_SCORE: 36
ADLS_ACUITY_SCORE: 60
ADLS_ACUITY_SCORE: 62
ADLS_ACUITY_SCORE: 36
ADLS_ACUITY_SCORE: 62

## 2025-09-02 ASSESSMENT — LIFESTYLE VARIABLES
NAUSEA AND VOMITING: MILD NAUSEA WITH NO VOMITING
ANXIETY: MODERATELY ANXIOUS, OR GUARDED, SO ANXIETY IS INFERRED
VISUAL DISTURBANCES: NOT PRESENT
PAROXYSMAL SWEATS: BARELY PERCEPTIBLE SWEATING, PALMS MOIST
ORIENTATION AND CLOUDING OF SENSORIUM: ORIENTED AND CAN DO SERIAL ADDITIONS
AUDITORY DISTURBANCES: NOT PRESENT
AGITATION: SOMEWHAT MORE THAN NORMAL ACTIVITY
TACTILE DISTURBANCES: MODERATE ITCHING, PINS AND NEEDLES, BURNING OR NUMBNESS
TOTAL SCORE: 11
TREMOR: NOT VISIBLE, BUT CAN BE FELT FINGERTIP TO FINGERTIP
HEADACHE, FULLNESS IN HEAD: NOT PRESENT

## 2025-09-03 VITALS
HEIGHT: 71 IN | SYSTOLIC BLOOD PRESSURE: 126 MMHG | TEMPERATURE: 97.7 F | WEIGHT: 196.43 LBS | DIASTOLIC BLOOD PRESSURE: 86 MMHG | OXYGEN SATURATION: 98 % | RESPIRATION RATE: 18 BRPM | BODY MASS INDEX: 27.5 KG/M2 | HEART RATE: 77 BPM

## 2025-09-03 LAB
ANION GAP SERPL CALCULATED.3IONS-SCNC: 9 MMOL/L (ref 7–15)
BUN SERPL-MCNC: 11.1 MG/DL (ref 6–20)
CALCIUM SERPL-MCNC: 9.1 MG/DL (ref 8.8–10.4)
CHLORIDE SERPL-SCNC: 105 MMOL/L (ref 98–107)
CREAT SERPL-MCNC: 0.76 MG/DL (ref 0.67–1.17)
EGFRCR SERPLBLD CKD-EPI 2021: >90 ML/MIN/1.73M2
ERYTHROCYTE [DISTWIDTH] IN BLOOD BY AUTOMATED COUNT: 12 % (ref 10–15)
GLUCOSE SERPL-MCNC: 103 MG/DL (ref 70–99)
HCO3 SERPL-SCNC: 25 MMOL/L (ref 22–29)
HCT VFR BLD AUTO: 46.2 % (ref 40–53)
HGB BLD-MCNC: 16.2 G/DL (ref 13.3–17.7)
MCH RBC QN AUTO: 32.5 PG (ref 26.5–33)
MCHC RBC AUTO-ENTMCNC: 35.1 G/DL (ref 31.5–36.5)
MCV RBC AUTO: 92.6 FL (ref 78–100)
PLATELET # BLD AUTO: 168 10E3/UL (ref 150–450)
POTASSIUM SERPL-SCNC: 4.3 MMOL/L (ref 3.4–5.3)
RBC # BLD AUTO: 4.99 10E6/UL (ref 4.4–5.9)
SODIUM SERPL-SCNC: 139 MMOL/L (ref 135–145)
WBC # BLD AUTO: 8.89 10E3/UL (ref 4–11)

## 2025-09-03 PROCEDURE — G0378 HOSPITAL OBSERVATION PER HR: HCPCS

## 2025-09-03 PROCEDURE — 250N000013 HC RX MED GY IP 250 OP 250 PS 637: Performed by: INTERNAL MEDICINE

## 2025-09-03 PROCEDURE — 80048 BASIC METABOLIC PNL TOTAL CA: CPT | Performed by: INTERNAL MEDICINE

## 2025-09-03 PROCEDURE — 36415 COLL VENOUS BLD VENIPUNCTURE: CPT | Performed by: INTERNAL MEDICINE

## 2025-09-03 PROCEDURE — 85027 COMPLETE CBC AUTOMATED: CPT | Performed by: INTERNAL MEDICINE

## 2025-09-03 RX ORDER — NALTREXONE HYDROCHLORIDE 50 MG/1
50 TABLET, FILM COATED ORAL DAILY
Qty: 30 TABLET | Refills: 0 | Status: SHIPPED | OUTPATIENT
Start: 2025-09-04

## 2025-09-03 RX ORDER — CLONIDINE HYDROCHLORIDE 0.1 MG/1
0.1 TABLET ORAL 2 TIMES DAILY
Qty: 10 TABLET | Refills: 0 | Status: SHIPPED | OUTPATIENT
Start: 2025-09-03

## 2025-09-03 RX ORDER — FOLIC ACID 1 MG/1
1 TABLET ORAL DAILY
Qty: 30 TABLET | Refills: 0 | Status: SHIPPED | OUTPATIENT
Start: 2025-09-04

## 2025-09-03 RX ORDER — LANOLIN ALCOHOL/MO/W.PET/CERES
100 CREAM (GRAM) TOPICAL DAILY
Qty: 30 TABLET | Refills: 0 | Status: SHIPPED | OUTPATIENT
Start: 2025-09-04

## 2025-09-03 RX ADMIN — GABAPENTIN 900 MG: 300 CAPSULE ORAL at 10:01

## 2025-09-03 RX ADMIN — NICOTINE 1 PATCH: 21 PATCH, EXTENDED RELEASE TRANSDERMAL at 10:02

## 2025-09-03 RX ADMIN — GUAIFENESIN SYRUP AND DEXTROMETHORPHAN 10 ML: 100; 10 SYRUP ORAL at 10:11

## 2025-09-03 RX ADMIN — FOLIC ACID 1 MG: 1 TABLET ORAL at 10:01

## 2025-09-03 RX ADMIN — GABAPENTIN 900 MG: 300 CAPSULE ORAL at 01:08

## 2025-09-03 RX ADMIN — CLONIDINE HYDROCHLORIDE 0.1 MG: 0.1 TABLET ORAL at 10:01

## 2025-09-03 RX ADMIN — Medication 1 TABLET: at 10:02

## 2025-09-03 RX ADMIN — NALTREXONE HYDROCHLORIDE 25 MG: 50 TABLET, FILM COATED ORAL at 10:02

## 2025-09-03 RX ADMIN — THIAMINE HCL TAB 100 MG 100 MG: 100 TAB at 10:01

## 2025-09-03 RX ADMIN — CLONIDINE HYDROCHLORIDE 0.1 MG: 0.1 TABLET ORAL at 01:08

## 2025-09-03 ASSESSMENT — ACTIVITIES OF DAILY LIVING (ADL)
ADLS_ACUITY_SCORE: 36

## 2025-09-04 ENCOUNTER — PATIENT OUTREACH (OUTPATIENT)
Dept: CARE COORDINATION | Facility: CLINIC | Age: 50
End: 2025-09-04
Payer: COMMERCIAL